# Patient Record
Sex: FEMALE | Race: BLACK OR AFRICAN AMERICAN | NOT HISPANIC OR LATINO | URBAN - METROPOLITAN AREA
[De-identification: names, ages, dates, MRNs, and addresses within clinical notes are randomized per-mention and may not be internally consistent; named-entity substitution may affect disease eponyms.]

---

## 2021-09-07 ENCOUNTER — EMERGENCY (EMERGENCY)
Facility: HOSPITAL | Age: 51
LOS: 1 days | Discharge: HOME | End: 2021-09-07
Attending: EMERGENCY MEDICINE
Payer: COMMERCIAL

## 2021-09-07 VITALS
HEART RATE: 81 BPM | OXYGEN SATURATION: 98 % | DIASTOLIC BLOOD PRESSURE: 80 MMHG | RESPIRATION RATE: 20 BRPM | WEIGHT: 199.96 LBS | TEMPERATURE: 98 F | SYSTOLIC BLOOD PRESSURE: 151 MMHG | HEIGHT: 66 IN

## 2021-09-07 PROBLEM — Z00.00 ENCOUNTER FOR PREVENTIVE HEALTH EXAMINATION: Status: ACTIVE | Noted: 2021-09-07

## 2021-09-07 LAB
ABO RH CONFIRMATION: SIGNIFICANT CHANGE UP
ALBUMIN SERPL ELPH-MCNC: 3.6 G/DL — SIGNIFICANT CHANGE UP (ref 3.3–5.2)
ALP SERPL-CCNC: 49 U/L — SIGNIFICANT CHANGE UP (ref 40–120)
ALT FLD-CCNC: 15 U/L — SIGNIFICANT CHANGE UP
ANION GAP SERPL CALC-SCNC: 13 MMOL/L — SIGNIFICANT CHANGE UP (ref 5–17)
ANISOCYTOSIS BLD QL: SLIGHT — SIGNIFICANT CHANGE UP
APTT BLD: 27.8 SEC — SIGNIFICANT CHANGE UP (ref 27.5–35.5)
AST SERPL-CCNC: 45 U/L — HIGH
BASOPHILS # BLD AUTO: 0.06 K/UL — SIGNIFICANT CHANGE UP (ref 0–0.2)
BASOPHILS NFR BLD AUTO: 0.8 % — SIGNIFICANT CHANGE UP (ref 0–2)
BILIRUB SERPL-MCNC: <0.2 MG/DL — LOW (ref 0.4–2)
BLD GP AB SCN SERPL QL: SIGNIFICANT CHANGE UP
BUN SERPL-MCNC: 5.8 MG/DL — LOW (ref 8–20)
BURR CELLS BLD QL SMEAR: PRESENT — SIGNIFICANT CHANGE UP
CALCIUM SERPL-MCNC: 8.9 MG/DL — SIGNIFICANT CHANGE UP (ref 8.6–10.2)
CHLORIDE SERPL-SCNC: 104 MMOL/L — SIGNIFICANT CHANGE UP (ref 98–107)
CO2 SERPL-SCNC: 19 MMOL/L — LOW (ref 22–29)
CREAT SERPL-MCNC: 0.92 MG/DL — SIGNIFICANT CHANGE UP (ref 0.5–1.3)
DACRYOCYTES BLD QL SMEAR: SLIGHT — SIGNIFICANT CHANGE UP
ELLIPTOCYTES BLD QL SMEAR: SLIGHT — SIGNIFICANT CHANGE UP
EOSINOPHIL # BLD AUTO: 0.21 K/UL — SIGNIFICANT CHANGE UP (ref 0–0.5)
EOSINOPHIL NFR BLD AUTO: 2.6 % — SIGNIFICANT CHANGE UP (ref 0–6)
GIANT PLATELETS BLD QL SMEAR: PRESENT — SIGNIFICANT CHANGE UP
GLUCOSE SERPL-MCNC: 114 MG/DL — HIGH (ref 70–99)
HCG SERPL-ACNC: <4 MIU/ML — SIGNIFICANT CHANGE UP
HCT VFR BLD CALC: 20.3 % — CRITICAL LOW (ref 34.5–45)
HGB BLD-MCNC: 5.8 G/DL — CRITICAL LOW (ref 11.5–15.5)
HYPOCHROMIA BLD QL: SLIGHT — SIGNIFICANT CHANGE UP
INR BLD: 0.96 RATIO — SIGNIFICANT CHANGE UP (ref 0.88–1.16)
LYMPHOCYTES # BLD AUTO: 2.75 K/UL — SIGNIFICANT CHANGE UP (ref 1–3.3)
LYMPHOCYTES # BLD AUTO: 34.8 % — SIGNIFICANT CHANGE UP (ref 13–44)
MACROCYTES BLD QL: SLIGHT — SIGNIFICANT CHANGE UP
MAGNESIUM SERPL-MCNC: 2.2 MG/DL — SIGNIFICANT CHANGE UP (ref 1.6–2.6)
MANUAL SMEAR VERIFICATION: SIGNIFICANT CHANGE UP
MCHC RBC-ENTMCNC: 22.2 PG — LOW (ref 27–34)
MCHC RBC-ENTMCNC: 28.6 GM/DL — LOW (ref 32–36)
MCV RBC AUTO: 77.8 FL — LOW (ref 80–100)
MICROCYTES BLD QL: SLIGHT — SIGNIFICANT CHANGE UP
MONOCYTES # BLD AUTO: 0.48 K/UL — SIGNIFICANT CHANGE UP (ref 0–0.9)
MONOCYTES NFR BLD AUTO: 6.1 % — SIGNIFICANT CHANGE UP (ref 2–14)
MYELOCYTES NFR BLD: 0.9 % — HIGH (ref 0–0)
NEUTROPHILS # BLD AUTO: 4.25 K/UL — SIGNIFICANT CHANGE UP (ref 1.8–7.4)
NEUTROPHILS NFR BLD AUTO: 53.9 % — SIGNIFICANT CHANGE UP (ref 43–77)
NRBC # BLD: 2 /100 — HIGH (ref 0–0)
OVALOCYTES BLD QL SMEAR: SLIGHT — SIGNIFICANT CHANGE UP
PHOSPHATE SERPL-MCNC: 3.4 MG/DL — SIGNIFICANT CHANGE UP (ref 2.4–4.7)
PLAT MORPH BLD: NORMAL — SIGNIFICANT CHANGE UP
PLATELET # BLD AUTO: 400 K/UL — SIGNIFICANT CHANGE UP (ref 150–400)
POIKILOCYTOSIS BLD QL AUTO: SLIGHT — SIGNIFICANT CHANGE UP
POLYCHROMASIA BLD QL SMEAR: SLIGHT — SIGNIFICANT CHANGE UP
POTASSIUM SERPL-MCNC: 4.4 MMOL/L — SIGNIFICANT CHANGE UP (ref 3.5–5.3)
POTASSIUM SERPL-SCNC: 4.4 MMOL/L — SIGNIFICANT CHANGE UP (ref 3.5–5.3)
PROT SERPL-MCNC: 7.2 G/DL — SIGNIFICANT CHANGE UP (ref 6.6–8.7)
PROTHROM AB SERPL-ACNC: 11.2 SEC — SIGNIFICANT CHANGE UP (ref 10.6–13.6)
RBC # BLD: 2.61 M/UL — LOW (ref 3.8–5.2)
RBC # FLD: 21.2 % — HIGH (ref 10.3–14.5)
RBC BLD AUTO: ABNORMAL
SCHISTOCYTES BLD QL AUTO: SLIGHT — SIGNIFICANT CHANGE UP
SODIUM SERPL-SCNC: 136 MMOL/L — SIGNIFICANT CHANGE UP (ref 135–145)
TARGETS BLD QL SMEAR: SLIGHT — SIGNIFICANT CHANGE UP
VARIANT LYMPHS # BLD: 0.9 % — SIGNIFICANT CHANGE UP (ref 0–6)
WBC # BLD: 7.89 K/UL — SIGNIFICANT CHANGE UP (ref 3.8–10.5)
WBC # FLD AUTO: 7.89 K/UL — SIGNIFICANT CHANGE UP (ref 3.8–10.5)

## 2021-09-07 PROCEDURE — 76830 TRANSVAGINAL US NON-OB: CPT

## 2021-09-07 PROCEDURE — 85610 PROTHROMBIN TIME: CPT

## 2021-09-07 PROCEDURE — 36415 COLL VENOUS BLD VENIPUNCTURE: CPT

## 2021-09-07 PROCEDURE — P9016: CPT

## 2021-09-07 PROCEDURE — 86923 COMPATIBILITY TEST ELECTRIC: CPT

## 2021-09-07 PROCEDURE — 99220: CPT

## 2021-09-07 PROCEDURE — 86901 BLOOD TYPING SEROLOGIC RH(D): CPT

## 2021-09-07 PROCEDURE — 36430 TRANSFUSION BLD/BLD COMPNT: CPT

## 2021-09-07 PROCEDURE — G0378: CPT

## 2021-09-07 PROCEDURE — 86850 RBC ANTIBODY SCREEN: CPT

## 2021-09-07 PROCEDURE — 76830 TRANSVAGINAL US NON-OB: CPT | Mod: 26

## 2021-09-07 PROCEDURE — 99284 EMERGENCY DEPT VISIT MOD MDM: CPT | Mod: 25

## 2021-09-07 PROCEDURE — 76856 US EXAM PELVIC COMPLETE: CPT

## 2021-09-07 PROCEDURE — 76856 US EXAM PELVIC COMPLETE: CPT | Mod: 26,59

## 2021-09-07 PROCEDURE — 83735 ASSAY OF MAGNESIUM: CPT

## 2021-09-07 PROCEDURE — 84100 ASSAY OF PHOSPHORUS: CPT

## 2021-09-07 PROCEDURE — 86900 BLOOD TYPING SEROLOGIC ABO: CPT

## 2021-09-07 PROCEDURE — 36410 VNPNXR 3YR/> PHY/QHP DX/THER: CPT

## 2021-09-07 PROCEDURE — 85730 THROMBOPLASTIN TIME PARTIAL: CPT

## 2021-09-07 PROCEDURE — 80053 COMPREHEN METABOLIC PANEL: CPT

## 2021-09-07 PROCEDURE — 84702 CHORIONIC GONADOTROPIN TEST: CPT

## 2021-09-07 PROCEDURE — 85025 COMPLETE CBC W/AUTO DIFF WBC: CPT

## 2021-09-07 RX ORDER — DOCUSATE SODIUM 100 MG
1 CAPSULE ORAL
Qty: 30 | Refills: 0
Start: 2021-09-07 | End: 2021-10-06

## 2021-09-07 RX ORDER — MEDROXYPROGESTERONE ACETATE 150 MG/ML
2 INJECTION, SUSPENSION, EXTENDED RELEASE INTRAMUSCULAR
Qty: 60 | Refills: 0
Start: 2021-09-07 | End: 2021-10-06

## 2021-09-07 RX ORDER — FERROUS SULFATE 325(65) MG
1 TABLET ORAL
Qty: 30 | Refills: 0
Start: 2021-09-07 | End: 2021-10-06

## 2021-09-07 NOTE — CONSULT NOTE ADULT - SUBJECTIVE AND OBJECTIVE BOX
HPI:     51y   Last Menstrual Period -   presents with heavy vaginal bleeding w/ clots and feeling lightheaded and experiencing palpitations. Pt states that after her menses ended on schedule, she had an episode of heavy bleeding with multiple fist-size clots. She states that the bleeding then eased up but returned a couple days later accompanied by lightheadedness, feeling weak and like she would pass out. EMS was called and she was evaluated. She felt better and was not brought in to the ED. However, the bleeding returned heavily last night, and she passed multiple large clots. She reports using 7 pads overnight and thus presented to Urgent Care, from which she was sent to the ED this morning for evaluation. Currently, she is no longer bleeding. Of note, she was incidentally found to have large fibroids on an MRI for back pain this past April. At that time, she was completely asymptomatic. She followed up with a gyn who recommended surgery eval, and she has an appointment scheduled with an outside provider for tomorrow, although she would prefer to follow up with a [facility] physician.     PMHX; denies  PSHX; ankle surgery  POBHX; 1 FT , 1 sAb, 1 TOP  PGYNHX: h/o abnl pap s/p colpo, repeat nl. h/o chlamydia s/p tx; fibroid uterus; right ovarian pain  SOCIAL: denies alcohol, illicit drug, and smoking use  Allergies: No Known Allergies  MEDS: iron    Vital Signs Last 24 Hrs  T(C): 36.7 (07 Sep 2021 09:17), Max: 36.7 (07 Sep 2021 09:17)  T(F): 98.1 (07 Sep 2021 09:17), Max: 98.1 (07 Sep 2021 09:17)  HR: 72 (07 Sep 2021 10:28) (72 - 81)  BP: 130/60 (07 Sep 2021 10:28) (130/60 - 151/80)  RR: 18 (07 Sep 2021 10:28) (18 - 20)  SpO2: 98% (07 Sep 2021 10:28) (98% - 98%)     PHYSICAL EXAM:  Gen: well-appearing, NAD  Resp: breathing comfortably on RA while sitting. Mildly SOB on exertion.   ABDOMEN: Soft, Nontender, Nondistended; Fibroid uterus palpated - fundus at umbilicus  PELVIC:        EXTERNAL GENITALIA: Normal. No rashes or lesions noted.        SVE: multiple fibroids palpated; uterine fundus at umbilicus, c/w 19wk size uterus. No adnexal tenderness or masses appreciated. Cervix felt smooth without any masses appreciated.    LABS:                        5.8    7.89  )-----------( 400      ( 07 Sep 2021 10:36 )             20.3         136  |  104  |  5.8<L>  ----------------------------<  114<H>  4.4   |  19.0<L>  |  0.92    Ca    8.9      07 Sep 2021 10:36  Phos  3.4       Mg     2.2         TPro  7.2  /  Alb  3.6  /  TBili  <0.2<L>  /  DBili  x   /  AST  45<H>  /  ALT  15  /  AlkPhos  49      RADIOLOGY STUDIES:  < from: US Transvaginal (21 @ 13:04) >  FINDINGS:    Uterus: 13.9 cm x 9.3 cm x 11.4 cm. Anterior transmural leiomyoma, measuring 8.6 x 6.4 x 8.5 cm. Lateral uterine leiomyoma, measuring 4.1 x 3.0 x 3.5 cm. Left lateral intramural leiomyoma, measuring 4.0 x 2.3 x 2.4 cm. Posterior intramural leiomyomas, measuring 2.8 and 1.4 cm.  Endometrium: Not clearly visualized.    Right ovary: 3.0 cm x 1.6 cm x 2.4 cm. Within normallimits. Normal arterial and venous waveforms.  Left ovary: 4.0 cm x 1.4 cm x 2.2 cm. Within normal limits. Normal arterial and venous waveforms.    Fluid: None.    IMPRESSION:  Multiple uterine leiomyomas.    < end of copied text >

## 2021-09-07 NOTE — ED PROVIDER NOTE - ATTENDING CONTRIBUTION TO CARE
I personally saw the patient with the resident, and completed the key components of the history and physical exam. I then discussed the management plan with the resident.    50 y/o F with fibroids presents for dysfunctional uterine bleeding with large clots, no pain starting on 9/1 with SOB, lightheadedness and near syncope. She was hypotensive at urgent care, given 1L NS which improved BP. Does not have GYN in [place] - saw her GYN in [place] recently and has follow up appointment next week.    AP - well appearing, pale, RRR, lungs CTA B/L, abd soft, NT/ND, no LE edema or calf TTP, 2+ distal pulses.    Will evaluate for level of anemia, sono, GYN consult as needed, transfuse as needed.

## 2021-09-07 NOTE — ED PROVIDER NOTE - CARE PLAN
1 Principal Discharge DX:	DUB (dysfunctional uterine bleeding)  Secondary Diagnosis:	Anemia due to acute blood loss

## 2021-09-07 NOTE — ED ADULT NURSE NOTE - NSIMPLEMENTINTERV_GEN_ALL_ED
Implemented All Universal Safety Interventions:  [place] to call system. Call bell, personal items and telephone within reach. Instruct patient to call for assistance. Room bathroom lighting operational. Non-slip footwear when patient is off stretcher. Physically safe environment: no spills, clutter or unnecessary equipment. Stretcher in lowest position, wheels locked, appropriate side rails in place.

## 2021-09-07 NOTE — ED ADULT NURSE REASSESSMENT NOTE - NS ED NURSE REASSESS COMMENT FT1
Pt awake and alert, VSS afebrile, normal sinus on bedside cardiac monitor. Pt currently receiving 1st unit of PRBC no adverse reactions noted. Will continue to monitor.

## 2021-09-07 NOTE — ED ADULT NURSE NOTE - OBJECTIVE STATEMENT
51 year old female, PMHx of uterine fibroids, present to the ED with c/o heavy vaginal bleeding since Friday with large blood clots. Pt states that she was recently diagnosed with fibroids. Pt states that she is not having any abdominal pain or cramping. Pt states that she is using 6-7 pads/24hrs. Pt also reporting some dizziness and lightheadedness. Patient A/Ox4, respirations are even and non labored, NSR on the monitor, abdomen is soft and non tender, NAD noted.

## 2021-09-07 NOTE — ED CDU PROVIDER INITIAL DAY NOTE - PHYSICAL EXAMINATION
Gen: NAD, AOx3  Head: NCAT  Lung: no respiratory distress  CV: Normal perfusion  Abd: soft, NTND  MSK: No edema, no visible deformities, full range of motion in all 4 extremities  Neuro: No focal neurologic deficits  Skin: No rash   Psych: normal affect

## 2021-09-07 NOTE — ED PROVIDER NOTE - CLINICAL SUMMARY MEDICAL DECISION MAKING FREE TEXT BOX
51yF with fibroids presenting with AUB. Patient with continuous heavy bleeding with passage of clots. Denies pregnancy. Now symptomatic due to bleeding. Labs, T&S, U/S, possible transfusion.

## 2021-09-07 NOTE — ED ADULT NURSE REASSESSMENT NOTE - NS ED NURSE REASSESS COMMENT FT1
PT A&Ox4. PT received at 1915 from previous RN, documentation as noted.  PT completed 1 unit PRBCs at change of shift, VSS. Pt has no complaints of pain or adverse reaction.  PT able to make needs know.  PT able to ambulate.  ON portable cardiac monitor.  IVs patent and intact. Will continue to monitor.

## 2021-09-07 NOTE — ED PROVIDER NOTE - OBJECTIVE STATEMENT
51yF recently diagnosed with fibroids with no other pmh presenting with recurrent, heavy vaginal bleeding. Patient reports menses is normally regular, lasting 5 days, using 2-4 pads per day. LMP was 8/23-09/01 and per patient was normal. Bleeding stopped but then had new bleeding on 09/01 which was heavy with passage of clots the size of her palm. Describes being "drenched in blood". Continued to have bleeding with clots through Friday 09/01 and began to have light-headedness and dizziness. Friend described her as pale. Called EMS who took BP which per patient may have been low. Rechecked at scene and was ok. Did not go to hospital. Continued to have bleeding with clots through weekend and went through 7 pads from Mon night to Tuesday morning. Went to urgent care where she was found to be hypotensive. Received IVF at urgent care and BIBA to [facility]. With respect to gyn history, denies history of AUB, clotting issues. Had MRI for back pain in April 2021 which noted an enlarged uterus. F/u U/S showed 3 large fibroids. Pt follows with Gyn at [facility] in [place] and plan to see gyn surgeon in next week or so. Last sexually active 6 months ago. No history of STDs or STIs. Denies abd, chest, or pelvic pain. Endorsing some shortness of breath and lightheadedness.

## 2021-09-07 NOTE — CONSULT NOTE ADULT - ATTENDING COMMENTS
Agree with above. Encouraged to consider Provera QD and iron supplementation until definitive treatment.

## 2021-09-07 NOTE — ED CDU PROVIDER INITIAL DAY NOTE - OBJECTIVE STATEMENT
50yo female with PMH fibroids presenting with vaginal bleeding. States that LMP 8/23-8/27, however from 8/31 until today patient started vaginally bleeding. On Friday, patient started to have clots with vaginal bleeding, felt lightheaded, had palpitations, EMS came however patient did not wish to go to hospital at that time. Patient presents today because she feels lightheaded. Went through 7 pads overnight. States that she has stopped bleeding today.

## 2021-09-07 NOTE — ED PROVIDER NOTE - NS ED ROS FT
Gen: No fever, normal appetite  Resp: +shortness of breath  Cardiovascular: +lightheadness; No chest pain or palpitation  Gastroenteric: No nausea/vomiting, diarrhea, constipation  :  No change in urine output; no dysuria  GYN: vaginal bleeding; no pelvic pain  MS: No joint or muscle pain  Skin: No rashes  Neuro: No headache; no abnormal movements  Remainder negative, except as per the HPI

## 2021-09-07 NOTE — ED PROVIDER NOTE - PHYSICAL EXAMINATION
Gen: no acute distress  Head: normocephalic, atraumatic  EENT: EOMI, PERRLA, neck supple; pallor under eyes and tongue  Lung: no increased work of breathing, CTABL, no wheezing, rales, rhonchi  CV: normal s1/s2, rrr, 2+ radial pulses b/l  Abd: soft, non-tender, non-distended, no rebound tenderness or guarding; no CVA tenderness  MSK: No edema, no visible deformities, full range of motion in all 4 extremities  Gyn: blood on pad; no active bleeding on external exam  Neuro: No focal neurologic deficits  Skin: No rash   Psych: normal affect

## 2021-09-07 NOTE — ED ADULT NURSE NOTE - CHIEF COMPLAINT QUOTE
Assumed care of pt. Call light in reach. Bed in lowest position. Care of plan discussed with pt with pt agreeing to care of plan. Communication board updated. All questions answered. Assessment completed.    Vaginal bleeding since Friday, denies pregnancy, C/O lightheadedness.

## 2021-09-07 NOTE — ED CDU PROVIDER INITIAL DAY NOTE - MEDICAL DECISION MAKING DETAILS
50yo F with symptomatic anemia 2/2 vaginal bleeding from uterine fibroids, seen by ob/gyn, bleeding has stopped, will transfuse 2 units prbcs, dc home later with ob/gyn f/u. April Monzon DO

## 2021-09-07 NOTE — CONSULT NOTE ADULT - ASSESSMENT
51y evaluated for AUB in the setting of known large fibroids.    A/P  - transfusing 2u pRBCs  - not currently bleeding  - will send home with provera 20mg qd x30d  - recommend daily iron w/ vitC and stool softener  - Imaging appreciated  - cleared from a gyn perspective, to f/u w/ outpatient gyn. provided w/ a phone number for [facility]    discussed with Dr. Reid

## 2021-09-08 VITALS
HEART RATE: 83 BPM | SYSTOLIC BLOOD PRESSURE: 136 MMHG | TEMPERATURE: 99 F | DIASTOLIC BLOOD PRESSURE: 86 MMHG | RESPIRATION RATE: 18 BRPM | OXYGEN SATURATION: 99 %

## 2021-09-08 PROCEDURE — 99217: CPT

## 2021-09-08 RX ORDER — MEDROXYPROGESTERONE ACETATE 150 MG/ML
2 INJECTION, SUSPENSION, EXTENDED RELEASE INTRAMUSCULAR
Qty: 60 | Refills: 0
Start: 2021-09-08 | End: 2021-10-07

## 2021-09-08 NOTE — ED CDU PROVIDER DISPOSITION NOTE - ATTENDING CONTRIBUTION TO CARE
50 yo  with vaginal bleeding in observation for transfusion. no current bleeding. gyn cleared patient for discharge with her GYN started on provera daily vitamin c and iron supplementation

## 2021-09-08 NOTE — ED CDU PROVIDER DISPOSITION NOTE - NSFOLLOWUPINSTRUCTIONS_ED_ALL_ED_FT
please follow with GYN   please take prescribed medication as directed   please monitor for new or worsening symptoms    PROVERA 20mg  daily for the next 30 days   daily iron and vitamin C

## 2021-09-08 NOTE — ED CDU PROVIDER DISPOSITION NOTE - CLINICAL COURSE
52 yo female with vaginal bleeding placed in observation for transfusion. no current bleeding. gyn cleared patient for discharge with her GYN started on provera daily vitamin c and iron supplementation

## 2021-09-08 NOTE — CHART NOTE - NSCHARTNOTEFT_GEN_A_CORE
Outpatient Follow Up Scheduled with Dr. Mccullough on Thursday 9/9/21 at 12:15pm  [address] [address]  Phone: [phone]

## 2021-09-08 NOTE — ED CDU PROVIDER DISPOSITION NOTE - PATIENT PORTAL LINK FT
You can access the FollowMyHealth Patient Portal offered by [facility] by registering at the following website: [email or web address]. By joining [employer]s FollowMyHealth portal, you will also be able to view your health information using other applications (apps) compatible with our system.

## 2021-09-09 ENCOUNTER — APPOINTMENT (OUTPATIENT)
Dept: OBGYN | Facility: CLINIC | Age: 51
End: 2021-09-09

## 2021-09-30 PROBLEM — Z78.9 OTHER SPECIFIED HEALTH STATUS: Chronic | Status: ACTIVE | Noted: 2021-09-07

## 2021-10-15 ENCOUNTER — NON-APPOINTMENT (OUTPATIENT)
Age: 51
End: 2021-10-15

## 2021-10-15 ENCOUNTER — APPOINTMENT (OUTPATIENT)
Dept: OBGYN | Facility: CLINIC | Age: 51
End: 2021-10-15
Payer: COMMERCIAL

## 2021-10-15 VITALS
HEIGHT: 66 IN | WEIGHT: 206 LBS | BODY MASS INDEX: 33.11 KG/M2 | SYSTOLIC BLOOD PRESSURE: 118 MMHG | DIASTOLIC BLOOD PRESSURE: 70 MMHG

## 2021-10-15 DIAGNOSIS — Z83.3 FAMILY HISTORY OF DIABETES MELLITUS: ICD-10-CM

## 2021-10-15 DIAGNOSIS — Z86.018 PERSONAL HISTORY OF OTHER BENIGN NEOPLASM: ICD-10-CM

## 2021-10-15 DIAGNOSIS — N93.9 ABNORMAL UTERINE AND VAGINAL BLEEDING, UNSPECIFIED: ICD-10-CM

## 2021-10-15 DIAGNOSIS — Z78.9 OTHER SPECIFIED HEALTH STATUS: ICD-10-CM

## 2021-10-15 DIAGNOSIS — D25.9 LEIOMYOMA OF UTERUS, UNSPECIFIED: ICD-10-CM

## 2021-10-15 DIAGNOSIS — Z82.49 FAMILY HISTORY OF ISCHEMIC HEART DISEASE AND OTHER DISEASES OF THE CIRCULATORY SYSTEM: ICD-10-CM

## 2021-10-15 PROCEDURE — 99203 OFFICE O/P NEW LOW 30 MIN: CPT

## 2021-10-15 RX ORDER — MEDROXYPROGESTERONE ACETATE 10 MG/1
10 TABLET ORAL
Refills: 0 | Status: ACTIVE | COMMUNITY

## 2021-10-15 NOTE — PHYSICAL EXAM
[Appropriately responsive] : appropriately responsive [Alert] : alert [No Acute Distress] : no acute distress [Soft] : soft [Non-tender] : non-tender [Non-distended] : non-distended [No HSM] : No HSM [No Lesions] : no lesions [No Mass] : no mass [Oriented x3] : oriented x3 [Labia Majora] : normal [Labia Minora] : normal [Normal] : normal [Enlarged ___ wks] : enlarged [unfilled] ~Uweeks [Uterine Adnexae] : non-palpable

## 2021-10-15 NOTE — REASON FOR VISIT
[Initial] : an initial consultation for [FreeTextEntry2] : a fibroid uterus and heavy menstrual bleeding. The patient was seen in the  emergency room with the same complaint. She was found to be anemic requiring transfusion.

## 2021-10-15 NOTE — DISCUSSION/SUMMARY
[FreeTextEntry1] : The plan is to do a hysteroscopy, endometrial biopsy. Pending the results, treatment options will be discussed with the patient.

## 2021-10-15 NOTE — HISTORY OF PRESENT ILLNESS
[N] : Patient is not sexually active [Menarche Age: ____] : age at menarche was [unfilled] [Regular Cycle Intervals] : periods have been regular [Frequency: Q ___ days] : menstrual periods occur approximately every [unfilled] days [PGHxTotal] : 4 [PGHxFullTerm] : 0 [PGxPremature] : 1 [PGHxAbortions] : 3 [Barrow Neurological Instituteiving] : 1 [PGHxABInduced] : 0 [PGxABSpont] : 3 [PGHxEctopic] : 0 [PGHxMultBirths] : 0

## 2021-11-09 ENCOUNTER — EMERGENCY (EMERGENCY)
Facility: HOSPITAL | Age: 51
LOS: 1 days | Discharge: HOME | End: 2021-11-09
Attending: EMERGENCY MEDICINE
Payer: COMMERCIAL

## 2021-11-09 VITALS
HEART RATE: 102 BPM | HEIGHT: 66 IN | TEMPERATURE: 99 F | OXYGEN SATURATION: 100 % | DIASTOLIC BLOOD PRESSURE: 67 MMHG | SYSTOLIC BLOOD PRESSURE: 117 MMHG | RESPIRATION RATE: 22 BRPM | WEIGHT: 220.02 LBS

## 2021-11-09 PROCEDURE — 99283 EMERGENCY DEPT VISIT LOW MDM: CPT | Mod: 25

## 2021-11-09 PROCEDURE — 73564 X-RAY EXAM KNEE 4 OR MORE: CPT | Mod: 26,LT

## 2021-11-09 PROCEDURE — 99284 EMERGENCY DEPT VISIT MOD MDM: CPT

## 2021-11-09 PROCEDURE — 96372 THER/PROPH/DIAG INJ SC/IM: CPT

## 2021-11-09 PROCEDURE — 73564 X-RAY EXAM KNEE 4 OR MORE: CPT

## 2021-11-09 RX ORDER — KETOROLAC TROMETHAMINE 30 MG/ML
30 SYRINGE (ML) INJECTION ONCE
Refills: 0 | Status: DISCONTINUED | OUTPATIENT
Start: 2021-11-09 | End: 2021-11-09

## 2021-11-09 RX ADMIN — Medication 60 MILLIGRAM(S): at 16:55

## 2021-11-09 RX ADMIN — Medication 30 MILLIGRAM(S): at 15:29

## 2021-11-09 NOTE — ED PROVIDER NOTE - ATTENDING CONTRIBUTION TO CARE
Pt. awake and alert. Pt. with swelling to lateral aspect of left knee. I, Dr. Jin, performed a face to face bedside interview with this patient regarding history of present illness, review of symptoms and relevant past medical, social and family history.  I completed an independent physical examination.  I have also reviewed the ACP's note(s) and discussed the plan with the ACP.

## 2021-11-09 NOTE — ED PROVIDER NOTE - NSFOLLOWUPINSTRUCTIONS_ED_ALL_ED_FT
1. TAKE ALL MEDICATIONS AS DIRECTED.  FOR PAIN YOU CAN TAKE IBUPROFEN (MOTRIN, ADVIL) OR ACETAMINOPHEN (TYLENOL) AS NEEDED, AS DIRECTED ON PACKAGING.  2. FOLLOW UP WITH ___Dr. García _______ AS DIRECTED.  YOU WERE GIVEN COPIES OF ALL LABS AND IMAGING RESULTS FROM YOUR ER VISIT--PLEASE TAKE THEM WITH YOU TO YOUR APPOINTMENT.  3. IF NEEDED, CALL [phone] TO FIND A PRIMARY CARE PHYSICIAN.  OR CALL [phone] TO MAKE AN APPOINTMENT WITH THE MEDICAL CLINIC.  4. RETURN TO THE ER FOR ANY WORSENING SYMPTOMS.

## 2021-11-09 NOTE — ED ADULT TRIAGE NOTE - CHIEF COMPLAINT QUOTE
BIBA from home c/o  non traumatic L knee swelling and pain  since this morning. Pt states she is unable to put pressure on

## 2021-11-09 NOTE — ED PROVIDER NOTE - OBJECTIVE STATEMENT
50 yo F Pt, no PmHx, presents to ED complaining of L knee pain x 1 day. Pt states she has 10/10 L knee pain and swelling.  Pt admits to knee locking last night in bent position. Pt denies fever, chills, recent trauma, numbness, tingling, calf pain, weakness, and any other acute symptoms at this time.

## 2021-11-09 NOTE — ED PROVIDER NOTE - MUSCULOSKELETAL, MLM
Spine appears normal, range of motion is not limited, + TTP Of L knee medial joint line tenderness, + suprapatellar swelling

## 2021-11-09 NOTE — ED PROVIDER NOTE - CARE PROVIDER_API CALL
Migel García)  Orthopaedic Surgery  [address] [place] [address]  Phone: [phone]  Fax: [phone]  Follow Up Time:

## 2021-11-09 NOTE — ED PROVIDER NOTE - PATIENT PORTAL LINK FT
You can access the FollowMyHealth Patient Portal offered by [facility] by registering at the following website: [email or web address]. By joining [employer]’s FollowMyHealth portal, you will also be able to view your health information using other applications (apps) compatible with our system.

## 2021-11-09 NOTE — ED PROVIDER NOTE - PROGRESS NOTE DETAILS
pt placed in ace wrap and referred to [facility] Acute Ed read of Xray shows no acute fractures/ dislocation. PT aware if secondary reading of imaging changes they will be notified.

## 2021-11-15 ENCOUNTER — APPOINTMENT (OUTPATIENT)
Dept: OBGYN | Facility: CLINIC | Age: 51
End: 2021-11-15

## 2021-11-16 ENCOUNTER — TRANSCRIPTION ENCOUNTER (OUTPATIENT)
Age: 51
End: 2021-11-16

## 2021-11-16 ENCOUNTER — NON-APPOINTMENT (OUTPATIENT)
Age: 51
End: 2021-11-16

## 2021-11-17 ENCOUNTER — INPATIENT (INPATIENT)
Facility: HOSPITAL | Age: 51
LOS: 71 days | Discharge: NURSING, REHAB OR LONG-TERM CARE | DRG: 853 | End: 2022-01-28
Attending: SURGERY | Admitting: INTERNAL MEDICINE
Payer: COMMERCIAL

## 2021-11-17 ENCOUNTER — APPOINTMENT (OUTPATIENT)
Dept: ORTHOPEDIC SURGERY | Facility: CLINIC | Age: 51
End: 2021-11-17

## 2021-11-17 ENCOUNTER — TRANSCRIPTION ENCOUNTER (OUTPATIENT)
Age: 51
End: 2021-11-17

## 2021-11-17 VITALS
TEMPERATURE: 98 F | DIASTOLIC BLOOD PRESSURE: 72 MMHG | RESPIRATION RATE: 17 BRPM | WEIGHT: 195.99 LBS | SYSTOLIC BLOOD PRESSURE: 115 MMHG | HEART RATE: 110 BPM | OXYGEN SATURATION: 99 % | HEIGHT: 66 IN

## 2021-11-17 DIAGNOSIS — L03.116 CELLULITIS OF LEFT LOWER LIMB: ICD-10-CM

## 2021-11-17 DIAGNOSIS — Z98.890 OTHER SPECIFIED POSTPROCEDURAL STATES: Chronic | ICD-10-CM

## 2021-11-17 LAB
ACANTHOCYTES BLD QL SMEAR: SIGNIFICANT CHANGE UP
ALBUMIN SERPL ELPH-MCNC: 1.8 G/DL — LOW (ref 3.3–5.2)
ALP SERPL-CCNC: 124 U/L — HIGH (ref 40–120)
ALT FLD-CCNC: 29 U/L — SIGNIFICANT CHANGE UP
ANION GAP SERPL CALC-SCNC: 25 MMOL/L — HIGH (ref 5–17)
ANISOCYTOSIS BLD QL: SIGNIFICANT CHANGE UP
APPEARANCE UR: ABNORMAL
APTT BLD: 31.4 SEC — SIGNIFICANT CHANGE UP (ref 27.5–35.5)
AST SERPL-CCNC: 50 U/L — HIGH
BACTERIA # UR AUTO: ABNORMAL
BASE EXCESS BLDV CALC-SCNC: -15.4 MMOL/L — LOW (ref -2–3)
BASOPHILS # BLD AUTO: 0 K/UL — SIGNIFICANT CHANGE UP (ref 0–0.2)
BASOPHILS NFR BLD AUTO: 0 % — SIGNIFICANT CHANGE UP (ref 0–2)
BILIRUB SERPL-MCNC: 0.4 MG/DL — SIGNIFICANT CHANGE UP (ref 0.4–2)
BILIRUB UR-MCNC: ABNORMAL
BLD GP AB SCN SERPL QL: SIGNIFICANT CHANGE UP
BUN SERPL-MCNC: 142.5 MG/DL — HIGH (ref 8–20)
BURR CELLS BLD QL SMEAR: PRESENT — SIGNIFICANT CHANGE UP
CA-I SERPL-SCNC: 1.08 MMOL/L — LOW (ref 1.15–1.33)
CALCIUM SERPL-MCNC: 8.5 MG/DL — LOW (ref 8.6–10.2)
CHLORIDE BLDV-SCNC: 104 MMOL/L — SIGNIFICANT CHANGE UP (ref 98–107)
CHLORIDE SERPL-SCNC: 99 MMOL/L — SIGNIFICANT CHANGE UP (ref 98–107)
CHLORIDE UR-SCNC: <27 MMOL/L — SIGNIFICANT CHANGE UP
CO2 SERPL-SCNC: 10 MMOL/L — CRITICAL LOW (ref 22–29)
COLOR SPEC: YELLOW — SIGNIFICANT CHANGE UP
CREAT SERPL-MCNC: 3.8 MG/DL — HIGH (ref 0.5–1.3)
DACRYOCYTES BLD QL SMEAR: SLIGHT — SIGNIFICANT CHANGE UP
DIFF PNL FLD: ABNORMAL
EOSINOPHIL # BLD AUTO: 0 K/UL — SIGNIFICANT CHANGE UP (ref 0–0.5)
EOSINOPHIL NFR BLD AUTO: 0 % — SIGNIFICANT CHANGE UP (ref 0–6)
EPI CELLS # UR: SIGNIFICANT CHANGE UP
FERRITIN SERPL-MCNC: 388 NG/ML — HIGH (ref 15–150)
GAS PNL BLDA: SIGNIFICANT CHANGE UP
GAS PNL BLDV: 132 MMOL/L — LOW (ref 136–145)
GAS PNL BLDV: SIGNIFICANT CHANGE UP
GIANT PLATELETS BLD QL SMEAR: PRESENT — SIGNIFICANT CHANGE UP
GLUCOSE BLDV-MCNC: 190 MG/DL — HIGH (ref 70–99)
GLUCOSE SERPL-MCNC: 211 MG/DL — HIGH (ref 70–99)
GLUCOSE UR QL: 50 MG/DL
HCG SERPL-ACNC: <4 MIU/ML — SIGNIFICANT CHANGE UP
HCO3 BLDV-SCNC: 12 MMOL/L — LOW (ref 22–29)
HCT VFR BLD CALC: 23.5 % — LOW (ref 34.5–45)
HCT VFR BLDA CALC: 22 % — LOW (ref 34–46)
HGB BLD CALC-MCNC: 7.3 G/DL — LOW (ref 11.7–16.1)
HGB BLD-MCNC: 7.7 G/DL — LOW (ref 11.5–15.5)
HIV 1 & 2 AB SERPL IA.RAPID: SIGNIFICANT CHANGE UP
HYPOCHROMIA BLD QL: SLIGHT — SIGNIFICANT CHANGE UP
INR BLD: 1.44 RATIO — HIGH (ref 0.88–1.16)
IRON SATN MFR SERPL: <9 UG/DL — LOW (ref 37–145)
IRON SATN MFR SERPL: SIGNIFICANT CHANGE UP % (ref 14–50)
KETONES UR-MCNC: ABNORMAL
LACTATE BLDV-MCNC: 2.6 MMOL/L — HIGH (ref 0.5–2)
LACTATE BLDV-MCNC: 2.8 MMOL/L — HIGH (ref 0.5–2)
LEUKOCYTE ESTERASE UR-ACNC: ABNORMAL
LYMPHOCYTES # BLD AUTO: 2.43 K/UL — SIGNIFICANT CHANGE UP (ref 1–3.3)
LYMPHOCYTES # BLD AUTO: 5.2 % — LOW (ref 13–44)
MACROCYTES BLD QL: SIGNIFICANT CHANGE UP
MAGNESIUM SERPL-MCNC: 3.5 MG/DL — HIGH (ref 1.6–2.6)
MANUAL SMEAR VERIFICATION: SIGNIFICANT CHANGE UP
MCHC RBC-ENTMCNC: 19.7 PG — LOW (ref 27–34)
MCHC RBC-ENTMCNC: 32.8 GM/DL — SIGNIFICANT CHANGE UP (ref 32–36)
MCV RBC AUTO: 60.3 FL — LOW (ref 80–100)
METAMYELOCYTES # FLD: 1.7 % — HIGH (ref 0–0)
MONOCYTES # BLD AUTO: 0.79 K/UL — SIGNIFICANT CHANGE UP (ref 0–0.9)
MONOCYTES NFR BLD AUTO: 1.7 % — LOW (ref 2–14)
MYELOCYTES NFR BLD: 0.9 % — HIGH (ref 0–0)
NEUTROPHILS # BLD AUTO: 41.41 K/UL — HIGH (ref 1.8–7.4)
NEUTROPHILS NFR BLD AUTO: 80 % — HIGH (ref 43–77)
NEUTS BAND # BLD: 8.7 % — HIGH (ref 0–8)
NITRITE UR-MCNC: NEGATIVE — SIGNIFICANT CHANGE UP
NT-PROBNP SERPL-SCNC: 798 PG/ML — HIGH (ref 0–300)
OSMOLALITY UR: 374 MOSM/KG — SIGNIFICANT CHANGE UP (ref 300–1000)
OVALOCYTES BLD QL SMEAR: SLIGHT — SIGNIFICANT CHANGE UP
PCO2 BLDV: 26 MMHG — LOW (ref 39–42)
PH BLDV: 7.26 — LOW (ref 7.32–7.43)
PH UR: 5 — SIGNIFICANT CHANGE UP (ref 5–8)
PLAT MORPH BLD: NORMAL — SIGNIFICANT CHANGE UP
PLATELET # BLD AUTO: 648 K/UL — HIGH (ref 150–400)
PO2 BLDV: 113 MMHG — HIGH (ref 25–45)
POIKILOCYTOSIS BLD QL AUTO: SIGNIFICANT CHANGE UP
POLYCHROMASIA BLD QL SMEAR: SLIGHT — SIGNIFICANT CHANGE UP
POTASSIUM BLDV-SCNC: 5 MMOL/L — SIGNIFICANT CHANGE UP (ref 3.5–5.1)
POTASSIUM SERPL-MCNC: 5.2 MMOL/L — SIGNIFICANT CHANGE UP (ref 3.5–5.3)
POTASSIUM SERPL-SCNC: 5.2 MMOL/L — SIGNIFICANT CHANGE UP (ref 3.5–5.3)
POTASSIUM UR-SCNC: 59 MMOL/L — SIGNIFICANT CHANGE UP
PROMYELOCYTES # FLD: 0.9 % — HIGH (ref 0–0)
PROT SERPL-MCNC: 6.9 G/DL — SIGNIFICANT CHANGE UP (ref 6.6–8.7)
PROT UR-MCNC: 30 MG/DL
PROTHROM AB SERPL-ACNC: 16.4 SEC — HIGH (ref 10.6–13.6)
RBC # BLD: 3.9 M/UL — SIGNIFICANT CHANGE UP (ref 3.8–5.2)
RBC # FLD: 23.9 % — HIGH (ref 10.3–14.5)
RBC BLD AUTO: ABNORMAL
RBC CASTS # UR COMP ASSIST: ABNORMAL /HPF (ref 0–4)
SAO2 % BLDV: 98.5 % — SIGNIFICANT CHANGE UP
SARS-COV-2 RNA SPEC QL NAA+PROBE: SIGNIFICANT CHANGE UP
SCHISTOCYTES BLD QL AUTO: SLIGHT — SIGNIFICANT CHANGE UP
SODIUM SERPL-SCNC: 134 MMOL/L — LOW (ref 135–145)
SODIUM UR-SCNC: <30 MMOL/L — SIGNIFICANT CHANGE UP
SP GR SPEC: 1.02 — SIGNIFICANT CHANGE UP (ref 1.01–1.02)
TARGETS BLD QL SMEAR: SLIGHT — SIGNIFICANT CHANGE UP
TIBC SERPL-MCNC: 160 UG/DL — LOW (ref 220–430)
TRANSFERRIN SERPL-MCNC: 112 MG/DL — LOW (ref 192–382)
TROPONIN T SERPL-MCNC: <0.01 NG/ML — SIGNIFICANT CHANGE UP (ref 0–0.06)
UROBILINOGEN FLD QL: 1 MG/DL
VARIANT LYMPHS # BLD: 0.9 % — SIGNIFICANT CHANGE UP (ref 0–6)
WBC # BLD: 46.68 K/UL — CRITICAL HIGH (ref 3.8–10.5)
WBC # FLD AUTO: 46.68 K/UL — CRITICAL HIGH (ref 3.8–10.5)
WBC UR QL: ABNORMAL

## 2021-11-17 PROCEDURE — 71250 CT THORAX DX C-: CPT | Mod: 26,MA

## 2021-11-17 PROCEDURE — 74176 CT ABD & PELVIS W/O CONTRAST: CPT | Mod: 26,MA

## 2021-11-17 PROCEDURE — 99231 SBSQ HOSP IP/OBS SF/LOW 25: CPT | Mod: GC,25

## 2021-11-17 PROCEDURE — 71045 X-RAY EXAM CHEST 1 VIEW: CPT | Mod: 26,76

## 2021-11-17 PROCEDURE — 27603 DRAIN LOWER LEG LESION: CPT | Mod: GC

## 2021-11-17 PROCEDURE — 99243 OFF/OP CNSLTJ NEW/EST LOW 30: CPT

## 2021-11-17 PROCEDURE — 73562 X-RAY EXAM OF KNEE 3: CPT | Mod: 26,LT

## 2021-11-17 PROCEDURE — 99291 CRITICAL CARE FIRST HOUR: CPT

## 2021-11-17 PROCEDURE — 73700 CT LOWER EXTREMITY W/O DYE: CPT | Mod: 26,LT,MA

## 2021-11-17 PROCEDURE — 99253 IP/OBS CNSLTJ NEW/EST LOW 45: CPT | Mod: 57

## 2021-11-17 PROCEDURE — 93971 EXTREMITY STUDY: CPT | Mod: 26,LT

## 2021-11-17 PROCEDURE — 73610 X-RAY EXAM OF ANKLE: CPT | Mod: 26,LT

## 2021-11-17 PROCEDURE — 93010 ELECTROCARDIOGRAM REPORT: CPT

## 2021-11-17 PROCEDURE — 70450 CT HEAD/BRAIN W/O DYE: CPT | Mod: 26,MA

## 2021-11-17 PROCEDURE — 20680 REMOVAL OF IMPLANT DEEP: CPT | Mod: 59,LT

## 2021-11-17 PROCEDURE — 99221 1ST HOSP IP/OBS SF/LOW 40: CPT | Mod: GC,25

## 2021-11-17 PROCEDURE — 76770 US EXAM ABDO BACK WALL COMP: CPT | Mod: 26

## 2021-11-17 PROCEDURE — 27310 EXPLORATION OF KNEE JOINT: CPT | Mod: LT

## 2021-11-17 PROCEDURE — 99213 OFFICE O/P EST LOW 20 MIN: CPT | Mod: 25

## 2021-11-17 RX ORDER — CHLORHEXIDINE GLUCONATE 213 G/1000ML
15 SOLUTION TOPICAL EVERY 12 HOURS
Refills: 0 | Status: DISCONTINUED | OUTPATIENT
Start: 2021-11-17 | End: 2021-11-18

## 2021-11-17 RX ORDER — SODIUM CHLORIDE 9 MG/ML
1000 INJECTION INTRAMUSCULAR; INTRAVENOUS; SUBCUTANEOUS ONCE
Refills: 0 | Status: COMPLETED | OUTPATIENT
Start: 2021-11-17 | End: 2021-11-17

## 2021-11-17 RX ORDER — SODIUM CHLORIDE 9 MG/ML
1700 INJECTION, SOLUTION INTRAVENOUS ONCE
Refills: 0 | Status: COMPLETED | OUTPATIENT
Start: 2021-11-17 | End: 2021-11-17

## 2021-11-17 RX ORDER — HYDROMORPHONE HYDROCHLORIDE 2 MG/ML
1 INJECTION INTRAMUSCULAR; INTRAVENOUS; SUBCUTANEOUS ONCE
Refills: 0 | Status: DISCONTINUED | OUTPATIENT
Start: 2021-11-17 | End: 2021-11-17

## 2021-11-17 RX ORDER — KETOROLAC TROMETHAMINE 30 MG/ML
15 SYRINGE (ML) INJECTION ONCE
Refills: 0 | Status: DISCONTINUED | OUTPATIENT
Start: 2021-11-17 | End: 2021-11-17

## 2021-11-17 RX ORDER — SODIUM CHLORIDE 9 MG/ML
1000 INJECTION, SOLUTION INTRAVENOUS
Refills: 0 | Status: DISCONTINUED | OUTPATIENT
Start: 2021-11-17 | End: 2021-11-22

## 2021-11-17 RX ORDER — FENTANYL CITRATE 50 UG/ML
0.5 INJECTION INTRAVENOUS
Qty: 2500 | Refills: 0 | Status: DISCONTINUED | OUTPATIENT
Start: 2021-11-17 | End: 2021-11-18

## 2021-11-17 RX ORDER — NOREPINEPHRINE BITARTRATE/D5W 8 MG/250ML
0.05 PLASTIC BAG, INJECTION (ML) INTRAVENOUS
Qty: 8 | Refills: 0 | Status: DISCONTINUED | OUTPATIENT
Start: 2021-11-17 | End: 2021-11-19

## 2021-11-17 RX ORDER — MORPHINE SULFATE 50 MG/1
4 CAPSULE, EXTENDED RELEASE ORAL ONCE
Refills: 0 | Status: DISCONTINUED | OUTPATIENT
Start: 2021-11-17 | End: 2021-11-17

## 2021-11-17 RX ORDER — DEXMEDETOMIDINE HYDROCHLORIDE IN 0.9% SODIUM CHLORIDE 4 UG/ML
0.2 INJECTION INTRAVENOUS
Qty: 200 | Refills: 0 | Status: DISCONTINUED | OUTPATIENT
Start: 2021-11-17 | End: 2021-11-18

## 2021-11-17 RX ORDER — VANCOMYCIN HCL 1 G
1000 VIAL (EA) INTRAVENOUS ONCE
Refills: 0 | Status: COMPLETED | OUTPATIENT
Start: 2021-11-17 | End: 2021-11-17

## 2021-11-17 RX ORDER — VASOPRESSIN 20 [USP'U]/ML
0.04 INJECTION INTRAVENOUS
Qty: 50 | Refills: 0 | Status: DISCONTINUED | OUTPATIENT
Start: 2021-11-17 | End: 2021-11-18

## 2021-11-17 RX ORDER — PIPERACILLIN AND TAZOBACTAM 4; .5 G/20ML; G/20ML
3.38 INJECTION, POWDER, LYOPHILIZED, FOR SOLUTION INTRAVENOUS ONCE
Refills: 0 | Status: COMPLETED | OUTPATIENT
Start: 2021-11-17 | End: 2021-11-17

## 2021-11-17 RX ORDER — CHLORHEXIDINE GLUCONATE 213 G/1000ML
1 SOLUTION TOPICAL
Refills: 0 | Status: DISCONTINUED | OUTPATIENT
Start: 2021-11-17 | End: 2021-11-22

## 2021-11-17 RX ORDER — PIPERACILLIN AND TAZOBACTAM 4; .5 G/20ML; G/20ML
3.38 INJECTION, POWDER, LYOPHILIZED, FOR SOLUTION INTRAVENOUS EVERY 12 HOURS
Refills: 0 | Status: DISCONTINUED | OUTPATIENT
Start: 2021-11-17 | End: 2021-11-19

## 2021-11-17 RX ORDER — SODIUM CHLORIDE 9 MG/ML
1000 INJECTION, SOLUTION INTRAVENOUS ONCE
Refills: 0 | Status: COMPLETED | OUTPATIENT
Start: 2021-11-17 | End: 2021-11-17

## 2021-11-17 RX ORDER — SODIUM BICARBONATE 1 MEQ/ML
0.21 SYRINGE (ML) INTRAVENOUS
Qty: 150 | Refills: 0 | Status: DISCONTINUED | OUTPATIENT
Start: 2021-11-17 | End: 2021-11-17

## 2021-11-17 RX ADMIN — MORPHINE SULFATE 4 MILLIGRAM(S): 50 CAPSULE, EXTENDED RELEASE ORAL at 14:46

## 2021-11-17 RX ADMIN — Medication 15 MILLIGRAM(S): at 13:12

## 2021-11-17 RX ADMIN — HYDROMORPHONE HYDROCHLORIDE 1 MILLIGRAM(S): 2 INJECTION INTRAMUSCULAR; INTRAVENOUS; SUBCUTANEOUS at 20:25

## 2021-11-17 RX ADMIN — SODIUM CHLORIDE 1700 MILLILITER(S): 9 INJECTION, SOLUTION INTRAVENOUS at 18:09

## 2021-11-17 RX ADMIN — HYDROMORPHONE HYDROCHLORIDE 1 MILLIGRAM(S): 2 INJECTION INTRAMUSCULAR; INTRAVENOUS; SUBCUTANEOUS at 20:56

## 2021-11-17 RX ADMIN — MORPHINE SULFATE 4 MILLIGRAM(S): 50 CAPSULE, EXTENDED RELEASE ORAL at 16:00

## 2021-11-17 RX ADMIN — SODIUM CHLORIDE 1700 MILLILITER(S): 9 INJECTION, SOLUTION INTRAVENOUS at 14:47

## 2021-11-17 RX ADMIN — Medication 15 MILLIGRAM(S): at 14:40

## 2021-11-17 RX ADMIN — Medication 125 MEQ/KG/HR: at 17:09

## 2021-11-17 RX ADMIN — MORPHINE SULFATE 4 MILLIGRAM(S): 50 CAPSULE, EXTENDED RELEASE ORAL at 13:12

## 2021-11-17 RX ADMIN — SODIUM CHLORIDE 1000 MILLILITER(S): 9 INJECTION INTRAMUSCULAR; INTRAVENOUS; SUBCUTANEOUS at 14:13

## 2021-11-17 RX ADMIN — Medication 250 MILLIGRAM(S): at 16:00

## 2021-11-17 RX ADMIN — PIPERACILLIN AND TAZOBACTAM 200 GRAM(S): 4; .5 INJECTION, POWDER, LYOPHILIZED, FOR SOLUTION INTRAVENOUS at 14:48

## 2021-11-17 RX ADMIN — PIPERACILLIN AND TAZOBACTAM 3.38 GRAM(S): 4; .5 INJECTION, POWDER, LYOPHILIZED, FOR SOLUTION INTRAVENOUS at 16:00

## 2021-11-17 RX ADMIN — MORPHINE SULFATE 4 MILLIGRAM(S): 50 CAPSULE, EXTENDED RELEASE ORAL at 14:40

## 2021-11-17 RX ADMIN — SODIUM CHLORIDE 1000 MILLILITER(S): 9 INJECTION INTRAMUSCULAR; INTRAVENOUS; SUBCUTANEOUS at 16:00

## 2021-11-17 RX ADMIN — Medication 1000 MILLIGRAM(S): at 17:09

## 2021-11-17 NOTE — ED ADULT NURSE NOTE - CHIEF COMPLAINT QUOTE
left leg swelling, seen at [facility] on 11/9 for same, pain and swelling has gotten significantly worse and pt is unable to walk.  50 mcg fentanyl given by EMS.  #22 L Ac present on arrival.

## 2021-11-17 NOTE — ED PROVIDER NOTE - PHYSICAL EXAMINATION
Gen: NAD, AOx3  Head: NCAT  HEENT: EOMI, oral mucosa moist, normal conjunctiva, neck supple  Lung: CTAB, mild tachypnea  CV: tachy regular, no murmur, Normal perfusion  Abd: soft, NTND  MSK: +3 Lt LE pitting edema circumfrential from foot to thigh, no visible deformities  Neuro: No focal neurologic deficits  Skin: No rash   Psych: normal affect Gen: NAD, AOx3  Head: NCAT  HEENT: EOMI, oral mucosa moist, normal conjunctiva, neck supple  Lung: CTAB, mild tachypnea  CV: tachy regular, no murmur, Normal perfusion  Abd: soft, NTND  MSK: +3 Lt LE pitting edema circumfrential from foot to thigh, no visible deformities  Neuro: No focal neurologic deficits  Skin: increased warmth to left leg with some erythema to calf    Psych: normal affect

## 2021-11-17 NOTE — ED ADULT TRIAGE NOTE - WEIGHT IN KG
pain control/gait training physical therapy at rehab. prevent symptoms. stay hydrated.   change positions very slowly  can try compression stockings while sitting in chair or walking.   sleep with head of bed elevated. 88.9

## 2021-11-17 NOTE — CHART NOTE - NSCHARTNOTEFT_GEN_A_CORE
pt was admitted to hospitalist service but pt  was taken to OR urgently for necrotizing soft tissue infection prior to being evaluated. Post operatively pt with worsening acidosis and anemia, transferred to SICU for further management.

## 2021-11-17 NOTE — ED PROVIDER NOTE - PROGRESS NOTE DETAILS
patient with significant leukocytosis and bandemia, will send lactate and cultures, abx ordered, will give additional fluids. also with acute renal failure. unable to get CTA. will need V/Q but likely start hep pending duplex LE. now does recall 1 day of subjective fever/chills 1 week ago prior to start of leg pain/swelling. now notes some mild lower abd pain but no diarreha. will ct abd/pelvis. ct lower extremity- no obvious cellulitis no crepitus eval for deep space infection -Jose DO patient slightly somnolent arouses to verbal stimuli but slow to answer questions, moving all extremities except for lt leg due to pain and swelling. priority CT called for urgent evaluation -Jose HENDRICKS ortho and gen surg consulted -Jose HENDRICKS patient slightly somnolent arouses to verbal stimuli but slow to answer questions, moving all extremities except for lt leg due to pain and swelling. priority CT called for urgent evaluation. left leg more erythematous, signifcant diffuse ttp, no pain with passive ROM of toes/ankle.  -Slowey DO signed off by surgery no acute intervention, pending ortho eval. will admit medicine -Jose DO patient with increased swelling/redness/pain. surgery to re-eval, currently admitted under medicine service -Jose HENDRICKS

## 2021-11-17 NOTE — ED CLERICAL - DIVISION
[facility] Vaccine Information Statement(s) was given today. This has been reviewed, questions answered, and verbal consent given by Parent for injection(s) and administration of Influenza (Inactivated).      Patient tolerated without incident. See immunization grid for documentation.

## 2021-11-17 NOTE — ED PROVIDER NOTE - CARE PLAN
Principal Discharge DX:	Cellulitis of left lower extremity  Secondary Diagnosis:	Severe sepsis  Secondary Diagnosis:	Acute kidney failure  Secondary Diagnosis:	Bandemia   1

## 2021-11-17 NOTE — ED ADULT NURSE REASSESSMENT NOTE - NS ED NURSE REASSESS COMMENT FT1
Surgery team and ortho team at bedside.  Team made aware that pain, swelling and redness and increased significantly since arrival 8 hours ago.

## 2021-11-17 NOTE — H&P ADULT - ASSESSMENT
51 year old female with no significant past medical history presenting with pain and swelling of left lower extremity for the past few days without improvement.   concern for necrotizing soft tissue infection/fasciitis  given rapid progression of swelling and blistering of skin near the ankle    -urgent OR for left lower extremity exploration, incision/drainage, debridement  -ortho colleages to evaluate hardware in left ankle as source of infection  -npo/iv fluids  -iv antibiotics  -possible SICU post op

## 2021-11-17 NOTE — ED ADULT NURSE NOTE - CAS EDN DISCHARGE INTERVENTIONS
left leg elevated/Arm band on/IV intact/Indwelling catheter secured and draining/Admission wristband placed

## 2021-11-17 NOTE — CHART NOTE - NSCHARTNOTEFT_GEN_A_CORE
Pt evaluated in ED by me 19:30 11/17/21  severe LLE pain with increasing swelling, redness per patient  nurse confirms worsening erythema and edema over last 90 minutes  Bullae noted left ankle with limited mobility of ankle due to pain  exquisitely tender to palpation, duplex neg for DVT  no crepitus but concerning for ascending soft tissue infection  WBC 46, BE -15, Hgb 7, lactate 10, CRT 3.5  Vanc, Zosyn-will add clindamycin  tachycardia 100-110, -120  diffuse edema LLE per CT , no gas in tissue  Ortho also as bedside  proceed to OR emergently for excision, debridement, drainage and indicated procedures  explained all risks and benefits to patient not excluding loss of limb or death  patient stated she understood and wished to proceed with surgery

## 2021-11-17 NOTE — CONSULT NOTE ADULT - SUBJECTIVE AND OBJECTIVE BOX
ACUTE CARE SURGERY CONSULT     HPI: 51y Female present to ED with left leg swelling, erythema, pain. Patient endorses she had left knee pain for 1 week presented 3 days ago to ED  where she states was given an steroid shot, and ibuprofen. however pain and edema worsened. Patient states she wasnt feeling herself today reason why she came. Endorses chills initially with pain 3 days ago but none since, denies history of trauma, insect bites, recent interventions, or injections to the area, contact with ticks, history of diabetes.      ROS: 10-system review is otherwise negative except HPI above.      PAST MEDICAL & SURGICAL HISTORY:  No pertinent past medical history    Left ankle Orif      FAMILY HISTORY:  FH: HTN (hypertension) (Mother)      Family history not pertinent as reviewed with the patient.    SOCIAL HISTORY:  Denies any toxic habits    ALLERGIES: NKA No Known Allergies      HOME MEDICATIONS: ***  Home Medications:    None  --------------------------------------------------------------------------------------------    PHYSICAL EXAM:   General: NAD,  uncomfortale  Neuro: A+Ox3  HEENT: EOMI, PERRLA, MMM  Cardio: RRR  Resp: Non labored breathing on RA  GI/Abd: Soft, NT/ND, no rebound/guarding, no masses palpated  Vascular: All 4 extremities warm and well perfused.   Pelvis: stable  Musculoskeletal: All 4 extremities moving spontaneously, nLeft lower extremoity edema erythema, there is no crepitus or tracking, pulses are palpable, marked knee edema  --------------------------------------------------------------------------------------------    LABS                 7.7    46.68  )----------(  648       ( 2021 13:21 )               23.5      134    |  99     |  142.5  ----------------------------<  211        ( 2021 13:21 )  5.2     |  10.0   |  3.80     Ca    8.5        ( 2021 13:21 )  Mg     3.5       ( 2021 13:21 )    TPro  6.9    /  Alb  1.8    /  TBili  0.4    /  DBili  x      /  AST  50     /  ALT  29     /  AlkPhos  124    ( 2021 13:21 )    LIVER FUNCTIONS - ( 2021 13:21 )  Alb: 1.8 g/dL / Pro: 6.9 g/dL / ALK PHOS: 124 U/L / ALT: 29 U/L / AST: 50 U/L / GGT: x           PT/INR -  16.4 sec / 1.44 ratio   ( 2021 13:21 )       PTT -  31.4 sec   ( 2021 13:21 )  CAPILLARY BLOOD GLUCOSE    CARDIAC MARKERS ( 2021 13:21 )  x     / <0.01 ng/mL / 439 U/L / x     / 10.8 ng/mL      Urinalysis Basic - ( 2021 16:46 )    Color: Yellow / Appearance: Cloudy / S.020 / pH: x  Gluc: x / Ketone: Trace  / Bili: Small / Urobili: 1 mg/dL   Blood: x / Protein: 30 mg/dL / Nitrite: Negative   Leuk Esterase: Trace / RBC: 6-10 /HPF / WBC 11-25   Sq Epi: x / Non Sq Epi: Few / Bacteria: Moderate        14:44 - VBG - pH: 7.260 | pCO2: 26    | pO2: 113   | Lactate: 2.80     --------------------------------------------------------------------------------------------  IMAGING  CT with diffuse soft tissue uptake

## 2021-11-17 NOTE — ED ADULT NURSE NOTE - NSICDXPASTSURGICALHX_GEN_ALL_CORE_FT
PAST SURGICAL HISTORY:  No significant past surgical history      PAST SURGICAL HISTORY:  History of ankle surgery

## 2021-11-17 NOTE — ED ADULT NURSE NOTE - OBJECTIVE STATEMENT
assumed pt care at 1249. Pt was seen in ED on Nov 9 for left knee swelling and pain. Discharged and made follow up appt with ortho which is scheduled for today. Entire left leg including foot became more painful and swollen over the last 3 days.  Also c/o some SOB especially upon exertion.  Oxygen saturation 100% on room air.  tachycardic 109.

## 2021-11-17 NOTE — H&P ADULT - HISTORY OF PRESENT ILLNESS
HPI: 51y Female present to ED with left leg swelling, erythema, pain. Patient endorses she had left knee pain for 1 week presented 3 days ago to ED  where she states was given an steroid shot, and ibuprofen. however pain and edema worsened. Patient states she wasnt feeling herself today reason why she came. Endorses chills initially with pain 3 days ago but none since, denies history of trauma, insect bites, recent interventions, or injections to the area, contact with ticks, history of diabetes.

## 2021-11-17 NOTE — CONSULT NOTE ADULT - ATTENDING COMMENTS
pt seen and examined. rapidly increasing swelling, erythema, with formation of bullae. Pt /w L fibula plate and effusion on US. Unable to range knee due to pain and diffuse soft tissue swelling. Unable to aspirate knee due to cellulitis and probable nec fasc. Trauma taking pt to OR for radical debridement emergently, will perform possible L ankle MASON and Knee washout. WBC 46k, CRP >422, very high probability of nec fasc. post op check
51y Female present to ED with left leg swelling, erythema, pain. Patient endorses she had left knee pain for 1 week presented 3 days ago to ED  where she states was given an steroid shot, and ibuprofen. however pain and edema worsened. Patient states she wasn't feeling herself today reason why she came. Endorses chills initially with pain 3 days ago but none since, denies history of trauma, insect bites, recent interventions, or injections to the area, contact with ticks, history of diabetes.  Leukocytosis and L shift. CT leg reviewed  Renal function impaired due to pre renal causes and dehydration/volume deficit.    On examination, left leg swelling from just above the knee down, consistent with severe cellulitis/edema. Palpable and doppler strong pulses. popliteal/DP PT.  Dorsiflexion impaired due to dorsum foot swelling/ but sensation preserved.  1. Patient has severe cellulitis of the soft tissues but NO necrotizing fasciitis or myonecrosis  2. Blood flow is preserved  3. Possible DVT underlying, but doubt it. US pending  In summary, this patient has severe cellulitis of the superficial tissues and knee effusion. This may either respond to conservative measures, or worsen   Local worsening in sense of swelling, vascular deficit or beeper infection. In addition, septic hyperdynamic picture may worsen, all these necessitating drainage and fasciotomy with possible knee drainage.  Dr Hogan will follow patient through the night

## 2021-11-17 NOTE — PROGRESS NOTE ADULT - SUBJECTIVE AND OBJECTIVE BOX
Pt Name: LUIS FERNANDO DAVIS    MRN: [id number]      Patient is a 51y Female presenting to the emergency department with a chief complaint of left lower extremity swelling.  Patient states she came to [facility] ER on 11/9 for left knee pain and swelling.  Patient denied any recent accident, injury or trauma, negative radiographs.  Patient received an injection of toradol,  was placed in an ACE bandage and given outpatient ortho number for follow up.  Today patient return to ER with increased pain to left lower extremity, and limited range of motion, and erythema to left leg. States the swelling has increased rapidly over the last several days.   Pt still denies  recent trauma, but states she has had worsening leg pain and swelling.  States she "didn't feel right"  Trauma service att bedside during exam   REVIEW OF SYSTEMS    General: Alert, responsive, in NAD    Skin/Breast: No rashes, no pruritis   	  Ophthalmologic: No visual changes. No redness.   	  ENMT:	No discharge. No swelling.    Respiratory and Thorax: No difficulty breathing. No cough.  	   Cardiovascular:	No chest pain. No palpitations.    Gastrointestinal:	 No abdominal pain. No diarrhea.     Genitourinary: Del Toro in place    Musculoskeletal: SEE HPI.    Neurological: No sensory or motor changes.       PAST MEDICAL & SURGICAL HISTORY:  PAST MEDICAL & SURGICAL HISTORY:  No pertinent past medical history    No significant past surgical history        Allergies: No Known Allergies      Medications: sodium bicarbonate  Infusion 0.211 mEq/kG/Hr IV Continuous <Continuous>      FAMILY HISTORY:  FH: HTN (hypertension) (Mother)    : non-contributory    Social History:              7.7    46.68 )-----------( 648      ( 17 Nov 2021 13:21 )             23.5       11-17    134<L>  |  99  |  142.5<H>  ----------------------------<  211<H>  5.2   |  10.0<LL>  |  3.80<H>    Ca    8.5<L>      17 Nov 2021 13:21  Mg     3.5     11-17    TPro  6.9  /  Alb  1.8<L>  /  TBili  0.4  /  DBili  x   /  AST  50<H>  /  ALT  29  /  AlkPhos  124<H>  11-17      Vital Signs Last 24 Hrs  T(C): 36.9 (17 Nov 2021 17:15), Max: 37.2 (17 Nov 2021 14:38)  T(F): 98.4 (17 Nov 2021 17:15), Max: 98.9 (17 Nov 2021 14:38)  HR: 100 (17 Nov 2021 17:15) (64 - 110)  BP: 121/58 (17 Nov 2021 17:15) (115/72 - 121/58)  BP(mean): --  RR: 18 (17 Nov 2021 17:15) (17 - 18)  SpO2: 99% (17 Nov 2021 17:15) (99% - 100%)    Daily Height in cm: 167.64 (17 Nov 2021 11:42)    Daily       PHYSICAL EXAM:      Appearance: Alert, responsive, in no acute distress.    Neurological: Sensation is grossly intact to light touch Left lower extremity. Limited ankle range of motion, patient is unable to flex, and ankle  knee secondary to swelling.    Skin: no rash on visible skin. Skin is clean, dry and intact. No bleeding. No abrasions. No ulcerations.    Vascular: 2+ distal pulses. Cap refill < 2 sec. No signs of venous insufficiency or stasis. No extremity ulcerations. No cyanosis.    Musculoskeletal:         Left Lower Extremity: Sensation is grossly intact to light touch Left lower extremity. Limited ankle range of motion, patient is unable to flex,  knee secondary to swelling.  EHL/FHL intact.   LLE swellling encompasses  left foot.  Palpable distal pulse.   swelling noted to left knee, cellulitis along entire left lower extremity.   pitting edema noted at calf.     Imaging Studies:  < from: CT Lower Extremity No Cont, Left (11.17.21 @ 15:30) >   EXAM:  CT LWR EXT LT                          PROCEDURE DATE:  11/17/2021          INTERPRETATION:  CT LOWER EXTREMITY LEFT    HISTORY: Sepsis with left leg swelling. Pain.    TECHNIQUE: Contiguous axial imaging was performed through the left lower extremity from the hip to the foot. Coronal and sagittal reformatting was utilized.    COMPARISON:  Left knee x-rays dated November 9, 2021 and pelvic ultrasound dated September 7, 2021.    FINDINGS    OSSEOUS STRUCTURES    Fractures:  None.    Postoperative Changes: Lateral malleolus surgical plate and lag screw are present. No lucency is appreciated around the hardware.    VISUALIZED PELVIC JOINTS    Symphysis Pubis:  Preserved.    Visualized Sacroiliac Joint:  Mild arthrosis is noted.    HIP JOINTS    Joint Space(s):  Maintained.    KNEE JOINTS    Joint Space(s):  Partially imaged right knee demonstrate mild medial compartment joint space narrowing. The left knee demonstrates moderate to severe medial compartment joint space narrowing with small marginal osteophytes. Small subchondral cysts are noted within the left patella.    Effusion:  Moderate to large left knee joint effusion is present.    LEFT ANKLE/FOOT JOINTS    Joint Space(s):  Maintained.    SOFT TISSUES    Neurovascular:  Unremarkable.    Pelvis/Abdomen:  Large presumed uterine leiomyoma is partially imaged measuring 10.9 x 11.8 cm axially. Borderline sized pelvic, iliac chain, and inguinal lymph nodes are noted that are larger on the left.    Musculature:  Mild intermuscular fascial edema is present around the musculature of the thigh and lower leg with posterior predominance.    Subcutaneous Tissues:  Moderate to severe subcutaneous edema is present throughout the left leg. Cutaneous thickening is also present. No abscess collection or gas is appreciated.    IMPRESSION:  1. Moderate-to-severe subcutaneous edema without gas or appreciated abscess on this noncontrast exam.  2. Intermuscular fascial edema is nonspecific and may be related to fluid imbalance or nonspecific fasciitis. There is no associated gas or discrete fluid collection.  3. Left knee osteoarthritis with moderate to large nonspecific suprapatellar effusion. Consider arthrocentesis as warranted.  4. Large presumed uterine leiomyoma is partially imaged measuring 10.9 x 11.8 cm axially.  5. Borderline sized pelvic and inguinal lymph nodes.    --- End of Report ---      KIERAN LAST MD; Attending Radiologist  This document has been electronically signed. Nov 17 2021  4:03PM    < end of copied text >  < from: CT Abdomen and Pelvis No Cont (11.17.21 @ 15:30) >   EXAM:  CT ABDOMEN AND PELVIS                         EXAM:  CT CHEST                          PROCEDURE DATE:  11/17/2021          INTERPRETATION:  CLINICAL INFORMATION: Sepsis    COMPARISON: None.    CONTRAST/COMPLICATIONS:  IV Contrast: NONE  Oral Contrast: NONE  Complications: None reported at time of study completion    PROCEDURE:  CT of the Chest, Abdomen and Pelvis was performed.  Sagittal and coronal reformats were performed.    FINDINGS:  CHEST:  LUNGS AND LARGE AIRWAYS: Patent centralairways. No pulmonary nodules.  PLEURA: No pleural effusion.  VESSELS: Within normal limits.  HEART: Heart size is normal. No pericardial effusion.  MEDIASTINUM AND RICHARD: No lymphadenopathy.  CHEST WALL AND LOWER NECK: Fluid in the right axilla and adjacent to the pectoralis and scapularis musculature. This may be iatrogenic. Has there been recent placement of a central line? If not, this may be related to inflammation or trauma.    ABDOMEN AND PELVIS:    Motion artifact limits evaluation of the mid abdomen.  LIVER: Within normal limits.  BILE DUCTS: Normal caliber.  GALLBLADDER: Distended gallbladder. Grossly unremarkable.  SPLEEN: Within normal limits.  PANCREAS: Within normal limits.  ADRENALS: Within normal limits.  KIDNEYS/URETERS: No calcifications. Limited evaluation for hydronephrosis due to motion artifact. None is seen with certainty. The left ureter and ovarian vein is limited in evaluation due to motion artifact and without contrast.    BLADDER: Within normal limits.  REPRODUCTIVE ORGANS: Uterus is markedly enlarged with heterogeneous central region. This may represent a large leiomyoma or gross thickening of the endometrial cavity. Please correlate clinically.    BOWEL: No bowel obstruction. Appendix within normal limits.  PERITONEUM: No ascites.  VESSELS: Within normal limits.  RETROPERITONEUM/LYMPH NODES: 1.4 x 2.9 cm left obturator internus questionable left obturator internus lymph node measuring 1.5 x 1.7 cm on series 3 image 262. Torturous vessel may also be within the differential diagnosis./external iliac lymph node on series 3 image 270. Small lymph nodes in the left inguinal region measuring up to 1.2 x 2.2 cm.  ABDOMINAL WALL: Subcutaneous edema the left side. Limited evaluation of vascular structures without intravenous contrast.  BONES: Within normal limits.    IMPRESSION:  No lung consolidation or source of infection visualized.    Mildly enlarged lymph nodes in the left obturator internus/external iliac region. This is limited in evaluation without intravenous contrast. This may be infectious or inflammatory although if there is a primary neoplasm, metastatic disease could not be excluded.    Enlarged uterus with central heterogeneity. This may represent a large leiomyoma versus a thickened endometrium from endometrial carcinoma. Unless there is a known history of large fibroids, pelvic MRI would be recommended. Clinical correlation is suggested.    Fluid in the right axillary region and adjacent to the pectoralis and scapular musculature. If patient had recent central line, this may be iatrogenic. If not, inflammation or infection cannot be excluded. Subcutaneous edema in the left thigh of uncertain etiology. I cannot assess for DVT without intravenous contrast.        --- End of Report ---    < from: Xray Knee w/Patella 4 View, Left (11.09.21 @ 16:02) >   EXAM:  XR KNEE W PATELLA 4+ VIEWS LT                          PROCEDURE DATE:  11/09/2021          INTERPRETATION:  Clinical history: 51 year old female, pain.    Three views of the left knee without comparison demonstrate minimal degenerative change with no fracture, dislocation, suprapatellar effusion/radiographic soft tissue abnormality.    IMPRESSION:  Minimal OA with no acute radiographic findings, if there is continued clinical concern follow-up MRI can be ordered    --- End of Report ---  JESSICA BYRNE DO; Attending Radiologist  This document has been electronically signed. Nov 10 2021 10:29AM    JORGE L SERVIN MD; Attending Radiologist  This document has been electronically signed. Nov 17 2021  4:43PM    A/P:  Pt is a  51y Female with diffuse  Left lower extremity swelling.    PLAN:   *Patient was taken to ultrasound after ortho exam.   follow up ultrasound  Awaiting plan from Dr Tobias

## 2021-11-17 NOTE — CONSULT NOTE ADULT - SUBJECTIVE AND OBJECTIVE BOX
Chief Complaint: leg swelling.    · Chief Complaint: The patient is a 51y Female complaining of    · HPI Objective Statement:  Per ED :         52yo AA F with h/o fibroids previously on hormone,  stopped 1 month ago, 1 week of knee/thigh pain and swelling now progressed rapidly over last 3 days to encompass entire left leg down to ankle.            pain 10/10.          some subjective numbness no weakness but limited movement due to pain.            no CP/SOB. breathing heavy due to pain.          no fever/chills. no rash. no trauma/ injury    PAST MEDICAL HISTORY:  No pertinent past medical history.     PAST SURGICAL HISTORY:  No significant past surgical history.     FAMILY HISTORY:  Mother  Still living? Unknown.    FH: HTN (hypertension), Age at diagnosis: Age Unknown.      · Tobacco Usage	Never smoker         Allergies:  	No Known Allergies:       * Patient Currently Takes Medications as of 09-Nov-2021 16:46 documented in Structured Notes  · 	predniSONE 20 mg oral tablet: 2 tab(s) orally once a day   · 	Provera 10 mg oral tablet: 2 tab(s) orally once a day   · 	Feosol 200 mg (65 mg elemental iron) oral tablet: 1 tab(s) orally once a day   · 	docusate sodium 100 mg oral tablet: 1 tab(s) orally once a day       · Review of Systems:          ROS: no CP/SOB. no cough. no fever. no n/v/d/c. no abd pain. no rash. no bleeding. no urinary complaints. no weakness. no vision changes. no HA. no neck/back pain. +extremity swelling/deformity. No change in mental status.      · Physical Examination:       Gen: NAD, AOx3  	Head: NCAT  	HEENT: EOMI, oral mucosa moist, normal conjunctiva, neck supple  	Lung: CTAB, mild tachypnea  	CV: tachy regular, no murmur, Normal perfusion  	Abd: soft, NT , ND  	MSK: +3 Lt LE pitting edema circumferential  from foot to thigh, no visible deformities  	Neuro: No focal neurologic deficits  	Skin: No rash     Psych : normal affect    CURRENT ORDERS/:   · 	Activity - Bedrest, 17-Nov-2021, Active, Standard  · 	Cardiac Monitor Bedside,   Time/Priority:  STAT, 17-Nov-2021, Active, Standard  · 	IV Insert,   Time/Priority:  STAT, 17-Nov-2021, Active, Standard  · 	Pulse Oximetry,   Frequency: <Continuous>  	  Additional Instructions:  Notify Provider if oxygen saturation is LESS THAN 92%, 17-Nov-2021, Active, Standard  · 	Vital Signs,   Frequency:  per routine, 17-Nov-2021, Active, Standard      Progress: patient with significant leukocytosis, Shift to L ,     lactate and cultures sent , abx ordered, will give additional fluids. also with acute renal failure. unable to get CTA. will need V/Q but likely start hep pending duplex LE. now does recall 1 day of subjective fever/chills 1 week ago prior to start of leg pain/swelling. now notes some mild lower abd pain but no diarrhaea will ct abd/pelvis. ct lower extremity- no obvious cellulitis no crepitus eval for deep space infection -Jose DO.     Date: 17-Nov-2021 14:52.     Progress: Worsening. patient slightly somnolent arouses to verbal stimuli but slow to answer questions, moving all extremities except for lt leg due to pain and swelling. priority CT called for urgent evaluation -Jose DO.    · EKG - Additional Information:	110bpm Sinus tach   mild ST depressions and t wave inversions inferiorly.     QRS 72  · Clinical Summary  (MDM): Summarize the clinical   Encounter	LT LE DVT, with tachycardia/tachypnea ,     R/O PE.       ATN - Vs. Post Renal RONNIE    Rhabdomyolysis,

## 2021-11-17 NOTE — ED ADULT NURSE REASSESSMENT NOTE - NS ED NURSE REASSESS COMMENT FT1
Pt has some increased pain in left knee.  No increased pain in thigh, lower leg or foot.  Awaiting pain medication orders from Dr. Garcia.

## 2021-11-17 NOTE — ED ADULT NURSE REASSESSMENT NOTE - NS ED NURSE REASSESS COMMENT FT1
Pt alert and oriented X 4. signed consent to operative procedure. Verbalized understanding of plan of care.  Vital signs stable. Valle catheter 350ml clear, yellow urine in valle bag upon transport to OR.  Transported by 2 surgical NPs.

## 2021-11-17 NOTE — H&P ADULT - ATTENDING COMMENTS
Pt seen and examined by me  agree with findings  exquisitely tender to palpation, unable to move foot due to pain  worsening erythema extending from foot to 6-8 cm above knee  bullae at ankle  labs as noted above concerning for ascending soft tissue infection  pt to be taken to OR emergently for incision/debridement/drainage and all indicated procedures

## 2021-11-17 NOTE — ED PROVIDER NOTE - PRESENTATION SUGGESTIVE OF:
r bilateral simple mastectomies bilateral sentinel node biopsies  possible axillary lymph node dissection bilateral iliana flap reconstruction bilateral simple mastectomies bilateral sentinel node biopsies  possible axillary lymph node dissection bilateral iliana flap reconstruction Bacterial Etiology

## 2021-11-17 NOTE — ED ADULT TRIAGE NOTE - CHIEF COMPLAINT QUOTE
left leg swelling, seen at [facility] on 11/9 for same, pain and swelling has gotten significantly worse and pt is unable to walk.  50 mcg fentanyl given by EMS.  #22 L Ac present on arrival.

## 2021-11-17 NOTE — CONSULT NOTE ADULT - SUBJECTIVE AND OBJECTIVE BOX
Progress Note Adult-Orthopedics PA [Charted Location: [facility] ED] [Authored: 17-Nov-2021 17:43]- for Visit: [phone], In Progress, Entered, Signed w/additional Signatures Pending, General    Progress Note:   · Provider Specialty	Orthopedics    Reason for Admission:    Reason for Admission:  · Reason for Admission	Left lower extremity swelling      · Subjective and Objective:   Pt Name: LUIS FERNANDO DAVIS    MRN: [id number]      Patient is a 51y Female presenting to the emergency department with a chief complaint of left lower extremity swelling.  Patient states she came to [facility] ER on 11/9 for left knee pain and swelling.  Patient denied any recent accident, injury or trauma, negative radiographs.  Patient received an injection of toradol,  was placed in an ACE bandage and given outpatient ortho number for follow up.  Today patient return to ER with increased pain to left lower extremity, and limited range of motion, and erythema to left leg. States the swelling has increased rapidly over the last several days.   Pt still denies  recent trauma, but states she has had worsening leg pain and swelling.  States she "didn't feel right"  Trauma service att bedside during exam   REVIEW OF SYSTEMS    General: Alert, responsive, in NAD    Skin/Breast: No rashes, no pruritis   	  Ophthalmologic: No visual changes. No redness.   	  ENMT:	No discharge. No swelling.    Respiratory and Thorax: No difficulty breathing. No cough.  	   Cardiovascular:	No chest pain. No palpitations.    Gastrointestinal:	 No abdominal pain. No diarrhea.     Genitourinary: Del Toro in place    Musculoskeletal: SEE HPI.    Neurological: No sensory or motor changes.       PAST MEDICAL & SURGICAL HISTORY:  PAST MEDICAL & SURGICAL HISTORY:  No pertinent past medical history    No significant past surgical history        Allergies: No Known Allergies      Medications: sodium bicarbonate  Infusion 0.211 mEq/kG/Hr IV Continuous <Continuous>      FAMILY HISTORY:  FH: HTN (hypertension) (Mother)    : non-contributory    Social History:              7.7    46.68 )-----------( 648      ( 17 Nov 2021 13:21 )             23.5       11-17    134<L>  |  99  |  142.5<H>  ----------------------------<  211<H>  5.2   |  10.0<LL>  |  3.80<H>    Ca    8.5<L>      17 Nov 2021 13:21  Mg     3.5     11-17    TPro  6.9  /  Alb  1.8<L>  /  TBili  0.4  /  DBili  x   /  AST  50<H>  /  ALT  29  /  AlkPhos  124<H>  11-17      Vital Signs Last 24 Hrs  T(C): 36.9 (17 Nov 2021 17:15), Max: 37.2 (17 Nov 2021 14:38)  T(F): 98.4 (17 Nov 2021 17:15), Max: 98.9 (17 Nov 2021 14:38)  HR: 100 (17 Nov 2021 17:15) (64 - 110)  BP: 121/58 (17 Nov 2021 17:15) (115/72 - 121/58)  BP(mean): --  RR: 18 (17 Nov 2021 17:15) (17 - 18)  SpO2: 99% (17 Nov 2021 17:15) (99% - 100%)    Daily Height in cm: 167.64 (17 Nov 2021 11:42)    Daily       PHYSICAL EXAM:      Appearance: Alert, responsive, in no acute distress.    Neurological: Sensation is grossly intact to light touch Left lower extremity. Limited ankle range of motion, patient is unable to flex, and ankle  knee secondary to swelling.    Skin: no rash on visible skin. Skin is clean, dry and intact. No bleeding. No abrasions. No ulcerations.    Vascular: 2+ distal pulses. Cap refill < 2 sec. No signs of venous insufficiency or stasis. No extremity ulcerations. No cyanosis.    Musculoskeletal:         Left Lower Extremity: Sensation is grossly intact to light touch Left lower extremity. Limited ankle range of motion, patient is unable to flex,  knee secondary to swelling.  EHL/FHL intact.   LLE swellling encompasses  left foot.  Palpable distal pulse.   swelling noted to left knee, cellulitis along entire left lower extremity.   pitting edema noted at calf.     Imaging Studies:  < from: CT Lower Extremity No Cont, Left (11.17.21 @ 15:30) >   EXAM:  CT LWR EXT LT                          PROCEDURE DATE:  11/17/2021          INTERPRETATION:  CT LOWER EXTREMITY LEFT    HISTORY: Sepsis with left leg swelling. Pain.    TECHNIQUE: Contiguous axial imaging was performed through the left lower extremity from the hip to the foot. Coronal and sagittal reformatting was utilized.    COMPARISON:  Left knee x-rays dated November 9, 2021 and pelvic ultrasound dated September 7, 2021.    FINDINGS    OSSEOUS STRUCTURES    Fractures:  None.    Postoperative Changes: Lateral malleolus surgical plate and lag screw are present. No lucency is appreciated around the hardware.    VISUALIZED PELVIC JOINTS    Symphysis Pubis:  Preserved.    Visualized Sacroiliac Joint:  Mild arthrosis is noted.    HIP JOINTS    Joint Space(s):  Maintained.    KNEE JOINTS    Joint Space(s):  Partially imaged right knee demonstrate mild medial compartment joint space narrowing. The left knee demonstrates moderate to severe medial compartment joint space narrowing with small marginal osteophytes. Small subchondral cysts are noted within the left patella.    Effusion:  Moderate to large left knee joint effusion is present.    LEFT ANKLE/FOOT JOINTS    Joint Space(s):  Maintained.    SOFT TISSUES    Neurovascular:  Unremarkable.    Pelvis/Abdomen:  Large presumed uterine leiomyoma is partially imaged measuring 10.9 x 11.8 cm axially. Borderline sized pelvic, iliac chain, and inguinal lymph nodes are noted that are larger on the left.    Musculature:  Mild intermuscular fascial edema is present around the musculature of the thigh and lower leg with posterior predominance.    Subcutaneous Tissues:  Moderate to severe subcutaneous edema is present throughout the left leg. Cutaneous thickening is also present. No abscess collection or gas is appreciated.    IMPRESSION:  1. Moderate-to-severe subcutaneous edema without gas or appreciated abscess on this noncontrast exam.  2. Intermuscular fascial edema is nonspecific and may be related to fluid imbalance or nonspecific fasciitis. There is no associated gas or discrete fluid collection.  3. Left knee osteoarthritis with moderate to large nonspecific suprapatellar effusion. Consider arthrocentesis as warranted.  4. Large presumed uterine leiomyoma is partially imaged measuring 10.9 x 11.8 cm axially.  5. Borderline sized pelvic and inguinal lymph nodes.    --- End of Report ---      KIERAN LAST MD; Attending Radiologist  This document has been electronically signed. Nov 17 2021  4:03PM    < end of copied text >  < from: CT Abdomen and Pelvis No Cont (11.17.21 @ 15:30) >   EXAM:  CT ABDOMEN AND PELVIS                         EXAM:  CT CHEST                          PROCEDURE DATE:  11/17/2021          INTERPRETATION:  CLINICAL INFORMATION: Sepsis    COMPARISON: None.    CONTRAST/COMPLICATIONS:  IV Contrast: NONE  Oral Contrast: NONE  Complications: None reported at time of study completion    PROCEDURE:  CT of the Chest, Abdomen and Pelvis was performed.  Sagittal and coronal reformats were performed.    FINDINGS:  CHEST:  LUNGS AND LARGE AIRWAYS: Patent centralairways. No pulmonary nodules.  PLEURA: No pleural effusion.  VESSELS: Within normal limits.  HEART: Heart size is normal. No pericardial effusion.  MEDIASTINUM AND RICHARD: No lymphadenopathy.  CHEST WALL AND LOWER NECK: Fluid in the right axilla and adjacent to the pectoralis and scapularis musculature. This may be iatrogenic. Has there been recent placement of a central line? If not, this may be related to inflammation or trauma.    ABDOMEN AND PELVIS:    Motion artifact limits evaluation of the mid abdomen.  LIVER: Within normal limits.  BILE DUCTS: Normal caliber.  GALLBLADDER: Distended gallbladder. Grossly unremarkable.  SPLEEN: Within normal limits.  PANCREAS: Within normal limits.  ADRENALS: Within normal limits.  KIDNEYS/URETERS: No calcifications. Limited evaluation for hydronephrosis due to motion artifact. None is seen with certainty. The left ureter and ovarian vein is limited in evaluation due to motion artifact and without contrast.    BLADDER: Within normal limits.  REPRODUCTIVE ORGANS: Uterus is markedly enlarged with heterogeneous central region. This may represent a large leiomyoma or gross thickening of the endometrial cavity. Please correlate clinically.    BOWEL: No bowel obstruction. Appendix within normal limits.  PERITONEUM: No ascites.  VESSELS: Within normal limits.  RETROPERITONEUM/LYMPH NODES: 1.4 x 2.9 cm left obturator internus questionable left obturator internus lymph node measuring 1.5 x 1.7 cm on series 3 image 262. Torturous vessel may also be within the differential diagnosis./external iliac lymph node on series 3 image 270. Small lymph nodes in the left inguinal region measuring up to 1.2 x 2.2 cm.  ABDOMINAL WALL: Subcutaneous edema the left side. Limited evaluation of vascular structures without intravenous contrast.  BONES: Within normal limits.    IMPRESSION:  No lung consolidation or source of infection visualized.    Mildly enlarged lymph nodes in the left obturator internus/external iliac region. This is limited in evaluation without intravenous contrast. This may be infectious or inflammatory although if there is a primary neoplasm, metastatic disease could not be excluded.    Enlarged uterus with central heterogeneity. This may represent a large leiomyoma versus a thickened endometrium from endometrial carcinoma. Unless there is a known history of large fibroids, pelvic MRI would be recommended. Clinical correlation is suggested.    Fluid in the right axillary region and adjacent to the pectoralis and scapular musculature. If patient had recent central line, this may be iatrogenic. If not, inflammation or infection cannot be excluded. Subcutaneous edema in the left thigh of uncertain etiology. I cannot assess for DVT without intravenous contrast.        --- End of Report ---    < from: Xray Knee w/Patella 4 View, Left (11.09.21 @ 16:02) >   EXAM:  XR KNEE W PATELLA 4+ VIEWS LT                          PROCEDURE DATE:  11/09/2021          INTERPRETATION:  Clinical history: 51 year old female, pain.    Three views of the left knee without comparison demonstrate minimal degenerative change with no fracture, dislocation, suprapatellar effusion/radiographic soft tissue abnormality.    IMPRESSION:  Minimal OA with no acute radiographic findings, if there is continued clinical concern follow-up MRI can be ordered    --- End of Report ---  JESSICA BYRNE DO; Attending Radiologist  This document has been electronically signed. Nov 10 2021 10:29AM    JORGE L SERVIN MD; Attending Radiologist  This document has been electronically signed. Nov 17 2021  4:43PM    A/P:  Pt is a  51y Female with diffuse  Left lower extremity swelling.    PLAN:   *Patient was taken to ultrasound after ortho exam.   follow up ultrasound, x rays  Awaiting plan from Dr Tobias           Electronic Signatures:  Alycia Koenig (PA)  (Signed 17-Nov-2021 18:18)  	Authored: Progress Note, Reason for Admission, Subjective and Objective  Gallo Tobias)  (Signature Pending)  	Co-Signer: Progress Note, Reason for Admission, Subjective and Objective      Last Updated: 17-Nov-2021 18:18 by Alycia Koenig)

## 2021-11-18 LAB
ACANTHOCYTES BLD QL SMEAR: SIGNIFICANT CHANGE UP
ALBUMIN SERPL ELPH-MCNC: 1.2 G/DL — LOW (ref 3.3–5.2)
ALP SERPL-CCNC: 89 U/L — SIGNIFICANT CHANGE UP (ref 40–120)
ALT FLD-CCNC: 19 U/L — SIGNIFICANT CHANGE UP
ANION GAP SERPL CALC-SCNC: 17 MMOL/L — SIGNIFICANT CHANGE UP (ref 5–17)
ANION GAP SERPL CALC-SCNC: 18 MMOL/L — HIGH (ref 5–17)
ANION GAP SERPL CALC-SCNC: 20 MMOL/L — HIGH (ref 5–17)
ANISOCYTOSIS BLD QL: SIGNIFICANT CHANGE UP
ANISOCYTOSIS BLD QL: SIGNIFICANT CHANGE UP
APTT BLD: 32.1 SEC — SIGNIFICANT CHANGE UP (ref 27.5–35.5)
APTT BLD: 32.9 SEC — SIGNIFICANT CHANGE UP (ref 27.5–35.5)
AST SERPL-CCNC: 44 U/L — HIGH
BASE EXCESS BLDV CALC-SCNC: -7.6 MMOL/L — LOW (ref -2–3)
BASOPHILS # BLD AUTO: 0 K/UL — SIGNIFICANT CHANGE UP (ref 0–0.2)
BASOPHILS # BLD AUTO: 0 K/UL — SIGNIFICANT CHANGE UP (ref 0–0.2)
BASOPHILS NFR BLD AUTO: 0 % — SIGNIFICANT CHANGE UP (ref 0–2)
BASOPHILS NFR BLD AUTO: 0 % — SIGNIFICANT CHANGE UP (ref 0–2)
BILIRUB SERPL-MCNC: 0.6 MG/DL — SIGNIFICANT CHANGE UP (ref 0.4–2)
BUN SERPL-MCNC: 107.2 MG/DL — HIGH (ref 8–20)
BUN SERPL-MCNC: 116.8 MG/DL — HIGH (ref 8–20)
BUN SERPL-MCNC: 117.9 MG/DL — HIGH (ref 8–20)
BURR CELLS BLD QL SMEAR: PRESENT — SIGNIFICANT CHANGE UP
CA-I SERPL-SCNC: 1.11 MMOL/L — LOW (ref 1.15–1.33)
CALCIUM SERPL-MCNC: 7.8 MG/DL — LOW (ref 8.6–10.2)
CALCIUM SERPL-MCNC: 8.3 MG/DL — LOW (ref 8.6–10.2)
CALCIUM SERPL-MCNC: 8.7 MG/DL — SIGNIFICANT CHANGE UP (ref 8.6–10.2)
CHLORIDE BLDV-SCNC: 104 MMOL/L — SIGNIFICANT CHANGE UP (ref 98–107)
CHLORIDE SERPL-SCNC: 102 MMOL/L — SIGNIFICANT CHANGE UP (ref 98–107)
CHLORIDE SERPL-SCNC: 102 MMOL/L — SIGNIFICANT CHANGE UP (ref 98–107)
CHLORIDE SERPL-SCNC: 103 MMOL/L — SIGNIFICANT CHANGE UP (ref 98–107)
CK MB CFR SERPL CALC: 11.4 NG/ML — HIGH (ref 0–6.7)
CK MB CFR SERPL CALC: 12.1 NG/ML — HIGH (ref 0–6.7)
CK SERPL-CCNC: 406 U/L — HIGH (ref 25–170)
CK SERPL-CCNC: 498 U/L — HIGH (ref 25–170)
CO2 SERPL-SCNC: 15 MMOL/L — LOW (ref 22–29)
CO2 SERPL-SCNC: 17 MMOL/L — LOW (ref 22–29)
CO2 SERPL-SCNC: 19 MMOL/L — LOW (ref 22–29)
COVID-19 NUCLEOCAPSID GAM AB INTERP: NEGATIVE — SIGNIFICANT CHANGE UP
COVID-19 NUCLEOCAPSID TOTAL GAM ANTIBODY RESULT: 0.08 INDEX — SIGNIFICANT CHANGE UP
COVID-19 SPIKE DOMAIN AB INTERP: POSITIVE
COVID-19 SPIKE DOMAIN ANTIBODY RESULT: 72.1 U/ML — HIGH
CREAT SERPL-MCNC: 1.98 MG/DL — HIGH (ref 0.5–1.3)
CREAT SERPL-MCNC: 2.52 MG/DL — HIGH (ref 0.5–1.3)
CREAT SERPL-MCNC: 2.54 MG/DL — HIGH (ref 0.5–1.3)
CULTURE RESULTS: SIGNIFICANT CHANGE UP
DACRYOCYTES BLD QL SMEAR: SLIGHT — SIGNIFICANT CHANGE UP
EOSINOPHIL # BLD AUTO: 0 K/UL — SIGNIFICANT CHANGE UP (ref 0–0.5)
EOSINOPHIL # BLD AUTO: 0.21 K/UL — SIGNIFICANT CHANGE UP (ref 0–0.5)
EOSINOPHIL NFR BLD AUTO: 0 % — SIGNIFICANT CHANGE UP (ref 0–6)
EOSINOPHIL NFR BLD AUTO: 0.9 % — SIGNIFICANT CHANGE UP (ref 0–6)
FIBRINOGEN PPP-MCNC: 869 MG/DL — CRITICAL HIGH (ref 290–520)
GAS PNL BLDA: SIGNIFICANT CHANGE UP
GAS PNL BLDV: 136 MMOL/L — SIGNIFICANT CHANGE UP (ref 136–145)
GAS PNL BLDV: SIGNIFICANT CHANGE UP
GAS PNL BLDV: SIGNIFICANT CHANGE UP
GIANT PLATELETS BLD QL SMEAR: PRESENT — SIGNIFICANT CHANGE UP
GIANT PLATELETS BLD QL SMEAR: PRESENT — SIGNIFICANT CHANGE UP
GLUCOSE BLDC GLUCOMTR-MCNC: 140 MG/DL — HIGH (ref 70–99)
GLUCOSE BLDC GLUCOMTR-MCNC: 172 MG/DL — HIGH (ref 70–99)
GLUCOSE BLDC GLUCOMTR-MCNC: 177 MG/DL — HIGH (ref 70–99)
GLUCOSE BLDC GLUCOMTR-MCNC: 239 MG/DL — HIGH (ref 70–99)
GLUCOSE BLDV-MCNC: 136 MG/DL — HIGH (ref 70–99)
GLUCOSE SERPL-MCNC: 142 MG/DL — HIGH (ref 70–99)
GLUCOSE SERPL-MCNC: 155 MG/DL — HIGH (ref 70–99)
GLUCOSE SERPL-MCNC: 177 MG/DL — HIGH (ref 70–99)
GRAM STN FLD: SIGNIFICANT CHANGE UP
HCO3 BLDV-SCNC: 18 MMOL/L — LOW (ref 22–29)
HCT VFR BLD CALC: 19.2 % — CRITICAL LOW (ref 34.5–45)
HCT VFR BLD CALC: 23.2 % — LOW (ref 34.5–45)
HCT VFR BLD CALC: 27.8 % — LOW (ref 34.5–45)
HCT VFR BLD CALC: 30.7 % — LOW (ref 34.5–45)
HCT VFR BLDA CALC: 30 % — LOW (ref 34–46)
HGB BLD CALC-MCNC: 10 G/DL — LOW (ref 11.7–16.1)
HGB BLD-MCNC: 10.1 G/DL — LOW (ref 11.5–15.5)
HGB BLD-MCNC: 6.5 G/DL — CRITICAL LOW (ref 11.5–15.5)
HGB BLD-MCNC: 8 G/DL — LOW (ref 11.5–15.5)
HGB BLD-MCNC: 9.4 G/DL — LOW (ref 11.5–15.5)
HYPOCHROMIA BLD QL: SLIGHT — SIGNIFICANT CHANGE UP
INR BLD: 1.47 RATIO — HIGH (ref 0.88–1.16)
INR BLD: 1.58 RATIO — HIGH (ref 0.88–1.16)
LACTATE BLDV-MCNC: 3.3 MMOL/L — HIGH (ref 0.5–2)
LYMPHOCYTES # BLD AUTO: 1.09 K/UL — SIGNIFICANT CHANGE UP (ref 1–3.3)
LYMPHOCYTES # BLD AUTO: 2.02 K/UL — SIGNIFICANT CHANGE UP (ref 1–3.3)
LYMPHOCYTES # BLD AUTO: 4.4 % — LOW (ref 13–44)
LYMPHOCYTES # BLD AUTO: 8.7 % — LOW (ref 13–44)
MACROCYTES BLD QL: SIGNIFICANT CHANGE UP
MACROCYTES BLD QL: SIGNIFICANT CHANGE UP
MAGNESIUM SERPL-MCNC: 2.8 MG/DL — HIGH (ref 1.6–2.6)
MAGNESIUM SERPL-MCNC: 2.8 MG/DL — HIGH (ref 1.6–2.6)
MANUAL SMEAR VERIFICATION: SIGNIFICANT CHANGE UP
MANUAL SMEAR VERIFICATION: SIGNIFICANT CHANGE UP
MCHC RBC-ENTMCNC: 23.2 PG — LOW (ref 27–34)
MCHC RBC-ENTMCNC: 23.2 PG — LOW (ref 27–34)
MCHC RBC-ENTMCNC: 23.4 PG — LOW (ref 27–34)
MCHC RBC-ENTMCNC: 32.9 GM/DL — SIGNIFICANT CHANGE UP (ref 32–36)
MCHC RBC-ENTMCNC: 33.8 GM/DL — SIGNIFICANT CHANGE UP (ref 32–36)
MCHC RBC-ENTMCNC: 33.9 GM/DL — SIGNIFICANT CHANGE UP (ref 32–36)
MCV RBC AUTO: 68.5 FL — LOW (ref 80–100)
MCV RBC AUTO: 69.1 FL — LOW (ref 80–100)
MCV RBC AUTO: 70.4 FL — LOW (ref 80–100)
METAMYELOCYTES # FLD: 1.7 % — HIGH (ref 0–0)
METAMYELOCYTES # FLD: 2.6 % — HIGH (ref 0–0)
METHOD TYPE: SIGNIFICANT CHANGE UP
MONOCYTES # BLD AUTO: 0 K/UL — SIGNIFICANT CHANGE UP (ref 0–0.9)
MONOCYTES # BLD AUTO: 0.65 K/UL — SIGNIFICANT CHANGE UP (ref 0–0.9)
MONOCYTES NFR BLD AUTO: 0 % — LOW (ref 2–14)
MONOCYTES NFR BLD AUTO: 2.6 % — SIGNIFICANT CHANGE UP (ref 2–14)
MYELOCYTES NFR BLD: 1.7 % — HIGH (ref 0–0)
MYOGLOBIN SERPL-MCNC: 1472 NG/ML — HIGH (ref 25–58)
NEUTROPHILS # BLD AUTO: 20.34 K/UL — HIGH (ref 1.8–7.4)
NEUTROPHILS # BLD AUTO: 21.58 K/UL — HIGH (ref 1.8–7.4)
NEUTROPHILS NFR BLD AUTO: 73.9 % — SIGNIFICANT CHANGE UP (ref 43–77)
NEUTROPHILS NFR BLD AUTO: 86.1 % — HIGH (ref 43–77)
NEUTS BAND # BLD: 1.7 % — SIGNIFICANT CHANGE UP (ref 0–8)
NEUTS BAND # BLD: 13.1 % — HIGH (ref 0–8)
NRBC # BLD: 1 /100 — HIGH (ref 0–0)
ORGANISM # SPEC MICROSCOPIC CNT: SIGNIFICANT CHANGE UP
OVALOCYTES BLD QL SMEAR: SLIGHT — SIGNIFICANT CHANGE UP
PCO2 BLDV: 36 MMHG — LOW (ref 39–42)
PH BLDV: 7.32 — SIGNIFICANT CHANGE UP (ref 7.32–7.43)
PHOSPHATE SERPL-MCNC: 10.6 MG/DL — HIGH (ref 2.4–4.7)
PHOSPHATE SERPL-MCNC: 11.7 MG/DL — HIGH (ref 2.4–4.7)
PLAT MORPH BLD: NORMAL — SIGNIFICANT CHANGE UP
PLAT MORPH BLD: NORMAL — SIGNIFICANT CHANGE UP
PLATELET # BLD AUTO: 311 K/UL — SIGNIFICANT CHANGE UP (ref 150–400)
PLATELET # BLD AUTO: 335 K/UL — SIGNIFICANT CHANGE UP (ref 150–400)
PLATELET # BLD AUTO: 401 K/UL — HIGH (ref 150–400)
PLATELET CLUMP BLD QL SMEAR: SLIGHT
PO2 BLDV: 47 MMHG — HIGH (ref 25–45)
POIKILOCYTOSIS BLD QL AUTO: SIGNIFICANT CHANGE UP
POIKILOCYTOSIS BLD QL AUTO: SIGNIFICANT CHANGE UP
POLYCHROMASIA BLD QL SMEAR: SIGNIFICANT CHANGE UP
POLYCHROMASIA BLD QL SMEAR: SIGNIFICANT CHANGE UP
POTASSIUM BLDV-SCNC: 4.7 MMOL/L — SIGNIFICANT CHANGE UP (ref 3.5–5.1)
POTASSIUM SERPL-MCNC: 4.4 MMOL/L — SIGNIFICANT CHANGE UP (ref 3.5–5.3)
POTASSIUM SERPL-MCNC: 4.7 MMOL/L — SIGNIFICANT CHANGE UP (ref 3.5–5.3)
POTASSIUM SERPL-MCNC: 4.7 MMOL/L — SIGNIFICANT CHANGE UP (ref 3.5–5.3)
POTASSIUM SERPL-SCNC: 4.4 MMOL/L — SIGNIFICANT CHANGE UP (ref 3.5–5.3)
POTASSIUM SERPL-SCNC: 4.7 MMOL/L — SIGNIFICANT CHANGE UP (ref 3.5–5.3)
POTASSIUM SERPL-SCNC: 4.7 MMOL/L — SIGNIFICANT CHANGE UP (ref 3.5–5.3)
PROMYELOCYTES # FLD: 1.7 % — HIGH (ref 0–0)
PROT ?TM UR-MCNC: 163 MG/DL — HIGH (ref 0–12)
PROT SERPL-MCNC: 5.2 G/DL — LOW (ref 6.6–8.7)
PROTHROM AB SERPL-ACNC: 16.7 SEC — HIGH (ref 10.6–13.6)
PROTHROM AB SERPL-ACNC: 17.9 SEC — HIGH (ref 10.6–13.6)
RBC # BLD: 2.78 M/UL — LOW (ref 3.8–5.2)
RBC # BLD: 4.06 M/UL — SIGNIFICANT CHANGE UP (ref 3.8–5.2)
RBC # BLD: 4.36 M/UL — SIGNIFICANT CHANGE UP (ref 3.8–5.2)
RBC # FLD: 27.8 % — HIGH (ref 10.3–14.5)
RBC # FLD: 28.9 % — HIGH (ref 10.3–14.5)
RBC # FLD: 30.8 % — HIGH (ref 10.3–14.5)
RBC BLD AUTO: ABNORMAL
RBC BLD AUTO: ABNORMAL
S PYO DNA BLD POS QL NAA+NON-PROBE: SIGNIFICANT CHANGE UP
SAO2 % BLDV: 77.9 % — SIGNIFICANT CHANGE UP
SARS-COV-2 IGG+IGM SERPL QL IA: 0.08 INDEX — SIGNIFICANT CHANGE UP
SARS-COV-2 IGG+IGM SERPL QL IA: 72.1 U/ML — HIGH
SARS-COV-2 IGG+IGM SERPL QL IA: NEGATIVE — SIGNIFICANT CHANGE UP
SARS-COV-2 IGG+IGM SERPL QL IA: POSITIVE
SCHISTOCYTES BLD QL AUTO: SLIGHT — SIGNIFICANT CHANGE UP
SMUDGE CELLS # BLD: PRESENT — SIGNIFICANT CHANGE UP
SODIUM SERPL-SCNC: 137 MMOL/L — SIGNIFICANT CHANGE UP (ref 135–145)
SODIUM SERPL-SCNC: 137 MMOL/L — SIGNIFICANT CHANGE UP (ref 135–145)
SODIUM SERPL-SCNC: 138 MMOL/L — SIGNIFICANT CHANGE UP (ref 135–145)
SPECIMEN SOURCE: SIGNIFICANT CHANGE UP
SPHEROCYTES BLD QL SMEAR: SLIGHT — SIGNIFICANT CHANGE UP
TARGETS BLD QL SMEAR: SLIGHT — SIGNIFICANT CHANGE UP
VANCOMYCIN TROUGH SERPL-MCNC: 9.4 UG/ML — LOW (ref 10–20)
VARIANT LYMPHS # BLD: 0.9 % — SIGNIFICANT CHANGE UP (ref 0–6)
WBC # BLD: 23.17 K/UL — HIGH (ref 3.8–10.5)
WBC # BLD: 24.81 K/UL — HIGH (ref 3.8–10.5)
WBC # BLD: 29.87 K/UL — HIGH (ref 3.8–10.5)
WBC # FLD AUTO: 23.17 K/UL — HIGH (ref 3.8–10.5)
WBC # FLD AUTO: 24.81 K/UL — HIGH (ref 3.8–10.5)
WBC # FLD AUTO: 29.87 K/UL — HIGH (ref 3.8–10.5)

## 2021-11-18 PROCEDURE — 99291 CRITICAL CARE FIRST HOUR: CPT

## 2021-11-18 PROCEDURE — 99232 SBSQ HOSP IP/OBS MODERATE 35: CPT

## 2021-11-18 PROCEDURE — 71045 X-RAY EXAM CHEST 1 VIEW: CPT | Mod: 26

## 2021-11-18 PROCEDURE — 93010 ELECTROCARDIOGRAM REPORT: CPT

## 2021-11-18 RX ORDER — INSULIN LISPRO 100/ML
VIAL (ML) SUBCUTANEOUS
Refills: 0 | Status: DISCONTINUED | OUTPATIENT
Start: 2021-11-18 | End: 2021-11-19

## 2021-11-18 RX ORDER — OXYCODONE HYDROCHLORIDE 5 MG/1
10 TABLET ORAL EVERY 4 HOURS
Refills: 0 | Status: DISCONTINUED | OUTPATIENT
Start: 2021-11-18 | End: 2021-11-24

## 2021-11-18 RX ORDER — CALCIUM GLUCONATE 100 MG/ML
2 VIAL (ML) INTRAVENOUS ONCE
Refills: 0 | Status: COMPLETED | OUTPATIENT
Start: 2021-11-18 | End: 2021-11-18

## 2021-11-18 RX ORDER — DEXTROSE 50 % IN WATER 50 %
15 SYRINGE (ML) INTRAVENOUS ONCE
Refills: 0 | Status: DISCONTINUED | OUTPATIENT
Start: 2021-11-18 | End: 2021-11-19

## 2021-11-18 RX ORDER — HYDROCORTISONE 20 MG
100 TABLET ORAL EVERY 8 HOURS
Refills: 0 | Status: DISCONTINUED | OUTPATIENT
Start: 2021-11-18 | End: 2021-11-18

## 2021-11-18 RX ORDER — GLUCAGON INJECTION, SOLUTION 0.5 MG/.1ML
1 INJECTION, SOLUTION SUBCUTANEOUS ONCE
Refills: 0 | Status: DISCONTINUED | OUTPATIENT
Start: 2021-11-18 | End: 2021-11-24

## 2021-11-18 RX ORDER — GABAPENTIN 400 MG/1
300 CAPSULE ORAL ONCE
Refills: 0 | Status: COMPLETED | OUTPATIENT
Start: 2021-11-18 | End: 2021-11-18

## 2021-11-18 RX ORDER — HYDROCORTISONE 20 MG
50 TABLET ORAL EVERY 6 HOURS
Refills: 0 | Status: DISCONTINUED | OUTPATIENT
Start: 2021-11-19 | End: 2021-11-20

## 2021-11-18 RX ORDER — VANCOMYCIN HCL 1 G
1000 VIAL (EA) INTRAVENOUS
Refills: 0 | Status: DISCONTINUED | OUTPATIENT
Start: 2021-11-18 | End: 2021-11-19

## 2021-11-18 RX ORDER — GABAPENTIN 400 MG/1
200 CAPSULE ORAL THREE TIMES A DAY
Refills: 0 | Status: DISCONTINUED | OUTPATIENT
Start: 2021-11-18 | End: 2021-11-19

## 2021-11-18 RX ORDER — DEXTROSE 50 % IN WATER 50 %
25 SYRINGE (ML) INTRAVENOUS ONCE
Refills: 0 | Status: DISCONTINUED | OUTPATIENT
Start: 2021-11-18 | End: 2021-11-19

## 2021-11-18 RX ORDER — FENTANYL CITRATE 50 UG/ML
50 INJECTION INTRAVENOUS ONCE
Refills: 0 | Status: DISCONTINUED | OUTPATIENT
Start: 2021-11-18 | End: 2021-11-18

## 2021-11-18 RX ORDER — SODIUM CHLORIDE 9 MG/ML
500 INJECTION, SOLUTION INTRAVENOUS ONCE
Refills: 0 | Status: COMPLETED | OUTPATIENT
Start: 2021-11-18 | End: 2021-11-18

## 2021-11-18 RX ORDER — HEPARIN SODIUM 5000 [USP'U]/ML
5000 INJECTION INTRAVENOUS; SUBCUTANEOUS EVERY 8 HOURS
Refills: 0 | Status: DISCONTINUED | OUTPATIENT
Start: 2021-11-18 | End: 2021-11-20

## 2021-11-18 RX ORDER — PHYTONADIONE (VIT K1) 5 MG
5 TABLET ORAL ONCE
Refills: 0 | Status: COMPLETED | OUTPATIENT
Start: 2021-11-18 | End: 2021-11-18

## 2021-11-18 RX ORDER — DEXTROSE 50 % IN WATER 50 %
12.5 SYRINGE (ML) INTRAVENOUS ONCE
Refills: 0 | Status: DISCONTINUED | OUTPATIENT
Start: 2021-11-18 | End: 2021-11-19

## 2021-11-18 RX ORDER — OXYCODONE HYDROCHLORIDE 5 MG/1
5 TABLET ORAL EVERY 4 HOURS
Refills: 0 | Status: DISCONTINUED | OUTPATIENT
Start: 2021-11-18 | End: 2021-11-24

## 2021-11-18 RX ORDER — SODIUM CHLORIDE 9 MG/ML
500 INJECTION, SOLUTION INTRAVENOUS
Refills: 0 | Status: DISCONTINUED | OUTPATIENT
Start: 2021-11-18 | End: 2021-11-18

## 2021-11-18 RX ORDER — ALBUMIN HUMAN 25 %
100 VIAL (ML) INTRAVENOUS EVERY 6 HOURS
Refills: 0 | Status: COMPLETED | OUTPATIENT
Start: 2021-11-18 | End: 2021-11-19

## 2021-11-18 RX ADMIN — HEPARIN SODIUM 5000 UNIT(S): 5000 INJECTION INTRAVENOUS; SUBCUTANEOUS at 13:36

## 2021-11-18 RX ADMIN — OXYCODONE HYDROCHLORIDE 10 MILLIGRAM(S): 5 TABLET ORAL at 16:36

## 2021-11-18 RX ADMIN — Medication 50 MILLILITER(S): at 09:17

## 2021-11-18 RX ADMIN — CHLORHEXIDINE GLUCONATE 15 MILLILITER(S): 213 SOLUTION TOPICAL at 05:41

## 2021-11-18 RX ADMIN — Medication 101 MILLIGRAM(S): at 14:41

## 2021-11-18 RX ADMIN — SODIUM CHLORIDE 1000 MILLILITER(S): 9 INJECTION, SOLUTION INTRAVENOUS at 08:54

## 2021-11-18 RX ADMIN — Medication 200 GRAM(S): at 08:54

## 2021-11-18 RX ADMIN — OXYCODONE HYDROCHLORIDE 10 MILLIGRAM(S): 5 TABLET ORAL at 12:25

## 2021-11-18 RX ADMIN — OXYCODONE HYDROCHLORIDE 10 MILLIGRAM(S): 5 TABLET ORAL at 17:35

## 2021-11-18 RX ADMIN — HEPARIN SODIUM 5000 UNIT(S): 5000 INJECTION INTRAVENOUS; SUBCUTANEOUS at 23:08

## 2021-11-18 RX ADMIN — GABAPENTIN 200 MILLIGRAM(S): 400 CAPSULE ORAL at 23:07

## 2021-11-18 RX ADMIN — OXYCODONE HYDROCHLORIDE 10 MILLIGRAM(S): 5 TABLET ORAL at 23:27

## 2021-11-18 RX ADMIN — DEXMEDETOMIDINE HYDROCHLORIDE IN 0.9% SODIUM CHLORIDE 4.45 MICROGRAM(S)/KG/HR: 4 INJECTION INTRAVENOUS at 00:14

## 2021-11-18 RX ADMIN — SODIUM CHLORIDE 125 MILLILITER(S): 9 INJECTION, SOLUTION INTRAVENOUS at 00:21

## 2021-11-18 RX ADMIN — Medication 100 MILLIGRAM(S): at 09:17

## 2021-11-18 RX ADMIN — Medication 50 MILLILITER(S): at 16:37

## 2021-11-18 RX ADMIN — Medication 50 MILLIGRAM(S): at 23:09

## 2021-11-18 RX ADMIN — Medication 100 MILLIGRAM(S): at 16:35

## 2021-11-18 RX ADMIN — CHLORHEXIDINE GLUCONATE 1 APPLICATION(S): 213 SOLUTION TOPICAL at 05:42

## 2021-11-18 RX ADMIN — Medication 2: at 23:27

## 2021-11-18 RX ADMIN — FENTANYL CITRATE 4.45 MICROGRAM(S)/KG/HR: 50 INJECTION INTRAVENOUS at 00:14

## 2021-11-18 RX ADMIN — GABAPENTIN 300 MILLIGRAM(S): 400 CAPSULE ORAL at 14:41

## 2021-11-18 RX ADMIN — Medication 100 MILLIGRAM(S): at 00:22

## 2021-11-18 RX ADMIN — Medication 8.33 MICROGRAM(S)/KG/MIN: at 00:14

## 2021-11-18 RX ADMIN — Medication 100 MILLIGRAM(S): at 09:40

## 2021-11-18 RX ADMIN — VASOPRESSIN 2.4 UNIT(S)/MIN: 20 INJECTION INTRAVENOUS at 00:14

## 2021-11-18 RX ADMIN — DEXMEDETOMIDINE HYDROCHLORIDE IN 0.9% SODIUM CHLORIDE 4.45 MICROGRAM(S)/KG/HR: 4 INJECTION INTRAVENOUS at 02:07

## 2021-11-18 RX ADMIN — Medication 100 MILLIGRAM(S): at 23:09

## 2021-11-18 RX ADMIN — FENTANYL CITRATE 50 MICROGRAM(S): 50 INJECTION INTRAVENOUS at 09:20

## 2021-11-18 RX ADMIN — SODIUM CHLORIDE 3000 MILLILITER(S): 9 INJECTION, SOLUTION INTRAVENOUS at 00:20

## 2021-11-18 RX ADMIN — SODIUM CHLORIDE 1000 MILLILITER(S): 9 INJECTION, SOLUTION INTRAVENOUS at 03:00

## 2021-11-18 RX ADMIN — Medication 250 MILLIGRAM(S): at 09:17

## 2021-11-18 RX ADMIN — FENTANYL CITRATE 50 MICROGRAM(S): 50 INJECTION INTRAVENOUS at 09:00

## 2021-11-18 RX ADMIN — Medication 4: at 16:35

## 2021-11-18 RX ADMIN — PIPERACILLIN AND TAZOBACTAM 25 GRAM(S): 4; .5 INJECTION, POWDER, LYOPHILIZED, FOR SOLUTION INTRAVENOUS at 02:07

## 2021-11-18 RX ADMIN — Medication 50 MILLILITER(S): at 23:09

## 2021-11-18 RX ADMIN — OXYCODONE HYDROCHLORIDE 10 MILLIGRAM(S): 5 TABLET ORAL at 11:32

## 2021-11-18 RX ADMIN — PIPERACILLIN AND TAZOBACTAM 25 GRAM(S): 4; .5 INJECTION, POWDER, LYOPHILIZED, FOR SOLUTION INTRAVENOUS at 13:08

## 2021-11-18 NOTE — PROGRESS NOTE ADULT - ATTENDING COMMENTS
pt seen and examined. s/p L knee washout and L ankle MASON. PT for ROM of L knee. IV abx, ID recs. DVT ppx as per primary team.

## 2021-11-18 NOTE — DISCHARGE NOTE PROVIDER - NSFOLLOWUPCLINICS_GEN_ALL_ED_FT
[facility] Acute Care Surgery  Acute Care Surgery  [address] [place] [id number]  Phone: [phone]  Fax:

## 2021-11-18 NOTE — DISCHARGE NOTE PROVIDER - NSDCCPCAREPLAN_GEN_ALL_CORE_FT
PRINCIPAL DISCHARGE DIAGNOSIS  Diagnosis: Cellulitis of left lower extremity  Assessment and Plan of Treatment: Follow up: Please call and make an appointment with the Acute Care Surgery Clinic 10-14 days after discharge. Also, please call and make an appointment with your primary care physician as per your usual schedule.   Activity: WBAT LLE in cam walker.  Diet: May continue regular diet.  Medications: Please take all medications listed on your discharge paperwork as prescribed. Antibiotics have been prescribed, you MUST FINISH THE ENTIRE PRESCRIPTION even if feeling better.  Wound Care: Wound sites should be covered with [employer] and wrap with Kerlix.  Keep dry and do not submerge in water.  Patient is advised to RETURN TO THE EMERGENCY DEPARTMENT for any of the following - worsening pain, fever/chills, nausea/vomiting, altered mental status, chest pain, shortness of breath, or any other new / worsening symptom.      SECONDARY DISCHARGE DIAGNOSES  Diagnosis: Severe sepsis  Assessment and Plan of Treatment:     Diagnosis: Acute kidney failure  Assessment and Plan of Treatment:     Diagnosis: Bandemia  Assessment and Plan of Treatment:

## 2021-11-18 NOTE — PROGRESS NOTE ADULT - SUBJECTIVE AND OBJECTIVE BOX
24h Events:  Pt taken emergently to the OR with ACS surgery and orthopedics to explore LLE. Fascia intact laterally and medially, some dishwater colored fluid irrigated. Orthopedics evacuated purulent drainage from knee, removed hardware from L ankle and evacuated pus from L ankle. L knee incision closed with sutures, remaining 4 incisions packed with betadine soaked gauze. Pt recieved 4 u PRBC and 4.5 L crystalloid during the case. Post operatively, pt arrived from OR on levophed and vaso. POCUS exam showed hyperdynamic heart with completely collapsed IVC. Pt started on rogers track. Additional L of IVF give, SVV improved from 20s to 9. prop d/c, fentanyl and precedex added for analgosedation. Transitioned to CPAP, tolerating well.     ICU Vital Signs Last 24 Hrs  T(C): 36.6 (2021 01:00), Max: 37.2 (2021 14:38)  T(F): 97.9 (2021 01:00), Max: 98.9 (2021 14:38)  HR: 86 (2021 01:45) (64 - 110)  BP: 93/50 (2021 01:20) (83/47 - 124/62)  BP(mean): 63 (2021 01:20) (59 - 107)  ABP: 108/50 (2021 01:45) (99/48 - 147/70)  ABP(mean): 65 (2021 01:45) (62 - 92)  RR: 22 (2021 01:45) (14 - 26)  SpO2: 100% (2021 01:45) (98% - 100%)    I&O's Detail    2021 07:01  -  2021 02:25  --------------------------------------------------------  IN:    Dexmedetomidine: 39.7 mL    FentaNYL: 18.7 mL    IV PiggyBack: 50 mL    multiple electrolytes Injection Type 1 Bolus: 1000 mL    multiple electrolytes Injection Type 1.: 125 mL    Norepinephrine: 42.8 mL    Vasopressin: 7.2 mL  Total IN: 1283.4 mL    OUT:    Indwelling Catheter - Urethral (mL): 625 mL  Total OUT: 625 mL    Total NET: 658.4 mL    ABG - ( 2021 23:50 )  pH, Arterial: 7.280 pH, Blood: x     /  pCO2: 39    /  pO2: 159   / HCO3: 18    / Base Excess: -8.4  /  SaO2: 99.6      MEDICATIONS  (STANDING):  chlorhexidine 0.12% Liquid 15 milliLiter(s) Oral Mucosa every 12 hours  chlorhexidine 2% Cloths 1 Application(s) Topical <User Schedule>  clindamycin IVPB 900 milliGRAM(s) IV Intermittent every 8 hours  dexMEDEtomidine Infusion 0.2 MICROgram(s)/kG/Hr (4.45 mL/Hr) IV Continuous <Continuous>  fentaNYL   Infusion 0.5 MICROgram(s)/kG/Hr (4.45 mL/Hr) IV Continuous <Continuous>  multiple electrolytes Injection Type 1 1000 milliLiter(s) (125 mL/Hr) IV Continuous <Continuous>  norepinephrine Infusion 0.05 MICROgram(s)/kG/Min (8.33 mL/Hr) IV Continuous <Continuous>  piperacillin/tazobactam IVPB.. 3.375 Gram(s) IV Intermittent every 12 hours  vasopressin Infusion 0.04 Unit(s)/Min (2.4 mL/Hr) IV Continuous <Continuous>    MEDICATIONS  (PRN):    Physical Exam:    Gen: intubated, sedated    HEENT: PERRL, EOMI    Neurological: full exam deferred due to sedation, no focal deficits noted    Neck: Trachea midline, no evidence of JVD, FROM without pain, neck symmetric    Pulmonary: CTAB with decreased breath sounds at the bases, mechanically ventilated    Cardiovascular: regular rate and rhythm    Gastrointestinal: Soft, non-tender, non-distended    : Valle in place draining clear yellow urine    Extremities: LLE w/ 4 incisions, packed with betadine soaked gauze, wrapped with kerlex    Skin: incisions as described above    Musculoskeletal: FROM without pain, no deformity or areas of tenderness    LABS:  CBC Full  -  ( 2021 23:52 )  WBC Count : 23.17 K/uL  RBC Count : 4.36 M/uL  Hemoglobin : 10.1 g/dL  Hematocrit : 30.7 %  Platelet Count - Automated : 401 K/uL  Mean Cell Volume : 70.4 fl  Mean Cell Hemoglobin : 23.2 pg  Mean Cell Hemoglobin Concentration : 32.9 gm/dL  Auto Neutrophil # : 20.34 K/uL  Auto Lymphocyte # : 2.02 K/uL  Auto Monocyte # : 0.00 K/uL  Auto Eosinophil # : 0.21 K/uL  Auto Basophil # : 0.00 K/uL  Auto Neutrophil % : 86.1 %  Auto Lymphocyte % : 8.7 %  Auto Monocyte % : 0.0 %  Auto Eosinophil % : 0.9 %  Auto Basophil % : 0.0 %        137  |  103  |  116.8<H>  ----------------------------<  155<H>  4.4   |  17.0<L>  |  2.52<H>    Ca    8.7      2021 23:52  Phos  11.7       Mg     2.8         TPro  5.2<L>  /  Alb  1.2<L>  /  TBili  0.6  /  DBili  x   /  AST  44<H>  /  ALT  19  /  AlkPhos  89      PT/INR - ( 2021 23:52 )   PT: 16.7 sec;   INR: 1.47 ratio      PTT - ( 2021 23:52 )  PTT:32.1 sec  Urinalysis Basic - ( 2021 16:46 )    Color: Yellow / Appearance: Cloudy / S.020 / pH: x  Gluc: x / Ketone: Trace  / Bili: Small / Urobili: 1 mg/dL   Blood: x / Protein: 30 mg/dL / Nitrite: Negative   Leuk Esterase: Trace / RBC: 6-10 /HPF / WBC 11-25   Sq Epi: x / Non Sq Epi: Few / Bacteria: Moderate    RECENT CULTURES:    LIVER FUNCTIONS - ( 2021 23:52 )  Alb: 1.2 g/dL / Pro: 5.2 g/dL / ALK PHOS: 89 U/L / ALT: 19 U/L / AST: 44 U/L / GGT: x           CARDIAC MARKERS ( 2021 23:52 )  x     / x     / 498 U/L / x     / 12.1 ng/mL  CARDIAC MARKERS ( 2021 13:21 )  x     / <0.01 ng/mL / 439 U/L / x     / 10.8 ng/mL    ASSESSMENT/PLAN:  51 y.o female, PMH significant for uterine fibroids presented to ED with LLE pain and swelling. Found to have Acute renal failure and CT scan concerning for ascending necrotizing soft tissue infection. Pt now s/p LLE exploration and removal of hardware, remains intubated and sedated requiring vasopressors for septic +/- hemorrhagic shock.    Neuro:   - maintain RASS 0 to -1  - fentanly and Precedex for analgosedation  - SAT in AM    CV:   - Flotrack monitoring, SVV improved from 20s to 9 with volume  - levophed and vasopressin to maintain MAP > 65  - 500 ml bolus infusing, total 6L IVF including what was given Intra-Op  - f/u AM lactate  - frequent POCUS exam to evaluate fluid balance    Pulm:   - Transitioned from AC to PSV  - F/U AM ABG  - RSBI in AM  - possible extubation this morning    GI/Nutrition:   - NPO  - start tube feeds in AM if remains intubated    /Renal:   - valle catheter for strict I/O  - metabolic acidosis improved with bicarb ggt (pt recieved in ED as per nephro recs)  - Cr downtrending, > 3 --> 2.5, baseline Cr 0.9  - F/U AM BMP, judicious electrolyte repletion  - UOP improving, approx 75 ml/hr, continue to trend  - renally dose medications  - nephrology recommendations appreciated    ID:   - blood cx sent , results pending  - continue empiric vancomycin, zosyn, and clindamycin for necrotizing soft tissue infection  - WBC improving 49 --> 23    Endo:   - FS q 6    Skin / MSK  - Repositioning for DTI prevention while in bed  - keep LLE elevated    Heme/DVT Prophylaxis:   - SCDs  - start DVT ppx in AM if Hgb remains stable    Lines/Tubes:   - RIJ TLC, L radial A line, ETT, OGT, Valle    Dispo:   - SICU

## 2021-11-18 NOTE — PROGRESS NOTE ADULT - SUBJECTIVE AND OBJECTIVE BOX
[facility] DIVISION OF KIDNEY DISEASES AND HYPERTENSION -- FOLLOW UP NOTE  --------------------------------------------------------------------------------  Chief Complaint:  bennett     24 hour events/subjective:  s/p OR for LLE exploration and removal of hardware      PAST HISTORY  --------------------------------------------------------------------------------  No significant changes to PMH, PSH, FHx, SHx, unless otherwise noted    ALLERGIES & MEDICATIONS  --------------------------------------------------------------------------------  Allergies    No Known Allergies    Intolerances      Standing Inpatient Medications  albumin human 25% IVPB 100 milliLiter(s) IV Intermittent every 6 hours  chlorhexidine 2% Cloths 1 Application(s) Topical <User Schedule>  clindamycin IVPB 900 milliGRAM(s) IV Intermittent every 8 hours  dextrose 40% Gel 15 Gram(s) Oral once  dextrose 50% Injectable 25 Gram(s) IV Push once  dextrose 50% Injectable 12.5 Gram(s) IV Push once  dextrose 50% Injectable 25 Gram(s) IV Push once  glucagon  Injectable 1 milliGRAM(s) IntraMuscular once  heparin   Injectable 5000 Unit(s) SubCutaneous every 8 hours  hydrocortisone sodium succinate Injectable 100 milliGRAM(s) IV Push every 8 hours  insulin lispro (ADMELOG) corrective regimen sliding scale   SubCutaneous Before meals and at bedtime  multiple electrolytes Injection Type 1 1000 milliLiter(s) IV Continuous <Continuous>  norepinephrine Infusion 0.05 MICROgram(s)/kG/Min IV Continuous <Continuous>  phytonadione  IVPB 5 milliGRAM(s) IV Intermittent once  piperacillin/tazobactam IVPB.. 3.375 Gram(s) IV Intermittent every 12 hours  vancomycin  IVPB 1000 milliGRAM(s) IV Intermittent <User Schedule>  vasopressin Infusion 0.04 Unit(s)/Min IV Continuous <Continuous>    PRN Inpatient Medications  oxyCODONE    IR 5 milliGRAM(s) Oral every 4 hours PRN  oxyCODONE    IR 10 milliGRAM(s) Oral every 4 hours PRN      REVIEW OF SYSTEMS  --------------------------------------------------------------------------------  Gen: No weight changes, fatigue, fevers/chills, weakness  Skin: No rashes  Head/Eyes/Ears/Mouth: No headache; Normal hearing; Normal vision w/o blurriness; No sinus pain/discomfort, sore throat  Respiratory: No dyspnea, cough, wheezing, hemoptysis  CV: No chest pain, PND, orthopnea  GI: No abdominal pain, diarrhea, constipation, nausea, vomiting, melena, hematochezia  : No increased frequency, dysuria, hematuria, nocturia  MSK: No joint pain/swelling; no back pain; no edema  Neuro: No dizziness/lightheadedness, weakness, seizures, numbness, tingling  Heme: No easy bruising or bleeding  Endo: No heat/cold intolerance  Psych: No significant nervousness, anxiety, stress, depression    All other systems were reviewed and are negative, except as noted.    VITALS/PHYSICAL EXAM  --------------------------------------------------------------------------------  T(C): 37.9 (11-18-21 @ 11:45), Max: 38.5 (11-18-21 @ 07:41)  HR: 93 (11-18-21 @ 13:00) (64 - 106)  BP: 124/51 (11-18-21 @ 12:00) (83/47 - 136/49)  RR: 15 (11-18-21 @ 13:00) (13 - 26)  SpO2: 100% (11-18-21 @ 13:00) (98% - 100%)  Wt(kg): --  Height (cm): 167.6 (11-17-21 @ 22:45)  Weight (kg): 93.4 (11-17-21 @ 22:45)  BMI (kg/m2): 33.3 (11-17-21 @ 22:45)  BSA (m2): 2.02 (11-17-21 @ 22:45)      11-17-21 @ 07:01  -  11-18-21 @ 07:00  --------------------------------------------------------  IN: 3330.7 mL / OUT: 860 mL / NET: 2470.7 mL    11-18-21 @ 07:01  -  11-18-21 @ 13:39  --------------------------------------------------------  IN: 1808.9 mL / OUT: 440 mL / NET: 1368.9 mL      Physical Exam:  	Gen: NAD  	HEENT: supple neck  	Pulm: CTA B/L  	CV: RRR, S1S2; no rub  	Back: no sacral edema  	Abd: +BS, soft, nontender/nondistended  	: No suprapubic tenderness  	UE: Warm, no edema  	LE: Warm,  edema, left leg edema- bandaged+  	Neuro: No focal deficit  	Psych: Normal affect and mood  	Skin: Warm      LABS/STUDIES  --------------------------------------------------------------------------------              8.0    x     >-----------<  x        [11-18-21 @ 12:01]              23.2     137  |  102  |  117.9  ----------------------------<  142      [11-18-21 @ 04:34]  4.7   |  15.0  |  2.54        Ca     8.3     [11-18-21 @ 04:34]      Mg     2.8     [11-18-21 @ 04:34]      Phos  10.6     [11-18-21 @ 04:34]    TPro  5.2  /  Alb  1.2  /  TBili  0.6  /  DBili  x   /  AST  44  /  ALT  19  /  AlkPhos  89  [11-17-21 @ 23:52]    PT/INR: PT 17.9 , INR 1.58       [11-18-21 @ 12:00]  PTT: 32.9       [11-18-21 @ 12:00]    Troponin <0.01      [11-17-21 @ 13:21]        [11-18-21 @ 04:34]    Creatinine Trend:  SCr 2.54 [11-18 @ 04:34]  SCr 2.52 [11-17 @ 23:52]  SCr 3.80 [11-17 @ 13:21]    Urinalysis - [11-17-21 @ 16:46]      Color Yellow / Appearance Cloudy / SG 1.020 / pH 5.0      Gluc 50 / Ketone Trace  / Bili Small / Urobili 1       Blood Moderate / Protein 30 / Leuk Est Trace / Nitrite Negative      RBC 6-10 / WBC 11-25 / Hyaline  / Gran  / Sq Epi  / Non Sq Epi Few / Bacteria Moderate    Urine Protein 163      [11-18-21 @ 00:59]  Urine Sodium <30      [11-17-21 @ 16:46]  Urine Potassium 59      [11-17-21 @ 16:46]  Urine Chloride <27      [11-17-21 @ 16:46]  Urine Osmolality 374      [11-17-21 @ 16:46]    Iron <9, TIBC 160, %sat Unable to Calculate      [11-17-21 @ 13:21]  Ferritin 388      [11-17-21 @ 13:21]    HIV Nonreact      [11-17-21 @ 13:22]

## 2021-11-18 NOTE — PROGRESS NOTE ADULT - SUBJECTIVE AND OBJECTIVE BOX
Pt Name: LUIS FERNANDO DAVIS  MRN: [id number]      Patient is a 51y female s/p emergent surgery secondary to severe soft tissue infection. Patient had removal of hardware of left ankle and I&D of left knee done by Dr. Tobias, I & D of LLE done by [facility] POD #0. Patient seen and examined at bedside. Exam limited as patient is intubated and unresponsive. Patient was transfused 4U of blood in OR.       PAST MEDICAL & SURGICAL HISTORY:  No pertinent past medical history    History of ankle surgery        Allergies: No Known Allergies                          7.7    46.68 )-----------( 648      ( 17 Nov 2021 13:21 )             23.5     11-17    134<L>  |  99  |  142.5<H>  ----------------------------<  211<H>  5.2   |  10.0<LL>  |  3.80<H>    Ca    8.5<L>      17 Nov 2021 13:21  Mg     3.5     11-17    TPro  6.9  /  Alb  1.8<L>  /  TBili  0.4  /  DBili  x   /  AST  50<H>  /  ALT  29  /  AlkPhos  124<H>  11-17      PHYSICAL EXAM:    Vital Signs Last 24 Hrs  T(C): 37 (17 Nov 2021 20:19), Max: 37.2 (17 Nov 2021 14:38)  T(F): 98.6 (17 Nov 2021 20:19), Max: 98.9 (17 Nov 2021 14:38)  HR: 99 (17 Nov 2021 23:45) (64 - 110)  BP: 118/99 (17 Nov 2021 23:00) (111/57 - 124/62)  BP(mean): 107 (17 Nov 2021 23:00) (76 - 107)  RR: 14 (17 Nov 2021 23:45) (14 - 21)  SpO2: 100% (17 Nov 2021 23:45) (98% - 100%)  Daily Height in cm: 167.6 (17 Nov 2021 22:45)    Daily     Appearance: Alert, responsive, in no acute distress.  Musculoskeletal:       Left Lower Extremity: + Dressings saturated to lower leg. Dressings removed. Betadine kerlix left in medial and lateral leg wounds. Incision site over knee closed with nylons - noted with bloody discharge. No erythema noted around knee incision. + Abd dressings placed over medial and lateral open wounds as well as over knee. New dressings applied. + DP pulse via doppler. BCR. Compartments soft.         A/P:  Pt is a  51y Female with removal of hardware of left ankle and I&D of left knee done by Dr. Tobias, I & D of LLE done by [facility] POD #0.    PLAN:   - CAM boot ordered for LLE   - PT   - WBAT to LLE with CAM BOOT, ROM of knee allowed   - Rec ID consult   - DVTp per trauma   - Continue IV antibx   - F/u knee cell count and culture

## 2021-11-18 NOTE — PROGRESS NOTE ADULT - SUBJECTIVE AND OBJECTIVE BOX
Pt Name: LUIS FERNANDO DAVIS    MRN: [id number]      Patient is a being followed for       PAST MEDICAL & SURGICAL HISTORY:  PAST MEDICAL & SURGICAL HISTORY:  No pertinent past medical history    History of ankle surgery        Allergies: No Known Allergies      Medications: albumin human 25% IVPB 100 milliLiter(s) IV Intermittent every 6 hours  chlorhexidine 2% Cloths 1 Application(s) Topical <User Schedule>  clindamycin IVPB 900 milliGRAM(s) IV Intermittent every 8 hours  dextrose 40% Gel 15 Gram(s) Oral once  dextrose 50% Injectable 25 Gram(s) IV Push once  dextrose 50% Injectable 12.5 Gram(s) IV Push once  dextrose 50% Injectable 25 Gram(s) IV Push once  glucagon  Injectable 1 milliGRAM(s) IntraMuscular once  heparin   Injectable 5000 Unit(s) SubCutaneous every 8 hours  hydrocortisone sodium succinate Injectable 100 milliGRAM(s) IV Push every 8 hours  insulin lispro (ADMELOG) corrective regimen sliding scale   SubCutaneous Before meals and at bedtime  multiple electrolytes Injection Type 1 1000 milliLiter(s) IV Continuous <Continuous>  norepinephrine Infusion 0.05 MICROgram(s)/kG/Min IV Continuous <Continuous>  oxyCODONE    IR 5 milliGRAM(s) Oral every 4 hours PRN  oxyCODONE    IR 10 milliGRAM(s) Oral every 4 hours PRN  piperacillin/tazobactam IVPB.. 3.375 Gram(s) IV Intermittent every 12 hours  vancomycin  IVPB 1000 milliGRAM(s) IV Intermittent <User Schedule>  vasopressin Infusion 0.04 Unit(s)/Min IV Continuous <Continuous>        Ambulation: Walking independently [ ] With Cane [ ] With Walker [ ]  Bedbound [ ]                           8.0    x     )-----------( x        ( 18 Nov 2021 12:01 )             23.2     11-18    137  |  102  |  117.9<H>  ----------------------------<  142<H>  4.7   |  15.0<L>  |  2.54<H>    Ca    8.3<L>      18 Nov 2021 04:34  Phos  10.6     11-18  Mg     2.8     11-18    TPro  5.2<L>  /  Alb  1.2<L>  /  TBili  0.6  /  DBili  x   /  AST  44<H>  /  ALT  19  /  AlkPhos  89  11-17      PHYSICAL EXAM:    Vital Signs Last 24 Hrs  T(C): 37.9 (18 Nov 2021 11:45), Max: 38.5 (18 Nov 2021 07:41)  T(F): 100.2 (18 Nov 2021 11:45), Max: 101.3 (18 Nov 2021 07:41)  HR: 85 (18 Nov 2021 15:15) (64 - 105)  BP: 124/51 (18 Nov 2021 12:00) (83/47 - 136/49)  BP(mean): 69 (18 Nov 2021 12:00) (59 - 107)  RR: 20 (18 Nov 2021 15:15) (13 - 26)  SpO2: 100% (18 Nov 2021 15:15) (93% - 100%)  Daily Height in cm: 167.6 (17 Nov 2021 22:45)    Daily     Appearance: Alert, responsive, in no acute distress.    Neurological: Sensation is grossly intact to light touch. 5/5 motor function of all extremities. No focal deficits or weaknesses found.    Skin: no rash on visible skin. Skin is clean, dry and intact. No bleeding. No abrasions. No ulcerations.    Vascular: 2+ distal pulses. Cap refill < 2 sec. No signs of venous   insufficiency   or stasis. No extremity ulcerations. No cyanosis.    Musculoskeletal:         Left Upper Extremity:       Right Upper Extremity:       Left Lower Extremity:       Right Lower Extremity:      A/P:  Pt is a  51y Female with     PLAN:   *  Pt Name: LUIS FERNANDO DAVIS    MRN: [id number]      Patient is a 51 F being followed for LLE soft tissue infection. Patient is POD #1 s/p emergent extensive LLE irrigation and debridement and MASON w/ ortho and ACS. Patient was seen in SICU today. Patient is intubated which limited encounter. Pain is reportedly well controlled. No acute events reported by bedside RN.       PAST MEDICAL & SURGICAL HISTORY:  No pertinent past medical history    History of ankle surgery        Allergies: No Known Allergies      Medications: albumin human 25% IVPB 100 milliLiter(s) IV Intermittent every 6 hours  chlorhexidine 2% Cloths 1 Application(s) Topical <User Schedule>  clindamycin IVPB 900 milliGRAM(s) IV Intermittent every 8 hours  dextrose 40% Gel 15 Gram(s) Oral once  dextrose 50% Injectable 25 Gram(s) IV Push once  dextrose 50% Injectable 12.5 Gram(s) IV Push once  dextrose 50% Injectable 25 Gram(s) IV Push once  glucagon  Injectable 1 milliGRAM(s) IntraMuscular once  heparin   Injectable 5000 Unit(s) SubCutaneous every 8 hours  hydrocortisone sodium succinate Injectable 100 milliGRAM(s) IV Push every 8 hours  insulin lispro (ADMELOG) corrective regimen sliding scale   SubCutaneous Before meals and at bedtime  multiple electrolytes Injection Type 1 1000 milliLiter(s) IV Continuous <Continuous>  norepinephrine Infusion 0.05 MICROgram(s)/kG/Min IV Continuous <Continuous>  oxyCODONE    IR 5 milliGRAM(s) Oral every 4 hours PRN  oxyCODONE    IR 10 milliGRAM(s) Oral every 4 hours PRN  piperacillin/tazobactam IVPB.. 3.375 Gram(s) IV Intermittent every 12 hours  vancomycin  IVPB 1000 milliGRAM(s) IV Intermittent <User Schedule>  vasopressin Infusion 0.04 Unit(s)/Min IV Continuous <Continuous>                          8.0    x     )-----------( x        ( 18 Nov 2021 12:01 )             23.2     11-18    137  |  102  |  117.9<H>  ----------------------------<  142<H>  4.7   |  15.0<L>  |  2.54<H>    Ca    8.3<L>      18 Nov 2021 04:34  Phos  10.6     11-18  Mg     2.8     11-18    TPro  5.2<L>  /  Alb  1.2<L>  /  TBili  0.6  /  DBili  x   /  AST  44<H>  /  ALT  19  /  AlkPhos  89  11-17      PHYSICAL EXAM:    Vital Signs Last 24 Hrs  T(C): 37.9 (18 Nov 2021 11:45), Max: 38.5 (18 Nov 2021 07:41)  T(F): 100.2 (18 Nov 2021 11:45), Max: 101.3 (18 Nov 2021 07:41)  HR: 85 (18 Nov 2021 15:15) (64 - 105)  BP: 124/51 (18 Nov 2021 12:00) (83/47 - 136/49)  BP(mean): 69 (18 Nov 2021 12:00) (59 - 107)  RR: 20 (18 Nov 2021 15:15) (13 - 26)  SpO2: 100% (18 Nov 2021 15:15) (93% - 100%)  Daily Height in cm: 167.6 (17 Nov 2021 22:45)    Daily     Appearance: Alert, responsive, in no acute distress.    Skin: no rash on visible skin. Skin is clean, dry and intact. No bleeding. No abrasions. No ulcerations.    Vascular, LLE: 1+ DP pulse.     Musculoskeletal:         Left Lower Extremity: Dressings intact. ROM not assessed at this time. SILT distally at foot. +EHL/FHL. +DP pulse.       A/P:  Pt is a 51y Female POD #1 s/p LLE I+D and MASON for necrotizing soft tissue infection.      PLAN:   1. Pain control.  2. WBAT LLE in CAM boot.  3. F/U OR Cx and joint aspiration results.  4. ABx as per ID.  5. DVT PPx.  6. PT.   7. Post-op labs.  8. Ortho to follow.

## 2021-11-18 NOTE — DISCHARGE NOTE PROVIDER - HOSPITAL COURSE
51F with no significant PMHx who originally presented to the ED with left leg swelling, erythema, pain for 1 week prior to admission.  Originally seen in the ED 1 week prior to admission, returned due to worsening pain and rapidly increasing swelling over the 3 days prior to admission.  While in ED swelling continued to worsen and pt was emergently taken to the OR due to concern for Necrotizing Fascitis.  She underwent Incision and drainage, abscess, deep, lower leg by surgery and knee washout and hardware removal by Ortho at the same time.  She was admitted to the SICU post op intubated on pressors.  Initial blood cultures returned positive for Strep Pyogenes.  She remained there for a few days due to ongoing critical care needs, extubated and pressors stopped POD#1, had continuing transfusion needs, GI called due to dark stools with blood thought to be due to NSAID use prior to admission.  underwent EGD on 11/20 where multiple ulcers found that were non bleeding and a deulofoy lesion was identified that was injected with epi and clipped.  HGB stabilized afterwards.  Infectious disease also consulted for antibiotic management on 11/20 and opinion on possible source.  She was downgraded to the floor on 11/22 once deemed stable.  Care on the floor consisted of daily dressing changes.  On 11/23 she was noted to have R knee swelling, arthrocentesis performed by Ortho withdrawing murky fluid but no organisms found.  She was taken back to the OR by Ortho on 11/24 and underwent mini open I&D of R knee.  Returned to floor in stable condition.  Seen by Neurology on 11/26 for pain/weakness in her shoulders.  UE MRI and dopplers obtained which were negative.  Course on floor continued with ongoing fevers, ID following managing abx.  11/29 CT showed collection in L leg.  Joint aspiration by Ortho on 12/1 done.  Fevers persisted, taken to the OR on 12/2 by surgery and Ortho both and pt. underwent Irrigation and debridement of the left leg: medial and lateral fasciotomies irrigation and debridement. Tissue culture performed.  Post op returned to floor.  Cardiology consulted for BENJAMIN to r/o cardiac source of infection.  Rheumatology consulted for ? rheumatic cause of infection.  Fevers returned, pt taken to the OR again on 12/7 for Debridement of muscle, Distal left lower extremity. Irrigation and debridement of the left leg: medial and lateral fasciotomies irrigation and debridement. Wound Vac placed.  Tissue culture performed.  Pt. returned to the floor once again, course continued and pt. taken back to the OR 12/11 for wound vac change.  Returned to the floor and course continued with vac changes, pain control.  Again return of fevers, taken back to the OR 12/17 and underwent Wound VAC dressing change for area 50 sq cm or less, Medial and lateral LLE, Drainage of abscess of left lower extremity, Selective debridement and Pulsed irrigation of wound with both surgery and Ortho.  Returned to floor where fevers persisted.  Pt. was taken back to the OR again on 12/20 to have Wound VAC dressing change for area 50 sq cm or less Medial and lateral LLE, Selective debridement.  Return to the floor. 12/23 returned to OR for Wound VAC dressing change, Pulsed irrigation of wound and Wound wash out.  Returned to floor, CT done on 12/27 with new collection.  Returned to the OR on 12/28 for I&D, washout, hemo vac x 2 placed, wound vac placed.  Returned to floor again, again spiked fevers.  Returned to the OR on 12/31 for washout, irrigation and wound vac change.  Returned to floor for continued IV abx, routine wound vac changes.  CT scan repeated on 1/10 showing increased abscess size in anterior thigh.  IR consulted and drain inserted by them on 1/11.  Drain removed 1/13.  Floor care continued with antibiotics as per ID, routine wound vac changes by team.  On 1/18 she was taken to the OR for STSG of her wound sites.  Returned to floor on bedrest for 5 days.  Wound vac taken down on 1/23 revealing that the skin grafts took well.  Currently awaiting PAWEL placement.............        Length of time preparing discharge > 30 minutes         51F with no significant PMHx who originally presented to the ED with left leg swelling, erythema, pain for 1 week prior to admission.  Originally seen in the ED 1 week prior to admission, returned due to worsening pain and rapidly increasing swelling over the 3 days prior to admission.  While in ED swelling continued to worsen and pt was emergently taken to the OR due to concern for Necrotizing Fascitis.  She underwent Incision and drainage, abscess, deep, lower leg by surgery and knee washout and hardware removal by Ortho at the same time.  She was admitted to the SICU post op intubated on pressors.  Initial blood cultures returned positive for Strep Pyogenes.  She remained there for a few days due to ongoing critical care needs, extubated and pressors stopped POD#1, had continuing transfusion needs, GI called due to dark stools with blood thought to be due to NSAID use prior to admission.  underwent EGD on 11/20 where multiple ulcers found that were non bleeding and a deulofoy lesion was identified that was injected with epi and clipped.  HGB stabilized afterwards.  Infectious disease also consulted for antibiotic management on 11/20 and opinion on possible source.  She was downgraded to the floor on 11/22 once deemed stable.  Care on the floor consisted of daily dressing changes.  On 11/23 she was noted to have R knee swelling, arthrocentesis performed by Ortho withdrawing murky fluid but no organisms found.  She was taken back to the OR by Ortho on 11/24 and underwent mini open I&D of R knee.  Returned to floor in stable condition.  Seen by Neurology on 11/26 for pain/weakness in her shoulders.  UE MRI and dopplers obtained which were negative.  Course on floor continued with ongoing fevers, ID following managing abx.  11/29 CT showed collection in L leg.  Joint aspiration by Ortho on 12/1 done.  Fevers persisted, taken to the OR on 12/2 by surgery and Ortho both and pt. underwent Irrigation and debridement of the left leg: medial and lateral fasciotomies irrigation and debridement. Tissue culture performed.  Post op returned to floor.  Cardiology consulted for BENAJMIN to r/o cardiac source of infection.  Rheumatology consulted for ? rheumatic cause of infection.  Fevers returned, pt taken to the OR again on 12/7 for Debridement of muscle, Distal left lower extremity. Irrigation and debridement of the left leg: medial and lateral fasciotomies irrigation and debridement. Wound Vac placed.  Tissue culture performed.  Pt. returned to the floor once again, course continued and pt. taken back to the OR 12/11 for wound vac change.  Returned to the floor and course continued with vac changes, pain control.  Again return of fevers, taken back to the OR 12/17 and underwent Wound VAC dressing change for area 50 sq cm or less, Medial and lateral LLE, Drainage of abscess of left lower extremity, Selective debridement and Pulsed irrigation of wound with both surgery and Ortho.  Returned to floor where fevers persisted.  Pt. was taken back to the OR again on 12/20 to have Wound VAC dressing change for area 50 sq cm or less Medial and lateral LLE, Selective debridement.  Return to the floor. 12/23 returned to OR for Wound VAC dressing change, Pulsed irrigation of wound and Wound wash out.  Returned to floor, CT done on 12/27 with new collection.  Returned to the OR on 12/28 for I&D, washout, hemo vac x 2 placed, wound vac placed.  Returned to floor again, again spiked fevers.  Returned to the OR on 12/31 for washout, irrigation and wound vac change.  Returned to floor for continued IV abx, routine wound vac changes.  CT scan repeated on 1/10 showing increased abscess size in anterior thigh.  IR consulted and drain inserted by them on 1/11.  Drain removed 1/13.  Floor care continued with antibiotics as per ID, routine wound vac changes by team.  On 1/18 she was taken to the OR for STSG of her wound sites.  Returned to floor on bedrest for 5 days.  Wound vac taken down on 1/23 revealing that the skin grafts took well.  Currently, accepted in [facility] for continuity of care.     51F with no significant PMHx who originally presented to the ED with left leg swelling, erythema, pain for 1 week prior to admission.  Originally seen in the ED 1 week prior to admission, returned due to worsening pain and rapidly increasing swelling over the 3 days prior to admission.  While in ED swelling continued to worsen and pt was emergently taken to the OR due to concern for Necrotizing Fascitis.  She underwent Incision and drainage, abscess, deep, lower leg by surgery and knee washout and hardware removal by Ortho at the same time.  She was admitted to the SICU post op intubated on pressors.  Initial blood cultures returned positive for Strep Pyogenes.  She remained there for a few days due to ongoing critical care needs, extubated and pressors stopped POD#1, had continuing transfusion needs, GI called due to dark stools with blood thought to be due to NSAID use prior to admission.  underwent EGD on 11/20 where multiple ulcers found that were non bleeding and a deulofoy lesion was identified that was injected with epi and clipped.  HGB stabilized afterwards.  Infectious disease also consulted for antibiotic management on 11/20 and opinion on possible source.  She was downgraded to the floor on 11/22 once deemed stable.  Care on the floor consisted of daily dressing changes.  On 11/23 she was noted to have R knee swelling, arthrocentesis performed by Ortho withdrawing murky fluid but no organisms found.  She was taken back to the OR by Ortho on 11/24 and underwent mini open I&D of R knee.  Returned to floor in stable condition.  Seen by Neurology on 11/26 for pain/weakness in her shoulders.  UE MRI and dopplers obtained which were negative.  Course on floor continued with ongoing fevers, ID following managing abx.  11/29 CT showed collection in L leg.  Joint aspiration by Ortho on 12/1 done.  Fevers persisted, taken to the OR on 12/2 by surgery and Ortho both and pt. underwent Irrigation and debridement of the left leg: medial and lateral fasciotomies irrigation and debridement. Tissue culture performed.  Post op returned to floor.  Cardiology consulted for BENJAMNI to r/o cardiac source of infection.  Rheumatology consulted for ? rheumatic cause of infection.  Fevers returned, pt taken to the OR again on 12/7 for Debridement of muscle, Distal left lower extremity. Irrigation and debridement of the left leg: medial and lateral fasciotomies irrigation and debridement. Wound Vac placed.  Tissue culture performed.  Pt. returned to the floor once again, course continued and pt. taken back to the OR 12/11 for wound vac change.  Returned to the floor and course continued with vac changes, pain control.  Again return of fevers, taken back to the OR 12/17 and underwent Wound VAC dressing change for area 50 sq cm or less, Medial and lateral LLE, Drainage of abscess of left lower extremity, Selective debridement and Pulsed irrigation of wound with both surgery and Ortho.  Returned to floor where fevers persisted.  Pt. was taken back to the OR again on 12/20 to have Wound VAC dressing change for area 50 sq cm or less Medial and lateral LLE, Selective debridement.  Return to the floor. 12/23 returned to OR for Wound VAC dressing change, Pulsed irrigation of wound and Wound wash out.  Returned to floor, CT done on 12/27 with new collection.  Returned to the OR on 12/28 for I&D, washout, hemo vac x 2 placed, wound vac placed.  Returned to floor again, again spiked fevers.  Returned to the OR on 12/31 for washout, irrigation and wound vac change.  Returned to floor for continued IV abx, routine wound vac changes.  CT scan repeated on 1/10 showing increased abscess size in anterior thigh.  IR consulted and drain inserted by them on 1/11.  Drain removed 1/13.  Floor care continued with antibiotics as per ID, routine wound vac changes by team.  On 1/18 she was taken to the OR for STSG of her wound sites.  Returned to floor on bedrest for 5 days.  Wound vac taken down on 1/23 revealing that the skin grafts took well.  Currently, accepted in [facility] for continuity of care.     Patient must follow up as outpatient with Gastroenterology due to peptic ulcer disease and current gastric ulcer FU care, and Gynecology due to uterine-leiomyomatosis diagnosed before admission.

## 2021-11-18 NOTE — DISCHARGE NOTE PROVIDER - CARE PROVIDERS DIRECT ADDRESSES
,DirectAddress_Unknown ,DirectAddress_Unknown,DirectAddress_Unknown ,DirectAddress_Unknown,DirectAddress_Unknown,[email or web address]

## 2021-11-18 NOTE — CHART NOTE - NSCHARTNOTEFT_GEN_A_CORE
pt repeat CBC with Hgb 6.5. dressing taken down bedside, oozing from medial incision, packed with surgicel. xeroform applied, incisions packed with gauze and wrapped with kerlex and ACE. Pt tolerated dressing change without complication. Pt asymptomatic, remains normotensive. 1 U PRBC ordered.

## 2021-11-18 NOTE — DISCHARGE NOTE PROVIDER - NSDCFUADDINST_GEN_ALL_CORE_FT
Orthopedic Recommendations:   Patient is WBAT to LLE with CAM Boot on. Range of motion to left knee is allowed. Patient will follow up in office with Dr. Tobias within 1 week. The patient will contact the office if the wound becomes red, has increasing pain, develops bleeding or discharge, an injury occurs, or has other concerns. The patient will continue prescribed DVTp. The patient will take prescribed pain medications for pain control and titrate according to prescription and patient needs. The patient is recommended to elevated the affected extremity to reduce swelling.  Orthopedic Recommendations:   Patient is WBAT to LLE with CAM Boot on. Patient is WBAT of Right lower extremity. Range of motion to left knee and right knee is allowed. Patient will follow up in office with Dr. Tobias within 1 week. The patient will contact the office if the wound becomes red, has increasing pain, develops bleeding or discharge, an injury occurs, or has other concerns. The patient will continue prescribed DVTp. The patient will take prescribed pain medications for pain control and titrate according to prescription and patient needs. The patient is recommended to elevated the affected extremity to reduce swelling.

## 2021-11-18 NOTE — PHYSICAL THERAPY INITIAL EVALUATION ADULT - ADDITIONAL COMMENTS
Pt. lives in a house with her mother, father, and 2 sisters. her mother and one sister who is visiting will be able to assist as needed. Pt. has 3 KANIKA without a rail and 12 inside with a rail. Pt. was independent PTA. No DME. Pt. lives in a house with her mother, father, and 2 sisters. her mother and one sister who is visiting will be able to assist as needed. Pt. has 3 KANIKA without a rail and 12 inside with a rail. Pt. was independent PTA. No DME. Sister is visually impaired and the first floor is set up for living.

## 2021-11-18 NOTE — DISCHARGE NOTE PROVIDER - PROVIDER TOKENS
PROVIDER:[TOKEN:[[id number]]] PROVIDER:[TOKEN:[[id number]]],PROVIDER:[TOKEN:[[id number]]] PROVIDER:[TOKEN:[[id number]]],PROVIDER:[TOKEN:[[id number]]],PROVIDER:[TOKEN:[[id number]]]

## 2021-11-18 NOTE — PROGRESS NOTE ADULT - SUBJECTIVE AND OBJECTIVE BOX
Bryce was awake and alert as I demonstrated fitting of an Aircast cam walker on her right LE. She was instructed on aircell inflation, and heel height equalization when W/B is allowed. Printed instructions and contact info were provided. Walker and socks were labeled and placed on chair for later application to LLE with dressing change.    [facility]th[facility]  [phone]

## 2021-11-18 NOTE — PROGRESS NOTE ADULT - ATTENDING COMMENTS
Patient seen and examined on rounds this am. Dressing taken down, all wounds appear healthy and viable, small amount drainage in lower medial wound, no additional debridement needs, small amount venous oozing from lateral wound inferior aspect, packed with surgicel, WTD dressing replaced, will plan on wound vac for upper medial wound, BID dressing changes for other two. Pt extubated, continue to wean pressors, given additional bolus of IVF< starting stress dose steroids and given 25% albumin to aid with oncotic pressure as pt's album is 1. To remain in ICU.

## 2021-11-18 NOTE — PROGRESS NOTE ADULT - ATTENDING COMMENTS
pt seen and examined this am s/p L knee washout, L ankle MASON and debridement, and fasciotomies/debridement by trauma surgery. Remains intubated but alert and follows commands. Able to dorsiflex foot, SILT in SP/DP distributions, dressing intact    F/u labs  f/u Cx  DVT ppx  ID recs  IV abx, will need prolonged course for likely septic arthritis in the L knee  care as per SICU pt seen and examined this am s/p L knee washout, L ankle MASON and debridement, and fasciotomies/debridement by trauma surgery. Remains intubated but alert and follows commands. Able to dorsiflex foot, SILT in SP/DP distributions, dressing intact. ankle FROM w/o pain, no micromotion tenderness      S/p L ankle MASON, bone and soft tissue debridement, L knee I&D  F/u labs  f/u Cx  DVT ppx  ID recs  IV abx, will need prolonged course for likely septic arthritis in the L knee  care as per SICU

## 2021-11-18 NOTE — PHARMACOTHERAPY INTERVENTION NOTE - COMMENTS
Spoke with patient at bedside regarding home medications Spoke with patient at bedside regarding home medications. Patient reports only taking ibuprofen and meloxicam as needed for pain.

## 2021-11-18 NOTE — DISCHARGE NOTE PROVIDER - DETAILS OF MALNUTRITION DIAGNOSIS/DIAGNOSES
This patient has been assessed with a concern for Malnutrition and was treated during this hospitalization for the following Nutrition diagnosis/diagnoses:     -  01/11/2022: Moderate protein-calorie malnutrition   -  12/10/2021: Moderate protein-calorie malnutrition

## 2021-11-18 NOTE — DISCHARGE NOTE PROVIDER - CARE PROVIDER_API CALL
Gallo Tobias)  Orthopedics  [address] [place] [address]  Phone: [phone]  Fax: [phone]  Follow Up Time:    Gallo Tobias)  Orthopedics  [address] [place] [address]  Phone: [phone]  Fax: [phone]  Follow Up Time:     Brooke Christie)  OBSGYN  Dept Director  [address] [place] [address] [address]  [place] [address]  Phone: [phone]  Fax: [phone]  Follow Up Time:    Gallo Tobias)  Orthopedics  [address] [place] [address]  Phone: [phone]  Fax: [phone]  Follow Up Time:     Brooke Christie)  OBSGYN  Dept Director  [address] [facility], [address]  [place] [address]  Phone: [phone]  Fax: [phone]  Follow Up Time:     Shawna Castellanos (DO)  Gastroenterology  [address] [facility], [address]  [place] [address]  Phone: [phone]  Fax: [phone]  Follow Up Time:

## 2021-11-18 NOTE — PROGRESS NOTE ADULT - ASSESSMENT
Acute kidney injury  Metabolic acidosis  Ascending necrotizing soft tissue infection- s/p LLE exploration and removal of hardware  Anemia- iron def    Improving Scr- continue volume resuscitation  Maintain valle  Monitor Scr, lytes, UOP

## 2021-11-18 NOTE — DISCHARGE NOTE PROVIDER - NSDCMRMEDTOKEN_GEN_ALL_CORE_FT
docusate sodium 100 mg oral tablet: 1 tab(s) orally once a day   Feosol 200 mg (65 mg elemental iron) oral tablet: 1 tab(s) orally once a day   predniSONE 20 mg oral tablet: 2 tab(s) orally once a day   Provera 10 mg oral tablet: 2 tab(s) orally once a day    ibuprofen:   meloxicam 15 mg oral tablet: 1 tab(s) orally once a day, As Needed   acetaminophen 325 mg oral tablet: 3 tab(s) orally every 6 hours  ascorbic acid 1000 mg oral tablet: 1 tab(s) orally 2 times a day  baclofen 10 mg oral tablet: 1 tab(s) orally every 6 hours  enoxaparin: 40 unit(s) subcutaneously once a day  gabapentin: 900 milligram(s) orally every 8 hours  lisinopril 5 mg oral tablet: 1 tab(s) orally once a day  melatonin 5 mg oral tablet: 1 tab(s) orally once a day (at bedtime), As needed, Sleep  pantoprazole 40 mg oral delayed release tablet: 1 tab(s) orally 2 times a day  senna oral tablet: 2 tab(s) orally once a day (at bedtime)  sulfamethoxazole-trimethoprim 800 mg-160 mg oral tablet: 160 milligram(s) orally 2 times a day  zinc sulfate 220 mg oral capsule: 1 cap(s) orally once a day

## 2021-11-19 ENCOUNTER — TRANSCRIPTION ENCOUNTER (OUTPATIENT)
Age: 51
End: 2021-11-19

## 2021-11-19 LAB
A1C WITH ESTIMATED AVERAGE GLUCOSE RESULT: 5.5 % — SIGNIFICANT CHANGE UP (ref 4–5.6)
ACANTHOCYTES BLD QL SMEAR: SLIGHT — SIGNIFICANT CHANGE UP
ALBUMIN SERPL ELPH-MCNC: 2.5 G/DL — LOW (ref 3.3–5.2)
ALP SERPL-CCNC: 75 U/L — SIGNIFICANT CHANGE UP (ref 40–120)
ALT FLD-CCNC: 14 U/L — SIGNIFICANT CHANGE UP
ANION GAP SERPL CALC-SCNC: 13 MMOL/L — SIGNIFICANT CHANGE UP (ref 5–17)
ANION GAP SERPL CALC-SCNC: 15 MMOL/L — SIGNIFICANT CHANGE UP (ref 5–17)
ANISOCYTOSIS BLD QL: SIGNIFICANT CHANGE UP
ANISOCYTOSIS BLD QL: SIGNIFICANT CHANGE UP
APTT BLD: 34.9 SEC — SIGNIFICANT CHANGE UP (ref 27.5–35.5)
AST SERPL-CCNC: 42 U/L — HIGH
BASOPHILS # BLD AUTO: 0 K/UL — SIGNIFICANT CHANGE UP (ref 0–0.2)
BASOPHILS # BLD AUTO: 0 K/UL — SIGNIFICANT CHANGE UP (ref 0–0.2)
BASOPHILS NFR BLD AUTO: 0 % — SIGNIFICANT CHANGE UP (ref 0–2)
BASOPHILS NFR BLD AUTO: 0 % — SIGNIFICANT CHANGE UP (ref 0–2)
BILIRUB SERPL-MCNC: 0.5 MG/DL — SIGNIFICANT CHANGE UP (ref 0.4–2)
BUN SERPL-MCNC: 87.2 MG/DL — HIGH (ref 8–20)
BUN SERPL-MCNC: 94.3 MG/DL — HIGH (ref 8–20)
BURR CELLS BLD QL SMEAR: PRESENT — SIGNIFICANT CHANGE UP
CALCIUM SERPL-MCNC: 8 MG/DL — LOW (ref 8.6–10.2)
CALCIUM SERPL-MCNC: 8.3 MG/DL — LOW (ref 8.6–10.2)
CHLORIDE SERPL-SCNC: 108 MMOL/L — HIGH (ref 98–107)
CHLORIDE SERPL-SCNC: 108 MMOL/L — HIGH (ref 98–107)
CK MB CFR SERPL CALC: 6.5 NG/ML — SIGNIFICANT CHANGE UP (ref 0–6.7)
CK SERPL-CCNC: 404 U/L — HIGH (ref 25–170)
CO2 SERPL-SCNC: 20 MMOL/L — LOW (ref 22–29)
CO2 SERPL-SCNC: 22 MMOL/L — SIGNIFICANT CHANGE UP (ref 22–29)
CREAT SERPL-MCNC: 1.42 MG/DL — HIGH (ref 0.5–1.3)
CREAT SERPL-MCNC: 1.63 MG/DL — HIGH (ref 0.5–1.3)
CULTURE RESULTS: NO GROWTH — SIGNIFICANT CHANGE UP
EOSINOPHIL # BLD AUTO: 0 K/UL — SIGNIFICANT CHANGE UP (ref 0–0.5)
EOSINOPHIL # BLD AUTO: 0 K/UL — SIGNIFICANT CHANGE UP (ref 0–0.5)
EOSINOPHIL NFR BLD AUTO: 0 % — SIGNIFICANT CHANGE UP (ref 0–6)
EOSINOPHIL NFR BLD AUTO: 0 % — SIGNIFICANT CHANGE UP (ref 0–6)
ESTIMATED AVERAGE GLUCOSE: 111 MG/DL — SIGNIFICANT CHANGE UP (ref 68–114)
GIANT PLATELETS BLD QL SMEAR: PRESENT — SIGNIFICANT CHANGE UP
GLUCOSE BLDC GLUCOMTR-MCNC: 174 MG/DL — HIGH (ref 70–99)
GLUCOSE BLDC GLUCOMTR-MCNC: 224 MG/DL — HIGH (ref 70–99)
GLUCOSE BLDC GLUCOMTR-MCNC: 224 MG/DL — HIGH (ref 70–99)
GLUCOSE BLDC GLUCOMTR-MCNC: 233 MG/DL — HIGH (ref 70–99)
GLUCOSE SERPL-MCNC: 155 MG/DL — HIGH (ref 70–99)
GLUCOSE SERPL-MCNC: 197 MG/DL — HIGH (ref 70–99)
HCT VFR BLD CALC: 18.7 % — CRITICAL LOW (ref 34.5–45)
HCT VFR BLD CALC: 19 % — CRITICAL LOW (ref 34.5–45)
HCT VFR BLD CALC: 19 % — CRITICAL LOW (ref 34.5–45)
HCT VFR BLD CALC: 21 % — CRITICAL LOW (ref 34.5–45)
HGB BLD-MCNC: 6.5 G/DL — CRITICAL LOW (ref 11.5–15.5)
HGB BLD-MCNC: 6.5 G/DL — CRITICAL LOW (ref 11.5–15.5)
HGB BLD-MCNC: 6.6 G/DL — CRITICAL LOW (ref 11.5–15.5)
HGB BLD-MCNC: 7.3 G/DL — LOW (ref 11.5–15.5)
HYPOCHROMIA BLD QL: SLIGHT — SIGNIFICANT CHANGE UP
HYPOCHROMIA BLD QL: SLIGHT — SIGNIFICANT CHANGE UP
INR BLD: 1.41 RATIO — HIGH (ref 0.88–1.16)
INR BLD: 1.42 RATIO — HIGH (ref 0.88–1.16)
KAPPA LC SER QL IFE: 28.27 MG/DL — HIGH (ref 0.33–1.94)
KAPPA/LAMBDA FREE LIGHT CHAIN RATIO, SERUM: 1.58 RATIO — SIGNIFICANT CHANGE UP (ref 0.26–1.65)
LAMBDA LC SER QL IFE: 17.9 MG/DL — HIGH (ref 0.57–2.63)
LYMPHOCYTES # BLD AUTO: 0.94 K/UL — LOW (ref 1–3.3)
LYMPHOCYTES # BLD AUTO: 1.04 K/UL — SIGNIFICANT CHANGE UP (ref 1–3.3)
LYMPHOCYTES # BLD AUTO: 3.5 % — LOW (ref 13–44)
LYMPHOCYTES # BLD AUTO: 3.5 % — LOW (ref 13–44)
MACROCYTES BLD QL: SIGNIFICANT CHANGE UP
MACROCYTES BLD QL: SLIGHT — SIGNIFICANT CHANGE UP
MAGNESIUM SERPL-MCNC: 3 MG/DL — HIGH (ref 1.6–2.6)
MAGNESIUM SERPL-MCNC: 3.1 MG/DL — HIGH (ref 1.6–2.6)
MANUAL SMEAR VERIFICATION: SIGNIFICANT CHANGE UP
MANUAL SMEAR VERIFICATION: SIGNIFICANT CHANGE UP
MCHC RBC-ENTMCNC: 24.4 PG — LOW (ref 27–34)
MCHC RBC-ENTMCNC: 25.1 PG — LOW (ref 27–34)
MCHC RBC-ENTMCNC: 25.2 PG — LOW (ref 27–34)
MCHC RBC-ENTMCNC: 34.7 GM/DL — SIGNIFICANT CHANGE UP (ref 32–36)
MCHC RBC-ENTMCNC: 34.8 GM/DL — SIGNIFICANT CHANGE UP (ref 32–36)
MCHC RBC-ENTMCNC: 34.8 GM/DL — SIGNIFICANT CHANGE UP (ref 32–36)
MCV RBC AUTO: 70.3 FL — LOW (ref 80–100)
MCV RBC AUTO: 72.2 FL — LOW (ref 80–100)
MCV RBC AUTO: 72.5 FL — LOW (ref 80–100)
MICROCYTES BLD QL: SIGNIFICANT CHANGE UP
MICROCYTES BLD QL: SLIGHT — SIGNIFICANT CHANGE UP
MONOCYTES # BLD AUTO: 0.24 K/UL — SIGNIFICANT CHANGE UP (ref 0–0.9)
MONOCYTES # BLD AUTO: 0.27 K/UL — SIGNIFICANT CHANGE UP (ref 0–0.9)
MONOCYTES NFR BLD AUTO: 0.9 % — LOW (ref 2–14)
MONOCYTES NFR BLD AUTO: 0.9 % — LOW (ref 2–14)
MYELOCYTES NFR BLD: 0.9 % — HIGH (ref 0–0)
NEUTROPHILS # BLD AUTO: 25.75 K/UL — HIGH (ref 1.8–7.4)
NEUTROPHILS # BLD AUTO: 28.1 K/UL — HIGH (ref 1.8–7.4)
NEUTROPHILS NFR BLD AUTO: 90.4 % — HIGH (ref 43–77)
NEUTROPHILS NFR BLD AUTO: 95.6 % — HIGH (ref 43–77)
NEUTS BAND # BLD: 4.3 % — SIGNIFICANT CHANGE UP (ref 0–8)
NRBC # BLD: 1 /100 — HIGH (ref 0–0)
OVALOCYTES BLD QL SMEAR: SLIGHT — SIGNIFICANT CHANGE UP
PHOSPHATE SERPL-MCNC: 6.5 MG/DL — HIGH (ref 2.4–4.7)
PHOSPHATE SERPL-MCNC: 8.2 MG/DL — HIGH (ref 2.4–4.7)
PLAT MORPH BLD: ABNORMAL
PLAT MORPH BLD: NORMAL — SIGNIFICANT CHANGE UP
PLATELET # BLD AUTO: 256 K/UL — SIGNIFICANT CHANGE UP (ref 150–400)
PLATELET # BLD AUTO: 256 K/UL — SIGNIFICANT CHANGE UP (ref 150–400)
PLATELET # BLD AUTO: 262 K/UL — SIGNIFICANT CHANGE UP (ref 150–400)
POIKILOCYTOSIS BLD QL AUTO: SIGNIFICANT CHANGE UP
POIKILOCYTOSIS BLD QL AUTO: SIGNIFICANT CHANGE UP
POLYCHROMASIA BLD QL SMEAR: SIGNIFICANT CHANGE UP
POLYCHROMASIA BLD QL SMEAR: SIGNIFICANT CHANGE UP
POTASSIUM SERPL-MCNC: 4.1 MMOL/L — SIGNIFICANT CHANGE UP (ref 3.5–5.3)
POTASSIUM SERPL-MCNC: 4.2 MMOL/L — SIGNIFICANT CHANGE UP (ref 3.5–5.3)
POTASSIUM SERPL-SCNC: 4.1 MMOL/L — SIGNIFICANT CHANGE UP (ref 3.5–5.3)
POTASSIUM SERPL-SCNC: 4.2 MMOL/L — SIGNIFICANT CHANGE UP (ref 3.5–5.3)
PROT SERPL-MCNC: 6.1 G/DL — LOW (ref 6.6–8.7)
PROTHROM AB SERPL-ACNC: 16.1 SEC — HIGH (ref 10.6–13.6)
PROTHROM AB SERPL-ACNC: 16.2 SEC — HIGH (ref 10.6–13.6)
RBC # BLD: 2.62 M/UL — LOW (ref 3.8–5.2)
RBC # BLD: 2.66 M/UL — LOW (ref 3.8–5.2)
RBC # BLD: 2.91 M/UL — LOW (ref 3.8–5.2)
RBC # FLD: 25.6 % — HIGH (ref 10.3–14.5)
RBC # FLD: 26.4 % — HIGH (ref 10.3–14.5)
RBC # FLD: 26.5 % — HIGH (ref 10.3–14.5)
RBC BLD AUTO: ABNORMAL
RBC BLD AUTO: ABNORMAL
ROULEAUX BLD QL SMEAR: PRESENT
SCHISTOCYTES BLD QL AUTO: SLIGHT — SIGNIFICANT CHANGE UP
SCHISTOCYTES BLD QL AUTO: SLIGHT — SIGNIFICANT CHANGE UP
SODIUM SERPL-SCNC: 143 MMOL/L — SIGNIFICANT CHANGE UP (ref 135–145)
SODIUM SERPL-SCNC: 143 MMOL/L — SIGNIFICANT CHANGE UP (ref 135–145)
SPECIMEN SOURCE: SIGNIFICANT CHANGE UP
TARGETS BLD QL SMEAR: SIGNIFICANT CHANGE UP
TARGETS BLD QL SMEAR: SLIGHT — SIGNIFICANT CHANGE UP
VANCOMYCIN TROUGH SERPL-MCNC: 8.7 UG/ML — LOW (ref 10–20)
WBC # BLD: 26.93 K/UL — HIGH (ref 3.8–10.5)
WBC # BLD: 29.67 K/UL — HIGH (ref 3.8–10.5)
WBC # BLD: 29.79 K/UL — HIGH (ref 3.8–10.5)
WBC # FLD AUTO: 26.93 K/UL — HIGH (ref 3.8–10.5)
WBC # FLD AUTO: 29.67 K/UL — HIGH (ref 3.8–10.5)
WBC # FLD AUTO: 29.79 K/UL — HIGH (ref 3.8–10.5)

## 2021-11-19 PROCEDURE — 99291 CRITICAL CARE FIRST HOUR: CPT

## 2021-11-19 RX ORDER — INSULIN LISPRO 100/ML
VIAL (ML) SUBCUTANEOUS
Refills: 0 | Status: DISCONTINUED | OUTPATIENT
Start: 2021-11-19 | End: 2021-11-21

## 2021-11-19 RX ORDER — FENTANYL CITRATE 50 UG/ML
100 INJECTION INTRAVENOUS ONCE
Refills: 0 | Status: DISCONTINUED | OUTPATIENT
Start: 2021-11-19 | End: 2021-11-19

## 2021-11-19 RX ORDER — VANCOMYCIN HCL 1 G
750 VIAL (EA) INTRAVENOUS EVERY 12 HOURS
Refills: 0 | Status: DISCONTINUED | OUTPATIENT
Start: 2021-11-19 | End: 2021-11-20

## 2021-11-19 RX ORDER — SENNA PLUS 8.6 MG/1
2 TABLET ORAL AT BEDTIME
Refills: 0 | Status: DISCONTINUED | OUTPATIENT
Start: 2021-11-19 | End: 2021-11-20

## 2021-11-19 RX ORDER — PIPERACILLIN AND TAZOBACTAM 4; .5 G/20ML; G/20ML
3.38 INJECTION, POWDER, LYOPHILIZED, FOR SOLUTION INTRAVENOUS EVERY 8 HOURS
Refills: 0 | Status: DISCONTINUED | OUTPATIENT
Start: 2021-11-19 | End: 2021-11-20

## 2021-11-19 RX ORDER — ACETAMINOPHEN 500 MG
950 TABLET ORAL EVERY 6 HOURS
Refills: 0 | Status: DISCONTINUED | OUTPATIENT
Start: 2021-11-19 | End: 2021-11-19

## 2021-11-19 RX ORDER — FERROUS SULFATE 325(65) MG
325 TABLET ORAL DAILY
Refills: 0 | Status: DISCONTINUED | OUTPATIENT
Start: 2021-11-19 | End: 2021-11-20

## 2021-11-19 RX ORDER — GABAPENTIN 400 MG/1
200 CAPSULE ORAL THREE TIMES A DAY
Refills: 0 | Status: DISCONTINUED | OUTPATIENT
Start: 2021-11-19 | End: 2021-11-24

## 2021-11-19 RX ORDER — ACETAMINOPHEN 500 MG
975 TABLET ORAL EVERY 6 HOURS
Refills: 0 | Status: DISCONTINUED | OUTPATIENT
Start: 2021-11-19 | End: 2021-11-24

## 2021-11-19 RX ORDER — FENTANYL CITRATE 50 UG/ML
75 INJECTION INTRAVENOUS EVERY 8 HOURS
Refills: 0 | Status: DISCONTINUED | OUTPATIENT
Start: 2021-11-19 | End: 2021-11-24

## 2021-11-19 RX ORDER — POLYETHYLENE GLYCOL 3350 17 G/17G
17 POWDER, FOR SOLUTION ORAL DAILY
Refills: 0 | Status: DISCONTINUED | OUTPATIENT
Start: 2021-11-19 | End: 2021-11-20

## 2021-11-19 RX ADMIN — Medication 975 MILLIGRAM(S): at 12:12

## 2021-11-19 RX ADMIN — OXYCODONE HYDROCHLORIDE 10 MILLIGRAM(S): 5 TABLET ORAL at 10:37

## 2021-11-19 RX ADMIN — OXYCODONE HYDROCHLORIDE 10 MILLIGRAM(S): 5 TABLET ORAL at 05:21

## 2021-11-19 RX ADMIN — OXYCODONE HYDROCHLORIDE 10 MILLIGRAM(S): 5 TABLET ORAL at 06:21

## 2021-11-19 RX ADMIN — Medication 50 MILLILITER(S): at 03:56

## 2021-11-19 RX ADMIN — CHLORHEXIDINE GLUCONATE 1 APPLICATION(S): 213 SOLUTION TOPICAL at 05:51

## 2021-11-19 RX ADMIN — Medication 100 MILLIGRAM(S): at 17:08

## 2021-11-19 RX ADMIN — Medication 4: at 08:11

## 2021-11-19 RX ADMIN — Medication 250 MILLIGRAM(S): at 05:50

## 2021-11-19 RX ADMIN — Medication 975 MILLIGRAM(S): at 17:10

## 2021-11-19 RX ADMIN — Medication 975 MILLIGRAM(S): at 17:25

## 2021-11-19 RX ADMIN — Medication 100 MILLIGRAM(S): at 23:43

## 2021-11-19 RX ADMIN — OXYCODONE HYDROCHLORIDE 10 MILLIGRAM(S): 5 TABLET ORAL at 23:31

## 2021-11-19 RX ADMIN — OXYCODONE HYDROCHLORIDE 10 MILLIGRAM(S): 5 TABLET ORAL at 11:37

## 2021-11-19 RX ADMIN — PIPERACILLIN AND TAZOBACTAM 25 GRAM(S): 4; .5 INJECTION, POWDER, LYOPHILIZED, FOR SOLUTION INTRAVENOUS at 14:42

## 2021-11-19 RX ADMIN — OXYCODONE HYDROCHLORIDE 10 MILLIGRAM(S): 5 TABLET ORAL at 16:53

## 2021-11-19 RX ADMIN — Medication 100 MILLIGRAM(S): at 09:25

## 2021-11-19 RX ADMIN — Medication 50 MILLIGRAM(S): at 23:39

## 2021-11-19 RX ADMIN — Medication 975 MILLIGRAM(S): at 23:31

## 2021-11-19 RX ADMIN — Medication 50 MILLIGRAM(S): at 05:49

## 2021-11-19 RX ADMIN — GABAPENTIN 200 MILLIGRAM(S): 400 CAPSULE ORAL at 05:50

## 2021-11-19 RX ADMIN — OXYCODONE HYDROCHLORIDE 10 MILLIGRAM(S): 5 TABLET ORAL at 00:20

## 2021-11-19 RX ADMIN — Medication 325 MILLIGRAM(S): at 12:13

## 2021-11-19 RX ADMIN — Medication 50 MILLIGRAM(S): at 17:08

## 2021-11-19 RX ADMIN — Medication 50 MILLIGRAM(S): at 12:12

## 2021-11-19 RX ADMIN — HEPARIN SODIUM 5000 UNIT(S): 5000 INJECTION INTRAVENOUS; SUBCUTANEOUS at 14:41

## 2021-11-19 RX ADMIN — FENTANYL CITRATE 100 MICROGRAM(S): 50 INJECTION INTRAVENOUS at 09:25

## 2021-11-19 RX ADMIN — Medication 6: at 17:09

## 2021-11-19 RX ADMIN — POLYETHYLENE GLYCOL 3350 17 GRAM(S): 17 POWDER, FOR SOLUTION ORAL at 12:13

## 2021-11-19 RX ADMIN — FENTANYL CITRATE 100 MICROGRAM(S): 50 INJECTION INTRAVENOUS at 09:40

## 2021-11-19 RX ADMIN — GABAPENTIN 200 MILLIGRAM(S): 400 CAPSULE ORAL at 23:31

## 2021-11-19 RX ADMIN — SODIUM CHLORIDE 75 MILLILITER(S): 9 INJECTION, SOLUTION INTRAVENOUS at 21:44

## 2021-11-19 RX ADMIN — PIPERACILLIN AND TAZOBACTAM 25 GRAM(S): 4; .5 INJECTION, POWDER, LYOPHILIZED, FOR SOLUTION INTRAVENOUS at 23:43

## 2021-11-19 RX ADMIN — Medication 250 MILLIGRAM(S): at 17:10

## 2021-11-19 RX ADMIN — GABAPENTIN 200 MILLIGRAM(S): 400 CAPSULE ORAL at 14:41

## 2021-11-19 RX ADMIN — PIPERACILLIN AND TAZOBACTAM 25 GRAM(S): 4; .5 INJECTION, POWDER, LYOPHILIZED, FOR SOLUTION INTRAVENOUS at 03:56

## 2021-11-19 RX ADMIN — Medication 975 MILLIGRAM(S): at 13:12

## 2021-11-19 RX ADMIN — OXYCODONE HYDROCHLORIDE 10 MILLIGRAM(S): 5 TABLET ORAL at 15:53

## 2021-11-19 RX ADMIN — Medication 4: at 23:40

## 2021-11-19 NOTE — PROGRESS NOTE ADULT - SUBJECTIVE AND OBJECTIVE BOX
24h Events:  pt extubated yesterday morning. Stress dose steroids started, weaned off vasopressors. Dressing changed yesterday AM, Lateral incision with some bleeding, area packed with surgicell. POCUS showed collapsing IVC. Pt recieved 1L IVF and albumin. vitamin K given for elevated INR. Overnight, dressing changed with Ortho PA. surgicel placed in L lateral incision where slow bleeding noted. Hgb yesterday evening 6.5. Pt recieved 1u PRBC, hgb did not respond. Repeat h/h post transfusion 6.5. Pt recieved another 1u PRBC. Remained HD stable overnight, no hypotension or tachycardia associated with acute blood loss anemia.     ICU Vital Signs Last 24 Hrs  T(C): 36.1 (2021 05:00), Max: 38.5 (2021 07:41)  T(F): 97 (2021 05:00), Max: 101.3 (2021 07:41)  HR: 87 (2021 05:00) (79 - 97)  BP: 124/51 (2021 12:00) (110/44 - 136/49)  BP(mean): 69 (2021 12:00) (62 - 104)  ABP: 138/53 (2021 05:00) (115/49 - 167/60)  ABP(mean): 75 (2021 05:00) (61 - 84)  RR: 14 (2021 05:00) (12 - 20)  SpO2: 100% (2021 05:00) (93% - 100%)    I&O's Detail    2021 07:01  -  2021 07:00  --------------------------------------------------------  IN:    Dexmedetomidine: 53.7 mL    FentaNYL: 46.7 mL    IV PiggyBack: 50 mL    IV PiggyBack: 100 mL    multiple electrolytes Injection Type 1 Bolus: 1000 mL    multiple electrolytes Injection Type 1.: 1750 mL    Norepinephrine: 308.7 mL    Vasopressin: 21.6 mL  Total IN: 3330.7 mL    OUT:    Indwelling Catheter - Urethral (mL): 860 mL  Total OUT: 860 mL    Total NET: 2470.7 mL    2021 07:01  -  2021 06:00  --------------------------------------------------------  IN:    FentaNYL: 9.3 mL    IV PiggyBack: 150 mL    IV PiggyBack: 100 mL    IV PiggyBack: 50 mL    IV PiggyBack: 50 mL    IV PiggyBack: 250 mL    IV PiggyBack: 200 mL    IV PiggyBack: 400 mL    multiple electrolytes Injection Type 1 Bolus: 500 mL    multiple electrolytes Injection Type 1.: 2875 mL    Norepinephrine: 338.9 mL    PRBCs (Packed Red Blood Cells): 563 mL    Vasopressin: 14.4 mL  Total IN: 5500.6 mL    OUT:    Indwelling Catheter - Urethral (mL): 3365 mL  Total OUT: 3365 mL    Total NET: 2135.6 mL    ABG - ( 2021 03:51 )  pH, Arterial: 7.390 pH, Blood: x     /  pCO2: 28    /  pO2: 148   / HCO3: 17    / Base Excess: -8.1  /  SaO2: 99.4      MEDICATIONS  (STANDING):  chlorhexidine 2% Cloths 1 Application(s) Topical <User Schedule>  clindamycin IVPB 900 milliGRAM(s) IV Intermittent every 8 hours  gabapentin Solution 200 milliGRAM(s) Oral three times a day  glucagon  Injectable 1 milliGRAM(s) IntraMuscular once  heparin   Injectable 5000 Unit(s) SubCutaneous every 8 hours  hydrocortisone sodium succinate Injectable 50 milliGRAM(s) IV Push every 6 hours  insulin lispro (ADMELOG) corrective regimen sliding scale   SubCutaneous Before meals and at bedtime  multiple electrolytes Injection Type 1 1000 milliLiter(s) (125 mL/Hr) IV Continuous <Continuous>  norepinephrine Infusion 0.05 MICROgram(s)/kG/Min (8.33 mL/Hr) IV Continuous <Continuous>  piperacillin/tazobactam IVPB.. 3.375 Gram(s) IV Intermittent every 12 hours  vancomycin  IVPB 1000 milliGRAM(s) IV Intermittent <User Schedule>    MEDICATIONS  (PRN):  oxyCODONE    IR 5 milliGRAM(s) Oral every 4 hours PRN Moderate Pain (4 - 6)  oxyCODONE    IR 10 milliGRAM(s) Oral every 4 hours PRN Severe Pain (7 - 10)    Physical Exam:    Gen: Resting comfortably in bed    HEENT: PERRL, EOMI    Neurological: Alert and oriented x3 without focal deficit    Neck: Trachea midline, no evidence of JVD, FROM without pain, neck symmetric    Pulmonary: CTAB with decreased breath sounds at the bases    Cardiovascular: regular rate and rhythm    Gastrointestinal: Soft, non-tender, non-distended    : Del Toro in place draining clear yellow urine    Extremities: LLE w/ 4 incision, L lateral incision, L medial incision and L upper thigh incision with xeroform and kerlex packing, Knee incision closed with sutures    Skin: incision site as described above. Skin around the L lateral incision boggy with fluid filled blister extending onto the ankle and foot.     Musculoskeletal: LLE limited ROM, elevated    LABS:  CBC Full  -  ( 2021 01:51 )  WBC Count : x  RBC Count : x  Hemoglobin : 6.5 g/dL  Hematocrit : 19.0 %  Platelet Count - Automated : x  Mean Cell Volume : x  Mean Cell Hemoglobin : x  Mean Cell Hemoglobin Concentration : x  Auto Neutrophil # : x  Auto Lymphocyte # : x  Auto Monocyte # : x  Auto Eosinophil # : x  Auto Basophil # : x  Auto Neutrophil % : x  Auto Lymphocyte % : x  Auto Monocyte % : x  Auto Eosinophil % : x  Auto Basophil % : x    11-18    138  |  102  |  107.2<H>  ----------------------------<  177<H>  4.7   |  19.0<L>  |  1.98<H>    Ca    7.8<L>      2021 21:01  Phos  10.6     11-18  Mg     2.8     11-18    TPro  5.2<L>  /  Alb  1.2<L>  /  TBili  0.6  /  DBili  x   /  AST  44<H>  /  ALT  19  /  AlkPhos  89  11-17    PT/INR - ( 2021 12:00 )   PT: 17.9 sec;   INR: 1.58 ratio      PTT - ( 2021 12:00 )  PTT:32.9 sec  Urinalysis Basic - ( 2021 16:46 )    Color: Yellow / Appearance: Cloudy / S.020 / pH: x  Gluc: x / Ketone: Trace  / Bili: Small / Urobili: 1 mg/dL   Blood: x / Protein: 30 mg/dL / Nitrite: Negative   Leuk Esterase: Trace / RBC: 6-10 /HPF / WBC    Sq Epi: x / Non Sq Epi: Few / Bacteria: Moderate    RECENT CULTURES:   .Body Fluid OR - Left Lower Extremity Fluid XXXX   No polymorphonuclear leukocytes seen  No organisms seen  by cytocentrifuge XXXX     Catheterized Catheterized XXXX XXXX   <10,000 CFU/mL Normal Urogenital Aimee     .Blood Blood Blood Culture PCR   Growth in aerobic and anaerobic bottles: Gram Positive Cocci in Pairs and  Chains  ***Blood Panel PCR results on this specimen are available  approximately 3 hours after the Gram stain result.***  Gram stain, PCR, and/or culture results may not always  correspond due to difference in methodologies.  ************************************************************  This PCR assay was performed using [employer].  The following targets are tested for: Enterococcus,  vancomycin resistant enterococci, Listeria monocytogenes,  coagulase negative staphylococci, S. aureus,  methicillin resistant S. aureus, Streptococcus agalactiae  (Group B), S. pneumoniae, S. pyogenes (Group A),  Acinetobacter baumannii, Enterobacter cloacae, E. coli,  Klebsiella oxytoca, K. pneumoniae, Proteus sp.,  Serratia marcescens, Haemophilus influenzae,  Neisseria meningitidis, Pseudomonas aeruginosa, Candida  albicans, C. glabrata, C krusei, C parapsilosis,  C. tropicalis and the KPC resistance gene.  Gram Stain and BCID performed by:  [facility]  [address] [place] [id number]  .  TYPE: (C=Critical, N=Notification, A=Abnormal) C  TESTS:  _ GS  DATE/TIME CALLED: _ 2021 09:40:08  CALLED TO: Chris Hernandez RN  READ BACK (2 Patient Identifiers)(Y/N): _ Y  READ BACK VALUES (Y/N): _ Y  CALLED BY: _ Sajaz XXXX    LIVER FUNCTIONS - ( 2021 23:52 )  Alb: 1.2 g/dL / Pro: 5.2 g/dL / ALK PHOS: 89 U/L / ALT: 19 U/L / AST: 44 U/L / GGT: x           CARDIAC MARKERS ( 2021 04:34 )  x     / x     / 406 U/L / x     / 11.4 ng/mL  CARDIAC MARKERS ( 2021 23:52 )  x     / x     / 498 U/L / x     / 12.1 ng/mL  CARDIAC MARKERS ( 2021 13:21 )  x     / <0.01 ng/mL / 439 U/L / x     / 10.8 ng/mL    ASSESSMENT/PLAN:  51y Female present to ED with left leg swelling, erythema, pain, leukocytosis and acute renal failure. CT scan concerning for necrotizing soft tissue infection. Pt s/p LLE exploration and debridement, admitted to SICU for hemorrhagic + septic shock, now weaned off vasopressors, still with acute blood loss anemia.    Neuro: Pain control, delirium precautions, sleep hygiene     CV:     Pulm:     GI/Nutrition:     /Renal: Del Toro for strict I&O    ID:     Endo:     Skin: Repositioning for DTI prevention while in bed    Heme/DVT Prophylaxis: SCDs    Lines/Tubes:     Dispo:      24h Events:  pt extubated yesterday morning. Stress dose steroids started, weaned off vasopressors. Dressing changed yesterday AM, Lateral incision with some bleeding, area packed with surgicell. POCUS showed collapsing IVC. Pt recieved 1L IVF and albumin. vitamin K given for elevated INR. Overnight, dressing changed with Ortho PA. surgicel placed in L lateral incision where slow bleeding noted. Hgb yesterday evening 6.5. Pt recieved 1u PRBC, hgb did not respond. Repeat h/h post transfusion 6.5. Pt recieved another 1u PRBC. Remained HD stable overnight, no hypotension or tachycardia associated with acute blood loss anemia.     ICU Vital Signs Last 24 Hrs  T(C): 36.1 (2021 05:00), Max: 38.5 (2021 07:41)  T(F): 97 (2021 05:00), Max: 101.3 (2021 07:41)  HR: 87 (2021 05:00) (79 - 97)  BP: 124/51 (2021 12:00) (110/44 - 136/49)  BP(mean): 69 (2021 12:00) (62 - 104)  ABP: 138/53 (2021 05:00) (115/49 - 167/60)  ABP(mean): 75 (2021 05:00) (61 - 84)  RR: 14 (2021 05:00) (12 - 20)  SpO2: 100% (2021 05:00) (93% - 100%)    I&O's Detail    2021 07:01  -  2021 07:00  --------------------------------------------------------  IN:    Dexmedetomidine: 53.7 mL    FentaNYL: 46.7 mL    IV PiggyBack: 50 mL    IV PiggyBack: 100 mL    multiple electrolytes Injection Type 1 Bolus: 1000 mL    multiple electrolytes Injection Type 1.: 1750 mL    Norepinephrine: 308.7 mL    Vasopressin: 21.6 mL  Total IN: 3330.7 mL    OUT:    Indwelling Catheter - Urethral (mL): 860 mL  Total OUT: 860 mL    Total NET: 2470.7 mL    2021 07:01  -  2021 06:00  --------------------------------------------------------  IN:    FentaNYL: 9.3 mL    IV PiggyBack: 150 mL    IV PiggyBack: 100 mL    IV PiggyBack: 50 mL    IV PiggyBack: 50 mL    IV PiggyBack: 250 mL    IV PiggyBack: 200 mL    IV PiggyBack: 400 mL    multiple electrolytes Injection Type 1 Bolus: 500 mL    multiple electrolytes Injection Type 1.: 2875 mL    Norepinephrine: 338.9 mL    PRBCs (Packed Red Blood Cells): 563 mL    Vasopressin: 14.4 mL  Total IN: 5500.6 mL    OUT:    Indwelling Catheter - Urethral (mL): 3365 mL  Total OUT: 3365 mL    Total NET: 2135.6 mL    ABG - ( 2021 03:51 )  pH, Arterial: 7.390 pH, Blood: x     /  pCO2: 28    /  pO2: 148   / HCO3: 17    / Base Excess: -8.1  /  SaO2: 99.4      MEDICATIONS  (STANDING):  chlorhexidine 2% Cloths 1 Application(s) Topical <User Schedule>  clindamycin IVPB 900 milliGRAM(s) IV Intermittent every 8 hours  gabapentin Solution 200 milliGRAM(s) Oral three times a day  glucagon  Injectable 1 milliGRAM(s) IntraMuscular once  heparin   Injectable 5000 Unit(s) SubCutaneous every 8 hours  hydrocortisone sodium succinate Injectable 50 milliGRAM(s) IV Push every 6 hours  insulin lispro (ADMELOG) corrective regimen sliding scale   SubCutaneous Before meals and at bedtime  multiple electrolytes Injection Type 1 1000 milliLiter(s) (125 mL/Hr) IV Continuous <Continuous>  norepinephrine Infusion 0.05 MICROgram(s)/kG/Min (8.33 mL/Hr) IV Continuous <Continuous>  piperacillin/tazobactam IVPB.. 3.375 Gram(s) IV Intermittent every 12 hours  vancomycin  IVPB 1000 milliGRAM(s) IV Intermittent <User Schedule>    MEDICATIONS  (PRN):  oxyCODONE    IR 5 milliGRAM(s) Oral every 4 hours PRN Moderate Pain (4 - 6)  oxyCODONE    IR 10 milliGRAM(s) Oral every 4 hours PRN Severe Pain (7 - 10)    Physical Exam:    Gen: Resting comfortably in bed    HEENT: PERRL, EOMI    Neurological: Alert and oriented x3 without focal deficit    Neck: Trachea midline, no evidence of JVD, FROM without pain, neck symmetric    Pulmonary: CTAB with decreased breath sounds at the bases    Cardiovascular: regular rate and rhythm    Gastrointestinal: Soft, non-tender, non-distended    : Valle in place draining clear yellow urine    Extremities: LLE w/ 4 incision, L lateral incision, L medial incision and L upper thigh incision with xeroform and kerlex packing, Knee incision closed with sutures    Skin: incision site as described above. Skin around the L lateral incision boggy with fluid filled blister extending onto the ankle and foot.     Musculoskeletal: LLE limited ROM, elevated    LABS:  CBC Full  -  ( 2021 01:51 )  WBC Count : x  RBC Count : x  Hemoglobin : 6.5 g/dL  Hematocrit : 19.0 %  Platelet Count - Automated : x  Mean Cell Volume : x  Mean Cell Hemoglobin : x  Mean Cell Hemoglobin Concentration : x  Auto Neutrophil # : x  Auto Lymphocyte # : x  Auto Monocyte # : x  Auto Eosinophil # : x  Auto Basophil # : x  Auto Neutrophil % : x  Auto Lymphocyte % : x  Auto Monocyte % : x  Auto Eosinophil % : x  Auto Basophil % : x    11-18    138  |  102  |  107.2<H>  ----------------------------<  177<H>  4.7   |  19.0<L>  |  1.98<H>    Ca    7.8<L>      2021 21:01  Phos  10.6     11-18  Mg     2.8     11-18    TPro  5.2<L>  /  Alb  1.2<L>  /  TBili  0.6  /  DBili  x   /  AST  44<H>  /  ALT  19  /  AlkPhos  89  11-17    PT/INR - ( 2021 12:00 )   PT: 17.9 sec;   INR: 1.58 ratio      PTT - ( 2021 12:00 )  PTT:32.9 sec  Urinalysis Basic - ( 2021 16:46 )    Color: Yellow / Appearance: Cloudy / S.020 / pH: x  Gluc: x / Ketone: Trace  / Bili: Small / Urobili: 1 mg/dL   Blood: x / Protein: 30 mg/dL / Nitrite: Negative   Leuk Esterase: Trace / RBC: 6-10 /HPF / WBC    Sq Epi: x / Non Sq Epi: Few / Bacteria: Moderate    RECENT CULTURES:   .Body Fluid OR - Left Lower Extremity Fluid XXXX   No polymorphonuclear leukocytes seen  No organisms seen  by cytocentrifuge XXXX     Catheterized Catheterized XXXX XXXX   <10,000 CFU/mL Normal Urogenital Aimee     .Blood Blood Blood Culture PCR   Growth in aerobic and anaerobic bottles: Gram Positive Cocci in Pairs and  Chains  ***Blood Panel PCR results on this specimen are available  approximately 3 hours after the Gram stain result.***  Gram stain, PCR, and/or culture results may not always  correspond due to difference in methodologies.  ************************************************************  This PCR assay was performed using [employer].  The following targets are tested for: Enterococcus,  vancomycin resistant enterococci, Listeria monocytogenes,  coagulase negative staphylococci, S. aureus,  methicillin resistant S. aureus, Streptococcus agalactiae  (Group B), S. pneumoniae, S. pyogenes (Group A),  Acinetobacter baumannii, Enterobacter cloacae, E. coli,  Klebsiella oxytoca, K. pneumoniae, Proteus sp.,  Serratia marcescens, Haemophilus influenzae,  Neisseria meningitidis, Pseudomonas aeruginosa, Candida  albicans, C. glabrata, C krusei, C parapsilosis,  C. tropicalis and the KPC resistance gene.  Gram Stain and BCID performed by:  [facility]  [address] [place] [id number]  .  TYPE: (C=Critical, N=Notification, A=Abnormal) C  TESTS:  _ GS  DATE/TIME CALLED: _ 2021 09:40:08  CALLED TO: Chris Hernandez RN  READ BACK (2 Patient Identifiers)(Y/N): _ Y  READ BACK VALUES (Y/N): _ Y  CALLED BY: _ Sajaz XXXX    LIVER FUNCTIONS - ( 2021 23:52 )  Alb: 1.2 g/dL / Pro: 5.2 g/dL / ALK PHOS: 89 U/L / ALT: 19 U/L / AST: 44 U/L / GGT: x           CARDIAC MARKERS ( 2021 04:34 )  x     / x     / 406 U/L / x     / 11.4 ng/mL  CARDIAC MARKERS ( 2021 23:52 )  x     / x     / 498 U/L / x     / 12.1 ng/mL  CARDIAC MARKERS ( 2021 13:21 )  x     / <0.01 ng/mL / 439 U/L / x     / 10.8 ng/mL    ASSESSMENT/PLAN:  51y Female present to ED with left leg swelling, erythema, pain, leukocytosis and acute renal failure. CT scan concerning for necrotizing soft tissue infection. Pt s/p LLE exploration and debridement, admitted to SICU for hemorrhagic + septic shock, now weaned off vasopressors, still with acute blood loss anemia.    Neuro:   - pain control with oxycodone for moderate to severe pain, tylenol for mild pain  - delirium precautions  - regulate sleep wake cycle  - pt with somewhat of weird affect / intermittent confusion - likely uremic encephalopathy, will continue to monitor    CV:   - off all vasopressors since yesterday afternoon  - Flotrack numbers indicate pt is well resusciated  - 2u PRBC overnight for acute blood loss anemia  - lactate clear    Pulm:   - breathing comfortably on room air  - encourage coughing and deep breathing  - IS 10x hour while awake    GI/Nutrition:   - consistent carb diet    /Renal:   - Valle for strict I&O  - uremic encephalopathy, continue to trend BUN  - no urgent indication for RRT, making good urine, Cr continues to improve  - avoid nephrotoxic medications    ID:   - preliminary OR cultures w/ CPC, follow up final result  - continue empiric vancomycin, clindamycin and zosyn for necrotizing soft tissue infection  - trend leukocytosis    Endo:   - FS q6  - tight glycemic control to maintain blood glucose < 180  - stress dose steroids for adrenal insufficiency (pt has been on prednisone for the week prior to surgery for knee inflammation)  - hydrocort 50 mg q8 for the next 24 hours (48 hours total), then slow taper    Skin:   - Repositioning for DTI prevention while in bed    Heme/DVT Prophylaxis:   - SCDs  - SQH held given patient with acute blood loss anemia overnight  - F/U hgb response after 2nd unit PRBC this AM  - continue to trend h/h q6    Lines/Tubes:   - R IJ TLC, L radial A line, valle    Dispo:   - SICU

## 2021-11-19 NOTE — DIETITIAN INITIAL EVALUATION ADULT. - OTHER INFO
Pt is a 51 year old Female present to ED with left leg swelling, erythema, pain. Patient endorses she had left knee pain for 1 week presented 3 days ago to ED  where she states was given an steroid shot, and ibuprofen. however pain and edema worsened. Patient states she wasn't feeling herself today reason why she came. Endorses chills initially with pain 3 days ago but none since, denies history of trauma, insect bites, recent interventions, or injections to the area, contact with ticks, history of diabetes. CT scan concerning for necrotizing soft tissue infection. Pt s/p LLE exploration and debridement, admitted to SICU for hemorrhagic + septic shock, now weaned off vasopressors, still with acute blood loss anemia. Pt is now extubated; Pt sleeping during visit, unable to conduct interview. Spoke with RN at bedside; PO diet started last night. Appetite fair at this time per nursing staff.

## 2021-11-19 NOTE — CDI QUERY NOTE - [id number]_GEN_ALL_CORE_HH
The Operative Report from 11/17/2021 documents "Left knee open irrigation and debridement, left ankle removal of deep implant, as well as debridement of bone and subcutaneous tissue".    Please clarify the type of debridement (excisional vs non-excisional) that was performed.    A)  Excisional Debridement of Bone  B)  Non-excisional Debridement of Bone  C)  Other, please specify  D)  Not clinically significant            Supporting Documentation:    PREOPERATIVE DIAGNOSIS:  Necrotizing fasciitis of the left lower extremity.    POSTOPERATIVE DIAGNOSIS:  Left septic knee and necrotizing fasciitis of left lower extremity.    OPERATION:  Left knee open irrigation and debridement, left ankle removal of deep implant, as well as debridement of bone and subcutaneous tissue.    ANESTHESIA:  General.    ASSISTANT:  BORIS SHARMA.    ESTIMATED BLOOD LOSS:  200 mL.    COMPLICATIONS:  None.    PREOPERATIVE ANTIBIOTICS:  The patient was given vancomycin as well as Zosyn in the emergency room, as well as clindamycin prior to the start of surgery.    CONDITION:  The patient is taken to the SICU in stable condition but on pressors and intubated    INDICATIONS:  Ms. Leal is a 51yearold female with no known past medical history who presented with a chief complaint of left leg pain 3 days ago to the Emergency Room.  She was given a Toradol injection as well as a steroid dose pack and discharged home.  She presented back to the Emergency Room today with increasing pain, swelling, and erythema of her left lower extremity.  On presentation her white count was over 46,000.  Her CRP was greater than 422,000 as well as a low sodium and high glucose.  Her hemoglobin was also low at 7.7.    CT scan of her left lower extremity did not show any air in the tissues or gross abscesses or infection, however it did show a nonspecific effusion in the left knee.  However, during the hours after the CT scan at 3:00 p.m. she then developed increasing pain, swelling, erythema as well as bullae on the left lower extremity.  Therefore with the history of her labs her LRINEC score put her at a >92% chance of having necrotizing fasciitis this coupled with the septicappearing nature of the patient she was taken to the Operating Room emergently by the Trauma Team and myself for necrotizing fasciitis and urgent irrigation and debridement of the left lower extremity including MASON of the left ankle and left knee irrigation and debridement. Of note, she had minimal pain with ankle range of motion and no micromotion tenderness, however she had severe knee pain with minimal movement.     Due to the fact that she had this effusion on CT scan and the fact that it was unable to be tapped in the Emergency Room we did proceed with irrigation and debridement of the left knee as well as arthrocentesis as well as removal of hardware, debridement of bone and soft tissue in the left lower extremity.    PROCEDURE:  After appropriate endotracheal intubation was given to the patient she was placed on the operating table, and prepped and draped in the standard sterile manner.  An approximately 6 cm incision was then made centered over the anterior aspect of the patella and a standard medial capsulotomy was then made next to the patella where purulent material was expressed from the knee joint.  This was then cultured as well as taken for cell count and other testing, and the knee was copiously irrigated with 9 liters of normal saline.  We next turned our attention to the ankle, where the previous skin incision approximately 1015 cm in length was utilized.    This was carried down through subcutaneous and soft tissues until the plate was identified distally.  The incision was then carried out proximally with careful protection of the peroneal nerve, which was not encountered during the procedure.  Dishwater fluid and some pus was identified in the fascial layers but no necrotic tissue was encountered in the fascia over the fibula plate. The screws were then removed from the distal aspect of the plate as well as the proximal aspect of plate.  Pus was noted and  fluid was also noted in the subcutaneous tissues and pus was noted anteriorly to the plate, but there was no evidence of gross infection of the plate or hardware.  The plate was then removed without complication.  The lag screw that was placed in the anterior aspect was then removed as well without complication.    Fluoroscopy was taken that confirmed no fractures of left lower extremity as well as removal of all the hardware.  The soft tissue and subcutaneous tissue were then debrided as well as the bone was debrided of any foreign material and necroticlooking tissue.  The wound was then irrigated with copious amounts of normal saline.  The Trauma Team then took over, washing out and fasciotomizing the lower extremity as well as placing Betadine wash, which will be dictated and detailed in their operative report.    The skin and soft tissues were then left open due to this infection as well as necrotizing fasciitis and difficulty with closing the soft tissues.  The knee capsulotomy was closed with 0 PDS.  The knee incision skin was closed with 2-0 nylon and a sterile dressing was placed over the knee.  She will continue on antibiotics with an Infectious Disease consult.  Of note, she required large amounts of pressors in the operating room as well as transfusion, and therefore she was taken to the SICU stable.  However, intubated on antibiotics.        Electronically Signed By: Gallo Tobias 11/18/2021 03:23:31 PM        DICT:	Gallo Tobias ([id number]) 11/17/2021 10:04 PM  TRANS:	[email or web address] 11/17/2021 10:07 PM  JOB:	[id number]

## 2021-11-19 NOTE — PROGRESS NOTE ADULT - ATTENDING COMMENTS
Patient seen and examined on am rounds. Pain controlled, tolerating diet, dressing change showed no additional bloody oozing, wound beds clean with no signs of infection. Needs wound vac to upper inner thigh wound. Cont abx. Off pressors. PT/OT. Will remain in ICU.

## 2021-11-19 NOTE — DIETITIAN INITIAL EVALUATION ADULT. - PERTINENT LABORATORY DATA
11-19 Na143 mmol/L Glu 155 mg/dL<H> K+ 4.2 mmol/L Cr  1.63 mg/dL<H> BUN 94.3 mg/dL<H> Phos 8.2 mg/dL<H> Alb 2.5 g/dL<L> PAB n/a

## 2021-11-19 NOTE — PROGRESS NOTE ADULT - SUBJECTIVE AND OBJECTIVE BOX
Pt Name: LUIS FERNANDO DAVIS  MRN: [id number]    Patient is a 51 F being followed for LLE soft tissue infection. Patient is POD #2 s/p emergent extensive LLE irrigation and debridement and MASON w/ ortho and ACS. Patient was seen in SICU today with SICU team. Pain is reportedly well controlled. No acute events reported by bedside RN.                           7.3    26.93 )-----------( 262      ( 19 Nov 2021 05:48 )             21.0     11-19    143  |  108<H>  |  94.3<H>  ----------------------------<  155<H>  4.2   |  20.0<L>  |  1.63<H>    Ca    8.0<L>      19 Nov 2021 05:48  Phos  8.2     11-19  Mg     3.0     11-19    TPro  6.1<L>  /  Alb  2.5<L>  /  TBili  0.5  /  DBili  x   /  AST  42<H>  /  ALT  14  /  AlkPhos  75  11-19      PHYSICAL EXAM:  Vital Signs Last 24 Hrs  T(C): 36.4 (19 Nov 2021 10:00), Max: 38 (18 Nov 2021 11:26)  T(F): 97.5 (19 Nov 2021 10:00), Max: 100.4 (18 Nov 2021 11:26)  HR: 92 (19 Nov 2021 08:00) (79 - 96)  BP: 124/51 (18 Nov 2021 12:00) (122/52 - 124/51)  BP(mean): 69 (18 Nov 2021 12:00) (69 - 72)  RR: 22 (19 Nov 2021 08:00) (12 - 22)  SpO2: 98% (19 Nov 2021 08:00) (93% - 100%)    Appearance: Alert, responsive, in no acute distress.  LLE:  Skin: All dressings of LLE removed. Knee incision c/d/i. No erythema noted at knee. Swelling decreased significantly. Minimal bleeding at proximal incision. Nylons intact. New dressing placed over knee dressing.   Other incisions being managed by ACS/SICU  Vascular, LLE: 2+ DP pulse. 1+ PT  Sensation: SILT, however diminished in SP distribution.  Motor: 4+/5 PF/FHL, 0/5 EHL/DF      A/P:  Pt is a 51y Female POD #2 s/p LLE I+D and MASON for necrotizing soft tissue infection.      PLAN:   1. Pain control.  2. WBAT LLE in CAM boot.  3. F/U OR Cx and joint aspiration results.  4. ABx as per ID.  5. DVT PPx.  6. PT.   7. Post-op labs. SICU following drop in H/H  8. Ortho to follow.

## 2021-11-20 DIAGNOSIS — K92.1 MELENA: ICD-10-CM

## 2021-11-20 LAB
ANION GAP SERPL CALC-SCNC: 14 MMOL/L — SIGNIFICANT CHANGE UP (ref 5–17)
ANION GAP SERPL CALC-SCNC: 14 MMOL/L — SIGNIFICANT CHANGE UP (ref 5–17)
ANISOCYTOSIS BLD QL: SLIGHT — SIGNIFICANT CHANGE UP
APTT BLD: 35.4 SEC — SIGNIFICANT CHANGE UP (ref 27.5–35.5)
APTT BLD: 35.5 SEC — SIGNIFICANT CHANGE UP (ref 27.5–35.5)
BASOPHILS # BLD AUTO: 0.21 K/UL — HIGH (ref 0–0.2)
BASOPHILS NFR BLD AUTO: 0.8 % — SIGNIFICANT CHANGE UP (ref 0–2)
BUN SERPL-MCNC: 108.8 MG/DL — HIGH (ref 8–20)
BUN SERPL-MCNC: 91.8 MG/DL — HIGH (ref 8–20)
BURR CELLS BLD QL SMEAR: PRESENT — SIGNIFICANT CHANGE UP
CALCIUM SERPL-MCNC: 7.9 MG/DL — LOW (ref 8.6–10.2)
CALCIUM SERPL-MCNC: 8.2 MG/DL — LOW (ref 8.6–10.2)
CHLORIDE SERPL-SCNC: 108 MMOL/L — HIGH (ref 98–107)
CHLORIDE SERPL-SCNC: 110 MMOL/L — HIGH (ref 98–107)
CO2 SERPL-SCNC: 21 MMOL/L — LOW (ref 22–29)
CO2 SERPL-SCNC: 22 MMOL/L — SIGNIFICANT CHANGE UP (ref 22–29)
CREAT SERPL-MCNC: 1.11 MG/DL — SIGNIFICANT CHANGE UP (ref 0.5–1.3)
CREAT SERPL-MCNC: 1.41 MG/DL — HIGH (ref 0.5–1.3)
EOSINOPHIL # BLD AUTO: 0 K/UL — SIGNIFICANT CHANGE UP (ref 0–0.5)
EOSINOPHIL NFR BLD AUTO: 0 % — SIGNIFICANT CHANGE UP (ref 0–6)
FIBRINOGEN PPP-MCNC: 635 MG/DL — HIGH (ref 290–520)
GIANT PLATELETS BLD QL SMEAR: PRESENT — SIGNIFICANT CHANGE UP
GLUCOSE BLDC GLUCOMTR-MCNC: 152 MG/DL — HIGH (ref 70–99)
GLUCOSE BLDC GLUCOMTR-MCNC: 167 MG/DL — HIGH (ref 70–99)
GLUCOSE BLDC GLUCOMTR-MCNC: 257 MG/DL — HIGH (ref 70–99)
GLUCOSE SERPL-MCNC: 149 MG/DL — HIGH (ref 70–99)
GLUCOSE SERPL-MCNC: 163 MG/DL — HIGH (ref 70–99)
HAPTOGLOB SERPL-MCNC: 205 MG/DL — HIGH (ref 34–200)
HCT VFR BLD CALC: 15.5 % — CRITICAL LOW (ref 34.5–45)
HCT VFR BLD CALC: 19.5 % — CRITICAL LOW (ref 34.5–45)
HCT VFR BLD CALC: 23.9 % — LOW (ref 34.5–45)
HGB BLD-MCNC: 5.3 G/DL — CRITICAL LOW (ref 11.5–15.5)
HGB BLD-MCNC: 6.7 G/DL — CRITICAL LOW (ref 11.5–15.5)
HGB BLD-MCNC: 8.2 G/DL — LOW (ref 11.5–15.5)
INR BLD: 1.39 RATIO — HIGH (ref 0.88–1.16)
INR BLD: 1.41 RATIO — HIGH (ref 0.88–1.16)
INR BLD: 1.43 RATIO — HIGH (ref 0.88–1.16)
LDH SERPL L TO P-CCNC: 769 U/L — HIGH (ref 98–192)
LYMPHOCYTES # BLD AUTO: 0.92 K/UL — LOW (ref 1–3.3)
LYMPHOCYTES # BLD AUTO: 3.5 % — LOW (ref 13–44)
MAGNESIUM SERPL-MCNC: 2.8 MG/DL — HIGH (ref 1.6–2.6)
MANUAL SMEAR VERIFICATION: SIGNIFICANT CHANGE UP
MCHC RBC-ENTMCNC: 25.4 PG — LOW (ref 27–34)
MCHC RBC-ENTMCNC: 26.3 PG — LOW (ref 27–34)
MCHC RBC-ENTMCNC: 26.4 PG — LOW (ref 27–34)
MCHC RBC-ENTMCNC: 34.2 GM/DL — SIGNIFICANT CHANGE UP (ref 32–36)
MCHC RBC-ENTMCNC: 34.3 GM/DL — SIGNIFICANT CHANGE UP (ref 32–36)
MCHC RBC-ENTMCNC: 34.4 GM/DL — SIGNIFICANT CHANGE UP (ref 32–36)
MCV RBC AUTO: 74.2 FL — LOW (ref 80–100)
MCV RBC AUTO: 76.5 FL — LOW (ref 80–100)
MCV RBC AUTO: 76.8 FL — LOW (ref 80–100)
MICROCYTES BLD QL: SLIGHT — SIGNIFICANT CHANGE UP
MONOCYTES # BLD AUTO: 0 K/UL — SIGNIFICANT CHANGE UP (ref 0–0.9)
MONOCYTES NFR BLD AUTO: 0 % — LOW (ref 2–14)
MYELOCYTES NFR BLD: 0.9 % — HIGH (ref 0–0)
NEUTROPHILS # BLD AUTO: 24.9 K/UL — HIGH (ref 1.8–7.4)
NEUTROPHILS NFR BLD AUTO: 94.8 % — HIGH (ref 43–77)
OB PNL STL: POSITIVE
PHOSPHATE SERPL-MCNC: 6.4 MG/DL — HIGH (ref 2.4–4.7)
PLAT MORPH BLD: NORMAL — SIGNIFICANT CHANGE UP
PLATELET # BLD AUTO: 209 K/UL — SIGNIFICANT CHANGE UP (ref 150–400)
PLATELET # BLD AUTO: 225 K/UL — SIGNIFICANT CHANGE UP (ref 150–400)
PLATELET # BLD AUTO: 226 K/UL — SIGNIFICANT CHANGE UP (ref 150–400)
POIKILOCYTOSIS BLD QL AUTO: SLIGHT — SIGNIFICANT CHANGE UP
POLYCHROMASIA BLD QL SMEAR: SIGNIFICANT CHANGE UP
POTASSIUM SERPL-MCNC: 3.9 MMOL/L — SIGNIFICANT CHANGE UP (ref 3.5–5.3)
POTASSIUM SERPL-MCNC: 4.3 MMOL/L — SIGNIFICANT CHANGE UP (ref 3.5–5.3)
POTASSIUM SERPL-SCNC: 3.9 MMOL/L — SIGNIFICANT CHANGE UP (ref 3.5–5.3)
POTASSIUM SERPL-SCNC: 4.3 MMOL/L — SIGNIFICANT CHANGE UP (ref 3.5–5.3)
PROT SERPL-MCNC: 5.8 G/DL — LOW (ref 6–8.3)
PROT SERPL-MCNC: 5.8 G/DL — LOW (ref 6–8.3)
PROTHROM AB SERPL-ACNC: 15.9 SEC — HIGH (ref 10.6–13.6)
PROTHROM AB SERPL-ACNC: 16.1 SEC — HIGH (ref 10.6–13.6)
PROTHROM AB SERPL-ACNC: 16.3 SEC — HIGH (ref 10.6–13.6)
RBC # BLD: 2.09 M/UL — LOW (ref 3.8–5.2)
RBC # BLD: 2.55 M/UL — LOW (ref 3.8–5.2)
RBC # BLD: 3.11 M/UL — LOW (ref 3.8–5.2)
RBC # FLD: 21 % — HIGH (ref 10.3–14.5)
RBC # FLD: 23.5 % — HIGH (ref 10.3–14.5)
RBC # FLD: 25.6 % — HIGH (ref 10.3–14.5)
RBC BLD AUTO: ABNORMAL
SCHISTOCYTES BLD QL AUTO: SLIGHT — SIGNIFICANT CHANGE UP
SODIUM SERPL-SCNC: 144 MMOL/L — SIGNIFICANT CHANGE UP (ref 135–145)
SODIUM SERPL-SCNC: 145 MMOL/L — SIGNIFICANT CHANGE UP (ref 135–145)
SPHEROCYTES BLD QL SMEAR: SLIGHT — SIGNIFICANT CHANGE UP
TARGETS BLD QL SMEAR: SLIGHT — SIGNIFICANT CHANGE UP
WBC # BLD: 25.35 K/UL — HIGH (ref 3.8–10.5)
WBC # BLD: 26.27 K/UL — HIGH (ref 3.8–10.5)
WBC # BLD: 26.59 K/UL — HIGH (ref 3.8–10.5)
WBC # FLD AUTO: 25.35 K/UL — HIGH (ref 3.8–10.5)
WBC # FLD AUTO: 26.27 K/UL — HIGH (ref 3.8–10.5)
WBC # FLD AUTO: 26.59 K/UL — HIGH (ref 3.8–10.5)

## 2021-11-20 PROCEDURE — 71045 X-RAY EXAM CHEST 1 VIEW: CPT | Mod: 26

## 2021-11-20 PROCEDURE — 31500 INSERT EMERGENCY AIRWAY: CPT

## 2021-11-20 PROCEDURE — 43227 ESOPHAGOSCOPY CONTROL BLEED: CPT

## 2021-11-20 PROCEDURE — 99255 IP/OBS CONSLTJ NEW/EST HI 80: CPT | Mod: 25

## 2021-11-20 PROCEDURE — 99252 IP/OBS CONSLTJ NEW/EST SF 35: CPT | Mod: 25

## 2021-11-20 RX ORDER — CALCIUM GLUCONATE 100 MG/ML
2 VIAL (ML) INTRAVENOUS ONCE
Refills: 0 | Status: COMPLETED | OUTPATIENT
Start: 2021-11-20 | End: 2021-11-20

## 2021-11-20 RX ORDER — PANTOPRAZOLE SODIUM 20 MG/1
40 TABLET, DELAYED RELEASE ORAL EVERY 12 HOURS
Refills: 0 | Status: DISCONTINUED | OUTPATIENT
Start: 2021-11-20 | End: 2021-11-20

## 2021-11-20 RX ORDER — CEFTRIAXONE 500 MG/1
INJECTION, POWDER, FOR SOLUTION INTRAMUSCULAR; INTRAVENOUS
Refills: 0 | Status: DISCONTINUED | OUTPATIENT
Start: 2021-11-20 | End: 2021-11-24

## 2021-11-20 RX ORDER — HYDROCORTISONE 20 MG
50 TABLET ORAL EVERY 8 HOURS
Refills: 0 | Status: DISCONTINUED | OUTPATIENT
Start: 2021-11-20 | End: 2021-11-21

## 2021-11-20 RX ORDER — CEFTRIAXONE 500 MG/1
2000 INJECTION, POWDER, FOR SOLUTION INTRAMUSCULAR; INTRAVENOUS ONCE
Refills: 0 | Status: COMPLETED | OUTPATIENT
Start: 2021-11-20 | End: 2021-11-20

## 2021-11-20 RX ORDER — ONDANSETRON 8 MG/1
4 TABLET, FILM COATED ORAL EVERY 4 HOURS
Refills: 0 | Status: DISCONTINUED | OUTPATIENT
Start: 2021-11-20 | End: 2021-11-24

## 2021-11-20 RX ORDER — DESMOPRESSIN ACETATE 0.1 MG/1
20 TABLET ORAL ONCE
Refills: 0 | Status: DISCONTINUED | OUTPATIENT
Start: 2021-11-20 | End: 2021-11-20

## 2021-11-20 RX ORDER — FENTANYL CITRATE 50 UG/ML
100 INJECTION INTRAVENOUS ONCE
Refills: 0 | Status: DISCONTINUED | OUTPATIENT
Start: 2021-11-20 | End: 2021-11-20

## 2021-11-20 RX ORDER — PANTOPRAZOLE SODIUM 20 MG/1
8 TABLET, DELAYED RELEASE ORAL
Qty: 80 | Refills: 0 | Status: DISCONTINUED | OUTPATIENT
Start: 2021-11-20 | End: 2021-11-21

## 2021-11-20 RX ORDER — CEFTRIAXONE 500 MG/1
2000 INJECTION, POWDER, FOR SOLUTION INTRAMUSCULAR; INTRAVENOUS EVERY 24 HOURS
Refills: 0 | Status: DISCONTINUED | OUTPATIENT
Start: 2021-11-21 | End: 2021-11-24

## 2021-11-20 RX ORDER — DESMOPRESSIN ACETATE 0.1 MG/1
20 TABLET ORAL ONCE
Refills: 0 | Status: COMPLETED | OUTPATIENT
Start: 2021-11-20 | End: 2021-11-20

## 2021-11-20 RX ADMIN — PANTOPRAZOLE SODIUM 10 MG/HR: 20 TABLET, DELAYED RELEASE ORAL at 15:27

## 2021-11-20 RX ADMIN — OXYCODONE HYDROCHLORIDE 10 MILLIGRAM(S): 5 TABLET ORAL at 16:16

## 2021-11-20 RX ADMIN — CHLORHEXIDINE GLUCONATE 1 APPLICATION(S): 213 SOLUTION TOPICAL at 05:15

## 2021-11-20 RX ADMIN — Medication 50 MILLIGRAM(S): at 13:56

## 2021-11-20 RX ADMIN — Medication 50 MILLIGRAM(S): at 21:27

## 2021-11-20 RX ADMIN — Medication 4: at 11:25

## 2021-11-20 RX ADMIN — CEFTRIAXONE 100 MILLIGRAM(S): 500 INJECTION, POWDER, FOR SOLUTION INTRAMUSCULAR; INTRAVENOUS at 12:58

## 2021-11-20 RX ADMIN — GABAPENTIN 200 MILLIGRAM(S): 400 CAPSULE ORAL at 05:08

## 2021-11-20 RX ADMIN — OXYCODONE HYDROCHLORIDE 10 MILLIGRAM(S): 5 TABLET ORAL at 21:28

## 2021-11-20 RX ADMIN — SODIUM CHLORIDE 75 MILLILITER(S): 9 INJECTION, SOLUTION INTRAVENOUS at 20:00

## 2021-11-20 RX ADMIN — PIPERACILLIN AND TAZOBACTAM 25 GRAM(S): 4; .5 INJECTION, POWDER, LYOPHILIZED, FOR SOLUTION INTRAVENOUS at 05:09

## 2021-11-20 RX ADMIN — FENTANYL CITRATE 100 MICROGRAM(S): 50 INJECTION INTRAVENOUS at 13:18

## 2021-11-20 RX ADMIN — Medication 975 MILLIGRAM(S): at 00:00

## 2021-11-20 RX ADMIN — Medication 100 MILLIGRAM(S): at 08:43

## 2021-11-20 RX ADMIN — Medication 8: at 21:45

## 2021-11-20 RX ADMIN — GABAPENTIN 200 MILLIGRAM(S): 400 CAPSULE ORAL at 21:28

## 2021-11-20 RX ADMIN — Medication 100 MILLIGRAM(S): at 15:13

## 2021-11-20 RX ADMIN — Medication 975 MILLIGRAM(S): at 05:09

## 2021-11-20 RX ADMIN — Medication 100 MILLIGRAM(S): at 23:57

## 2021-11-20 RX ADMIN — Medication 975 MILLIGRAM(S): at 05:39

## 2021-11-20 RX ADMIN — DESMOPRESSIN ACETATE 220 MICROGRAM(S): 0.1 TABLET ORAL at 10:58

## 2021-11-20 RX ADMIN — OXYCODONE HYDROCHLORIDE 10 MILLIGRAM(S): 5 TABLET ORAL at 05:39

## 2021-11-20 RX ADMIN — Medication 4: at 16:37

## 2021-11-20 RX ADMIN — Medication 975 MILLIGRAM(S): at 23:58

## 2021-11-20 RX ADMIN — Medication 200 GRAM(S): at 08:43

## 2021-11-20 RX ADMIN — OXYCODONE HYDROCHLORIDE 10 MILLIGRAM(S): 5 TABLET ORAL at 15:16

## 2021-11-20 RX ADMIN — Medication 250 MILLIGRAM(S): at 05:09

## 2021-11-20 RX ADMIN — Medication 50 MILLIGRAM(S): at 05:09

## 2021-11-20 RX ADMIN — OXYCODONE HYDROCHLORIDE 10 MILLIGRAM(S): 5 TABLET ORAL at 22:28

## 2021-11-20 RX ADMIN — OXYCODONE HYDROCHLORIDE 10 MILLIGRAM(S): 5 TABLET ORAL at 05:09

## 2021-11-20 RX ADMIN — OXYCODONE HYDROCHLORIDE 10 MILLIGRAM(S): 5 TABLET ORAL at 00:00

## 2021-11-20 RX ADMIN — PANTOPRAZOLE SODIUM 10 MG/HR: 20 TABLET, DELAYED RELEASE ORAL at 06:27

## 2021-11-20 NOTE — PROGRESS NOTE ADULT - SUBJECTIVE AND OBJECTIVE BOX
24 HOUR EVENTS:  Yesterday patient with downtrending hemoglobin, though hemodynamically stable throughout. RLE dressing changed with some oozing noted, but no clear active bleed. Given 1UpRBC with poor response, Hgb 6.5 to 6.6. Bedside FAST exam without finding of intraabdominal free fluid, or fluid in pelvis concerning for fibroid bleed. OB/Gyn called and explained that in absence of vaginal bleeding, uterine fibroid bleeding unlikely despite history. Decision per overnight attending to not transfuse given stability and concern for possible hemolytic process. LDH and Haptoglobin sent. This morning found with liquid dark stool positive for blood. When asked, patient did express some abdominal symptoms in the week prior to presenting, felt to be due to NSAID use. Around this time does remember one episode of dark stool.  No history of ulcers prior. Started on PPI infusion, SQH discontinued. Patient otherwise without complaints- no pain, nausea, vomiting, fevers, or chills          SUBJECTIVE/ROS:  [x] A ten-point review of systems was otherwise negative except as noted.  [ ] Due to altered mental status/intubation, subjective information were not able to be obtained from the patient. History was obtained, to the extent possible, from review of the chart and collateral sources of information.      NEURO  RASS: 0    GCS: 15    CAM ICU: Negative  Exam: A&Ox3, intermittent periods of confusion. No focal deficits.  Meds: acetaminophen     Tablet .. 975 milliGRAM(s) Oral every 6 hours  fentaNYL    Injectable 75 MICROGram(s) IV Push every 8 hours PRN Dressing changes  gabapentin 200 milliGRAM(s) Oral three times a day  ondansetron Injectable 4 milliGRAM(s) IV Push every 4 hours  oxyCODONE    IR 5 milliGRAM(s) Oral every 4 hours PRN Moderate Pain (4 - 6)  oxyCODONE    IR 10 milliGRAM(s) Oral every 4 hours PRN Severe Pain (7 - 10)    [x] Adequacy of sedation and pain control has been assessed and adjusted      RESPIRATORY  RR: 16 (11-20-21 @ 08:00) (10 - 24)  SpO2: 100% (11-20-21 @ 08:00) (94% - 100%)  Wt(kg): --  Exam: unlabored, no conversational dyspnea  ROOM AIR    [ ] Extubation Readiness Assessed  Meds:       CARDIOVASCULAR  HR: 85 (11-20-21 @ 08:00) (81 - 106)  BP: --  BP(mean): --  ABP: 159/56 (11-20-21 @ 08:00) (131/64 - 167/66)  ABP(mean): 81 (11-20-21 @ 08:00) (65 - 96)  Wt(kg): --  CVP(cm H2O): --      Exam: RRR S1S2  Cardiac Rhythm: Sinue  Perfusion     [x]Adequate   [ ]Inadequate  Mentation   [x]Normal       [ ]Reduced  Extremities  [x]Warm         [ ]Cool  Volume Status [ ]Hypervolemic [ ]Euvolemic [ ]Hypovolemic  Meds:       GI/NUTRITION  Exam: Soft, some fullness at lover abdomen representing enlarged fibroid within uterus with mild discomfort.   Diet: CC, made NPO after GIB suspected.   Meds: pantoprazole Infusion 8 mG/Hr IV Continuous <Continuous>      GENITOURINARY  I&O's Detail    11-19 @ 07:01  -  11-20 @ 07:00  --------------------------------------------------------  IN:    IV PiggyBack: 175 mL    IV PiggyBack: 50 mL    IV PiggyBack: 250 mL    multiple electrolytes Injection Type 1.: 1725 mL    Oral Fluid: 320 mL    Pantoprazole: 20 mL  Total IN: 2540 mL    OUT:    Indwelling Catheter - Urethral (mL): 2835 mL    VAC (Vacuum Assisted Closure) System (mL): 300 mL  Total OUT: 3135 mL    Total NET: -595 mL      11-20 @ 07:01  -  11-20 @ 08:44  --------------------------------------------------------  IN:    IV PiggyBack: 25 mL    multiple electrolytes Injection Type 1.: 75 mL    Pantoprazole: 10 mL    PRBCs (Packed Red Blood Cells): 570 mL  Total IN: 680 mL    OUT:  Total OUT: 0 mL    Total NET: 680 mL          11-20    144  |  108<H>  |  108.8<H>  ----------------------------<  149<H>  4.3   |  22.0  |  1.41<H>    Ca    7.9<L>      20 Nov 2021 05:21  Phos  6.4     11-20  Mg     2.8     11-20    TPro  6.1<L>  /  Alb  2.5<L>  /  TBili  0.5  /  DBili  x   /  AST  42<H>  /  ALT  14  /  AlkPhos  75  11-19    [x] Del Toro catheter, indication: Strict I&O  Meds: ferrous    sulfate 325 milliGRAM(s) Oral daily  multiple electrolytes Injection Type 1 1000 milliLiter(s) IV Continuous <Continuous>        HEMATOLOGIC  Meds:   [x] VTE Prophylaxis                        5.3    26.27 )-----------( 225      ( 20 Nov 2021 05:21 )             15.5     PT/INR - ( 20 Nov 2021 03:56 )   PT: 16.1 sec;   INR: 1.41 ratio         PTT - ( 20 Nov 2021 03:56 )  PTT:35.5 sec  Transfusion     [3] PRBC   [ ] Platelets   [ ] FFP   [ ] Cryoprecipitate      INFECTIOUS DISEASES  T(C): 36.6 (11-20-21 @ 07:26), Max: 37.2 (11-19-21 @ 17:00)  Wt(kg): --  WBC Count: 26.27 K/uL (11-20 @ 05:21)  WBC Count: 29.79 K/uL (11-19 @ 23:48)  WBC Count: 29.67 K/uL (11-19 @ 14:54)    Recent Cultures:  Specimen Source: .Body Fluid OR - Left Lower Extremity Fluid, 11-18 @ 16:20; Results   No growth; Gram Stain:   No polymorphonuclear leukocytes seen  No organisms seen  by cytocentrifuge; Organism: --  Specimen Source: Catheterized Catheterized, 11-18 @ 10:07; Results   No growth; Gram Stain: --; Organism: --  Specimen Source: Catheterized Catheterized, 11-18 @ 00:25; Results   <10,000 CFU/mL Normal Urogenital Aimee; Gram Stain: --; Organism: --  Specimen Source: .Blood Blood, 11-17 @ 14:45; Results   Growth in anaerobic bottle: Streptococcus pyogenes (Group A)  Susceptibility to follow.; Gram Stain:   Growth in aerobic and anaerobic bottles: Gram Positive Cocci in Pairs and  Chains  ***Blood Panel PCR results on this specimen are available  approximately 3 hours after the Gram stain result.***  Gram stain, PCR, and/or culture results may not always  correspond due to difference in methodologies.  ************************************************************  This PCR assay was performed using [employer].  The following targets are tested for: Enterococcus,  vancomycin resistant enterococci, Listeria monocytogenes,  coagulase negative staphylococci, S. aureus,  methicillin resistant S. aureus, Streptococcus agalactiae  (Group B), S. pneumoniae, S. pyogenes (Group A),  Acinetobacter baumannii, Enterobacter cloacae, E. coli,  Klebsiella oxytoca, K. pneumoniae, Proteus sp.,  Serratia marcescens, Haemophilus influenzae,  Neisseria meningitidis, Pseudomonas aeruginosa, Candida  albicans, C. glabrata, C krusei, C parapsilosis,  C. tropicalis and the KPC resistance gene.  Gram Stain and BCID performed by:  [facility]  [address] [place] [address]  .  TYPE: (C=Critical, N=Notification, A=Abnormal) C  TESTS:  _ GS  DATE/TIME CALLED: _ 11/18/2021 09:40:08  CALLED TO: Chris Hernandez RN  READ BACK (2 Patient Identifiers)(Y/N): _ Y  READ BACK VALUES (Y/N): _ Y  CALLED BY: Chris Quiñones; Organism: Blood Culture PCR    Meds: clindamycin IVPB 900 milliGRAM(s) IV Intermittent every 8 hours  piperacillin/tazobactam IVPB.. 3.375 Gram(s) IV Intermittent every 8 hours  vancomycin  IVPB 750 milliGRAM(s) IV Intermittent every 12 hours        ENDOCRINE  Capillary Blood Glucose    Meds: glucagon  Injectable 1 milliGRAM(s) IntraMuscular once  hydrocortisone sodium succinate Injectable 50 milliGRAM(s) IV Push every 8 hours  insulin lispro (ADMELOG) corrective regimen sliding scale   SubCutaneous Before meals and at bedtime        ACCESS DEVICES:  [s] Peripheral IV  [s] Central Venous Line	[ ] R	[ ] L	[ ] IJ	[ ] Fem	[ ] SC	Placed:   [s] Arterial Line		[ ] R	[ ] L	[ ] Fem	[ ] Rad	[ ] Ax	Placed:   [ ] PICC:					[ ] Mediport  [s] Urinary Catheter, Date Placed:   [s] Necessity of urinary, arterial, and venous catheters discussed    OTHER MEDICATIONS:  chlorhexidine 2% Cloths 1 Application(s) Topical <User Schedule>      CODE STATUS: Full    IMAGING:

## 2021-11-20 NOTE — PROGRESS NOTE ADULT - SUBJECTIVE AND OBJECTIVE BOX
Patient was seen and examined at approximately 8:00am    ORTHO-TRAUMA SERVICE      Pt Name: LUIS FERNANDO DAVIS    MRN: [id number]      Patient is a 51 y.o Female being followed s/p I&D of left native knee and removal of hardware of left ankle POD#3. Patient seen and examined in bed. Pain is controlled with current regimen. Currently being transfused for possible GI bleed as per nurse at bedside. No acute orthopedic events overnight.       PAST MEDICAL & SURGICAL HISTORY:  PAST MEDICAL & SURGICAL HISTORY:  No pertinent past medical history    History of ankle surgery      Allergies: No Known Allergies      Medications: acetaminophen     Tablet .. 975 milliGRAM(s) Oral every 6 hours  chlorhexidine 2% Cloths 1 Application(s) Topical <User Schedule>  clindamycin IVPB 900 milliGRAM(s) IV Intermittent every 8 hours  fentaNYL    Injectable 75 MICROGram(s) IV Push every 8 hours PRN  ferrous    sulfate 325 milliGRAM(s) Oral daily  gabapentin 200 milliGRAM(s) Oral three times a day  glucagon  Injectable 1 milliGRAM(s) IntraMuscular once  hydrocortisone sodium succinate Injectable 50 milliGRAM(s) IV Push every 8 hours  insulin lispro (ADMELOG) corrective regimen sliding scale   SubCutaneous Before meals and at bedtime  multiple electrolytes Injection Type 1 1000 milliLiter(s) IV Continuous <Continuous>  ondansetron Injectable 4 milliGRAM(s) IV Push every 4 hours PRN  oxyCODONE    IR 5 milliGRAM(s) Oral every 4 hours PRN  oxyCODONE    IR 10 milliGRAM(s) Oral every 4 hours PRN  pantoprazole Infusion 8 mG/Hr IV Continuous <Continuous>  piperacillin/tazobactam IVPB.. 3.375 Gram(s) IV Intermittent every 8 hours  vancomycin  IVPB 750 milliGRAM(s) IV Intermittent every 12 hours                        5.3    26.27 )-----------( 225      ( 2021 05:21 )             15.5     11-20    144  |  108<H>  |  108.8<H>  ----------------------------<  149<H>  4.3   |  22.0  |  1.41<H>    Ca    7.9<L>      2021 05:21  Phos  6.4     11-20  Mg     2.8     11-20    TPro  6.1<L>  /  Alb  2.5<L>  /  TBili  0.5  /  DBili  x   /  AST  42<H>  /  ALT  14  /  AlkPhos  75  -      PHYSICAL EXAM:    Vital Signs Last 24 Hrs  T(C): 36.6 (2021 07:26), Max: 37.2 (2021 17:00)  T(F): 97.9 (2021 07:26), Max: 99 (2021 17:00)  HR: 79 (2021 09:00) (79 - 106)  BP: --  BP(mean): --  RR: 12 (2021 09:00) (10 - 24)  SpO2: 100% (:) (94% - 100%)  Daily     Daily Weight in k.5 (2021 00:23)    Appearance: Alert, responsive, in no acute distress.    Neurological: Sensation is grossly intact to light touch b/l    Vascular: 2+ distal pulses. Cap refill < 2 sec.  No extremity ulcerations. No cyanosis.    Musculoskeletal:         Left Lower Extremity: Ace bandage present with Wound Vac in place. Knee dressing has a quarter size bloody stain. Dressing removed. No erythema noted. Incision Site is C/D/I. Nylon sutures intact. No active draining or bleeding. Sutures reinforced with steri strips and clean dressing placed. + 4/5 plantar flexion. 5/5 FHL intact. 1/5 EHL, dorsi flexion. calf is supple, tender to touch due to incisions. ACS/SICU managing other incisions.       Right Lower Extremity: + 5/5 dorsi plantar flexion, EHL, FHL intact. calf supple nontender.       A/P:  Pt is a 51y Female POD #3 s/p LLE I&D and MASON for soft tissue infection.      PLAN:   * Pain control.  * WBAT LLE in CAM boot.  * ABx as per ID.  * DVTp  * Physical therapy  * SICU transfusing H/H for GI bleed  * Ortho to follow, D/W Yohe     Patient was seen and examined at approximately 8:00am    ORTHO-TRAUMA SERVICE      Pt Name: LUIS FERNANDO DAVIS    MRN: [id number]      Patient is a 51 y.o Female being followed s/p I&D of left native knee and removal of hardware of left ankle POD#3. Patient seen and examined in bed. Pain is controlled with current regimen. Currently being transfused for possible GI bleed as per nurse at bedside. No acute orthopedic events overnight.       PAST MEDICAL & SURGICAL HISTORY:  PAST MEDICAL & SURGICAL HISTORY:  No pertinent past medical history    History of ankle surgery      Allergies: No Known Allergies      Medications: acetaminophen     Tablet .. 975 milliGRAM(s) Oral every 6 hours  chlorhexidine 2% Cloths 1 Application(s) Topical <User Schedule>  clindamycin IVPB 900 milliGRAM(s) IV Intermittent every 8 hours  fentaNYL    Injectable 75 MICROGram(s) IV Push every 8 hours PRN  ferrous    sulfate 325 milliGRAM(s) Oral daily  gabapentin 200 milliGRAM(s) Oral three times a day  glucagon  Injectable 1 milliGRAM(s) IntraMuscular once  hydrocortisone sodium succinate Injectable 50 milliGRAM(s) IV Push every 8 hours  insulin lispro (ADMELOG) corrective regimen sliding scale   SubCutaneous Before meals and at bedtime  multiple electrolytes Injection Type 1 1000 milliLiter(s) IV Continuous <Continuous>  ondansetron Injectable 4 milliGRAM(s) IV Push every 4 hours PRN  oxyCODONE    IR 5 milliGRAM(s) Oral every 4 hours PRN  oxyCODONE    IR 10 milliGRAM(s) Oral every 4 hours PRN  pantoprazole Infusion 8 mG/Hr IV Continuous <Continuous>  piperacillin/tazobactam IVPB.. 3.375 Gram(s) IV Intermittent every 8 hours  vancomycin  IVPB 750 milliGRAM(s) IV Intermittent every 12 hours                        5.3    26.27 )-----------( 225      ( 2021 05:21 )             15.5     11-20    144  |  108<H>  |  108.8<H>  ----------------------------<  149<H>  4.3   |  22.0  |  1.41<H>    Ca    7.9<L>      2021 05:21  Phos  6.4     11-20  Mg     2.8     11-20    TPro  6.1<L>  /  Alb  2.5<L>  /  TBili  0.5  /  DBili  x   /  AST  42<H>  /  ALT  14  /  AlkPhos  75  -      PHYSICAL EXAM:    Vital Signs Last 24 Hrs  T(C): 36.6 (2021 07:26), Max: 37.2 (2021 17:00)  T(F): 97.9 (2021 07:26), Max: 99 (2021 17:00)  HR: 79 (2021 09:00) (79 - 106)  BP: --  BP(mean): --  RR: 12 (2021 09:00) (10 - 24)  SpO2: 100% (:) (94% - 100%)  Daily     Daily Weight in k.5 (2021 00:23)    Appearance: Alert, responsive, in no acute distress.    Neurological: Sensation is grossly intact to light touch b/l    Vascular: 2+ distal pulses. Cap refill < 2 sec.  No extremity ulcerations. No cyanosis.    Musculoskeletal:         Left Lower Extremity: Ace bandage present with Wound Vac in place. Knee dressing has a quarter size bloody stain. Dressing removed. No erythema noted. Incision Site is C/D/I. Nylon sutures intact. No active draining or bleeding. Sutures reinforced with steri strips and clean dressing placed. + 4/5 plantar flexion. 5/5 FHL intact. 1/5 EHL, dorsi flexion. calf is supple, tender to touch due to incisions. ACS/SICU managing other incisions.       Right Lower Extremity: + 5/5 dorsi plantar flexion, EHL, FHL intact. calf supple nontender.       A/P:  Pt is a 51y Female POD #3 s/p LLE I&D and MASON for soft tissue infection.      PLAN:   * Pain control  * WBAT LLE in CAM boot  * DVTp   * Physical therapy  * D/W SICU team, recommend ID consult, D/W Dr. Triplett   * ABx as per ID  * SICU transfusing H/H for GI bleed  * Ortho to follow, D/W Jatinder

## 2021-11-20 NOTE — PROGRESS NOTE ADULT - ATTENDING COMMENTS
Patient post fasciotomy debridement leg.  Awake alert/ pain controlled  hemodynamically stable, but anemic with low trending Hgb for the last 48h  received 2 U PRBC over last 2 days and I am transfusing additional 2 U  Abdomen soft  NGT reveals upper GI bleeding  In summary, the hgb drop is not from the leg but a GI source, likely erosions or ulcer.  Patient does not have coagulopathy at this time  DDAVP  EGD today as per GI. I discussed it wit anesthesiologist and patient is placed on the schedule. Anesthesia sedation and management is a safer and preferred way, rather than bedside sedation.  In addition, patient was on steroids and NSAIDs before admission  Plan  decrease sternoids   EGD and appropriate measures pending bleeding source/ including possible embolization  Lupus / immune  workup

## 2021-11-20 NOTE — CONSULT NOTE ADULT - SUBJECTIVE AND OBJECTIVE BOX
INFECTIOUS DISEASES AND INTERNAL MEDICINE at [place]  =======================================================  Theo Morrow MD  Diplomates American Board of Internal Medicine and Infectious Diseases  Telephone [phone]  Fax            [phone]  =======================================================    LUIS FERNANDO [id number]      HPI:  HPI: 51y Female present to ED with left leg swelling, erythema, pain. Patient endorses she had left knee pain for 1 week presented 3 days ago to ED  where she states was given an steroid shot, and ibuprofen. however pain and edema worsened. Patient states she wasnt feeling herself today reason why she came. Endorses chills initially with pain 3 days ago but none since, denies history of trauma, insect bites, recent interventions, or injections to the area, contact with ticks, history of diabetes.  PT AS ABOVE WITH LEFT LEG SWELLING PT WITH INCREASED WBC PLACED ON ABX FOR PRESUMED INFECTION  PT WITH CT SCAN WITH FASCIAL EDAM PT BROUGHT TO THE OR EMERGENTLY AND  UNDERWENT FASCIOTOMY   PT BROUGHT TO THE OR  BOTH TRAUMA AND ORTHO INVOLVED  PURULENT FLUID FOUND  NECROTIZING SOFT TISSUE INFECTION    BLOOD CX WITH GROUP A STREP AND  OPERATIVE FLUID WITH STREP  ASKED TO EVALUATE TODAY  FROM ID STANDPOINT          PAST MEDICAL & SURGICAL HISTORY:  No pertinent past medical history    History of ankle surgery        ANTIBIOTICS  cefTRIAXone   IVPB      clindamycin IVPB 900 milliGRAM(s) IV Intermittent every 8 hours      Allergies    No Known Allergies    Intolerances        SOCIAL HISTORY:     FAMILY HX   FAMILY HISTORY:  FH: HTN (hypertension) (Mother)        Vital Signs Last 24 Hrs  T(C): 36.8 (20 Nov 2021 11:24), Max: 37.2 (19 Nov 2021 17:00)  T(F): 98.2 (20 Nov 2021 11:24), Max: 99 (19 Nov 2021 17:00)  HR: 82 (20 Nov 2021 11:00) (76 - 104)  BP: --  BP(mean): --  RR: 15 (20 Nov 2021 11:00) (10 - 24)  SpO2: 76% (20 Nov 2021 11:00) (76% - 100%)  Drug Dosing Weight  Height (cm): 167.6 (17 Nov 2021 22:45)  Weight (kg): 93.4 (17 Nov 2021 22:45)  BMI (kg/m2): 33.3 (17 Nov 2021 22:45)  BSA (m2): 2.02 (17 Nov 2021 22:45)      REVIEW OF SYSTEMS:    CONSTITUTIONAL:  As per HPI.    HEENT:  Eyes:  No diplopia or blurred vision. ENT:  No earache, sore throat or runny nose.    CARDIOVASCULAR:  No pressure, squeezing, strangling, tightness, heaviness or aching about the chest, neck, axilla or epigastrium.    RESPIRATORY:  No cough, shortness of breath, PND or orthopnea.    GASTROINTESTINAL:  No nausea, vomiting or diarrhea.    GENITOURINARY:  No dysuria, frequency or urgency.    MUSCULOSKELETAL:  As per HPI.    SKIN:  AS PER HPI    NEUROLOGIC: AS PER HPI                  PHYSICAL EXAMINATION:    GENERAL: The patient is a _____in no apparent distress. ___     VITAL SIGNS: T(C): 36.8 (11-20-21 @ 11:24), Max: 37.2 (11-19-21 @ 17:00)  HR: 82 (11-20-21 @ 11:00) (76 - 104)  BP: --  RR: 15 (11-20-21 @ 11:00) (10 - 24)  SpO2: 76% (11-20-21 @ 11:00) (76% - 100%)  Wt(kg): --    HEENT: Head is normocephalic and atraumatic.  ANICTERIC  NECK: Supple. No carotid bruits.  No lymphadenopathy or thyromegaly.    LUNGS:COARSE BREATH SOUNDS    HEART: Regular rate and rhythm without murmur.    ABDOMEN: Soft, nontender, and nondistended.  Positive bowel sounds.  No hepatosplenomegaly was noted. NO REBOUND NO GUARDING    EXTREMITIES: LEFT LEG WRAPPED     NEUROLOGIC: NON FOCAL      SKIN: No ulceration or induration present. NO RASH        BLOOD CULTURES  Culture Results:   Rare Streptococcus pyogenes (Group A)  Penicillin and ampicillin are drugs of choice for treatment of  beta-hemolytic streptococcal infections.  Susceptibility testing is not performed routinely because S. pyogenes  (GAS) is universally susceptibleto penicillin  and resistance in other strains is extremely rare. (11-18 @ 16:20)  Culture Results:   Numerous Streptococcus pyogenes (Group A) Penicillin and ampicillin are  drugs of choice for  treatment of beta-hemolytic streptococcal infections.  Susceptibility testing is not performed routinely because  S. pyogenes (GAS) is universally susceptible to penicillin  and resistance in other strains is extremely rare. (11-18 @ 16:20)  Culture Results:   Moderate Streptococcus pyogenes (Group A) Penicillin and ampicillin are  drugs of choice for  treatment of beta-hemolytic streptococcal infections.  Susceptibility testing is not performed routinely because  S. pyogenes (GAS) is universally susceptible to penicillin  and resistance in other strains is extremely rare. (11-18 @ 16:20)  Culture Results:   No growth (11-18 @ 10:07)  Culture Results:   <10,000 CFU/mL Normal Urogenital Aimee (11-18 @ 00:25)  Culture Results:   Growth in anaerobic bottle: Streptococcus pyogenes (Group A)  Susceptibility to follow. (11-17 @ 14:45)  Culture Results:   Growth in aerobic and anaerobic bottles: Streptococcus pyogenes (Group A)  See previous culture [id number] (11-17 @ 14:45)       URINE CX          LABS:                        6.7    25.35 )-----------( 209      ( 20 Nov 2021 10:32 )             19.5     11-20    144  |  108<H>  |  108.8<H>  ----------------------------<  149<H>  4.3   |  22.0  |  1.41<H>    Ca    7.9<L>      20 Nov 2021 05:21  Phos  6.4     11-20  Mg     2.8     11-20    TPro  6.1<L>  /  Alb  2.5<L>  /  TBili  0.5  /  DBili  x   /  AST  42<H>  /  ALT  14  /  AlkPhos  75  11-19    PT/INR - ( 20 Nov 2021 10:32 )   PT: 15.9 sec;   INR: 1.39 ratio         PTT - ( 20 Nov 2021 10:32 )  PTT:35.4 sec      RADIOLOGY & ADDITIONAL STUDIES:      ASSESSMENT/PLAN  51y Female present to ED with left leg swelling, erythema, pain. Patient endorses she had left knee pain for 1 week presented 3 days ago to ED  where she states was given an steroid shot, and ibuprofen. however pain and edema worsened. Patient states she wasnt feeling herself today reason why she came. Endorses chills initially with pain 3 days ago but none since, denies history of trauma, insect bites, recent interventions, or injections to the area, contact with ticks, history of diabetes.  PT AS ABOVE WITH LEFT LEG SWELLING PT WITH INCREASED WBC PLACED ON ABX FOR PRESUMED INFECTION  PT WITH CT SCAN WITH FASCIAL EDAM PT BROUGHT TO THE OR EMERGENTLY AND  UNDERWENT FASCIOTOMY   PT BROUGHT TO THE OR  BOTH TRAUMA AND ORTHO INVOLVED  PURULENT FLUID FOUND  NECROTIZING SOFT TISSUE INFECTION    BLOOD CX WITH GROUP A STREP AND  OPERATIVE FLUID WITH STREP  PT WAS ON ZOSYN /VANCO/ CLINDA  WILL CHANGE TO ROCEPHIN FOR GROUP A STREP  CONTINUE  CLINDAMYCIN FOR  POSSIBLE ANTITOXIN EFFECT IN GROUP A STREP INFECTION  WILL REPEAT BLOOD  CX X2  SETS  ULTIMATELY PT WILL REQUIRE LONG TERM IV ABX   PT WITH ANEMIA  WORK UP AS PER SURGERY AND GI  WILL FOLLOW UP WITH FURTHER RECOMMENDATIONS                 EMMETT MCKINNEY MD

## 2021-11-20 NOTE — PROGRESS NOTE ADULT - ASSESSMENT
51y Female present to ED with left leg swelling, erythema, pain, leukocytosis and acute renal failure. CT scan concerning for necrotizing soft tissue infection. Pt s/p LLE exploration and debridement, admitted to SICU for hemorrhagic + septic shock, now weaned off vasopressors, still with acute blood loss anemia. Unresponsive to transfusions, found this AM with dark stool + blood, concern for GIB. Hemodynamically stable throughout.     Neuro:   - pain control with oxycodone for moderate to severe pain, tylenol for mild pain  - delirium precautions  - regulate sleep wake cycle  - pt with somewhat of peculiar affect / intermittent confusion - possibly uremic encephalopathy, will continue to monitor    CV:   - No pressor needs, normal HR and normotensive.,   - IVF for ARF  - ordered for 2UpRBC for acute blood loss anemia    Pulm:   - breathing comfortably on room air  - encourage coughing and deep breathing  - IS 10x hour while awake    GI/Nutrition:   - Dark stools positive for blood  - Bowel regimen discontinued  - PPI infusion  - GI consult  - NPO for possible procedure    /Renal:   - Valle for strict I&O  - IVF for ARF  - possible uremic encephalopathy, continue to trend BUN  - no urgent indication for RRT, making good urine, Cr continues to improve  - avoid nephrotoxic medications    ID:   - preliminary OR cultures w/ [facility], follow up final result  - continue empiric vancomycin, clindamycin and zosyn for necrotizing soft tissue infection  - trend leukocytosis    Endo:   - FS q6  - tight glycemic control to maintain blood glucose < 180  - stress dose steroids for adrenal insufficiency (pt has been on prednisone for the week prior to surgery for knee inflammation)  - hydrocort 50 mg q8 for the next 24 hours (48 hours total), then slow taper - possible abrupt taper given GIB    Skin:   - Repositioning for DTI prevention while in bed  - LLE dressing changes daily  - Wound VAC to superior portion of medial thigh incisions placed 11/19. Next due  11/23    Heme/DVT Prophylaxis:   - SCDs  - SQH held given patient with acute blood loss anemia overnight  - 2UpRBC ordered  - continue to trend h/h q6    Lines/Tubes:   - R IJ TLC, L radial A line, valle    Dispo:   - SICU for critical care needs.     30 minutes of critical care time spent providing medical care for patient's acute illness/conditions that impairs at least one vital organ system and/or poses a high risk of imminent or life threatening deterioration in the patient's condition. It includes time spent evaluating and treating the patient's acute illness as well as time spent reviewing labs, radiology, discussing goals of care with patient and/or patient's family, and discussing the case with a multidisciplinary team in an effort to prevent further life threatening deterioration or end organ damage. This time is independent of any procedures performed.

## 2021-11-20 NOTE — PROCEDURE NOTE - NSPOSTPRCRAD_GEN_A_CORE
good position/post-procedure radiography performed
no pneumothorax/post-procedure radiography performed

## 2021-11-20 NOTE — CONSULT NOTE ADULT - SUBJECTIVE AND OBJECTIVE BOX
Patient is a 51y old  Female who presents with a chief complaint of necrotizing soft tissue infection (19 Nov 2021 09:06)      HPI:  HPI: 51y Female present to ED with left leg swelling, erythema, pain. Patient endorses she had left knee pain for 1 week . Found to have necrotizing fascitis.  Patient had debridement.  Had septic shock and required debridement. septic shock ,pressors were required, Patient noted to have decline in hemoglobin for few days. Surgery does not feel pt is bleeding into surgical site. has hx of fibroids and found to have anemia 3 months ago after heavy menses , but no vaginal bleeding now. On admission hemoglobin was 10, now 5.3. Patient admits to taking 3-4 NSAID pills a day for 6 days before admission for knee pain. May have had one dark stool at home. Now with melena. NGT showed coffee grounds. Not requiring pressors.  Hgb was 6.4 yesterday, then 6.5 after one unit. Now 5.3.  Had 2 more units and waiting for hemoglobin. No abdominal pain, nausea, heartburn       REVIEW OF SYSTEMS:  Constitutional: No fever, weight loss or fatigue  ENMT:  No difficulty hearing, tinnitus, vertigo; No sinus or throat pain  Respiratory: No cough, wheezing, chills or hemoptysis  Cardiovascular: No chest pain, palpitations, dizziness or leg swelling  Gastrointestinal: No abdominal or epigastric pain. No nausea, vomiting or hematemesis; No diarrhea or constipation. + melena   Skin: No itching, burning, rashes or lesions   Musculoskeletal: No joint pain or swelling; No muscle, back or extremity pain    PAST MEDICAL & SURGICAL HISTORY:  No pertinent past medical history    History of ankle surgery        FAMILY HISTORY:  FH: HTN (hypertension) (Mother)        SOCIAL HISTORY:  Smoking Status: [ ] Current, [ ] Former, [ ] Never  Pack Years:  [  ] EtOH-no  [  ] IVDA    MEDICATIONS:  MEDICATIONS  (STANDING):  acetaminophen     Tablet .. 975 milliGRAM(s) Oral every 6 hours  chlorhexidine 2% Cloths 1 Application(s) Topical <User Schedule>  clindamycin IVPB 900 milliGRAM(s) IV Intermittent every 8 hours  gabapentin 200 milliGRAM(s) Oral three times a day  glucagon  Injectable 1 milliGRAM(s) IntraMuscular once  hydrocortisone sodium succinate Injectable 50 milliGRAM(s) IV Push every 8 hours  insulin lispro (ADMELOG) corrective regimen sliding scale   SubCutaneous Before meals and at bedtime  multiple electrolytes Injection Type 1 1000 milliLiter(s) (100 mL/Hr) IV Continuous <Continuous>  pantoprazole Infusion 8 mG/Hr (10 mL/Hr) IV Continuous <Continuous>  piperacillin/tazobactam IVPB.. 3.375 Gram(s) IV Intermittent every 8 hours    MEDICATIONS  (PRN):  fentaNYL    Injectable 75 MICROGram(s) IV Push every 8 hours PRN Dressing changes  ondansetron Injectable 4 milliGRAM(s) IV Push every 4 hours PRN Nausea and/or Vomiting  oxyCODONE    IR 5 milliGRAM(s) Oral every 4 hours PRN Moderate Pain (4 - 6)  oxyCODONE    IR 10 milliGRAM(s) Oral every 4 hours PRN Severe Pain (7 - 10)      Allergies    No Known Allergies    Intolerances        Vital Signs Last 24 Hrs  T(C): 36.8 (20 Nov 2021 11:24), Max: 37.2 (19 Nov 2021 17:00)  T(F): 98.2 (20 Nov 2021 11:24), Max: 99 (19 Nov 2021 17:00)  HR: 76 (20 Nov 2021 10:00) (76 - 104)  BP: --  BP(mean): --  RR: 12 (20 Nov 2021 10:00) (10 - 24)  SpO2: 100% (20 Nov 2021 10:00) (94% - 100%)    11-19 @ 07:01 - 11-20 @ 07:00  --------------------------------------------------------  IN: 2540 mL / OUT: 3135 mL / NET: -595 mL    11-20 @ 07:01 - 11-20 @ 11:35  --------------------------------------------------------  IN: 1075 mL / OUT: 300 mL / NET: 775 mL          PHYSICAL EXAM:    General: acutely ill - offee gorund in NGT  HEENT: MMM, conjunctiva and sclera clear  H- RRR  L- CTA   Gastrointestinal: Soft, non-tender non-distended; Normal bowel sounds; No rebound or guarding  Extremities: Normal range of motion, No clubbing, cyanosis left leg in dressing  Neurological: Alert and oriented x3  Skin: Warm and dry. No obvious rash  rectal- melena       LABS:                        6.7    25.35 )-----------( 209      ( 20 Nov 2021 10:32 )             19.5     20 Nov 2021 05:21    144    |  108    |  108.8  ----------------------------<  149    4.3     |  22.0   |  1.41     Ca    7.9        20 Nov 2021 05:21  Phos  6.4       20 Nov 2021 05:21  Mg     2.8       20 Nov 2021 05:21    TPro  6.1    /  Alb  2.5    /  TBili  0.5    /  DBili  x      /  AST  42     /  ALT  14     /  AlkPhos  75     / Amylase x      /Lipase x      19 Nov 2021 05:48              RADIOLOGY & ADDITIONAL STUDIES:     < from: Xray Chest 1 View-PORTABLE IMMEDIATE (Xray Chest 1 View-PORTABLE IMMEDIATE .) (11.18.21 @ 10:12) >  MPRESSION: Removal left ankle hardware. Chest shows no infiltrate. Ankle edema.      < end of copied text >    < from: Xray Chest 1 View-PORTABLE IMMEDIATE (Xray Chest 1 View-PORTABLE IMMEDIATE .) (11.18.21 @ 10:12) >  IMPRESSION: Removal left ankle hardware. Chest shows no infiltrate. Ankle edema.    < end of copied text >

## 2021-11-21 LAB
-  CEFTRIAXONE: SIGNIFICANT CHANGE UP
-  PENICILLIN: SIGNIFICANT CHANGE UP
-  VANCOMYCIN: SIGNIFICANT CHANGE UP
ANION GAP SERPL CALC-SCNC: 11 MMOL/L — SIGNIFICANT CHANGE UP (ref 5–17)
ANION GAP SERPL CALC-SCNC: 12 MMOL/L — SIGNIFICANT CHANGE UP (ref 5–17)
ANION GAP SERPL CALC-SCNC: 13 MMOL/L — SIGNIFICANT CHANGE UP (ref 5–17)
ANISOCYTOSIS BLD QL: SLIGHT — SIGNIFICANT CHANGE UP
BASOPHILS # BLD AUTO: 0 K/UL — SIGNIFICANT CHANGE UP (ref 0–0.2)
BASOPHILS NFR BLD AUTO: 0 % — SIGNIFICANT CHANGE UP (ref 0–2)
BUN SERPL-MCNC: 50.1 MG/DL — HIGH (ref 8–20)
BUN SERPL-MCNC: 61.2 MG/DL — HIGH (ref 8–20)
BUN SERPL-MCNC: 77.1 MG/DL — HIGH (ref 8–20)
CALCIUM SERPL-MCNC: 7.7 MG/DL — LOW (ref 8.6–10.2)
CALCIUM SERPL-MCNC: 7.8 MG/DL — LOW (ref 8.6–10.2)
CALCIUM SERPL-MCNC: 8.1 MG/DL — LOW (ref 8.6–10.2)
CHLORIDE SERPL-SCNC: 113 MMOL/L — HIGH (ref 98–107)
CHLORIDE SERPL-SCNC: 113 MMOL/L — HIGH (ref 98–107)
CHLORIDE SERPL-SCNC: 115 MMOL/L — HIGH (ref 98–107)
CO2 SERPL-SCNC: 22 MMOL/L — SIGNIFICANT CHANGE UP (ref 22–29)
CREAT SERPL-MCNC: 0.73 MG/DL — SIGNIFICANT CHANGE UP (ref 0.5–1.3)
CREAT SERPL-MCNC: 0.88 MG/DL — SIGNIFICANT CHANGE UP (ref 0.5–1.3)
CREAT SERPL-MCNC: 1.02 MG/DL — SIGNIFICANT CHANGE UP (ref 0.5–1.3)
CULTURE RESULTS: SIGNIFICANT CHANGE UP
CULTURE RESULTS: SIGNIFICANT CHANGE UP
EOSINOPHIL # BLD AUTO: 0 K/UL — SIGNIFICANT CHANGE UP (ref 0–0.5)
EOSINOPHIL NFR BLD AUTO: 0 % — SIGNIFICANT CHANGE UP (ref 0–6)
GIANT PLATELETS BLD QL SMEAR: PRESENT — SIGNIFICANT CHANGE UP
GLUCOSE BLDC GLUCOMTR-MCNC: 147 MG/DL — HIGH (ref 70–99)
GLUCOSE BLDC GLUCOMTR-MCNC: 193 MG/DL — HIGH (ref 70–99)
GLUCOSE BLDC GLUCOMTR-MCNC: 298 MG/DL — HIGH (ref 70–99)
GLUCOSE SERPL-MCNC: 135 MG/DL — HIGH (ref 70–99)
GLUCOSE SERPL-MCNC: 149 MG/DL — HIGH (ref 70–99)
GLUCOSE SERPL-MCNC: 246 MG/DL — HIGH (ref 70–99)
HCT VFR BLD CALC: 23 % — LOW (ref 34.5–45)
HCT VFR BLD CALC: 23.3 % — LOW (ref 34.5–45)
HCT VFR BLD CALC: 23.5 % — LOW (ref 34.5–45)
HGB BLD-MCNC: 7.7 G/DL — LOW (ref 11.5–15.5)
HGB BLD-MCNC: 7.8 G/DL — LOW (ref 11.5–15.5)
HGB BLD-MCNC: 7.9 G/DL — LOW (ref 11.5–15.5)
HYPOCHROMIA BLD QL: SLIGHT — SIGNIFICANT CHANGE UP
LYMPHOCYTES # BLD AUTO: 0.52 K/UL — LOW (ref 1–3.3)
LYMPHOCYTES # BLD AUTO: 2.6 % — LOW (ref 13–44)
MAGNESIUM SERPL-MCNC: 1.9 MG/DL — SIGNIFICANT CHANGE UP (ref 1.6–2.6)
MAGNESIUM SERPL-MCNC: 2.2 MG/DL — SIGNIFICANT CHANGE UP (ref 1.6–2.6)
MANUAL SMEAR VERIFICATION: SIGNIFICANT CHANGE UP
MCHC RBC-ENTMCNC: 26.7 PG — LOW (ref 27–34)
MCHC RBC-ENTMCNC: 27 PG — SIGNIFICANT CHANGE UP (ref 27–34)
MCHC RBC-ENTMCNC: 27.1 PG — SIGNIFICANT CHANGE UP (ref 27–34)
MCHC RBC-ENTMCNC: 33 GM/DL — SIGNIFICANT CHANGE UP (ref 32–36)
MCHC RBC-ENTMCNC: 33.2 GM/DL — SIGNIFICANT CHANGE UP (ref 32–36)
MCHC RBC-ENTMCNC: 34.3 GM/DL — SIGNIFICANT CHANGE UP (ref 32–36)
MCV RBC AUTO: 79 FL — LOW (ref 80–100)
MCV RBC AUTO: 80.9 FL — SIGNIFICANT CHANGE UP (ref 80–100)
MCV RBC AUTO: 81.3 FL — SIGNIFICANT CHANGE UP (ref 80–100)
METHOD TYPE: SIGNIFICANT CHANGE UP
METHOD TYPE: SIGNIFICANT CHANGE UP
MONOCYTES # BLD AUTO: 0.7 K/UL — SIGNIFICANT CHANGE UP (ref 0–0.9)
MONOCYTES NFR BLD AUTO: 3.5 % — SIGNIFICANT CHANGE UP (ref 2–14)
NEUTROPHILS # BLD AUTO: 18.88 K/UL — HIGH (ref 1.8–7.4)
NEUTROPHILS NFR BLD AUTO: 93.9 % — HIGH (ref 43–77)
ORGANISM # SPEC MICROSCOPIC CNT: SIGNIFICANT CHANGE UP
OVALOCYTES BLD QL SMEAR: SLIGHT — SIGNIFICANT CHANGE UP
PHOSPHATE SERPL-MCNC: 3.6 MG/DL — SIGNIFICANT CHANGE UP (ref 2.4–4.7)
PHOSPHATE SERPL-MCNC: 4.9 MG/DL — HIGH (ref 2.4–4.7)
PLAT MORPH BLD: NORMAL — SIGNIFICANT CHANGE UP
PLATELET # BLD AUTO: 231 K/UL — SIGNIFICANT CHANGE UP (ref 150–400)
PLATELET # BLD AUTO: 248 K/UL — SIGNIFICANT CHANGE UP (ref 150–400)
PLATELET # BLD AUTO: 273 K/UL — SIGNIFICANT CHANGE UP (ref 150–400)
POIKILOCYTOSIS BLD QL AUTO: SLIGHT — SIGNIFICANT CHANGE UP
POTASSIUM SERPL-MCNC: 3.6 MMOL/L — SIGNIFICANT CHANGE UP (ref 3.5–5.3)
POTASSIUM SERPL-MCNC: 4 MMOL/L — SIGNIFICANT CHANGE UP (ref 3.5–5.3)
POTASSIUM SERPL-MCNC: 4.1 MMOL/L — SIGNIFICANT CHANGE UP (ref 3.5–5.3)
POTASSIUM SERPL-SCNC: 3.6 MMOL/L — SIGNIFICANT CHANGE UP (ref 3.5–5.3)
POTASSIUM SERPL-SCNC: 4 MMOL/L — SIGNIFICANT CHANGE UP (ref 3.5–5.3)
POTASSIUM SERPL-SCNC: 4.1 MMOL/L — SIGNIFICANT CHANGE UP (ref 3.5–5.3)
RBC # BLD: 2.88 M/UL — LOW (ref 3.8–5.2)
RBC # BLD: 2.89 M/UL — LOW (ref 3.8–5.2)
RBC # BLD: 2.91 M/UL — LOW (ref 3.8–5.2)
RBC # FLD: 21.2 % — HIGH (ref 10.3–14.5)
RBC # FLD: 21.2 % — HIGH (ref 10.3–14.5)
RBC # FLD: 21.3 % — HIGH (ref 10.3–14.5)
RBC BLD AUTO: ABNORMAL
RHEUMATOID FACT SERPL-ACNC: <10 IU/ML — SIGNIFICANT CHANGE UP (ref 0–13)
SCHISTOCYTES BLD QL AUTO: SLIGHT — SIGNIFICANT CHANGE UP
SMUDGE CELLS # BLD: PRESENT — SIGNIFICANT CHANGE UP
SODIUM SERPL-SCNC: 146 MMOL/L — HIGH (ref 135–145)
SODIUM SERPL-SCNC: 148 MMOL/L — HIGH (ref 135–145)
SODIUM SERPL-SCNC: 148 MMOL/L — HIGH (ref 135–145)
SPECIMEN SOURCE: SIGNIFICANT CHANGE UP
SPECIMEN SOURCE: SIGNIFICANT CHANGE UP
WBC # BLD: 16.87 K/UL — HIGH (ref 3.8–10.5)
WBC # BLD: 17.2 K/UL — HIGH (ref 3.8–10.5)
WBC # BLD: 20.11 K/UL — HIGH (ref 3.8–10.5)
WBC # FLD AUTO: 16.87 K/UL — HIGH (ref 3.8–10.5)
WBC # FLD AUTO: 17.2 K/UL — HIGH (ref 3.8–10.5)
WBC # FLD AUTO: 20.11 K/UL — HIGH (ref 3.8–10.5)

## 2021-11-21 PROCEDURE — 99233 SBSQ HOSP IP/OBS HIGH 50: CPT

## 2021-11-21 RX ORDER — LISINOPRIL 2.5 MG/1
5 TABLET ORAL DAILY
Refills: 0 | Status: DISCONTINUED | OUTPATIENT
Start: 2021-11-21 | End: 2021-11-24

## 2021-11-21 RX ORDER — POTASSIUM CHLORIDE 20 MEQ
20 PACKET (EA) ORAL ONCE
Refills: 0 | Status: COMPLETED | OUTPATIENT
Start: 2021-11-21 | End: 2021-11-21

## 2021-11-21 RX ORDER — PANTOPRAZOLE SODIUM 20 MG/1
40 TABLET, DELAYED RELEASE ORAL
Refills: 0 | Status: DISCONTINUED | OUTPATIENT
Start: 2021-11-21 | End: 2021-11-24

## 2021-11-21 RX ORDER — HYDROCORTISONE 20 MG
50 TABLET ORAL
Refills: 0 | Status: DISCONTINUED | OUTPATIENT
Start: 2021-11-21 | End: 2021-11-21

## 2021-11-21 RX ORDER — INSULIN LISPRO 100/ML
VIAL (ML) SUBCUTANEOUS
Refills: 0 | Status: DISCONTINUED | OUTPATIENT
Start: 2021-11-21 | End: 2021-11-24

## 2021-11-21 RX ORDER — HYDROCORTISONE 20 MG
50 TABLET ORAL
Refills: 0 | Status: COMPLETED | OUTPATIENT
Start: 2021-11-21 | End: 2021-11-22

## 2021-11-21 RX ORDER — HYDROCORTISONE 20 MG
50 TABLET ORAL DAILY
Refills: 0 | Status: DISCONTINUED | OUTPATIENT
Start: 2021-11-22 | End: 2021-11-22

## 2021-11-21 RX ADMIN — Medication 50 MILLIGRAM(S): at 17:01

## 2021-11-21 RX ADMIN — SODIUM CHLORIDE 75 MILLILITER(S): 9 INJECTION, SOLUTION INTRAVENOUS at 19:58

## 2021-11-21 RX ADMIN — OXYCODONE HYDROCHLORIDE 10 MILLIGRAM(S): 5 TABLET ORAL at 04:07

## 2021-11-21 RX ADMIN — Medication 100 MILLIGRAM(S): at 17:02

## 2021-11-21 RX ADMIN — Medication 20 MILLIEQUIVALENT(S): at 17:02

## 2021-11-21 RX ADMIN — OXYCODONE HYDROCHLORIDE 10 MILLIGRAM(S): 5 TABLET ORAL at 14:15

## 2021-11-21 RX ADMIN — OXYCODONE HYDROCHLORIDE 10 MILLIGRAM(S): 5 TABLET ORAL at 05:00

## 2021-11-21 RX ADMIN — GABAPENTIN 200 MILLIGRAM(S): 400 CAPSULE ORAL at 05:34

## 2021-11-21 RX ADMIN — Medication 975 MILLIGRAM(S): at 11:27

## 2021-11-21 RX ADMIN — Medication 100 MILLIGRAM(S): at 08:17

## 2021-11-21 RX ADMIN — GABAPENTIN 200 MILLIGRAM(S): 400 CAPSULE ORAL at 22:34

## 2021-11-21 RX ADMIN — Medication 4: at 11:26

## 2021-11-21 RX ADMIN — OXYCODONE HYDROCHLORIDE 10 MILLIGRAM(S): 5 TABLET ORAL at 15:15

## 2021-11-21 RX ADMIN — Medication 50 MILLIGRAM(S): at 05:33

## 2021-11-21 RX ADMIN — PANTOPRAZOLE SODIUM 40 MILLIGRAM(S): 20 TABLET, DELAYED RELEASE ORAL at 17:02

## 2021-11-21 RX ADMIN — GABAPENTIN 200 MILLIGRAM(S): 400 CAPSULE ORAL at 14:16

## 2021-11-21 RX ADMIN — OXYCODONE HYDROCHLORIDE 10 MILLIGRAM(S): 5 TABLET ORAL at 22:34

## 2021-11-21 RX ADMIN — Medication 975 MILLIGRAM(S): at 05:32

## 2021-11-21 RX ADMIN — CHLORHEXIDINE GLUCONATE 1 APPLICATION(S): 213 SOLUTION TOPICAL at 05:33

## 2021-11-21 RX ADMIN — Medication 100 MILLIGRAM(S): at 23:23

## 2021-11-21 RX ADMIN — Medication 975 MILLIGRAM(S): at 17:02

## 2021-11-21 RX ADMIN — CEFTRIAXONE 100 MILLIGRAM(S): 500 INJECTION, POWDER, FOR SOLUTION INTRAMUSCULAR; INTRAVENOUS at 11:27

## 2021-11-21 RX ADMIN — LISINOPRIL 5 MILLIGRAM(S): 2.5 TABLET ORAL at 11:55

## 2021-11-21 RX ADMIN — PANTOPRAZOLE SODIUM 10 MG/HR: 20 TABLET, DELAYED RELEASE ORAL at 01:30

## 2021-11-21 RX ADMIN — Medication 8: at 17:03

## 2021-11-21 RX ADMIN — OXYCODONE HYDROCHLORIDE 10 MILLIGRAM(S): 5 TABLET ORAL at 23:04

## 2021-11-21 RX ADMIN — Medication 975 MILLIGRAM(S): at 23:23

## 2021-11-21 NOTE — PROGRESS NOTE ADULT - SUBJECTIVE AND OBJECTIVE BOX
Pt Name: LUIS FERNANDO DAVIS    MRN: [id number]      Patient is a 51 y F s/p I&D of left native knee and removal of hardware of left ankle POD#4. Patient seen and examined in bed. Pain is controlled with current regimen. Denies fever/chills. Denies numbness or tingling. No acute orthopedic complaints at this time                          7.9    20.11 )-----------( 231      ( 21 Nov 2021 04:19 )             23.0     PHYSICAL EXAM:    ICU Vital Signs Last 24 Hrs  T(C): 36.9 (21 Nov 2021 04:21), Max: 37.1 (20 Nov 2021 20:00)  T(F): 98.4 (21 Nov 2021 04:21), Max: 98.8 (20 Nov 2021 20:00)  HR: 67 (21 Nov 2021 07:00) (67 - 97)  BP: --  BP(mean): --  ABP: 181/74 (21 Nov 2021 07:00) (143/48 - 195/70)  ABP(mean): 107 (21 Nov 2021 07:00) (-18 - 107)  RR: 18 (21 Nov 2021 07:00) (12 - 29)  SpO2: 100% (21 Nov 2021 07:00) (59% - 100%)      Appearance: Alert, responsive, in no acute distress.    Musculoskeletal:         Left Lower Extremity: ACE wrap to LLE. Knee dressing in place, c/d/i-no drainage or discharge noted.  4/5 plantar flexion. 4/5 FHL intact. 1/5 EHL, dorsi flexion. calf is supple, tender to touch due to incisions. ICU team at bedside- Ace wrap and dressing taken down, incisions noted to medial and lateral leg being managed by ACS/SICU team.    A/P:  Pt is a 51y Female POD #4 s/p LLE I&D and MASON for soft tissue infection.      PLAN:   * Pain control  * WBAT LLE in CAM boot  * DVTp   * Physical therapy  * ID following  * ABx as per ID  * Ortho to follow

## 2021-11-21 NOTE — PROGRESS NOTE ADULT - SUBJECTIVE AND OBJECTIVE BOX
Patient is a 51y old  Female who presents with a chief complaint of Ascending soft tissue infection (21 Nov 2021 03:40)      HPI:  HPI: 51y Female present to ED with left leg swelling, erythema, pain- found to have fasciatitis. The patient was found to have melena and severe drop in hemoglobin to 5.3 yesterday.  Hemoglobin went up to 8.2 and this morning it is 7.9.  This is fairly stable.  She did pass some melanic bowel movements overnight.  EGD yesterday showed 2 large ulcers in the antrum.  There were 3 smaller ulcers in the antrum as well.  No active bleeding.  She was also noted to have a Dielafoy  lesion in the body which was injected and clipped.  She has no abdominal pain nausea vomiting.      REVIEW OF SYSTEMS:  Constitutional: No fever, weight loss or fatigue  ENMT:  No difficulty hearing, tinnitus, vertigo; No sinus or throat pain  Respiratory: No cough, wheezing, chills or hemoptysis  Cardiovascular: No chest pain, palpitations, dizziness or leg swelling  Gastrointestinal: No abdominal or epigastric pain. No nausea, vomiting or hematemesis; No diarrhea or constipation. + melena   Skin: No itching, burning, rashes or lesions   Musculoskeletal: No joint pain or swelling; No muscle, back or extremity pain    PAST MEDICAL & SURGICAL HISTORY:  No pertinent past medical history    History of ankle surgery        FAMILY HISTORY:  FH: HTN (hypertension) (Mother)        SOCIAL HISTORY:  Smoking Status: [ ] Current, [ ] Former, [ ] Never  Pack Years:  [  ] EtOH-no  [  ] IVDA    MEDICATIONS:  MEDICATIONS  (STANDING):  acetaminophen     Tablet .. 975 milliGRAM(s) Oral every 6 hours  cefTRIAXone   IVPB      cefTRIAXone   IVPB 2000 milliGRAM(s) IV Intermittent every 24 hours  chlorhexidine 2% Cloths 1 Application(s) Topical <User Schedule>  clindamycin IVPB 900 milliGRAM(s) IV Intermittent every 8 hours  gabapentin 200 milliGRAM(s) Oral three times a day  glucagon  Injectable 1 milliGRAM(s) IntraMuscular once  hydrocortisone sodium succinate Injectable 50 milliGRAM(s) IV Push two times a day  insulin lispro (ADMELOG) corrective regimen sliding scale   SubCutaneous Before meals and at bedtime  lisinopril 5 milliGRAM(s) Oral daily  multiple electrolytes Injection Type 1 1000 milliLiter(s) (75 mL/Hr) IV Continuous <Continuous>  pantoprazole  Injectable 40 milliGRAM(s) IV Push two times a day    MEDICATIONS  (PRN):  fentaNYL    Injectable 75 MICROGram(s) IV Push every 8 hours PRN Dressing changes  ondansetron Injectable 4 milliGRAM(s) IV Push every 4 hours PRN Nausea and/or Vomiting  oxyCODONE    IR 5 milliGRAM(s) Oral every 4 hours PRN Moderate Pain (4 - 6)  oxyCODONE    IR 10 milliGRAM(s) Oral every 4 hours PRN Severe Pain (7 - 10)      Allergies    No Known Allergies    Intolerances        Vital Signs Last 24 Hrs  T(C): 36.9 (21 Nov 2021 04:21), Max: 37.1 (20 Nov 2021 20:00)  T(F): 98.4 (21 Nov 2021 04:21), Max: 98.8 (20 Nov 2021 20:00)  HR: 62 (21 Nov 2021 10:00) (62 - 97)  BP: --  BP(mean): --  RR: 13 (21 Nov 2021 10:00) (13 - 29)  SpO2: 99% (21 Nov 2021 10:00) (59% - 100%)    11-20 @ 07:01  -  11-21 @ 07:00  --------------------------------------------------------  IN: 3886 mL / OUT: 3905 mL / NET: -19 mL    11-21 @ 07:01 - 11-21 @ 10:55  --------------------------------------------------------  IN: 220 mL / OUT: 80 mL / NET: 140 mL          PHYSICAL EXAM:    General:  in no acute distress  HEENT: MMM, conjunctiva and sclera clear  H- RRR  L- CTA   Gastrointestinal: Soft, non-tender non-distended; Normal bowel sounds; No rebound or guarding  Extremities: Normal range of motion, left leg in surgical dressing   Neurological: Alert and oriented x3  Skin: Warm and dry. No obvious rash      LABS:                        7.9    20.11 )-----------( 231      ( 21 Nov 2021 04:19 )             23.0     21 Nov 2021 04:19    148    |  113    |  77.1   ----------------------------<  135    4.1     |  22.0   |  1.02     Ca    8.1        21 Nov 2021 04:19  Phos  4.9       21 Nov 2021 04:19  Mg     2.2       21 Nov 2021 04:19                RADIOLOGY & ADDITIONAL STUDIES:     < from: Xray Chest 1 View- PORTABLE-Urgent (Xray Chest 1 View- PORTABLE-Urgent .) (11.20.21 @ 10:08) >  IMPRESSION:  Right central line in SVC region. The nasogastric tube courses below the left hemidiaphragm, tip off edge of film.  HEART:difficult to access in this projection  LUNGS: free of consolidation or effusion.  BONES: degenerative changes      < end of copied text >

## 2021-11-21 NOTE — PROGRESS NOTE ADULT - ATTENDING COMMENTS
pt seen and examined. knee dressing c/d/i. transferred from sicu to the floor. recommend PT for knee ROM. IV abx per ID, will require 6 weeks for septic knee. care as per primary team. ortho to follow

## 2021-11-21 NOTE — PROGRESS NOTE ADULT - SUBJECTIVE AND OBJECTIVE BOX
24h Events: Patient continued to have downtrending HGB yesterday despite transfusions. Was found to have upper GI bleed via NGT lavage and also had melanotic stools yesterday morning. DDAVP was also given for coagulopathy along with 4 PRBC. EGD was performed which showed multiple ulcers that were non-bleeding and had clean bases. There was also a deulofoy lesion identified that was injected with epi and clip was placed. Patient’s hemoglobin has since stabilized. She had an additional large melanotic stool which is likely residual from bleeding prior to EGD.  Protonix drip started. SQH held and SCD’s currently in place. Patient states she had been taking Ibuprofen for left leg pain prior to coming to hospital, however gastrin level was sent and is pending.     ICU Vital Signs Last 24 Hrs  T(C): 37 (21 Nov 2021 00:00), Max: 37.1 (20 Nov 2021 20:00)  T(F): 98.6 (21 Nov 2021 00:00), Max: 98.8 (20 Nov 2021 20:00)  HR: 73 (21 Nov 2021 03:00) (71 - 97)  BP: --  BP(mean): --  ABP: 147/62 (21 Nov 2021 03:00) (133/56 - 195/70)  ABP(mean): 89 (21 Nov 2021 03:00) (70 - 101)  RR: 20 (21 Nov 2021 03:00) (12 - 29)  SpO2: 97% (21 Nov 2021 03:00) (59% - 100%)      I&O's Detail    19 Nov 2021 07:01  -  20 Nov 2021 07:00  --------------------------------------------------------  IN:    IV PiggyBack: 175 mL    IV PiggyBack: 50 mL    IV PiggyBack: 250 mL    multiple electrolytes Injection Type 1.: 1725 mL    Oral Fluid: 320 mL    Pantoprazole: 20 mL  Total IN: 2540 mL    OUT:    Indwelling Catheter - Urethral (mL): 2835 mL    VAC (Vacuum Assisted Closure) System (mL): 300 mL  Total OUT: 3135 mL    Total NET: -595 mL      20 Nov 2021 07:01  -  21 Nov 2021 03:40  --------------------------------------------------------  IN:    IV PiggyBack: 25 mL    IV PiggyBack: 100 mL    IV PiggyBack: 50 mL    IV PiggyBack: 100 mL    IV PiggyBack: 50 mL    IV PiggyBack: 50 mL    multiple electrolytes Injection Type 1.: 1800 mL    Pantoprazole: 200 mL    PRBCs (Packed Red Blood Cells): 1171 mL  Total IN: 3546 mL    OUT:    Indwelling Catheter - Urethral (mL): 2845 mL    VAC (Vacuum Assisted Closure) System (mL): 350 mL  Total OUT: 3195 mL    Total NET: 351 mL      MEDICATIONS  (STANDING):  acetaminophen     Tablet .. 975 milliGRAM(s) Oral every 6 hours  cefTRIAXone   IVPB      cefTRIAXone   IVPB 2000 milliGRAM(s) IV Intermittent every 24 hours  chlorhexidine 2% Cloths 1 Application(s) Topical <User Schedule>  clindamycin IVPB 900 milliGRAM(s) IV Intermittent every 8 hours  gabapentin 200 milliGRAM(s) Oral three times a day  glucagon  Injectable 1 milliGRAM(s) IntraMuscular once  hydrocortisone sodium succinate Injectable 50 milliGRAM(s) IV Push every 8 hours  insulin lispro (ADMELOG) corrective regimen sliding scale   SubCutaneous Before meals and at bedtime  multiple electrolytes Injection Type 1 1000 milliLiter(s) (75 mL/Hr) IV Continuous <Continuous>  pantoprazole Infusion 8 mG/Hr (10 mL/Hr) IV Continuous <Continuous>    MEDICATIONS  (PRN):  fentaNYL    Injectable 75 MICROGram(s) IV Push every 8 hours PRN Dressing changes  ondansetron Injectable 4 milliGRAM(s) IV Push every 4 hours PRN Nausea and/or Vomiting  oxyCODONE    IR 5 milliGRAM(s) Oral every 4 hours PRN Moderate Pain (4 - 6)  oxyCODONE    IR 10 milliGRAM(s) Oral every 4 hours PRN Severe Pain (7 - 10)        Physical Exam:    Gen: Resting comfortably in bed    HEENT: PERRL, EOMI    Neurological: Alert and oriented x3 without focal deficit    Neck: Trachea midline, no evidence of JVD, FROM without pain, neck symmetric    Pulmonary: CTAB with decreased breath sounds at the bases    Cardiovascular: regular rate and rhythm    Gastrointestinal: Soft, non-tender, non-distended    : Del Toro in place draining clear yellow urine    Extremities: LLE w/ 4 incision, L lateral incision, L medial incision and L upper thigh incision with xeroform and kerlex packing, Knee incision closed with sutures    Skin: incision site as described above. Skin around the L lateral incision boggy with fluid filled blister extending onto the ankle and foot.     Musculoskeletal: LLE limited ROM, elevated    LABS:  CBC Full  -  ( 20 Nov 2021 16:46 )  WBC Count : 26.59 K/uL  RBC Count : 3.11 M/uL  Hemoglobin : 8.2 g/dL  Hematocrit : 23.9 %  Platelet Count - Automated : 226 K/uL  Mean Cell Volume : 76.8 fl  Mean Cell Hemoglobin : 26.4 pg  Mean Cell Hemoglobin Concentration : 34.3 gm/dL  Auto Neutrophil # : x  Auto Lymphocyte # : x  Auto Monocyte # : x  Auto Eosinophil # : x  Auto Basophil # : x  Auto Neutrophil % : x  Auto Lymphocyte % : x  Auto Monocyte % : x  Auto Eosinophil % : x  Auto Basophil % : x    11-20    145  |  110<H>  |  91.8<H>  ----------------------------<  163<H>  3.9   |  21.0<L>  |  1.11    Ca    8.2<L>      20 Nov 2021 16:46  Phos  6.4     11-20  Mg     2.8     11-20    TPro  6.1<L>  /  Alb  2.5<L>  /  TBili  0.5  /  DBili  x   /  AST  42<H>  /  ALT  14  /  AlkPhos  75  11-19    PT/INR - ( 20 Nov 2021 16:46 )   PT: 16.3 sec;   INR: 1.43 ratio         PTT - ( 20 Nov 2021 10:32 )  PTT:35.4 sec    RECENT CULTURES:  11-18 .Body Fluid OR - Left Lower Extremity Fluid XXXX   No polymorphonuclear leukocytes seen  No organisms seen  by cytocentrifuge   Rare Streptococcus pyogenes (Group A)  Penicillin and ampicillin are drugs of choice for treatment of  beta-hemolytic streptococcal infections.  Susceptibility testing is not performed routinely because S. pyogenes  (GAS) is universally susceptibleto penicillin  and resistance in other strains is extremely rare.    11-18 Catheterized Catheterized XXXX XXXX   No growth    11-18 Catheterized Catheterized XXXX XXXX   <10,000 CFU/mL Normal Urogenital Aimee    11-17 .Blood Blood Blood Culture PCR   Growth in aerobic and anaerobic bottles: Gram Positive Cocci in Pairs and  Chains  ***Blood Panel PCR results on this specimen are available  approximately 3 hours after the Gram stain result.***  Gram stain, PCR, and/or culture results may not always  correspond due to difference in methodologies.  ************************************************************  This PCR assay was performed using [employer].  The following targets are tested for: Enterococcus,  vancomycin resistant enterococci, Listeria monocytogenes,  coagulase negative staphylococci, S. aureus,  methicillin resistant S. aureus, Streptococcus agalactiae  (Group B), S. pneumoniae, S. pyogenes (Group A),  Acinetobacter baumannii, Enterobacter cloacae, E. coli,  Klebsiella oxytoca, K. pneumoniae, Proteus sp.,  Serratia marcescens, Haemophilus influenzae,  Neisseria meningitidis, Pseudomonas aeruginosa, Candida  albicans, C. glabrata, C krusei, C parapsilosis,  C. tropicalis and the KPC resistance gene.  Gram Stain and BCID performed by:  [facility]  [address] [place] [address]  .  TYPE: (C=Critical, N=Notification, A=Abnormal) C  TESTS:  _ GS  DATE/TIME CALLED: _ 11/18/2021 09:40:08  CALLED TO: Chris Hernandez RN  READ BACK (2 Patient Identifiers)(Y/N): _ Y  READ BACK VALUES (Y/N): _ Y  CALLED BY: Chris Quiñones   Growth in aerobic and anaerobic bottles: Streptococcus pyogenes (Group A)  Susceptibility to follow.        LIVER FUNCTIONS - ( 19 Nov 2021 05:48 )  Alb: 2.5 g/dL / Pro: 6.1 g/dL / ALK PHOS: 75 U/L / ALT: 14 U/L / AST: 42 U/L / GGT: x           CARDIAC MARKERS ( 19 Nov 2021 05:48 )  x     / x     / 404 U/L / x     / 6.5 ng/mL

## 2021-11-21 NOTE — PROGRESS NOTE ADULT - ASSESSMENT
Acute kidney injury  Metabolic acidosis  Ascending necrotizing soft tissue infection- s/p LLE exploration and removal of hardware  Anemia- iron def    Improving Scr- continue volume resuscitation  Change fluids to 1/2 NS  Signing off  Please recall if needed

## 2021-11-21 NOTE — PROGRESS NOTE ADULT - ASSESSMENT
Assessment: 51y Female present to ED with left leg swelling, erythema, pain, leukocytosis and acute renal failure. CT scan concerning for necrotizing soft tissue infection. Pt s/p LLE exploration and debridement, admitted to SICU for hemorrhagic + septic shock, now weaned off vasopressors, still with acute blood loss anemia. Unresponsive to transfusions, found this AM with dark stool + blood, concern for GIB. Hemodynamically stable throughout. EDG performed yesterday showed stable ulcers and duelofoy lesions that received epi injection and clip.     Neuro:   - pain control with oxycodone for moderate to severe pain, tylenol for mild pain  - delirium precautions  - regulate sleep wake cycle  - pt continues to have abnormal affect / intermittent confusion - possibly uremic encephalopathy, will continue to monitor    CV:   - No pressor needs, normal HR and normotensive.,   - IVF for ARF  - Received 4 units PRBC yesterday...Hgb now stable.     Pulm:   - breathing comfortably on room air  - encourage coughing and deep breathing  - IS 10x hour while awake    GI/Nutrition:   - Continue to monitor for melena  - Bowel regimen discontinued  - PPI infusion...transition to BID dosing after 24 hours.  - EGD performed yesterday by GI w/ epi injection and clip of duelofoy lesion.     /Renal:   - Valle for strict I&O  - IVF for ARF  - possible uremic encephalopathy, continue to trend BUN  - no urgent indication for RRT, making good urine, Cr continues to improve  - avoid nephrotoxic medications    ID:   - Strep Pyogenes bacteremia  - Knee cx positive for strep pyogenes.   - ID consulted by Orthopedics yesterday...transitioned to Ceftriaxone and Clindamycin  - trend leukocytosis    Endo:   - FS q6  - tight glycemic control to maintain blood glucose < 180  - stress dose steroids for adrenal insufficiency (pt has been on prednisone for the week prior to surgery for knee inflammation)  - hydrocort 50 mg q8 for the next 24 hours (48 hours total), then slow taper.    Skin:   - Repositioning for DTI prevention while in bed  - LLE dressing changes daily  - Wound VAC to superior portion of medial thigh incisions placed 11/19. Next due 11/23    Heme/DVT Prophylaxis:   - SCDs  - SQH held given patient with acute blood loss anemia overnight  - continue to trend h/h q6...HGB appears stable now.    Lines/Tubes:   - R IJ TLC, L radial A line, valle    Dispo:   - SICU for critical care needs.

## 2021-11-21 NOTE — PROGRESS NOTE ADULT - SUBJECTIVE AND OBJECTIVE BOX
[facility] DIVISION OF KIDNEY DISEASES AND HYPERTENSION -- FOLLOW UP NOTE  --------------------------------------------------------------------------------  Chief Complaint:  RONNIE    24 hour events/subjective:  Seen/examined   SCr improving;      PAST HISTORY  --------------------------------------------------------------------------------  No significant changes to PMH, PSH, FHx, SHx, unless otherwise noted    ALLERGIES & MEDICATIONS  --------------------------------------------------------------------------------  Allergies    No Known Allergies    Intolerances      Standing Inpatient Medications  acetaminophen     Tablet .. 975 milliGRAM(s) Oral every 6 hours  cefTRIAXone   IVPB      cefTRIAXone   IVPB 2000 milliGRAM(s) IV Intermittent every 24 hours  chlorhexidine 2% Cloths 1 Application(s) Topical <User Schedule>  clindamycin IVPB 900 milliGRAM(s) IV Intermittent every 8 hours  gabapentin 200 milliGRAM(s) Oral three times a day  glucagon  Injectable 1 milliGRAM(s) IntraMuscular once  hydrocortisone sodium succinate Injectable 50 milliGRAM(s) IV Push two times a day  insulin lispro (ADMELOG) corrective regimen sliding scale   SubCutaneous Before meals and at bedtime  lisinopril 5 milliGRAM(s) Oral daily  multiple electrolytes Injection Type 1 1000 milliLiter(s) IV Continuous <Continuous>  pantoprazole  Injectable 40 milliGRAM(s) IV Push two times a day    PRN Inpatient Medications  fentaNYL    Injectable 75 MICROGram(s) IV Push every 8 hours PRN  ondansetron Injectable 4 milliGRAM(s) IV Push every 4 hours PRN  oxyCODONE    IR 5 milliGRAM(s) Oral every 4 hours PRN  oxyCODONE    IR 10 milliGRAM(s) Oral every 4 hours PRN      REVIEW OF SYSTEMS  --------------------------------------------------------------------------------  Gen: No weight changes, fatigue, fevers/chills, weakness  Skin: No rashes  Head/Eyes/Ears/Mouth: No headache; Normal hearing; Normal vision w/o blurriness; No sinus pain/discomfort, sore throat  Respiratory: No dyspnea, cough, wheezing, hemoptysis  CV: No chest pain, PND, orthopnea  GI: No abdominal pain, diarrhea, constipation, nausea, vomiting, melena, hematochezia  : No increased frequency, dysuria, hematuria, nocturia  MSK: No joint pain/swelling; no back pain; no edema  Neuro: No dizziness/lightheadedness, weakness, seizures, numbness, tingling  Heme: No easy bruising or bleeding  Endo: No heat/cold intolerance  Psych: No significant nervousness, anxiety, stress, depression    All other systems were reviewed and are negative, except as noted.    VITALS/PHYSICAL EXAM  --------------------------------------------------------------------------------  T(C): 36.7 (11-21-21 @ 12:00), Max: 37.1 (11-20-21 @ 20:00)  HR: 63 (11-21-21 @ 14:00) (62 - 96)  BP: --  RR: 16 (11-21-21 @ 14:00) (13 - 29)  SpO2: 98% (11-21-21 @ 14:00) (95% - 100%)  Wt(kg): --        11-20-21 @ 07:01  -  11-21-21 @ 07:00  --------------------------------------------------------  IN: 3886 mL / OUT: 3905 mL / NET: -19 mL    11-21-21 @ 07:01  -  11-21-21 @ 14:31  --------------------------------------------------------  IN: 505 mL / OUT: 490 mL / NET: 15 mL      Physical Exam:  	Gen: NAD   	HEENT: PERRL, supple neck, clear oropharynx  	Pulm: CTA B/L  	CV: RRR, S1S2; no rub  	Back: No spinal or CVA tenderness; no sacral edema  	Abd: +BS, soft, nontender/nondistended  	: No suprapubic tenderness  	UE: Warm, FROM, no clubbing, intact strength; no edema; no asterixis  	LE: Warm, FROM, no clubbing, intact strength; no edema  	Neuro: No focal deficits, intact gait  	Psych: Normal affect and mood  	Skin: Warm, without rashes       LABS/STUDIES  --------------------------------------------------------------------------------              7.9    20.11 >-----------<  231      [11-21-21 @ 04:19]              23.0     148  |  113  |  77.1  ----------------------------<  135      [11-21-21 @ 04:19]  4.1   |  22.0  |  1.02        Ca     8.1     [11-21-21 @ 04:19]      Mg     2.2     [11-21-21 @ 04:19]      Phos  4.9     [11-21-21 @ 04:19]      PT/INR: PT 16.3 , INR 1.43       [11-20-21 @ 16:46]  PTT: 35.4       [11-20-21 @ 10:32]          [11-20-21 @ 03:56]    Creatinine Trend:  SCr 1.02 [11-21 @ 04:19]  SCr 1.11 [11-20 @ 16:46]  SCr 1.41 [11-20 @ 05:21]  SCr 1.42 [11-19 @ 14:54]  SCr 1.63 [11-19 @ 05:48]    Urinalysis - [11-17-21 @ 16:46]      Color Yellow / Appearance Cloudy / SG 1.020 / pH 5.0      Gluc 50 / Ketone Trace  / Bili Small / Urobili 1       Blood Moderate / Protein 30 / Leuk Est Trace / Nitrite Negative      RBC 6-10 / WBC 11-25 / Hyaline  / Gran  / Sq Epi  / Non Sq Epi Few / Bacteria Moderate    Urine Protein 163      [11-18-21 @ 00:59]  Urine Sodium <30      [11-17-21 @ 16:46]  Urine Potassium 59      [11-17-21 @ 16:46]  Urine Chloride <27      [11-17-21 @ 16:46]  Urine Osmolality 374      [11-17-21 @ 16:46]    Iron <9, TIBC 160, %sat Unable to Calculate      [11-17-21 @ 13:21]  Ferritin 388      [11-17-21 @ 13:21]    HIV Nonreact      [11-17-21 @ 13:22]    Rheumatoid Factor <10      [11-21-21 @ 01:17]  Free Light Chains: kappa 28.27, lambda 17.90, ratio = 1.58      [11-18 @ 07:41]

## 2021-11-22 LAB
ANION GAP SERPL CALC-SCNC: 12 MMOL/L — SIGNIFICANT CHANGE UP (ref 5–17)
BASOPHILS # BLD AUTO: 0.02 K/UL — SIGNIFICANT CHANGE UP (ref 0–0.2)
BASOPHILS NFR BLD AUTO: 0.1 % — SIGNIFICANT CHANGE UP (ref 0–2)
BUN SERPL-MCNC: 43.9 MG/DL — HIGH (ref 8–20)
CALCIUM SERPL-MCNC: 7.9 MG/DL — LOW (ref 8.6–10.2)
CHLORIDE SERPL-SCNC: 116 MMOL/L — HIGH (ref 98–107)
CO2 SERPL-SCNC: 22 MMOL/L — SIGNIFICANT CHANGE UP (ref 22–29)
CREAT SERPL-MCNC: 0.7 MG/DL — SIGNIFICANT CHANGE UP (ref 0.5–1.3)
DRVVT 50/50: 54.6 SEC — SIGNIFICANT CHANGE UP
DRVVT SCREEN TO CONFIRM RATIO: SIGNIFICANT CHANGE UP
EOSINOPHIL # BLD AUTO: 0.1 K/UL — SIGNIFICANT CHANGE UP (ref 0–0.5)
EOSINOPHIL NFR BLD AUTO: 0.6 % — SIGNIFICANT CHANGE UP (ref 0–6)
GLUCOSE BLDC GLUCOMTR-MCNC: 161 MG/DL — HIGH (ref 70–99)
GLUCOSE BLDC GLUCOMTR-MCNC: 171 MG/DL — HIGH (ref 70–99)
GLUCOSE BLDC GLUCOMTR-MCNC: 230 MG/DL — HIGH (ref 70–99)
GLUCOSE BLDC GLUCOMTR-MCNC: 235 MG/DL — HIGH (ref 70–99)
GLUCOSE BLDC GLUCOMTR-MCNC: 96 MG/DL — SIGNIFICANT CHANGE UP (ref 70–99)
GLUCOSE SERPL-MCNC: 116 MG/DL — HIGH (ref 70–99)
HCT VFR BLD CALC: 23.1 % — LOW (ref 34.5–45)
HGB BLD-MCNC: 7.6 G/DL — LOW (ref 11.5–15.5)
IMM GRANULOCYTES NFR BLD AUTO: 1.8 % — HIGH (ref 0–1.5)
LA NT DPL PPP QL: 57.6 SEC — SIGNIFICANT CHANGE UP
LYMPHOCYTES # BLD AUTO: 1.62 K/UL — SIGNIFICANT CHANGE UP (ref 1–3.3)
LYMPHOCYTES # BLD AUTO: 10.5 % — LOW (ref 13–44)
MAGNESIUM SERPL-MCNC: 1.9 MG/DL — SIGNIFICANT CHANGE UP (ref 1.6–2.6)
MCHC RBC-ENTMCNC: 27 PG — SIGNIFICANT CHANGE UP (ref 27–34)
MCHC RBC-ENTMCNC: 32.9 GM/DL — SIGNIFICANT CHANGE UP (ref 32–36)
MCV RBC AUTO: 82.2 FL — SIGNIFICANT CHANGE UP (ref 80–100)
MONOCYTES # BLD AUTO: 0.78 K/UL — SIGNIFICANT CHANGE UP (ref 0–0.9)
MONOCYTES NFR BLD AUTO: 5 % — SIGNIFICANT CHANGE UP (ref 2–14)
MYOGLOBIN UR-MCNC: 4 NG/ML — SIGNIFICANT CHANGE UP (ref 0–13)
NEUTROPHILS # BLD AUTO: 12.69 K/UL — HIGH (ref 1.8–7.4)
NEUTROPHILS NFR BLD AUTO: 82 % — HIGH (ref 43–77)
NORMALIZED SCT PPP-RTO: 0.65 RATIO — SIGNIFICANT CHANGE UP (ref 0–1.16)
NORMALIZED SCT PPP-RTO: SIGNIFICANT CHANGE UP
PHOSPHATE SERPL-MCNC: 3.4 MG/DL — SIGNIFICANT CHANGE UP (ref 2.4–4.7)
PLATELET # BLD AUTO: 260 K/UL — SIGNIFICANT CHANGE UP (ref 150–400)
POTASSIUM SERPL-MCNC: 4 MMOL/L — SIGNIFICANT CHANGE UP (ref 3.5–5.3)
POTASSIUM SERPL-SCNC: 4 MMOL/L — SIGNIFICANT CHANGE UP (ref 3.5–5.3)
RBC # BLD: 2.81 M/UL — LOW (ref 3.8–5.2)
RBC # FLD: 22 % — HIGH (ref 10.3–14.5)
SODIUM SERPL-SCNC: 150 MMOL/L — HIGH (ref 135–145)
WBC # BLD: 15.49 K/UL — HIGH (ref 3.8–10.5)
WBC # FLD AUTO: 15.49 K/UL — HIGH (ref 3.8–10.5)

## 2021-11-22 PROCEDURE — 99233 SBSQ HOSP IP/OBS HIGH 50: CPT

## 2021-11-22 PROCEDURE — 99232 SBSQ HOSP IP/OBS MODERATE 35: CPT

## 2021-11-22 RX ORDER — POLYETHYLENE GLYCOL 3350 17 G/17G
17 POWDER, FOR SOLUTION ORAL DAILY
Refills: 0 | Status: DISCONTINUED | OUTPATIENT
Start: 2021-11-22 | End: 2021-11-24

## 2021-11-22 RX ORDER — SODIUM CHLORIDE 9 MG/ML
1000 INJECTION, SOLUTION INTRAVENOUS
Refills: 0 | Status: DISCONTINUED | OUTPATIENT
Start: 2021-11-22 | End: 2021-11-22

## 2021-11-22 RX ORDER — SENNA PLUS 8.6 MG/1
2 TABLET ORAL AT BEDTIME
Refills: 0 | Status: DISCONTINUED | OUTPATIENT
Start: 2021-11-22 | End: 2021-11-24

## 2021-11-22 RX ADMIN — Medication 4: at 12:05

## 2021-11-22 RX ADMIN — OXYCODONE HYDROCHLORIDE 10 MILLIGRAM(S): 5 TABLET ORAL at 21:50

## 2021-11-22 RX ADMIN — Medication 975 MILLIGRAM(S): at 23:50

## 2021-11-22 RX ADMIN — GABAPENTIN 200 MILLIGRAM(S): 400 CAPSULE ORAL at 21:18

## 2021-11-22 RX ADMIN — Medication 975 MILLIGRAM(S): at 18:15

## 2021-11-22 RX ADMIN — OXYCODONE HYDROCHLORIDE 10 MILLIGRAM(S): 5 TABLET ORAL at 15:49

## 2021-11-22 RX ADMIN — GABAPENTIN 200 MILLIGRAM(S): 400 CAPSULE ORAL at 13:03

## 2021-11-22 RX ADMIN — LISINOPRIL 5 MILLIGRAM(S): 2.5 TABLET ORAL at 05:24

## 2021-11-22 RX ADMIN — SODIUM CHLORIDE 125 MILLILITER(S): 9 INJECTION, SOLUTION INTRAVENOUS at 07:42

## 2021-11-22 RX ADMIN — Medication 975 MILLIGRAM(S): at 23:07

## 2021-11-22 RX ADMIN — Medication 975 MILLIGRAM(S): at 12:08

## 2021-11-22 RX ADMIN — Medication 975 MILLIGRAM(S): at 05:24

## 2021-11-22 RX ADMIN — Medication 4: at 08:05

## 2021-11-22 RX ADMIN — OXYCODONE HYDROCHLORIDE 10 MILLIGRAM(S): 5 TABLET ORAL at 20:55

## 2021-11-22 RX ADMIN — PANTOPRAZOLE SODIUM 40 MILLIGRAM(S): 20 TABLET, DELAYED RELEASE ORAL at 05:25

## 2021-11-22 RX ADMIN — CHLORHEXIDINE GLUCONATE 1 APPLICATION(S): 213 SOLUTION TOPICAL at 05:26

## 2021-11-22 RX ADMIN — Medication 100 MILLIGRAM(S): at 23:07

## 2021-11-22 RX ADMIN — Medication 8: at 23:05

## 2021-11-22 RX ADMIN — CEFTRIAXONE 100 MILLIGRAM(S): 500 INJECTION, POWDER, FOR SOLUTION INTRAMUSCULAR; INTRAVENOUS at 12:06

## 2021-11-22 RX ADMIN — GABAPENTIN 200 MILLIGRAM(S): 400 CAPSULE ORAL at 05:24

## 2021-11-22 RX ADMIN — Medication 100 MILLIGRAM(S): at 08:03

## 2021-11-22 RX ADMIN — Medication 50 MILLIGRAM(S): at 05:25

## 2021-11-22 RX ADMIN — OXYCODONE HYDROCHLORIDE 5 MILLIGRAM(S): 5 TABLET ORAL at 09:00

## 2021-11-22 RX ADMIN — Medication 100 MILLIGRAM(S): at 16:28

## 2021-11-22 RX ADMIN — FENTANYL CITRATE 75 MICROGRAM(S): 50 INJECTION INTRAVENOUS at 16:25

## 2021-11-22 RX ADMIN — PANTOPRAZOLE SODIUM 40 MILLIGRAM(S): 20 TABLET, DELAYED RELEASE ORAL at 18:15

## 2021-11-22 RX ADMIN — OXYCODONE HYDROCHLORIDE 5 MILLIGRAM(S): 5 TABLET ORAL at 08:03

## 2021-11-22 NOTE — PROGRESS NOTE ADULT - PROBLEM SELECTOR PLAN 1
pateint with 2 large antral ucler ( no visable vessel), 2 small ulcers in antrum abd dielaofy in body.   hemoglobin stable  solid food  BID PPI  will need repeat egd in 8 weeks

## 2021-11-22 NOTE — PROGRESS NOTE ADULT - SUBJECTIVE AND OBJECTIVE BOX
INFECTIOUS DISEASES AND INTERNAL MEDICINE at [place]  =======================================================  Theo Morrow MD  Diplomates American Board of Internal Medicine and Infectious Diseases  Telephone [phone]  Fax            [phone]  =======================================================    LUIS FERNANDO DAVIS [id number]    Follow up: group A STREP NECROTIZING SOFT TISSUE INFECTION     Allergies:  No Known Allergies      Medications:  acetaminophen     Tablet .. 975 milliGRAM(s) Oral every 6 hours  cefTRIAXone   IVPB      cefTRIAXone   IVPB 2000 milliGRAM(s) IV Intermittent every 24 hours  chlorhexidine 2% Cloths 1 Application(s) Topical <User Schedule>  clindamycin IVPB 900 milliGRAM(s) IV Intermittent every 8 hours  fentaNYL    Injectable 75 MICROGram(s) IV Push every 8 hours PRN  gabapentin 200 milliGRAM(s) Oral three times a day  glucagon  Injectable 1 milliGRAM(s) IntraMuscular once  hydrocortisone sodium succinate Injectable 50 milliGRAM(s) IV Push daily  insulin lispro (ADMELOG) corrective regimen sliding scale   SubCutaneous Before meals and at bedtime  lisinopril 5 milliGRAM(s) Oral daily  multiple electrolytes Injection Type 1 1000 milliLiter(s) IV Continuous <Continuous>  ondansetron Injectable 4 milliGRAM(s) IV Push every 4 hours PRN  oxyCODONE    IR 5 milliGRAM(s) Oral every 4 hours PRN  oxyCODONE    IR 10 milliGRAM(s) Oral every 4 hours PRN  pantoprazole  Injectable 40 milliGRAM(s) IV Push two times a day    SOCIAL       FAMILY   FAMILY HISTORY:  FH: HTN (hypertension) (Mother)      REVIEW OF SYSTEMS:  CONSTITUTIONAL:  No Fever or chills  HEENT:   No diplopia or blurred vision.  No earache, sore throat or runny nose.  CARDIOVASCULAR:  No pressure, squeezing, strangling, tightness, heaviness or aching about the chest, neck, axilla or epigastrium.  RESPIRATORY:  No cough, shortness of breath, PND or orthopnea.  GASTROINTESTINAL:  No nausea, vomiting or diarrhea.  GENITOURINARY:  No dysuria, frequency or urgency. No Blood in urine  MUSCULOSKELETAL:   moves all joints  SKIN:  No change in skin, hair or nails.  NEUROLOGIC:  No paresthesias, fasciculations, seizures or weakness.  PSYCHIATRIC:  No disorder of thought or mood.  ENDOCRINE:  No heat or cold intolerance, polyuria or polydipsia.  HEMATOLOGICAL:  No easy bruising or bleeding.            Physical Exam:  ICU Vital Signs Last 24 Hrs  T(C): 36.8 (22 Nov 2021 03:48), Max: 37 (21 Nov 2021 20:00)  T(F): 98.2 (22 Nov 2021 03:48), Max: 98.6 (21 Nov 2021 20:00)  HR: 67 (22 Nov 2021 07:00) (60 - 73)  BP: --  BP(mean): --  ABP: 174/70 (22 Nov 2021 07:00) (153/61 - 186/69)  ABP(mean): 101 (22 Nov 2021 07:00) (82 - 103)  RR: 13 (22 Nov 2021 07:00) (12 - 21)  SpO2: 100% (22 Nov 2021 07:00) (95% - 100%)    GEN: NAD,   HEENT: normocephalic and atraumatic. EOMI. ASHISH.    NECK: Supple. No carotid bruits.  No lymphadenopathy or thyromegaly.  LUNGS: Clear to auscultation.  HEART: Regular rate and rhythm without murmur.  ABDOMEN: Soft, nontender, and nondistended.  Positive bowel sounds.    : No CVA tenderness  EXTREMITIES: LEFT LEG WRAPPED      NEUROLOGIC: Cranial nerves II through XII are grossly intact.  PSYCHIATRIC: Appropriate affect .  SKIN: No ulceration or induration present.        Labs:  Vitals:  ============  T(F): 98.2 (22 Nov 2021 03:48), Max: 98.6 (21 Nov 2021 20:00)  HR: 67 (22 Nov 2021 07:00)  BP: --  RR: 13 (22 Nov 2021 07:00)  SpO2: 100% (22 Nov 2021 07:00) (95% - 100%)  temp max in last 48H T(F): , Max: 98.8 (11-20-21 @ 20:00)    =======================================================  Current Antibiotics:  cefTRIAXone   IVPB      cefTRIAXone   IVPB 2000 milliGRAM(s) IV Intermittent every 24 hours  clindamycin IVPB 900 milliGRAM(s) IV Intermittent every 8 hours    Other medications:  acetaminophen     Tablet .. 975 milliGRAM(s) Oral every 6 hours  chlorhexidine 2% Cloths 1 Application(s) Topical <User Schedule>  gabapentin 200 milliGRAM(s) Oral three times a day  glucagon  Injectable 1 milliGRAM(s) IntraMuscular once  hydrocortisone sodium succinate Injectable 50 milliGRAM(s) IV Push daily  insulin lispro (ADMELOG) corrective regimen sliding scale   SubCutaneous Before meals and at bedtime  lisinopril 5 milliGRAM(s) Oral daily  multiple electrolytes Injection Type 1 1000 milliLiter(s) IV Continuous <Continuous>  pantoprazole  Injectable 40 milliGRAM(s) IV Push two times a day      =======================================================  Labs:                        7.6    15.49 )-----------( 260      ( 22 Nov 2021 04:43 )             23.1     11-22    150<H>  |  116<H>  |  43.9<H>  ----------------------------<  116<H>  4.0   |  22.0  |  0.70    Ca    7.9<L>      22 Nov 2021 04:43  Phos  3.4     11-22  Mg     1.9     11-22        Culture - Body Fluid with Gram Stain (collected 11-18-21 @ 16:20)  Source: .Body Fluid OR Posterior Calf Left Lower Extremity Fluid  Gram Stain (11-18-21 @ 21:39):    polymorphonuclear leukocytes seen    Gram Positive Cocci in Pairs and Chains seen    by cytocentrifuge    Culture - Body Fluid with Gram Stain (collected 11-18-21 @ 16:20)  Source: .Body Fluid OR Swab Left Knee Lower Extremity Fluid  Gram Stain (11-18-21 @ 19:36):    polymorphonuclear leukocytes seen    Gram Positive Cocci in Pairs and Chains seen    by cytocentrifuge    Culture - Body Fluid with Gram Stain (collected 11-18-21 @ 16:20)  Source: .Body Fluid OR - Left Lower Extremity Fluid  Gram Stain (11-18-21 @ 20:10):    No polymorphonuclear leukocytes seen    No organisms seen    by cytocentrifuge    Culture - Urine (collected 11-18-21 @ 10:07)  Source: Catheterized Catheterized  Final Report (11-19-21 @ 07:03):    No growth    Culture - Urine (collected 11-18-21 @ 00:25)  Source: Catheterized Catheterized  Final Report (11-18-21 @ 22:09):    <10,000 CFU/mL Normal Urogenital Aimee    Culture - Blood (collected 11-17-21 @ 14:45)  Source: .Blood Blood  Gram Stain (11-18-21 @ 09:44):    Growth in aerobic and anaerobic bottles: Gram Positive Cocci in Pairs and    Chains    Gram Stain performed by:    [facility]    [address] [place] [address]    .    TYPE: (C=Critical, N=Notification, A=Abnormal) C    TESTS:  _ GS    DATE/TIME CALLED: _ 11/18/2021 09:42:19    CALLED TO: Chris Hernandez RN    READ BACK (2 Patient Identifiers)(Y/N): _ Y    READ BACK VALUES (Y/N): _ Y    CALLED BY: Chris Quiñones  Final Report (11-21-21 @ 12:28):    Growth in aerobic and anaerobic bottles: Streptococcus pyogenes (Group A)    See previous culture [id number]    Culture - Blood (collected 11-17-21 @ 14:45)  Source: .Blood Blood  Gram Stain (11-18-21 @ 09:40):    Growth in aerobic and anaerobic bottles: Gram Positive Cocci in Pairs and    Chains    ***Blood Panel PCR results on this specimen are available    approximately 3 hours after the Gram stain result.***    Gram stain, PCR, and/or culture results may not always    correspond due to difference in methodologies.    ************************************************************    This PCR assay was performed using [employer].    The following targets are tested for: Enterococcus,    vancomycin resistant enterococci, Listeria monocytogenes,    coagulase negative staphylococci, S. aureus,    methicillin resistant S. aureus, Streptococcus agalactiae    (Group B), S. pneumoniae, S. pyogenes (Group A),    Acinetobacter baumannii, Enterobacter cloacae, E. coli,    Klebsiella oxytoca, K. pneumoniae, Proteus sp.,    Serratia marcescens, Haemophilus influenzae,    Neisseria meningitidis, Pseudomonas aeruginosa, Candida    albicans, C. glabrata, C krusei, C parapsilosis,    C. tropicalis and the KPC resistance gene.    Gram Stain and BCID performed by:    [facility]    [address] [place] [address]    .    TYPE: (C=Critical, N=Notification, A=Abnormal) C    TESTS:  _ GS    DATE/TIME CALLED: _ 11/18/2021 09:40:08    CALLED TO: _ Zoe Hernandez RN    READ BACK (2 Patient Identifiers)(Y/N): _ Y    READ BACK VALUES (Y/N): _ Y    CALLED BY: Chris Quiñones  Final Report (11-21-21 @ 12:28):    Growth in aerobic and anaerobic bottles: Streptococcus pyogenes (Group A)  Organism: Blood Culture PCR  Streptococcus pyogenes (Group A) (11-21-21 @ 12:28)  Organism: Streptococcus pyogenes (Group A) (11-21-21 @ 12:28)    Sensitivities:      -  Ceftriaxone: S 0.016      -  Penicillin: S 0.016 Predicts results for ampicillin, amoxicillin, amoxicillin/clavulanate, ampicillin/sulbactam, 1st, 2nd and 3rd generation cephalosporins and carbapenems.      Method Type: ETEST  Organism: Streptococcus pyogenes (Group A) (11-21-21 @ 12:28)    Sensitivities:      -  Vancomycin: S      Method Type: KB  Organism: Blood Culture PCR (11-21-21 @ 12:28)    Sensitivities:      -  Streptococcus pyogenes (Group A): Detec      Method Type: PCR      Creatinine, Serum: 0.70 mg/dL (11-22-21 @ 04:43)  Creatinine, Serum: 0.73 mg/dL (11-21-21 @ 21:57)  Creatinine, Serum: 0.88 mg/dL (11-21-21 @ 15:43)  Creatinine, Serum: 1.02 mg/dL (11-21-21 @ 04:19)  Creatinine, Serum: 1.11 mg/dL (11-20-21 @ 16:46)  Creatinine, Serum: 1.41 mg/dL (11-20-21 @ 05:21)  Creatinine, Serum: 1.42 mg/dL (11-19-21 @ 14:54)  Creatinine, Serum: 1.63 mg/dL (11-19-21 @ 05:48)  Creatinine, Serum: 1.98 mg/dL (11-18-21 @ 21:01)  Creatinine, Serum: 2.54 mg/dL (11-18-21 @ 04:34)  Creatinine, Serum: 2.52 mg/dL (11-17-21 @ 23:52)  Creatinine, Serum: 3.80 mg/dL (11-17-21 @ 13:21)        Ferritin, Serum: 388 ng/mL (11-17-21 @ 13:21)    C-Reactive Protein, Serum: >422 mg/L (11-17-21 @ 13:21)    WBC Count: 15.49 K/uL (11-22-21 @ 04:43)  WBC Count: 17.20 K/uL (11-21-21 @ 21:57)  WBC Count: 16.87 K/uL (11-21-21 @ 15:43)  WBC Count: 20.11 K/uL (11-21-21 @ 04:19)  WBC Count: 26.59 K/uL (11-20-21 @ 16:46)  WBC Count: 25.35 K/uL (11-20-21 @ 10:32)  WBC Count: 26.27 K/uL (11-20-21 @ 05:21)  WBC Count: 29.79 K/uL (11-19-21 @ 23:48)  WBC Count: 29.67 K/uL (11-19-21 @ 14:54)  WBC Count: 26.93 K/uL (11-19-21 @ 05:48)  WBC Count: 29.87 K/uL (11-18-21 @ 21:01)  WBC Count: 24.81 K/uL (11-18-21 @ 04:34)  WBC Count: 23.17 K/uL (11-17-21 @ 23:52)  WBC Count: 46.68 K/uL (11-17-21 @ 13:21)      COVID-19 PCR: NotDetec (11-17-21 @ 13:18)    Lactate Dehydrogenase, Serum: 769 U/L (11-20-21 @ 03:56)    Alkaline Phosphatase, Serum: 75 U/L (11-19-21 @ 05:48)  Alanine Aminotransferase (ALT/SGPT): 14 U/L (11-19-21 @ 05:48)  Aspartate Aminotransferase (AST/SGOT): 42 U/L (11-19-21 @ 05:48)  Bilirubin Total, Serum: 0.5 mg/dL (11-19-21 @ 05:48)

## 2021-11-22 NOTE — PROGRESS NOTE ADULT - ATTENDING COMMENTS
I have seen and examined the patient during SICU multidisciplinary rounds from 830a-11a.   Events noted.    Neuro: awake, no focal  CV: HD normal, perfusion is adequate  Pulm: SaO2 adequate.  GI: tolerating diet, no melena, no hematochezia  : urine flow adequate, free water deficit noted, electrolytes ok  ID: ceftriaxone, clindamycin  Heme: h/h Stable , SCD  Endo: glycemia at target    Plan:  NSTI and GI bleed.  No new bleed, appreciate GI interventions.  Appreciate ID input.  Ok to transfer to floor.

## 2021-11-22 NOTE — PROGRESS NOTE ADULT - SUBJECTIVE AND OBJECTIVE BOX
24h Events:  Hgb slowly drifted down to 7.9 during the day. HD stable, one small bloody BM during the day yesterday. Evening labs w/ hgb 7.7 overnight, pt w/ brown bowel movement, no melena noted.    ICU Vital Signs Last 24 Hrs  T(C): 36.8 (22 Nov 2021 03:48), Max: 37 (21 Nov 2021 20:00)  T(F): 98.2 (22 Nov 2021 03:48), Max: 98.6 (21 Nov 2021 20:00)  HR: 60 (22 Nov 2021 04:00) (60 - 74)  BP: --  BP(mean): --  ABP: 172/63 (22 Nov 2021 04:00) (153/61 - 187/73)  ABP(mean): 93 (22 Nov 2021 04:00) (82 - 107)  RR: 14 (22 Nov 2021 04:00) (12 - 21)  SpO2: 97% (22 Nov 2021 04:00) (95% - 100%)    I&O's Detail    20 Nov 2021 07:01  -  21 Nov 2021 07:00  --------------------------------------------------------  IN:    IV PiggyBack: 100 mL    IV PiggyBack: 25 mL    IV PiggyBack: 100 mL    IV PiggyBack: 50 mL    IV PiggyBack: 100 mL    IV PiggyBack: 50 mL    multiple electrolytes Injection Type 1.: 2100 mL    Pantoprazole: 240 mL    PRBCs (Packed Red Blood Cells): 1171 mL  Total IN: 3936 mL    OUT:    Indwelling Catheter - Urethral (mL): 3280 mL    VAC (Vacuum Assisted Closure) System (mL): 625 mL  Total OUT: 3905 mL    Total NET: 31 mL      21 Nov 2021 07:01  -  22 Nov 2021 04:55  --------------------------------------------------------  IN:    IV PiggyBack: 100 mL    IV PiggyBack: 50 mL    IV PiggyBack: 50 mL    multiple electrolytes Injection Type 1.: 1575 mL    Pantoprazole: 30 mL  Total IN: 1805 mL    OUT:    Indwelling Catheter - Urethral (mL): 2095 mL    VAC (Vacuum Assisted Closure) System (mL): 125 mL  Total OUT: 2220 mL    Total NET: -415 mL    MEDICATIONS  (STANDING):  acetaminophen     Tablet .. 975 milliGRAM(s) Oral every 6 hours  cefTRIAXone   IVPB      cefTRIAXone   IVPB 2000 milliGRAM(s) IV Intermittent every 24 hours  chlorhexidine 2% Cloths 1 Application(s) Topical <User Schedule>  clindamycin IVPB 900 milliGRAM(s) IV Intermittent every 8 hours  gabapentin 200 milliGRAM(s) Oral three times a day  glucagon  Injectable 1 milliGRAM(s) IntraMuscular once  hydrocortisone sodium succinate Injectable 50 milliGRAM(s) IV Push two times a day  hydrocortisone sodium succinate Injectable 50 milliGRAM(s) IV Push daily  insulin lispro (ADMELOG) corrective regimen sliding scale   SubCutaneous Before meals and at bedtime  lisinopril 5 milliGRAM(s) Oral daily  multiple electrolytes Injection Type 1 1000 milliLiter(s) (75 mL/Hr) IV Continuous <Continuous>  pantoprazole  Injectable 40 milliGRAM(s) IV Push two times a day    MEDICATIONS  (PRN):  fentaNYL    Injectable 75 MICROGram(s) IV Push every 8 hours PRN Dressing changes  ondansetron Injectable 4 milliGRAM(s) IV Push every 4 hours PRN Nausea and/or Vomiting  oxyCODONE    IR 5 milliGRAM(s) Oral every 4 hours PRN Moderate Pain (4 - 6)  oxyCODONE    IR 10 milliGRAM(s) Oral every 4 hours PRN Severe Pain (7 - 10)      Physical Exam:    Gen: Resting comfortably in bed    HEENT: PERRL, EOMI    Neurological: Alert and oriented x3 without focal deficit    Neck: Trachea midline, no evidence of JVD, FROM without pain, neck symmetric    Pulmonary: CTAB with decreased breath sounds at the bases    Cardiovascular: regular rate and rhythm    Gastrointestinal: Soft, non-tender, non-distended    : Valle in place draining clear yellow urine    Extremities: LLE w/ 4 incision, L lateral incision, L medial incision with xeroform and kerlex packing, Knee incision closed with sutures, L upper thigh incision with wound vac in place    Skin: incision site as described above, rest of skin is intact    Musculoskeletal: LLE limited ROM, elevated    LABS:  CBC Full  -  ( 21 Nov 2021 21:57 )  WBC Count : 17.20 K/uL  RBC Count : 2.89 M/uL  Hemoglobin : 7.8 g/dL  Hematocrit : 23.5 %  Platelet Count - Automated : 273 K/uL  Mean Cell Volume : 81.3 fl  Mean Cell Hemoglobin : 27.0 pg  Mean Cell Hemoglobin Concentration : 33.2 gm/dL  Auto Neutrophil # : x  Auto Lymphocyte # : x  Auto Monocyte # : x  Auto Eosinophil # : x  Auto Basophil # : x  Auto Neutrophil % : x  Auto Lymphocyte % : x  Auto Monocyte % : x  Auto Eosinophil % : x  Auto Basophil % : x    11-21    146<H>  |  113<H>  |  50.1<H>  ----------------------------<  149<H>  4.0   |  22.0  |  0.73    Ca    7.8<L>      21 Nov 2021 21:57  Phos  3.6     11-21  Mg     1.9     11-21      PT/INR - ( 20 Nov 2021 16:46 )   PT: 16.3 sec;   INR: 1.43 ratio         PTT - ( 20 Nov 2021 10:32 )  PTT:35.4 sec    RECENT CULTURES:  11-18 .Body Fluid OR - Left Lower Extremity Fluid XXXX   No polymorphonuclear leukocytes seen  No organisms seen  by cytocentrifuge   Rare Streptococcus pyogenes (Group A)  Penicillin and ampicillin are drugs of choice for treatment of  beta-hemolytic streptococcal infections.  Susceptibility testing is not performed routinely because S. pyogenes  (GAS) is universally susceptibleto penicillin  and resistance in other strains is extremely rare.    11-18 Catheterized Catheterized XXXX XXXX   No growth    11-18 Catheterized Catheterized XXXX XXXX   <10,000 CFU/mL Normal Urogenital Aimee    11-17 .Blood Blood Blood Culture PCR  Streptococcus pyogenes (Group A)   Growth in aerobic and anaerobic bottles: Gram Positive Cocci in Pairs and  Chains  ***Blood Panel PCR results on this specimen are available  approximately 3 hours after the Gram stain result.***  Gram stain, PCR, and/or culture results may not always  correspond due to difference in methodologies.  ************************************************************  This PCR assay was performed using [employer].  The following targets are tested for: Enterococcus,  vancomycin resistant enterococci, Listeria monocytogenes,  coagulase negative staphylococci, S. aureus,  methicillin resistant S. aureus, Streptococcus agalactiae  (Group B), S. pneumoniae, S. pyogenes (Group A),  Acinetobacter baumannii, Enterobacter cloacae, E. coli,  Klebsiella oxytoca, K. pneumoniae, Proteus sp.,  Serratia marcescens, Haemophilus influenzae,  Neisseria meningitidis, Pseudomonas aeruginosa, Candida  albicans, C. glabrata, C krusei, C parapsilosis,  C. tropicalis and the KPC resistance gene.  Gram Stain and BCID performed by:  [facility]  [address] [place] [id number]  .  TYPE: (C=Critical, N=Notification, A=Abnormal) C  TESTS:  _ GS  DATE/TIME CALLED: _ 11/18/2021 09:40:08  CALLED TO: Chris Hernandez RN  READ BACK (2 Patient Identifiers)(Y/N): _ Y  READ BACK VALUES (Y/N): _ Y  CALLED BY: Chris Quiñones   Growth in aerobic and anaerobic bottles: Streptococcus pyogenes (Group A)      ASSESSMENT/PLAN:  51y Female present to ED with left leg swelling, erythema, pain, leukocytosis and acute renal failure. CT scan concerning for necrotizing soft tissue infection. 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      Neuro:   - pain control with oxycodone for moderate to severe pain, tylenol for mild pain  - delirium precautions  - regulate sleep wake cycle    CV:   - No pressor needs, normal HR and normotensive   - continue to trend H/H, no active s/sx of bleeding currently    Pulm:   - breathing comfortably on room air  - encourage coughing and deep breathing  - IS 10x hour while awake    GI/Nutrition:   - Continue to monitor for melena  - protonix BID  - follow up Gastrin level   - advance diet     /Renal:   - Valle for strict I&O  - IVF for ARF  - Renal function continues to improve  - d/c valle today    ID:   - Strep Pyogenes bacteremia  - Knee cx positive for strep pyogenes.   - ID recs appreciated, ceftriaxone w/ clindamycin for added toxic effect  - trend leukocytosis  - per ID pt will need PICC line for long term access  - send BCX today 11/22 for surveillance    Endo:   - FS q6  - tight glycemic control to maintain blood glucose < 180  - taper steroids    Skin:   - Repositioning for DTI prevention while in bed  - LLE dressing changes daily  - Wound VAC to superior portion of medial thigh incisions placed 11/19. Next due 11/23    Heme/DVT Prophylaxis:   - SCDs  - hold chemical AC given GI bleed  - continue to trend h/h q6    Lines/Tubes:   - R IJ TLC, L radial A line, valle  - D/C TLC, A line and valle today    Dispo:   - SICU for critical care needs.   - possible downgrade this afternoon

## 2021-11-22 NOTE — CHART NOTE - NSCHARTNOTEFT_GEN_A_CORE
Dressing to LLE changed, patient premedicated with fentanyl, packing removed, stained with serous drainage, Medial aspect with small fibrinous exudate on the posterior aspect of the wound, Lateral very clean with the exception of surgicel in the caudal aspect. wounds redressed and patient tolerated procedure well. Will order santyl for medial aspect of the wound to be applied at next dressing change. Dressing to LLE changed at 1600, patient premedicated with fentanyl, packing removed, stained with serous drainage, Medial aspect with small fibrinous exudate on the posterior aspect of the wound, Lateral very clean with the exception of surgicel in the caudal aspect. wounds redressed and patient tolerated procedure well. Will order santyl for medial aspect of the wound to be applied at next dressing change.

## 2021-11-22 NOTE — CHART NOTE - NSCHARTNOTEFT_GEN_A_CORE
SICU TRANSFER NOTE  -----------------------------  ICU Admission Date: XXXXX  Transfer Date: 11-22-21 @ 15:29    Admission Diagnosis: XXXXXXX    Active Problems/injuries: XXXXXX    Procedures: XXXXX INCLUDE DATES    Consultants:  [ ] Cardiology  [ ] Endocrine  [ ] Infectious Disease  [ ] Medicine  [ ]Neurosurgery  [ ] Ortho       [ ] Weight Bearing Status:  [ ] Palliative       [ ] Advanced Directives:    [ ] Physical Medicine and Rehab       [ ] Disposition :   [ ] Plastics  [ ] Pulmonary    Medications  acetaminophen     Tablet .. 975 milliGRAM(s) Oral every 6 hours  cefTRIAXone   IVPB      cefTRIAXone   IVPB 2000 milliGRAM(s) IV Intermittent every 24 hours  chlorhexidine 2% Cloths 1 Application(s) Topical <User Schedule>  clindamycin IVPB 900 milliGRAM(s) IV Intermittent every 8 hours  fentaNYL    Injectable 75 MICROGram(s) IV Push every 8 hours PRN  gabapentin 200 milliGRAM(s) Oral three times a day  glucagon  Injectable 1 milliGRAM(s) IntraMuscular once  insulin lispro (ADMELOG) corrective regimen sliding scale   SubCutaneous Before meals and at bedtime  lisinopril 5 milliGRAM(s) Oral daily  ondansetron Injectable 4 milliGRAM(s) IV Push every 4 hours PRN  oxyCODONE    IR 5 milliGRAM(s) Oral every 4 hours PRN  oxyCODONE    IR 10 milliGRAM(s) Oral every 4 hours PRN  pantoprazole  Injectable 40 milliGRAM(s) IV Push two times a day  polyethylene glycol 3350 17 Gram(s) Oral daily  senna 2 Tablet(s) Oral at bedtime      [ ] I attest I have reviewed and reconciled all medications prior to transfer    IV Fluids  lactated ringers Bolus:   1700 milliLiter(s), IV Bolus, once, infuse over 60 Minute(s), Stop After 1 Doses  Provider's Contact #: [phone]     (Calc Info: 19.1226 milliLiter(s)/Kg/DOSE x 88.9 Kg = 1,700 milliLiter(s)/Dose     (Requested dose was 30 milliLiter(s) per Kg), Professional Judgment  sodium chloride 0.9% Bolus:   1000 milliLiter(s), IV Bolus, once, infuse over 60 Minute(s), Stop After 1 Doses  Provider's Contact #: [phone]    Indication: XXXXXX    Antibiotics:  cefTRIAXone   IVPB      cefTRIAXone   IVPB 2000 milliGRAM(s) IV Intermittent every 24 hours  clindamycin IVPB 900 milliGRAM(s) IV Intermittent every 8 hours    Indication: XXXXXXX End Date:XXXXXXX      I have discussed this case with xxxxxENTER [email or web address] upon transfer and all questions regarding ICU course were answered.  The following items are to be followed up: SICU TRANSFER NOTE  -----------------------------  ICU Admission Date: 11/17  Transfer Date: 11-22-21 @ 15:29    Admission Diagnosis: Septic shock due to Necrotizing soft tissue infection    Active Problems/injuries: Necrotizing soft tissue infection    Procedures: 11/17 wide excisional debridement, and MASON L ankle    Consultants:  [ ] Cardiology  [ ] Endocrine  [ x ] Infectious Disease  [ ] Medicine  [ ]Neurosurgery  [ x ] Ortho       [ x ] Weight Bearing Status: LLE WBAT with cam boot  [ ] Palliative       [ ] Advanced Directives:    [ ] Physical Medicine and Rehab       [ ] Disposition :   [ ] Plastics  [ ] Pulmonary    Medications  acetaminophen     Tablet .. 975 milliGRAM(s) Oral every 6 hours  cefTRIAXone   IVPB      cefTRIAXone   IVPB 2000 milliGRAM(s) IV Intermittent every 24 hours  chlorhexidine 2% Cloths 1 Application(s) Topical <User Schedule>  clindamycin IVPB 900 milliGRAM(s) IV Intermittent every 8 hours  fentaNYL    Injectable 75 MICROGram(s) IV Push every 8 hours PRN  gabapentin 200 milliGRAM(s) Oral three times a day  glucagon  Injectable 1 milliGRAM(s) IntraMuscular once  insulin lispro (ADMELOG) corrective regimen sliding scale   SubCutaneous Before meals and at bedtime  lisinopril 5 milliGRAM(s) Oral daily  ondansetron Injectable 4 milliGRAM(s) IV Push every 4 hours PRN  oxyCODONE    IR 5 milliGRAM(s) Oral every 4 hours PRN  oxyCODONE    IR 10 milliGRAM(s) Oral every 4 hours PRN  pantoprazole  Injectable 40 milliGRAM(s) IV Push two times a day  polyethylene glycol 3350 17 Gram(s) Oral daily  senna 2 Tablet(s) Oral at bedtime      [ x ] I attest I have reviewed and reconciled all medications prior to transfer    IV Fluids      Antibiotics:  cefTRIAXone   IVPB      cefTRIAXone   IVPB 2000 milliGRAM(s) IV Intermittent every 24 hours  clindamycin IVPB 900 milliGRAM(s) IV Intermittent every 8 hours    Indication: NSTI End Date: TBD, ID following      I have discussed this case with Dr. Flores upon transfer and all questions regarding ICU course were answered.  The following items are to be followed up:    -Will need PICC for prolonged IV ABX course per ID, most resent blood cx neg   -will need BID dressing changes to LLE  -VAC to thigh to changed 11/23  -no further melena noted from Dieulafoy lesion SICU TRANSFER NOTE  -----------------------------  ICU Admission Date: 11/17  Transfer Date: 11-22-21 @ 15:29    Admission Diagnosis: Septic shock due to Necrotizing soft tissue infection    Active Problems/injuries: Necrotizing soft tissue infection, upper GI bleed, Dieulafoy lesion    Procedures: 11/17 wide excisional debridement, and MASON L ankle, 11/20 EGD    Consultants:  [ ] Cardiology  [ ] Endocrine  [ x ] Infectious Disease  [ ] Medicine  [ ]Neurosurgery  [ x ] Ortho       [ x ] Weight Bearing Status: LLE WBAT with cam boot  [ ] Palliative       [ ] Advanced Directives:    [ ] Physical Medicine and Rehab       [ ] Disposition :   [ ] Plastics  [ ] Pulmonary    Medications  acetaminophen     Tablet .. 975 milliGRAM(s) Oral every 6 hours  cefTRIAXone   IVPB      cefTRIAXone   IVPB 2000 milliGRAM(s) IV Intermittent every 24 hours  chlorhexidine 2% Cloths 1 Application(s) Topical <User Schedule>  clindamycin IVPB 900 milliGRAM(s) IV Intermittent every 8 hours  fentaNYL    Injectable 75 MICROGram(s) IV Push every 8 hours PRN  gabapentin 200 milliGRAM(s) Oral three times a day  glucagon  Injectable 1 milliGRAM(s) IntraMuscular once  insulin lispro (ADMELOG) corrective regimen sliding scale   SubCutaneous Before meals and at bedtime  lisinopril 5 milliGRAM(s) Oral daily  ondansetron Injectable 4 milliGRAM(s) IV Push every 4 hours PRN  oxyCODONE    IR 5 milliGRAM(s) Oral every 4 hours PRN  oxyCODONE    IR 10 milliGRAM(s) Oral every 4 hours PRN  pantoprazole  Injectable 40 milliGRAM(s) IV Push two times a day  polyethylene glycol 3350 17 Gram(s) Oral daily  senna 2 Tablet(s) Oral at bedtime      [ x ] I attest I have reviewed and reconciled all medications prior to transfer    IV Fluids      Antibiotics:  cefTRIAXone   IVPB      cefTRIAXone   IVPB 2000 milliGRAM(s) IV Intermittent every 24 hours  clindamycin IVPB 900 milliGRAM(s) IV Intermittent every 8 hours    Indication: NSTI End Date: TBD, ID following      I have discussed this case with Dr. Flores upon transfer and all questions regarding ICU course were answered.  The following items are to be followed up:    -Will need PICC for prolonged IV ABX course per ID, most resent blood cx neg   -will need BID dressing changes to LLE  -VAC to thigh to changed 11/23  -no further melena noted from Dieulafoy lesion

## 2021-11-22 NOTE — CHART NOTE - NSCHARTNOTEFT_GEN_A_CORE
Patient is a 50 y/o F s/p ascending soft tissue infection to LLE, with septic shock now resolving downgraded from the SICU today with no acute events. Patient examined at bedside, awake and alert complaining of right lower leg pain. On exam extremity has generalized swelling, some mild tenderness to the calf on palpation but patient says it feels good when I move her leg and bend her knee, speaking with the patient it seems she is stiff and mildly  deconditioned and she agreed. A right lower extremity duplex is ordered to r/o DVT and patient is aware. Otherwise at this time no other complaints, we spoke about trying not to get to discouraged as the healing process has good days and bad days and she was thankful for our bedside conversation. Patient remains on abx for group A bacteremia, hemoglobin stable for now, tolerating a dash diet and pain controlled on current regimen. Patient needs continued encouragement and PT/OT for dispo

## 2021-11-22 NOTE — PROGRESS NOTE ADULT - SUBJECTIVE AND OBJECTIVE BOX
Patient is a 51y old  Female who presents with a chief complaint of necrotizing soft tissue infection (22 Nov 2021 04:55)      HPI:  HPI: 51y Female- The patient has no nausea vomiting abdominal pain.  She had one small brown bowel movement today.  No heartburn.  She is started solid diet and is tolerating.  Hemoglobin stable.        REVIEW OF SYSTEMS:  Constitutional: No fever, weight loss or fatigue  ENMT:  No difficulty hearing, tinnitus, vertigo; No sinus or throat pain  Respiratory: No cough, wheezing, chills or hemoptysis  Cardiovascular: No chest pain, palpitations, dizziness or leg swelling  Gastrointestinal: No abdominal or epigastric pain. No nausea, vomiting or hematemesis; No diarrhea or constipation. No melena or hematochezia.  Skin: No itching, burning, rashes or lesions   Musculoskeletal: No joint pain or swelling; No muscle, back or extremity pain    PAST MEDICAL & SURGICAL HISTORY:  No pertinent past medical history    History of ankle surgery        FAMILY HISTORY:  FH: HTN (hypertension) (Mother)        SOCIAL HISTORY:  Smoking Status: [ ] Current, [ ] Former, [ ] Never  Pack Years:  [  ] EtOH-no  [  ] IVDA    MEDICATIONS:  MEDICATIONS  (STANDING):  acetaminophen     Tablet .. 975 milliGRAM(s) Oral every 6 hours  cefTRIAXone   IVPB      cefTRIAXone   IVPB 2000 milliGRAM(s) IV Intermittent every 24 hours  chlorhexidine 2% Cloths 1 Application(s) Topical <User Schedule>  clindamycin IVPB 900 milliGRAM(s) IV Intermittent every 8 hours  gabapentin 200 milliGRAM(s) Oral three times a day  glucagon  Injectable 1 milliGRAM(s) IntraMuscular once  insulin lispro (ADMELOG) corrective regimen sliding scale   SubCutaneous Before meals and at bedtime  lisinopril 5 milliGRAM(s) Oral daily  pantoprazole  Injectable 40 milliGRAM(s) IV Push two times a day  polyethylene glycol 3350 17 Gram(s) Oral daily  senna 2 Tablet(s) Oral at bedtime    MEDICATIONS  (PRN):  fentaNYL    Injectable 75 MICROGram(s) IV Push every 8 hours PRN Dressing changes  ondansetron Injectable 4 milliGRAM(s) IV Push every 4 hours PRN Nausea and/or Vomiting  oxyCODONE    IR 10 milliGRAM(s) Oral every 4 hours PRN Severe Pain (7 - 10)  oxyCODONE    IR 5 milliGRAM(s) Oral every 4 hours PRN Moderate Pain (4 - 6)      Allergies    No Known Allergies    Intolerances        Vital Signs Last 24 Hrs  T(C): 37.1 (22 Nov 2021 16:10), Max: 37.1 (22 Nov 2021 16:10)  T(F): 98.8 (22 Nov 2021 16:10), Max: 98.8 (22 Nov 2021 16:10)  HR: 69 (22 Nov 2021 16:10) (60 - 76)  BP: 140/68 (22 Nov 2021 16:10) (137/82 - 141/71)  BP(mean): 98 (22 Nov 2021 14:14) (85 - 98)  RR: 18 (22 Nov 2021 16:10) (12 - 22)  SpO2: 99% (22 Nov 2021 16:10) (95% - 100%)    11-21 @ 07:01  -  11-22 @ 07:00  --------------------------------------------------------  IN: 2080 mL / OUT: 2805 mL / NET: -725 mL    11-22 @ 07:01 - 11-22 @ 16:51  --------------------------------------------------------  IN: 905 mL / OUT: 140 mL / NET: 765 mL          PHYSICAL EXAM:    General: Well developed; well nourished; in no acute distress  HEENT: MMM, conjunctiva and sclera clear  H- RRR  l_ cta   Gastrointestinal: Soft, non-tender non-distended; Normal bowel sounds; No rebound or guarding  Extremities: leg in surgical dressing   Neurological: Alert and oriented x3  Skin: Warm and dry. No obvious rash      LABS:                        7.6    15.49 )-----------( 260      ( 22 Nov 2021 04:43 )             23.1     22 Nov 2021 04:43    150    |  116    |  43.9   ----------------------------<  116    4.0     |  22.0   |  0.70     Ca    7.9        22 Nov 2021 04:43  Phos  3.4       22 Nov 2021 04:43  Mg     1.9       22 Nov 2021 04:43                RADIOLOGY & ADDITIONAL STUDIES:     < from: Xray Chest 1 View- PORTABLE-Urgent (Xray Chest 1 View- PORTABLE-Urgent .) (11.20.21 @ 10:08) >  ight central line in SVC region. The nasogastric tube courses below the left hemidiaphragm, tip off edge of film.  HEART:difficult to access in this projection  LUNGS: free of consolidation or effusion.  BONES: degenerative changes      < end of copied text >

## 2021-11-22 NOTE — PROGRESS NOTE ADULT - ASSESSMENT
51y Female present to ED with left leg swelling, erythema, pain. Patient endorses she had left knee pain for 1 week presented 3 days ago to ED  where she states was given an steroid shot, and ibuprofen. however pain and edema worsened. Patient states she wasnt feeling herself today reason why she came. Endorses chills initially with pain 3 days ago but none since, denies history of trauma, insect bites, recent interventions, or injections to the area, contact with ticks, history of diabetes.  PT AS ABOVE WITH LEFT LEG SWELLING PT WITH INCREASED WBC PLACED ON ABX FOR PRESUMED INFECTION  PT WITH CT SCAN WITH FASCIAL EDAM PT BROUGHT TO THE OR EMERGENTLY AND  UNDERWENT FASCIOTOMY   PT BROUGHT TO THE OR  BOTH TRAUMA AND ORTHO INVOLVED  PURULENT FLUID FOUND  NECROTIZING SOFT TISSUE INFECTION    BLOOD CX WITH GROUP A STREP AND  OPERATIVE FLUID WITH STREP  PT WAS ON ZOSYN /VANCO/ CLINDA  WILL CHANGE TO ROCEPHIN FOR GROUP A STREP  CONTINUE  CLINDAMYCIN FOR  POSSIBLE ANTITOXIN EFFECT IN GROUP A STREP INFECTION    BLOOD  CX  REPEATED   ULTIMATELY PT WILL REQUIRE LONG TERM IV ABX   PT WITH ANEMIA  WORK UP AS PER SURGERY AND GI  WILL FOLLOW UP

## 2021-11-22 NOTE — PROGRESS NOTE ADULT - SUBJECTIVE AND OBJECTIVE BOX
Pt Name: LUIS FERNANDO DAVIS    MRN: [id number]    Patient is a 51 y F s/p I&D of left native knee and removal of hardware of left ankle POD#4. Patient seen and examined in bed. Pain is controlled with current regimen. Denies fever/chills. Denies numbness or tingling. No acute orthopedic complaints at this time.    PAST MEDICAL & SURGICAL HISTORY:  PAST MEDICAL & SURGICAL HISTORY:  No pertinent past medical history    History of ankle surgery    Allergies: No Known Allergies    Medications: acetaminophen     Tablet .. 975 milliGRAM(s) Oral every 6 hours  cefTRIAXone   IVPB      cefTRIAXone   IVPB 2000 milliGRAM(s) IV Intermittent every 24 hours  chlorhexidine 2% Cloths 1 Application(s) Topical <User Schedule>  clindamycin IVPB 900 milliGRAM(s) IV Intermittent every 8 hours  fentaNYL    Injectable 75 MICROGram(s) IV Push every 8 hours PRN  gabapentin 200 milliGRAM(s) Oral three times a day  glucagon  Injectable 1 milliGRAM(s) IntraMuscular once  hydrocortisone sodium succinate Injectable 50 milliGRAM(s) IV Push daily  insulin lispro (ADMELOG) corrective regimen sliding scale   SubCutaneous Before meals and at bedtime  lisinopril 5 milliGRAM(s) Oral daily  multiple electrolytes Injection Type 1 1000 milliLiter(s) IV Continuous <Continuous>  ondansetron Injectable 4 milliGRAM(s) IV Push every 4 hours PRN  oxyCODONE    IR 5 milliGRAM(s) Oral every 4 hours PRN  oxyCODONE    IR 10 milliGRAM(s) Oral every 4 hours PRN  pantoprazole  Injectable 40 milliGRAM(s) IV Push two times a day                          7.6    15.49 )-----------( 260      ( 2021 04:43 )             23.1     11-22    150<H>  |  116<H>  |  43.9<H>  ----------------------------<  116<H>  4.0   |  22.0  |  0.70    Ca    7.9<L>      2021 04:43  Phos  3.4     -  Mg     1.9     -        PHYSICAL EXAM:    Vital Signs Last 24 Hrs  T(C): 36.8 (2021 03:48), Max: 37 (2021 20:00)  T(F): 98.2 (2021 03:48), Max: 98.6 (2021 20:00)  HR: 67 (2021 07:00) (60 - 73)  BP: --  BP(mean): --  RR: 13 (2021 07:00) (12 - 21)  SpO2: 100% (2021 07:00) (95% - 100%)  Daily     Daily Weight in k.2 (2021 01:38)    General: Alert, responsive, in no acute distress.  LLE: Knee dressing in place, c/d/i-no drainage or discharge noted, no erythema to surrounding skin.  Knee non tender to palpation. ACE wrap to LLE c/d/i. 4/5 plantar flexion. 4/5 FHL intact. 1/5 EHL, 1/5 dorsi flexion. calf is supple, tender to touch due to incisions. Compartments soft and compressible.     A/P:  Pt is a 51y Female POD #5 s/p LLE I&D and MASON for soft tissue infection.      PLAN:   * Pain control  * WBAT LLE in CAM boot  * DVTp   * Physical therapy  * ID following  * ABx as per ID  * Ortho to follow

## 2021-11-23 LAB
ANION GAP SERPL CALC-SCNC: 9 MMOL/L — SIGNIFICANT CHANGE UP (ref 5–17)
B PERT IGG+IGM PNL SER: ABNORMAL
BASOPHILS # BLD AUTO: 0.05 K/UL — SIGNIFICANT CHANGE UP (ref 0–0.2)
BASOPHILS NFR BLD AUTO: 0.3 % — SIGNIFICANT CHANGE UP (ref 0–2)
BUN SERPL-MCNC: 24.2 MG/DL — HIGH (ref 8–20)
CALCIUM SERPL-MCNC: 7.6 MG/DL — LOW (ref 8.6–10.2)
CHLORIDE SERPL-SCNC: 111 MMOL/L — HIGH (ref 98–107)
CO2 SERPL-SCNC: 24 MMOL/L — SIGNIFICANT CHANGE UP (ref 22–29)
COLOR FLD: ABNORMAL
CREAT SERPL-MCNC: 0.63 MG/DL — SIGNIFICANT CHANGE UP (ref 0.5–1.3)
CRP SERPL-MCNC: 53 MG/L — HIGH
CULTURE RESULTS: SIGNIFICANT CHANGE UP
EOSINOPHIL # BLD AUTO: 0.1 K/UL — SIGNIFICANT CHANGE UP (ref 0–0.5)
EOSINOPHIL # FLD: 3 % — SIGNIFICANT CHANGE UP
EOSINOPHIL NFR BLD AUTO: 0.5 % — SIGNIFICANT CHANGE UP (ref 0–6)
FLUID INTAKE SUBSTANCE CLASS: SIGNIFICANT CHANGE UP
FLUID SEGMENTED GRANULOCYTES: 80 % — SIGNIFICANT CHANGE UP
GASTRIN SERPL-MCNC: 35 PG/ML — SIGNIFICANT CHANGE UP (ref 0–115)
GLUCOSE BLDC GLUCOMTR-MCNC: 111 MG/DL — HIGH (ref 70–99)
GLUCOSE BLDC GLUCOMTR-MCNC: 152 MG/DL — HIGH (ref 70–99)
GLUCOSE BLDC GLUCOMTR-MCNC: 87 MG/DL — SIGNIFICANT CHANGE UP (ref 70–99)
GLUCOSE BLDC GLUCOMTR-MCNC: 89 MG/DL — SIGNIFICANT CHANGE UP (ref 70–99)
GLUCOSE SERPL-MCNC: 85 MG/DL — SIGNIFICANT CHANGE UP (ref 70–99)
GRAM STN FLD: SIGNIFICANT CHANGE UP
HCT VFR BLD CALC: 28.8 % — LOW (ref 34.5–45)
HGB BLD-MCNC: 9.2 G/DL — LOW (ref 11.5–15.5)
IMM GRANULOCYTES NFR BLD AUTO: 1.3 % — SIGNIFICANT CHANGE UP (ref 0–1.5)
LYMPHOCYTES # BLD AUTO: 10.7 % — LOW (ref 13–44)
LYMPHOCYTES # BLD AUTO: 2.08 K/UL — SIGNIFICANT CHANGE UP (ref 1–3.3)
LYMPHOCYTES # FLD: 6 % — SIGNIFICANT CHANGE UP
MAGNESIUM SERPL-MCNC: 1.7 MG/DL — SIGNIFICANT CHANGE UP (ref 1.6–2.6)
MCHC RBC-ENTMCNC: 27.5 PG — SIGNIFICANT CHANGE UP (ref 27–34)
MCHC RBC-ENTMCNC: 31.9 GM/DL — LOW (ref 32–36)
MCV RBC AUTO: 86.2 FL — SIGNIFICANT CHANGE UP (ref 80–100)
MONOCYTES # BLD AUTO: 0.67 K/UL — SIGNIFICANT CHANGE UP (ref 0–0.9)
MONOCYTES NFR BLD AUTO: 3.4 % — SIGNIFICANT CHANGE UP (ref 2–14)
MONOS+MACROS # FLD: 11 % — SIGNIFICANT CHANGE UP
NEUTROPHILS # BLD AUTO: 16.37 K/UL — HIGH (ref 1.8–7.4)
NEUTROPHILS NFR BLD AUTO: 83.8 % — HIGH (ref 43–77)
PHOSPHATE SERPL-MCNC: 2.7 MG/DL — SIGNIFICANT CHANGE UP (ref 2.4–4.7)
PLATELET # BLD AUTO: 419 K/UL — HIGH (ref 150–400)
POTASSIUM SERPL-MCNC: 3.9 MMOL/L — SIGNIFICANT CHANGE UP (ref 3.5–5.3)
POTASSIUM SERPL-SCNC: 3.9 MMOL/L — SIGNIFICANT CHANGE UP (ref 3.5–5.3)
RBC # BLD: 3.34 M/UL — LOW (ref 3.8–5.2)
RBC # FLD: 22.7 % — HIGH (ref 10.3–14.5)
RCV VOL RI: 8000 /UL — HIGH (ref 0–0)
SARS-COV-2 RNA SPEC QL NAA+PROBE: SIGNIFICANT CHANGE UP
SODIUM SERPL-SCNC: 144 MMOL/L — SIGNIFICANT CHANGE UP (ref 135–145)
SPECIMEN SOURCE: SIGNIFICANT CHANGE UP
SYNOVIAL CRYSTALS CLARITY: ABNORMAL
SYNOVIAL CRYSTALS COLOR: SIGNIFICANT CHANGE UP
SYNOVIAL CRYSTALS ID: SIGNIFICANT CHANGE UP
SYNOVIAL CRYSTALS TUBE: SIGNIFICANT CHANGE UP
TOTAL NUCLEATED CELL COUNT, BODY FLUID: SIGNIFICANT CHANGE UP /UL
TUBE TYPE: SIGNIFICANT CHANGE UP
WBC # BLD: 19.53 K/UL — HIGH (ref 3.8–10.5)
WBC # FLD AUTO: 19.53 K/UL — HIGH (ref 3.8–10.5)

## 2021-11-23 PROCEDURE — 99233 SBSQ HOSP IP/OBS HIGH 50: CPT

## 2021-11-23 PROCEDURE — 93971 EXTREMITY STUDY: CPT | Mod: 26,RT

## 2021-11-23 PROCEDURE — 73562 X-RAY EXAM OF KNEE 3: CPT | Mod: 26,RT

## 2021-11-23 PROCEDURE — 73700 CT LOWER EXTREMITY W/O DYE: CPT | Mod: 26,RT

## 2021-11-23 PROCEDURE — 99232 SBSQ HOSP IP/OBS MODERATE 35: CPT | Mod: 24,57

## 2021-11-23 RX ORDER — HYDROMORPHONE HYDROCHLORIDE 2 MG/ML
0.5 INJECTION INTRAMUSCULAR; INTRAVENOUS; SUBCUTANEOUS ONCE
Refills: 0 | Status: DISCONTINUED | OUTPATIENT
Start: 2021-11-23 | End: 2021-11-23

## 2021-11-23 RX ORDER — SODIUM CHLORIDE 9 MG/ML
1000 INJECTION, SOLUTION INTRAVENOUS
Refills: 0 | Status: DISCONTINUED | OUTPATIENT
Start: 2021-11-24 | End: 2021-11-24

## 2021-11-23 RX ORDER — COLLAGENASE CLOSTRIDIUM HIST. 250 UNIT/G
1 OINTMENT (GRAM) TOPICAL
Refills: 0 | Status: DISCONTINUED | OUTPATIENT
Start: 2021-11-23 | End: 2021-11-24

## 2021-11-23 RX ORDER — HYDROMORPHONE HYDROCHLORIDE 2 MG/ML
0.5 INJECTION INTRAMUSCULAR; INTRAVENOUS; SUBCUTANEOUS EVERY 6 HOURS
Refills: 0 | Status: DISCONTINUED | OUTPATIENT
Start: 2021-11-23 | End: 2021-11-24

## 2021-11-23 RX ADMIN — Medication 975 MILLIGRAM(S): at 22:35

## 2021-11-23 RX ADMIN — Medication 975 MILLIGRAM(S): at 06:41

## 2021-11-23 RX ADMIN — Medication 975 MILLIGRAM(S): at 17:28

## 2021-11-23 RX ADMIN — HYDROMORPHONE HYDROCHLORIDE 0.5 MILLIGRAM(S): 2 INJECTION INTRAMUSCULAR; INTRAVENOUS; SUBCUTANEOUS at 18:33

## 2021-11-23 RX ADMIN — Medication 100 MILLIGRAM(S): at 09:21

## 2021-11-23 RX ADMIN — Medication 1 APPLICATION(S): at 17:25

## 2021-11-23 RX ADMIN — Medication 100 MILLIGRAM(S): at 22:34

## 2021-11-23 RX ADMIN — HYDROMORPHONE HYDROCHLORIDE 0.5 MILLIGRAM(S): 2 INJECTION INTRAMUSCULAR; INTRAVENOUS; SUBCUTANEOUS at 19:00

## 2021-11-23 RX ADMIN — SENNA PLUS 2 TABLET(S): 8.6 TABLET ORAL at 22:35

## 2021-11-23 RX ADMIN — Medication 975 MILLIGRAM(S): at 06:03

## 2021-11-23 RX ADMIN — Medication 975 MILLIGRAM(S): at 14:18

## 2021-11-23 RX ADMIN — GABAPENTIN 200 MILLIGRAM(S): 400 CAPSULE ORAL at 13:21

## 2021-11-23 RX ADMIN — OXYCODONE HYDROCHLORIDE 10 MILLIGRAM(S): 5 TABLET ORAL at 09:15

## 2021-11-23 RX ADMIN — OXYCODONE HYDROCHLORIDE 10 MILLIGRAM(S): 5 TABLET ORAL at 17:29

## 2021-11-23 RX ADMIN — OXYCODONE HYDROCHLORIDE 10 MILLIGRAM(S): 5 TABLET ORAL at 23:03

## 2021-11-23 RX ADMIN — OXYCODONE HYDROCHLORIDE 10 MILLIGRAM(S): 5 TABLET ORAL at 09:45

## 2021-11-23 RX ADMIN — PANTOPRAZOLE SODIUM 40 MILLIGRAM(S): 20 TABLET, DELAYED RELEASE ORAL at 06:05

## 2021-11-23 RX ADMIN — PANTOPRAZOLE SODIUM 40 MILLIGRAM(S): 20 TABLET, DELAYED RELEASE ORAL at 17:24

## 2021-11-23 RX ADMIN — GABAPENTIN 200 MILLIGRAM(S): 400 CAPSULE ORAL at 22:35

## 2021-11-23 RX ADMIN — Medication 975 MILLIGRAM(S): at 18:40

## 2021-11-23 RX ADMIN — OXYCODONE HYDROCHLORIDE 10 MILLIGRAM(S): 5 TABLET ORAL at 02:27

## 2021-11-23 RX ADMIN — Medication 100 MILLIGRAM(S): at 17:35

## 2021-11-23 RX ADMIN — HYDROMORPHONE HYDROCHLORIDE 0.5 MILLIGRAM(S): 2 INJECTION INTRAMUSCULAR; INTRAVENOUS; SUBCUTANEOUS at 09:52

## 2021-11-23 RX ADMIN — HYDROMORPHONE HYDROCHLORIDE 0.5 MILLIGRAM(S): 2 INJECTION INTRAMUSCULAR; INTRAVENOUS; SUBCUTANEOUS at 14:02

## 2021-11-23 RX ADMIN — OXYCODONE HYDROCHLORIDE 10 MILLIGRAM(S): 5 TABLET ORAL at 16:29

## 2021-11-23 RX ADMIN — OXYCODONE HYDROCHLORIDE 10 MILLIGRAM(S): 5 TABLET ORAL at 22:35

## 2021-11-23 RX ADMIN — Medication 975 MILLIGRAM(S): at 13:18

## 2021-11-23 RX ADMIN — Medication 975 MILLIGRAM(S): at 23:02

## 2021-11-23 RX ADMIN — GABAPENTIN 200 MILLIGRAM(S): 400 CAPSULE ORAL at 06:03

## 2021-11-23 RX ADMIN — LISINOPRIL 5 MILLIGRAM(S): 2.5 TABLET ORAL at 06:03

## 2021-11-23 RX ADMIN — HYDROMORPHONE HYDROCHLORIDE 0.5 MILLIGRAM(S): 2 INJECTION INTRAMUSCULAR; INTRAVENOUS; SUBCUTANEOUS at 11:24

## 2021-11-23 RX ADMIN — CEFTRIAXONE 100 MILLIGRAM(S): 500 INJECTION, POWDER, FOR SOLUTION INTRAMUSCULAR; INTRAVENOUS at 12:47

## 2021-11-23 RX ADMIN — OXYCODONE HYDROCHLORIDE 10 MILLIGRAM(S): 5 TABLET ORAL at 01:28

## 2021-11-23 RX ADMIN — HYDROMORPHONE HYDROCHLORIDE 0.5 MILLIGRAM(S): 2 INJECTION INTRAMUSCULAR; INTRAVENOUS; SUBCUTANEOUS at 13:02

## 2021-11-23 RX ADMIN — Medication 4: at 22:46

## 2021-11-23 NOTE — PROGRESS NOTE ADULT - SUBJECTIVE AND OBJECTIVE BOX
Orthopedic PA Note   Cell count for right knee 26,000  No crystals   CT positive for SubQ edema,   Will order MRI with contrast of knee     Spoke with ACS will evaluate to right leg edema

## 2021-11-23 NOTE — PROGRESS NOTE ADULT - SUBJECTIVE AND OBJECTIVE BOX
Patient is a 51y old  Female who presents with a chief complaint of Left lower extremity necrotizing fascitis (23 Nov 2021 07:55)      INTERVAL HPI/OVERNIGHT EVENTS: Patient seen and evaluated at bedside, reporting no complaints, no overnight events, s/p EGD revealing normal esophagus, dieulafoy int the body that was injected with epinephrine at the base and 2 hemostat clips placed. 5 Ulcers int he antrum/prepyloric area, and a large deep ulcer on the right pylorus, and another on the left. Hemoglobin uptrend (S/p 8 units of PRBC's since admission). Denies nausea, vomiting, abdominal pain, chest pain, shortness of breath, hematemesis, hematochezia, melena.                                                            MEDICATIONS  (STANDING):  acetaminophen     Tablet .. 975 milliGRAM(s) Oral every 6 hours  cefTRIAXone   IVPB 2000 milliGRAM(s) IV Intermittent every 24 hours  cefTRIAXone   IVPB      clindamycin IVPB 900 milliGRAM(s) IV Intermittent every 8 hours  collagenase Ointment 1 Application(s) Topical two times a day  gabapentin 200 milliGRAM(s) Oral three times a   glucagon  Injectable 1 milliGRAM(s) IntraMuscular once  insulin lispro (ADMELOG) corrective regimen sliding scale   SubCutaneous Before meals and at bedtime  lisinopril 5 milliGRAM(s) Oral daily  pantoprazole  Injectable 40 milliGRAM(s) IV Push two times a day  polyethylene glycol 3350 17 Gram(s) Oral daily  senna 2 Tablet(s) Oral at bedtime    MEDICATIONS  (PRN):  fentaNYL    Injectable 75 MICROGram(s) IV Push every 8 hours PRN Dressing changes  ondansetron Injectable 4 milliGRAM(s) IV Push every 4 hours PRN Nausea and/or Vomiting  oxyCODONE    IR 5 milliGRAM(s) Oral every 4 hours PRN Moderate Pain (4 - 6)  oxyCODONE    IR 10 milliGRAM(s) Oral every 4 hours PRN Severe Pain (7 - 10)      Allergies    No Known Allergies    Intolerances    Review of Systems:  · ENMT: negative  · Respiratory and Thorax: negative  · Cardiovascular: negative  · Gastrointestinal: see above.  · Genitourinary:	negative  · Musculoskeletal: negative  · Neurological: negative  · Psychiatric: negative  · Hematology/Lymphatics: negative  · Endocrine: negative        Vital Signs Last 24 Hrs  T(C): 37.3 (23 Nov 2021 08:00), Max: 37.3 (23 Nov 2021 01:25)  T(F): 99.2 (23 Nov 2021 08:00), Max: 99.2 (23 Nov 2021 01:25)  HR: 84 (23 Nov 2021 09:45) (64 - 84)  BP: 136/70 (23 Nov 2021 09:45) (116/82 - 144/78)  BP(mean): 98 (22 Nov 2021 14:14) (91 - 98)  RR: 18 (23 Nov 2021 08:00) (17 - 18)  SpO2: 98% (23 Nov 2021 08:00) (93% - 100%)    PHYSICAL EXAM:  · Constitutional: Elderly looking woman, in no acute distress.   · Eyes: EOMI; PERRL; no drainage or redness  · ENMT: No oral lesions; no gross abnormalities  · Neck:	No bruits; no thyromegaly or nodules  · Back:	No deformity or limitation of movement  · Respiratory: Breath Sounds equal & clear to percussion & auscultation, no accessory muscle use  · Cardiovascular: Regular rate & rhythm, normal S1, S2; no murmurs, gallops or rubs; no S3, S4  · Gastrointestinal: Soft, non-tender, no hepatosplenomegaly, normal bowel sounds      LABS:                        9.2    19.53 )-----------( 419      ( 23 Nov 2021 07:42 )             28.8     11-23    144  |  111<H>  |  24.2<H>  ----------------------------<  85  3.9   |  24.0  |  0.63    Ca    7.6<L>      23 Nov 2021 07:42  Phos  2.7     11-23  Mg     1.7     11-23          LIVER FUNCTIONS - ( 19 Nov 2021 05:48 )  Alb: 2.5 g/dL / Pro: 6.1 g/dL / ALK PHOS: 75 U/L / ALT: 14 U/L / AST: 42 U/L / GGT: x             RADIOLOGY & ADDITIONAL TESTS:

## 2021-11-23 NOTE — PROGRESS NOTE ADULT - ASSESSMENT
51y Female present to ED with left leg swelling, erythema, pain, leukocytosis and acute renal failure. CT scan concerning for necrotizing soft tissue infection. 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  Downgraded to floor from SICU 11/22/21.  Patient had complained or RLE swelling.  Right lower extremity duplex and negative for DVT; however + swelling in popliteal fossa to R calf.      Plan  Daily dressing changes to LLE  IV ceftriaxone ABX for group A bacteremia  FU AM labs; hemoglobin stable for now  Dash diet  Pain controlled on current regimen  PT/OT for dispo     51y Female present to ED with left leg swelling, erythema, pain, leukocytosis and acute renal failure. CT scan concerning for necrotizing soft tissue infection. 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  Downgraded to floor from SICU 11/22/21.  Patient had complained or RLE swelling.  Right lower extremity duplex and negative for DVT; however + swelling in popliteal fossa to R calf.      Plan  Daily dressing changes to LLE  IV ceftriaxone ABX for group A bacteremia  FU AM labs; hemoglobin stable for now  Dash diet  Pain controlled on current regimen  Will need PICC line 11/23  PT/OT for dispo

## 2021-11-23 NOTE — PROGRESS NOTE ADULT - ATTENDING COMMENTS
pt seen and examined. crp down to 56 from >422. However, over last 24 hours pt /w increased pain and swelling in right knee. Now states that right knee hurts as much as left knee on presentation. Has severe pain /w movement and can only flex to 30 degrees. Tap results 26K wbc and no crystals, however, patient on abx. I had a thorough discussion /w the patient about the risks and benefits of surgical intervention. While the tap results are not c/w infection, they could be skewed by abx. I have recommended an MRI of her R knee for further evaluation. Will plan for knee I&D in am if pain continues to increase despite arthrocentesis and abx use. Pt in agreement that knee may not be infected however with the developing clinical picture she may have a new infection in her right knee. All questions asked and answered. F/u tap cx and gram stain results. Will plan for OR in am pending MRI results. pt seen and examined. crp down to 56 from >422. However, over last 24 hours pt /w increased pain and swelling in right knee. Now states that right knee hurts as much as left knee on presentation. Has severe pain /w movement and can only flex to 30 degrees. Tap results 26K wbc and no crystals, however, patient on abx. I had a thorough discussion /w the patient about the risks and benefits of surgical intervention. While the tap results are not c/w infection, they could be skewed by abx. I have recommended an MRI of her R knee for further evaluation. Will plan for knee I&D in am if pain continues to increase despite arthrocentesis and abx use. Pt in agreement that knee may not be infected however with the developing clinical picture she may have a new infection in her right knee. All questions asked and answered. F/u tap cx and gram stain results. Will plan for OR in am pending MRI results.    11/24    Pt seen and examined. +micromotion tenderness of R knee, + edema, pain increasing from exam yesterday. neg gram stain but pt on abx. MRI reviewed, no discreet abscess identified, large joint effusion. D/w pt there is significant subq edema on MRI. May need possible surgical intervention in the future for developing infection in RLE, however, due to clinical diagnosis of evolving R knee septic arthrtis, will proceed /w urgent I&D of R knee at his time. Pt in agreement and wishes to proceed /w surgery. Risks/benefits discussed

## 2021-11-23 NOTE — PROCEDURE NOTE - NSICDXPROCEDURE_GEN_ALL_CORE_FT
PROCEDURES:  Wound VAC dressing change for area 50 sq cm or less 19-Nov-2021 14:16:05  Zion Hillman  
PROCEDURES:  Arthrocentesis of right knee 23-Nov-2021 09:30:48  Mj Jeff

## 2021-11-23 NOTE — PROGRESS NOTE ADULT - SUBJECTIVE AND OBJECTIVE BOX
Trauma Surgery Progress Note:    Overnight patient complained of swelling to RLE, but RLE US was negative for DVT.  Pain well controlled to the LLE wound sites.    VSS, afebrile.  Tolerating diet.  Denies n/v/f/c/sob/cp.      Diet, Regular:   Consistent Carbohydrate Evening Snack (CSTCHOSN)  Supplement Feeding Modality:  Oral  Glucerna Shake Cans or Servings Per Day:  2       Frequency:  Two Times a day (11-22-21 @ 09:31)      Scheduled Medications:   acetaminophen     Tablet .. 975 milliGRAM(s) Oral every 6 hours  cefTRIAXone   IVPB      cefTRIAXone   IVPB 2000 milliGRAM(s) IV Intermittent every 24 hours  clindamycin IVPB 900 milliGRAM(s) IV Intermittent every 8 hours  gabapentin 200 milliGRAM(s) Oral three times a day  glucagon  Injectable 1 milliGRAM(s) IntraMuscular once  insulin lispro (ADMELOG) corrective regimen sliding scale   SubCutaneous Before meals and at bedtime  lisinopril 5 milliGRAM(s) Oral daily  pantoprazole  Injectable 40 milliGRAM(s) IV Push two times a day  polyethylene glycol 3350 17 Gram(s) Oral daily  senna 2 Tablet(s) Oral at bedtime    PRN Medications:  fentaNYL    Injectable 75 MICROGram(s) IV Push every 8 hours PRN Dressing changes  ondansetron Injectable 4 milliGRAM(s) IV Push every 4 hours PRN Nausea and/or Vomiting  oxyCODONE    IR 10 milliGRAM(s) Oral every 4 hours PRN Severe Pain (7 - 10)  oxyCODONE    IR 5 milliGRAM(s) Oral every 4 hours PRN Moderate Pain (4 - 6)      Objective:   T(F): 99.2 (11-23 @ 01:25), Max: 99.2 (11-23 @ 01:25)  HR: 76 (11-23 @ 01:25) (60 - 76)  BP: 144/78 (11-23 @ 01:25) (128/70 - 144/78)  BP(mean): 98 (11-22 @ 14:14) (85 - 98)  ABP: 165/65 (11-22 @ 10:00) (161/67 - 185/70)  ABP(mean): 94 (11-22 @ 10:00) (87 - 104)  RR: 18 (11-23 @ 01:25) (12 - 22)  SpO2: 93% (11-23 @ 01:25) (93% - 100%)      Physical Exam:   GEN: patient resting comfortably in bed, in no acute distress  RESP: respirations are unlabored, no accessory muscle use, no conversational dyspnea  CVS: RRR  GI: Abdomen soft, non-tender, non-distended, no rebound tenderness / guarding  MSK: RLE with swelling in calf; active ROM in RLE; dressings to LLE c/d/i    I&O's    11-21 @ 07:01  -  11-22 @ 07:00  --------------------------------------------------------  IN:    IV PiggyBack: 50 mL    IV PiggyBack: 50 mL    IV PiggyBack: 100 mL    multiple electrolytes Injection Type 1.: 1725 mL    multiple electrolytes Injection Type 1.: 125 mL    Pantoprazole: 30 mL  Total IN: 2080 mL    OUT:    Indwelling Catheter - Urethral (mL): 2405 mL    VAC (Vacuum Assisted Closure) System (mL): 400 mL  Total OUT: 2805 mL    Total NET: -725 mL      11-22 @ 07:01  -  11-23 @ 01:48  --------------------------------------------------------  IN:    IV PiggyBack: 50 mL    multiple electrolytes Injection Type 1.: 375 mL    Oral Fluid: 480 mL  Total IN: 905 mL    OUT:    Indwelling Catheter - Urethral (mL): 140 mL    VAC (Vacuum Assisted Closure) System (mL): 250 mL  Total OUT: 390 mL    Total NET: 515 mL          LABS:                        7.6    15.49 )-----------( 260      ( 22 Nov 2021 04:43 )             23.1     11-22    150<H>  |  116<H>  |  43.9<H>  ----------------------------<  116<H>  4.0   |  22.0  |  0.70    Ca    7.9<L>      22 Nov 2021 04:43  Phos  3.4     11-22  Mg     1.9     11-22            MICROBIOLOGY:     Culture - Blood (collected 11-20 @ 12:57)  Source: .Blood Blood  Preliminary Report (11-22 @ 13:00):    No growth at 48 hours    Culture - Body Fluid with Gram Stain (collected 11-18 @ 16:20)  Source: .Body Fluid OR Posterior Calf Left Lower Extremity Fluid  Gram Stain (11-18 @ 21:39):    polymorphonuclear leukocytes seen    Gram Positive Cocci in Pairs and Chains seen    by cytocentrifuge  Preliminary Report (11-19 @ 14:01):    Moderate Streptococcus pyogenes (Group A) Penicillin and ampicillin are    drugs of choice for    treatment of beta-hemolytic streptococcal infections.    Susceptibility testing is not performed routinely because    S. pyogenes (GAS) is universally susceptible to penicillin    and resistance in other strains is extremely rare.    Culture - Body Fluid with Gram Stain (collected 11-18 @ 16:20)  Source: .Body Fluid OR Swab Left Knee Lower Extremity Fluid  Gram Stain (11-18 @ 19:36):    polymorphonuclear leukocytes seen    Gram Positive Cocci in Pairs and Chains seen    by cytocentrifuge  Preliminary Report (11-19 @ 14:02):    Numerous Streptococcus pyogenes (Group A) Penicillin and ampicillin are    drugs of choice for    treatment of beta-hemolytic streptococcal infections.    Susceptibility testing is not performed routinely because    S. pyogenes (GAS) is universally susceptible to penicillin    and resistance in other strains is extremely rare.    Culture - Body Fluid with Gram Stain (collected 11-18 @ 16:20)  Source: .Body Fluid OR - Left Lower Extremity Fluid  Gram Stain (11-18 @ 20:10):    No polymorphonuclear leukocytes seen    No organisms seen    by cytocentrifuge  Preliminary Report (11-20 @ 11:41):    Rare Streptococcus pyogenes (Group A)    Penicillin and ampicillin are drugs of choice for treatment of    beta-hemolytic streptococcal infections.    Susceptibility testing is not performed routinely because S. pyogenes    (GAS) is universally susceptibleto penicillin    and resistance in other strains is extremely rare.    Culture - Urine (collected 11-18 @ 10:07)  Source: Catheterized Catheterized  Final Report (11-19 @ 07:03):    No growth    Culture - Urine (collected 11-18 @ 00:25)  Source: Catheterized Catheterized  Final Report (11-18 @ 22:09):    <10,000 CFU/mL Normal Urogenital Aimee    Culture - Blood (collected 11-17 @ 14:45)  Source: .Blood Blood  Gram Stain (11-18 @ 09:44):    Growth in aerobic and anaerobic bottles: Gram Positive Cocci in Pairs and    Chains    Gram Stain performed by:    [facility]    [address] [place] [id number]    .    TYPE: (C=Critical, N=Notification, A=Abnormal) C    TESTS:  _ GS    DATE/TIME CALLED: _ 11/18/2021 09:42:19    CALLED TO: Chris Hernandez RN    READ BACK (2 Patient Identifiers)(Y/N): _ Y    READ BACK VALUES (Y/N): _ Y    CALLED BY: Chris Quiñones  Final Report (11-21 @ 12:28):    Growth in aerobic and anaerobic bottles: Streptococcus pyogenes (Group A)    See previous culture [id number]    Culture - Blood (collected 11-17 @ 14:45)  Source: .Blood Blood  Gram Stain (11-18 @ 09:40):    Growth in aerobic and anaerobic bottles: Gram Positive Cocci in Pairs and    Chains    ***Blood Panel PCR results on this specimen are available    approximately 3 hours after the Gram stain result.***    Gram stain, PCR, and/or culture results may not always    correspond due to difference in methodologies.    ************************************************************    This PCR assay was performed using [employer].    The following targets are tested for: Enterococcus,    vancomycin resistant enterococci, Listeria monocytogenes,    coagulase negative staphylococci, S. aureus,    methicillin resistant S. aureus, Streptococcus agalactiae    (Group B), S. pneumoniae, S. pyogenes (Group A),    Acinetobacter baumannii, Enterobacter cloacae, E. coli,    Klebsiella oxytoca, K. pneumoniae, Proteus sp.,    Serratia marcescens, Haemophilus influenzae,    Neisseria meningitidis, Pseudomonas aeruginosa, Candida    albicans, C. glabrata, C krusei, C parapsilosis,    C. tropicalis and the KPC resistance gene.    Gram Stain and BCID performed by:    [facility]    [address] [place] [id number]    .    TYPE: (C=Critical, N=Notification, A=Abnormal) C    TESTS:  _ GS    DATE/TIME CALLED: _ 11/18/2021 09:40:08    CALLED TO: Chris Hernandez RN    READ BACK (2 Patient Identifiers)(Y/N): _ Y    READ BACK VALUES (Y/N): _ Y    CALLED BY: Chris Quiñones  Final Report (11-21 @ 12:28):    Growth in aerobic and anaerobic bottles: Streptococcus pyogenes (Group A)  Organism: Blood Culture PCR  Streptococcus pyogenes (Group A) (11-21 @ 12:28)  Organism: Streptococcus pyogenes (Group A) (11-21 @ 12:28)      -  Ceftriaxone: S 0.016      -  Penicillin: S 0.016 Predicts results for ampicillin, amoxicillin, amoxicillin/clavulanate, ampicillin/sulbactam, 1st, 2nd and 3rd generation cephalosporins and carbapenems.      Method Type: ETEST  Organism: Streptococcus pyogenes (Group A) (11-21 @ 12:28)      -  Vancomycin: S      Method Type: KB  Organism: Blood Culture PCR (11-21 @ 12:28)      -  Streptococcus pyogenes (Group A): Detec      Method Type: PCR        PATHOLOGY:

## 2021-11-23 NOTE — PROGRESS NOTE ADULT - SUBJECTIVE AND OBJECTIVE BOX
Pt Name: LUIS FERNANDO DAVIS    MRN: [id number]      Patient is a being followed for Left knee infection/ Necrotising fascitis  removal of hardware left ankle.     PAST MEDICAL & SURGICAL HISTORY:  PAST MEDICAL & SURGICAL HISTORY:  No pertinent past medical history    History of ankle surgery   EXAM:  US DPLX LWR EXT VEINS LTD RT                          PROCEDURE DATE:  11/23/2021          INTERPRETATION:  CLINICAL INFORMATION: Right lower extremity pain.    COMPARISON: None available.    TECHNIQUE: Duplex sonography of the RIGHT LOWER extremity veins with color and spectral Doppler, with and without compression.    FINDINGS:    There is normal compressibility of the right common femoral, femoral and popliteal veins.  The contralateral common femoral vein is patent.  Doppler examination shows normal spontaneous and phasic flow.    No calf vein thrombosis is detected.    IMPRESSION:  No evidence of right lower extremity deep venous thrombosis.          --- End of Report ---            Allergies: No Known Allergies      Medications: acetaminophen     Tablet .. 975 milliGRAM(s) Oral every 6 hours  cefTRIAXone   IVPB 2000 milliGRAM(s) IV Intermittent every 24 hours  cefTRIAXone   IVPB      clindamycin IVPB 900 milliGRAM(s) IV Intermittent every 8 hours  fentaNYL    Injectable 75 MICROGram(s) IV Push every 8 hours PRN  gabapentin 200 milliGRAM(s) Oral three times a day  glucagon  Injectable 1 milliGRAM(s) IntraMuscular once  insulin lispro (ADMELOG) corrective regimen sliding scale   SubCutaneous Before meals and at bedtime  lisinopril 5 milliGRAM(s) Oral daily  ondansetron Injectable 4 milliGRAM(s) IV Push every 4 hours PRN  oxyCODONE    IR 10 milliGRAM(s) Oral every 4 hours PRN  oxyCODONE    IR 5 milliGRAM(s) Oral every 4 hours PRN  pantoprazole  Injectable 40 milliGRAM(s) IV Push two times a day  polyethylene glycol 3350 17 Gram(s) Oral daily  senna 2 Tablet(s) Oral at bedtime        Ambulation: Walking independently [ ] With Cane [ ] With Walker [ ]  Bedbound [ ]                           7.6    15.49 )-----------( 260      ( 22 Nov 2021 04:43 )             23.1     11-22    150<H>  |  116<H>  |  43.9<H>  ----------------------------<  116<H>  4.0   |  22.0  |  0.70    Ca    7.9<L>      22 Nov 2021 04:43  Phos  3.4     11-22  Mg     1.9     11-22        PHYSICAL EXAM:    Vital Signs Last 24 Hrs  T(C): 37.3 (23 Nov 2021 04:42), Max: 37.3 (23 Nov 2021 01:25)  T(F): 99.2 (23 Nov 2021 04:42), Max: 99.2 (23 Nov 2021 01:25)  HR: 80 (23 Nov 2021 04:42) (63 - 80)  BP: 116/82 (23 Nov 2021 04:42) (116/82 - 144/78)  BP(mean): 98 (22 Nov 2021 14:14) (85 - 98)  RR: 18 (23 Nov 2021 04:42) (13 - 22)  SpO2: 93% (23 Nov 2021 01:25) (93% - 100%)  Daily     Daily     Appearance: Alert, responsive, in no acute distress.    Neurological: Sensation is grossly intact to light touch. EHL/FHL intact RLE, unable to fully dorsiflex great toe LLE in splint.  Compartments soft non tender.       Skin: no rash on visible skin. Skin is clean, dry and intact. No bleeding. No abrasions. No ulcerations.    Vascular: 2+ distal pulses. Cap refill < 2 sec. No signs of venous   insufficiency   or stasis. No extremity ulcerations. No cyanosis.    Musculoskeletal:        Left Lower Extremity: In posterior splint.  Decreased dorsiflexion LLE, LLE in splint.  Compartments soft non tender. Left knee dressings remain clean dry and intact.        Right Lower Extremity: Diffuse swelling noted to Right lower extremity.  Patient unable  to straight leg raise without assistance. Knee effusion noted.  ROM to right knee very limited by pain and swelling.  Unable to Passively range knee without discomfort. compartments soft non tender.       A/P:  Pt is a  51y Female with Left lower extremity necrotizing fascitis     PLAN:   * Will discuss with Dr Tobias regarding new physical findings regarding RIGHT lower extremity, right knee swelling  Pain control

## 2021-11-23 NOTE — PROGRESS NOTE ADULT - ATTENDING COMMENTS
52 yo F present to ED with left leg swelling, erythema, pain, and leukocytosis is s/p LLE exploration and debridement for LLE necrotizing soft tissue infection. 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  She is AAOx3, and complained of RLE swelling. Ortho tapped the knee and found murky fluid which had no organisms  PUL CTA  CV RRR  GI benign  MS LLE with open wounds and wound VAC. RLE duplex and negative for DVT; however + swelling in popliteal fossa to R calf.  Plan  1. Ortho recommend CT scan RLE and no surgical intervention  2. Obtain  ECHO  3. D/C Fentanyl and place on Oxy and Dilaudid  4. Diet as tolerated   5. Hold chem ppx  6. Pain controlled on current regimen  7. Place PICC line   8. PT/OT for dispo      Code 24895

## 2021-11-23 NOTE — PROGRESS NOTE ADULT - ATTENDING COMMENTS
Patient with GI bleeding status post endoscopic hemostasis.  Continue PPI therapy.  Patient is n.p.o. for surgical intervention.  CBC is stable.  No further GI recommendation except for continuing PPI.

## 2021-11-23 NOTE — PROGRESS NOTE ADULT - PROBLEM SELECTOR PLAN 1
Now s/p EGD revealing normal esophagus, dieulafoy int the body that was injected with epinephrine at the base and 2 hemostat clips placed. 5 Ulcers int he antrum/prepyloric area, and a large deep ulcer on the right pylorus, and another on the left. Hemoglobin uptrend (S/p 8 units of PRBC's since admission).  Continue to monitor daily CBC  Protonix drip  Advance diet as per primary team.

## 2021-11-24 LAB
ANA PAT FLD IF-IMP: ABNORMAL
ANA TITR SER: ABNORMAL
ANION GAP SERPL CALC-SCNC: 14 MMOL/L — SIGNIFICANT CHANGE UP (ref 5–17)
B PERT IGG+IGM PNL SER: ABNORMAL
BASOPHILS # BLD AUTO: 0.19 K/UL — SIGNIFICANT CHANGE UP (ref 0–0.2)
BASOPHILS NFR BLD AUTO: 0.7 % — SIGNIFICANT CHANGE UP (ref 0–2)
BLD GP AB SCN SERPL QL: SIGNIFICANT CHANGE UP
BUN SERPL-MCNC: 13.8 MG/DL — SIGNIFICANT CHANGE UP (ref 8–20)
CALCIUM SERPL-MCNC: 7.7 MG/DL — LOW (ref 8.6–10.2)
CHLORIDE SERPL-SCNC: 106 MMOL/L — SIGNIFICANT CHANGE UP (ref 98–107)
CO2 SERPL-SCNC: 19 MMOL/L — LOW (ref 22–29)
COLOR FLD: ABNORMAL
CREAT SERPL-MCNC: 0.62 MG/DL — SIGNIFICANT CHANGE UP (ref 0.5–1.3)
EOSINOPHIL # BLD AUTO: 0.08 K/UL — SIGNIFICANT CHANGE UP (ref 0–0.5)
EOSINOPHIL NFR BLD AUTO: 0.3 % — SIGNIFICANT CHANGE UP (ref 0–6)
FLUID INTAKE SUBSTANCE CLASS: SIGNIFICANT CHANGE UP
FLUID SEGMENTED GRANULOCYTES: 95 % — SIGNIFICANT CHANGE UP
GLUCOSE BLDC GLUCOMTR-MCNC: 141 MG/DL — HIGH (ref 70–99)
GLUCOSE BLDC GLUCOMTR-MCNC: 157 MG/DL — HIGH (ref 70–99)
GLUCOSE BLDC GLUCOMTR-MCNC: 185 MG/DL — HIGH (ref 70–99)
GLUCOSE BLDC GLUCOMTR-MCNC: 202 MG/DL — HIGH (ref 70–99)
GLUCOSE BLDC GLUCOMTR-MCNC: 56 MG/DL — LOW (ref 70–99)
GLUCOSE BLDC GLUCOMTR-MCNC: 66 MG/DL — LOW (ref 70–99)
GLUCOSE BLDC GLUCOMTR-MCNC: 84 MG/DL — SIGNIFICANT CHANGE UP (ref 70–99)
GLUCOSE SERPL-MCNC: 68 MG/DL — LOW (ref 70–99)
HCT VFR BLD CALC: 36.7 % — SIGNIFICANT CHANGE UP (ref 34.5–45)
HGB BLD-MCNC: 10.9 G/DL — LOW (ref 11.5–15.5)
IMM GRANULOCYTES NFR BLD AUTO: 1.5 % — SIGNIFICANT CHANGE UP (ref 0–1.5)
INTERPRETATION 24H UR IFE-IMP: SIGNIFICANT CHANGE UP
INTERPRETATION 24H UR IFE-IMP: SIGNIFICANT CHANGE UP
LYMPHOCYTES # BLD AUTO: 2.18 K/UL — SIGNIFICANT CHANGE UP (ref 1–3.3)
LYMPHOCYTES # BLD AUTO: 7.6 % — LOW (ref 13–44)
LYMPHOCYTES # FLD: 2 % — SIGNIFICANT CHANGE UP
MAGNESIUM SERPL-MCNC: 1.7 MG/DL — SIGNIFICANT CHANGE UP (ref 1.6–2.6)
MCHC RBC-ENTMCNC: 27.3 PG — SIGNIFICANT CHANGE UP (ref 27–34)
MCHC RBC-ENTMCNC: 29.7 GM/DL — LOW (ref 32–36)
MCV RBC AUTO: 92 FL — SIGNIFICANT CHANGE UP (ref 80–100)
MONOCYTES # BLD AUTO: 0.56 K/UL — SIGNIFICANT CHANGE UP (ref 0–0.9)
MONOCYTES NFR BLD AUTO: 1.9 % — LOW (ref 2–14)
MONOS+MACROS # FLD: 3 % — SIGNIFICANT CHANGE UP
NEUTROPHILS # BLD AUTO: 25.43 K/UL — HIGH (ref 1.8–7.4)
NEUTROPHILS NFR BLD AUTO: 88 % — HIGH (ref 43–77)
NIGHT BLUE STAIN TISS: SIGNIFICANT CHANGE UP
PLATELET # BLD AUTO: 348 K/UL — SIGNIFICANT CHANGE UP (ref 150–400)
POTASSIUM SERPL-MCNC: 4.9 MMOL/L — SIGNIFICANT CHANGE UP (ref 3.5–5.3)
POTASSIUM SERPL-SCNC: 4.9 MMOL/L — SIGNIFICANT CHANGE UP (ref 3.5–5.3)
RBC # BLD: 3.99 M/UL — SIGNIFICANT CHANGE UP (ref 3.8–5.2)
RBC # FLD: 23.1 % — HIGH (ref 10.3–14.5)
RCV VOL RI: HIGH /UL (ref 0–0)
SODIUM SERPL-SCNC: 139 MMOL/L — SIGNIFICANT CHANGE UP (ref 135–145)
SPECIMEN SOURCE: SIGNIFICANT CHANGE UP
TOTAL NUCLEATED CELL COUNT, BODY FLUID: 4829 /UL — SIGNIFICANT CHANGE UP
TUBE TYPE: SIGNIFICANT CHANGE UP
WBC # BLD: 28.86 K/UL — HIGH (ref 3.8–10.5)
WBC # FLD AUTO: 28.86 K/UL — HIGH (ref 3.8–10.5)

## 2021-11-24 PROCEDURE — 73722 MRI JOINT OF LWR EXTR W/DYE: CPT | Mod: 26,RT

## 2021-11-24 PROCEDURE — 93306 TTE W/DOPPLER COMPLETE: CPT | Mod: 26

## 2021-11-24 PROCEDURE — 99233 SBSQ HOSP IP/OBS HIGH 50: CPT

## 2021-11-24 PROCEDURE — 27310 EXPLORATION OF KNEE JOINT: CPT | Mod: 79,RT

## 2021-11-24 PROCEDURE — ZZZZZ: CPT

## 2021-11-24 PROCEDURE — 99232 SBSQ HOSP IP/OBS MODERATE 35: CPT

## 2021-11-24 RX ORDER — PENICILLIN G POTASSIUM 5000000 [IU]/1
4 POWDER, FOR SOLUTION INTRAMUSCULAR; INTRAPLEURAL; INTRATHECAL; INTRAVENOUS EVERY 4 HOURS
Refills: 0 | Status: DISCONTINUED | OUTPATIENT
Start: 2021-11-24 | End: 2021-11-28

## 2021-11-24 RX ORDER — INSULIN LISPRO 100/ML
VIAL (ML) SUBCUTANEOUS
Refills: 0 | Status: DISCONTINUED | OUTPATIENT
Start: 2021-11-24 | End: 2021-11-27

## 2021-11-24 RX ORDER — POLYETHYLENE GLYCOL 3350 17 G/17G
17 POWDER, FOR SOLUTION ORAL DAILY
Refills: 0 | Status: DISCONTINUED | OUTPATIENT
Start: 2021-11-24 | End: 2021-12-02

## 2021-11-24 RX ORDER — ONDANSETRON 8 MG/1
4 TABLET, FILM COATED ORAL ONCE
Refills: 0 | Status: DISCONTINUED | OUTPATIENT
Start: 2021-11-24 | End: 2021-11-24

## 2021-11-24 RX ORDER — PENICILLIN G POTASSIUM 5000000 [IU]/1
4 POWDER, FOR SOLUTION INTRAMUSCULAR; INTRAPLEURAL; INTRATHECAL; INTRAVENOUS ONCE
Refills: 0 | Status: COMPLETED | OUTPATIENT
Start: 2021-11-24 | End: 2021-11-24

## 2021-11-24 RX ORDER — KETOROLAC TROMETHAMINE 30 MG/ML
15 SYRINGE (ML) INJECTION ONCE
Refills: 0 | Status: DISCONTINUED | OUTPATIENT
Start: 2021-11-24 | End: 2021-11-24

## 2021-11-24 RX ORDER — HYDROMORPHONE HYDROCHLORIDE 2 MG/ML
0.5 INJECTION INTRAMUSCULAR; INTRAVENOUS; SUBCUTANEOUS EVERY 6 HOURS
Refills: 0 | Status: DISCONTINUED | OUTPATIENT
Start: 2021-11-24 | End: 2021-11-29

## 2021-11-24 RX ORDER — ONDANSETRON 8 MG/1
4 TABLET, FILM COATED ORAL EVERY 4 HOURS
Refills: 0 | Status: DISCONTINUED | OUTPATIENT
Start: 2021-11-24 | End: 2021-11-30

## 2021-11-24 RX ORDER — METHOCARBAMOL 500 MG/1
750 TABLET, FILM COATED ORAL THREE TIMES A DAY
Refills: 0 | Status: DISCONTINUED | OUTPATIENT
Start: 2021-11-24 | End: 2021-12-02

## 2021-11-24 RX ORDER — SENNA PLUS 8.6 MG/1
2 TABLET ORAL AT BEDTIME
Refills: 0 | Status: DISCONTINUED | OUTPATIENT
Start: 2021-11-24 | End: 2021-12-02

## 2021-11-24 RX ORDER — OXYCODONE HYDROCHLORIDE 5 MG/1
5 TABLET ORAL EVERY 4 HOURS
Refills: 0 | Status: DISCONTINUED | OUTPATIENT
Start: 2021-11-24 | End: 2021-11-25

## 2021-11-24 RX ORDER — FENTANYL CITRATE 50 UG/ML
75 INJECTION INTRAVENOUS EVERY 8 HOURS
Refills: 0 | Status: DISCONTINUED | OUTPATIENT
Start: 2021-11-24 | End: 2021-11-24

## 2021-11-24 RX ORDER — SODIUM CHLORIDE 9 MG/ML
1000 INJECTION, SOLUTION INTRAVENOUS
Refills: 0 | Status: DISCONTINUED | OUTPATIENT
Start: 2021-11-24 | End: 2021-11-24

## 2021-11-24 RX ORDER — LISINOPRIL 2.5 MG/1
5 TABLET ORAL DAILY
Refills: 0 | Status: DISCONTINUED | OUTPATIENT
Start: 2021-11-24 | End: 2021-12-02

## 2021-11-24 RX ORDER — PANTOPRAZOLE SODIUM 20 MG/1
40 TABLET, DELAYED RELEASE ORAL
Refills: 0 | Status: DISCONTINUED | OUTPATIENT
Start: 2021-11-24 | End: 2021-11-29

## 2021-11-24 RX ORDER — PENICILLIN G POTASSIUM 5000000 [IU]/1
POWDER, FOR SOLUTION INTRAMUSCULAR; INTRAPLEURAL; INTRATHECAL; INTRAVENOUS
Refills: 0 | Status: DISCONTINUED | OUTPATIENT
Start: 2021-11-24 | End: 2021-11-28

## 2021-11-24 RX ORDER — SODIUM CHLORIDE 9 MG/ML
1000 INJECTION, SOLUTION INTRAVENOUS
Refills: 0 | Status: DISCONTINUED | OUTPATIENT
Start: 2021-11-24 | End: 2021-11-27

## 2021-11-24 RX ORDER — MAGNESIUM SULFATE 500 MG/ML
2 VIAL (ML) INJECTION ONCE
Refills: 0 | Status: DISCONTINUED | OUTPATIENT
Start: 2021-11-24 | End: 2021-11-24

## 2021-11-24 RX ORDER — FENTANYL CITRATE 50 UG/ML
50 INJECTION INTRAVENOUS
Refills: 0 | Status: DISCONTINUED | OUTPATIENT
Start: 2021-11-24 | End: 2021-11-24

## 2021-11-24 RX ORDER — GLUCAGON INJECTION, SOLUTION 0.5 MG/.1ML
1 INJECTION, SOLUTION SUBCUTANEOUS ONCE
Refills: 0 | Status: DISCONTINUED | OUTPATIENT
Start: 2021-11-24 | End: 2021-12-02

## 2021-11-24 RX ORDER — HYDROMORPHONE HYDROCHLORIDE 2 MG/ML
0.5 INJECTION INTRAMUSCULAR; INTRAVENOUS; SUBCUTANEOUS ONCE
Refills: 0 | Status: DISCONTINUED | OUTPATIENT
Start: 2021-11-24 | End: 2021-11-24

## 2021-11-24 RX ORDER — GABAPENTIN 400 MG/1
200 CAPSULE ORAL THREE TIMES A DAY
Refills: 0 | Status: DISCONTINUED | OUTPATIENT
Start: 2021-11-24 | End: 2021-12-02

## 2021-11-24 RX ORDER — OXYCODONE HYDROCHLORIDE 5 MG/1
10 TABLET ORAL EVERY 4 HOURS
Refills: 0 | Status: DISCONTINUED | OUTPATIENT
Start: 2021-11-24 | End: 2021-12-01

## 2021-11-24 RX ORDER — ACETAMINOPHEN 500 MG
975 TABLET ORAL EVERY 6 HOURS
Refills: 0 | Status: DISCONTINUED | OUTPATIENT
Start: 2021-11-24 | End: 2021-11-29

## 2021-11-24 RX ORDER — HEPARIN SODIUM 5000 [USP'U]/ML
5000 INJECTION INTRAVENOUS; SUBCUTANEOUS EVERY 8 HOURS
Refills: 0 | Status: DISCONTINUED | OUTPATIENT
Start: 2021-11-24 | End: 2021-12-02

## 2021-11-24 RX ORDER — COLLAGENASE CLOSTRIDIUM HIST. 250 UNIT/G
1 OINTMENT (GRAM) TOPICAL
Refills: 0 | Status: DISCONTINUED | OUTPATIENT
Start: 2021-11-24 | End: 2021-12-02

## 2021-11-24 RX ADMIN — LISINOPRIL 5 MILLIGRAM(S): 2.5 TABLET ORAL at 13:09

## 2021-11-24 RX ADMIN — METHOCARBAMOL 750 MILLIGRAM(S): 500 TABLET, FILM COATED ORAL at 17:22

## 2021-11-24 RX ADMIN — HYDROMORPHONE HYDROCHLORIDE 0.5 MILLIGRAM(S): 2 INJECTION INTRAMUSCULAR; INTRAVENOUS; SUBCUTANEOUS at 01:13

## 2021-11-24 RX ADMIN — Medication 975 MILLIGRAM(S): at 21:55

## 2021-11-24 RX ADMIN — HEPARIN SODIUM 5000 UNIT(S): 5000 INJECTION INTRAVENOUS; SUBCUTANEOUS at 13:09

## 2021-11-24 RX ADMIN — HYDROMORPHONE HYDROCHLORIDE 0.5 MILLIGRAM(S): 2 INJECTION INTRAMUSCULAR; INTRAVENOUS; SUBCUTANEOUS at 15:43

## 2021-11-24 RX ADMIN — OXYCODONE HYDROCHLORIDE 10 MILLIGRAM(S): 5 TABLET ORAL at 06:14

## 2021-11-24 RX ADMIN — SODIUM CHLORIDE 100 MILLILITER(S): 9 INJECTION, SOLUTION INTRAVENOUS at 16:39

## 2021-11-24 RX ADMIN — HYDROMORPHONE HYDROCHLORIDE 0.5 MILLIGRAM(S): 2 INJECTION INTRAMUSCULAR; INTRAVENOUS; SUBCUTANEOUS at 13:07

## 2021-11-24 RX ADMIN — HYDROMORPHONE HYDROCHLORIDE 0.5 MILLIGRAM(S): 2 INJECTION INTRAMUSCULAR; INTRAVENOUS; SUBCUTANEOUS at 21:55

## 2021-11-24 RX ADMIN — HYDROMORPHONE HYDROCHLORIDE 0.5 MILLIGRAM(S): 2 INJECTION INTRAMUSCULAR; INTRAVENOUS; SUBCUTANEOUS at 14:02

## 2021-11-24 RX ADMIN — FENTANYL CITRATE 50 MICROGRAM(S): 50 INJECTION INTRAVENOUS at 10:40

## 2021-11-24 RX ADMIN — METHOCARBAMOL 750 MILLIGRAM(S): 500 TABLET, FILM COATED ORAL at 21:44

## 2021-11-24 RX ADMIN — GABAPENTIN 200 MILLIGRAM(S): 400 CAPSULE ORAL at 05:43

## 2021-11-24 RX ADMIN — GABAPENTIN 200 MILLIGRAM(S): 400 CAPSULE ORAL at 13:07

## 2021-11-24 RX ADMIN — PANTOPRAZOLE SODIUM 40 MILLIGRAM(S): 20 TABLET, DELAYED RELEASE ORAL at 17:22

## 2021-11-24 RX ADMIN — SODIUM CHLORIDE 150 MILLILITER(S): 9 INJECTION, SOLUTION INTRAVENOUS at 09:59

## 2021-11-24 RX ADMIN — PANTOPRAZOLE SODIUM 40 MILLIGRAM(S): 20 TABLET, DELAYED RELEASE ORAL at 05:42

## 2021-11-24 RX ADMIN — Medication 100 MILLIGRAM(S): at 22:22

## 2021-11-24 RX ADMIN — FENTANYL CITRATE 50 MICROGRAM(S): 50 INJECTION INTRAVENOUS at 10:09

## 2021-11-24 RX ADMIN — OXYCODONE HYDROCHLORIDE 10 MILLIGRAM(S): 5 TABLET ORAL at 05:44

## 2021-11-24 RX ADMIN — LISINOPRIL 5 MILLIGRAM(S): 2.5 TABLET ORAL at 05:43

## 2021-11-24 RX ADMIN — Medication 1 APPLICATION(S): at 17:23

## 2021-11-24 RX ADMIN — HYDROMORPHONE HYDROCHLORIDE 0.5 MILLIGRAM(S): 2 INJECTION INTRAMUSCULAR; INTRAVENOUS; SUBCUTANEOUS at 01:32

## 2021-11-24 RX ADMIN — FENTANYL CITRATE 50 MICROGRAM(S): 50 INJECTION INTRAVENOUS at 10:56

## 2021-11-24 RX ADMIN — PENICILLIN G POTASSIUM 100 MILLION UNIT(S): 5000000 POWDER, FOR SOLUTION INTRAMUSCULAR; INTRAPLEURAL; INTRATHECAL; INTRAVENOUS at 16:37

## 2021-11-24 RX ADMIN — HEPARIN SODIUM 5000 UNIT(S): 5000 INJECTION INTRAVENOUS; SUBCUTANEOUS at 21:44

## 2021-11-24 RX ADMIN — Medication 975 MILLIGRAM(S): at 16:49

## 2021-11-24 RX ADMIN — GABAPENTIN 200 MILLIGRAM(S): 400 CAPSULE ORAL at 21:43

## 2021-11-24 RX ADMIN — Medication 975 MILLIGRAM(S): at 17:19

## 2021-11-24 RX ADMIN — Medication 15 MILLIGRAM(S): at 18:20

## 2021-11-24 RX ADMIN — OXYCODONE HYDROCHLORIDE 5 MILLIGRAM(S): 5 TABLET ORAL at 20:49

## 2021-11-24 RX ADMIN — FENTANYL CITRATE 50 MICROGRAM(S): 50 INJECTION INTRAVENOUS at 09:57

## 2021-11-24 RX ADMIN — Medication 8: at 22:21

## 2021-11-24 RX ADMIN — Medication 15 MILLIGRAM(S): at 17:22

## 2021-11-24 RX ADMIN — Medication 975 MILLIGRAM(S): at 05:43

## 2021-11-24 RX ADMIN — HYDROMORPHONE HYDROCHLORIDE 0.5 MILLIGRAM(S): 2 INJECTION INTRAMUSCULAR; INTRAVENOUS; SUBCUTANEOUS at 22:55

## 2021-11-24 RX ADMIN — PENICILLIN G POTASSIUM 100 MILLION UNIT(S): 5000000 POWDER, FOR SOLUTION INTRAMUSCULAR; INTRAPLEURAL; INTRATHECAL; INTRAVENOUS at 21:43

## 2021-11-24 RX ADMIN — Medication 100 MILLIGRAM(S): at 16:36

## 2021-11-24 RX ADMIN — HYDROMORPHONE HYDROCHLORIDE 0.5 MILLIGRAM(S): 2 INJECTION INTRAMUSCULAR; INTRAVENOUS; SUBCUTANEOUS at 17:16

## 2021-11-24 RX ADMIN — Medication 975 MILLIGRAM(S): at 22:25

## 2021-11-24 RX ADMIN — FENTANYL CITRATE 50 MICROGRAM(S): 50 INJECTION INTRAVENOUS at 10:42

## 2021-11-24 RX ADMIN — SENNA PLUS 2 TABLET(S): 8.6 TABLET ORAL at 21:44

## 2021-11-24 RX ADMIN — OXYCODONE HYDROCHLORIDE 5 MILLIGRAM(S): 5 TABLET ORAL at 21:49

## 2021-11-24 NOTE — PROGRESS NOTE ADULT - ASSESSMENT
51y Female present to ED with left leg swelling, erythema, pain, leukocytosis and acute renal failure. CT scan concerning for necrotizing soft tissue infection. 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  Downgraded to floor from SICU 11/22/21.  Patient had complained or RLE swelling.  Right lower extremity duplex and negative for DVT; however + swelling in popliteal fossa to R calf.      Plan  Going to OR today with ortho for right knee wash out and exploration   Daily dressing changes to LLE  IV ceftriaxone ABX for group A bacteremia  FU AM labs; hemoglobin stable for now  Dash diet but npo with IV hydration for OR   Pain controlled on current regimen  Will need PICC line at some point   PT/OT for dispo when medically stable

## 2021-11-24 NOTE — PROGRESS NOTE ADULT - SUBJECTIVE AND OBJECTIVE BOX
INFECTIOUS DISEASES AND INTERNAL MEDICINE at [facility]  =======================================================  Theo Morrow MD  Diplomates American Board of Internal Medicine and Infectious Diseases  Telephone [phone]  Fax            [phone]  =======================================================    LUIS FERNANDO DAVIS [id number]    Follow up: group A STREP NECROTIZING SOFT TISSUE INFECTION    NOW W/P RIGHT KNEE     Allergies:  No Known Allergies      Medications:  acetaminophen     Tablet .. 975 milliGRAM(s) Oral every 6 hours  cefTRIAXone   IVPB      cefTRIAXone   IVPB 2000 milliGRAM(s) IV Intermittent every 24 hours  chlorhexidine 2% Cloths 1 Application(s) Topical <User Schedule>  clindamycin IVPB 900 milliGRAM(s) IV Intermittent every 8 hours  fentaNYL    Injectable 75 MICROGram(s) IV Push every 8 hours PRN  gabapentin 200 milliGRAM(s) Oral three times a day  glucagon  Injectable 1 milliGRAM(s) IntraMuscular once  hydrocortisone sodium succinate Injectable 50 milliGRAM(s) IV Push daily  insulin lispro (ADMELOG) corrective regimen sliding scale   SubCutaneous Before meals and at bedtime  lisinopril 5 milliGRAM(s) Oral daily  multiple electrolytes Injection Type 1 1000 milliLiter(s) IV Continuous <Continuous>  ondansetron Injectable 4 milliGRAM(s) IV Push every 4 hours PRN  oxyCODONE    IR 5 milliGRAM(s) Oral every 4 hours PRN  oxyCODONE    IR 10 milliGRAM(s) Oral every 4 hours PRN  pantoprazole  Injectable 40 milliGRAM(s) IV Push two times a day    SOCIAL       FAMILY   FAMILY HISTORY:  FH: HTN (hypertension) (Mother)      REVIEW OF SYSTEMS:  CONSTITUTIONAL:  No Fever or chills  HEENT:   No diplopia or blurred vision.  No earache, sore throat or runny nose.  CARDIOVASCULAR:  No pressure, squeezing, strangling, tightness, heaviness or aching about the chest, neck, axilla or epigastrium.  RESPIRATORY:  No cough, shortness of breath, PND or orthopnea.  GASTROINTESTINAL:  No nausea, vomiting or diarrhea.  GENITOURINARY:  No dysuria, frequency or urgency. No Blood in urine  MUSCULOSKELETAL:   moves all joints  SKIN:  No change in skin, hair or nails.  NEUROLOGIC:  No paresthesias, fasciculations, seizures or weakness.  PSYCHIATRIC:  No disorder of thought or mood.  ENDOCRINE:  No heat or cold intolerance, polyuria or polydipsia.  HEMATOLOGICAL:  No easy bruising or bleeding.            Physical Exam:  I Vital Signs Last 24 Hrs  T(C): 38.3 (24 Nov 2021 06:36), Max: 38.3 (24 Nov 2021 06:36)  T(F): 101 (24 Nov 2021 06:36), Max: 101 (24 Nov 2021 06:36)  HR: 94 (24 Nov 2021 06:36) (84 - 94)  BP: 125/57 (24 Nov 2021 06:36) (120/65 - 159/73)  BP(mean): --  RR: 18 (24 Nov 2021 06:36) (18 - 18)  SpO2: 98% (24 Nov 2021 06:36) (97% - 100%)    GEN: NAD, LETHARGIC  HEENT: normocephalic and atraumatic. EOMI. ASHISH.    NECK: Supple. No carotid bruits.  No lymphadenopathy or thyromegaly.  LUNGS: Clear to auscultation.  HEART: Regular rate and rhythm without murmur.  ABDOMEN: Soft, nontender, and nondistended.  Positive bowel sounds.    : No CVA tenderness  EXTREMITIES: LEFT LEG WRAPPED  RIGHT LEG WRAPPED     NEUROLOGIC: LETHARGIC  BUT ANSWERS QUESTIONS  PSYCHIATRIC: Appropriate affect .  SKIN: No ulceration or induration present.        Labs:  Vitals:  =   =======================================================  Current Antibiotics:  cefTRIAXone   IVPB      cefTRIAXone   IVPB 2000 milliGRAM(s) IV Intermittent every 24 hours  clindamycin IVPB 900 milliGRAM(s) IV Intermittent every 8 hours    Other medications:  acetaminophen     Tablet .. 975 milliGRAM(s) Oral every 6 hours  chlorhexidine 2% Cloths 1 Application(s) Topical <User Schedule>  gabapentin 200 milliGRAM(s) Oral three times a day  glucagon  Injectable 1 milliGRAM(s) IntraMuscular once  hydrocortisone sodium succinate Injectable 50 milliGRAM(s) IV Push daily  insulin lispro (ADMELOG) corrective regimen sliding scale   SubCutaneous Before meals and at bedtime  lisinopril 5 milliGRAM(s) Oral daily  multiple electrolytes Injection Type 1 1000 milliLiter(s) IV Continuous <Continuous>  pantoprazole  Injectable 40 milliGRAM(s) IV Push two times a day      =======================================================  Labs:                                  10.9   28.86 )-----------( 348      ( 24 Nov 2021 06:21 )             36.7   11-24    139  |  106  |  13.8  ----------------------------<  68<L>  4.9   |  19.0<L>  |  0.62    Ca    7.7<L>      24 Nov 2021 06:21  Phos  2.7     11-23  Mg     1.7     11-24            Culture - Body Fluid with Gram Stain (collected 11-18-21 @ 16:20)  Source: .Body Fluid OR Posterior Calf Left Lower Extremity Fluid  Gram Stain (11-18-21 @ 21:39):    polymorphonuclear leukocytes seen    Gram Positive Cocci in Pairs and Chains seen    by cytocentrifuge    Culture - Body Fluid with Gram Stain (collected 11-18-21 @ 16:20)  Source: .Body Fluid OR Swab Left Knee Lower Extremity Fluid  Gram Stain (11-18-21 @ 19:36):    polymorphonuclear leukocytes seen    Gram Positive Cocci in Pairs and Chains seen    by cytocentrifuge    Culture - Body Fluid with Gram Stain (collected 11-18-21 @ 16:20)  Source: .Body Fluid OR - Left Lower Extremity Fluid  Gram Stain (11-18-21 @ 20:10):    No polymorphonuclear leukocytes seen    No organisms seen    by cytocentrifuge    Culture - Urine (collected 11-18-21 @ 10:07)  Source: Catheterized Catheterized  Final Report (11-19-21 @ 07:03):    No growth    Culture - Urine (collected 11-18-21 @ 00:25)  Source: Catheterized Catheterized  Final Report (11-18-21 @ 22:09):    <10,000 CFU/mL Normal Urogenital Aimee    Culture - Blood (collected 11-17-21 @ 14:45)  Source: .Blood Blood  Gram Stain (11-18-21 @ 09:44):    Growth in aerobic and anaerobic bottles: Gram Positive Cocci in Pairs and    Chains    Gram Stain performed by:    [facility]    [address] [place] [id number]    .    TYPE: (C=Critical, N=Notification, A=Abnormal) C    TESTS:  _ GS    DATE/TIME CALLED: _ 11/18/2021 09:42:19    CALLED TO: Chris Hernandez RN    READ BACK (2 Patient Identifiers)(Y/N): _ Y    READ BACK VALUES (Y/N): _ Y    CALLED BY: Chris Quiñones  Final Report (11-21-21 @ 12:28):    Growth in aerobic and anaerobic bottles: Streptococcus pyogenes (Group A)    See previous culture [id number]    Culture - Blood (collected 11-17-21 @ 14:45)  Source: .Blood Blood  Gram Stain (11-18-21 @ 09:40):    Growth in aerobic and anaerobic bottles: Gram Positive Cocci in Pairs and    Chains    ***Blood Panel PCR results on this specimen are available    approximately 3 hours after the Gram stain result.***    Gram stain, PCR, and/or culture results may not always    correspond due to difference in methodologies.    ************************************************************    This PCR assay was performed using [employer].    The following targets are tested for: Enterococcus,    vancomycin resistant enterococci, Listeria monocytogenes,    coagulase negative staphylococci, S. aureus,    methicillin resistant S. aureus, Streptococcus agalactiae    (Group B), S. pneumoniae, S. pyogenes (Group A),    Acinetobacter baumannii, Enterobacter cloacae, E. coli,    Klebsiella oxytoca, K. pneumoniae, Proteus sp.,    Serratia marcescens, Haemophilus influenzae,    Neisseria meningitidis, Pseudomonas aeruginosa, Candida    albicans, C. glabrata, C krusei, C parapsilosis,    C. tropicalis and the KPC resistance gene.    Gram Stain and BCID performed by:    [facility]    [address] [place] [id number]    .    TYPE: (C=Critical, N=Notification, A=Abnormal) C    TESTS:  _ GS    DATE/TIME CALLED: _ 11/18/2021 09:40:08    CALLED TO: Chris Hernandez RN    READ BACK (2 Patient Identifiers)(Y/N): _ Y    READ BACK VALUES (Y/N): _ Y    CALLED BY: Chris Quiñones  Final Report (11-21-21 @ 12:28):    Growth in aerobic and anaerobic bottles: Streptococcus pyogenes (Group A)  Organism: Blood Culture PCR  Streptococcus pyogenes (Group A) (11-21-21 @ 12:28)  Organism: Streptococcus pyogenes (Group A) (11-21-21 @ 12:28)    Sensitivities:      -  Ceftriaxone: S 0.016      -  Penicillin: S 0.016 Predicts results for ampicillin, amoxicillin, amoxicillin/clavulanate, ampicillin/sulbactam, 1st, 2nd and 3rd generation cephalosporins and carbapenems.      Method Type: ETEST  Organism: Streptococcus pyogenes (Group A) (11-21-21 @ 12:28)    Sensitivities:      -  Vancomycin: S      Method Type: KB  Organism: Blood Culture PCR (11-21-21 @ 12:28)    Sensitivities:      -  Streptococcus pyogenes (Group A): Detec      Method Type: PCR      Creatinine, Serum: 0.70 mg/dL (11-22-21 @ 04:43)  Creatinine, Serum: 0.73 mg/dL (11-21-21 @ 21:57)  Creatinine, Serum: 0.88 mg/dL (11-21-21 @ 15:43)  Creatinine, Serum: 1.02 mg/dL (11-21-21 @ 04:19)  Creatinine, Serum: 1.11 mg/dL (11-20-21 @ 16:46)  Creatinine, Serum: 1.41 mg/dL (11-20-21 @ 05:21)  Creatinine, Serum: 1.42 mg/dL (11-19-21 @ 14:54)  Creatinine, Serum: 1.63 mg/dL (11-19-21 @ 05:48)  Creatinine, Serum: 1.98 mg/dL (11-18-21 @ 21:01)  Creatinine, Serum: 2.54 mg/dL (11-18-21 @ 04:34)  Creatinine, Serum: 2.52 mg/dL (11-17-21 @ 23:52)  Creatinine, Serum: 3.80 mg/dL (11-17-21 @ 13:21)        Ferritin, Serum: 388 ng/mL (11-17-21 @ 13:21)    C-Reactive Protein, Serum: >422 mg/L (11-17-21 @ 13:21)    WBC Count: 15.49 K/uL (11-22-21 @ 04:43)  WBC Count: 17.20 K/uL (11-21-21 @ 21:57)  WBC Count: 16.87 K/uL (11-21-21 @ 15:43)  WBC Count: 20.11 K/uL (11-21-21 @ 04:19)  WBC Count: 26.59 K/uL (11-20-21 @ 16:46)  WBC Count: 25.35 K/uL (11-20-21 @ 10:32)  WBC Count: 26.27 K/uL (11-20-21 @ 05:21)  WBC Count: 29.79 K/uL (11-19-21 @ 23:48)  WBC Count: 29.67 K/uL (11-19-21 @ 14:54)  WBC Count: 26.93 K/uL (11-19-21 @ 05:48)  WBC Count: 29.87 K/uL (11-18-21 @ 21:01)  WBC Count: 24.81 K/uL (11-18-21 @ 04:34)  WBC Count: 23.17 K/uL (11-17-21 @ 23:52)  WBC Count: 46.68 K/uL (11-17-21 @ 13:21)      COVID-19 PCR: NotDetec (11-17-21 @ 13:18)    Lactate Dehydrogenase, Serum: 769 U/L (11-20-21 @ 03:56)    Alkaline Phosphatase, Serum: 75 U/L (11-19-21 @ 05:48)  Alanine Aminotransferase (ALT/SGPT): 14 U/L (11-19-21 @ 05:48)  Aspartate Aminotransferase (AST/SGOT): 42 U/L (11-19-21 @ 05:48)  Bilirubin Total, Serum: 0.5 mg/dL (11-19-21 @ 05:48)

## 2021-11-24 NOTE — PROGRESS NOTE ADULT - SUBJECTIVE AND OBJECTIVE BOX
SUBJECTIVE/24 hour events: Patient complained of swelling to RLE yesterday evening and throughout the day, ortho consulted and MRI completed 2/2 to the severity of the lle infection and patient stating that the pain feels like how her lle started she will go today for washout of right knee and exploration. RLE US was negative for DVT.  Pain well controlled to the LLE wound sites.  VSS, afebrile.  Tolerating diet now NPO and on IV fluids for OR.  Denies n/v/f/c/sob/cp.           Vital Signs Last 24 Hrs  T(C): 37.3 (23 Nov 2021 22:00), Max: 37.3 (23 Nov 2021 04:42)  T(F): 99.2 (23 Nov 2021 22:00), Max: 99.2 (23 Nov 2021 04:42)  HR: 93 (23 Nov 2021 22:00) (80 - 93)  BP: 125/66 (23 Nov 2021 22:00) (116/82 - 136/70)  BP(mean): --  RR: 18 (23 Nov 2021 22:00) (18 - 18)  SpO2: 98% (23 Nov 2021 22:00) (98% - 100%)  Drug Dosing Weight  Height (cm): 167.6 (17 Nov 2021 22:45)  Weight (kg): 93.4 (17 Nov 2021 22:45)  BMI (kg/m2): 33.3 (17 Nov 2021 22:45)  BSA (m2): 2.02 (17 Nov 2021 22:45)  I&O's Detail    22 Nov 2021 07:01  -  23 Nov 2021 07:00  --------------------------------------------------------  IN:    IV PiggyBack: 50 mL    multiple electrolytes Injection Type 1.: 375 mL    Oral Fluid: 780 mL  Total IN: 1205 mL    OUT:    Indwelling Catheter - Urethral (mL): 140 mL    VAC (Vacuum Assisted Closure) System (mL): 450 mL    Voided (mL): 500 mL  Total OUT: 1090 mL    Total NET: 115 mL      23 Nov 2021 07:01 - 24 Nov 2021 01:25  --------------------------------------------------------  IN:  Total IN: 0 mL    OUT:    Voided (mL): 900 mL  Total OUT: 900 mL    Total NET: -900 mL        Allergies    No Known Allergies    Intolerances                              9.2    19.53 )-----------( 419      ( 23 Nov 2021 07:42 )             28.8   11-23    144  |  111<H>  |  24.2<H>  ----------------------------<  85  3.9   |  24.0  |  0.63    Ca    7.6<L>      23 Nov 2021 07:42  Phos  2.7     11-23  Mg     1.7     11-23        ROS:    PHYSICAL EXAM:  GEN: patient resting comfortably in bed, in no acute distress  RESP: respirations are unlabored, no accessory muscle use, no conversational dyspnea  CVS: RRR  GI: Abdomen soft, non-tender, non-distended, no rebound tenderness / guarding  MSK: RLE with swelling now from ankle through thigh; active ROM in RLE; dressings to LLE c/d/i          MEDICATIONS  (STANDING):  acetaminophen     Tablet .. 975 milliGRAM(s) Oral every 6 hours  cefTRIAXone   IVPB      cefTRIAXone   IVPB 2000 milliGRAM(s) IV Intermittent every 24 hours  clindamycin IVPB 900 milliGRAM(s) IV Intermittent every 8 hours  collagenase Ointment 1 Application(s) Topical two times a day  gabapentin 200 milliGRAM(s) Oral three times a day  glucagon  Injectable 1 milliGRAM(s) IntraMuscular once  insulin lispro (ADMELOG) corrective regimen sliding scale   SubCutaneous Before meals and at bedtime  lisinopril 5 milliGRAM(s) Oral daily  multiple electrolytes Injection Type 1 1000 milliLiter(s) (100 mL/Hr) IV Continuous <Continuous>  pantoprazole  Injectable 40 milliGRAM(s) IV Push two times a day  polyethylene glycol 3350 17 Gram(s) Oral daily  senna 2 Tablet(s) Oral at bedtime    MEDICATIONS  (PRN):  fentaNYL    Injectable 75 MICROGram(s) IV Push every 8 hours PRN Dressing changes  HYDROmorphone  Injectable 0.5 milliGRAM(s) IV Push every 6 hours PRN breakthrough pain  ondansetron Injectable 4 milliGRAM(s) IV Push every 4 hours PRN Nausea and/or Vomiting  oxyCODONE    IR 10 milliGRAM(s) Oral every 4 hours PRN Severe Pain (7 - 10)  oxyCODONE    IR 5 milliGRAM(s) Oral every 4 hours PRN Moderate Pain (4 - 6)      RADIOLOGY STUDIES:    CULTURES:

## 2021-11-24 NOTE — PROGRESS NOTE ADULT - PROBLEM SELECTOR PLAN 1
Now s/p EGD revealing normal esophagus, dieulafoy int the body that was injected with epinephrine at the base and 2 hemostat clips placed. 5 Ulcers int he antrum/prepyloric area, and a large deep ulcer on the right pylorus, and another on the left. Hemoglobin uptrend (S/p 8 units of PRBC's since admission).  Hemoglobin remain stable, today 10.9, no evidence of overt bleeding   Continue IV Protonix every 12 H  Trend H/H, transfuse to Hgb 8 or higher  Advance diet as tolerated

## 2021-11-24 NOTE — PROGRESS NOTE ADULT - ATTENDING COMMENTS
Patient with multiple ulcer in antrum and one dielafoy. hemoglobin stable   had debridement on leg today   continue  PPI BID   Will sign off.  Please F/U in office in 4 weeks. EGD in 6-8 weeks  avoid NSAIDS

## 2021-11-24 NOTE — CHART NOTE - NSCHARTNOTEFT_GEN_A_CORE
POST OP CHECK and WOUND VAC CHANGE    RLE: Patient is POD 0 s/p R knee washout secondary to a right knee effusion, septic arthritis and mini open I&D.  She reports her pain to be a 10/10 until she receives pain medication.  She complains of muscle spasms that shoot from R knee downwards to calf and ankle.  Dressing c/d/i.  She has a Preveena connected to suction with good urinary output.  Patient has not had anything to eat yet but ordered food for dinner.    LLE: Patient's wound vacuum changed to medial thigh with tolerance.    Diet, Regular (11-24-21 @ 12:46)      Scheduled Medications:   acetaminophen     Tablet .. 975 milliGRAM(s) Oral every 6 hours  clindamycin IVPB 900 milliGRAM(s) IV Intermittent every 8 hours  collagenase Ointment 1 Application(s) Topical two times a day  gabapentin 200 milliGRAM(s) Oral three times a day  glucagon  Injectable 1 milliGRAM(s) IntraMuscular once  heparin   Injectable 5000 Unit(s) SubCutaneous every 8 hours  insulin lispro (ADMELOG) corrective regimen sliding scale   SubCutaneous Before meals and at bedtime  lisinopril 5 milliGRAM(s) Oral daily  methocarbamol 750 milliGRAM(s) Oral three times a day  multiple electrolytes Injection Type 1 1000 milliLiter(s) (100 mL/Hr) IV Continuous <Continuous>  pantoprazole  Injectable 40 milliGRAM(s) IV Push two times a day  penicillin   G  potassium  IVPB      penicillin   G  potassium  IVPB 4 Million Unit(s) IV Intermittent every 4 hours  polyethylene glycol 3350 17 Gram(s) Oral daily  senna 2 Tablet(s) Oral at bedtime    PRN Medications:  fentaNYL    Injectable 75 MICROGram(s) IV Push every 8 hours PRN Dressing changes  HYDROmorphone  Injectable 0.5 milliGRAM(s) IV Push every 6 hours PRN breakthrough pain  ondansetron Injectable 4 milliGRAM(s) IV Push every 4 hours PRN Nausea and/or Vomiting  oxyCODONE    IR 5 milliGRAM(s) Oral every 4 hours PRN Moderate Pain (4 - 6)  oxyCODONE    IR 10 milliGRAM(s) Oral every 4 hours PRN Severe Pain (7 - 10)      Objective:   T(F): 98.6 (11-24 @ 11:15), Max: 101 (11-24 @ 06:36)  HR: 80 (11-24 @ 11:15) (80 - 94)  BP: 122/74 (11-24 @ 11:15) (114/53 - 159/73)  BP(mean): 78 (11-24 @ 10:30) (68 - 83)  ABP: --  ABP(mean): --  RR: 18 (11-24 @ 11:15) (16 - 20)  SpO2: 97% (11-24 @ 11:15) (97% - 100%)      Physical Exam:   GEN: patient resting in bed, in mild acute distress  RESP: respirations are unlabored, no accessory muscle use, no conversational dyspnea  CVS: RRR  GI: Abdomen soft, non-tender, non-distended  MSK: RLE with dressing c/d/i, LLE with ACE bandage to distal leg and wound vacuum to medial thigh        I&O's    11-23 @ 07:01  -  11-24 @ 07:00  --------------------------------------------------------  IN:  Total IN: 0 mL    OUT:    Voided (mL): 1700 mL  Total OUT: 1700 mL    Total NET: -1700 mL          LABS:                        10.9   28.86 )-----------( 348      ( 24 Nov 2021 06:21 )             36.7     11-24    139  |  106  |  13.8  ----------------------------<  68<L>  4.9   |  19.0<L>  |  0.62    Ca    7.7<L>      24 Nov 2021 06:21  Phos  2.7     11-23  Mg     1.7     11-24            MICROBIOLOGY:     Culture - Body Fluid with Gram Stain (collected 11-23 @ 12:50)  Source: .Body Fluid Knee Fluid  Gram Stain (11-23 @ 23:34):    polymorphonuclear leukocytes seen    No organisms seen    by cytocentrifuge  Preliminary Report (11-24 @ 13:52):    No growth    Culture - Blood (collected 11-22 @ 08:44)  Source: .Blood Blood  Preliminary Report (11-24 @ 09:00):    No growth at 48 hours    Culture - Blood (collected 11-20 @ 12:57)  Source: .Blood Blood  Preliminary Report (11-22 @ 13:00):    No growth at 48 hours    Culture - Body Fluid with Gram Stain (collected 11-18 @ 16:20)  Source: .Body Fluid OR Posterior Calf Left Lower Extremity Fluid  Gram Stain (11-18 @ 21:39):    polymorphonuclear leukocytes seen    Gram Positive Cocci in Pairs and Chains seen    by cytocentrifuge  Final Report (11-23 @ 09:40):    Moderate Streptococcus pyogenes (Group A) Penicillin and ampicillin are    drugs of choice for    treatment of beta-hemolytic streptococcal infections.    Susceptibility testing is not performed routinely because    S. pyogenes (GAS) is universally susceptible to penicillin    and resistance in other strains is extremely rare.    Culture - Body Fluid with Gram Stain (collected 11-18 @ 16:20)  Source: .Body Fluid OR Swab Left Knee Lower Extremity Fluid  Gram Stain (11-18 @ 19:36):    polymorphonuclear leukocytes seen    Gram Positive Cocci in Pairs and Chains seen    by cytocentrifuge  Final Report (11-23 @ 09:41):    Numerous Streptococcus pyogenes (Group A) Penicillin and ampicillin are    drugs of choice for    treatment of beta-hemolytic streptococcal infections.    Susceptibility testing is not performed routinely because    S. pyogenes (GAS) is universally susceptible to penicillin    and resistance in other strains is extremely rare.    Culture - Body Fluid with Gram Stain (collected 11-18 @ 16:20)  Source: .Body Fluid OR - Left Lower Extremity Fluid  Gram Stain (11-18 @ 20:10):    No polymorphonuclear leukocytes seen    No organisms seen    by cytocentrifuge  Final Report (11-23 @ 09:40):    Rare Streptococcus pyogenes (Group A)    Penicillin and ampicillin are drugs of choice for treatment of    beta-hemolytic streptococcal infections.    Susceptibility testing is not performed routinely because S. pyogenes    (GAS) is universally susceptibleto penicillin    and resistance in other strains is extremely rare.    Culture - Urine (collected 11-18 @ 10:07)  Source: Catheterized Catheterized  Final Report (11-19 @ 07:03):    No growth    Culture - Urine (collected 11-18 @ 00:25)  Source: Catheterized Catheterized  Final Report (11-18 @ 22:09):    <10,000 CFU/mL Normal Urogenital Aimee        PATHOLOGY:    ASSESSMENT and PLAN  51y Female present to ED with left leg swelling, erythema, pain, leukocytosis and acute renal failure. CT scan concerning for necrotizing soft tissue infection. 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  Downgraded to floor from SICU 11/22/21.  Patient had complained or RLE swelling.  Right lower extremity duplex and negative for DVT; however + swelling in popliteal fossa to R calf.      Plan  S/p OR today with ortho for right knee wash out and exploration   Daily dressing changes to LLE, and Wound vac changed 11/24  IV ceftriaxone ABX for group A bacteremia; Added on Pencicillin  FU AM labs  Dash diet   Added Robaxin for muscle spasms  FU pain tolerance  Vitals, monitor for fevers

## 2021-11-24 NOTE — PROGRESS NOTE ADULT - SUBJECTIVE AND OBJECTIVE BOX
Chief Complaint: This is a 51y old woman patient being seen in follow-up consultation for melena.     Interval HPI / 24H events:  Patient is seen and evaluated at bedside, reporting no complaints, no overnight events, s/p EGD revealing normal esophagus, dieulafoy int the body that was injected with epinephrine at the base and 2 hemostat clips placed. 5 Ulcers in  the antrum/prepyloric area, and a large deep ulcer on the right pylorus, and another on the left. Her hemoglobin now remain stable, today 10.9 gm uptrend from 9.2 yesterday.  Denies nausea, vomiting, abdominal pain, chest pain, shortness of breath, hematemesis, hematochezia, melena.                                                           Review of Systems:  · ENMT: negative  · Respiratory and Thorax: negative  · Cardiovascular: negative  · Gastrointestinal: see above.  · Genitourinary:	negative  · Musculoskeletal: negative  · Neurological: negative  · Psychiatric: negative  · Hematology/Lymphatics: negative  · Endocrine: negative      PAST MEDICAL/SURGICAL HISTORY:  No pertinent past medical history    History of ankle surgery      MEDICATIONS  (STANDING):  clindamycin IVPB 900 milliGRAM(s) IV Intermittent every 8 hours  penicillin   G  potassium  IVPB      penicillin   G  potassium  IVPB 4 Million Unit(s) IV Intermittent once  penicillin   G  potassium  IVPB 4 Million Unit(s) IV Intermittent every 4 hours    MEDICATIONS  (PRN):    No Known Allergies    T(C): 36.7 (11-24-21 @ 10:15), Max: 38.3 (11-24-21 @ 06:36)  HR: 81 (11-24-21 @ 10:30) (80 - 94)  BP: 130/61 (11-24-21 @ 10:30) (114/53 - 159/73)  RR: 17 (11-24-21 @ 10:30) (16 - 20)  SpO2: 100% (11-24-21 @ 10:30) (97% - 100%) on room air    I&O's Summary    23 Nov 2021 07:01  -  24 Nov 2021 07:00  --------------------------------------------------------  IN: 0 mL / OUT: 1700 mL / NET: -1700 mL      PHYSICAL EXAM:  Constitutional: No acute distress  Neuro: Awake alert, oriented to person, place and situation, non-focal, speech clear and intact  HEENT: PERRL, anicteric sclerae  Neck: supple, no JVD  CV: regular rate, regular rhythm, +S1S2,   Pulm/chest: lung sounds CTA and equal bilaterally, no accessory muscle use noted  Abd: soft, NT, ND, hypoactive bowel sounds   Ext: Left knee wound vac with acewrap. Right knee with acewrap.   Skin: warm, no jaundice   Psych: calm, appropriate affect      LABS:               10.9   28.86 )-----------( 348      ( 11-24 @ 06:21 )             36.7                9.2    19.53 )-----------( 419      ( 11-23 @ 07:42 )             28.8                7.6    15.49 )-----------( 260      ( 11-22 @ 04:43 )             23.1                7.8    17.20 )-----------( 273      ( 11-21 @ 21:57 )             23.5                7.7    16.87 )-----------( 248      ( 11-21 @ 15:43 )             23.3       11-24    139  |  106  |  13.8  ----------------------------<  68<L>  4.9   |  19.0<L>  |  0.62    Ca    7.7<L>      24 Nov 2021 06:21  Phos  2.7     11-23  Mg     1.7     11-24    Culture - Body Fluid with Gram Stain (collected 23 Nov 2021 12:50)  Source: .Body Fluid Knee Fluid  Gram Stain (23 Nov 2021 23:34):    polymorphonuclear leukocytes seen    No organisms seen    by cytocentrifuge    Culture - Blood (collected 22 Nov 2021 08:44)  Source: .Blood Blood  Preliminary Report (24 Nov 2021 09:00):    No growth at 48 hour        < from: EGD (11.20.21 @ 00:00) >  Findings:     Esophagus Normal esophagus.     Duodenum Additional duodenum findings - There was a deulofoy in the body. The    base was injected with 9 cc epinephrine and 2 hemostat clips placed. There were    5 ulcers in the antrum/peripyloric area. There was a large 2 cm deep ulcer on    the right of pylorus. On the left of the pylorus there was a 1 cm ulcer. Deep ,    but no visible vessel. There were three 5 mm ulcers just proximal to the 1 cm    ulcer ..       Impressions:      Normal esophagus.      - There was a deulofoy in the body. The base was injected with 9 cc epinephrine    and 2 hemostat clips placed. There were 5 ulcers in the antrum/peripyloric area.    There was a large 2 cm deep ulcer on the right of pylorus. On the left of the    pylorus there was a 1 cm ulcer. Deep, but no visible vessel. There were three 5    mm ulcers just proximal to the 1 cm ulcer. .       Plan: Continue current medical regimen.      Additional Notes: start clears in am    check serial hgb    protonix drip         < end of copied text >   Chief Complaint: This is a 51y old woman patient being seen in follow-up consultation for melena.     Interval HPI / 24H events:  Patient is seen and evaluated at bedside, reporting no complaints, no overnight events, s/p EGD revealing normal esophagus, dieulafoy int the body that was injected with epinephrine at the base and 2 hemostat clips placed. 5 Ulcers in  the stomach .  All were in the  antrum/prepyloric area,.  2 were large  1-2 cm deep ulcers, the other 3 were 5 mm ulcers. There was a dielafoy in the body.  Her hemoglobin now remain stable, today 10.9 gm uptrend from 9.2 yesterday.  Denies nausea, vomiting, abdominal pain, chest pain, shortness of breath, hematemesis, hematochezia, melena.                                                           Review of Systems:  · ENMT: negative  · Respiratory and Thorax: negative  · Cardiovascular: negative  · Gastrointestinal: see above.  · Genitourinary:	negative  · Musculoskeletal: negative  · Neurological: negative  · Psychiatric: negative  · Hematology/Lymphatics: negative  · Endocrine: negative      PAST MEDICAL/SURGICAL HISTORY:  No pertinent past medical history    History of ankle surgery      MEDICATIONS  (STANDING):  clindamycin IVPB 900 milliGRAM(s) IV Intermittent every 8 hours  penicillin   G  potassium  IVPB      penicillin   G  potassium  IVPB 4 Million Unit(s) IV Intermittent once  penicillin   G  potassium  IVPB 4 Million Unit(s) IV Intermittent every 4 hours    MEDICATIONS  (PRN):    No Known Allergies    T(C): 36.7 (11-24-21 @ 10:15), Max: 38.3 (11-24-21 @ 06:36)  HR: 81 (11-24-21 @ 10:30) (80 - 94)  BP: 130/61 (11-24-21 @ 10:30) (114/53 - 159/73)  RR: 17 (11-24-21 @ 10:30) (16 - 20)  SpO2: 100% (11-24-21 @ 10:30) (97% - 100%) on room air    I&O's Summary    23 Nov 2021 07:01  -  24 Nov 2021 07:00  --------------------------------------------------------  IN: 0 mL / OUT: 1700 mL / NET: -1700 mL      PHYSICAL EXAM:  Constitutional: No acute distress  Neuro: Awake alert, oriented to person, place and situation, non-focal, speech clear and intact  HEENT: PERRL, anicteric sclerae  Neck: supple, no JVD  CV: regular rate, regular rhythm, +S1S2,   Pulm/chest: lung sounds CTA and equal bilaterally, no accessory muscle use noted  Abd: soft, NT, ND, hypoactive bowel sounds   Ext: Left knee wound vac with acewrap. Right knee with acewrap.   Skin: warm, no jaundice   Psych: calm, appropriate affect      LABS:               10.9   28.86 )-----------( 348      ( 11-24 @ 06:21 )             36.7                9.2    19.53 )-----------( 419      ( 11-23 @ 07:42 )             28.8                7.6    15.49 )-----------( 260      ( 11-22 @ 04:43 )             23.1                7.8    17.20 )-----------( 273      ( 11-21 @ 21:57 )             23.5                7.7    16.87 )-----------( 248      ( 11-21 @ 15:43 )             23.3       11-24    139  |  106  |  13.8  ----------------------------<  68<L>  4.9   |  19.0<L>  |  0.62    Ca    7.7<L>      24 Nov 2021 06:21  Phos  2.7     11-23  Mg     1.7     11-24    Culture - Body Fluid with Gram Stain (collected 23 Nov 2021 12:50)  Source: .Body Fluid Knee Fluid  Gram Stain (23 Nov 2021 23:34):    polymorphonuclear leukocytes seen    No organisms seen    by cytocentrifuge    Culture - Blood (collected 22 Nov 2021 08:44)  Source: .Blood Blood  Preliminary Report (24 Nov 2021 09:00):    No growth at 48 hour        < from: EGD (11.20.21 @ 00:00) >  Findings:     Esophagus Normal esophagus.     Duodenum Additional duodenum findings - There was a deulofoy in the body. The    base was injected with 9 cc epinephrine and 2 hemostat clips placed. There were    5 ulcers in the antrum/peripyloric area. There was a large 2 cm deep ulcer on    the right of pylorus. On the left of the pylorus there was a 1 cm ulcer. Deep ,    but no visible vessel. There were three 5 mm ulcers just proximal to the 1 cm    ulcer ..       Impressions:      Normal esophagus.      - There was a deulofoy in the body. The base was injected with 9 cc epinephrine    and 2 hemostat clips placed. There were 5 ulcers in the antrum/peripyloric area.    There was a large 2 cm deep ulcer on the right of pylorus. On the left of the    pylorus there was a 1 cm ulcer. Deep, but no visible vessel. There were three 5    mm ulcers just proximal to the 1 cm ulcer. .       Plan: Continue current medical regimen.      Additional Notes: start clears in am    check serial hgb    protonix drip         < end of copied text >

## 2021-11-24 NOTE — PROGRESS NOTE ADULT - SUBJECTIVE AND OBJECTIVE BOX
Ortho Post Op Check    Name: LUIS FERNANDO DAVIS  MR #: [id number]    Procedure: right native knee I&D  Surgeon: Jatinder    Pt seen and examined with Dr. Tobias. Pt comfortable without complaints, pain to right knee controlled, some improvement compared to pre-op  Denies CP, SOB, N/V, numbness/tingling     General Exam:  Vital Signs Last 24 Hrs  T(C): 37 (11-24-21 @ 11:15), Max: 37 (11-24-21 @ 11:15)  T(F): 98.6 (11-24-21 @ 11:15), Max: 98.6 (11-24-21 @ 11:15)  HR: 80 (11-24-21 @ 11:15) (80 - 80)  BP: 122/74 (11-24-21 @ 11:15) (122/74 - 122/74)  BP(mean): --  RR: 18 (11-24-21 @ 11:15) (18 - 18)  SpO2: 97% (11-24-21 @ 11:15) (97% - 97%)    General: Pt Alert and oriented, NAD, controlled pain.  Dressings C/D/I. No bleeding.  Pulses: 2+ dorsalis pedis pulse. Cap refill < 2 sec.  Sensation: Grossly intact to light touch without deficit.  Motor: + EHL/FHL/TA/GS      A/P: 51yFemale POD#0 s/p right native knee I&D    - Pain Control  - DVT ppx as per primary team  - Post op abx: as per ID - Clinda, PCN  - PT/OT  - Weight bearing status: WBAT RLE, WBAT LLE in cam boot when able

## 2021-11-24 NOTE — PROGRESS NOTE ADULT - ASSESSMENT
51y Female present to ED with left leg swelling, erythema, pain. Patient endorses she had left knee pain for 1 week presented 3 days ago to ED  where she states was given an steroid shot, and ibuprofen. however pain and edema worsened. Patient states she wasnt feeling herself today reason why she came. Endorses chills initially with pain 3 days ago but none since, denies history of trauma, insect bites, recent interventions, or injections to the area, contact with ticks, history of diabetes.  PT AS ABOVE WITH LEFT LEG SWELLING PT WITH INCREASED WBC PLACED ON ABX FOR PRESUMED INFECTION  PT WITH CT SCAN WITH FASCIAL EDAM PT BROUGHT TO THE OR EMERGENTLY AND  UNDERWENT FASCIOTOMY   PT BROUGHT TO THE OR  BOTH TRAUMA AND ORTHO INVOLVED  PURULENT FLUID FOUND  NECROTIZING SOFT TISSUE INFECTION    BLOOD CX WITH GROUP A STREP AND  OPERATIVE FLUID WITH STREP  PT WAS ON ZOSYN /VANCO/ CLINDA   CHANGED TO  ROCEPHIN FOR GROUP A STREP  CONTINUE  CLINDAMYCIN FOR  POSSIBLE ANTITOXIN EFFECT IN GROUP A STREP INFECTION    BLOOD  CX  REPEATED   PT WITH RIGHT KNEE PAIN WENT TO OR TODAY AND WASHED OUT AND CULTURES TAKEN  WILL CHANGE  ROCEPHIN TO PCN 4MILLION Q 4 FOR STREP AS WELL AS CONTINUE CLINDAMYCIN FOR ADDITIONAL   ANTI TOXIN EFFECT   WILL FOLLOWUP   PT SEEN IN PACU TODAY  LETHARGIC

## 2021-11-24 NOTE — PROGRESS NOTE ADULT - ATTENDING COMMENTS
52 yo F present to ED with left leg swelling, erythema, pain, and leukocytosis is s/p LLE exploration and debridement for LLE necrotizing soft tissue infection. 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 Ortho tapped the Right knee and found murky fluid which had no organisms  AAOx3, and complained of RLE swelling.   PUL CTA  CV RRR  GI benign  MS LLE with open wounds and wound VAC. R knee tender  Plan  1. Ortho for surgical intervention to R Knee  2. Obtain  ECHO  3. Continue surveillance blood cultures      Code [id number] .

## 2021-11-25 LAB
ANION GAP SERPL CALC-SCNC: 12 MMOL/L — SIGNIFICANT CHANGE UP (ref 5–17)
BASOPHILS # BLD AUTO: 0.07 K/UL — SIGNIFICANT CHANGE UP (ref 0–0.2)
BASOPHILS NFR BLD AUTO: 0.3 % — SIGNIFICANT CHANGE UP (ref 0–2)
BUN SERPL-MCNC: 11.6 MG/DL — SIGNIFICANT CHANGE UP (ref 8–20)
CALCIUM SERPL-MCNC: 7.4 MG/DL — LOW (ref 8.6–10.2)
CHLORIDE SERPL-SCNC: 101 MMOL/L — SIGNIFICANT CHANGE UP (ref 98–107)
CO2 SERPL-SCNC: 21 MMOL/L — LOW (ref 22–29)
CREAT SERPL-MCNC: 0.71 MG/DL — SIGNIFICANT CHANGE UP (ref 0.5–1.3)
CULTURE RESULTS: SIGNIFICANT CHANGE UP
EOSINOPHIL # BLD AUTO: 0.2 K/UL — SIGNIFICANT CHANGE UP (ref 0–0.5)
EOSINOPHIL NFR BLD AUTO: 0.8 % — SIGNIFICANT CHANGE UP (ref 0–6)
GLUCOSE BLDC GLUCOMTR-MCNC: 130 MG/DL — HIGH (ref 70–99)
GLUCOSE BLDC GLUCOMTR-MCNC: 74 MG/DL — SIGNIFICANT CHANGE UP (ref 70–99)
GLUCOSE BLDC GLUCOMTR-MCNC: 85 MG/DL — SIGNIFICANT CHANGE UP (ref 70–99)
GLUCOSE BLDC GLUCOMTR-MCNC: 85 MG/DL — SIGNIFICANT CHANGE UP (ref 70–99)
GLUCOSE SERPL-MCNC: 125 MG/DL — HIGH (ref 70–99)
GRAM STN FLD: SIGNIFICANT CHANGE UP
HCT VFR BLD CALC: 28.4 % — LOW (ref 34.5–45)
HGB BLD-MCNC: 8.5 G/DL — LOW (ref 11.5–15.5)
IMM GRANULOCYTES NFR BLD AUTO: 0.8 % — SIGNIFICANT CHANGE UP (ref 0–1.5)
LYMPHOCYTES # BLD AUTO: 1.6 K/UL — SIGNIFICANT CHANGE UP (ref 1–3.3)
LYMPHOCYTES # BLD AUTO: 6.8 % — LOW (ref 13–44)
MAGNESIUM SERPL-MCNC: 1.6 MG/DL — SIGNIFICANT CHANGE UP (ref 1.6–2.6)
MCHC RBC-ENTMCNC: 27.2 PG — SIGNIFICANT CHANGE UP (ref 27–34)
MCHC RBC-ENTMCNC: 29.9 GM/DL — LOW (ref 32–36)
MCV RBC AUTO: 91 FL — SIGNIFICANT CHANGE UP (ref 80–100)
MONOCYTES # BLD AUTO: 0.61 K/UL — SIGNIFICANT CHANGE UP (ref 0–0.9)
MONOCYTES NFR BLD AUTO: 2.6 % — SIGNIFICANT CHANGE UP (ref 2–14)
NEUTROPHILS # BLD AUTO: 20.95 K/UL — HIGH (ref 1.8–7.4)
NEUTROPHILS NFR BLD AUTO: 88.7 % — HIGH (ref 43–77)
PLATELET # BLD AUTO: 514 K/UL — HIGH (ref 150–400)
POTASSIUM SERPL-MCNC: 4.1 MMOL/L — SIGNIFICANT CHANGE UP (ref 3.5–5.3)
POTASSIUM SERPL-SCNC: 4.1 MMOL/L — SIGNIFICANT CHANGE UP (ref 3.5–5.3)
RBC # BLD: 3.12 M/UL — LOW (ref 3.8–5.2)
RBC # FLD: 22.7 % — HIGH (ref 10.3–14.5)
SODIUM SERPL-SCNC: 134 MMOL/L — LOW (ref 135–145)
SPECIMEN SOURCE: SIGNIFICANT CHANGE UP
SPECIMEN SOURCE: SIGNIFICANT CHANGE UP
WBC # BLD: 23.61 K/UL — HIGH (ref 3.8–10.5)
WBC # FLD AUTO: 23.61 K/UL — HIGH (ref 3.8–10.5)

## 2021-11-25 PROCEDURE — 99233 SBSQ HOSP IP/OBS HIGH 50: CPT | Mod: 25

## 2021-11-25 RX ORDER — HYDROMORPHONE HYDROCHLORIDE 2 MG/ML
0.5 INJECTION INTRAMUSCULAR; INTRAVENOUS; SUBCUTANEOUS ONCE
Refills: 0 | Status: DISCONTINUED | OUTPATIENT
Start: 2021-11-25 | End: 2021-11-25

## 2021-11-25 RX ORDER — MAGNESIUM SULFATE 500 MG/ML
2 VIAL (ML) INJECTION ONCE
Refills: 0 | Status: COMPLETED | OUTPATIENT
Start: 2021-11-25 | End: 2021-11-25

## 2021-11-25 RX ORDER — BACLOFEN 100 %
10 POWDER (GRAM) MISCELLANEOUS THREE TIMES A DAY
Refills: 0 | Status: DISCONTINUED | OUTPATIENT
Start: 2021-11-25 | End: 2021-12-02

## 2021-11-25 RX ADMIN — METHOCARBAMOL 750 MILLIGRAM(S): 500 TABLET, FILM COATED ORAL at 12:50

## 2021-11-25 RX ADMIN — METHOCARBAMOL 750 MILLIGRAM(S): 500 TABLET, FILM COATED ORAL at 21:10

## 2021-11-25 RX ADMIN — Medication 100 MILLIGRAM(S): at 12:50

## 2021-11-25 RX ADMIN — OXYCODONE HYDROCHLORIDE 10 MILLIGRAM(S): 5 TABLET ORAL at 10:57

## 2021-11-25 RX ADMIN — OXYCODONE HYDROCHLORIDE 10 MILLIGRAM(S): 5 TABLET ORAL at 17:31

## 2021-11-25 RX ADMIN — SODIUM CHLORIDE 100 MILLILITER(S): 9 INJECTION, SOLUTION INTRAVENOUS at 00:16

## 2021-11-25 RX ADMIN — PENICILLIN G POTASSIUM 100 MILLION UNIT(S): 5000000 POWDER, FOR SOLUTION INTRAMUSCULAR; INTRAPLEURAL; INTRATHECAL; INTRAVENOUS at 21:11

## 2021-11-25 RX ADMIN — PENICILLIN G POTASSIUM 100 MILLION UNIT(S): 5000000 POWDER, FOR SOLUTION INTRAMUSCULAR; INTRAPLEURAL; INTRATHECAL; INTRAVENOUS at 04:57

## 2021-11-25 RX ADMIN — PANTOPRAZOLE SODIUM 40 MILLIGRAM(S): 20 TABLET, DELAYED RELEASE ORAL at 17:13

## 2021-11-25 RX ADMIN — HYDROMORPHONE HYDROCHLORIDE 0.5 MILLIGRAM(S): 2 INJECTION INTRAMUSCULAR; INTRAVENOUS; SUBCUTANEOUS at 20:22

## 2021-11-25 RX ADMIN — HEPARIN SODIUM 5000 UNIT(S): 5000 INJECTION INTRAVENOUS; SUBCUTANEOUS at 05:13

## 2021-11-25 RX ADMIN — Medication 975 MILLIGRAM(S): at 05:12

## 2021-11-25 RX ADMIN — GABAPENTIN 200 MILLIGRAM(S): 400 CAPSULE ORAL at 21:11

## 2021-11-25 RX ADMIN — OXYCODONE HYDROCHLORIDE 5 MILLIGRAM(S): 5 TABLET ORAL at 05:43

## 2021-11-25 RX ADMIN — Medication 100 MILLIGRAM(S): at 05:14

## 2021-11-25 RX ADMIN — PANTOPRAZOLE SODIUM 40 MILLIGRAM(S): 20 TABLET, DELAYED RELEASE ORAL at 05:14

## 2021-11-25 RX ADMIN — PENICILLIN G POTASSIUM 100 MILLION UNIT(S): 5000000 POWDER, FOR SOLUTION INTRAMUSCULAR; INTRAPLEURAL; INTRATHECAL; INTRAVENOUS at 12:58

## 2021-11-25 RX ADMIN — Medication 1 APPLICATION(S): at 16:36

## 2021-11-25 RX ADMIN — Medication 10 MILLIGRAM(S): at 21:10

## 2021-11-25 RX ADMIN — HYDROMORPHONE HYDROCHLORIDE 0.5 MILLIGRAM(S): 2 INJECTION INTRAMUSCULAR; INTRAVENOUS; SUBCUTANEOUS at 17:32

## 2021-11-25 RX ADMIN — HEPARIN SODIUM 5000 UNIT(S): 5000 INJECTION INTRAVENOUS; SUBCUTANEOUS at 10:59

## 2021-11-25 RX ADMIN — PENICILLIN G POTASSIUM 100 MILLION UNIT(S): 5000000 POWDER, FOR SOLUTION INTRAMUSCULAR; INTRAPLEURAL; INTRATHECAL; INTRAVENOUS at 17:14

## 2021-11-25 RX ADMIN — Medication 975 MILLIGRAM(S): at 23:17

## 2021-11-25 RX ADMIN — HEPARIN SODIUM 5000 UNIT(S): 5000 INJECTION INTRAVENOUS; SUBCUTANEOUS at 21:10

## 2021-11-25 RX ADMIN — SODIUM CHLORIDE 100 MILLILITER(S): 9 INJECTION, SOLUTION INTRAVENOUS at 22:27

## 2021-11-25 RX ADMIN — Medication 100 MILLIGRAM(S): at 22:18

## 2021-11-25 RX ADMIN — HYDROMORPHONE HYDROCHLORIDE 0.5 MILLIGRAM(S): 2 INJECTION INTRAMUSCULAR; INTRAVENOUS; SUBCUTANEOUS at 20:37

## 2021-11-25 RX ADMIN — HYDROMORPHONE HYDROCHLORIDE 0.5 MILLIGRAM(S): 2 INJECTION INTRAMUSCULAR; INTRAVENOUS; SUBCUTANEOUS at 08:20

## 2021-11-25 RX ADMIN — SENNA PLUS 2 TABLET(S): 8.6 TABLET ORAL at 21:10

## 2021-11-25 RX ADMIN — OXYCODONE HYDROCHLORIDE 5 MILLIGRAM(S): 5 TABLET ORAL at 05:13

## 2021-11-25 RX ADMIN — Medication 975 MILLIGRAM(S): at 06:12

## 2021-11-25 RX ADMIN — OXYCODONE HYDROCHLORIDE 10 MILLIGRAM(S): 5 TABLET ORAL at 16:31

## 2021-11-25 RX ADMIN — Medication 50 GRAM(S): at 20:22

## 2021-11-25 RX ADMIN — GABAPENTIN 200 MILLIGRAM(S): 400 CAPSULE ORAL at 05:13

## 2021-11-25 RX ADMIN — METHOCARBAMOL 750 MILLIGRAM(S): 500 TABLET, FILM COATED ORAL at 05:13

## 2021-11-25 RX ADMIN — Medication 975 MILLIGRAM(S): at 10:59

## 2021-11-25 RX ADMIN — HYDROMORPHONE HYDROCHLORIDE 0.5 MILLIGRAM(S): 2 INJECTION INTRAMUSCULAR; INTRAVENOUS; SUBCUTANEOUS at 13:50

## 2021-11-25 RX ADMIN — HYDROMORPHONE HYDROCHLORIDE 0.5 MILLIGRAM(S): 2 INJECTION INTRAMUSCULAR; INTRAVENOUS; SUBCUTANEOUS at 08:09

## 2021-11-25 RX ADMIN — GABAPENTIN 200 MILLIGRAM(S): 400 CAPSULE ORAL at 10:58

## 2021-11-25 RX ADMIN — Medication 10 MILLIGRAM(S): at 11:00

## 2021-11-25 RX ADMIN — Medication 1 APPLICATION(S): at 11:25

## 2021-11-25 RX ADMIN — POLYETHYLENE GLYCOL 3350 17 GRAM(S): 17 POWDER, FOR SOLUTION ORAL at 10:58

## 2021-11-25 RX ADMIN — OXYCODONE HYDROCHLORIDE 10 MILLIGRAM(S): 5 TABLET ORAL at 11:57

## 2021-11-25 RX ADMIN — PENICILLIN G POTASSIUM 100 MILLION UNIT(S): 5000000 POWDER, FOR SOLUTION INTRAMUSCULAR; INTRAPLEURAL; INTRATHECAL; INTRAVENOUS at 08:52

## 2021-11-25 NOTE — PROGRESS NOTE ADULT - ASSESSMENT
51y Female present to ED with left leg swelling, erythema, pain, leukocytosis and acute renal failure. CT scan concerning for necrotizing soft tissue infection. 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  Patient had complained or RLE swelling. Right lower extremity duplex and negative for DVT; however + swelling in popliteal fossa to R calf.  R knee I&D performed.     Plan:   - Pain control   - Daily dressing changes, wound care        Will reassess wound in AM, currently wrapped   - F/U Cx   - Continue IV abx: Clinda, penicillin   - AM labs, monitor lytes, replete PRN   - PICC line  - PT/OT

## 2021-11-25 NOTE — PROGRESS NOTE ADULT - SUBJECTIVE AND OBJECTIVE BOX
INFECTIOUS DISEASES AND INTERNAL MEDICINE at [place]  =======================================================  Theo Morrow MD  Diplomates American Board of Internal Medicine and Infectious Diseases  Telephone [phone]  Fax            [phone]  =======================================================    LUIS FERNANDO DAVIS [id number]    Follow up: group A STREP NECROTIZING SOFT TISSUE INFECTION    NOW s/P RIGHT KNEE SURGERY    Allergies:  No Known Allergies      Medications:  acetaminophen     Tablet .. 975 milliGRAM(s) Oral every 6 hours   PCN   chlorhexidine 2% Cloths 1 Application(s) Topical <User Schedule>  clindamycin IVPB 900 milliGRAM(s) IV Intermittent every 8 hours  fentaNYL    Injectable 75 MICROGram(s) IV Push every 8 hours PRN  gabapentin 200 milliGRAM(s) Oral three times a day  glucagon  Injectable 1 milliGRAM(s) IntraMuscular once  hydrocortisone sodium succinate Injectable 50 milliGRAM(s) IV Push daily  insulin lispro (ADMELOG) corrective regimen sliding scale   SubCutaneous Before meals and at bedtime  lisinopril 5 milliGRAM(s) Oral daily  multiple electrolytes Injection Type 1 1000 milliLiter(s) IV Continuous <Continuous>  ondansetron Injectable 4 milliGRAM(s) IV Push every 4 hours PRN  oxyCODONE    IR 5 milliGRAM(s) Oral every 4 hours PRN  oxyCODONE    IR 10 milliGRAM(s) Oral every 4 hours PRN  pantoprazole  Injectable 40 milliGRAM(s) IV Push two times a day    SOCIAL       FAMILY   FAMILY HISTORY:  FH: HTN (hypertension) (Mother)      REVIEW OF SYSTEMS:  CONSTITUTIONAL:  No Fever or chills  HEENT:   No diplopia or blurred vision.  No earache, sore throat or runny nose.  CARDIOVASCULAR:  No pressure, squeezing, strangling, tightness, heaviness or aching about the chest, neck, axilla or epigastrium.  RESPIRATORY:  No cough, shortness of breath, PND or orthopnea.  GASTROINTESTINAL:  No nausea, vomiting or diarrhea.  GENITOURINARY:  No dysuria, frequency or urgency. No Blood in urine  MUSCULOSKELETAL:   AS PER HPI   SKIN:  No change in skin, hair or nails.  NEUROLOGIC:  No paresthesias, fasciculations, seizures or weakness.  PSYCHIATRIC:  No disorder of thought or mood.  ENDOCRINE:  No heat or cold intolerance, polyuria or polydipsia.  HEMATOLOGICAL:  No easy bruising or bleeding.            Physical Exam:  I  Vital Signs Last 24 Hrs  T(C): 37.1 (25 Nov 2021 06:00), Max: 39.3 (24 Nov 2021 17:00)  T(F): 98.8 (25 Nov 2021 06:00), Max: 102.8 (24 Nov 2021 17:00)  HR: 78 (25 Nov 2021 06:00) (78 - 85)  BP: 112/64 (25 Nov 2021 06:00) (101/63 - 122/74)  BP(mean): --  RR: 18 (25 Nov 2021 06:00) (18 - 18)  SpO2: 95% (25 Nov 2021 06:00) (95% - 97%)    GEN: NAD, LETHARGIC  HEENT: normocephalic and atraumatic. EOMI. ASHISH.    NECK: Supple. No carotid bruits.  No lymphadenopathy or thyromegaly.  LUNGS: Clear to auscultation.  HEART: Regular rate and rhythm without murmur.  ABDOMEN: Soft, nontender, and nondistended.  Positive bowel sounds.    : No CVA tenderness  EXTREMITIES: LEFT LEG WRAPPED  RIGHT LEG WRAPPED     NEUROLOGIC: LETHARGIC  BUT ANSWERS QUESTIONS  PSYCHIATRIC: Appropriate affect .  SKIN: No ulceration or induration present.        Labs:  Vitals:  =   =======================================================  Current Antibiotics:  PCN  clindamycin IVPB 900 milliGRAM(s) IV Intermittent every 8 hours    Other medications:  acetaminophen     Tablet .. 975 milliGRAM(s) Oral every 6 hours  chlorhexidine 2% Cloths 1 Application(s) Topical <User Schedule>  gabapentin 200 milliGRAM(s) Oral three times a day  glucagon  Injectable 1 milliGRAM(s) IntraMuscular once  hydrocortisone sodium succinate Injectable 50 milliGRAM(s) IV Push daily  insulin lispro (ADMELOG) corrective regimen sliding scale   SubCutaneous Before meals and at bedtime  lisinopril 5 milliGRAM(s) Oral daily  multiple electrolytes Injection Type 1 1000 milliLiter(s) IV Continuous <Continuous>  pantoprazole  Injectable 40 milliGRAM(s) IV Push two times a day      =======================================================  Labs:                                      8.5    23.61 )-----------( x        ( 25 Nov 2021 10:21 )             28.4   11-25    134<L>  |  101  |  11.6  ----------------------------<  125<H>  4.1   |  21.0<L>  |  0.71    Ca    7.4<L>      25 Nov 2021 10:21  Mg     1.6     11-25              Culture - Body Fluid with Gram Stain (collected 11-18-21 @ 16:20)  Source: .Body Fluid OR Posterior Calf Left Lower Extremity Fluid  Gram Stain (11-18-21 @ 21:39):    polymorphonuclear leukocytes seen    Gram Positive Cocci in Pairs and Chains seen    by cytocentrifuge    Culture - Body Fluid with Gram Stain (collected 11-18-21 @ 16:20)  Source: .Body Fluid OR Swab Left Knee Lower Extremity Fluid  Gram Stain (11-18-21 @ 19:36):    polymorphonuclear leukocytes seen    Gram Positive Cocci in Pairs and Chains seen    by cytocentrifuge    Culture - Body Fluid with Gram Stain (collected 11-18-21 @ 16:20)  Source: .Body Fluid OR - Left Lower Extremity Fluid  Gram Stain (11-18-21 @ 20:10):    No polymorphonuclear leukocytes seen    No organisms seen    by cytocentrifuge    Culture - Urine (collected 11-18-21 @ 10:07)  Source: Catheterized Catheterized  Final Report (11-19-21 @ 07:03):    No growth    Culture - Urine (collected 11-18-21 @ 00:25)  Source: Catheterized Catheterized  Final Report (11-18-21 @ 22:09):    <10,000 CFU/mL Normal Urogenital Aimee    Culture - Blood (collected 11-17-21 @ 14:45)  Source: .Blood Blood  Gram Stain (11-18-21 @ 09:44):    Growth in aerobic and anaerobic bottles: Gram Positive Cocci in Pairs and    Chains    Gram Stain performed by:    [facility]    [address] [place] [id number]    .    TYPE: (C=Critical, N=Notification, A=Abnormal) C    TESTS:  _ GS    DATE/TIME CALLED: _ 11/18/2021 09:42:19    CALLED TO: Chris Hernandez RN    READ BACK (2 Patient Identifiers)(Y/N): _ Y    READ BACK VALUES (Y/N): _ Y    CALLED BY: Chris Quiñones  Final Report (11-21-21 @ 12:28):    Growth in aerobic and anaerobic bottles: Streptococcus pyogenes (Group A)    See previous culture [id number]    Culture - Blood (collected 11-17-21 @ 14:45)  Source: .Blood Blood  Gram Stain (11-18-21 @ 09:40):    Growth in aerobic and anaerobic bottles: Gram Positive Cocci in Pairs and    Chains    ***Blood Panel PCR results on this specimen are available    approximately 3 hours after the Gram stain result.***    Gram stain, PCR, and/or culture results may not always    correspond due to difference in methodologies.    ************************************************************    This PCR assay was performed using Ciplex.    The following targets are tested for: Enterococcus,    vancomycin resistant enterococci, Listeria monocytogenes,    coagulase negative staphylococci, S. aureus,    methicillin resistant S. aureus, Streptococcus agalactiae    (Group B), S. pneumoniae, S. pyogenes (Group A),    Acinetobacter baumannii, Enterobacter cloacae, E. coli,    Klebsiella oxytoca, K. pneumoniae, Proteus sp.,    Serratia marcescens, Haemophilus influenzae,    Neisseria meningitidis, Pseudomonas aeruginosa, Candida    albicans, C. glabrata, C krusei, C parapsilosis,    C. tropicalis and the KPC resistance gene.    Gram Stain and BCID performed by:    [facility]    [address] [place] [id number]    .    TYPE: (C=Critical, N=Notification, A=Abnormal) C    TESTS:  _ GS    DATE/TIME CALLED: _ 11/18/2021 09:40:08    CALLED TO: Chris Hernandez RN    READ BACK (2 Patient Identifiers)(Y/N): _ Y    READ BACK VALUES (Y/N): _ Y    CALLED BY: Chris Quiñones  Final Report (11-21-21 @ 12:28):    Growth in aerobic and anaerobic bottles: Streptococcus pyogenes (Group A)  Organism: Blood Culture PCR  Streptococcus pyogenes (Group A) (11-21-21 @ 12:28)  Organism: Streptococcus pyogenes (Group A) (11-21-21 @ 12:28)    Sensitivities:      -  Ceftriaxone: S 0.016      -  Penicillin: S 0.016 Predicts results for ampicillin, amoxicillin, amoxicillin/clavulanate, ampicillin/sulbactam, 1st, 2nd and 3rd generation cephalosporins and carbapenems.      Method Type: ETEST  Organism: Streptococcus pyogenes (Group A) (11-21-21 @ 12:28)    Sensitivities:      -  Vancomycin: S      Method Type: KB  Organism: Blood Culture PCR (11-21-21 @ 12:28)    Sensitivities:      -  Streptococcus pyogenes (Group A): Detec      Method Type: PCR      Creatinine, Serum: 0.70 mg/dL (11-22-21 @ 04:43)  Creatinine, Serum: 0.73 mg/dL (11-21-21 @ 21:57)  Creatinine, Serum: 0.88 mg/dL (11-21-21 @ 15:43)  Creatinine, Serum: 1.02 mg/dL (11-21-21 @ 04:19)  Creatinine, Serum: 1.11 mg/dL (11-20-21 @ 16:46)  Creatinine, Serum: 1.41 mg/dL (11-20-21 @ 05:21)  Creatinine, Serum: 1.42 mg/dL (11-19-21 @ 14:54)  Creatinine, Serum: 1.63 mg/dL (11-19-21 @ 05:48)  Creatinine, Serum: 1.98 mg/dL (11-18-21 @ 21:01)  Creatinine, Serum: 2.54 mg/dL (11-18-21 @ 04:34)  Creatinine, Serum: 2.52 mg/dL (11-17-21 @ 23:52)  Creatinine, Serum: 3.80 mg/dL (11-17-21 @ 13:21)        Ferritin, Serum: 388 ng/mL (11-17-21 @ 13:21)    C-Reactive Protein, Serum: >422 mg/L (11-17-21 @ 13:21)    WBC Count: 15.49 K/uL (11-22-21 @ 04:43)  WBC Count: 17.20 K/uL (11-21-21 @ 21:57)  WBC Count: 16.87 K/uL (11-21-21 @ 15:43)  WBC Count: 20.11 K/uL (11-21-21 @ 04:19)  WBC Count: 26.59 K/uL (11-20-21 @ 16:46)  WBC Count: 25.35 K/uL (11-20-21 @ 10:32)  WBC Count: 26.27 K/uL (11-20-21 @ 05:21)  WBC Count: 29.79 K/uL (11-19-21 @ 23:48)  WBC Count: 29.67 K/uL (11-19-21 @ 14:54)  WBC Count: 26.93 K/uL (11-19-21 @ 05:48)  WBC Count: 29.87 K/uL (11-18-21 @ 21:01)  WBC Count: 24.81 K/uL (11-18-21 @ 04:34)  WBC Count: 23.17 K/uL (11-17-21 @ 23:52)  WBC Count: 46.68 K/uL (11-17-21 @ 13:21)      COVID-19 PCR: NotDetec (11-17-21 @ 13:18)    Lactate Dehydrogenase, Serum: 769 U/L (11-20-21 @ 03:56)    Alkaline Phosphatase, Serum: 75 U/L (11-19-21 @ 05:48)  Alanine Aminotransferase (ALT/SGPT): 14 U/L (11-19-21 @ 05:48)  Aspartate Aminotransferase (AST/SGOT): 42 U/L (11-19-21 @ 05:48)  Bilirubin Total, Serum: 0.5 mg/dL (11-19-21 @ 05:48)

## 2021-11-25 NOTE — PROGRESS NOTE ADULT - ASSESSMENT
51y Female present to ED with left leg swelling, erythema, pain. Patient endorses she had left knee pain for 1 week presented 3 days ago to ED  where she states was given an steroid shot, and ibuprofen. however pain and edema worsened. Patient states she wasnt feeling herself today reason why she came. Endorses chills initially with pain 3 days ago but none since, denies history of trauma, insect bites, recent interventions, or injections to the area, contact with ticks, history of diabetes.  PT AS ABOVE WITH LEFT LEG SWELLING PT WITH INCREASED WBC PLACED ON ABX FOR PRESUMED INFECTION  PT WITH CT SCAN WITH FASCIAL EDAM PT BROUGHT TO THE OR EMERGENTLY AND  UNDERWENT FASCIOTOMY   PT BROUGHT TO THE OR  BOTH TRAUMA AND ORTHO INVOLVED  PURULENT FLUID FOUND  NECROTIZING SOFT TISSUE INFECTION    BLOOD CX WITH GROUP A STREP AND  OPERATIVE FLUID WITH STREP   PT ON PCN AND CLINDA    P  CONTINUE  CLINDAMYCIN FOR  POSSIBLE ANTITOXIN EFFECT IN GROUP A STREP INFECTION    BLOOD  CX  REPEATED   PT WITH RIGHT KNEE PAIN WENT TO OR 11/24  AND WASHED OUT AND CULTURES TAKEN  W PT WTIH RPROTED OF BILATERAL SHOULDER PAIN UNCLEAR ETIOLOGY  WILL D/W  TRAUMA  ? MRI OF CERVICAL SPINE  WILL FOLLOW UP

## 2021-11-25 NOTE — PROGRESS NOTE ADULT - ATTENDING COMMENTS
52 yo F present to ED with left leg swelling, erythema, pain, and leukocytosis is s/p LLE exploration and debridement for LLE necrotizing soft tissue infection. 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 Ortho tapped the Right knee and found murky fluid which had no organisms  AAOx3, and complained of RLE swelling.   PUL CTA  CV RRR  GI benign  MS LLE with open wounds and wound VAC. R knee tender  WBC 28.8--> 23  ECHO no vegetation  Plan  1. S/P  R Knee drainage and antibx on  2. Add muscle relaxer  3. Awaiting clearance from ortho for ambulation      Code [id number] .

## 2021-11-25 NOTE — PROGRESS NOTE ADULT - SUBJECTIVE AND OBJECTIVE BOX
Patient seen and examined. POD #0 of right knee incision and drainage. Pain controlled. Doing well. denies acute sensory/motor changes at this time.    Vital Signs Last 24 Hrs  T(C): 37.4 (24 Nov 2021 21:53), Max: 39.3 (24 Nov 2021 17:00)  T(F): 99.3 (24 Nov 2021 21:53), Max: 102.8 (24 Nov 2021 17:00)  HR: 85 (24 Nov 2021 21:53) (80 - 94)  BP: 101/63 (24 Nov 2021 21:53) (101/63 - 159/73)  BP(mean): 78 (24 Nov 2021 10:30) (68 - 83)  RR: 18 (24 Nov 2021 21:53) (16 - 20)  SpO2: 97% (24 Nov 2021 21:53) (97% - 100%)    Alert and oriented NAD  RLE:  Ace is c/d/i  Calf compressible nontender  SILT distally  +DF/PF/EHL/FHL  vasc, warm and perfused    abx as per ID  pain control  dvtp  Ortho managing b/l incisions  WBAT b/l LE

## 2021-11-25 NOTE — PHYSICAL THERAPY INITIAL EVALUATION ADULT - GENERAL OBSERVATIONS, REHAB EVAL
Pt received in semifowler position with LLE wound vac, LLE boot, +IV, +monitor, 10/10 pain before during and after treatment.

## 2021-11-25 NOTE — PHYSICAL THERAPY INITIAL EVALUATION ADULT - ADDITIONAL COMMENTS
Pt. lives in a house with her mother, father, and 2 sisters. her mother and one sister who is visiting will be able to assist as needed. Pt. has 3 KANIKA without a rail and 12 inside with a rail. Pt. was independent PTA. No DME. Sister is visually impaired and the first floor is set up for living.

## 2021-11-25 NOTE — PROGRESS NOTE ADULT - SUBJECTIVE AND OBJECTIVE BOX
Ortho Post Op Check    Name: LUIS FERNANDO DAVIS    MR #: [id number]    Procedure: Right knee I&D POD#1 (11/24/21),  Left knee I&D w [facility]ON left ankle POD#8 (11/17/21)  Surgeon: Dr Tobias    Patient expressing concern about inability/weakness in bilateral UE. Patient states she feels weak in her arms and is unable to lift her arms up past shoulder height. She has NO shoulder or arm pain.   States no back or neck pain, she simply feels weak and "restricted" as if a weight or heavy object were on top of her  Patient also states she continues to have pain in her right knee.   Denies CP, SOB, N/V, numbness/tingling     PAST MEDICAL & SURGICAL HISTORY:  No pertinent past medical history    History of ankle surgery        General Exam:  Vital Signs Last 24 Hrs  T(C): 37.2 (11-25-21 @ 11:00), Max: 37.2 (11-25-21 @ 11:00)  T(F): 99 (11-25-21 @ 11:00), Max: 99 (11-25-21 @ 11:00)  HR: 88 (11-25-21 @ 11:00) (78 - 88)  BP: 132/71 (11-25-21 @ 11:00) (112/64 - 132/71)  BP(mean): --  RR: 18 (11-25-21 @ 11:00) (18 - 18)  SpO2: 99% (11-25-21 @ 11:00) (95% - 99%)    General: Pt Alert and oriented, NAD, controlled pain.  Right knee ace wrap dressing was saturated posteriorly, therefore removed. Serous drainage noted on arun in bed. Anterior knee dressing remains C/D/I.   Left medial leg/ superficial medial knee area has a wound vac that appears to not be functioning and is leaking clear serous drainage. After drying around the wound vac sponge I placed new tegaderm to achieve a seal. The vac machine itself had been unplugged and had no battery life left. Once plugged in it seemed to be functioning well. Over 400ml in canister. Replacement canister left at bedside. ACS team made aware  Left knee anterior dressings remain C/D/I. No bleeding. Left foot unable to dorsi flex  Bilateral calves remain soft, supple and nontender  Pulses: 2+ dorsalis pedis pulse. Cap refill < 2 sec.  Sensation: Grossly intact to light touch without deficit.                            8.5    23.61 )-----------( 514      ( 25 Nov 2021 10:21 )             28.4   11-25    134<L>  |  101  |  11.6  ----------------------------<  125<H>  4.1   |  21.0<L>  |  0.71    Ca    7.4<L>      25 Nov 2021 10:21  Mg     1.6     11-25    I&O's Detail    24 Nov 2021 07:01  -  25 Nov 2021 07:00  --------------------------------------------------------  IN:    IV PiggyBack: 100 mL    IV PiggyBack: 100 mL    multiple electrolytes Injection Type 1.: 400 mL    Oral Fluid: 500 mL  Total IN: 1100 mL    OUT:    Voided (mL): 400 mL  Total OUT: 400 mL    Total NET: 700 mL      MEDICATIONS  (STANDING):  acetaminophen     Tablet .. 975 milliGRAM(s) Oral every 6 hours  baclofen 10 milliGRAM(s) Oral three times a day  clindamycin IVPB 900 milliGRAM(s) IV Intermittent every 8 hours  collagenase Ointment 1 Application(s) Topical two times a day  gabapentin 200 milliGRAM(s) Oral three times a day  glucagon  Injectable 1 milliGRAM(s) IntraMuscular once  heparin   Injectable 5000 Unit(s) SubCutaneous every 8 hours  insulin lispro (ADMELOG) corrective regimen sliding scale   SubCutaneous Before meals and at bedtime  lisinopril 5 milliGRAM(s) Oral daily  methocarbamol 750 milliGRAM(s) Oral three times a day  multiple electrolytes Injection Type 1 1000 milliLiter(s) (100 mL/Hr) IV Continuous <Continuous>  pantoprazole  Injectable 40 milliGRAM(s) IV Push two times a day  penicillin   G  potassium  IVPB      penicillin   G  potassium  IVPB 4 Million Unit(s) IV Intermittent every 4 hours  polyethylene glycol 3350 17 Gram(s) Oral daily  senna 2 Tablet(s) Oral at bedtime    MEDICATIONS  (PRN):  fentaNYL    Injectable 75 MICROGram(s) IV Push every 8 hours PRN Dressing changes  HYDROmorphone  Injectable 0.5 milliGRAM(s) IV Push every 6 hours PRN breakthrough pain  ondansetron Injectable 4 milliGRAM(s) IV Push every 4 hours PRN Nausea and/or Vomiting  oxyCODONE    IR 5 milliGRAM(s) Oral every 4 hours PRN Moderate Pain (4 - 6)  oxyCODONE    IR 10 milliGRAM(s) Oral every 4 hours PRN Severe Pain (7 - 10)      A/P: 51yFemale Right knee I&D POD#1 (11/24/21),  Left knee I&D w [facility] left ankle POD#8 (11/17/21)  - Pain Control- recommend Pian management or tighter pain control   - DVT ppx: Heparin  - Post op abx: as per ID team   - PT eval pending  - Weight bearing status: WBAT B/L LE Ortho Post Op Check    Name: LUIS FERNANDO DAVIS    MR #: [id number]    Procedure: Right knee I&D POD#1 (11/24/21),  Left knee I&D w [facility] left ankle POD#8 (11/17/21)  Surgeon: Dr Tobias    Patient expressing concern about inability/weakness in bilateral UE. Patient states she feels weak in her arms and is unable to lift her arms up past shoulder height. She has NO shoulder or arm pain.   States no back or neck pain, she simply feels weak and "restricted" as if a weight or heavy object were on top of her  Patient also states she continues to have pain in her right knee.   Denies CP, SOB, N/V, numbness/tingling     PAST MEDICAL & SURGICAL HISTORY:  No pertinent past medical history    History of ankle surgery        General Exam:  Vital Signs Last 24 Hrs  T(C): 37.2 (11-25-21 @ 11:00), Max: 37.2 (11-25-21 @ 11:00)  T(F): 99 (11-25-21 @ 11:00), Max: 99 (11-25-21 @ 11:00)  HR: 88 (11-25-21 @ 11:00) (78 - 88)  BP: 132/71 (11-25-21 @ 11:00) (112/64 - 132/71)  BP(mean): --  RR: 18 (11-25-21 @ 11:00) (18 - 18)  SpO2: 99% (11-25-21 @ 11:00) (95% - 99%)    General: Pt Alert and oriented, NAD, controlled pain.  Right knee ace wrap dressing was saturated posteriorly, therefore removed. Serous drainage noted on arun in bed. Anterior knee dressing remains C/D/I.   Left medial leg/ superficial medial knee area has a wound vac that appears to not be functioning and is leaking clear serous drainage. After drying around the wound vac sponge I placed new tegaderm to achieve a seal. The vac machine itself had been unplugged and had no battery life left. Once plugged in it seemed to be functioning well. Over 400ml in canister. Replacement canister left at bedside. ACS team made aware  Left knee anterior dressings remain C/D/I. No bleeding. Left foot unable to dorsi flex  Bilateral calves remain soft, supple and nontender  Pulses: 2+ dorsalis pedis pulse. Cap refill < 2 sec.  Sensation: Grossly intact to light touch without deficit.                            8.5    23.61 )-----------( 514      ( 25 Nov 2021 10:21 )             28.4   11-25    134<L>  |  101  |  11.6  ----------------------------<  125<H>  4.1   |  21.0<L>  |  0.71    Ca    7.4<L>      25 Nov 2021 10:21  Mg     1.6     11-25    I&O's Detail    24 Nov 2021 07:01  -  25 Nov 2021 07:00  --------------------------------------------------------  IN:    IV PiggyBack: 100 mL    IV PiggyBack: 100 mL    multiple electrolytes Injection Type 1.: 400 mL    Oral Fluid: 500 mL  Total IN: 1100 mL    OUT:    Voided (mL): 400 mL  Total OUT: 400 mL    Total NET: 700 mL      MEDICATIONS  (STANDING):  acetaminophen     Tablet .. 975 milliGRAM(s) Oral every 6 hours  baclofen 10 milliGRAM(s) Oral three times a day  clindamycin IVPB 900 milliGRAM(s) IV Intermittent every 8 hours  collagenase Ointment 1 Application(s) Topical two times a day  gabapentin 200 milliGRAM(s) Oral three times a day  glucagon  Injectable 1 milliGRAM(s) IntraMuscular once  heparin   Injectable 5000 Unit(s) SubCutaneous every 8 hours  insulin lispro (ADMELOG) corrective regimen sliding scale   SubCutaneous Before meals and at bedtime  lisinopril 5 milliGRAM(s) Oral daily  methocarbamol 750 milliGRAM(s) Oral three times a day  multiple electrolytes Injection Type 1 1000 milliLiter(s) (100 mL/Hr) IV Continuous <Continuous>  pantoprazole  Injectable 40 milliGRAM(s) IV Push two times a day  penicillin   G  potassium  IVPB      penicillin   G  potassium  IVPB 4 Million Unit(s) IV Intermittent every 4 hours  polyethylene glycol 3350 17 Gram(s) Oral daily  senna 2 Tablet(s) Oral at bedtime    MEDICATIONS  (PRN):  fentaNYL    Injectable 75 MICROGram(s) IV Push every 8 hours PRN Dressing changes  HYDROmorphone  Injectable 0.5 milliGRAM(s) IV Push every 6 hours PRN breakthrough pain  ondansetron Injectable 4 milliGRAM(s) IV Push every 4 hours PRN Nausea and/or Vomiting  oxyCODONE    IR 5 milliGRAM(s) Oral every 4 hours PRN Moderate Pain (4 - 6)  oxyCODONE    IR 10 milliGRAM(s) Oral every 4 hours PRN Severe Pain (7 - 10)      A/P: 51yFemale Right knee I&D POD#1 (11/24/21),  Left knee I&D w [facility] left ankle POD#8 (11/17/21)  - Pain Control- recommend Pian management or tighter pain control   - DVT ppx: Heparin  - Post op abx: as per ID team   - PT eval pending  - Weight bearing status: WBAT B/L LE  - Ortho recs neuro consult, Rheumatology consult for vague UE weakness.

## 2021-11-26 LAB
ANION GAP SERPL CALC-SCNC: 11 MMOL/L — SIGNIFICANT CHANGE UP (ref 5–17)
BASOPHILS # BLD AUTO: 0.05 K/UL — SIGNIFICANT CHANGE UP (ref 0–0.2)
BASOPHILS NFR BLD AUTO: 0.3 % — SIGNIFICANT CHANGE UP (ref 0–2)
BUN SERPL-MCNC: 10 MG/DL — SIGNIFICANT CHANGE UP (ref 8–20)
CALCIUM SERPL-MCNC: 7 MG/DL — LOW (ref 8.6–10.2)
CHLORIDE SERPL-SCNC: 101 MMOL/L — SIGNIFICANT CHANGE UP (ref 98–107)
CK SERPL-CCNC: 40 U/L — SIGNIFICANT CHANGE UP (ref 25–170)
CK SERPL-CCNC: 43 U/L — SIGNIFICANT CHANGE UP (ref 25–170)
CO2 SERPL-SCNC: 23 MMOL/L — SIGNIFICANT CHANGE UP (ref 22–29)
CREAT SERPL-MCNC: 0.61 MG/DL — SIGNIFICANT CHANGE UP (ref 0.5–1.3)
CRP SERPL-MCNC: 197 MG/L — HIGH
CRP SERPL-MCNC: 211 MG/L — HIGH
EOSINOPHIL # BLD AUTO: 0.26 K/UL — SIGNIFICANT CHANGE UP (ref 0–0.5)
EOSINOPHIL NFR BLD AUTO: 1.4 % — SIGNIFICANT CHANGE UP (ref 0–6)
ERYTHROCYTE [SEDIMENTATION RATE] IN BLOOD: 63 MM/HR — HIGH (ref 0–20)
ERYTHROCYTE [SEDIMENTATION RATE] IN BLOOD: 65 MM/HR — HIGH (ref 0–20)
GLUCOSE BLDC GLUCOMTR-MCNC: 120 MG/DL — HIGH (ref 70–99)
GLUCOSE BLDC GLUCOMTR-MCNC: 132 MG/DL — HIGH (ref 70–99)
GLUCOSE BLDC GLUCOMTR-MCNC: 135 MG/DL — HIGH (ref 70–99)
GLUCOSE BLDC GLUCOMTR-MCNC: 136 MG/DL — HIGH (ref 70–99)
GLUCOSE BLDC GLUCOMTR-MCNC: 87 MG/DL — SIGNIFICANT CHANGE UP (ref 70–99)
GLUCOSE SERPL-MCNC: 95 MG/DL — SIGNIFICANT CHANGE UP (ref 70–99)
HCT VFR BLD CALC: 24 % — LOW (ref 34.5–45)
HGB BLD-MCNC: 7.5 G/DL — LOW (ref 11.5–15.5)
IMM GRANULOCYTES NFR BLD AUTO: 0.6 % — SIGNIFICANT CHANGE UP (ref 0–1.5)
LYMPHOCYTES # BLD AUTO: 1.83 K/UL — SIGNIFICANT CHANGE UP (ref 1–3.3)
LYMPHOCYTES # BLD AUTO: 9.8 % — LOW (ref 13–44)
MAGNESIUM SERPL-MCNC: 1.8 MG/DL — SIGNIFICANT CHANGE UP (ref 1.6–2.6)
MCHC RBC-ENTMCNC: 27.4 PG — SIGNIFICANT CHANGE UP (ref 27–34)
MCHC RBC-ENTMCNC: 31.3 GM/DL — LOW (ref 32–36)
MCV RBC AUTO: 87.6 FL — SIGNIFICANT CHANGE UP (ref 80–100)
MONOCYTES # BLD AUTO: 0.81 K/UL — SIGNIFICANT CHANGE UP (ref 0–0.9)
MONOCYTES NFR BLD AUTO: 4.3 % — SIGNIFICANT CHANGE UP (ref 2–14)
NEUTROPHILS # BLD AUTO: 15.67 K/UL — HIGH (ref 1.8–7.4)
NEUTROPHILS NFR BLD AUTO: 83.6 % — HIGH (ref 43–77)
PHOSPHATE SERPL-MCNC: 2.6 MG/DL — SIGNIFICANT CHANGE UP (ref 2.4–4.7)
PLATELET # BLD AUTO: 540 K/UL — HIGH (ref 150–400)
POTASSIUM SERPL-MCNC: 4.3 MMOL/L — SIGNIFICANT CHANGE UP (ref 3.5–5.3)
POTASSIUM SERPL-SCNC: 4.3 MMOL/L — SIGNIFICANT CHANGE UP (ref 3.5–5.3)
RBC # BLD: 2.74 M/UL — LOW (ref 3.8–5.2)
RBC # FLD: 22.5 % — HIGH (ref 10.3–14.5)
SODIUM SERPL-SCNC: 135 MMOL/L — SIGNIFICANT CHANGE UP (ref 135–145)
WBC # BLD: 18.73 K/UL — HIGH (ref 3.8–10.5)
WBC # FLD AUTO: 18.73 K/UL — HIGH (ref 3.8–10.5)

## 2021-11-26 PROCEDURE — 99232 SBSQ HOSP IP/OBS MODERATE 35: CPT

## 2021-11-26 PROCEDURE — 99252 IP/OBS CONSLTJ NEW/EST SF 35: CPT

## 2021-11-26 RX ORDER — HYDROMORPHONE HYDROCHLORIDE 2 MG/ML
1 INJECTION INTRAMUSCULAR; INTRAVENOUS; SUBCUTANEOUS
Refills: 0 | Status: DISCONTINUED | OUTPATIENT
Start: 2021-11-26 | End: 2021-11-29

## 2021-11-26 RX ADMIN — METHOCARBAMOL 750 MILLIGRAM(S): 500 TABLET, FILM COATED ORAL at 05:20

## 2021-11-26 RX ADMIN — PENICILLIN G POTASSIUM 100 MILLION UNIT(S): 5000000 POWDER, FOR SOLUTION INTRAMUSCULAR; INTRAPLEURAL; INTRATHECAL; INTRAVENOUS at 01:58

## 2021-11-26 RX ADMIN — HYDROMORPHONE HYDROCHLORIDE 1 MILLIGRAM(S): 2 INJECTION INTRAMUSCULAR; INTRAVENOUS; SUBCUTANEOUS at 17:43

## 2021-11-26 RX ADMIN — Medication 975 MILLIGRAM(S): at 18:20

## 2021-11-26 RX ADMIN — OXYCODONE HYDROCHLORIDE 10 MILLIGRAM(S): 5 TABLET ORAL at 20:30

## 2021-11-26 RX ADMIN — SODIUM CHLORIDE 100 MILLILITER(S): 9 INJECTION, SOLUTION INTRAVENOUS at 08:35

## 2021-11-26 RX ADMIN — METHOCARBAMOL 750 MILLIGRAM(S): 500 TABLET, FILM COATED ORAL at 21:35

## 2021-11-26 RX ADMIN — GABAPENTIN 200 MILLIGRAM(S): 400 CAPSULE ORAL at 21:36

## 2021-11-26 RX ADMIN — Medication 975 MILLIGRAM(S): at 17:28

## 2021-11-26 RX ADMIN — PENICILLIN G POTASSIUM 100 MILLION UNIT(S): 5000000 POWDER, FOR SOLUTION INTRAMUSCULAR; INTRAPLEURAL; INTRATHECAL; INTRAVENOUS at 21:32

## 2021-11-26 RX ADMIN — PANTOPRAZOLE SODIUM 40 MILLIGRAM(S): 20 TABLET, DELAYED RELEASE ORAL at 17:46

## 2021-11-26 RX ADMIN — PENICILLIN G POTASSIUM 100 MILLION UNIT(S): 5000000 POWDER, FOR SOLUTION INTRAMUSCULAR; INTRAPLEURAL; INTRATHECAL; INTRAVENOUS at 10:49

## 2021-11-26 RX ADMIN — HEPARIN SODIUM 5000 UNIT(S): 5000 INJECTION INTRAVENOUS; SUBCUTANEOUS at 21:35

## 2021-11-26 RX ADMIN — HYDROMORPHONE HYDROCHLORIDE 1 MILLIGRAM(S): 2 INJECTION INTRAMUSCULAR; INTRAVENOUS; SUBCUTANEOUS at 23:58

## 2021-11-26 RX ADMIN — SENNA PLUS 2 TABLET(S): 8.6 TABLET ORAL at 21:35

## 2021-11-26 RX ADMIN — OXYCODONE HYDROCHLORIDE 10 MILLIGRAM(S): 5 TABLET ORAL at 10:10

## 2021-11-26 RX ADMIN — HYDROMORPHONE HYDROCHLORIDE 0.5 MILLIGRAM(S): 2 INJECTION INTRAMUSCULAR; INTRAVENOUS; SUBCUTANEOUS at 05:17

## 2021-11-26 RX ADMIN — LISINOPRIL 5 MILLIGRAM(S): 2.5 TABLET ORAL at 05:18

## 2021-11-26 RX ADMIN — Medication 975 MILLIGRAM(S): at 00:00

## 2021-11-26 RX ADMIN — GABAPENTIN 200 MILLIGRAM(S): 400 CAPSULE ORAL at 14:01

## 2021-11-26 RX ADMIN — HEPARIN SODIUM 5000 UNIT(S): 5000 INJECTION INTRAVENOUS; SUBCUTANEOUS at 05:18

## 2021-11-26 RX ADMIN — HEPARIN SODIUM 5000 UNIT(S): 5000 INJECTION INTRAVENOUS; SUBCUTANEOUS at 14:01

## 2021-11-26 RX ADMIN — OXYCODONE HYDROCHLORIDE 10 MILLIGRAM(S): 5 TABLET ORAL at 02:55

## 2021-11-26 RX ADMIN — GABAPENTIN 200 MILLIGRAM(S): 400 CAPSULE ORAL at 05:18

## 2021-11-26 RX ADMIN — PENICILLIN G POTASSIUM 100 MILLION UNIT(S): 5000000 POWDER, FOR SOLUTION INTRAMUSCULAR; INTRAPLEURAL; INTRATHECAL; INTRAVENOUS at 05:20

## 2021-11-26 RX ADMIN — Medication 10 MILLIGRAM(S): at 05:20

## 2021-11-26 RX ADMIN — Medication 10 MILLIGRAM(S): at 21:35

## 2021-11-26 RX ADMIN — Medication 975 MILLIGRAM(S): at 12:00

## 2021-11-26 RX ADMIN — PENICILLIN G POTASSIUM 100 MILLION UNIT(S): 5000000 POWDER, FOR SOLUTION INTRAMUSCULAR; INTRAPLEURAL; INTRATHECAL; INTRAVENOUS at 17:28

## 2021-11-26 RX ADMIN — HYDROMORPHONE HYDROCHLORIDE 0.5 MILLIGRAM(S): 2 INJECTION INTRAMUSCULAR; INTRAVENOUS; SUBCUTANEOUS at 11:11

## 2021-11-26 RX ADMIN — HYDROMORPHONE HYDROCHLORIDE 0.5 MILLIGRAM(S): 2 INJECTION INTRAMUSCULAR; INTRAVENOUS; SUBCUTANEOUS at 11:26

## 2021-11-26 RX ADMIN — OXYCODONE HYDROCHLORIDE 10 MILLIGRAM(S): 5 TABLET ORAL at 14:01

## 2021-11-26 RX ADMIN — Medication 100 MILLIGRAM(S): at 07:02

## 2021-11-26 RX ADMIN — Medication 975 MILLIGRAM(S): at 11:11

## 2021-11-26 RX ADMIN — PANTOPRAZOLE SODIUM 40 MILLIGRAM(S): 20 TABLET, DELAYED RELEASE ORAL at 05:17

## 2021-11-26 RX ADMIN — SODIUM CHLORIDE 100 MILLILITER(S): 9 INJECTION, SOLUTION INTRAVENOUS at 21:33

## 2021-11-26 RX ADMIN — OXYCODONE HYDROCHLORIDE 10 MILLIGRAM(S): 5 TABLET ORAL at 09:18

## 2021-11-26 RX ADMIN — Medication 975 MILLIGRAM(S): at 23:58

## 2021-11-26 RX ADMIN — METHOCARBAMOL 750 MILLIGRAM(S): 500 TABLET, FILM COATED ORAL at 14:01

## 2021-11-26 RX ADMIN — Medication 975 MILLIGRAM(S): at 23:20

## 2021-11-26 RX ADMIN — OXYCODONE HYDROCHLORIDE 10 MILLIGRAM(S): 5 TABLET ORAL at 19:25

## 2021-11-26 RX ADMIN — Medication 100 MILLIGRAM(S): at 21:33

## 2021-11-26 RX ADMIN — Medication 100 MILLIGRAM(S): at 14:07

## 2021-11-26 RX ADMIN — OXYCODONE HYDROCHLORIDE 10 MILLIGRAM(S): 5 TABLET ORAL at 14:55

## 2021-11-26 RX ADMIN — Medication 975 MILLIGRAM(S): at 05:18

## 2021-11-26 RX ADMIN — Medication 10 MILLIGRAM(S): at 14:02

## 2021-11-26 RX ADMIN — HYDROMORPHONE HYDROCHLORIDE 1 MILLIGRAM(S): 2 INJECTION INTRAMUSCULAR; INTRAVENOUS; SUBCUTANEOUS at 23:20

## 2021-11-26 RX ADMIN — OXYCODONE HYDROCHLORIDE 10 MILLIGRAM(S): 5 TABLET ORAL at 03:40

## 2021-11-26 RX ADMIN — Medication 975 MILLIGRAM(S): at 06:00

## 2021-11-26 RX ADMIN — HYDROMORPHONE HYDROCHLORIDE 1 MILLIGRAM(S): 2 INJECTION INTRAMUSCULAR; INTRAVENOUS; SUBCUTANEOUS at 17:28

## 2021-11-26 RX ADMIN — HYDROMORPHONE HYDROCHLORIDE 0.5 MILLIGRAM(S): 2 INJECTION INTRAMUSCULAR; INTRAVENOUS; SUBCUTANEOUS at 05:32

## 2021-11-26 RX ADMIN — PENICILLIN G POTASSIUM 100 MILLION UNIT(S): 5000000 POWDER, FOR SOLUTION INTRAMUSCULAR; INTRAPLEURAL; INTRATHECAL; INTRAVENOUS at 15:49

## 2021-11-26 NOTE — PROGRESS NOTE ADULT - SUBJECTIVE AND OBJECTIVE BOX
Acute Care Surgery Progress Note:    No acute overnight events. Patient afebrile, VSS. Pain well controlled after a prn breakthrough med given.   Tolerating diet. Denies n/v/f/c/cp/sob.     Of note, patient stated that bilateral UE have been feeling more weak than usual.  She states this started while she was in the SICU.     Diet, Regular (11-24-21 @ 12:46)      Scheduled Medications:   acetaminophen     Tablet .. 975 milliGRAM(s) Oral every 6 hours  baclofen 10 milliGRAM(s) Oral three times a day  clindamycin IVPB 900 milliGRAM(s) IV Intermittent every 8 hours  collagenase Ointment 1 Application(s) Topical two times a day  gabapentin 200 milliGRAM(s) Oral three times a day  glucagon  Injectable 1 milliGRAM(s) IntraMuscular once  heparin   Injectable 5000 Unit(s) SubCutaneous every 8 hours  insulin lispro (ADMELOG) corrective regimen sliding scale   SubCutaneous Before meals and at bedtime  lisinopril 5 milliGRAM(s) Oral daily  methocarbamol 750 milliGRAM(s) Oral three times a day  multiple electrolytes Injection Type 1 1000 milliLiter(s) (100 mL/Hr) IV Continuous <Continuous>  pantoprazole  Injectable 40 milliGRAM(s) IV Push two times a day  penicillin   G  potassium  IVPB 4 Million Unit(s) IV Intermittent every 4 hours  penicillin   G  potassium  IVPB      polyethylene glycol 3350 17 Gram(s) Oral daily  senna 2 Tablet(s) Oral at bedtime    PRN Medications:  fentaNYL    Injectable 75 MICROGram(s) IV Push every 8 hours PRN Dressing changes  HYDROmorphone  Injectable 0.5 milliGRAM(s) IV Push every 6 hours PRN breakthrough pain  ondansetron Injectable 4 milliGRAM(s) IV Push every 4 hours PRN Nausea and/or Vomiting  oxyCODONE    IR 5 milliGRAM(s) Oral every 4 hours PRN Moderate Pain (4 - 6)  oxyCODONE    IR 10 milliGRAM(s) Oral every 4 hours PRN Severe Pain (7 - 10)      Objective:   T(F): 99.6 (11-26 @ 00:00), Max: 99.7 (11-25 @ 22:56)  HR: 88 (11-26 @ 00:00) (78 - 94)  BP: 118/68 (11-26 @ 00:00) (112/64 - 143/81)  BP(mean): --  ABP: --  ABP(mean): --  RR: 18 (11-26 @ 00:00) (18 - 20)  SpO2: 96% (11-26 @ 00:00) (95% - 100%)      Physical Exam:   GEN: patient resting comfortably in bed, in no acute distress  RESP: respirations are unlabored, no accessory muscle use, no conversational dyspnea  CVS: RRR  GI: Abdomen soft, non-tender, non-distended, no rebound tenderness / guarding  MSK: RLE with knee dressing c/d/i, no erythema; LLE with bandages c/d/i; medial thigh wound vac well adhesed and connected to Axela with moderate yellow fluid output; Bilateral UE with decreased strength 4/5, limited ROM to 45 degrees (patient states unable to move much further without having pain in shoulders)    I&O's    11-24 @ 07:01  -  11-25 @ 07:00  --------------------------------------------------------  IN:    IV PiggyBack: 100 mL    IV PiggyBack: 100 mL    multiple electrolytes Injection Type 1.: 400 mL    Oral Fluid: 500 mL  Total IN: 1100 mL    OUT:    Voided (mL): 400 mL  Total OUT: 400 mL    Total NET: 700 mL      11-25 @ 07:01  -  11-26 @ 03:14  --------------------------------------------------------  IN:  Total IN: 0 mL    OUT:    Indwelling Catheter - Urethral (mL): 700 mL    VAC (Vacuum Assisted Closure) System (mL): 500 mL  Total OUT: 1200 mL    Total NET: -1200 mL          LABS:                        8.5    23.61 )-----------( 514      ( 25 Nov 2021 10:21 )             28.4     11-25    134<L>  |  101  |  11.6  ----------------------------<  125<H>  4.1   |  21.0<L>  |  0.71    Ca    7.4<L>      25 Nov 2021 10:21  Mg     1.6     11-25            MICROBIOLOGY:     Culture - Acid Fast - Body Fluid w/Smear (collected 11-24 @ 13:35)  Source: .Body Fluid Right Knee Fluid    Culture - Body Fluid with Gram Stain (collected 11-24 @ 13:35)  Source: .Body Fluid Right Knee Fluid  Gram Stain (11-25 @ 01:27):    polymorphonuclear leukocytes seen per low power field    No organisms seen per oil power field  Preliminary Report (11-25 @ 12:23):    No growth    Culture - Surgical Swab (collected 11-24 @ 12:30)  Source: .Surgical Swab Swab Right Knee #2  Preliminary Report (11-25 @ 12:19):    No growth to date.    Culture - Surgical Swab (collected 11-24 @ 12:30)  Source: .Surgical Swab Swab Right Knee #1  Preliminary Report (11-25 @ 12:19):    No growth to date.    Culture - Surgical Swab (collected 11-24 @ 12:28)  Source: .Surgical Swab Swab Right Knee #3  Preliminary Report (11-25 @ 12:06):    No growth to date.    Culture - Body Fluid with Gram Stain (collected 11-23 @ 12:50)  Source: .Body Fluid Knee Fluid  Gram Stain (11-23 @ 23:34):    polymorphonuclear leukocytes seen    No organisms seen    by cytocentrifuge  Preliminary Report (11-24 @ 13:52):    No growth    Culture - Blood (collected 11-22 @ 08:44)  Source: .Blood Blood  Preliminary Report (11-24 @ 09:00):    No growth at 48 hours    Culture - Blood (collected 11-20 @ 12:57)  Source: .Blood Blood  Final Report (11-25 @ 13:00):    No growth at 5 days.        PATHOLOGY:

## 2021-11-26 NOTE — PROGRESS NOTE ADULT - ASSESSMENT
51y Female present to ED with left leg swelling, erythema, pain. Patient endorses she had left knee pain for 1 week presented 3 days ago to ED  where she states was given an steroid shot, and ibuprofen. however pain and edema worsened. Patient states she wasnt feeling herself today reason why she came. Endorses chills initially with pain 3 days ago but none since, denies history of trauma, insect bites, recent interventions, or injections to the area, contact with ticks, history of diabetes.  PT AS ABOVE WITH LEFT LEG SWELLING PT WITH INCREASED WBC PLACED ON ABX FOR PRESUMED INFECTION  PT WITH CT SCAN WITH FASCIAL EDAM PT BROUGHT TO THE OR EMERGENTLY AND  UNDERWENT FASCIOTOMY   PT BROUGHT TO THE OR  BOTH TRAUMA AND ORTHO INVOLVED  PURULENT FLUID FOUND  NECROTIZING SOFT TISSUE INFECTION    BLOOD CX WITH GROUP A STREP AND  OPERATIVE FLUID WITH STREP   PT ON PCN AND CLINDA    P  CONTINUE  CLINDAMYCIN FOR  POSSIBLE ANTITOXIN EFFECT IN GROUP A STREP INFECTION    BLOOD  CX  REPEATED   PT WITH RIGHT KNEE PAIN WENT TO OR 11/24  AND WASHED OUT AND CULTURES TAKEN  W PT WTIH RPROTED OF BILATERAL SHOULDER ISSUES UNCLEAR ETIOLOGY NO WARMTH OR TENDERNESS NO CREPITUS NO ERYTHEMA     D/W  TRAUMA     WILL FOLLOW UP   CONTIUE PCN/CLINDA

## 2021-11-26 NOTE — PROGRESS NOTE ADULT - SUBJECTIVE AND OBJECTIVE BOX
Patient seen and eval at bedside. Patient still having difficulty raising her arms B/L. Patient juanpablo fever, chills. Patient c/o some LE muscle spasms.    Vital Signs Last 24 Hrs  T(C): 37.1 (26 Nov 2021 05:00), Max: 37.6 (25 Nov 2021 22:56)  T(F): 98.7 (26 Nov 2021 05:00), Max: 99.7 (25 Nov 2021 22:56)  HR: 81 (26 Nov 2021 05:00) (81 - 94)  BP: 118/68 (26 Nov 2021 05:00) (116/66 - 143/81)  RR: 18 (26 Nov 2021 05:00) (18 - 20)  SpO2: 99% (26 Nov 2021 05:00) (96% - 100%)    PE: NAD, alert awake  B/L LE: Knee dressings C/D/I, incision sites healing well, steristrips intact on left knee, no bleeding or drainage. New gauze dressings placed.  EHL/TA/GS/FHL intact ON RIGHT, Left GS/FHL intact, diminished EHL/TA, SILT s/s/DP/SP/tib distrib B/L, DP pulse + B/L  bic/tric/WE/WF/ 5/5 B/L, Delt 1/5 B/L, SILT C5-T1 B/L                          8.5    23.61 )-----------( 514      ( 25 Nov 2021 10:21 )             28.4     Culture - Body Fluid with Gram Stain (11.24.21 @ 13:35)    Gram Stain:   polymorphonuclear leukocytes seen per low power field  No organisms seen per oil power field    Specimen Source: .Body Fluid Right Knee Fluid    Culture Results:   No growth    Culture - Body Fluid with Gram Stain (11.18.21 @ 16:20)    Gram Stain:   polymorphonuclear leukocytes seen  Gram Positive Cocci in Pairs and Chains seen  by cytocentrifuge    Specimen Source: .Body Fluid OR Swab Left Knee Lower Extremity Fluid    Culture Results:   Numerous Streptococcus pyogenes (Group A) Penicillin and ampicillin are  drugs of choice for  treatment of beta-hemolytic streptococcal infections.  Susceptibility testing is not performed routinely because  S. pyogenes (GAS) is universally susceptible to penicillin  and resistance in other strains is extremely rare.    A/P: s/p B/L Kale I&D, + strep group A  ·	Pain control  ·	Abx as per ID  ·	Recommend neurology consultation for UE weakness  ·	Recommend AFO boot left LE  ·	Left LE Wound VAC/fasciotomy managed by ACS  ·	DVT propx as per primary team  ·	Cont care as per primary team             Patient seen and eval at bedside. Patient still having difficulty raising her arms B/L. Patient juanpablo fever, chills. Patient c/o some LE muscle spasms.    Vital Signs Last 24 Hrs  T(C): 37.1 (26 Nov 2021 05:00), Max: 37.6 (25 Nov 2021 22:56)  T(F): 98.7 (26 Nov 2021 05:00), Max: 99.7 (25 Nov 2021 22:56)  HR: 81 (26 Nov 2021 05:00) (81 - 94)  BP: 118/68 (26 Nov 2021 05:00) (116/66 - 143/81)  RR: 18 (26 Nov 2021 05:00) (18 - 20)  SpO2: 99% (26 Nov 2021 05:00) (96% - 100%)    PE: NAD, alert awake  B/L LE: Knee dressings C/D/I, incision sites healing well, steristrips intact on left knee, no bleeding or drainage. New gauze dressings placed.  EHL/TA/GS/FHL intact ON RIGHT, Left GS/FHL intact, diminished EHL/TA, SILT s/s/DP/SP/tib distrib B/L, DP pulse + B/L  bic/tric/WE/WF/ 5/5 B/L, Delt 1/5 B/L, SILT C5-T1 B/L  B/L shoulders full PROM without discomfort, grossly NT to palp, skin intact, no open wounds, no s/o infx, no erythema deformity or ecchymosis                          8.5    23.61 )-----------( 514      ( 25 Nov 2021 10:21 )             28.4     Culture - Body Fluid with Gram Stain (11.24.21 @ 13:35)    Gram Stain:   polymorphonuclear leukocytes seen per low power field  No organisms seen per oil power field    Specimen Source: .Body Fluid Right Knee Fluid    Culture Results:   No growth    Culture - Body Fluid with Gram Stain (11.18.21 @ 16:20)    Gram Stain:   polymorphonuclear leukocytes seen  Gram Positive Cocci in Pairs and Chains seen  by cytocentrifuge    Specimen Source: .Body Fluid OR Swab Left Knee Lower Extremity Fluid    Culture Results:   Numerous Streptococcus pyogenes (Group A) Penicillin and ampicillin are  drugs of choice for  treatment of beta-hemolytic streptococcal infections.  Susceptibility testing is not performed routinely because  S. pyogenes (GAS) is universally susceptible to penicillin  and resistance in other strains is extremely rare.    A/P: s/p B/L Kale I&D, + strep group A (left)  ·	Pain control  ·	Abx as per ID  ·	Recommend neurology consultation for UE weakness  ·	Recommend AFO boot left LE  ·	Left LE Wound VAC/fasciotomy managed by ACS  ·	DVT propx as per primary team  ·	Cont care as per primary team

## 2021-11-26 NOTE — CONSULT NOTE ADULT - SUBJECTIVE AND OBJECTIVE BOX
Neurology Consult Note  [facility] [place] [address] [place] [address]  [phone]    HPI:  52 yo woman with no significant past medical history who was admitted for fasciotomy for necrotizing soft tissue infection. She states she had left lower extremity swelling at home and when she arrived here had emergent surgery for suspected infection. 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 She states at home, she also had pain in her arms and shoulders, but she could move them well. She states two days ago when she was in ICU, she noticed soreness in her shoulders. She states she has weakness in her proximal arms which is new. She denies numbness or tingling in her extremities. She denies headache, or change in vision. She states she feels her upper arms are swollen. She has no further complaints.     PMHx:  none    PSHx:  hx of ankle surgery    Meds:  MEDICATIONS  (STANDING):  acetaminophen     Tablet .. 975 milliGRAM(s) Oral every 6 hours  baclofen 10 milliGRAM(s) Oral three times a day  clindamycin IVPB 900 milliGRAM(s) IV Intermittent every 8 hours  collagenase Ointment 1 Application(s) Topical two times a day  gabapentin 200 milliGRAM(s) Oral three times a day  glucagon  Injectable 1 milliGRAM(s) IntraMuscular once  heparin   Injectable 5000 Unit(s) SubCutaneous every 8 hours  HYDROmorphone  Injectable 1 milliGRAM(s) IV Push four times a day  insulin lispro (ADMELOG) corrective regimen sliding scale   SubCutaneous Before meals and at bedtime  lisinopril 5 milliGRAM(s) Oral daily  methocarbamol 750 milliGRAM(s) Oral three times a day  multiple electrolytes Injection Type 1 1000 milliLiter(s) (100 mL/Hr) IV Continuous <Continuous>  pantoprazole  Injectable 40 milliGRAM(s) IV Push two times a day  penicillin   G  potassium  IVPB      penicillin   G  potassium  IVPB 4 Million Unit(s) IV Intermittent every 4 hours  polyethylene glycol 3350 17 Gram(s) Oral daily  senna 2 Tablet(s) Oral at bedtime    MEDICATIONS  (PRN):  fentaNYL    Injectable 75 MICROGram(s) IV Push every 8 hours PRN Dressing changes  HYDROmorphone  Injectable 0.5 milliGRAM(s) IV Push every 6 hours PRN breakthrough pain  ondansetron Injectable 4 milliGRAM(s) IV Push every 4 hours PRN Nausea and/or Vomiting  oxyCODONE    IR 5 milliGRAM(s) Oral every 4 hours PRN Moderate Pain (4 - 6)  oxyCODONE    IR 10 milliGRAM(s) Oral every 4 hours PRN Severe Pain (7 - 10)    Allergies: NKA    FamHx:  Mother -HTN    SocHx:  denies toxic habits    ROS: A 12 system review of system was negative aside from pertinent negatives and positives outlined in HPI      Physical Exam  Vital Signs Last 24 Hrs  T(C): 38.8 (26 Nov 2021 17:26), Max: 38.8 (26 Nov 2021 17:26)  T(F): 101.9 (26 Nov 2021 17:26), Max: 101.9 (26 Nov 2021 17:26)  HR: 92 (26 Nov 2021 17:26) (64 - 92)  BP: 131/70 (26 Nov 2021 17:26) (113/61 - 131/70)  BP(mean): --  RR: 18 (26 Nov 2021 17:26) (18 - 18)  SpO2: 100% (26 Nov 2021 17:26) (91% - 100%)  General: no acute distress  HEENT:NC/AT  Lungs: no respiratory distress  Skin: no rash or ecchymoses in upper extremities  Bilateral upper extremity proximal edema      Mental Status: AAO x 3, no dysarthria, no aphasia  CN: PERRL, EOMI, VFF, V1-V3 sensation intact, no facial asymmetry  Motor:  R deltoid 2/5  L deltoid 2/5  Otherwise 5/5 in upper extremities    Bilateral lower extremities, moves spontaneously at least 1/5, however, full strength examination limited due to pain from recent surgery  Sensory: intact to light touch  and pinprick throughout  Reflexes 1+ bilateral biceps and brachioradialis, unable to check in LE due to severe pain  Coordination: unable to perform due to weakness  Gait: deferred      LABS:  11-26 @ 09:01 Creatine 40 U/L [25 - 170]  cret                        7.5    18.73 )-----------( 540      ( 26 Nov 2021 09:01 )             24.0     11-26    135  |  101  |  10.0  ----------------------------<  95  4.3   |  23.0  |  0.61    Ca    7.0<L>      26 Nov 2021 09:01  Phos  2.6     11-26  Mg     1.8     11-26

## 2021-11-26 NOTE — PROGRESS NOTE ADULT - ASSESSMENT
51y Female present to ED with left leg swelling, erythema, pain, leukocytosis and acute renal failure. CT scan concerning for necrotizing soft tissue infection. 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  Patient had complained or RLE swelling. Right lower extremity duplex and negative for DVT; however + swelling in popliteal fossa to R calf.  R knee I&D performed.   LLE with streptococcus pyogenes      Plan:   - Pain control   - Assessment of B/L UE strength and motor function  - Daily dressing changes, wound care   - F/U Cx from RLE  - Continue IV abx: Clinda, penicillin   - AM labs, monitor lytes, replete PRN   - PT/OT   - Monitor wound vac

## 2021-11-26 NOTE — PROGRESS NOTE ADULT - SUBJECTIVE AND OBJECTIVE BOX
INFECTIOUS DISEASES AND INTERNAL MEDICINE at [place]  =======================================================  Theo Morrow MD  Diplomates American Board of Internal Medicine and Infectious Diseases  Telephone [phone]  Fax            [phone]  =======================================================    [place] [id number]    Follow up: group A STREP NECROTIZING SOFT TISSUE INFECTION    NOW s/P RIGHT KNEE SURGERY  CX NEGATIVE    Allergies:  No Known Allergies      Medications:  acetaminophen     Tablet .. 975 milliGRAM(s) Oral every 6 hours   PCN   chlorhexidine 2% Cloths 1 Application(s) Topical <User Schedule>  clindamycin IVPB 900 milliGRAM(s) IV Intermittent every 8 hours  fentaNYL    Injectable 75 MICROGram(s) IV Push every 8 hours PRN  gabapentin 200 milliGRAM(s) Oral three times a day  glucagon  Injectable 1 milliGRAM(s) IntraMuscular once  hydrocortisone sodium succinate Injectable 50 milliGRAM(s) IV Push daily  insulin lispro (ADMELOG) corrective regimen sliding scale   SubCutaneous Before meals and at bedtime  lisinopril 5 milliGRAM(s) Oral daily  multiple electrolytes Injection Type 1 1000 milliLiter(s) IV Continuous <Continuous>  ondansetron Injectable 4 milliGRAM(s) IV Push every 4 hours PRN  oxyCODONE    IR 5 milliGRAM(s) Oral every 4 hours PRN  oxyCODONE    IR 10 milliGRAM(s) Oral every 4 hours PRN  pantoprazole  Injectable 40 milliGRAM(s) IV Push two times a day    SOCIAL       FAMILY   FAMILY HISTORY:  FH: HTN (hypertension) (Mother)      REVIEW OF SYSTEMS:  CONSTITUTIONAL:  No Fever or chills  HEENT:   No diplopia or blurred vision.  No earache, sore throat or runny nose.  CARDIOVASCULAR:  No pressure, squeezing, strangling, tightness, heaviness or aching about the chest, neck, axilla or epigastrium.  RESPIRATORY:  No cough, shortness of breath, PND or orthopnea.  GASTROINTESTINAL:  No nausea, vomiting or diarrhea.  GENITOURINARY:  No dysuria, frequency or urgency. No Blood in urine  MUSCULOSKELETAL:   AS PER HPI   SKIN:  No change in skin, hair or nails.  NEUROLOGIC:  No paresthesias, fasciculations, seizures or weakness.  PSYCHIATRIC:  No disorder of thought or mood.  ENDOCRINE:  No heat or cold intolerance, polyuria or polydipsia.  HEMATOLOGICAL:  No easy bruising or bleeding.            Physical Exam:  I Vital Signs Last 24 Hrs  T(C): 38.2 (26 Nov 2021 12:50), Max: 38.2 (26 Nov 2021 12:50)  T(F): 100.7 (26 Nov 2021 12:50), Max: 100.7 (26 Nov 2021 12:50)  HR: 64 (26 Nov 2021 12:50) (64 - 94)  BP: 115/74 (26 Nov 2021 12:50) (113/61 - 143/81)  BP(mean): --  RR: 18 (26 Nov 2021 12:50) (18 - 20)  SpO2: 91% (26 Nov 2021 12:50) (91% - 100%)    GEN: NAD,    HEENT: normocephalic and atraumatic. EOMI. ASHISH.    NECK: Supple. No carotid bruits.  No lymphadenopathy or thyromegaly.  LUNGS: Clear to auscultation.  HEART: Regular rate and rhythm without murmur.  ABDOMEN: Soft, nontender, and nondistended.  Positive bowel sounds.    : No CVA tenderness  EXTREMITIES: LEFT LEG WRAPPED  RIGHT LEG WRAPPED  UPPER EXT STRENGTH GOOD BUT CAN T RAISE HANDS ALL THE WAY     NEUROLOGIC:  C AWAKE ALERT Appropriate affect .  SKIN: No ulceration or induration present.        Labs:  Vitals:  =   =======================================================  Current Antibiotics:  PCN  clindamycin IVPB 900 milliGRAM(s) IV Intermittent every 8 hours    Other medications:  acetaminophen     Tablet .. 975 milliGRAM(s) Oral every 6 hours  chlorhexidine 2% Cloths 1 Application(s) Topical <User Schedule>  gabapentin 200 milliGRAM(s) Oral three times a day  glucagon  Injectable 1 milliGRAM(s) IntraMuscular once  hydrocortisone sodium succinate Injectable 50 milliGRAM(s) IV Push daily  insulin lispro (ADMELOG) corrective regimen sliding scale   SubCutaneous Before meals and at bedtime  lisinopril 5 milliGRAM(s) Oral daily  multiple electrolytes Injection Type 1 1000 milliLiter(s) IV Continuous <Continuous>  pantoprazole  Injectable 40 milliGRAM(s) IV Push two times a day      =======================================================  Labs:                                                7.5    18.73 )-----------( 540      ( 26 Nov 2021 09:01 )             24.0   11-26    135  |  101  |  10.0  ----------------------------<  95  4.3   |  23.0  |  0.61    Ca    7.0<L>      26 Nov 2021 09:01  Phos  2.6     11-26  Mg     1.8     11-26                  Culture - Body Fluid with Gram Stain (collected 11-18-21 @ 16:20)  Source: .Body Fluid OR Posterior Calf Left Lower Extremity Fluid  Gram Stain (11-18-21 @ 21:39):    polymorphonuclear leukocytes seen    Gram Positive Cocci in Pairs and Chains seen    by cytocentrifuge    Culture - Body Fluid with Gram Stain (collected 11-18-21 @ 16:20)  Source: .Body Fluid OR Swab Left Knee Lower Extremity Fluid  Gram Stain (11-18-21 @ 19:36):    polymorphonuclear leukocytes seen    Gram Positive Cocci in Pairs and Chains seen    by cytocentrifuge    Culture - Body Fluid with Gram Stain (collected 11-18-21 @ 16:20)  Source: .Body Fluid OR - Left Lower Extremity Fluid  Gram Stain (11-18-21 @ 20:10):    No polymorphonuclear leukocytes seen    No organisms seen    by cytocentrifuge    Culture - Urine (collected 11-18-21 @ 10:07)  Source: Catheterized Catheterized  Final Report (11-19-21 @ 07:03):    No growth    Culture - Urine (collected 11-18-21 @ 00:25)  Source: Catheterized Catheterized  Final Report (11-18-21 @ 22:09):    <10,000 CFU/mL Normal Urogenital Aimee    Culture - Blood (collected 11-17-21 @ 14:45)  Source: .Blood Blood  Gram Stain (11-18-21 @ 09:44):    Growth in aerobic and anaerobic bottles: Gram Positive Cocci in Pairs and    Chains    Gram Stain performed by:    [facility]    [address] [place] [id number]    .    TYPE: (C=Critical, N=Notification, A=Abnormal) C    TESTS:  _ GS    DATE/TIME CALLED: _ 11/18/2021 09:42:19    CALLED TO: Chris Hernandez RN    READ BACK (2 Patient Identifiers)(Y/N): _ Y    READ BACK VALUES (Y/N): _ Y    CALLED BY: Chris Quiñones  Final Report (11-21-21 @ 12:28):    Growth in aerobic and anaerobic bottles: Streptococcus pyogenes (Group A)    See previous culture [id number]    Culture - Blood (collected 11-17-21 @ 14:45)  Source: .Blood Blood  Gram Stain (11-18-21 @ 09:40):    Growth in aerobic and anaerobic bottles: Gram Positive Cocci in Pairs and    Chains    ***Blood Panel PCR results on this specimen are available    approximately 3 hours after the Gram stain result.***    Gram stain, PCR, and/or culture results may not always    correspond due to difference in methodologies.    ************************************************************    This PCR assay was performed using [employer].    The following targets are tested for: Enterococcus,    vancomycin resistant enterococci, Listeria monocytogenes,    coagulase negative staphylococci, S. aureus,    methicillin resistant S. aureus, Streptococcus agalactiae    (Group B), S. pneumoniae, S. pyogenes (Group A),    Acinetobacter baumannii, Enterobacter cloacae, E. coli,    Klebsiella oxytoca, K. pneumoniae, Proteus sp.,    Serratia marcescens, Haemophilus influenzae,    Neisseria meningitidis, Pseudomonas aeruginosa, Candida    albicans, C. glabrata, C krusei, C parapsilosis,    C. tropicalis and the KPC resistance gene.    Gram Stain and BCID performed by:    [facility]    [address] [place] [id number]    .    TYPE: (C=Critical, N=Notification, A=Abnormal) C    TESTS:  _ GS    DATE/TIME CALLED: _ 11/18/2021 09:40:08    CALLED TO: Chris Hernandez RN    READ BACK (2 Patient Identifiers)(Y/N): _ Y    READ BACK VALUES (Y/N): _ Y    CALLED BY: Chris Quiñones  Final Report (11-21-21 @ 12:28):    Growth in aerobic and anaerobic bottles: Streptococcus pyogenes (Group A)  Organism: Blood Culture PCR  Streptococcus pyogenes (Group A) (11-21-21 @ 12:28)  Organism: Streptococcus pyogenes (Group A) (11-21-21 @ 12:28)    Sensitivities:      -  Ceftriaxone: S 0.016      -  Penicillin: S 0.016 Predicts results for ampicillin, amoxicillin, amoxicillin/clavulanate, ampicillin/sulbactam, 1st, 2nd and 3rd generation cephalosporins and carbapenems.      Method Type: ETEST  Organism: Streptococcus pyogenes (Group A) (11-21-21 @ 12:28)    Sensitivities:      -  Vancomycin: S      Method Type: KB  Organism: Blood Culture PCR (11-21-21 @ 12:28)    Sensitivities:      -  Streptococcus pyogenes (Group A): Detec      Method Type: PCR      Creatinine, Serum: 0.70 mg/dL (11-22-21 @ 04:43)  Creatinine, Serum: 0.73 mg/dL (11-21-21 @ 21:57)  Creatinine, Serum: 0.88 mg/dL (11-21-21 @ 15:43)  Creatinine, Serum: 1.02 mg/dL (11-21-21 @ 04:19)  Creatinine, Serum: 1.11 mg/dL (11-20-21 @ 16:46)  Creatinine, Serum: 1.41 mg/dL (11-20-21 @ 05:21)  Creatinine, Serum: 1.42 mg/dL (11-19-21 @ 14:54)  Creatinine, Serum: 1.63 mg/dL (11-19-21 @ 05:48)  Creatinine, Serum: 1.98 mg/dL (11-18-21 @ 21:01)  Creatinine, Serum: 2.54 mg/dL (11-18-21 @ 04:34)  Creatinine, Serum: 2.52 mg/dL (11-17-21 @ 23:52)  Creatinine, Serum: 3.80 mg/dL (11-17-21 @ 13:21)        Ferritin, Serum: 388 ng/mL (11-17-21 @ 13:21)    C-Reactive Protein, Serum: >422 mg/L (11-17-21 @ 13:21)    WBC Count: 15.49 K/uL (11-22-21 @ 04:43)  WBC Count: 17.20 K/uL (11-21-21 @ 21:57)  WBC Count: 16.87 K/uL (11-21-21 @ 15:43)  WBC Count: 20.11 K/uL (11-21-21 @ 04:19)  WBC Count: 26.59 K/uL (11-20-21 @ 16:46)  WBC Count: 25.35 K/uL (11-20-21 @ 10:32)  WBC Count: 26.27 K/uL (11-20-21 @ 05:21)  WBC Count: 29.79 K/uL (11-19-21 @ 23:48)  WBC Count: 29.67 K/uL (11-19-21 @ 14:54)  WBC Count: 26.93 K/uL (11-19-21 @ 05:48)  WBC Count: 29.87 K/uL (11-18-21 @ 21:01)  WBC Count: 24.81 K/uL (11-18-21 @ 04:34)  WBC Count: 23.17 K/uL (11-17-21 @ 23:52)  WBC Count: 46.68 K/uL (11-17-21 @ 13:21)      COVID-19 PCR: NotDetec (11-17-21 @ 13:18)    Lactate Dehydrogenase, Serum: 769 U/L (11-20-21 @ 03:56)    Alkaline Phosphatase, Serum: 75 U/L (11-19-21 @ 05:48)  Alanine Aminotransferase (ALT/SGPT): 14 U/L (11-19-21 @ 05:48)  Aspartate Aminotransferase (AST/SGOT): 42 U/L (11-19-21 @ 05:48)  Bilirubin Total, Serum: 0.5 mg/dL (11-19-21 @ 05:48)

## 2021-11-26 NOTE — PROGRESS NOTE ADULT - ATTENDING COMMENTS
Patient reports proximal UE weakness: unable to elevate/perform full rang of motion with b/l UE. Endorses b/l shoulder pain as cause. Intact strength/ROM distal to b/l elbows. Shoulders appear warm to touch; albeit minimal discrepancy from diffuse warmth to touch.     --f/u ID recs.   --f/u Ortho eval of shoulders.   --Neurology eval of proximal b/l UE weakness.

## 2021-11-27 LAB
ALBUMIN SERPL ELPH-MCNC: 1.5 G/DL — LOW (ref 3.3–5.2)
ALP SERPL-CCNC: 88 U/L — SIGNIFICANT CHANGE UP (ref 40–120)
ALT FLD-CCNC: 11 U/L — SIGNIFICANT CHANGE UP
ANION GAP SERPL CALC-SCNC: 10 MMOL/L — SIGNIFICANT CHANGE UP (ref 5–17)
ANION GAP SERPL CALC-SCNC: 14 MMOL/L — SIGNIFICANT CHANGE UP (ref 5–17)
APPEARANCE UR: CLEAR — SIGNIFICANT CHANGE UP
AST SERPL-CCNC: 25 U/L — SIGNIFICANT CHANGE UP
BASOPHILS # BLD AUTO: 0.08 K/UL — SIGNIFICANT CHANGE UP (ref 0–0.2)
BASOPHILS NFR BLD AUTO: 0.5 % — SIGNIFICANT CHANGE UP (ref 0–2)
BILIRUB SERPL-MCNC: 0.3 MG/DL — LOW (ref 0.4–2)
BILIRUB UR-MCNC: NEGATIVE — SIGNIFICANT CHANGE UP
BUN SERPL-MCNC: 5.1 MG/DL — LOW (ref 8–20)
BUN SERPL-MCNC: 5.9 MG/DL — LOW (ref 8–20)
CALCIUM SERPL-MCNC: 6.4 MG/DL — CRITICAL LOW (ref 8.6–10.2)
CALCIUM SERPL-MCNC: 7.4 MG/DL — LOW (ref 8.6–10.2)
CHLORIDE SERPL-SCNC: 92 MMOL/L — LOW (ref 98–107)
CHLORIDE SERPL-SCNC: 97 MMOL/L — LOW (ref 98–107)
CHLORIDE UR-SCNC: 117 MMOL/L — SIGNIFICANT CHANGE UP
CO2 SERPL-SCNC: 19 MMOL/L — LOW (ref 22–29)
CO2 SERPL-SCNC: 20 MMOL/L — LOW (ref 22–29)
COLOR SPEC: YELLOW — SIGNIFICANT CHANGE UP
CREAT SERPL-MCNC: 0.57 MG/DL — SIGNIFICANT CHANGE UP (ref 0.5–1.3)
CREAT SERPL-MCNC: 0.65 MG/DL — SIGNIFICANT CHANGE UP (ref 0.5–1.3)
CULTURE RESULTS: SIGNIFICANT CHANGE UP
DIFF PNL FLD: ABNORMAL
EOSINOPHIL # BLD AUTO: 0.27 K/UL — SIGNIFICANT CHANGE UP (ref 0–0.5)
EOSINOPHIL NFR BLD AUTO: 1.7 % — SIGNIFICANT CHANGE UP (ref 0–6)
GLUCOSE BLDC GLUCOMTR-MCNC: 97 MG/DL — SIGNIFICANT CHANGE UP (ref 70–99)
GLUCOSE SERPL-MCNC: 82 MG/DL — SIGNIFICANT CHANGE UP (ref 70–99)
GLUCOSE SERPL-MCNC: 83 MG/DL — SIGNIFICANT CHANGE UP (ref 70–99)
GLUCOSE UR QL: NEGATIVE MG/DL — SIGNIFICANT CHANGE UP
HCT VFR BLD CALC: 24.8 % — LOW (ref 34.5–45)
HGB BLD-MCNC: 7.8 G/DL — LOW (ref 11.5–15.5)
IMM GRANULOCYTES NFR BLD AUTO: 0.8 % — SIGNIFICANT CHANGE UP (ref 0–1.5)
KETONES UR-MCNC: NEGATIVE — SIGNIFICANT CHANGE UP
LEUKOCYTE ESTERASE UR-ACNC: ABNORMAL
LYMPHOCYTES # BLD AUTO: 1.9 K/UL — SIGNIFICANT CHANGE UP (ref 1–3.3)
LYMPHOCYTES # BLD AUTO: 11.9 % — LOW (ref 13–44)
MAGNESIUM SERPL-MCNC: 1.8 MG/DL — SIGNIFICANT CHANGE UP (ref 1.6–2.6)
MCHC RBC-ENTMCNC: 27.5 PG — SIGNIFICANT CHANGE UP (ref 27–34)
MCHC RBC-ENTMCNC: 31.5 GM/DL — LOW (ref 32–36)
MCV RBC AUTO: 87.3 FL — SIGNIFICANT CHANGE UP (ref 80–100)
MONOCYTES # BLD AUTO: 1.12 K/UL — HIGH (ref 0–0.9)
MONOCYTES NFR BLD AUTO: 7 % — SIGNIFICANT CHANGE UP (ref 2–14)
NEUTROPHILS # BLD AUTO: 12.5 K/UL — HIGH (ref 1.8–7.4)
NEUTROPHILS NFR BLD AUTO: 78.1 % — HIGH (ref 43–77)
NITRITE UR-MCNC: NEGATIVE — SIGNIFICANT CHANGE UP
OSMOLALITY UR: 393 MOSM/KG — SIGNIFICANT CHANGE UP (ref 300–1000)
PH UR: 6.5 — SIGNIFICANT CHANGE UP (ref 5–8)
PHOSPHATE SERPL-MCNC: 3 MG/DL — SIGNIFICANT CHANGE UP (ref 2.4–4.7)
PLATELET # BLD AUTO: 453 K/UL — HIGH (ref 150–400)
POTASSIUM SERPL-MCNC: 4.8 MMOL/L — SIGNIFICANT CHANGE UP (ref 3.5–5.3)
POTASSIUM SERPL-MCNC: 5 MMOL/L — SIGNIFICANT CHANGE UP (ref 3.5–5.3)
POTASSIUM SERPL-SCNC: 4.8 MMOL/L — SIGNIFICANT CHANGE UP (ref 3.5–5.3)
POTASSIUM SERPL-SCNC: 5 MMOL/L — SIGNIFICANT CHANGE UP (ref 3.5–5.3)
PROT SERPL-MCNC: 6.4 G/DL — LOW (ref 6.6–8.7)
PROT UR-MCNC: 15 MG/DL
RBC # BLD: 2.84 M/UL — LOW (ref 3.8–5.2)
RBC # FLD: 22.7 % — HIGH (ref 10.3–14.5)
SODIUM SERPL-SCNC: 125 MMOL/L — LOW (ref 135–145)
SODIUM SERPL-SCNC: 127 MMOL/L — LOW (ref 135–145)
SODIUM UR-SCNC: 128 MMOL/L — SIGNIFICANT CHANGE UP
SP GR SPEC: 1.01 — SIGNIFICANT CHANGE UP (ref 1.01–1.02)
SPECIMEN SOURCE: SIGNIFICANT CHANGE UP
UROBILINOGEN FLD QL: NEGATIVE MG/DL — SIGNIFICANT CHANGE UP
WBC # BLD: 15.99 K/UL — HIGH (ref 3.8–10.5)
WBC # FLD AUTO: 15.99 K/UL — HIGH (ref 3.8–10.5)

## 2021-11-27 PROCEDURE — 71045 X-RAY EXAM CHEST 1 VIEW: CPT | Mod: 26

## 2021-11-27 PROCEDURE — 99232 SBSQ HOSP IP/OBS MODERATE 35: CPT

## 2021-11-27 PROCEDURE — 93970 EXTREMITY STUDY: CPT | Mod: 26

## 2021-11-27 PROCEDURE — 72141 MRI NECK SPINE W/O DYE: CPT | Mod: 26

## 2021-11-27 PROCEDURE — 70551 MRI BRAIN STEM W/O DYE: CPT | Mod: 26

## 2021-11-27 RX ORDER — SODIUM CHLORIDE 9 MG/ML
1000 INJECTION INTRAMUSCULAR; INTRAVENOUS; SUBCUTANEOUS
Refills: 0 | Status: DISCONTINUED | OUTPATIENT
Start: 2021-11-27 | End: 2021-12-02

## 2021-11-27 RX ADMIN — GABAPENTIN 200 MILLIGRAM(S): 400 CAPSULE ORAL at 06:05

## 2021-11-27 RX ADMIN — HYDROMORPHONE HYDROCHLORIDE 0.5 MILLIGRAM(S): 2 INJECTION INTRAMUSCULAR; INTRAVENOUS; SUBCUTANEOUS at 16:56

## 2021-11-27 RX ADMIN — OXYCODONE HYDROCHLORIDE 10 MILLIGRAM(S): 5 TABLET ORAL at 13:30

## 2021-11-27 RX ADMIN — Medication 10 MILLIGRAM(S): at 21:21

## 2021-11-27 RX ADMIN — Medication 975 MILLIGRAM(S): at 18:58

## 2021-11-27 RX ADMIN — OXYCODONE HYDROCHLORIDE 10 MILLIGRAM(S): 5 TABLET ORAL at 21:21

## 2021-11-27 RX ADMIN — PENICILLIN G POTASSIUM 100 MILLION UNIT(S): 5000000 POWDER, FOR SOLUTION INTRAMUSCULAR; INTRAPLEURAL; INTRATHECAL; INTRAVENOUS at 13:30

## 2021-11-27 RX ADMIN — PENICILLIN G POTASSIUM 100 MILLION UNIT(S): 5000000 POWDER, FOR SOLUTION INTRAMUSCULAR; INTRAPLEURAL; INTRATHECAL; INTRAVENOUS at 08:50

## 2021-11-27 RX ADMIN — SENNA PLUS 2 TABLET(S): 8.6 TABLET ORAL at 21:22

## 2021-11-27 RX ADMIN — LISINOPRIL 5 MILLIGRAM(S): 2.5 TABLET ORAL at 06:03

## 2021-11-27 RX ADMIN — HEPARIN SODIUM 5000 UNIT(S): 5000 INJECTION INTRAVENOUS; SUBCUTANEOUS at 13:30

## 2021-11-27 RX ADMIN — PENICILLIN G POTASSIUM 100 MILLION UNIT(S): 5000000 POWDER, FOR SOLUTION INTRAMUSCULAR; INTRAPLEURAL; INTRATHECAL; INTRAVENOUS at 21:23

## 2021-11-27 RX ADMIN — OXYCODONE HYDROCHLORIDE 10 MILLIGRAM(S): 5 TABLET ORAL at 22:00

## 2021-11-27 RX ADMIN — HYDROMORPHONE HYDROCHLORIDE 1 MILLIGRAM(S): 2 INJECTION INTRAMUSCULAR; INTRAVENOUS; SUBCUTANEOUS at 18:07

## 2021-11-27 RX ADMIN — Medication 100 MILLIGRAM(S): at 06:00

## 2021-11-27 RX ADMIN — HYDROMORPHONE HYDROCHLORIDE 1 MILLIGRAM(S): 2 INJECTION INTRAMUSCULAR; INTRAVENOUS; SUBCUTANEOUS at 18:23

## 2021-11-27 RX ADMIN — PENICILLIN G POTASSIUM 100 MILLION UNIT(S): 5000000 POWDER, FOR SOLUTION INTRAMUSCULAR; INTRAPLEURAL; INTRATHECAL; INTRAVENOUS at 06:00

## 2021-11-27 RX ADMIN — METHOCARBAMOL 750 MILLIGRAM(S): 500 TABLET, FILM COATED ORAL at 06:00

## 2021-11-27 RX ADMIN — HYDROMORPHONE HYDROCHLORIDE 1 MILLIGRAM(S): 2 INJECTION INTRAMUSCULAR; INTRAVENOUS; SUBCUTANEOUS at 11:13

## 2021-11-27 RX ADMIN — OXYCODONE HYDROCHLORIDE 10 MILLIGRAM(S): 5 TABLET ORAL at 14:20

## 2021-11-27 RX ADMIN — HEPARIN SODIUM 5000 UNIT(S): 5000 INJECTION INTRAVENOUS; SUBCUTANEOUS at 21:22

## 2021-11-27 RX ADMIN — Medication 975 MILLIGRAM(S): at 12:10

## 2021-11-27 RX ADMIN — Medication 10 MILLIGRAM(S): at 13:30

## 2021-11-27 RX ADMIN — SODIUM CHLORIDE 100 MILLILITER(S): 9 INJECTION, SOLUTION INTRAVENOUS at 05:59

## 2021-11-27 RX ADMIN — PENICILLIN G POTASSIUM 100 MILLION UNIT(S): 5000000 POWDER, FOR SOLUTION INTRAMUSCULAR; INTRAPLEURAL; INTRATHECAL; INTRAVENOUS at 01:36

## 2021-11-27 RX ADMIN — Medication 975 MILLIGRAM(S): at 06:55

## 2021-11-27 RX ADMIN — SODIUM CHLORIDE 100 MILLILITER(S): 9 INJECTION INTRAMUSCULAR; INTRAVENOUS; SUBCUTANEOUS at 16:34

## 2021-11-27 RX ADMIN — PENICILLIN G POTASSIUM 100 MILLION UNIT(S): 5000000 POWDER, FOR SOLUTION INTRAMUSCULAR; INTRAPLEURAL; INTRATHECAL; INTRAVENOUS at 18:08

## 2021-11-27 RX ADMIN — HYDROMORPHONE HYDROCHLORIDE 1 MILLIGRAM(S): 2 INJECTION INTRAMUSCULAR; INTRAVENOUS; SUBCUTANEOUS at 11:28

## 2021-11-27 RX ADMIN — HEPARIN SODIUM 5000 UNIT(S): 5000 INJECTION INTRAVENOUS; SUBCUTANEOUS at 06:03

## 2021-11-27 RX ADMIN — GABAPENTIN 200 MILLIGRAM(S): 400 CAPSULE ORAL at 21:22

## 2021-11-27 RX ADMIN — Medication 975 MILLIGRAM(S): at 23:50

## 2021-11-27 RX ADMIN — OXYCODONE HYDROCHLORIDE 10 MILLIGRAM(S): 5 TABLET ORAL at 04:43

## 2021-11-27 RX ADMIN — Medication 10 MILLIGRAM(S): at 06:03

## 2021-11-27 RX ADMIN — METHOCARBAMOL 750 MILLIGRAM(S): 500 TABLET, FILM COATED ORAL at 21:21

## 2021-11-27 RX ADMIN — PANTOPRAZOLE SODIUM 40 MILLIGRAM(S): 20 TABLET, DELAYED RELEASE ORAL at 06:01

## 2021-11-27 RX ADMIN — HYDROMORPHONE HYDROCHLORIDE 1 MILLIGRAM(S): 2 INJECTION INTRAMUSCULAR; INTRAVENOUS; SUBCUTANEOUS at 06:08

## 2021-11-27 RX ADMIN — Medication 100 MILLIGRAM(S): at 23:04

## 2021-11-27 RX ADMIN — Medication 100 MILLIGRAM(S): at 16:35

## 2021-11-27 RX ADMIN — OXYCODONE HYDROCHLORIDE 10 MILLIGRAM(S): 5 TABLET ORAL at 05:30

## 2021-11-27 RX ADMIN — Medication 975 MILLIGRAM(S): at 18:08

## 2021-11-27 RX ADMIN — OXYCODONE HYDROCHLORIDE 10 MILLIGRAM(S): 5 TABLET ORAL at 08:50

## 2021-11-27 RX ADMIN — HYDROMORPHONE HYDROCHLORIDE 1 MILLIGRAM(S): 2 INJECTION INTRAMUSCULAR; INTRAVENOUS; SUBCUTANEOUS at 23:04

## 2021-11-27 RX ADMIN — METHOCARBAMOL 750 MILLIGRAM(S): 500 TABLET, FILM COATED ORAL at 13:30

## 2021-11-27 RX ADMIN — GABAPENTIN 200 MILLIGRAM(S): 400 CAPSULE ORAL at 13:37

## 2021-11-27 RX ADMIN — PANTOPRAZOLE SODIUM 40 MILLIGRAM(S): 20 TABLET, DELAYED RELEASE ORAL at 18:07

## 2021-11-27 RX ADMIN — Medication 975 MILLIGRAM(S): at 11:13

## 2021-11-27 RX ADMIN — HYDROMORPHONE HYDROCHLORIDE 1 MILLIGRAM(S): 2 INJECTION INTRAMUSCULAR; INTRAVENOUS; SUBCUTANEOUS at 23:19

## 2021-11-27 RX ADMIN — OXYCODONE HYDROCHLORIDE 10 MILLIGRAM(S): 5 TABLET ORAL at 09:45

## 2021-11-27 RX ADMIN — HYDROMORPHONE HYDROCHLORIDE 1 MILLIGRAM(S): 2 INJECTION INTRAMUSCULAR; INTRAVENOUS; SUBCUTANEOUS at 06:20

## 2021-11-27 RX ADMIN — Medication 975 MILLIGRAM(S): at 06:05

## 2021-11-27 RX ADMIN — Medication 975 MILLIGRAM(S): at 23:09

## 2021-11-27 RX ADMIN — HYDROMORPHONE HYDROCHLORIDE 0.5 MILLIGRAM(S): 2 INJECTION INTRAMUSCULAR; INTRAVENOUS; SUBCUTANEOUS at 16:41

## 2021-11-27 NOTE — PROGRESS NOTE ADULT - SUBJECTIVE AND OBJECTIVE BOX
Neurology Consult Note  [facility] [place] [address] [place] [address]  [phone]    Interval History:  Patient states her arms continue to feel weak and swelling has increased since yesterday    HPI 11/26/21:  50 yo woman with no significant past medical history who was admitted for fasciotomy for necrotizing soft tissue infection. She states she had left lower extremity swelling at home and when she arrived here had emergent surgery for suspected infection. 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 She states at home, she also had pain in her arms and shoulders, but she could move them well. She states two days ago when she was in ICU, she noticed soreness in her shoulders. She states she has weakness in her proximal arms which is new. She denies numbness or tingling in her extremities. She denies headache, or change in vision. She states she feels her upper arms are swollen. She has no further complaints.     Meds:  MEDICATIONS  (STANDING):  acetaminophen     Tablet .. 975 milliGRAM(s) Oral every 6 hours  baclofen 10 milliGRAM(s) Oral three times a day  clindamycin IVPB 900 milliGRAM(s) IV Intermittent every 8 hours  collagenase Ointment 1 Application(s) Topical two times a day  gabapentin 200 milliGRAM(s) Oral three times a day  glucagon  Injectable 1 milliGRAM(s) IntraMuscular once  heparin   Injectable 5000 Unit(s) SubCutaneous every 8 hours  HYDROmorphone  Injectable 1 milliGRAM(s) IV Push four times a day  lisinopril 5 milliGRAM(s) Oral daily  methocarbamol 750 milliGRAM(s) Oral three times a day  pantoprazole  Injectable 40 milliGRAM(s) IV Push two times a day  penicillin   G  potassium  IVPB      penicillin   G  potassium  IVPB 4 Million Unit(s) IV Intermittent every 4 hours  polyethylene glycol 3350 17 Gram(s) Oral daily  senna 2 Tablet(s) Oral at bedtime    MEDICATIONS  (PRN):  fentaNYL    Injectable 75 MICROGram(s) IV Push every 8 hours PRN Dressing changes  HYDROmorphone  Injectable 0.5 milliGRAM(s) IV Push every 6 hours PRN breakthrough pain  ondansetron Injectable 4 milliGRAM(s) IV Push every 4 hours PRN Nausea and/or Vomiting  oxyCODONE    IR 5 milliGRAM(s) Oral every 4 hours PRN Moderate Pain (4 - 6)  oxyCODONE    IR 10 milliGRAM(s) Oral every 4 hours PRN Severe Pain (7 - 10)    Physical Exam  Vital Signs Last 24 Hrs  T(C): 37.5 (27 Nov 2021 08:40), Max: 38.8 (26 Nov 2021 17:26)  T(F): 99.5 (27 Nov 2021 08:40), Max: 101.9 (26 Nov 2021 17:26)  HR: 83 (27 Nov 2021 08:40) (83 - 92)  BP: 105/70 (27 Nov 2021 08:40) (105/70 - 131/70)  BP(mean): --  RR: 18 (27 Nov 2021 08:40) (17 - 18)  SpO2: 95% (27 Nov 2021 08:40) (95% - 100%)  General: no acute distress  HEENT:NC/AT  Lungs: no respiratory distress  Skin: no rash or ecchymoses in upper extremities  Significant bilateral upper extremity edema      Mental Status: AAO x 3, no dysarthria, no aphasia  CN: PERRL, EOMI, VFF, V1-V3 sensation intact, no facial asymmetry  Motor:  R deltoid 2/5  L  unable to examine as patient having blood drawn  Otherwise 5/5 distal RUE    Bilateral lower extremities, moves spontaneously at least 1/5, however, full strength examination limited due to pain from recent surgery  Sensory: intact to light touch  and pinprick throughout  Reflexes 1+ bilateral biceps and brachioradialis, unable to check in LE due to severe pain  Coordination: unable to perform due to weakness  Gait: deferred      LABS:  11-26 @ 21:52 Creatine 43 U/L [25 - 170]  11-26 @ 09:01 Creatine 40 U/L [25 - 170]  cret                        7.8    15.99 )-----------( 453      ( 27 Nov 2021 05:33 )             24.8     11-27    125<L>  |  92<L>  |  5.1<L>  ----------------------------<  82  4.8   |  19.0<L>  |  0.65    Ca    6.4<LL>      27 Nov 2021 11:51  Phos  3.0     11-27  Mg     1.8     11-27    TPro  6.4<L>  /  Alb  1.5<L>  /  TBili  0.3<L>  /  DBili  x   /  AST  25  /  ALT  11  /  AlkPhos  88  11-27

## 2021-11-27 NOTE — PROVIDER CONTACT NOTE (OTHER) - ACTION/TREATMENT ORDERED:
Continue to monitor
Encourage PO fluids, ace packs, stat blood cultures, chest xray, UA
Sodium bicarb gtt infusing at 125ml/hr  Hospitalist can manage additional fluid.

## 2021-11-27 NOTE — CHART NOTE - NSCHARTNOTEFT_GEN_A_CORE
Source: Patient [ ]  Family [ ]   other [ ]    51y Female present to ED with left leg swelling, erythema, pain, leukocytosis and acute renal failure. CT scan concerning for necrotizing soft tissue infection. 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 Downgraded to floor from SICU 11/22/21.  Patient had complained or RLE swelling. Right lower extremity duplex and negative for DVT; however + swelling in popliteal fossa to R calf.  R knee I&D performed.   LLE with streptococcus pyogenes    Current Diet: Diet, Regular (11-24-21 @ 12:46)      Patient reports [ ] nausea  [ ] vomiting [ ] diarrhea [ ] constipation  [ ]chewing problems [ ] swallowing issues  [ ] other:     PO intake:  < 50% [ ]   50-75%  [x ]   %  [ ]  other :    Source for PO intake [ ] Patient [ x] family [ ] chart [ ] staff [ ] other        Current Weight:   11/24 93.3 kg  11/22 111.2 kg  11/21 110.2 kg  11/20 103.5 kg  11/17 93.5 kg    % Weight Change   accuracy is questionable  Edema : 1+ generalized. 2+lags and ankles, 3+ arms and feet- may be related to weight fluctuations    Pertinent Medications: MEDICATIONS  (STANDING):  acetaminophen     Tablet .. 975 milliGRAM(s) Oral every 6 hours  baclofen 10 milliGRAM(s) Oral three times a day  clindamycin IVPB 900 milliGRAM(s) IV Intermittent every 8 hours  collagenase Ointment 1 Application(s) Topical two times a day  gabapentin 200 milliGRAM(s) Oral three times a day  glucagon  Injectable 1 milliGRAM(s) IntraMuscular once  heparin   Injectable 5000 Unit(s) SubCutaneous every 8 hours  HYDROmorphone  Injectable 1 milliGRAM(s) IV Push four times a day  lisinopril 5 milliGRAM(s) Oral daily  methocarbamol 750 milliGRAM(s) Oral three times a day  pantoprazole  Injectable 40 milliGRAM(s) IV Push two times a day  penicillin   G  potassium  IVPB      penicillin   G  potassium  IVPB 4 Million Unit(s) IV Intermittent every 4 hours  polyethylene glycol 3350 17 Gram(s) Oral daily  senna 2 Tablet(s) Oral at bedtime    MEDICATIONS  (PRN):  fentaNYL    Injectable 75 MICROGram(s) IV Push every 8 hours PRN Dressing changes  HYDROmorphone  Injectable 0.5 milliGRAM(s) IV Push every 6 hours PRN breakthrough pain  ondansetron Injectable 4 milliGRAM(s) IV Push every 4 hours PRN Nausea and/or Vomiting  oxyCODONE    IR 5 milliGRAM(s) Oral every 4 hours PRN Moderate Pain (4 - 6)  oxyCODONE    IR 10 milliGRAM(s) Oral every 4 hours PRN Severe Pain (7 - 10)    Pertinent Labs: CBC Full  -  ( 27 Nov 2021 05:33 )  WBC Count : 15.99 K/uL  RBC Count : 2.84 M/uL  Hemoglobin : 7.8 g/dL  Hematocrit : 24.8 %  Platelet Count - Automated : 453 K/uL  Mean Cell Volume : 87.3 fl  Mean Cell Hemoglobin : 27.5 pg  Mean Cell Hemoglobin Concentration : 31.5 gm/dL  Auto Neutrophil # : 12.50 K/uL  Auto Lymphocyte # : 1.90 K/uL  Auto Monocyte # : 1.12 K/uL  Auto Eosinophil # : 0.27 K/uL  Auto Basophil # : 0.08 K/uL  Auto Neutrophil % : 78.1 %  Auto Lymphocyte % : 11.9 %  Auto Monocyte % : 7.0 %  Auto Eosinophil % : 1.7 %  Auto Basophil % : 0.5 %    11-27 Na127 mmol/L<L> Glu 83 mg/dL K+ 5.0 mmol/L Cr  0.57 mg/dL BUN 5.9 mg/dL<L> Phos 3.0 mg/dL Alb 1.5 g/dL<L> PAB n/a             Skin: sx incision     Nutrition focused physical exam conducted - found signs of malnutrition [x ]absent [ ]present    Subcutaneous fat loss: [ ] Orbital fat pads region, [ ]Buccal fat region, [ ]Triceps region,  [ ]Ribs region    Muscle wasting: [ ]Temples region, [ ]Clavicle region, [ ]Shoulder region, [ ]Scapula region, [ ]Interosseous region,  [ ]thigh region, [ ]Calf region    Estimated Needs:   [ x] no change since previous assessment  [ ] recalculated:     Current Nutrition Diagnosis: Pt presents at risk secondary to increased protein calorie needs, related to increased  physiologic demand stress factor as evidenced by s/p LLE exploration and debridement of L leg necrotizing soft tissue infection. PO intake is good.       Recommendations:   1) RX MVI, Vit C  2) Cont diet  3) consider ad Swapnil BID  4) monitor weights for accuracy and trends    Monitoring and Evaluation:   [x ] PO intake [ x] Tolerance to diet prescription [X] Weights  [X] Follow up per protocol [X] Labs:

## 2021-11-27 NOTE — PROGRESS NOTE ADULT - ASSESSMENT
50 yo woman admitted for necrotizing infection s/p fasciotomy of LLE now with proximal arm weakness and swelling    Proximal arm weakness/Swelling  unclear etiology, possibly due to pain from swelling  MRI brain and C spine w/o contrast to r/o neurologic cause, however, this seems less likely  Consider upper extremity dopplers or medicine eval for swelling  Upper extremity swelling worse when compared to yesterday - notified primary team, further management as per primary team, consider repeat scan to evaluate for infection seen in leg      Will continue to follow

## 2021-11-27 NOTE — CHART NOTE - NSCHARTNOTEFT_GEN_A_CORE
S: Patient with fever to 103.1F oral and 104.0F rectal. Last given tylenol at 1800. Seen and examined at bedside. No acute complaints, denies SOB/CP, N/V, new or worsening pain.     O:  Vital Signs Last 24 Hrs  T(C): 38.3 (27 Nov 2021 17:25), Max: 38.3 (27 Nov 2021 17:25)  T(F): 101 (27 Nov 2021 17:25), Max: 101 (27 Nov 2021 17:25)  HR: 85 (27 Nov 2021 18:10) (83 - 96)  BP: 138/72 (27 Nov 2021 18:10) (105/70 - 144/73)  BP(mean): --  RR: 18 (27 Nov 2021 17:25) (18 - 18)  SpO2: 99% (27 Nov 2021 17:25) (95% - 99%)    Patient resting comfortably, appears nontoxic, saturating well on room air. PIVs appear free from erythema or induration, nontender.       LABS:  11-26 @ 21:52 Creatine 43 U/L [25 - 170]  11-26 @ 09:01 Creatine 40 U/L [25 - 170]                          7.8    15.99 )-----------( 453      ( 27 Nov 2021 05:33 )             24.8     11-27    125<L>  |  92<L>  |  5.1<L>  ----------------------------<  82  4.8   |  19.0<L>  |  0.65    Ca    6.4<LL>      27 Nov 2021 11:51  Phos  3.0     11-27  Mg     1.8     11-27    TPro  6.4<L>  /  Alb  1.5<L>  /  TBili  0.3<L>  /  DBili  x   /  AST  25  /  ALT  11  /  AlkPhos  88  11-27          Culture - Blood (11.22.21 @ 08:44)   Specimen Source: .Blood Blood   Culture Results:   No growth at 5 days.     A/P: All cultures have been negative thus far. Last bcx 11/22. LLE (11/17), RLE (11/23), and BUE (11/27) duplex negative for DVT.     - will repeat BCx, CXR, and UA  - Encourage IS and deep breathing

## 2021-11-27 NOTE — PROGRESS NOTE ADULT - SUBJECTIVE AND OBJECTIVE BOX
Pt Name: LUIS FERNANDO DAVIS  MRN: [id number]    Procedure: Right knee I&D POD#3 (11/24/21),  Left knee I&D w [facility]ON left ankle POD#10 (11/17/21)  Surgeon: Dr Tobias    Patient is a 51y Female seen and examined at bedside. Patient is doing well, reports improvement. Only complaint of intermittent muscle spasms as before. Pain is controlled with the prescribed medication. Denies CP, SOB, N/V, numbness/tingling. No other orthopedic complaints.        PAST MEDICAL & SURGICAL HISTORY:  No pertinent past medical history    History of ankle surgery        Allergies: No Known Allergies          Vital Signs Last 24 Hrs  T(C): 37.5 (27 Nov 2021 08:40), Max: 38.8 (26 Nov 2021 17:26)  T(F): 99.5 (27 Nov 2021 08:40), Max: 101.9 (26 Nov 2021 17:26)  HR: 83 (27 Nov 2021 08:40) (64 - 92)  BP: 105/70 (27 Nov 2021 08:40) (105/70 - 131/70)  BP(mean): --  RR: 18 (27 Nov 2021 08:40) (17 - 18)  SpO2: 95% (27 Nov 2021 08:40) (91% - 100%)  Daily     Daily                           7.8    15.99 )-----------( 453      ( 27 Nov 2021 05:33 )             24.8     11-27    127<L>  |  97<L>  |  5.9<L>  ----------------------------<  83  5.0   |  20.0<L>  |  0.57    Ca    7.4<L>      27 Nov 2021 05:35  Phos  3.0     11-27  Mg     1.8     11-27    TPro  6.4<L>  /  Alb  1.5<L>  /  TBili  0.3<L>  /  DBili  x   /  AST  25  /  ALT  11  /  AlkPhos  88  11-27      PHYSICAL EXAM:    Appearance: Alert, responsive, in no acute distress.    Musculoskeletal:       Left Lower Extremity: Left knee dressing is clean, dry, and intact. Wound vac/ACE dressing intact, managed by ACS. No abrasions, ecchymosis, or erythema. No bleeding. Sensation is grossly intact to light touch. + plantarflexion/FHL. +foot drop. DP pulses 2+. Cap refill < 2 seconds. No cyanosis. No signs of venous insufficiency or stasis.        Right Lower Extremity: Right knee dressing is clean, dry, and intact. No abrasions, ecchymosis, or erythema. No bleeding. Sensation is grossly intact to light touch. + dorsi/plantarflexion/EHL/FHL. DP pulses 2+. Cap refill < 2 seconds. No cyanosis. No signs of venous insufficiency or stasis.         A/P:  Pt is a  51y Female s/p Right knee I&D POD#3 (11/24/21),  Left knee I&D w [facility] left ankle POD#10 (11/17/21)    PLAN:   * Pain control  * DVTp  * recc AFO for left foot drop  * Weight Bearing Status: WBAT RLE, WBAT LLE in CAM boot  * Continue care as per primary team

## 2021-11-27 NOTE — PROGRESS NOTE ADULT - ATTENDING COMMENTS
Persistent b/l shoulder weakness/pain (slight improvement in ability to "shrug").   Neurology recs appreciated: MRI today.   F/u ortho recs.   COntinue abx (mild downtrend of WBC today).     --Repeat BMP (AM lab concerning for errors).

## 2021-11-27 NOTE — CHART NOTE - NSCHARTNOTEFT_GEN_A_CORE
Left medial thigh wound vac changed at bedside, patient tolerated well. Wound appeared well with healthy viable tissue. No evidence of exudate and/or purulence. Wound measures 9cm x 3cm x 2.5 cm. Wound vac has good seal (125 mmhg), serous drainage.

## 2021-11-27 NOTE — PROGRESS NOTE ADULT - ASSESSMENT
51y Female present to ED with left leg swelling, erythema, pain, leukocytosis and acute renal failure. CT scan concerning for necrotizing soft tissue infection. 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  Patient had complained or RLE swelling. Right lower extremity duplex and negative for DVT; however + swelling in popliteal fossa to R calf.  R knee I&D performed.   LLE with streptococcus pyogenes      Plan:   - Pain control   - monitor fevers  - f/u neurology recs of MRI and CRP  - UE doppler pending  - Daily dressing changes, wound care   - Continue IV abx: Clinda, penicillin   - AM labs, monitor lytes, replete PRN   - PT/OT   - Monitor wound vac

## 2021-11-27 NOTE — PROGRESS NOTE ADULT - SUBJECTIVE AND OBJECTIVE BOX
Acute Care Surgery/Trauma Surgery Progress Note:    No acute overnight events. Patient febrile and treated with Tylenol VSS. Pain well controlled with routine and PRN for breakthrough pain control. Denies n/v/f/c/cp/sob. Patient continues to reports b/l UE weakness and pain.      Diet, Regular (11-24-21 @ 12:46)      Scheduled Medications:   acetaminophen     Tablet .. 975 milliGRAM(s) Oral every 6 hours  baclofen 10 milliGRAM(s) Oral three times a day  clindamycin IVPB 900 milliGRAM(s) IV Intermittent every 8 hours  collagenase Ointment 1 Application(s) Topical two times a day  gabapentin 200 milliGRAM(s) Oral three times a day  glucagon  Injectable 1 milliGRAM(s) IntraMuscular once  heparin   Injectable 5000 Unit(s) SubCutaneous every 8 hours  HYDROmorphone  Injectable 1 milliGRAM(s) IV Push four times a day  insulin lispro (ADMELOG) corrective regimen sliding scale   SubCutaneous Before meals and at bedtime  lisinopril 5 milliGRAM(s) Oral daily  methocarbamol 750 milliGRAM(s) Oral three times a day  multiple electrolytes Injection Type 1 1000 milliLiter(s) (100 mL/Hr) IV Continuous <Continuous>  pantoprazole  Injectable 40 milliGRAM(s) IV Push two times a day  penicillin   G  potassium  IVPB      penicillin   G  potassium  IVPB 4 Million Unit(s) IV Intermittent every 4 hours  polyethylene glycol 3350 17 Gram(s) Oral daily  senna 2 Tablet(s) Oral at bedtime    PRN Medications:  fentaNYL    Injectable 75 MICROGram(s) IV Push every 8 hours PRN Dressing changes  HYDROmorphone  Injectable 0.5 milliGRAM(s) IV Push every 6 hours PRN breakthrough pain  ondansetron Injectable 4 milliGRAM(s) IV Push every 4 hours PRN Nausea and/or Vomiting  oxyCODONE    IR 5 milliGRAM(s) Oral every 4 hours PRN Moderate Pain (4 - 6)  oxyCODONE    IR 10 milliGRAM(s) Oral every 4 hours PRN Severe Pain (7 - 10)      Objective:   T(F): 100 (11-26 @ 21:56), Max: 101.9 (11-26 @ 17:26)  HR: 89 (11-26 @ 21:56) (64 - 92)  BP: 116/65 (11-26 @ 21:56) (113/61 - 131/70)  BP(mean): --  ABP: --  ABP(mean): --  RR: 18 (11-26 @ 21:56) (17 - 18)  SpO2: 97% (11-26 @ 21:56) (91% - 100%)      Physical Exam:   GEN: patient resting comfortably in bed, in no acute distress  RESP: respirations are unlabored, no accessory muscle use, no conversational dyspnea  CVS: RRR  GI: Abdomen soft, non-tender, non-distended, no rebound tenderness / guarding  MSK: RLE with knee dressing c/d/i without surrounding erythema. LLE with dressing c/d/i. b/l UE 4/5 strength, decreased ROM. ttp of shoulders bilaterally.     I&O's    11-25 @ 07:01  -  11-26 @ 07:00  --------------------------------------------------------  IN:  Total IN: 0 mL    OUT:    Indwelling Catheter - Urethral (mL): 700 mL    VAC (Vacuum Assisted Closure) System (mL): 500 mL    Voided (mL): 600 mL  Total OUT: 1800 mL    Total NET: -1800 mL      11-26 @ 07:01  -  11-27 @ 01:46  --------------------------------------------------------  IN:    Oral Fluid: 300 mL  Total IN: 300 mL    OUT:    VAC (Vacuum Assisted Closure) System (mL): 500 mL    Voided (mL): 800 mL  Total OUT: 1300 mL    Total NET: -1000 mL          LABS:                        7.5    18.73 )-----------( 540      ( 26 Nov 2021 09:01 )             24.0     11-26    135  |  101  |  10.0  ----------------------------<  95  4.3   |  23.0  |  0.61    Ca    7.0<L>      26 Nov 2021 09:01  Phos  2.6     11-26  Mg     1.8     11-26            MICROBIOLOGY:     Culture - Fungal, Body Fluid (collected 11-24 @ 13:35)  Source: .Body Fluid Right Knee Fluid  Preliminary Report (11-26 @ 09:33):    Testing in progress    Culture - Acid Fast - Body Fluid w/Smear (collected 11-24 @ 13:35)  Source: .Body Fluid Right Knee Fluid    Culture - Body Fluid with Gram Stain (collected 11-24 @ 13:35)  Source: .Body Fluid Right Knee Fluid  Gram Stain (11-25 @ 01:27):    polymorphonuclear leukocytes seen per low power field    No organisms seen per oil power field  Preliminary Report (11-25 @ 12:23):    No growth    Culture - Surgical Swab (collected 11-24 @ 12:30)  Source: .Surgical Swab Swab Right Knee #2  Preliminary Report (11-25 @ 12:19):    No growth to date.    Culture - Surgical Swab (collected 11-24 @ 12:30)  Source: .Surgical Swab Swab Right Knee #1  Preliminary Report (11-25 @ 12:19):    No growth to date.    Culture - Surgical Swab (collected 11-24 @ 12:28)  Source: .Surgical Swab Swab Right Knee #3  Preliminary Report (11-25 @ 12:06):    No growth to date.    Culture - Body Fluid with Gram Stain (collected 11-23 @ 12:50)  Source: .Body Fluid Knee Fluid  Gram Stain (11-23 @ 23:34):    polymorphonuclear leukocytes seen    No organisms seen    by cytocentrifuge  Preliminary Report (11-24 @ 13:52):    No growth    Culture - Blood (collected 11-22 @ 08:44)  Source: .Blood Blood  Preliminary Report (11-24 @ 09:00):    No growth at 48 hours    Culture - Blood (collected 11-20 @ 12:57)  Source: .Blood Blood  Final Report (11-25 @ 13:00):    No growth at 5 days.        PATHOLOGY:

## 2021-11-28 LAB
ANION GAP SERPL CALC-SCNC: 12 MMOL/L — SIGNIFICANT CHANGE UP (ref 5–17)
BASOPHILS # BLD AUTO: 0.06 K/UL — SIGNIFICANT CHANGE UP (ref 0–0.2)
BASOPHILS NFR BLD AUTO: 0.4 % — SIGNIFICANT CHANGE UP (ref 0–2)
BUN SERPL-MCNC: 5.6 MG/DL — LOW (ref 8–20)
CALCIUM SERPL-MCNC: 7.5 MG/DL — LOW (ref 8.6–10.2)
CHLORIDE SERPL-SCNC: 95 MMOL/L — LOW (ref 98–107)
CO2 SERPL-SCNC: 22 MMOL/L — SIGNIFICANT CHANGE UP (ref 22–29)
CREAT SERPL-MCNC: 0.64 MG/DL — SIGNIFICANT CHANGE UP (ref 0.5–1.3)
EOSINOPHIL # BLD AUTO: 0.48 K/UL — SIGNIFICANT CHANGE UP (ref 0–0.5)
EOSINOPHIL NFR BLD AUTO: 2.8 % — SIGNIFICANT CHANGE UP (ref 0–6)
GLUCOSE SERPL-MCNC: 71 MG/DL — SIGNIFICANT CHANGE UP (ref 70–99)
HCT VFR BLD CALC: 25.8 % — LOW (ref 34.5–45)
HGB BLD-MCNC: 7.7 G/DL — LOW (ref 11.5–15.5)
IMM GRANULOCYTES NFR BLD AUTO: 0.8 % — SIGNIFICANT CHANGE UP (ref 0–1.5)
LYMPHOCYTES # BLD AUTO: 1.78 K/UL — SIGNIFICANT CHANGE UP (ref 1–3.3)
LYMPHOCYTES # BLD AUTO: 10.5 % — LOW (ref 13–44)
MAGNESIUM SERPL-MCNC: 1.6 MG/DL — SIGNIFICANT CHANGE UP (ref 1.6–2.6)
MCHC RBC-ENTMCNC: 26.8 PG — LOW (ref 27–34)
MCHC RBC-ENTMCNC: 29.8 GM/DL — LOW (ref 32–36)
MCV RBC AUTO: 89.9 FL — SIGNIFICANT CHANGE UP (ref 80–100)
MONOCYTES # BLD AUTO: 1.37 K/UL — HIGH (ref 0–0.9)
MONOCYTES NFR BLD AUTO: 8 % — SIGNIFICANT CHANGE UP (ref 2–14)
NEUTROPHILS # BLD AUTO: 13.19 K/UL — HIGH (ref 1.8–7.4)
NEUTROPHILS NFR BLD AUTO: 77.5 % — HIGH (ref 43–77)
PHOSPHATE SERPL-MCNC: 3.3 MG/DL — SIGNIFICANT CHANGE UP (ref 2.4–4.7)
PLATELET # BLD AUTO: 687 K/UL — HIGH (ref 150–400)
POTASSIUM SERPL-MCNC: 4.3 MMOL/L — SIGNIFICANT CHANGE UP (ref 3.5–5.3)
POTASSIUM SERPL-SCNC: 4.3 MMOL/L — SIGNIFICANT CHANGE UP (ref 3.5–5.3)
RBC # BLD: 2.87 M/UL — LOW (ref 3.8–5.2)
RBC # FLD: 22.4 % — HIGH (ref 10.3–14.5)
SODIUM SERPL-SCNC: 129 MMOL/L — LOW (ref 135–145)
WBC # BLD: 17.02 K/UL — HIGH (ref 3.8–10.5)
WBC # FLD AUTO: 17.02 K/UL — HIGH (ref 3.8–10.5)

## 2021-11-28 PROCEDURE — 99233 SBSQ HOSP IP/OBS HIGH 50: CPT

## 2021-11-28 PROCEDURE — 99232 SBSQ HOSP IP/OBS MODERATE 35: CPT

## 2021-11-28 RX ORDER — PIPERACILLIN AND TAZOBACTAM 4; .5 G/20ML; G/20ML
3.38 INJECTION, POWDER, LYOPHILIZED, FOR SOLUTION INTRAVENOUS ONCE
Refills: 0 | Status: COMPLETED | OUTPATIENT
Start: 2021-11-28 | End: 2021-11-28

## 2021-11-28 RX ORDER — PIPERACILLIN AND TAZOBACTAM 4; .5 G/20ML; G/20ML
3.38 INJECTION, POWDER, LYOPHILIZED, FOR SOLUTION INTRAVENOUS EVERY 8 HOURS
Refills: 0 | Status: DISCONTINUED | OUTPATIENT
Start: 2021-11-28 | End: 2021-11-30

## 2021-11-28 RX ORDER — MAGNESIUM SULFATE 500 MG/ML
1 VIAL (ML) INJECTION ONCE
Refills: 0 | Status: COMPLETED | OUTPATIENT
Start: 2021-11-28 | End: 2021-11-28

## 2021-11-28 RX ORDER — IBUPROFEN 200 MG
600 TABLET ORAL ONCE
Refills: 0 | Status: DISCONTINUED | OUTPATIENT
Start: 2021-11-28 | End: 2021-11-28

## 2021-11-28 RX ADMIN — SODIUM CHLORIDE 100 MILLILITER(S): 9 INJECTION INTRAMUSCULAR; INTRAVENOUS; SUBCUTANEOUS at 05:19

## 2021-11-28 RX ADMIN — METHOCARBAMOL 750 MILLIGRAM(S): 500 TABLET, FILM COATED ORAL at 05:29

## 2021-11-28 RX ADMIN — SODIUM CHLORIDE 100 MILLILITER(S): 9 INJECTION INTRAMUSCULAR; INTRAVENOUS; SUBCUTANEOUS at 12:11

## 2021-11-28 RX ADMIN — Medication 10 MILLIGRAM(S): at 13:41

## 2021-11-28 RX ADMIN — METHOCARBAMOL 750 MILLIGRAM(S): 500 TABLET, FILM COATED ORAL at 13:41

## 2021-11-28 RX ADMIN — HYDROMORPHONE HYDROCHLORIDE 1 MILLIGRAM(S): 2 INJECTION INTRAMUSCULAR; INTRAVENOUS; SUBCUTANEOUS at 05:20

## 2021-11-28 RX ADMIN — PENICILLIN G POTASSIUM 100 MILLION UNIT(S): 5000000 POWDER, FOR SOLUTION INTRAMUSCULAR; INTRAPLEURAL; INTRATHECAL; INTRAVENOUS at 13:40

## 2021-11-28 RX ADMIN — Medication 10 MILLIGRAM(S): at 22:38

## 2021-11-28 RX ADMIN — OXYCODONE HYDROCHLORIDE 10 MILLIGRAM(S): 5 TABLET ORAL at 21:17

## 2021-11-28 RX ADMIN — HEPARIN SODIUM 5000 UNIT(S): 5000 INJECTION INTRAVENOUS; SUBCUTANEOUS at 22:39

## 2021-11-28 RX ADMIN — HEPARIN SODIUM 5000 UNIT(S): 5000 INJECTION INTRAVENOUS; SUBCUTANEOUS at 13:40

## 2021-11-28 RX ADMIN — OXYCODONE HYDROCHLORIDE 10 MILLIGRAM(S): 5 TABLET ORAL at 08:50

## 2021-11-28 RX ADMIN — LISINOPRIL 5 MILLIGRAM(S): 2.5 TABLET ORAL at 05:19

## 2021-11-28 RX ADMIN — HYDROMORPHONE HYDROCHLORIDE 1 MILLIGRAM(S): 2 INJECTION INTRAMUSCULAR; INTRAVENOUS; SUBCUTANEOUS at 12:11

## 2021-11-28 RX ADMIN — Medication 100 MILLIGRAM(S): at 16:02

## 2021-11-28 RX ADMIN — Medication 975 MILLIGRAM(S): at 06:24

## 2021-11-28 RX ADMIN — HYDROMORPHONE HYDROCHLORIDE 1 MILLIGRAM(S): 2 INJECTION INTRAMUSCULAR; INTRAVENOUS; SUBCUTANEOUS at 22:05

## 2021-11-28 RX ADMIN — HEPARIN SODIUM 5000 UNIT(S): 5000 INJECTION INTRAVENOUS; SUBCUTANEOUS at 05:19

## 2021-11-28 RX ADMIN — GABAPENTIN 200 MILLIGRAM(S): 400 CAPSULE ORAL at 22:38

## 2021-11-28 RX ADMIN — Medication 100 MILLIGRAM(S): at 22:38

## 2021-11-28 RX ADMIN — HYDROMORPHONE HYDROCHLORIDE 0.5 MILLIGRAM(S): 2 INJECTION INTRAMUSCULAR; INTRAVENOUS; SUBCUTANEOUS at 21:50

## 2021-11-28 RX ADMIN — HYDROMORPHONE HYDROCHLORIDE 1 MILLIGRAM(S): 2 INJECTION INTRAMUSCULAR; INTRAVENOUS; SUBCUTANEOUS at 23:25

## 2021-11-28 RX ADMIN — PENICILLIN G POTASSIUM 100 MILLION UNIT(S): 5000000 POWDER, FOR SOLUTION INTRAMUSCULAR; INTRAPLEURAL; INTRATHECAL; INTRAVENOUS at 01:31

## 2021-11-28 RX ADMIN — Medication 100 MILLIGRAM(S): at 06:16

## 2021-11-28 RX ADMIN — PANTOPRAZOLE SODIUM 40 MILLIGRAM(S): 20 TABLET, DELAYED RELEASE ORAL at 05:19

## 2021-11-28 RX ADMIN — HYDROMORPHONE HYDROCHLORIDE 1 MILLIGRAM(S): 2 INJECTION INTRAMUSCULAR; INTRAVENOUS; SUBCUTANEOUS at 17:56

## 2021-11-28 RX ADMIN — OXYCODONE HYDROCHLORIDE 10 MILLIGRAM(S): 5 TABLET ORAL at 04:18

## 2021-11-28 RX ADMIN — Medication 975 MILLIGRAM(S): at 23:42

## 2021-11-28 RX ADMIN — PENICILLIN G POTASSIUM 100 MILLION UNIT(S): 5000000 POWDER, FOR SOLUTION INTRAMUSCULAR; INTRAPLEURAL; INTRATHECAL; INTRAVENOUS at 05:29

## 2021-11-28 RX ADMIN — Medication 975 MILLIGRAM(S): at 18:42

## 2021-11-28 RX ADMIN — Medication 975 MILLIGRAM(S): at 13:00

## 2021-11-28 RX ADMIN — METHOCARBAMOL 750 MILLIGRAM(S): 500 TABLET, FILM COATED ORAL at 22:38

## 2021-11-28 RX ADMIN — HYDROMORPHONE HYDROCHLORIDE 1 MILLIGRAM(S): 2 INJECTION INTRAMUSCULAR; INTRAVENOUS; SUBCUTANEOUS at 05:35

## 2021-11-28 RX ADMIN — Medication 975 MILLIGRAM(S): at 05:30

## 2021-11-28 RX ADMIN — OXYCODONE HYDROCHLORIDE 10 MILLIGRAM(S): 5 TABLET ORAL at 20:17

## 2021-11-28 RX ADMIN — GABAPENTIN 200 MILLIGRAM(S): 400 CAPSULE ORAL at 13:41

## 2021-11-28 RX ADMIN — Medication 10 MILLIGRAM(S): at 21:52

## 2021-11-28 RX ADMIN — Medication 975 MILLIGRAM(S): at 17:56

## 2021-11-28 RX ADMIN — PIPERACILLIN AND TAZOBACTAM 200 GRAM(S): 4; .5 INJECTION, POWDER, LYOPHILIZED, FOR SOLUTION INTRAVENOUS at 16:40

## 2021-11-28 RX ADMIN — GABAPENTIN 200 MILLIGRAM(S): 400 CAPSULE ORAL at 05:20

## 2021-11-28 RX ADMIN — HYDROMORPHONE HYDROCHLORIDE 1 MILLIGRAM(S): 2 INJECTION INTRAMUSCULAR; INTRAVENOUS; SUBCUTANEOUS at 12:26

## 2021-11-28 RX ADMIN — OXYCODONE HYDROCHLORIDE 10 MILLIGRAM(S): 5 TABLET ORAL at 03:42

## 2021-11-28 RX ADMIN — OXYCODONE HYDROCHLORIDE 10 MILLIGRAM(S): 5 TABLET ORAL at 13:41

## 2021-11-28 RX ADMIN — HYDROMORPHONE HYDROCHLORIDE 0.5 MILLIGRAM(S): 2 INJECTION INTRAMUSCULAR; INTRAVENOUS; SUBCUTANEOUS at 22:20

## 2021-11-28 RX ADMIN — PENICILLIN G POTASSIUM 100 MILLION UNIT(S): 5000000 POWDER, FOR SOLUTION INTRAMUSCULAR; INTRAPLEURAL; INTRATHECAL; INTRAVENOUS at 08:50

## 2021-11-28 RX ADMIN — Medication 975 MILLIGRAM(S): at 12:11

## 2021-11-28 RX ADMIN — HYDROMORPHONE HYDROCHLORIDE 0.5 MILLIGRAM(S): 2 INJECTION INTRAMUSCULAR; INTRAVENOUS; SUBCUTANEOUS at 16:02

## 2021-11-28 RX ADMIN — HYDROMORPHONE HYDROCHLORIDE 1 MILLIGRAM(S): 2 INJECTION INTRAMUSCULAR; INTRAVENOUS; SUBCUTANEOUS at 18:12

## 2021-11-28 RX ADMIN — OXYCODONE HYDROCHLORIDE 10 MILLIGRAM(S): 5 TABLET ORAL at 14:30

## 2021-11-28 RX ADMIN — PIPERACILLIN AND TAZOBACTAM 25 GRAM(S): 4; .5 INJECTION, POWDER, LYOPHILIZED, FOR SOLUTION INTRAVENOUS at 22:39

## 2021-11-28 RX ADMIN — Medication 100 GRAM(S): at 13:40

## 2021-11-28 RX ADMIN — SENNA PLUS 2 TABLET(S): 8.6 TABLET ORAL at 22:38

## 2021-11-28 RX ADMIN — PANTOPRAZOLE SODIUM 40 MILLIGRAM(S): 20 TABLET, DELAYED RELEASE ORAL at 17:56

## 2021-11-28 RX ADMIN — Medication 10 MILLIGRAM(S): at 05:19

## 2021-11-28 RX ADMIN — HYDROMORPHONE HYDROCHLORIDE 0.5 MILLIGRAM(S): 2 INJECTION INTRAMUSCULAR; INTRAVENOUS; SUBCUTANEOUS at 16:17

## 2021-11-28 RX ADMIN — OXYCODONE HYDROCHLORIDE 10 MILLIGRAM(S): 5 TABLET ORAL at 09:40

## 2021-11-28 NOTE — PROGRESS NOTE ADULT - ATTENDING COMMENTS
Febrile overnight; workup (cultures etc) performed per night team.   Non-toxic-appearing this morning, afebrile this AM, WBC stable on AM labs; alert/oriented/appropriate.     MRI obtained yesterday, pending final read but no obvious abscess on review.     Clinical exam stable with decreased proximal UE strength. Anasarca.     --f/u ID/ortho/neuro recs.   --f/u results of infection workup from overnight.   --mobilize/OOBTC.

## 2021-11-28 NOTE — PROGRESS NOTE ADULT - SUBJECTIVE AND OBJECTIVE BOX
TRAUMA SURGERY PROGRESS NOTE    Subjective: Patient examined at bedside this AM. Reports pain is well controlled and she is feeling well. Tolerating PO without N/V. Patient was febrile overnight with TMax of 104.0F on rectal thermometer.     Objective:  Vital Signs  T(C): 37.8 (11-27 @ 23:15), Max: 40 (11-27 @ 20:30)  HR: 94 (11-27 @ 22:25) (83 - 100)  BP: 110/54 (11-27 @ 22:25) (105/70 - 144/73)  RR: 18 (11-27 @ 22:25) (18 - 18)  SpO2: 96% (11-27 @ 22:25) (94% - 99%)  11-26-21 @ 07:01  -  11-27-21 @ 07:00  --------------------------------------------------------  IN:  Total IN: 0 mL    OUT:    VAC (Vacuum Assisted Closure) System (mL): 500 mL    Voided (mL): 2050 mL  Total OUT: 2550 mL    Total NET: -2550 mL      11-27-21 @ 07:01  -  11-28-21 @ 01:37  --------------------------------------------------------  IN:  Total IN: 0 mL    OUT:    VAC (Vacuum Assisted Closure) System (mL): 500 mL    Voided (mL): 1400 mL  Total OUT: 1900 mL    Total NET: -1900 mL          Physical Exam:  General: alert and oriented, NAD  Resp: airway patent, respirations unlabored  CVS: regular rate and rhythm  Abdomen: soft, nontender, nondistended  Extremities: RLE with knee dressing c/d/i without surrounding erythema. LLE with dressing c/d/i. b/l UE 4/5 strength, decreased ROM. ttp of shoulders bilaterally. Profound edema in BUE  Skin: warm, dry, appropriate color        Labs:                        7.8    15.99 )-----------( 453      ( 27 Nov 2021 05:33 )             24.8   11-27    125<L>  |  92<L>  |  5.1<L>  ----------------------------<  82  4.8   |  19.0<L>  |  0.65    Ca    6.4<LL>      27 Nov 2021 11:51  Phos  3.0     11-27  Mg     1.8     11-27    TPro  6.4<L>  /  Alb  1.5<L>  /  TBili  0.3<L>  /  DBili  x   /  AST  25  /  ALT  11  /  AlkPhos  88  11-27    CAPILLARY BLOOD GLUCOSE      POCT Blood Glucose.: 97 mg/dL (27 Nov 2021 08:48)      Medications:   MEDICATIONS  (STANDING):  acetaminophen     Tablet .. 975 milliGRAM(s) Oral every 6 hours  baclofen 10 milliGRAM(s) Oral three times a day  clindamycin IVPB 900 milliGRAM(s) IV Intermittent every 8 hours  collagenase Ointment 1 Application(s) Topical two times a day  gabapentin 200 milliGRAM(s) Oral three times a day  glucagon  Injectable 1 milliGRAM(s) IntraMuscular once  heparin   Injectable 5000 Unit(s) SubCutaneous every 8 hours  HYDROmorphone  Injectable 1 milliGRAM(s) IV Push four times a day  lisinopril 5 milliGRAM(s) Oral daily  methocarbamol 750 milliGRAM(s) Oral three times a day  pantoprazole  Injectable 40 milliGRAM(s) IV Push two times a day  penicillin   G  potassium  IVPB      penicillin   G  potassium  IVPB 4 Million Unit(s) IV Intermittent every 4 hours  polyethylene glycol 3350 17 Gram(s) Oral daily  senna 2 Tablet(s) Oral at bedtime  sodium chloride 0.9%. 1000 milliLiter(s) (100 mL/Hr) IV Continuous <Continuous>    MEDICATIONS  (PRN):  fentaNYL    Injectable 75 MICROGram(s) IV Push every 8 hours PRN Dressing changes  HYDROmorphone  Injectable 0.5 milliGRAM(s) IV Push every 6 hours PRN breakthrough pain  ondansetron Injectable 4 milliGRAM(s) IV Push every 4 hours PRN Nausea and/or Vomiting  oxyCODONE    IR 5 milliGRAM(s) Oral every 4 hours PRN Moderate Pain (4 - 6)  oxyCODONE    IR 10 milliGRAM(s) Oral every 4 hours PRN Severe Pain (7 - 10)        IMAGING:   < from: US Duplex Venous Upper Ext Complete, Bilateral (11.27.21 @ 12:55) >  FINDINGS:    The right internal jugular, subclavian, axillary and brachial veins are patent and compressible where applicable.  The basilic vein (superficial veins) is patent and without thrombus. Occlusion of the right cephalic (superficial) vein.    The left internal jugular, subclavian, axillary and brachial veins are patent and compressible where applicable.  The basilic vein (superficial veins) is patent and without thrombus. Occlusion of the left cephalic (superficial) vein.    Doppler examination shows normal spontaneous and phasic flow.    IMPRESSION:  No evidence of deep venous thrombosis in either upper extremity. Occlusion of the bilateralcephalic veins (superficial veins).    < end of copied text >  < from: MR Head No Cont (11.27.21 @ 15:42) >  FINDINGS:    There is no evidence of acute infarct or hemorrhage. There is no midline shift or herniation pattern. No mass effect is found in the brain.    The ventricles, sulci and basal cisterns appearunremarkable.    The vertebral and internal carotid arteries demonstrate expected flow voids indicating their patency.    The paranasal sinuses are clear.    IMPRESSION:   No evidence of acute infarct or hemorrhage.    < end of copied text >  < from: MR Cervical Spine No Cont (11.27.21 @ 16:23) >  FINDINGS:  Vertebrae: The study is markedly limited due to motion artifact. Mild reversal  cervical lordosis. Normal alignment cervical vertebral bodies. No obvious acute  fracture involving the cervical spine.  Spinal cord: Normal signal. No cord compression.  Discs/Spinal canal/Neural foramina: There are multi level areas of posterior  projecting disc osteophyte complexes particularly C3-C4, C4-C5, C5-C6 and to  some degree C6-C7. There is fairly symmetrical bilateral foraminal stenosis due  to uncovertebral osteophytes at the C4-C5 level, C5-C6 and to some degree  C6-C7.    Vasculature: Expected flow voids in the vertebral arteries.  Soft tissues: Unremarkable    IMPRESSION:  1. The study is quite limited due to motion artifact.  2. Mild reversal cervical lordosis. Normal alignment cervical vertebral bodies.  3. No obvious acute fracture involving the cervical spine.  4. Please see details above regarding some degree of central and foraminal  stenosis at multiple levels due to posterior projecting disc osteophyte  complexes.  5. There is no evidence of any gross cord compression. Due to motion artifact  no conclusions can be made regarding the central spinal canal or the  intramedullary portion of the cervical spinal cord .    < end of copied text >

## 2021-11-28 NOTE — PROGRESS NOTE ADULT - SUBJECTIVE AND OBJECTIVE BOX
Neurology Progress Note  [facility] [place] [address] [place] [address]  [phone]    Interval History:  Patient reports continued soreness and arm swelling    HPI 11/26/21:  50 yo woman with no significant past medical history who was admitted for fasciotomy for necrotizing soft tissue infection. She states she had left lower extremity swelling at home and when she arrived here had emergent surgery for suspected infection. 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 She states at home, she also had pain in her arms and shoulders, but she could move them well. She states two days ago when she was in ICU, she noticed soreness in her shoulders. She states she has weakness in her proximal arms which is new. She denies numbness or tingling in her extremities. She denies headache, or change in vision. She states she feels her upper arms are swollen. She has no further complaints.     Meds:  MEDICATIONS  (STANDING):  acetaminophen     Tablet .. 975 milliGRAM(s) Oral every 6 hours  baclofen 10 milliGRAM(s) Oral three times a day  clindamycin IVPB 900 milliGRAM(s) IV Intermittent every 8 hours  collagenase Ointment 1 Application(s) Topical two times a day  gabapentin 200 milliGRAM(s) Oral three times a day  glucagon  Injectable 1 milliGRAM(s) IntraMuscular once  heparin   Injectable 5000 Unit(s) SubCutaneous every 8 hours  HYDROmorphone  Injectable 1 milliGRAM(s) IV Push four times a day  lisinopril 5 milliGRAM(s) Oral daily  methocarbamol 750 milliGRAM(s) Oral three times a day  pantoprazole  Injectable 40 milliGRAM(s) IV Push two times a day  penicillin   G  potassium  IVPB      penicillin   G  potassium  IVPB 4 Million Unit(s) IV Intermittent every 4 hours  polyethylene glycol 3350 17 Gram(s) Oral daily  senna 2 Tablet(s) Oral at bedtime  sodium chloride 0.9%. 1000 milliLiter(s) (100 mL/Hr) IV Continuous <Continuous>    MEDICATIONS  (PRN):  fentaNYL    Injectable 75 MICROGram(s) IV Push every 8 hours PRN Dressing changes  HYDROmorphone  Injectable 0.5 milliGRAM(s) IV Push every 6 hours PRN breakthrough pain  ondansetron Injectable 4 milliGRAM(s) IV Push every 4 hours PRN Nausea and/or Vomiting  oxyCODONE    IR 10 milliGRAM(s) Oral every 4 hours PRN Severe Pain (7 - 10)  oxyCODONE    IR 5 milliGRAM(s) Oral every 4 hours PRN Moderate Pain (4 - 6)      Physical Exam  Vital Signs Last 24 Hrs  T(C): 39 (28 Nov 2021 12:10), Max: 40 (27 Nov 2021 20:30)  T(F): 102.2 (28 Nov 2021 12:10), Max: 104 (27 Nov 2021 20:30)  HR: 88 (28 Nov 2021 12:10) (85 - 100)  BP: 127/70 (28 Nov 2021 12:10) (110/54 - 144/73)  BP(mean): --  RR: 18 (28 Nov 2021 12:10) (18 - 18)  SpO2: 95% (28 Nov 2021 12:10) (94% - 99%)  General: no acute distress  HEENT:NC/AT  Lungs: no respiratory distress  Skin: no rash or ecchymoses in upper extremities  Significant bilateral upper extremity edema      Mental Status: AAO x 3, no dysarthria, no aphasia  CN: PERRL, EOMI, VFF, V1-V3 sensation intact, no facial asymmetry  Motor:  R deltoid 2/5  L  deltoid 2/5  Otherwise 5/5 distal upper extremities  Good radial pulses bilaterally    Bilateral lower extremities, moves spontaneously at least 1/5, however, full strength examination limited due to pain from recent surgery  Sensory: intact to light touch  and pinprick throughout  Reflexes 1+ bilateral biceps and brachioradialis, unable to check in LE due to severe pain  Coordination: unable to perform due to weakness  Gait: deferred      LABS:  11-26 @ 21:52 Creatine 43 U/L [25 - 170]  cret                        7.7    17.02 )-----------( 687      ( 28 Nov 2021 09:14 )             25.8     11-28    129<L>  |  95<L>  |  5.6<L>  ----------------------------<  71  4.3   |  22.0  |  0.64    Ca    7.5<L>      28 Nov 2021 09:14  Phos  3.3     11-28  Mg     1.6     11-28    TPro  6.4<L>  /  Alb  1.5<L>  /  TBili  0.3<L>  /  DBili  x   /  AST  25  /  ALT  11  /  AlkPhos  88  11-27      MRI brain w/o contrast - no acute findings  MRI C spine w/o contrast - no gross evidence of cord compression, however, severely motion limited limiting interpretation    UE dopplers - no DVT in upper extremities

## 2021-11-28 NOTE — PROGRESS NOTE ADULT - SUBJECTIVE AND OBJECTIVE BOX
INFECTIOUS DISEASES AND INTERNAL MEDICINE at [place]  =======================================================  Theo Morrow MD  Diplomates American Board of Internal Medicine and Infectious Diseases  Telephone [phone]  Fax            [phone]  =======================================================    LUIS FERNANDO DAVIS [id number]    Follow up: group A STREP NECROTIZING SOFT TISSUE INFECTION   now febrile  c/o b/l knee pain and shoulder pain    Allergies:  No Known Allergies      Medications:  acetaminophen     Tablet .. 975 milliGRAM(s) Oral every 6 hours   PCN   chlorhexidine 2% Cloths 1 Application(s) Topical <User Schedule>  clindamycin IVPB 900 milliGRAM(s) IV Intermittent every 8 hours  fentaNYL    Injectable 75 MICROGram(s) IV Push every 8 hours PRN  gabapentin 200 milliGRAM(s) Oral three times a day  glucagon  Injectable 1 milliGRAM(s) IntraMuscular once  hydrocortisone sodium succinate Injectable 50 milliGRAM(s) IV Push daily  insulin lispro (ADMELOG) corrective regimen sliding scale   SubCutaneous Before meals and at bedtime  lisinopril 5 milliGRAM(s) Oral daily  multiple electrolytes Injection Type 1 1000 milliLiter(s) IV Continuous <Continuous>  ondansetron Injectable 4 milliGRAM(s) IV Push every 4 hours PRN  oxyCODONE    IR 5 milliGRAM(s) Oral every 4 hours PRN  oxyCODONE    IR 10 milliGRAM(s) Oral every 4 hours PRN  pantoprazole  Injectable 40 milliGRAM(s) IV Push two times a day    SOCIAL       FAMILY   FAMILY HISTORY:  FH: HTN (hypertension) (Mother)      REVIEW OF SYSTEMS:  CONSTITUTIONAL:  No Fever or chills  HEENT:   No diplopia or blurred vision.  No earache, sore throat or runny nose.  CARDIOVASCULAR:  No pressure, squeezing, strangling, tightness, heaviness or aching about the chest, neck, axilla or epigastrium.  RESPIRATORY:  No cough, shortness of breath, PND or orthopnea.  GASTROINTESTINAL:  No nausea, vomiting or diarrhea.  GENITOURINARY:  No dysuria, frequency or urgency. No Blood in urine  MUSCULOSKELETAL:   AS PER HPI   SKIN:  No change in skin, hair or nails.  NEUROLOGIC:  No paresthesias, fasciculations, seizures or weakness.  PSYCHIATRIC:  No disorder of thought or mood.  ENDOCRINE:  No heat or cold intolerance, polyuria or polydipsia.  HEMATOLOGICAL:  No easy bruising or bleeding.            Physical Exam:  Vital Signs Last 24 Hrs  T(C): 38 (2021 13:30), Max: 40 (2021 20:30)  T(F): 100.4 (2021 13:30), Max: 104 (2021 20:30)  HR: 88 (2021 12:10) (85 - 100)  BP: 127/70 (2021 12:10) (110/54 - 144/73)  BP(mean): --  RR: 18 (2021 12:10) (18 - 18)  SpO2: 95% (2021 12:10) (94% - 99%)    GEN: NAD,    HEENT: normocephalic and atraumatic. EOMI. ASHISH.    NECK: Supple. No carotid bruits.  No lymphadenopathy or thyromegaly.  LUNGS: Clear to auscultation.  HEART: Regular rate and rhythm without murmur.  ABDOMEN: Soft, nontender, and nondistended.  Positive bowel sounds.    : No CVA tenderness  EXTREMITIES: LEFT LEG WRAPPED left thigh vac in place  UPPER EXT STRENGTH GOOD BUT CAN T RAISE HANDS ALL THE WAY, localized tenderness to both shoulders     NEUROLOGIC:  C AWAKE ALERT Appropriate affect .  SKIN: No ulceration or induration present.        Labs:                        7.7    17.02 )-----------( 687      ( 2021 09:14 )             25.8           129<L>  |  95<L>  |  5.6<L>  ----------------------------<  71  4.3   |  22.0  |  0.64    Ca    7.5<L>      2021 09:14  Phos  3.3       Mg     1.6         TPro  6.4<L>  /  Alb  1.5<L>  /  TBili  0.3<L>  /  DBili  x   /  AST  25  /  ALT  11  /  AlkPhos  88                Urinalysis Basic - ( 2021 21:26 )    Color: Yellow / Appearance: Clear / S.010 / pH: x  Gluc: x / Ketone: Negative  / Bili: Negative / Urobili: Negative mg/dL   Blood: x / Protein: 15 mg/dL / Nitrite: Negative   Leuk Esterase: Trace / RBC: 6-10 /HPF / WBC 0-2   Sq Epi: x / Non Sq Epi: Few / Bacteria: Occasional            CARDIAC MARKERS ( 2021 21:52 )  x     / x     / 43 U/L / x     / x            CAPILLARY BLOOD GLUCOSE      POCT Blood Glucose.: 97 mg/dL (2021 08:48)            =======================================================  Current Antibiotics:  PCN  clindamycin IVPB 900 milliGRAM(s) IV Intermittent every 8 hours    Other medications:  acetaminophen     Tablet .. 975 milliGRAM(s) Oral every 6 hours  chlorhexidine 2% Cloths 1 Application(s) Topical <User Schedule>  gabapentin 200 milliGRAM(s) Oral three times a day  glucagon  Injectable 1 milliGRAM(s) IntraMuscular once  hydrocortisone sodium succinate Injectable 50 milliGRAM(s) IV Push daily  insulin lispro (ADMELOG) corrective regimen sliding scale   SubCutaneous Before meals and at bedtime  lisinopril 5 milliGRAM(s) Oral daily  multiple electrolytes Injection Type 1 1000 milliLiter(s) IV Continuous <Continuous>  pantoprazole  Injectable 40 milliGRAM(s) IV Push two times a day      =======================================================          Culture - Body Fluid with Gram Stain (collected 21 @ 16:20)  Source: .Body Fluid OR Posterior Calf Left Lower Extremity Fluid  Gram Stain (21 @ 21:39):    polymorphonuclear leukocytes seen    Gram Positive Cocci in Pairs and Chains seen    by cytocentrifuge    Culture - Body Fluid with Gram Stain (collected 21 @ 16:20)  Source: .Body Fluid OR Swab Left Knee Lower Extremity Fluid  Gram Stain (21 @ 19:36):    polymorphonuclear leukocytes seen    Gram Positive Cocci in Pairs and Chains seen    by cytocentrifuge    Culture - Body Fluid with Gram Stain (collected 21 @ 16:20)  Source: .Body Fluid OR - Left Lower Extremity Fluid  Gram Stain (21 @ 20:10):    No polymorphonuclear leukocytes seen    No organisms seen    by cytocentrifuge    Culture - Urine (collected 21 @ 10:07)  Source: Catheterized Catheterized  Final Report (21 @ 07:03):    No growth    Culture - Urine (collected 21 @ 00:25)  Source: Catheterized Catheterized  Final Report (21 @ 22:09):    <10,000 CFU/mL Normal Urogenital Aimee    Culture - Blood (collected 21 @ 14:45)  Source: .Blood Blood  Gram Stain (21 @ 09:44):    Growth in aerobic and anaerobic bottles: Gram Positive Cocci in Pairs and    Chains    Gram Stain performed by:    [facility]    [address] [place] [id number]    .    TYPE: (C=Critical, N=Notification, A=Abnormal) C    TESTS:  _     DATE/TIME CALLED: _ 2021 09:42:19    CALLED TO: Chris Hernandez RN    READ BACK (2 Patient Identifiers)(Y/N): _ Y    READ BACK VALUES (Y/N): _ Y    CALLED BY: Chris Quiñones  Final Report (21 @ 12:28):    Growth in aerobic and anaerobic bottles: Streptococcus pyogenes (Group A)    See previous culture [id number]    Culture - Blood (collected 21 @ 14:45)  Source: .Blood Blood  Gram Stain (21 @ 09:40):    Growth in aerobic and anaerobic bottles: Gram Positive Cocci in Pairs and    Chains    ***Blood Panel PCR results on this specimen are available    approximately 3 hours after the Gram stain result.***    Gram stain, PCR, and/or culture results may not always    correspond due to difference in methodologies.    ************************************************************    This PCR assay was performed using [employer].    The following targets are tested for: Enterococcus,    vancomycin resistant enterococci, Listeria monocytogenes,    coagulase negative staphylococci, S. aureus,    methicillin resistant S. aureus, Streptococcus agalactiae    (Group B), S. pneumoniae, S. pyogenes (Group A),    Acinetobacter baumannii, Enterobacter cloacae, E. coli,    Klebsiella oxytoca, K. pneumoniae, Proteus sp.,    Serratia marcescens, Haemophilus influenzae,    Neisseria meningitidis, Pseudomonas aeruginosa, Candida    albicans, C. glabrata, C krusei, C parapsilosis,    C. tropicalis and the KPC resistance gene.    Gram Stain and BCID performed by:    [facility]    [address] [place] [address]    .    TYPE: (C=Critical, N=Notification, A=Abnormal) C    TESTS:  _ GS    DATE/TIME CALLED: _ 2021 09:40:08    CALLED TO: Chris Hernandez RN    READ BACK (2 Patient Identifiers)(Y/N): _ Y    READ BACK VALUES (Y/N): _ Y    CALLED BY: Chris Quiñones  Final Report (21 @ 12:28):    Growth in aerobic and anaerobic bottles: Streptococcus pyogenes (Group A)  Organism: Blood Culture PCR  Streptococcus pyogenes (Group A) (21 @ 12:28)  Organism: Streptococcus pyogenes (Group A) (21 @ 12:28)    Sensitivities:      -  Ceftriaxone: S 0.016      -  Penicillin: S 0.016 Predicts results for ampicillin, amoxicillin, amoxicillin/clavulanate, ampicillin/sulbactam, 1st, 2nd and 3rd generation cephalosporins and carbapenems.      Method Type: ETEST  Organism: Streptococcus pyogenes (Group A) (21 @ 12:28)    Sensitivities:      -  Vancomycin: S      Method Type: KB  Organism: Blood Culture PCR (21 @ 12:28)    Sensitivities:      -  Streptococcus pyogenes (Group A): Detec      Method Type: PCR    < from: MR Cervical Spine No Cont (21 @ 16:23) >  IMPRESSION:  1. The study is quite limited due to motion artifact.  2. Mild reversal cervical lordosis. Normal alignment cervical vertebral bodies.  3. No obvious acute fracture involving the cervical spine.  4. Please see details above regarding some degree of central and foraminal  stenosis at multiple levels due to posterior projecting disc osteophyte  complexes.  5. There is no evidence of any gross cord compression. Due to motion artifact  no conclusions can be made regarding the central spinal canal or the  intramedullary portion of the cervical spinal cord .    Final report:    Agree with preliminary report.    --- End of Report ---    < end of copied text >

## 2021-11-28 NOTE — PROGRESS NOTE ADULT - ASSESSMENT
Assessment: 51y Female present to ED with left leg swelling, erythema, pain, leukocytosis and acute renal failure. CT scan concerning for necrotizing soft tissue infection. 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  Patient had complained or RLE swelling. Right lower extremity duplex and negative for DVT; however + swelling in popliteal fossa to R calf.  R knee I&D performed.   LLE with streptococcus pyogenes      Plan:   - Pain control   - monitor fevers  - f/u fever work up  - f/u neurology recs: medicine eval of swelling  - UE doppler: negative for DVT   - Daily dressing changes, wound care   - Continue IV abx: Clinda, penicillin   - AM labs, monitor lytes, replete PRN   - PT/OT   - Wound vac change on 11/30

## 2021-11-28 NOTE — PROGRESS NOTE ADULT - ASSESSMENT
51y Female present to ED with left leg swelling, erythema, pain. Patient endorses she had left knee pain for 1 week presented 3 days ago to ED  where she states was given an steroid shot, and ibuprofen. however pain and edema worsened. Patient states she wasnt feeling herself today reason why she came. Endorses chills initially with pain 3 days ago but none since, denies history of trauma, insect bites, recent interventions, or injections to the area, contact with ticks, history of diabetes.  PT AS ABOVE WITH LEFT LEG SWELLING PT WITH INCREASED WBC PLACED ON ABX FOR PRESUMED INFECTION  PT WITH CT SCAN WITH FASCIAL EDAM PT BROUGHT TO THE OR EMERGENTLY AND  UNDERWENT FASCIOTOMY   PT BROUGHT TO THE OR  BOTH TRAUMA AND ORTHO INVOLVED  PURULENT FLUID FOUND  NECROTIZING SOFT TISSUE INFECTION    BLOOD CX WITH GROUP A STREP AND  OPERATIVE FLUID WITH STREP   PT ON PCN AND CLINDA for POSSIBLE ANTITOXIN EFFECT IN GROUP A STREP INFECTION    PT WITH RIGHT KNEE PAIN WENT TO OR 11/24  AND WASHED OUT AND CULTURES TAKEN so far negative    now febrile with uptrending WBC  CXr and UA (-)  c/o b/l shoulder pain, and knee pain, MR as above  f/u repeat BCX  would re-image knees  dc penicillin and change to zosyn  c/w clinda       WILL FOLLOW UP

## 2021-11-28 NOTE — PROGRESS NOTE ADULT - SUBJECTIVE AND OBJECTIVE BOX
Pt Name: LUIS FERNANDO DAVIS  MRN: [id number]    Procedure: Right knee I&D POD#4 (11/24/21),  Left knee I&D w [facility]ON left ankle POD#10 (11/17/21)  Surgeon: Dr Tobias    Patient is a 51y Female seen and examined at bedside. Patient is doing well, reports improvement. Patient complaining of muscle spasm to RLE. Pain is controlled with the prescribed medication. Denies numbness/tingling. No other orthopedic complaints.    ICU Vital Signs Last 24 Hrs  T(C): 38.1 (28 Nov 2021 04:05), Max: 40 (27 Nov 2021 20:30)  T(F): 100.6 (28 Nov 2021 04:05), Max: 104 (27 Nov 2021 20:30)  HR: 100 (28 Nov 2021 04:05) (85 - 100)  BP: 123/68 (28 Nov 2021 04:05) (110/54 - 144/73)  BP(mean): --  ABP: --  ABP(mean): --  RR: 18 (28 Nov 2021 04:05) (18 - 18)  SpO2: 94% (28 Nov 2021 04:05) (94% - 99%)                          7.7    17.02 )-----------( x        ( 28 Nov 2021 09:14 )             25.8       PHYSICAL EXAM:    Appearance: Alert, responsive, in no acute distress.    Musculoskeletal:       Left Lower Extremity: Left knee dressing is clean, dry, and intact. Wound vac/ACE dressing intact- managed by ACS. No abrasions, ecchymosis, or erythema. No bleeding. SILT. + plantarflexion/FHL. +foot drop. DP pulses 2+. Cap refill < 2 seconds. No cyanosis. No signs of venous insufficiency or stasis.        Right Lower Extremity: Right knee dressing is clean, dry, and intact. No abrasions, ecchymosis, or erythema. No bleeding. SILT. + dorsi/plantarflexion/EHL/FHL. DP pulses 2+. Cap refill < 2 seconds. No cyanosis. No signs of venous insufficiency or stasis.     A/P:  Pt is a  51y Female s/p Right knee I&D POD#4 (11/24/21),  Left knee I&D w [facility]ON left ankle POD#10 (11/17/21)    PLAN:   * Pain control  * DVTp  * recc AFO for left foot drop  * Weight Bearing Status: WBAT RLE, WBAT LLE in CAM boot  * Continue care as per primary team

## 2021-11-28 NOTE — PROGRESS NOTE ADULT - ASSESSMENT
52 yo woman admitted for necrotizing infection s/p fasciotomy of LLE now with proximal arm weakness and swelling    Proximal arm weakness  weakness likely due to swelling  If swelling improves and weakness persists, consider repeat MRI C Spine w/o contrast    Upper extremity swelling  Management as per primary team        Will continue to follow

## 2021-11-29 LAB
% ALBUMIN: 24.6 % — SIGNIFICANT CHANGE UP
% ALPHA 1: 15.3 % — SIGNIFICANT CHANGE UP
% ALPHA 2: 14.1 % — SIGNIFICANT CHANGE UP
% BETA: 12.6 % — SIGNIFICANT CHANGE UP
% GAMMA: 33.4 % — SIGNIFICANT CHANGE UP
ALBUMIN SERPL ELPH-MCNC: 1.4 G/DL — LOW (ref 3.6–5.5)
ALBUMIN/GLOB SERPL ELPH: 0.3 RATIO — SIGNIFICANT CHANGE UP
ALPHA1 GLOB SERPL ELPH-MCNC: 0.9 G/DL — HIGH (ref 0.1–0.4)
ALPHA2 GLOB SERPL ELPH-MCNC: 0.8 G/DL — SIGNIFICANT CHANGE UP (ref 0.5–1)
ANION GAP SERPL CALC-SCNC: 11 MMOL/L — SIGNIFICANT CHANGE UP (ref 5–17)
ANISOCYTOSIS BLD QL: SLIGHT — SIGNIFICANT CHANGE UP
B-GLOBULIN SERPL ELPH-MCNC: 0.7 G/DL — SIGNIFICANT CHANGE UP (ref 0.5–1)
BASOPHILS # BLD AUTO: 0 K/UL — SIGNIFICANT CHANGE UP (ref 0–0.2)
BASOPHILS NFR BLD AUTO: 0 % — SIGNIFICANT CHANGE UP (ref 0–2)
BLD GP AB SCN SERPL QL: SIGNIFICANT CHANGE UP
BUN SERPL-MCNC: 5.7 MG/DL — LOW (ref 8–20)
CALCIUM SERPL-MCNC: 7.3 MG/DL — LOW (ref 8.6–10.2)
CHLORIDE SERPL-SCNC: 94 MMOL/L — LOW (ref 98–107)
CO2 SERPL-SCNC: 23 MMOL/L — SIGNIFICANT CHANGE UP (ref 22–29)
CREAT SERPL-MCNC: 0.68 MG/DL — SIGNIFICANT CHANGE UP (ref 0.5–1.3)
CULTURE RESULTS: SIGNIFICANT CHANGE UP
DACRYOCYTES BLD QL SMEAR: SLIGHT — SIGNIFICANT CHANGE UP
ELLIPTOCYTES BLD QL SMEAR: SLIGHT — SIGNIFICANT CHANGE UP
EOSINOPHIL # BLD AUTO: 0.58 K/UL — HIGH (ref 0–0.5)
EOSINOPHIL NFR BLD AUTO: 3.5 % — SIGNIFICANT CHANGE UP (ref 0–6)
GAMMA GLOBULIN: 1.9 G/DL — HIGH (ref 0.6–1.6)
GIANT PLATELETS BLD QL SMEAR: PRESENT — SIGNIFICANT CHANGE UP
GLUCOSE SERPL-MCNC: 78 MG/DL — SIGNIFICANT CHANGE UP (ref 70–99)
HCT VFR BLD CALC: 20.6 % — CRITICAL LOW (ref 34.5–45)
HGB BLD-MCNC: 6.5 G/DL — CRITICAL LOW (ref 11.5–15.5)
HYPOCHROMIA BLD QL: SIGNIFICANT CHANGE UP
INTERPRETATION SERPL IFE-IMP: SIGNIFICANT CHANGE UP
LYMPHOCYTES # BLD AUTO: 0.74 K/UL — LOW (ref 1–3.3)
LYMPHOCYTES # BLD AUTO: 4.4 % — LOW (ref 13–44)
MAGNESIUM SERPL-MCNC: 1.5 MG/DL — LOW (ref 1.6–2.6)
MANUAL SMEAR VERIFICATION: SIGNIFICANT CHANGE UP
MCHC RBC-ENTMCNC: 26.7 PG — LOW (ref 27–34)
MCHC RBC-ENTMCNC: 31.6 GM/DL — LOW (ref 32–36)
MCV RBC AUTO: 84.8 FL — SIGNIFICANT CHANGE UP (ref 80–100)
MICROCYTES BLD QL: SLIGHT — SIGNIFICANT CHANGE UP
MONOCYTES # BLD AUTO: 0.43 K/UL — SIGNIFICANT CHANGE UP (ref 0–0.9)
MONOCYTES NFR BLD AUTO: 2.6 % — SIGNIFICANT CHANGE UP (ref 2–14)
NEUTROPHILS # BLD AUTO: 14.81 K/UL — HIGH (ref 1.8–7.4)
NEUTROPHILS NFR BLD AUTO: 88.6 % — HIGH (ref 43–77)
OVALOCYTES BLD QL SMEAR: SLIGHT — SIGNIFICANT CHANGE UP
PHOSPHATE SERPL-MCNC: 3.6 MG/DL — SIGNIFICANT CHANGE UP (ref 2.4–4.7)
PLAT MORPH BLD: NORMAL — SIGNIFICANT CHANGE UP
PLATELET # BLD AUTO: 627 K/UL — HIGH (ref 150–400)
POIKILOCYTOSIS BLD QL AUTO: SLIGHT — SIGNIFICANT CHANGE UP
POLYCHROMASIA BLD QL SMEAR: SIGNIFICANT CHANGE UP
POTASSIUM SERPL-MCNC: 4.2 MMOL/L — SIGNIFICANT CHANGE UP (ref 3.5–5.3)
POTASSIUM SERPL-SCNC: 4.2 MMOL/L — SIGNIFICANT CHANGE UP (ref 3.5–5.3)
PROT PATTERN SERPL ELPH-IMP: SIGNIFICANT CHANGE UP
RBC # BLD: 2.43 M/UL — LOW (ref 3.8–5.2)
RBC # FLD: 21.8 % — HIGH (ref 10.3–14.5)
RBC BLD AUTO: ABNORMAL
SODIUM SERPL-SCNC: 128 MMOL/L — LOW (ref 135–145)
SPECIMEN SOURCE: SIGNIFICANT CHANGE UP
VARIANT LYMPHS # BLD: 0.9 % — SIGNIFICANT CHANGE UP (ref 0–6)
WBC # BLD: 16.71 K/UL — HIGH (ref 3.8–10.5)
WBC # FLD AUTO: 16.71 K/UL — HIGH (ref 3.8–10.5)

## 2021-11-29 PROCEDURE — 99232 SBSQ HOSP IP/OBS MODERATE 35: CPT | Mod: 24

## 2021-11-29 PROCEDURE — 73030 X-RAY EXAM OF SHOULDER: CPT | Mod: 26,50

## 2021-11-29 PROCEDURE — 99232 SBSQ HOSP IP/OBS MODERATE 35: CPT

## 2021-11-29 RX ORDER — MAGNESIUM SULFATE 500 MG/ML
2 VIAL (ML) INJECTION ONCE
Refills: 0 | Status: COMPLETED | OUTPATIENT
Start: 2021-11-29 | End: 2021-11-29

## 2021-11-29 RX ORDER — ACETAMINOPHEN 500 MG
1000 TABLET ORAL ONCE
Refills: 0 | Status: COMPLETED | OUTPATIENT
Start: 2021-11-29 | End: 2021-11-29

## 2021-11-29 RX ORDER — PANTOPRAZOLE SODIUM 20 MG/1
40 TABLET, DELAYED RELEASE ORAL
Refills: 0 | Status: DISCONTINUED | OUTPATIENT
Start: 2021-11-29 | End: 2021-12-02

## 2021-11-29 RX ORDER — HYDROMORPHONE HYDROCHLORIDE 2 MG/ML
1 INJECTION INTRAMUSCULAR; INTRAVENOUS; SUBCUTANEOUS EVERY 6 HOURS
Refills: 0 | Status: DISCONTINUED | OUTPATIENT
Start: 2021-11-29 | End: 2021-12-02

## 2021-11-29 RX ADMIN — METHOCARBAMOL 750 MILLIGRAM(S): 500 TABLET, FILM COATED ORAL at 10:50

## 2021-11-29 RX ADMIN — Medication 975 MILLIGRAM(S): at 15:52

## 2021-11-29 RX ADMIN — HYDROMORPHONE HYDROCHLORIDE 1 MILLIGRAM(S): 2 INJECTION INTRAMUSCULAR; INTRAVENOUS; SUBCUTANEOUS at 11:32

## 2021-11-29 RX ADMIN — PIPERACILLIN AND TAZOBACTAM 25 GRAM(S): 4; .5 INJECTION, POWDER, LYOPHILIZED, FOR SOLUTION INTRAVENOUS at 12:20

## 2021-11-29 RX ADMIN — Medication 400 MILLIGRAM(S): at 21:55

## 2021-11-29 RX ADMIN — OXYCODONE HYDROCHLORIDE 10 MILLIGRAM(S): 5 TABLET ORAL at 01:15

## 2021-11-29 RX ADMIN — OXYCODONE HYDROCHLORIDE 10 MILLIGRAM(S): 5 TABLET ORAL at 20:25

## 2021-11-29 RX ADMIN — HEPARIN SODIUM 5000 UNIT(S): 5000 INJECTION INTRAVENOUS; SUBCUTANEOUS at 21:46

## 2021-11-29 RX ADMIN — Medication 975 MILLIGRAM(S): at 17:00

## 2021-11-29 RX ADMIN — Medication 975 MILLIGRAM(S): at 10:50

## 2021-11-29 RX ADMIN — HYDROMORPHONE HYDROCHLORIDE 1 MILLIGRAM(S): 2 INJECTION INTRAMUSCULAR; INTRAVENOUS; SUBCUTANEOUS at 05:53

## 2021-11-29 RX ADMIN — SODIUM CHLORIDE 100 MILLILITER(S): 9 INJECTION INTRAMUSCULAR; INTRAVENOUS; SUBCUTANEOUS at 06:20

## 2021-11-29 RX ADMIN — OXYCODONE HYDROCHLORIDE 10 MILLIGRAM(S): 5 TABLET ORAL at 19:55

## 2021-11-29 RX ADMIN — PANTOPRAZOLE SODIUM 40 MILLIGRAM(S): 20 TABLET, DELAYED RELEASE ORAL at 15:52

## 2021-11-29 RX ADMIN — Medication 10 MILLIGRAM(S): at 06:19

## 2021-11-29 RX ADMIN — GABAPENTIN 200 MILLIGRAM(S): 400 CAPSULE ORAL at 21:45

## 2021-11-29 RX ADMIN — Medication 975 MILLIGRAM(S): at 11:32

## 2021-11-29 RX ADMIN — GABAPENTIN 200 MILLIGRAM(S): 400 CAPSULE ORAL at 12:20

## 2021-11-29 RX ADMIN — Medication 1 APPLICATION(S): at 09:06

## 2021-11-29 RX ADMIN — HYDROMORPHONE HYDROCHLORIDE 0.5 MILLIGRAM(S): 2 INJECTION INTRAMUSCULAR; INTRAVENOUS; SUBCUTANEOUS at 04:33

## 2021-11-29 RX ADMIN — GABAPENTIN 200 MILLIGRAM(S): 400 CAPSULE ORAL at 06:20

## 2021-11-29 RX ADMIN — Medication 50 GRAM(S): at 14:06

## 2021-11-29 RX ADMIN — HYDROMORPHONE HYDROCHLORIDE 1 MILLIGRAM(S): 2 INJECTION INTRAMUSCULAR; INTRAVENOUS; SUBCUTANEOUS at 17:00

## 2021-11-29 RX ADMIN — HYDROMORPHONE HYDROCHLORIDE 0.5 MILLIGRAM(S): 2 INJECTION INTRAMUSCULAR; INTRAVENOUS; SUBCUTANEOUS at 05:03

## 2021-11-29 RX ADMIN — Medication 100 MILLIGRAM(S): at 06:19

## 2021-11-29 RX ADMIN — Medication 975 MILLIGRAM(S): at 00:42

## 2021-11-29 RX ADMIN — PIPERACILLIN AND TAZOBACTAM 25 GRAM(S): 4; .5 INJECTION, POWDER, LYOPHILIZED, FOR SOLUTION INTRAVENOUS at 21:46

## 2021-11-29 RX ADMIN — METHOCARBAMOL 750 MILLIGRAM(S): 500 TABLET, FILM COATED ORAL at 21:44

## 2021-11-29 RX ADMIN — Medication 1000 MILLIGRAM(S): at 22:25

## 2021-11-29 RX ADMIN — Medication 100 MILLIGRAM(S): at 16:39

## 2021-11-29 RX ADMIN — POLYETHYLENE GLYCOL 3350 17 GRAM(S): 17 POWDER, FOR SOLUTION ORAL at 10:50

## 2021-11-29 RX ADMIN — Medication 975 MILLIGRAM(S): at 06:19

## 2021-11-29 RX ADMIN — HYDROMORPHONE HYDROCHLORIDE 1 MILLIGRAM(S): 2 INJECTION INTRAMUSCULAR; INTRAVENOUS; SUBCUTANEOUS at 15:52

## 2021-11-29 RX ADMIN — METHOCARBAMOL 750 MILLIGRAM(S): 500 TABLET, FILM COATED ORAL at 06:20

## 2021-11-29 RX ADMIN — PIPERACILLIN AND TAZOBACTAM 25 GRAM(S): 4; .5 INJECTION, POWDER, LYOPHILIZED, FOR SOLUTION INTRAVENOUS at 06:21

## 2021-11-29 RX ADMIN — OXYCODONE HYDROCHLORIDE 10 MILLIGRAM(S): 5 TABLET ORAL at 02:15

## 2021-11-29 RX ADMIN — LISINOPRIL 5 MILLIGRAM(S): 2.5 TABLET ORAL at 06:20

## 2021-11-29 RX ADMIN — HEPARIN SODIUM 5000 UNIT(S): 5000 INJECTION INTRAVENOUS; SUBCUTANEOUS at 06:20

## 2021-11-29 RX ADMIN — Medication 975 MILLIGRAM(S): at 07:19

## 2021-11-29 RX ADMIN — PANTOPRAZOLE SODIUM 40 MILLIGRAM(S): 20 TABLET, DELAYED RELEASE ORAL at 06:20

## 2021-11-29 RX ADMIN — Medication 1 APPLICATION(S): at 15:49

## 2021-11-29 RX ADMIN — Medication 10 MILLIGRAM(S): at 21:45

## 2021-11-29 RX ADMIN — HEPARIN SODIUM 5000 UNIT(S): 5000 INJECTION INTRAVENOUS; SUBCUTANEOUS at 12:20

## 2021-11-29 RX ADMIN — HYDROMORPHONE HYDROCHLORIDE 1 MILLIGRAM(S): 2 INJECTION INTRAMUSCULAR; INTRAVENOUS; SUBCUTANEOUS at 05:23

## 2021-11-29 RX ADMIN — Medication 10 MILLIGRAM(S): at 10:50

## 2021-11-29 RX ADMIN — HYDROMORPHONE HYDROCHLORIDE 1 MILLIGRAM(S): 2 INJECTION INTRAMUSCULAR; INTRAVENOUS; SUBCUTANEOUS at 10:49

## 2021-11-29 NOTE — PROGRESS NOTE ADULT - ATTENDING COMMENTS
Pt seen and examined. C/o bilateral shoulder weakness and pain, seen by neuro. Pain mild, able to passively range shoulder to 100 degrees /w minimal pain bilaterally. no micromotion tenderness. TTP over biceps tendon proximally. Deltoid 0/5. knee dressing c/d/i. VAC to LLE in place and managed by acs. pt /w sensation over L foot however unable to dorsiflex foot or toes. RLE NVID.    B/L Septic knee, Nec fasc  continue PT for ROM of b/l knees  vac care as per trauma team  PT for shoulder ROM  f/u neuro recs for weakness  Rheum consult  DVT ppx as per primary team  no acute ortho intervention necessary for shoulders at this time  care as per primary team  ortho to follow

## 2021-11-29 NOTE — PROGRESS NOTE ADULT - ASSESSMENT
52 yo woman admitted for necrotizing infection s/p fasciotomy of LLE now with proximal arm weakness and swelling    Proximal arm weakness  Weakness possibly due to swelling.  Patient has severe pain in shoulders limiting movement.  Would consider ortho eval for shoulders as well.   If swelling improves and weakness persists, consider repeat MRI C Spine w/o contrast.  Mobilize upper extremities with physical therapy.    Upper extremity swelling  Management as per primary team.

## 2021-11-29 NOTE — PROGRESS NOTE ADULT - SUBJECTIVE AND OBJECTIVE BOX
INFECTIOUS DISEASES AND INTERNAL MEDICINE at [place]  =======================================================  Theo Morrow MD  Diplomates American Board of Internal Medicine and Infectious Diseases  Telephone [phone]  Fax            [phone]  =======================================================    LUIS FERNANDO DAVIS [id number]  Follow up: group A STREP NECROTIZING SOFT TISSUE INFECTION     still with intermittent  fevers  c/o b/l knee pain and shoulder pain  Pain in the LEGS; right foot spasm.     Allergies:  No Known Allergies       FAMILY   FAMILY HISTORY:  FH: HTN (hypertension) (Mother)      REVIEW OF SYSTEMS:  CONSTITUTIONAL:  No Fever or chills  HEENT:   No diplopia or blurred vision.  No earache, sore throat or runny nose.  CARDIOVASCULAR:  No pressure, squeezing, strangling, tightness, heaviness or aching about the chest, neck, axilla or epigastrium.  RESPIRATORY:  No cough, shortness of breath, PND or orthopnea.  GASTROINTESTINAL:  No nausea, vomiting or diarrhea.  GENITOURINARY:  No dysuria, frequency or urgency. No Blood in urine  MUSCULOSKELETAL:   AS PER HPI   SKIN:  No change in skin, hair or nails.  NEUROLOGIC:  No paresthesias, fasciculations, seizures or weakness.  PSYCHIATRIC:  No disorder of thought or mood.  ENDOCRINE:  No heat or cold intolerance, polyuria or polydipsia.  HEMATOLOGICAL:  No easy bruising or bleeding.            Physical Exam:     GEN: NAD,    HEENT: normocephalic and atraumatic. EOMI. ASHISH.    NECK: Supple. No carotid bruits.  No lymphadenopathy or thyromegaly.  LUNGS: Clear to auscultation.  HEART: Regular rate and rhythm without murmur.  ABDOMEN: Soft, nontender, and nondistended.  Positive bowel sounds.    : No CVA tenderness  EXTREMITIES: LEFT LEG WRAPPED; left thigh vac in place  right knee with dressing in place   UPPER EXT STRENGTH GOOD BUT CAN T RAISE HANDS ALL THE WAY, localized tenderness to both shoulders   NEUROLOGIC:  C AWAKE ALERT Appropriate affect .  SKIN: No ulceration or induration present.         Vitals:  ============  T(F): 101 (29 Nov 2021 11:15), Max: 104 (28 Nov 2021 20:30)  HR: 84 (29 Nov 2021 11:15)  BP: 134/71 (29 Nov 2021 11:15)  RR: 18 (29 Nov 2021 11:15)  SpO2: 92% (29 Nov 2021 11:15) (92% - 97%)  temp max in last 48H T(F): , Max: 104 (11-27-21 @ 20:30)    =======================================================  Current Antibiotics:  clindamycin IVPB 900 milliGRAM(s) IV Intermittent every 8 hours  piperacillin/tazobactam IVPB.. 3.375 Gram(s) IV Intermittent every 8 hours    Other medications:  acetaminophen     Tablet .. 975 milliGRAM(s) Oral every 6 hours  baclofen 10 milliGRAM(s) Oral three times a day  collagenase Ointment 1 Application(s) Topical two times a day  gabapentin 200 milliGRAM(s) Oral three times a day  glucagon  Injectable 1 milliGRAM(s) IntraMuscular once  heparin   Injectable 5000 Unit(s) SubCutaneous every 8 hours  HYDROmorphone  Injectable 1 milliGRAM(s) IV Push four times a day  lisinopril 5 milliGRAM(s) Oral daily  magnesium sulfate  IVPB 2 Gram(s) IV Intermittent once  methocarbamol 750 milliGRAM(s) Oral three times a day  pantoprazole    Tablet 40 milliGRAM(s) Oral two times a day  polyethylene glycol 3350 17 Gram(s) Oral daily  senna 2 Tablet(s) Oral at bedtime  sodium chloride 0.9%. 1000 milliLiter(s) IV Continuous <Continuous>      =======================================================  Labs:                        6.5    16.71 )-----------( 627      ( 29 Nov 2021 09:54 )             20.6     11-29    128<L>  |  94<L>  |  5.7<L>  ----------------------------<  78  4.2   |  23.0  |  0.68    Ca    7.3<L>      29 Nov 2021 09:55  Phos  3.6     11-29  Mg     1.5     11-29        Culture - Fungal, Body Fluid (collected 11-24-21 @ 13:35)  Source: .Body Fluid Right Knee Fluid    Culture - Acid Fast - Body Fluid w/Smear (collected 11-24-21 @ 13:35)  Source: .Body Fluid Right Knee Fluid    Culture - Body Fluid with Gram Stain (collected 11-24-21 @ 13:35)  Source: .Body Fluid Right Knee Fluid  Gram Stain (11-25-21 @ 01:27):    polymorphonuclear leukocytes seen per low power field    No organisms seen per oil power field    Culture - Surgical Swab (collected 11-24-21 @ 12:30)  Source: .Surgical Swab Swab Right Knee #2  Final Report (11-29-21 @ 07:55):    No growth at 5 days    Culture - Surgical Swab (collected 11-24-21 @ 12:30)  Source: .Surgical Swab Swab Right Knee #1  Final Report (11-29-21 @ 07:56):    No growth at 5 days    Culture - Surgical Swab (collected 11-24-21 @ 12:28)  Source: .Surgical Swab Swab Right Knee #3  Final Report (11-29-21 @ 08:01):    No growth at 5 days    Culture - Body Fluid with Gram Stain (collected 11-23-21 @ 12:50)  Source: .Body Fluid Knee Fluid  Gram Stain (11-23-21 @ 23:34):    polymorphonuclear leukocytes seen    No organisms seen    by cytocentrifuge    Culture - Blood (collected 11-22-21 @ 08:44)  Source: .Blood Blood  Final Report (11-27-21 @ 09:00):    No growth at 5 days.    Culture - Blood (collected 11-20-21 @ 12:57)  Source: .Blood Blood  Final Report (11-25-21 @ 13:00):    No growth at 5 days.    Culture - Body Fluid with Gram Stain (collected 11-18-21 @ 16:20)  Source: .Body Fluid OR Posterior Calf Left Lower Extremity Fluid  Gram Stain (11-18-21 @ 21:39):    polymorphonuclear leukocytes seen    Gram Positive Cocci in Pairs and Chains seen    by cytocentrifuge  Final Report (11-23-21 @ 09:40):    Moderate Streptococcus pyogenes (Group A) Penicillin and ampicillin are    drugs of choice for    treatment of beta-hemolytic streptococcal infections.    Susceptibility testing is not performed routinely because    S. pyogenes (GAS) is universally susceptible to penicillin    and resistance in other strains is extremely rare.    Culture - Body Fluid with Gram Stain (collected 11-18-21 @ 16:20)  Source: .Body Fluid OR Swab Left Knee Lower Extremity Fluid  Gram Stain (11-18-21 @ 19:36):    polymorphonuclear leukocytes seen    Gram Positive Cocci in Pairs and Chains seen    by cytocentrifuge  Final Report (11-23-21 @ 09:41):    Numerous Streptococcus pyogenes (Group A) Penicillin and ampicillin are    drugs of choice for    treatment of beta-hemolytic streptococcal infections.    Susceptibility testing is not performed routinely because    S. pyogenes (GAS) is universally susceptible to penicillin    and resistance in other strains is extremely rare.    Culture - Body Fluid with Gram Stain (collected 11-18-21 @ 16:20)  Source: .Body Fluid OR - Left Lower Extremity Fluid  Gram Stain (11-18-21 @ 20:10):    No polymorphonuclear leukocytes seen    No organisms seen    by cytocentrifuge  Final Report (11-23-21 @ 09:40):    Rare Streptococcus pyogenes (Group A)    Penicillin and ampicillin are drugs of choice for treatment of    beta-hemolytic streptococcal infections.    Susceptibility testing is not performed routinely because S. pyogenes    (GAS) is universally susceptibleto penicillin    and resistance in other strains is extremely rare.    Culture - Urine (collected 11-18-21 @ 10:07)  Source: Catheterized Catheterized  Final Report (11-19-21 @ 07:03):    No growth    Culture - Urine (collected 11-18-21 @ 00:25)  Source: Catheterized Catheterized  Final Report (11-18-21 @ 22:09):    <10,000 CFU/mL Normal Urogenital Aimee    Culture - Blood (collected 11-17-21 @ 14:45)  Source: .Blood Blood  Gram Stain (11-18-21 @ 09:44):    Growth in aerobic and anaerobic bottles: Gram Positive Cocci in Pairs and    Chains    Gram Stain performed by:    [facility]    [address] [place] [address]    .    TYPE: (C=Critical, N=Notification, A=Abnormal) C    TESTS:  _ GS    DATE/TIME CALLED: _ 11/18/2021 09:42:19    CALLED TO: Chris Hernandez RN    READ BACK (2 Patient Identifiers)(Y/N): _ Y    READ BACK VALUES (Y/N): _ Y    CALLED BY: Chris Quiñones  Final Report (11-21-21 @ 12:28):    Growth in aerobic and anaerobic bottles: Streptococcus pyogenes (Group A)    See previous culture [id number]    Culture - Blood (collected 11-17-21 @ 14:45)  Source: .Blood Blood  Gram Stain (11-18-21 @ 09:40):    Growth in aerobic and anaerobic bottles: Gram Positive Cocci in Pairs and    Chains    ***Blood Panel PCR results on this specimen are available    approximately 3 hours after the Gram stain result.***    Gram stain, PCR, and/or culture results may not always    correspond due to difference in methodologies.    ************************************************************    This PCR assay was performed using [facility].    The following targets are tested for: Enterococcus,    vancomycin resistant enterococci, Listeria monocytogenes,    coagulase negative staphylococci, S. aureus,    methicillin resistant S. aureus, Streptococcus agalactiae    (Group B), S. pneumoniae, S. pyogenes (Group A),    Acinetobacter baumannii, Enterobacter cloacae, E. coli,    Klebsiella oxytoca, K. pneumoniae, Proteus sp.,    Serratia marcescens, Haemophilus influenzae,    Neisseria meningitidis, Pseudomonas aeruginosa, Candida    albicans, C. glabrata, C krusei, C parapsilosis,    C. tropicalis and the KPC resistance gene.    Gram Stain and BCID performed by:    [facility]    [address] [place] [address]    .    TYPE: (C=Critical, N=Notification, A=Abnormal) C    TESTS:  _ GS    DATE/TIME CALLED: _ 11/18/2021 09:40:08    CALLED TO: Chris Hernandez RN    READ BACK (2 Patient Identifiers)(Y/N): _ Y    READ BACK VALUES (Y/N): _ Y    CALLED BY: Chris Quiñones  Final Report (11-21-21 @ 12:28):    Growth in aerobic and anaerobic bottles: Streptococcus pyogenes (Group A)  Organism: Blood Culture PCR  Streptococcus pyogenes (Group A) (11-21-21 @ 12:28)  Organism: Streptococcus pyogenes (Group A) (11-21-21 @ 12:28)    Sensitivities:      -  Ceftriaxone: S 0.016      -  Penicillin: S 0.016 Predicts results for ampicillin, amoxicillin, amoxicillin/clavulanate, ampicillin/sulbactam, 1st, 2nd and 3rd generation cephalosporins and carbapenems.      Method Type: ETEST  Organism: Streptococcus pyogenes (Group A) (11-21-21 @ 12:28)    Sensitivities:      -  Vancomycin: S      Method Type: KB  Organism: Blood Culture PCR (11-21-21 @ 12:28)    Sensitivities:      -  Streptococcus pyogenes (Group A): Detec      Method Type: PCR      Creatinine, Serum: 0.68 mg/dL (11-29-21 @ 09:55)  Creatinine, Serum: 0.64 mg/dL (11-28-21 @ 09:14)  Creatinine, Serum: 0.65 mg/dL (11-27-21 @ 11:51)  Creatinine, Serum: 0.57 mg/dL (11-27-21 @ 05:35)  Creatinine, Serum: 0.61 mg/dL (11-26-21 @ 09:01)  Creatinine, Serum: 0.71 mg/dL (11-25-21 @ 10:21)        Ferritin, Serum: 388 ng/mL (11-17-21 @ 13:21)    C-Reactive Protein, Serum: 197 mg/L (11-26-21 @ 21:52)  C-Reactive Protein, Serum: 211 mg/L (11-26-21 @ 09:01)  C-Reactive Protein, Serum: 53 mg/L (11-23-21 @ 07:42)  C-Reactive Protein, Serum: >422 mg/L (11-17-21 @ 13:21)    Sedimentation Rate, Erythrocyte: 65 mm/hr (11-26-21 @ 21:52)  Sedimentation Rate, Erythrocyte: 63 mm/hr (11-26-21 @ 09:01)  Sedimentation Rate, Erythrocyte: 55 mm/hr (11-17-21 @ 13:21)    WBC Count: 16.71 K/uL (11-29-21 @ 09:54)  WBC Count: 17.02 K/uL (11-28-21 @ 09:14)  WBC Count: 15.99 K/uL (11-27-21 @ 05:33)  WBC Count: 18.73 K/uL (11-26-21 @ 09:01)  WBC Count: 23.61 K/uL (11-25-21 @ 10:21)      COVID-19 PCR: NotDetec (11-23-21 @ 18:50)  COVID-19 PCR: NotDetec (11-17-21 @ 13:18)    Lactate Dehydrogenase, Serum: 769 U/L (11-20-21 @ 03:56)    Alkaline Phosphatase, Serum: 88 U/L (11-27-21 @ 05:35)  Alanine Aminotransferase (ALT/SGPT): 11 U/L (11-27-21 @ 05:35)  Aspartate Aminotransferase (AST/SGOT): 25 U/L (11-27-21 @ 05:35)  Bilirubin Total, Serum: 0.3 mg/dL (11-27-21 @ 05:35)        < from: MR Cervical Spine No Cont (11.27.21 @ 16:23) >  IMPRESSION:  1. The study is quite limited due to motion artifact.  2. Mild reversal cervical lordosis. Normal alignment cervical vertebral bodies.  3. No obvious acute fracture involving the cervical spine.  4. Please see details above regarding some degree of central and foraminal  stenosis at multiple levels due to posterior projecting disc osteophyte  complexes.  5. There is no evidence of any gross cord compression. Due to motion artifact  no conclusions can be made regarding the central spinal canal or the  intramedullary portion of the cervical spinal cord .    Final report:    Agree with preliminary report.    --- End of Report ---    < end of copied text >

## 2021-11-29 NOTE — PROGRESS NOTE ADULT - SUBJECTIVE AND OBJECTIVE BOX
Patient seen and examined with Dr. Tobias.  Patient reporting weakness in b/l shoulders x 1 week. Patient reports that she has intermittent pain at shoulders. Currently no pain.  Denies acute sensory/motor changes to distal b/l upper extremity.     Examination:  NAD, alert and oriented    * Right upper extremity  - Generalized pain at shoulder. minimal swelling of upper extremity  - Limited AROM of shoulder 2/2 weakness.   - No pain with PROM of shoulder  - R/u/M nerve motor function intact. FROM of elbow/wrist/digits  - SILT distally    * Left upper extremity  - Generalized pain at shoulder. minimal swelling of upper extremity  - Limited AROM of shoulder 2/2 weakness.   - No pain with PROM of shoulder  - FROM of elbow/wrist/digits  - SILT distally      PLAN:   * Pain control  * DVTp  * recc AFO for left foot drop  * Weight Bearing Status: WBAT RLE, WBAT LLE in CAM boot  * Recommend pain management consult  * Continue care as per primary team    FOR UPPER EXTREMITIES:  * Recommend rheumatology consult  * b/l shoulder xrays stat  * PT/OT: ROM of shoulders  * Case d/w with Dr. Tobias

## 2021-11-29 NOTE — PROGRESS NOTE ADULT - SUBJECTIVE AND OBJECTIVE BOX
Acute Care Surgery/Trauma Surgery Progress Note:  No acute overnight events. Patient afebrile, VSS. Pain well controlled. Tolerating diet. Denies n/v/f/c/cp/sob.     Diet, Regular (21 @ 12:46)      Scheduled Medications:   acetaminophen     Tablet .. 975 milliGRAM(s) Oral every 6 hours  baclofen 10 milliGRAM(s) Oral three times a day  clindamycin IVPB 900 milliGRAM(s) IV Intermittent every 8 hours  collagenase Ointment 1 Application(s) Topical two times a day  gabapentin 200 milliGRAM(s) Oral three times a day  glucagon  Injectable 1 milliGRAM(s) IntraMuscular once  heparin   Injectable 5000 Unit(s) SubCutaneous every 8 hours  HYDROmorphone  Injectable 1 milliGRAM(s) IV Push four times a day  lisinopril 5 milliGRAM(s) Oral daily  methocarbamol 750 milliGRAM(s) Oral three times a day  pantoprazole  Injectable 40 milliGRAM(s) IV Push two times a day  piperacillin/tazobactam IVPB.. 3.375 Gram(s) IV Intermittent every 8 hours  polyethylene glycol 3350 17 Gram(s) Oral daily  senna 2 Tablet(s) Oral at bedtime  sodium chloride 0.9%. 1000 milliLiter(s) (100 mL/Hr) IV Continuous <Continuous>    PRN Medications:  fentaNYL    Injectable 75 MICROGram(s) IV Push every 8 hours PRN Dressing changes  HYDROmorphone  Injectable 0.5 milliGRAM(s) IV Push every 6 hours PRN breakthrough pain  ondansetron Injectable 4 milliGRAM(s) IV Push every 4 hours PRN Nausea and/or Vomiting  oxyCODONE    IR 5 milliGRAM(s) Oral every 4 hours PRN Moderate Pain (4 - 6)  oxyCODONE    IR 10 milliGRAM(s) Oral every 4 hours PRN Severe Pain (7 - 10)      Objective:   T(F): 101.3 ( @ 01:00), Max: 104 ( @ 20:30)  HR: 78 ( @ 01:00) (65 - 88)  BP: 104/61 ( @ 01:00) (104/61 - 127/70)  BP(mean): --  ABP: --  ABP(mean): --  RR: 16 ( @ 01:00) (16 - 18)  SpO2: 95% ( @ 01:00) (95% - 97%)      Physical Exam:   GEN: patient resting comfortably in bed, in no acute distress  RESP: respirations are unlabored, no accessory muscle use, no conversational dyspnea  CVS: RRR  GI: Abdomen soft, non-tender, non-distended, no rebound tenderness / guarding    I&O's     @ 07:01  -   @ 07:00  --------------------------------------------------------  IN:    Oral Fluid: 640 mL  Total IN: 640 mL    OUT:    VAC (Vacuum Assisted Closure) System (mL): 500 mL    Voided (mL): 2600 mL  Total OUT: 3100 mL    Total NET: -2460 mL       @ 07:01  -   @ 04:20  --------------------------------------------------------  IN:  Total IN: 0 mL    OUT:    VAC (Vacuum Assisted Closure) System (mL): 500 mL    Voided (mL): 1600 mL  Total OUT: 2100 mL    Total NET: -2100 mL          LABS:                        7.7    17.02 )-----------( 687      ( 2021 09:14 )             25.8         129<L>  |  95<L>  |  5.6<L>  ----------------------------<  71  4.3   |  22.0  |  0.64    Ca    7.5<L>      2021 09:14  Phos  3.3       Mg     1.6         TPro  6.4<L>  /  Alb  1.5<L>  /  TBili  0.3<L>  /  DBili  x   /  AST  25  /  ALT  11  /  AlkPhos  88        Urinalysis Basic - ( 2021 21:26 )    Color: Yellow / Appearance: Clear / S.010 / pH: x  Gluc: x / Ketone: Negative  / Bili: Negative / Urobili: Negative mg/dL   Blood: x / Protein: 15 mg/dL / Nitrite: Negative   Leuk Esterase: Trace / RBC: 6-10 /HPF / WBC 0-2   Sq Epi: x / Non Sq Epi: Few / Bacteria: Occasional        MICROBIOLOGY:     Culture - Fungal, Body Fluid (collected  @ 13:35)  Source: .Body Fluid Right Knee Fluid  Preliminary Report ( @ 09:33):    Testing in progress    Culture - Acid Fast - Body Fluid w/Smear (collected  @ 13:35)  Source: .Body Fluid Right Knee Fluid  Preliminary Report ( @ 15:04):    Culture is being performed.    Culture - Body Fluid with Gram Stain (collected  @ 13:35)  Source: .Body Fluid Right Knee Fluid  Gram Stain ( @ 01:27):    polymorphonuclear leukocytes seen per low power field    No organisms seen per oil power field  Preliminary Report ( @ 12:23):    No growth    Culture - Surgical Swab (collected  @ 12:30)  Source: .Surgical Swab Swab Right Knee #2  Preliminary Report ( @ 12:19):    No growth to date.    Culture - Surgical Swab (collected  @ 12:30)  Source: .Surgical Swab Swab Right Knee #1  Preliminary Report ( @ 12:19):    No growth to date.    Culture - Surgical Swab (collected  @ 12:28)  Source: .Surgical Swab Swab Right Knee #3  Preliminary Report ( @ 12:06):    No growth to date.    Culture - Body Fluid with Gram Stain (collected  @ 12:50)  Source: .Body Fluid Knee Fluid  Gram Stain ( @ 23:34):    polymorphonuclear leukocytes seen    No organisms seen    by cytocentrifuge  Preliminary Report ( @ 10:43):    No growth at 5 days    Culture - Blood (collected  @ 08:44)  Source: .Blood Blood  Final Report ( @ 09:00):    No growth at 5 days.        PATHOLOGY:

## 2021-11-29 NOTE — PROGRESS NOTE ADULT - SUBJECTIVE AND OBJECTIVE BOX
[facility]                                        Neurology at [place]                                 [address]                                  [address]                                        [place], [place], [address]                                             [phone]        CC: proximal weakness of upper extremities.    HPI:   50 yo woman with no significant past medical history who was admitted for fasciotomy for necrotizing soft tissue infection. She states she had left lower extremity swelling at home and when she arrived here had emergent surgery for suspected infection. 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 She states at home, she also had pain in her arms and shoulders, but she could move them well. She states two days ago when she was in ICU, she noticed soreness in her shoulders. She states she has weakness in her proximal arms which is new. She denies numbness or tingling in her extremities. She denies headache, or change in vision. She states she feels her upper arms are swollen. She has no further complaints. (TK).    Interim history:  On 2 [place].   Still with pain in both shoulders and proximal weakness in both upper extremities.    ROS:   Denies headache or dizziness.  Denies chest pain.  Denies shortness of breath.    MEDICATIONS  (STANDING):  acetaminophen     Tablet .. 975 milliGRAM(s) Oral every 6 hours  baclofen 10 milliGRAM(s) Oral three times a day  clindamycin IVPB 900 milliGRAM(s) IV Intermittent every 8 hours  collagenase Ointment 1 Application(s) Topical two times a day  gabapentin 200 milliGRAM(s) Oral three times a day  glucagon  Injectable 1 milliGRAM(s) IntraMuscular once  heparin   Injectable 5000 Unit(s) SubCutaneous every 8 hours  HYDROmorphone  Injectable 1 milliGRAM(s) IV Push four times a day  lisinopril 5 milliGRAM(s) Oral daily  magnesium sulfate  IVPB 2 Gram(s) IV Intermittent once  methocarbamol 750 milliGRAM(s) Oral three times a day  pantoprazole    Tablet 40 milliGRAM(s) Oral two times a day  piperacillin/tazobactam IVPB.. 3.375 Gram(s) IV Intermittent every 8 hours  polyethylene glycol 3350 17 Gram(s) Oral daily  senna 2 Tablet(s) Oral at bedtime  sodium chloride 0.9%. 1000 milliLiter(s) (100 mL/Hr) IV Continuous <Continuous>      Vital Signs Last 24 Hrs  T(C): 38.3 (29 Nov 2021 11:15), Max: 40 (28 Nov 2021 20:30)  T(F): 101 (29 Nov 2021 11:15), Max: 104 (28 Nov 2021 20:30)  HR: 84 (29 Nov 2021 11:15) (65 - 103)  BP: 134/71 (29 Nov 2021 11:15) (104/61 - 152/80)  BP(mean): 104 (29 Nov 2021 04:41) (104 - 104)  RR: 18 (29 Nov 2021 11:15) (16 - 18)  SpO2: 92% (29 Nov 2021 11:15) (92% - 97%)    Detailed Neurologic Exam:    Mental status: The patient is awake and alert. There is no aphasia. There is no dysarthria.     Cranial nerves: Pupils equal and react symmetrically to light. There is no visual field deficit to threat. Extraocular motion is full with no nystagmus. Facial sensation is intact. Facial musculature is symmetric. Palate elevates symmetrically. Tongue is midline.    Motor: There is normal bulk and tone.  There is no tremor.  Deltoids 2/5 bilaterally but limited by pain bilaterally in the shoulders. Strength in upper extremities otherwise 5/5.  Bilateral lower extremities, moves spontaneously at least 1/5, however, full strength examination limited due to pain from recent surgery.    Sensation: Grossly intact to light touch and pin other than around surgical site.    Reflexes: 1+ in UE (LEs not assessed secondary to surgery) and plantar responses are flexor.    Cerebellar: Difficult to assess secondary to weakness.    Labs:     11-29    128<L>  |  94<L>  |  5.7<L>  ----------------------------<  78  4.2   |  23.0  |  0.68    Ca    7.3<L>      29 Nov 2021 09:55  Phos  3.6     11-29  Mg     1.5     11-29                              6.5    16.71 )-----------( 627      ( 29 Nov 2021 09:54 )             20.6       Rad:   MRI brain w/o contrast - no acute findings  MRI C spine w/o contrast - no gross evidence of cord compression, however, severely motion limited limiting interpretation

## 2021-11-29 NOTE — PROGRESS NOTE ADULT - ASSESSMENT
51y Female present to ED with left leg swelling, erythema, pain. Patient endorses she had left knee pain for 1 week presented 3 days ago to ED  where she states was given an steroid shot, and ibuprofen. however pain and edema worsened. Patient states she wasnt feeling herself today reason why she came. Endorses chills initially with pain 3 days ago but none since, denies history of trauma, insect bites, recent interventions, or injections to the area, contact with ticks, history of diabetes.  PT AS ABOVE WITH LEFT LEG SWELLING PT WITH INCREASED WBC PLACED ON ABX FOR PRESUMED INFECTION  PT WITH CT SCAN WITH FASCIAL EDAM PT BROUGHT TO THE OR EMERGENTLY AND  UNDERWENT FASCIOTOMY   PT BROUGHT TO THE OR  BOTH TRAUMA AND ORTHO INVOLVED  PURULENT FLUID FOUND  NECROTIZING SOFT TISSUE INFECTION    BLOOD CX WITH GROUP A STREP AND  OPERATIVE FLUID WITH STREP   PT ON PCN AND CLINDA for POSSIBLE ANTITOXIN EFFECT IN GROUP A STREP INFECTION    PT WITH RIGHT KNEE PAIN WENT TO OR 11/24  AND WASHED OUT AND CULTURES TAKEN so far negative    now febrile with uptrending WBC    CXR and UA (-)  c/o b/l shoulder pain, and knee pain, MR as above  f/u repeat BCX  Continue Zosyn  c/w clinda    Pending BENJAMIN      WILL FOLLOW UP

## 2021-11-29 NOTE — PROGRESS NOTE ADULT - ASSESSMENT
Assessment: 51y Female present to ED with left leg swelling, erythema, pain, leukocytosis and acute renal failure. CT scan concerning for necrotizing soft tissue infection. 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  Patient had complained or RLE swelling. Right lower extremity duplex and negative for DVT; however + swelling in popliteal fossa to R calf.  R knee I&D performed.   LLE with streptococcus pyogenes      Plan:   - Continue IV abx: Clinda, Zosyn (recs changedby ID on 11/28)  - Pain control   - Monitor fevers  - f/u fever work up. new blood cultures sent 11/28.   - neurology recs: medicine eval of swelling (UE doppler: negative for DVT)  - potential for rescanning of B.L LE   - Daily dressing changes, wound care   - AM labs, monitor lytes, replete PRN   - PT/OT   - Wound vac change on 11/30

## 2021-11-29 NOTE — PROGRESS NOTE ADULT - SUBJECTIVE AND OBJECTIVE BOX
Pt Name: LUIS FERNANDO DAVIS  MRN: [id number]    Procedure: Right knee I&D POD#5 (11/24/21),  Left knee I&D w [facility] left ankle POD#11 (11/17/21)  Surgeon: Dr Tobias    Patient is a 51y Female seen and examined at bedside. Patient is doing well, reports improvement. Patient complaining of muscle spasm to RLE and LLE. Medication is not controlling spasms. Denies numbness/tingling. No other orthopedic complaints.    Vital Signs Last 24 Hrs  T(C): 38.6 (29 Nov 2021 04:41), Max: 40 (28 Nov 2021 20:30)  T(F): 101.4 (29 Nov 2021 04:41), Max: 104 (28 Nov 2021 20:30)  HR: 103 (29 Nov 2021 04:41) (65 - 103)  BP: 152/80 (29 Nov 2021 04:41) (104/61 - 152/80)  BP(mean): 104 (29 Nov 2021 04:41) (104 - 104)  RR: 18 (29 Nov 2021 04:41) (16 - 18)  SpO2: 95% (29 Nov 2021 01:00) (95% - 97%)                                    7.7    17.02 )-----------( 687      ( 28 Nov 2021 09:14 )             25.8         PHYSICAL EXAM:    Appearance: Alert, responsive, in no acute distress.    Musculoskeletal:       Left Lower Extremity: Left knee dressing is clean, dry, and intact. Wound vac/ACE dressing intact- managed by ACS. No abrasions, ecchymosis, or erythema. No bleeding. SILT. + plantarflexion/FHL. 0/5EHL. +footdrop. DP pulses 2+. Cap refill < 2 seconds. No cyanosis. No signs of venous insufficiency or stasis.        Right Lower Extremity: Right knee dressing is clean, dry, and intact. No abrasions, ecchymosis, or erythema. No bleeding. SILT. + dorsi/plantarflexion/EHL/FHL. DP pulses 2+. Cap refill < 2 seconds. No cyanosis. No signs of venous insufficiency or stasis.     A/P:  Pt is a  51y Female s/p Right knee I&D POD#5 (11/24/21),  Left knee I&D w [facility] left ankle POD#11 (11/17/21)    PLAN:   * Pain control  * DVTp  * recc AFO for left foot drop  * Weight Bearing Status: WBAT RLE, WBAT LLE in CAM boot  * Recommend pain management consult  * Continue care as per primary team

## 2021-11-29 NOTE — PROGRESS NOTE ADULT - ATTENDING COMMENTS
Patient awake alert  Initial cellulitis L leg for which she underwent fasciotomy and arthrotomy knee  Now R leg cellulitis and knee drainage  BENJAMIN pending. Truly elusive as to the original source of infection/inflamation

## 2021-11-30 LAB
ANION GAP SERPL CALC-SCNC: 14 MMOL/L — SIGNIFICANT CHANGE UP (ref 5–17)
BASOPHILS # BLD AUTO: 0.08 K/UL — SIGNIFICANT CHANGE UP (ref 0–0.2)
BASOPHILS NFR BLD AUTO: 0.6 % — SIGNIFICANT CHANGE UP (ref 0–2)
BUN SERPL-MCNC: 6.9 MG/DL — LOW (ref 8–20)
CALCIUM SERPL-MCNC: 7.5 MG/DL — LOW (ref 8.6–10.2)
CHLORIDE SERPL-SCNC: 98 MMOL/L — SIGNIFICANT CHANGE UP (ref 98–107)
CO2 SERPL-SCNC: 15 MMOL/L — LOW (ref 22–29)
CREAT SERPL-MCNC: 0.75 MG/DL — SIGNIFICANT CHANGE UP (ref 0.5–1.3)
EOSINOPHIL # BLD AUTO: 0.77 K/UL — HIGH (ref 0–0.5)
EOSINOPHIL NFR BLD AUTO: 5.6 % — SIGNIFICANT CHANGE UP (ref 0–6)
GLUCOSE SERPL-MCNC: 77 MG/DL — SIGNIFICANT CHANGE UP (ref 70–99)
HCT VFR BLD CALC: 31.6 % — LOW (ref 34.5–45)
HGB BLD-MCNC: 9.1 G/DL — LOW (ref 11.5–15.5)
IMM GRANULOCYTES NFR BLD AUTO: 0.6 % — SIGNIFICANT CHANGE UP (ref 0–1.5)
LYMPHOCYTES # BLD AUTO: 1.4 K/UL — SIGNIFICANT CHANGE UP (ref 1–3.3)
LYMPHOCYTES # BLD AUTO: 10.1 % — LOW (ref 13–44)
MAGNESIUM SERPL-MCNC: 1.7 MG/DL — SIGNIFICANT CHANGE UP (ref 1.6–2.6)
MCHC RBC-ENTMCNC: 27.5 PG — SIGNIFICANT CHANGE UP (ref 27–34)
MCHC RBC-ENTMCNC: 28.8 GM/DL — LOW (ref 32–36)
MCV RBC AUTO: 95.5 FL — SIGNIFICANT CHANGE UP (ref 80–100)
MONOCYTES # BLD AUTO: 1.16 K/UL — HIGH (ref 0–0.9)
MONOCYTES NFR BLD AUTO: 8.4 % — SIGNIFICANT CHANGE UP (ref 2–14)
NEUTROPHILS # BLD AUTO: 10.38 K/UL — HIGH (ref 1.8–7.4)
NEUTROPHILS NFR BLD AUTO: 74.7 % — SIGNIFICANT CHANGE UP (ref 43–77)
PLATELET # BLD AUTO: 534 K/UL — HIGH (ref 150–400)
POTASSIUM SERPL-MCNC: 5.9 MMOL/L — HIGH (ref 3.5–5.3)
POTASSIUM SERPL-SCNC: 5.9 MMOL/L — HIGH (ref 3.5–5.3)
RBC # BLD: 3.31 M/UL — LOW (ref 3.8–5.2)
RBC # FLD: 21 % — HIGH (ref 10.3–14.5)
SARS-COV-2 RNA SPEC QL NAA+PROBE: SIGNIFICANT CHANGE UP
SODIUM SERPL-SCNC: 127 MMOL/L — LOW (ref 135–145)
WBC # BLD: 14.55 K/UL — HIGH (ref 3.8–10.5)
WBC # FLD AUTO: 14.55 K/UL — HIGH (ref 3.8–10.5)

## 2021-11-30 PROCEDURE — 73701 CT LOWER EXTREMITY W/DYE: CPT | Mod: 26,50

## 2021-11-30 PROCEDURE — 99232 SBSQ HOSP IP/OBS MODERATE 35: CPT

## 2021-11-30 PROCEDURE — 99254 IP/OBS CNSLTJ NEW/EST MOD 60: CPT

## 2021-11-30 RX ORDER — HYDROMORPHONE HYDROCHLORIDE 2 MG/ML
0.5 INJECTION INTRAMUSCULAR; INTRAVENOUS; SUBCUTANEOUS ONCE
Refills: 0 | Status: DISCONTINUED | OUTPATIENT
Start: 2021-11-30 | End: 2021-11-30

## 2021-11-30 RX ORDER — LINEZOLID 600 MG/300ML
600 INJECTION, SOLUTION INTRAVENOUS ONCE
Refills: 0 | Status: DISCONTINUED | OUTPATIENT
Start: 2021-11-30 | End: 2021-11-30

## 2021-11-30 RX ORDER — LINEZOLID 600 MG/300ML
INJECTION, SOLUTION INTRAVENOUS
Refills: 0 | Status: DISCONTINUED | OUTPATIENT
Start: 2021-11-30 | End: 2021-11-30

## 2021-11-30 RX ORDER — MAGNESIUM SULFATE 500 MG/ML
2 VIAL (ML) INJECTION ONCE
Refills: 0 | Status: COMPLETED | OUTPATIENT
Start: 2021-11-30 | End: 2021-11-30

## 2021-11-30 RX ORDER — SODIUM CHLORIDE 9 MG/ML
1 INJECTION INTRAMUSCULAR; INTRAVENOUS; SUBCUTANEOUS EVERY 12 HOURS
Refills: 0 | Status: DISCONTINUED | OUTPATIENT
Start: 2021-11-30 | End: 2021-12-02

## 2021-11-30 RX ORDER — PIPERACILLIN AND TAZOBACTAM 4; .5 G/20ML; G/20ML
4.5 INJECTION, POWDER, LYOPHILIZED, FOR SOLUTION INTRAVENOUS EVERY 8 HOURS
Refills: 0 | Status: DISCONTINUED | OUTPATIENT
Start: 2021-11-30 | End: 2021-12-02

## 2021-11-30 RX ORDER — ACETAMINOPHEN 500 MG
1000 TABLET ORAL ONCE
Refills: 0 | Status: COMPLETED | OUTPATIENT
Start: 2021-11-30 | End: 2021-11-30

## 2021-11-30 RX ORDER — LINEZOLID 600 MG/300ML
600 INJECTION, SOLUTION INTRAVENOUS EVERY 12 HOURS
Refills: 0 | Status: DISCONTINUED | OUTPATIENT
Start: 2021-11-30 | End: 2021-11-30

## 2021-11-30 RX ADMIN — METHOCARBAMOL 750 MILLIGRAM(S): 500 TABLET, FILM COATED ORAL at 13:37

## 2021-11-30 RX ADMIN — SODIUM CHLORIDE 1 GRAM(S): 9 INJECTION INTRAMUSCULAR; INTRAVENOUS; SUBCUTANEOUS at 18:01

## 2021-11-30 RX ADMIN — POLYETHYLENE GLYCOL 3350 17 GRAM(S): 17 POWDER, FOR SOLUTION ORAL at 13:37

## 2021-11-30 RX ADMIN — Medication 400 MILLIGRAM(S): at 20:38

## 2021-11-30 RX ADMIN — Medication 50 GRAM(S): at 13:44

## 2021-11-30 RX ADMIN — HEPARIN SODIUM 5000 UNIT(S): 5000 INJECTION INTRAVENOUS; SUBCUTANEOUS at 07:11

## 2021-11-30 RX ADMIN — HYDROMORPHONE HYDROCHLORIDE 1 MILLIGRAM(S): 2 INJECTION INTRAMUSCULAR; INTRAVENOUS; SUBCUTANEOUS at 15:40

## 2021-11-30 RX ADMIN — PANTOPRAZOLE SODIUM 40 MILLIGRAM(S): 20 TABLET, DELAYED RELEASE ORAL at 18:00

## 2021-11-30 RX ADMIN — OXYCODONE HYDROCHLORIDE 10 MILLIGRAM(S): 5 TABLET ORAL at 01:16

## 2021-11-30 RX ADMIN — PIPERACILLIN AND TAZOBACTAM 25 GRAM(S): 4; .5 INJECTION, POWDER, LYOPHILIZED, FOR SOLUTION INTRAVENOUS at 21:18

## 2021-11-30 RX ADMIN — Medication 1000 MILLIGRAM(S): at 20:52

## 2021-11-30 RX ADMIN — HYDROMORPHONE HYDROCHLORIDE 0.5 MILLIGRAM(S): 2 INJECTION INTRAMUSCULAR; INTRAVENOUS; SUBCUTANEOUS at 17:59

## 2021-11-30 RX ADMIN — Medication 100 MILLIGRAM(S): at 07:12

## 2021-11-30 RX ADMIN — HEPARIN SODIUM 5000 UNIT(S): 5000 INJECTION INTRAVENOUS; SUBCUTANEOUS at 21:14

## 2021-11-30 RX ADMIN — PIPERACILLIN AND TAZOBACTAM 25 GRAM(S): 4; .5 INJECTION, POWDER, LYOPHILIZED, FOR SOLUTION INTRAVENOUS at 07:14

## 2021-11-30 RX ADMIN — PANTOPRAZOLE SODIUM 40 MILLIGRAM(S): 20 TABLET, DELAYED RELEASE ORAL at 07:13

## 2021-11-30 RX ADMIN — GABAPENTIN 200 MILLIGRAM(S): 400 CAPSULE ORAL at 07:11

## 2021-11-30 RX ADMIN — GABAPENTIN 200 MILLIGRAM(S): 400 CAPSULE ORAL at 13:30

## 2021-11-30 RX ADMIN — LISINOPRIL 5 MILLIGRAM(S): 2.5 TABLET ORAL at 07:11

## 2021-11-30 RX ADMIN — METHOCARBAMOL 750 MILLIGRAM(S): 500 TABLET, FILM COATED ORAL at 07:12

## 2021-11-30 RX ADMIN — ONDANSETRON 4 MILLIGRAM(S): 8 TABLET, FILM COATED ORAL at 01:50

## 2021-11-30 RX ADMIN — Medication 400 MILLIGRAM(S): at 08:50

## 2021-11-30 RX ADMIN — HYDROMORPHONE HYDROCHLORIDE 1 MILLIGRAM(S): 2 INJECTION INTRAMUSCULAR; INTRAVENOUS; SUBCUTANEOUS at 09:03

## 2021-11-30 RX ADMIN — GABAPENTIN 200 MILLIGRAM(S): 400 CAPSULE ORAL at 21:22

## 2021-11-30 RX ADMIN — HYDROMORPHONE HYDROCHLORIDE 1 MILLIGRAM(S): 2 INJECTION INTRAMUSCULAR; INTRAVENOUS; SUBCUTANEOUS at 09:15

## 2021-11-30 RX ADMIN — HYDROMORPHONE HYDROCHLORIDE 1 MILLIGRAM(S): 2 INJECTION INTRAMUSCULAR; INTRAVENOUS; SUBCUTANEOUS at 15:10

## 2021-11-30 RX ADMIN — OXYCODONE HYDROCHLORIDE 10 MILLIGRAM(S): 5 TABLET ORAL at 13:45

## 2021-11-30 RX ADMIN — Medication 10 MILLIGRAM(S): at 21:15

## 2021-11-30 RX ADMIN — Medication 100 MILLIGRAM(S): at 22:13

## 2021-11-30 RX ADMIN — Medication 1000 MILLIGRAM(S): at 09:00

## 2021-11-30 RX ADMIN — Medication 100 MILLIGRAM(S): at 01:15

## 2021-11-30 RX ADMIN — SENNA PLUS 2 TABLET(S): 8.6 TABLET ORAL at 21:15

## 2021-11-30 RX ADMIN — Medication 10 MILLIGRAM(S): at 07:10

## 2021-11-30 RX ADMIN — OXYCODONE HYDROCHLORIDE 10 MILLIGRAM(S): 5 TABLET ORAL at 01:46

## 2021-11-30 RX ADMIN — METHOCARBAMOL 750 MILLIGRAM(S): 500 TABLET, FILM COATED ORAL at 21:17

## 2021-11-30 RX ADMIN — HYDROMORPHONE HYDROCHLORIDE 0.5 MILLIGRAM(S): 2 INJECTION INTRAMUSCULAR; INTRAVENOUS; SUBCUTANEOUS at 17:19

## 2021-11-30 RX ADMIN — HEPARIN SODIUM 5000 UNIT(S): 5000 INJECTION INTRAVENOUS; SUBCUTANEOUS at 13:30

## 2021-11-30 RX ADMIN — Medication 100 MILLIGRAM(S): at 13:36

## 2021-11-30 RX ADMIN — PIPERACILLIN AND TAZOBACTAM 25 GRAM(S): 4; .5 INJECTION, POWDER, LYOPHILIZED, FOR SOLUTION INTRAVENOUS at 13:44

## 2021-11-30 RX ADMIN — Medication 10 MILLIGRAM(S): at 13:37

## 2021-11-30 RX ADMIN — OXYCODONE HYDROCHLORIDE 10 MILLIGRAM(S): 5 TABLET ORAL at 14:45

## 2021-11-30 NOTE — PROGRESS NOTE ADULT - SUBJECTIVE AND OBJECTIVE BOX
Acute Care Surgery/Trauma Surgery Progress Note:    No acute overnight events. Patient afebrile, VSS. Pain well controlled. Tolerating diet. Denies n/v/f/c/cp/sob.     Diet, Regular (11-24-21 @ 12:46)      Scheduled Medications:   baclofen 10 milliGRAM(s) Oral three times a day  clindamycin IVPB 900 milliGRAM(s) IV Intermittent every 8 hours  collagenase Ointment 1 Application(s) Topical two times a day  gabapentin 200 milliGRAM(s) Oral three times a day  glucagon  Injectable 1 milliGRAM(s) IntraMuscular once  heparin   Injectable 5000 Unit(s) SubCutaneous every 8 hours  lisinopril 5 milliGRAM(s) Oral daily  methocarbamol 750 milliGRAM(s) Oral three times a day  pantoprazole    Tablet 40 milliGRAM(s) Oral two times a day  piperacillin/tazobactam IVPB.. 3.375 Gram(s) IV Intermittent every 8 hours  polyethylene glycol 3350 17 Gram(s) Oral daily  senna 2 Tablet(s) Oral at bedtime  sodium chloride 0.9%. 1000 milliLiter(s) (100 mL/Hr) IV Continuous <Continuous>    PRN Medications:  fentaNYL    Injectable 75 MICROGram(s) IV Push every 8 hours PRN Dressing changes  HYDROmorphone  Injectable 1 milliGRAM(s) IV Push every 6 hours PRN break thru  ondansetron Injectable 4 milliGRAM(s) IV Push every 4 hours PRN Nausea and/or Vomiting  oxyCODONE    IR 5 milliGRAM(s) Oral every 4 hours PRN Moderate Pain (4 - 6)  oxyCODONE    IR 10 milliGRAM(s) Oral every 4 hours PRN Severe Pain (7 - 10)      Objective:   T(F): 102.9 (11-29 @ 22:01), Max: 102.9 (11-29 @ 22:01)  HR: 63 (11-29 @ 22:01) (63 - 103)  BP: 112/62 (11-29 @ 16:50) (112/62 - 152/80)  BP(mean): 104 (11-29 @ 04:41) (104 - 104)  ABP: --  ABP(mean): --  RR: 17 (11-29 @ 22:01) (17 - 18)  SpO2: 95% (11-29 @ 22:01) (92% - 96%)      Physical Exam:   GEN: patient resting comfortably in bed, in no acute distress  RESP: respirations are unlabored, no accessory muscle use, no conversational dyspnea  CVS: RRR  GI: Abdomen soft, non-tender, non-distended, no rebound tenderness / guarding    I&O's    11-28 @ 07:01  -  11-29 @ 07:00  --------------------------------------------------------  IN:    IV PiggyBack: 100 mL    IV PiggyBack: 200 mL    Oral Fluid: 1500 mL    sodium chloride 0.9%: 600 mL  Total IN: 2400 mL    OUT:    VAC (Vacuum Assisted Closure) System (mL): 950 mL    Voided (mL): 2500 mL  Total OUT: 3450 mL    Total NET: -1050 mL      11-29 @ 07:01  -  11-30 @ 02:00  --------------------------------------------------------  IN:  Total IN: 0 mL    OUT:    Voided (mL): 1100 mL  Total OUT: 1100 mL    Total NET: -1100 mL          LABS:                        6.5    16.71 )-----------( 627      ( 29 Nov 2021 09:54 )             20.6     11-29    128<L>  |  94<L>  |  5.7<L>  ----------------------------<  78  4.2   |  23.0  |  0.68    Ca    7.3<L>      29 Nov 2021 09:55  Phos  3.6     11-29  Mg     1.5     11-29            MICROBIOLOGY:     Culture - Fungal, Body Fluid (collected 11-24 @ 13:35)  Source: .Body Fluid Right Knee Fluid  Preliminary Report (11-26 @ 09:33):    Testing in progress    Culture - Acid Fast - Body Fluid w/Smear (collected 11-24 @ 13:35)  Source: .Body Fluid Right Knee Fluid  Preliminary Report (11-27 @ 15:04):    Culture is being performed.    Culture - Body Fluid with Gram Stain (collected 11-24 @ 13:35)  Source: .Body Fluid Right Knee Fluid  Gram Stain (11-25 @ 01:27):    polymorphonuclear leukocytes seen per low power field    No organisms seen per oil power field  Preliminary Report (11-29 @ 11:40):    No growth at 5 days    Culture - Surgical Swab (collected 11-24 @ 12:30)  Source: .Surgical Swab Swab Right Knee #2  Final Report (11-29 @ 07:55):    No growth at 5 days    Culture - Surgical Swab (collected 11-24 @ 12:30)  Source: .Surgical Swab Swab Right Knee #1  Final Report (11-29 @ 07:56):    No growth at 5 days    Culture - Surgical Swab (collected 11-24 @ 12:28)  Source: .Surgical Swab Swab Right Knee #3  Final Report (11-29 @ 08:01):    No growth at 5 days    Culture - Body Fluid with Gram Stain (collected 11-23 @ 12:50)  Source: .Body Fluid Knee Fluid  Gram Stain (11-23 @ 23:34):    polymorphonuclear leukocytes seen    No organisms seen    by cytocentrifuge  Preliminary Report (11-28 @ 10:43):    No growth at 5 days        PATHOLOGY:

## 2021-11-30 NOTE — CONSULT NOTE ADULT - SUBJECTIVE AND OBJECTIVE BOX
[place] [facility]                                                               Office:  [address] [address]                                                              Telephone: [phone]. Fax:[phone]                                                                        CARDIOLOGY CONSULTATION NOTE                                                                                             Consult requested by:    Reason for Consultation:   History obtained by: Patient and medical record   obtained: No    Chief complaint:    Patient is a 51y old  Female who presents with a chief complaint of UGIB (26 Nov 2021 03:14)        HPI:  HPI: 51y Female present to ED with left leg swelling, erythema, pain. Patient endorses she had left knee pain for 1 week presented 3 days ago to ED  where she states was given an steroid shot, and ibuprofen. however pain and edema worsened. Patient states she wasnt feeling herself today reason why she came. Endorses chills initially with pain 3 days ago but none since, denies history of trauma, insect bites, recent interventions, or injections to the area, contact with ticks, history of diabetes.   (17 Nov 2021 20:50)        REVIEW OF SYMPTOMS:     CONSTITUTIONAL: No fever, weight loss, or fatigue  ENMT:  No difficulty hearing, tinnitus, vertigo; No sinus or throat pain  NECK: No pain or stiffness  CARDIOVASCULAR: No chest pain, dyspnea, syncope, palpitations, dizziness, Orthopnea, Paroxsymal nocturnal dyspnea  RESPIRATORY: No Dyspnea on exertion, Shortness of breath, cough, wheezing  : No dysuria, no hematuria   GI: No dark color stool, no melena, no diarrhea, no constipation, no abdominal pain   NEURO: No headache, no dizziness, no slurred speech   MUSCULOSKELETAL: No joint pain or swelling; No muscle, back, or extremity pain  PSYCH: No agitation, no anxiety.    ALL OTHER REVIEW OF SYSTEMS ARE NEGATIVE.      PREVIOUS DIAGNOSTIC TESTING  ECHO FINDINGS:      STRESS FINDINGS:      CATHETERIZATION FINDINGS:         ALLERGIES: Allergies    No Known Allergies    Intolerances          PAST MEDICAL HISTORY  No pertinent past medical history        PAST SURGICAL HISTORY  No significant past surgical history    History of ankle surgery        FAMILY HISTORY:  FH: HTN (hypertension) (Mother)        SOCIAL HISTORY:  Denies smoking/alcohol/drugs  CIGARETTES:     ALCOHOL:  DRUGS:      CURRENT MEDICATIONS:  lisinopril 5 milliGRAM(s) Oral daily     baclofen  gabapentin  methocarbamol  pantoprazole    Tablet  polyethylene glycol 3350  senna  clindamycin IVPB  collagenase Ointment  glucagon  Injectable  heparin   Injectable  magnesium sulfate  IVPB  piperacillin/tazobactam IVPB..  sodium chloride  sodium chloride 0.9%.        HOME MEDICATIONS:      Vital Signs Last 24 Hrs  T(C): 39 (30 Nov 2021 08:55), Max: 39.4 (29 Nov 2021 22:01)  T(F): 102.2 (30 Nov 2021 08:55), Max: 102.9 (29 Nov 2021 22:01)  HR: 60 (30 Nov 2021 08:55) (60 - 84)  BP: 112/76 (30 Nov 2021 08:55) (112/62 - 134/76)  BP(mean): --  RR: 18 (30 Nov 2021 08:55) (17 - 18)  SpO2: 92% (30 Nov 2021 08:55) (92% - 96%)      PHYSICAL EXAM:  Constitutional: Comfortable . No acute distress.   HEENT: Atraumatic and normocephalic , neck is supple . no JVD. No carotid bruit. PEERL   CNS: A&Ox3. No focal deficits. EOMI. Cranial nerves II-IX are intact.   Lymph Nodes: Cervical : Not palpable.  Respiratory: CTAB  Cardiovascular: S1S2 RRR. No murmur/rubs or gallop.  Gastrointestinal: Soft non-tender and non distended . +Bowel sounds. negative Mcdonald's sign.  Extremities: No edema.   Psychiatric: Calm . no agitation.  Skin: No skin rash/ulcers visualized to face, hands or feet.    Intake and output:   11-29 @ 07:01  -  11-30 @ 07:00  --------------------------------------------------------  IN: 0 mL / OUT: 1100 mL / NET: -1100 mL        LABS:                        9.1    14.55 )-----------( 534      ( 30 Nov 2021 07:18 )             31.6     11-30    127<L>  |  98  |  6.9<L>  ----------------------------<  77  5.9<H>   |  15.0<L>  |  0.75    Ca    7.5<L>      30 Nov 2021 07:18  Phos  3.6     11-29  Mg     1.7     11-30        ;p-BNP=        INTERPRETATION OF TELEMETRY: Reviewed by me.   ECG: Reviewed by me.     RADIOLOGY & ADDITIONAL STUDIES:    X-ray:  reviewed by me.   CT scan:   MRI:                                                                          [place] [facility]                                                               Office:  [address] [address]                                                              Telephone: [phone]. Fax:[phone]                                                                        CARDIOLOGY CONSULTATION NOTE                                                                                             Consult requested by:  Dr. Barcenas   Reason for Consultation: Fever and Leukocytosis   History obtained by: Patient and medical record   obtained: No    Chief complaint:    Patient is a 51y old  Female who presents with a chief complaint of UGIB (26 Nov 2021 03:14)        HPI:  HPI: 51y Female present to ED with left leg swelling, erythema, pain. Patient endorses she had left knee pain for 1 week presented 3 days ago to ED  where she states was given an steroid shot, and ibuprofen. however pain and edema worsened. Patient states she wasnt feeling herself today reason why she came. Endorses chills initially with pain 3 days ago but none since, denies history of trauma, insect bites, recent interventions, or injections to the area, contact with ticks, history of diabetes.   (17 Nov 2021 20:50)  Patient of note has increased WBC with persistent fevers;  purulent fluid found with necrotizing soft tissue  blood cultures cultures with Group A Strep and operative fluid with Strep; patient with R knee pain went to OR 11/24 and washed out and cultures taken which has been negative so far.     REVIEW OF SYMPTOMS:     CONSTITUTIONAL: +_ fever, weight loss, or fatigue  ENMT:  No difficulty hearing, tinnitus, vertigo; No sinus or throat pain  NECK: No pain or stiffness  CARDIOVASCULAR: No chest pain, dyspnea, syncope, palpitations, dizziness, Orthopnea, Paroxsymal nocturnal dyspnea  RESPIRATORY: No Dyspnea on exertion, Shortness of breath, cough, wheezing  : No dysuria, no hematuria   GI: No dark color stool, no melena, no diarrhea, no constipation, no abdominal pain   NEURO: No headache, no dizziness, no slurred speech   MUSCULOSKELETAL: No joint pain or +swelling; No muscle, back, or extremity pain  PSYCH: No agitation, no anxiety.    ALL OTHER REVIEW OF SYSTEMS ARE NEGATIVE.      PREVIOUS DIAGNOSTIC TESTING  ECHO FINDINGS:  < from: TTE Echo Complete w/ Contrast w/ Doppler (11.24.21 @ 13:26) >    Summary:   1. Left ventricular ejection fraction, by visual estimation, is 60 to 65%.   2. Normal global left ventricular systolic function.   3. Normal left ventricular internal cavity size.   4. Normal right ventricular size and function.   5. The left atrium is normal in size.   6. Mild thickening of the anterior and posterior mitral valve leaflets.   7. Mild Mitral valve prolapse.   8. Mild to moderate mitral valve regurgitation.   9. Normal trileaflet aortic valve with normal opening.  10. There is no evidence of pericardial effusion.    MD Jeovanny Electronically signed on 11/24/2021 at 3:43:18 PM      < end of copied text >      STRESS FINDINGS:      CATHETERIZATION FINDINGS:         ALLERGIES: Allergies    No Known Allergies    Intolerances          PAST MEDICAL HISTORY  No pertinent past medical history        PAST SURGICAL HISTORY  No significant past surgical history    History of ankle surgery        FAMILY HISTORY:  FH: HTN (hypertension) (Mother)        SOCIAL HISTORY:  Denies smoking/alcohol/drugs  CIGARETTES:     ALCOHOL:  DRUGS:      CURRENT MEDICATIONS:  lisinopril 5 milliGRAM(s) Oral daily     baclofen  gabapentin  methocarbamol  pantoprazole    Tablet  polyethylene glycol 3350  senna  clindamycin IVPB  collagenase Ointment  glucagon  Injectable  heparin   Injectable  magnesium sulfate  IVPB  piperacillin/tazobactam IVPB..  sodium chloride  sodium chloride 0.9%.        HOME MEDICATIONS:      Vital Signs Last 24 Hrs  T(C): 39 (30 Nov 2021 08:55), Max: 39.4 (29 Nov 2021 22:01)  T(F): 102.2 (30 Nov 2021 08:55), Max: 102.9 (29 Nov 2021 22:01)  HR: 60 (30 Nov 2021 08:55) (60 - 84)  BP: 112/76 (30 Nov 2021 08:55) (112/62 - 134/76)  BP(mean): --  RR: 18 (30 Nov 2021 08:55) (17 - 18)  SpO2: 92% (30 Nov 2021 08:55) (92% - 96%)      PHYSICAL EXAM:  Constitutional: Comfortable . No acute distress.   HEENT: Atraumatic and normocephalic , neck is supple . no JVD. No carotid bruit. PEERL   CNS: A&Ox3. No focal deficits. EOMI. Cranial nerves II-IX are intact.   Lymph Nodes: Cervical : Not palpable.  Respiratory: CTAB  Cardiovascular: S1S2 RRR. No murmur/rubs or gallop.  Gastrointestinal: Soft non-tender and non distended . +Bowel sounds. negative Mcdonald's sign.  Extremities: No edema.   Psychiatric: Calm . no agitation.  Skin: No skin rash/ulcers visualized to face, hands or feet.    Intake and output:   11-29 @ 07:01  -  11-30 @ 07:00  --------------------------------------------------------  IN: 0 mL / OUT: 1100 mL / NET: -1100 mL        LABS:                        9.1    14.55 )-----------( 534      ( 30 Nov 2021 07:18 )             31.6     11-30    127<L>  |  98  |  6.9<L>  ----------------------------<  77  5.9<H>   |  15.0<L>  |  0.75    Ca    7.5<L>      30 Nov 2021 07:18  Phos  3.6     11-29  Mg     1.7     11-30        ;p-BNP=        INTERPRETATION OF TELEMETRY: Reviewed by me.   ECG: Reviewed by me.     RADIOLOGY & ADDITIONAL STUDIES:    X-ray:  reviewed by me.   CT scan:   MRI:

## 2021-11-30 NOTE — PROGRESS NOTE ADULT - SUBJECTIVE AND OBJECTIVE BOX
INFECTIOUS DISEASES AND INTERNAL MEDICINE at [place]  =======================================================  Theo Morrow MD  Diplomates American Board of Internal Medicine and Infectious Diseases  Telephone [phone]  Fax            [phone]  =======================================================    LUIS FERNANDO DAVIS [id number]  Follow up: group A STREP NECROTIZING SOFT TISSUE INFECTION     repeat CT scan of lower ext still with new collections.       Allergies:  No Known Allergies       FAMILY   FAMILY HISTORY:  FH: HTN (hypertension) (Mother)      REVIEW OF SYSTEMS:  CONSTITUTIONAL:  No Fever or chills  HEENT:   No diplopia or blurred vision.  No earache, sore throat or runny nose.  CARDIOVASCULAR:  No pressure, squeezing, strangling, tightness, heaviness or aching about the chest, neck, axilla or epigastrium.  RESPIRATORY:  No cough, shortness of breath, PND or orthopnea.  GASTROINTESTINAL:  No nausea, vomiting or diarrhea.  GENITOURINARY:  No dysuria, frequency or urgency. No Blood in urine  MUSCULOSKELETAL:   AS PER HPI   SKIN:  No change in skin, hair or nails.  NEUROLOGIC:  No paresthesias, fasciculations, seizures or weakness.  PSYCHIATRIC:  No disorder of thought or mood.  ENDOCRINE:  No heat or cold intolerance, polyuria or polydipsia.  HEMATOLOGICAL:  No easy bruising or bleeding.            Physical Exam:     GEN: NAD,    HEENT: normocephalic and atraumatic. EOMI. ASHISH.    NECK: Supple. No carotid bruits.  No lymphadenopathy or thyromegaly.  LUNGS: Clear to auscultation.  HEART: Regular rate and rhythm without murmur.  ABDOMEN: Soft, nontender, and nondistended.  Positive bowel sounds.    : No CVA tenderness  EXTREMITIES: LEFT LEG WRAPPED; left thigh vac in place  right knee with dressing in place   UPPER EXT STRENGTH GOOD BUT CAN T RAISE HANDS ALL THE WAY, localized tenderness to both shoulders   NEUROLOGIC:  C AWAKE ALERT Appropriate affect .  SKIN: No ulceration or induration present.           Vitals:  ============  T(F): 99.9 (30 Nov 2021 12:56), Max: 102.9 (29 Nov 2021 22:01)  HR: 69 (30 Nov 2021 12:56)  BP: 120/76 (30 Nov 2021 12:56)  RR: 17 (30 Nov 2021 12:56)  SpO2: 96% (30 Nov 2021 12:56) (92% - 96%)  temp max in last 48H T(F): , Max: 104 (11-28-21 @ 20:30)    =======================================================  Current Antibiotics:  clindamycin IVPB 900 milliGRAM(s) IV Intermittent every 8 hours  piperacillin/tazobactam IVPB.. 4.5 Gram(s) IV Intermittent every 8 hours    Other medications:  baclofen 10 milliGRAM(s) Oral three times a day  collagenase Ointment 1 Application(s) Topical two times a day  gabapentin 200 milliGRAM(s) Oral three times a day  glucagon  Injectable 1 milliGRAM(s) IntraMuscular once  heparin   Injectable 5000 Unit(s) SubCutaneous every 8 hours  lisinopril 5 milliGRAM(s) Oral daily  methocarbamol 750 milliGRAM(s) Oral three times a day  pantoprazole    Tablet 40 milliGRAM(s) Oral two times a day  polyethylene glycol 3350 17 Gram(s) Oral daily  senna 2 Tablet(s) Oral at bedtime  sodium chloride 1 Gram(s) Oral every 12 hours  sodium chloride 0.9%. 1000 milliLiter(s) IV Continuous <Continuous>      =======================================================  Labs:                        9.1    14.55 )-----------( 534      ( 30 Nov 2021 07:18 )             31.6     11-30    127<L>  |  98  |  6.9<L>  ----------------------------<  77  5.9<H>   |  15.0<L>  |  0.75    Ca    7.5<L>      30 Nov 2021 07:18  Phos  3.6     11-29  Mg     1.7     11-30        Culture - Blood (collected 11-28-21 @ 09:14)  Source: .Blood Blood-Peripheral    Culture - Blood (collected 11-28-21 @ 09:14)  Source: .Blood Blood-Peripheral    Culture - Fungal, Body Fluid (collected 11-24-21 @ 13:35)  Source: .Body Fluid Right Knee Fluid    Culture - Acid Fast - Body Fluid w/Smear (collected 11-24-21 @ 13:35)  Source: .Body Fluid Right Knee Fluid    Culture - Body Fluid with Gram Stain (collected 11-24-21 @ 13:35)  Source: .Body Fluid Right Knee Fluid  Gram Stain (11-25-21 @ 01:27):    polymorphonuclear leukocytes seen per low power field    No organisms seen per oil power field    Culture - Surgical Swab (collected 11-24-21 @ 12:30)  Source: .Surgical Swab Swab Right Knee #2  Final Report (11-29-21 @ 07:55):    No growth at 5 days    Culture - Surgical Swab (collected 11-24-21 @ 12:30)  Source: .Surgical Swab Swab Right Knee #1  Final Report (11-29-21 @ 07:56):    No growth at 5 days    Culture - Surgical Swab (collected 11-24-21 @ 12:28)  Source: .Surgical Swab Swab Right Knee #3  Final Report (11-29-21 @ 08:01):    No growth at 5 days    Culture - Body Fluid with Gram Stain (collected 11-23-21 @ 12:50)  Source: .Body Fluid Knee Fluid  Gram Stain (11-23-21 @ 23:34):    polymorphonuclear leukocytes seen    No organisms seen    by cytocentrifuge    Culture - Blood (collected 11-22-21 @ 08:44)  Source: .Blood Blood  Final Report (11-27-21 @ 09:00):    No growth at 5 days.    Culture - Blood (collected 11-20-21 @ 12:57)  Source: .Blood Blood  Final Report (11-25-21 @ 13:00):    No growth at 5 days.    Culture - Body Fluid with Gram Stain (collected 11-18-21 @ 16:20)  Source: .Body Fluid OR Posterior Calf Left Lower Extremity Fluid  Gram Stain (11-18-21 @ 21:39):    polymorphonuclear leukocytes seen    Gram Positive Cocci in Pairs and Chains seen    by cytocentrifuge  Final Report (11-23-21 @ 09:40):    Moderate Streptococcus pyogenes (Group A) Penicillin and ampicillin are    drugs of choice for    treatment of beta-hemolytic streptococcal infections.    Susceptibility testing is not performed routinely because    S. pyogenes (GAS) is universally susceptible to penicillin    and resistance in other strains is extremely rare.    Culture - Body Fluid with Gram Stain (collected 11-18-21 @ 16:20)  Source: .Body Fluid OR Swab Left Knee Lower Extremity Fluid  Gram Stain (11-18-21 @ 19:36):    polymorphonuclear leukocytes seen    Gram Positive Cocci in Pairs and Chains seen    by cytocentrifuge  Final Report (11-23-21 @ 09:41):    Numerous Streptococcus pyogenes (Group A) Penicillin and ampicillin are    drugs of choice for    treatment of beta-hemolytic streptococcal infections.    Susceptibility testing is not performed routinely because    S. pyogenes (GAS) is universally susceptible to penicillin    and resistance in other strains is extremely rare.    Culture - Body Fluid with Gram Stain (collected 11-18-21 @ 16:20)  Source: .Body Fluid OR - Left Lower Extremity Fluid  Gram Stain (11-18-21 @ 20:10):    No polymorphonuclear leukocytes seen    No organisms seen    by cytocentrifuge  Final Report (11-23-21 @ 09:40):    Rare Streptococcus pyogenes (Group A)    Penicillin and ampicillin are drugs of choice for treatment of    beta-hemolytic streptococcal infections.    Susceptibility testing is not performed routinely because S. pyogenes    (GAS) is universally susceptibleto penicillin    and resistance in other strains is extremely rare.    Culture - Urine (collected 11-18-21 @ 10:07)  Source: Catheterized Catheterized  Final Report (11-19-21 @ 07:03):    No growth    Culture - Urine (collected 11-18-21 @ 00:25)  Source: Catheterized Catheterized  Final Report (11-18-21 @ 22:09):    <10,000 CFU/mL Normal Urogenital Aimee    Culture - Blood (collected 11-17-21 @ 14:45)  Source: .Blood Blood  Gram Stain (11-18-21 @ 09:44):    Growth in aerobic and anaerobic bottles: Gram Positive Cocci in Pairs and    Chains    Gram Stain performed by:    [facility]    [address] [place] [address]    .    TYPE: (C=Critical, N=Notification, A=Abnormal) C    TESTS:  _ GS    DATE/TIME CALLED: _ 11/18/2021 09:42:19    CALLED TO: Chris Hernandez RN    READ BACK (2 Patient Identifiers)(Y/N): _ Y    READ BACK VALUES (Y/N): _ Y    CALLED BY: Chris Quiñones  Final Report (11-21-21 @ 12:28):    Growth in aerobic and anaerobic bottles: Streptococcus pyogenes (Group A)    See previous culture [id number]    Culture - Blood (collected 11-17-21 @ 14:45)  Source: .Blood Blood  Gram Stain (11-18-21 @ 09:40):    Growth in aerobic and anaerobic bottles: Gram Positive Cocci in Pairs and    Chains    ***Blood Panel PCR results on this specimen are available    approximately 3 hours after the Gram stain result.***    Gram stain, PCR, and/or culture results may not always    correspond due to difference in methodologies.    ************************************************************    This PCR assay was performed using [employer].    The following targets are tested for: Enterococcus,    vancomycin resistant enterococci, Listeria monocytogenes,    coagulase negative staphylococci, S. aureus,    methicillin resistant S. aureus, Streptococcus agalactiae    (Group B), S. pneumoniae, S. pyogenes (Group A),    Acinetobacter baumannii, Enterobacter cloacae, E. coli,    Klebsiella oxytoca, K. pneumoniae, Proteus sp.,    Serratia marcescens, Haemophilus influenzae,    Neisseria meningitidis, Pseudomonas aeruginosa, Candida    albicans, C. glabrata, C krusei, C parapsilosis,    C. tropicalis and the KPC resistance gene.    Gram Stain and BCID performed by:    [facility]    [address] [place] [address]    .    TYPE: (C=Critical, N=Notification, A=Abnormal) C    TESTS:  _ GS    DATE/TIME CALLED: _ 11/18/2021 09:40:08    CALLED TO: Chris Hernandez RN    READ BACK (2 Patient Identifiers)(Y/N): _ Y    READ BACK VALUES (Y/N): _ Y    CALLED BY: Chris Quiñones  Final Report (11-21-21 @ 12:28):    Growth in aerobic and anaerobic bottles: Streptococcus pyogenes (Group A)  Organism: Blood Culture PCR  Streptococcus pyogenes (Group A) (11-21-21 @ 12:28)  Organism: Streptococcus pyogenes (Group A) (11-21-21 @ 12:28)    Sensitivities:      -  Ceftriaxone: S 0.016      -  Penicillin: S 0.016 Predicts results for ampicillin, amoxicillin, amoxicillin/clavulanate, ampicillin/sulbactam, 1st, 2nd and 3rd generation cephalosporins and carbapenems.      Method Type: ETEST  Organism: Streptococcus pyogenes (Group A) (11-21-21 @ 12:28)    Sensitivities:      -  Vancomycin: S      Method Type: KB  Organism: Blood Culture PCR (11-21-21 @ 12:28)    Sensitivities:      -  Streptococcus pyogenes (Group A): Detec      Method Type: PCR      Creatinine, Serum: 0.75 mg/dL (11-30-21 @ 07:18)  Creatinine, Serum: 0.68 mg/dL (11-29-21 @ 09:55)  Creatinine, Serum: 0.64 mg/dL (11-28-21 @ 09:14)  Creatinine, Serum: 0.65 mg/dL (11-27-21 @ 11:51)  Creatinine, Serum: 0.57 mg/dL (11-27-21 @ 05:35)  Creatinine, Serum: 0.61 mg/dL (11-26-21 @ 09:01)        Ferritin, Serum: 388 ng/mL (11-17-21 @ 13:21)    C-Reactive Protein, Serum: 197 mg/L (11-26-21 @ 21:52)  C-Reactive Protein, Serum: 211 mg/L (11-26-21 @ 09:01)  C-Reactive Protein, Serum: 53 mg/L (11-23-21 @ 07:42)  C-Reactive Protein, Serum: >422 mg/L (11-17-21 @ 13:21)    Sedimentation Rate, Erythrocyte: 65 mm/hr (11-26-21 @ 21:52)  Sedimentation Rate, Erythrocyte: 63 mm/hr (11-26-21 @ 09:01)  Sedimentation Rate, Erythrocyte: 55 mm/hr (11-17-21 @ 13:21)    WBC Count: 14.55 K/uL (11-30-21 @ 07:18)  WBC Count: 16.71 K/uL (11-29-21 @ 09:54)  WBC Count: 17.02 K/uL (11-28-21 @ 09:14)  WBC Count: 15.99 K/uL (11-27-21 @ 05:33)  WBC Count: 18.73 K/uL (11-26-21 @ 09:01)      COVID-19 PCR: NotDetec (11-30-21 @ 01:59)  COVID-19 PCR: NotDetec (11-23-21 @ 18:50)  COVID-19 PCR: NotDetec (11-17-21 @ 13:18)    Lactate Dehydrogenase, Serum: 769 U/L (11-20-21 @ 03:56)    Alkaline Phosphatase, Serum: 88 U/L (11-27-21 @ 05:35)  Alanine Aminotransferase (ALT/SGPT): 11 U/L (11-27-21 @ 05:35)  Aspartate Aminotransferase (AST/SGOT): 25 U/L (11-27-21 @ 05:35)  Bilirubin Total, Serum: 0.3 mg/dL (11-27-21 @ 05:35)        < from: CT Lower Extremity w/ IV Cont, Bilateral (11.30.21 @ 09:55) >     EXAM:  CT LWR EXT IC BI                          PROCEDURE DATE:  11/30/2021          INTERPRETATION:  CT LOWER EXTREMITY WITH IV CONTRAST BILATERAL dated 11/30/2021 9:55 AM    INDICATION: Fever. Status post incision and drainage bilaterally. Persistent fevers to 104.    COMPARISON: CT of the right knee dated 11/23/2021. CT of the left lower extremity dated 11/17/2021    TECHNIQUE: CT imaging of the bilateral lower extremities was performed with contrast. The data was reformatted in the axial,coronal, and sagittal planes.  Contrast: Omnipaque 300. Administered: 96 cc. Discarded: 4 cc.    FINDINGS:    OSSEOUS STRUCTURES: Mild degenerative changes at the lower lumbar spine including sclerosis and narrowing at the facet joints. Moderate sclerosis at the right lower sacroiliac joint. Moderate narrowing the bilateral hip joint spaces which is worse posteriorly. There is marginal productive changes. Moderate narrowing at the pubic symphysis. Small cysts appreciated at the left patella suggesting overlying cartilage loss. Postsurgical changes noted at the left distal fibula with a small screw tracts through the distal fibula. A portion of the distal fibula is exposed by an overlying skin wound. No fracture or osteonecrosis is seen. Thereis no periosteal reaction or large erosion appreciated.  SYNOVIUM/ JOINT FLUID: There is a large left knee joint effusion containing several small new locules of gas. A moderate right knee joint effusion is identified. No hip joint effusion is seen.  TENDONS: The tendons are intact.  MUSCLES: There is hypodensity within the left soleus and gastrocnemius musculature. Confluent edema is seen between the soleus and gastrocnemius musculature. Moderate edema is noted in the left anterior compartment calf musculature. There is suggestion of rim enhancement, new compared to the prior exam. There is a hazy appearance of the right facets and lateralis and tensor fascia yohan muscle. Moderate hazy hypodensity throughout the left sartorius and left vastus lateralis muscle. Edema overlies the left rectus femoris muscle.  NEUROVASCULAR STRUCTURES: Mild vascular calcifications.  INTRAPELVIC SOFT TISSUES: Multiple uterine masses are noted without enhancement likely degenerating uterine fibroids, unchanged.  SUBCUTANEOUS SOFT TISSUES: There is diffuse soft tissue swelling and skin thickening. There are skin wounds along the medial lateral aspect of the left calf. No subcutaneous fluid collection is noted.    3-D reformatted imaging confirms these findings.    IMPRESSION:    1.  Soft tissue swelling bilaterally. Medial lateral left calf skin wounds possibly from prior surgery or infection. Nonspecific but can be seen with cellulitis.  2.  Exposure of the left distal fibula by an overlying skin wound raises concern for osteomyelitis. New compared to the prior study.  3.  Large left knee joint effusion with several new foci of gas. Differential considerations include recent aspiration and infection. Correlate clinically.  4.  Hypodensity throughout the left calf musculature in the bilateral hip musculature as detailed above as can be seen with myositis.  5.  Suggestion of rim enhancement involving the left anterior calf musculature concerning for early abscess formation  6.  Edema between the left medial gastrocnemius and soleus musculature, likely infectious  7.  Heterogeneous masses in the uterus, likely degenerating fibroids.    Findings were discussed with Dr. Cortez of Trauma on 11/30/2021 10:42 AM  with read back.    --- End of Report ---            LOREN ALEXANDER MD; Attending Radiologist  This document has been electronically signed. Nov 30 2021 10:42AM    < end of copied text >

## 2021-11-30 NOTE — PROGRESS NOTE ADULT - SUBJECTIVE AND OBJECTIVE BOX
CT scan results below:     EXAM:  CT LWR EXT IC BI                          PROCEDURE DATE:  11/30/2021          INTERPRETATION:  CT LOWER EXTREMITY WITH IV CONTRAST BILATERAL dated 11/30/2021 9:55 AM    INDICATION: Fever. Status post incision and drainage bilaterally. Persistent fevers to 104.    COMPARISON: CT of the right knee dated 11/23/2021. CT of the left lower extremity dated 11/17/2021    TECHNIQUE: CT imaging of the bilateral lower extremities was performed with contrast. The data was reformatted in the axial,coronal, and sagittal planes.  Contrast: Omnipaque 300. Administered: 96 cc. Discarded: 4 cc.    FINDINGS:    OSSEOUS STRUCTURES: Mild degenerative changes at the lower lumbar spine including sclerosis and narrowing at the facet joints. Moderate sclerosis at the right lower sacroiliac joint. Moderate narrowing the bilateral hip joint spaces which is worse posteriorly. There is marginal productive changes. Moderate narrowing at the pubic symphysis. Small cysts appreciated at the left patella suggesting overlying cartilage loss. Postsurgical changes noted at the left distal fibula with a small screw tracts through the distal fibula. A portion of the distal fibula is exposed by an overlying skin wound. No fracture or osteonecrosis is seen. Thereis no periosteal reaction or large erosion appreciated.  SYNOVIUM/ JOINT FLUID: There is a large left knee joint effusion containing several small new locules of gas. A moderate right knee joint effusion is identified. No hip joint effusion is seen.  TENDONS: The tendons are intact.  MUSCLES: There is hypodensity within the left soleus and gastrocnemius musculature. Confluent edema is seen between the soleus and gastrocnemius musculature. Moderate edema is noted in the left anterior compartment calf musculature. There is suggestion of rim enhancement, new compared to the prior exam. There is a hazy appearance of the right facets and lateralis and tensor fascia yohan muscle. Moderate hazy hypodensity throughout the left sartorius and left vastus lateralis muscle. Edema overlies the left rectus femoris muscle.  NEUROVASCULAR STRUCTURES: Mild vascular calcifications.  INTRAPELVIC SOFT TISSUES: Multiple uterine masses are noted without enhancement likely degenerating uterine fibroids, unchanged.  SUBCUTANEOUS SOFT TISSUES: There is diffuse soft tissue swelling and skin thickening. There are skin wounds along the medial lateral aspect of the left calf. No subcutaneous fluid collection is noted.    3-D reformatted imaging confirms these findings.    IMPRESSION:    1.  Soft tissue swelling bilaterally. Medial lateral left calf skin wounds possibly from prior surgery or infection. Nonspecific but can be seen with cellulitis.  2.  Exposure of the left distal fibula by an overlying skin wound raises concern for osteomyelitis. New compared to the prior study.  3.  Large left knee joint effusion with several new foci of gas. Differential considerations include recent aspiration and infection. Correlate clinically.  4.  Hypodensity throughout the left calf musculature in the bilateral hip musculature as detailed above as can be seen with myositis.  5.  Suggestion of rim enhancement involving the left anterior calf musculature concerning for early abscess formation  6.  Edema between theleft medial gastrocnemius and soleus musculature, likely infectious  7.  Heterogeneous masses in the uterus, likely degenerating fibroids.    Findings were discussed with Dr. Cortez of Trauma on 11/30/2021 10:42 AM  with read back.    --- End of Report ---    LOREN ALEXANDER MD; Attending Radiologist  This document has been electronically signed. Nov 30 2021 10:42AM      Patient seen and examined. Reports still having bilateral knee pain.   Physical examination same as previous exam this morning.  CT shows large knee effusion in left knee.     ARTHROCENTSIS  PROCEDURE NOTE: Arthrocentesis    Performed by: Roderick García PA-C    Indication: Effusion / rule out septic joint    Consent: the risks and benefits of arthrocentesis discussed with patient, including the risk of bleeding, infection, and technical failure. The risks of not performing the procedure, failure to diagnose septic joint with resultant systemic infection, discussed with the patient. The alternatives of performing the procedure also discussed. Written consent was obtained following the discussion.    Universal Protocol: A time out was performed and the correct patient and site were verified.    The left knee joint was prepped and draped in proper sterile fashion. A 18 gauge needle was used to aspirate fluid from the joint using appropriate anatomic landmarks. Bloody fluid was obtained from the joint. The site was bandaged and no complications were noted. The patient tolerated the procedure well.    Post-Procedure Diagnosis: Effusion  Complications: None    Plan is to keep patient NPO after midnight  Dr. Tobias will aspirate knee in morning.  Patient to be NPO until aspiration results return tomorrow.

## 2021-11-30 NOTE — PROGRESS NOTE ADULT - ASSESSMENT
51y Female present to ED with left leg swelling, erythema, pain. Patient endorses she had left knee pain for 1 week presented 3 days ago to ED  where she states was given an steroid shot, and ibuprofen. however pain and edema worsened. Patient states she wasnt feeling herself today reason why she came. Endorses chills initially with pain 3 days ago but none since, denies history of trauma, insect bites, recent interventions, or injections to the area, contact with ticks, history of diabetes.  PT AS ABOVE WITH LEFT LEG SWELLING PT WITH INCREASED WBC PLACED ON ABX FOR PRESUMED INFECTION  PT WITH CT SCAN WITH FASCIAL EDAM PT BROUGHT TO THE OR EMERGENTLY AND  UNDERWENT FASCIOTOMY   PT BROUGHT TO THE OR  BOTH TRAUMA AND ORTHO INVOLVED  PURULENT FLUID FOUND  NECROTIZING SOFT TISSUE INFECTION    BLOOD CX WITH GROUP A STREP AND  OPERATIVE FLUID WITH STREP   PT ON PCN AND CLINDA for POSSIBLE ANTITOXIN EFFECT IN GROUP A STREP INFECTION    PT WITH RIGHT KNEE PAIN WENT TO OR 11/24  AND WASHED OUT AND CULTURES TAKEN so far negative    now febrile with uptrending WBC    CXR and UA (-)  c/o b/l shoulder pain, and knee pain, MR as above  f/u repeat BCX  - new CT scan of lower ext on 11/29/21 showed early abscess in left anterior calf. also edema between left media gastroc and soleus      - increase ZOSYN to 4.5 grams Q8H  - continue CLINDAMYCIN      follow up on BENJAMIN results      WILL FOLLOW UP

## 2021-11-30 NOTE — PROGRESS NOTE ADULT - SUBJECTIVE AND OBJECTIVE BOX
Pt Name: LUIS FERNANDO DAVIS  MRN: [id number]    Procedure: Right knee I&D POD#6 (11/24/21),  Left knee I&D w [facility] left ankle POD#12 (11/17/21)  Surgeon: Dr Tobias    Patient is a 51y Female seen and examined at bedside. Patient is doing well, reports improvement. Patient complaining of muscle spasm to RLE and LLE. Also complaining of UE weakness. Denies numbness/tingling. No other orthopedic complaints.    Vital Signs Last 24 Hrs  T(C): 39 (30 Nov 2021 08:55), Max: 39.4 (29 Nov 2021 22:01)  T(F): 102.2 (30 Nov 2021 08:55), Max: 102.9 (29 Nov 2021 22:01)  HR: 60 (30 Nov 2021 08:55) (60 - 84)  BP: 112/76 (30 Nov 2021 08:55) (112/62 - 134/76)  BP(mean): --  RR: 18 (30 Nov 2021 08:55) (17 - 18)  SpO2: 92% (30 Nov 2021 08:55) (92% - 96%)                          9.1    14.55 )-----------( 534      ( 30 Nov 2021 07:18 )             31.6     PHYSICAL EXAM:    Appearance: Alert, responsive, in no acute distress.    Musculoskeletal:       Left Lower Extremity: Left knee dressing is clean, dry, and intact. Wound vac/ACE dressing intact- managed by ACS. No abrasions, ecchymosis, or erythema. No bleeding. SILT. + plantarflexion/FHL. 0/5EHL. +footdrop. DP pulses 2+. Cap refill < 2 seconds. No cyanosis. No signs of venous insufficiency or stasis.        Right Lower Extremity: Right knee dressing is clean, dry, and intact. No abrasions, ecchymosis, or erythema. No bleeding. SILT. + dorsi/plantarflexion/EHL/FHL. DP pulses 2+. Cap refill < 2 seconds. No cyanosis. No signs of venous insufficiency or stasis.     A/P:  Pt is a  51y Female s/p Right knee I&D POD#5 (11/24/21),  Left knee I&D w [facility] left ankle POD#11 (11/17/21)    PLAN:   * Pain control  * DVTp  * recc AFO for left foot drop  * Weight Bearing Status: WBAT RLE, WBAT LLE in CAM boot  * Recommend pain management consult  * Continue care as per primary team    FOR UPPER EXTREMITY WEAKNESS:  * Trauma to evaluate right arm swelling  * Recommend rheumatology consult  * b/l shoulder xrays stat  * PT/OT: ROM of shoulders  * Case d/w with Dr. Tobias

## 2021-11-30 NOTE — PROGRESS NOTE ADULT - ASSESSMENT
51y Female present to ED with left leg swelling, erythema, pain, leukocytosis and acute renal failure. CT scan concerning for necrotizing soft tissue infection. 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 Downgraded to floor from SICU 11/22/21.  Patient had complained or RLE swelling. Right lower extremity duplex and negative for DVT; however + swelling in popliteal fossa to R calf.  R knee I&D performed.   LLE with streptococcus pyogenes.    - Continue IV abx: Clinda, Zosyn (recs changed by ID on 11/28)  - Pain control   - Monitor fevers  - f/u new blood cultures sent on 11/28  - pending repeat CT   - Daily dressing changes, wound care   - AM labs, monitor lytes, replete PRN   - PT/OT   - Wound vac change on 11/30  - WBAT LLE in CAM boot

## 2021-11-30 NOTE — PROGRESS NOTE ADULT - ASSESSMENT
52 yo woman admitted for necrotizing infection s/p fasciotomy of LLE now with proximal arm weakness and swelling    Proximal arm weakness  Weakness possibly due to swelling.  Patient has severe pain in shoulders limiting movement.  Orthopedic follow up appreciated.   If swelling improves and weakness persists, consider repeat MRI C Spine w/o contrast.  Mobilize upper extremities with physical therapy.    Upper extremity swelling  Management as per primary team.

## 2021-11-30 NOTE — PROGRESS NOTE ADULT - ATTENDING COMMENTS
pt seen and examined this am. L knee no erythema, incision c/d/i; ROM 0-30 limited by pain, no micromotion tenderness. Knee aspirated by me, 2-3cc of bloody fluid aspirated. No evidence of gross infection or pus. CT showing large effusion and air, likely hemarthrosis and postsurgical. Recommend IR aspiration for possible septated collection. At this time, no plan for operative intervention pending tap results and IR aspiration. ACS f/u for early abscess in lower leg recommended. Care as per primary team. Ortho to continue to follow closely. continue PT and ROM for b/l knees

## 2021-11-30 NOTE — PROGRESS NOTE ADULT - ATTENDING COMMENTS
Swelling L leg significantly down  Excellent distal pulses.  CT legs reveals some air in the L knee and according to ortho this is outside air based on arthrotomy L knee  Wound vac to be changed,. Patient does not have additional sources of infection that we detected and no collections  BENJAMIN pending  Cardiology consult appreciated

## 2021-12-01 ENCOUNTER — TRANSCRIPTION ENCOUNTER (OUTPATIENT)
Age: 51
End: 2021-12-01

## 2021-12-01 LAB
ANION GAP SERPL CALC-SCNC: 13 MMOL/L — SIGNIFICANT CHANGE UP (ref 5–17)
ANISOCYTOSIS BLD QL: SLIGHT — SIGNIFICANT CHANGE UP
BASOPHILS # BLD AUTO: 0.13 K/UL — SIGNIFICANT CHANGE UP (ref 0–0.2)
BASOPHILS NFR BLD AUTO: 0.9 % — SIGNIFICANT CHANGE UP (ref 0–2)
BILIRUB DIRECT SERPL-MCNC: <0.1 MG/DL — SIGNIFICANT CHANGE UP (ref 0–0.3)
BILIRUB INDIRECT FLD-MCNC: SIGNIFICANT CHANGE UP MG/DL (ref 0.2–1)
BILIRUB SERPL-MCNC: 0.2 MG/DL — LOW (ref 0.4–2)
BUN SERPL-MCNC: 5.3 MG/DL — LOW (ref 8–20)
CALCIUM SERPL-MCNC: 7.6 MG/DL — LOW (ref 8.6–10.2)
CHLORIDE SERPL-SCNC: 101 MMOL/L — SIGNIFICANT CHANGE UP (ref 98–107)
CO2 SERPL-SCNC: 20 MMOL/L — LOW (ref 22–29)
CREAT SERPL-MCNC: 0.66 MG/DL — SIGNIFICANT CHANGE UP (ref 0.5–1.3)
EOSINOPHIL # BLD AUTO: 0.63 K/UL — HIGH (ref 0–0.5)
EOSINOPHIL NFR BLD AUTO: 4.4 % — SIGNIFICANT CHANGE UP (ref 0–6)
GIANT PLATELETS BLD QL SMEAR: PRESENT — SIGNIFICANT CHANGE UP
GLUCOSE SERPL-MCNC: 94 MG/DL — SIGNIFICANT CHANGE UP (ref 70–99)
GRAM STN FLD: SIGNIFICANT CHANGE UP
HAPTOGLOB SERPL-MCNC: 354 MG/DL — HIGH (ref 34–200)
HCT VFR BLD CALC: 21.7 % — LOW (ref 34.5–45)
HGB BLD-MCNC: 7 G/DL — CRITICAL LOW (ref 11.5–15.5)
HYPOCHROMIA BLD QL: SLIGHT — SIGNIFICANT CHANGE UP
LYMPHOCYTES # BLD AUTO: 1.25 K/UL — SIGNIFICANT CHANGE UP (ref 1–3.3)
LYMPHOCYTES # BLD AUTO: 8.7 % — LOW (ref 13–44)
MAGNESIUM SERPL-MCNC: 1.5 MG/DL — LOW (ref 1.8–2.6)
MAGNESIUM SERPL-MCNC: 2.1 MG/DL — SIGNIFICANT CHANGE UP (ref 1.8–2.6)
MANUAL SMEAR VERIFICATION: SIGNIFICANT CHANGE UP
MCHC RBC-ENTMCNC: 27.3 PG — SIGNIFICANT CHANGE UP (ref 27–34)
MCHC RBC-ENTMCNC: 32.3 GM/DL — SIGNIFICANT CHANGE UP (ref 32–36)
MCV RBC AUTO: 84.8 FL — SIGNIFICANT CHANGE UP (ref 80–100)
MONOCYTES # BLD AUTO: 0.62 K/UL — SIGNIFICANT CHANGE UP (ref 0–0.9)
MONOCYTES NFR BLD AUTO: 4.3 % — SIGNIFICANT CHANGE UP (ref 2–14)
NEUTROPHILS # BLD AUTO: 11.5 K/UL — HIGH (ref 1.8–7.4)
NEUTROPHILS NFR BLD AUTO: 80 % — HIGH (ref 43–77)
NRBC # BLD: 1 /100 — HIGH (ref 0–0)
OVALOCYTES BLD QL SMEAR: SLIGHT — SIGNIFICANT CHANGE UP
PLAT MORPH BLD: NORMAL — SIGNIFICANT CHANGE UP
PLATELET # BLD AUTO: 592 K/UL — HIGH (ref 150–400)
POLYCHROMASIA BLD QL SMEAR: SIGNIFICANT CHANGE UP
POTASSIUM SERPL-MCNC: 4.1 MMOL/L — SIGNIFICANT CHANGE UP (ref 3.5–5.3)
POTASSIUM SERPL-SCNC: 4.1 MMOL/L — SIGNIFICANT CHANGE UP (ref 3.5–5.3)
RBC # BLD: 2.56 M/UL — LOW (ref 3.8–5.2)
RBC # FLD: 21.2 % — HIGH (ref 10.3–14.5)
RBC BLD AUTO: ABNORMAL
SCHISTOCYTES BLD QL AUTO: SLIGHT — SIGNIFICANT CHANGE UP
SODIUM SERPL-SCNC: 134 MMOL/L — LOW (ref 135–145)
SPECIMEN SOURCE: SIGNIFICANT CHANGE UP
VARIANT LYMPHS # BLD: 1.7 % — SIGNIFICANT CHANGE UP (ref 0–6)
WBC # BLD: 14.38 K/UL — HIGH (ref 3.8–10.5)
WBC # FLD AUTO: 14.38 K/UL — HIGH (ref 3.8–10.5)

## 2021-12-01 PROCEDURE — 99232 SBSQ HOSP IP/OBS MODERATE 35: CPT

## 2021-12-01 RX ORDER — ACETAMINOPHEN 500 MG
1000 TABLET ORAL ONCE
Refills: 0 | Status: COMPLETED | OUTPATIENT
Start: 2021-12-01 | End: 2021-12-01

## 2021-12-01 RX ORDER — MAGNESIUM SULFATE 500 MG/ML
2 VIAL (ML) INJECTION
Refills: 0 | Status: COMPLETED | OUTPATIENT
Start: 2021-12-01 | End: 2021-12-01

## 2021-12-01 RX ORDER — LABETALOL HCL 100 MG
1 TABLET ORAL
Qty: 200 | Refills: 0 | Status: DISCONTINUED | OUTPATIENT
Start: 2021-12-01 | End: 2021-12-01

## 2021-12-01 RX ADMIN — Medication 50 GRAM(S): at 11:35

## 2021-12-01 RX ADMIN — Medication 10 MILLIGRAM(S): at 15:02

## 2021-12-01 RX ADMIN — GABAPENTIN 200 MILLIGRAM(S): 400 CAPSULE ORAL at 22:08

## 2021-12-01 RX ADMIN — OXYCODONE HYDROCHLORIDE 10 MILLIGRAM(S): 5 TABLET ORAL at 15:00

## 2021-12-01 RX ADMIN — OXYCODONE HYDROCHLORIDE 10 MILLIGRAM(S): 5 TABLET ORAL at 16:00

## 2021-12-01 RX ADMIN — Medication 50 GRAM(S): at 15:01

## 2021-12-01 RX ADMIN — PANTOPRAZOLE SODIUM 40 MILLIGRAM(S): 20 TABLET, DELAYED RELEASE ORAL at 17:55

## 2021-12-01 RX ADMIN — Medication 100 MILLIGRAM(S): at 15:03

## 2021-12-01 RX ADMIN — Medication 10 MILLIGRAM(S): at 05:52

## 2021-12-01 RX ADMIN — Medication 400 MILLIGRAM(S): at 20:59

## 2021-12-01 RX ADMIN — HYDROMORPHONE HYDROCHLORIDE 1 MILLIGRAM(S): 2 INJECTION INTRAMUSCULAR; INTRAVENOUS; SUBCUTANEOUS at 08:15

## 2021-12-01 RX ADMIN — GABAPENTIN 200 MILLIGRAM(S): 400 CAPSULE ORAL at 15:01

## 2021-12-01 RX ADMIN — Medication 400 MILLIGRAM(S): at 05:52

## 2021-12-01 RX ADMIN — Medication 1000 MILLIGRAM(S): at 21:30

## 2021-12-01 RX ADMIN — HYDROMORPHONE HYDROCHLORIDE 1 MILLIGRAM(S): 2 INJECTION INTRAMUSCULAR; INTRAVENOUS; SUBCUTANEOUS at 08:00

## 2021-12-01 RX ADMIN — METHOCARBAMOL 750 MILLIGRAM(S): 500 TABLET, FILM COATED ORAL at 05:52

## 2021-12-01 RX ADMIN — SODIUM CHLORIDE 1 GRAM(S): 9 INJECTION INTRAMUSCULAR; INTRAVENOUS; SUBCUTANEOUS at 17:55

## 2021-12-01 RX ADMIN — Medication 100 MILLIGRAM(S): at 21:59

## 2021-12-01 RX ADMIN — OXYCODONE HYDROCHLORIDE 10 MILLIGRAM(S): 5 TABLET ORAL at 04:33

## 2021-12-01 RX ADMIN — PANTOPRAZOLE SODIUM 40 MILLIGRAM(S): 20 TABLET, DELAYED RELEASE ORAL at 05:53

## 2021-12-01 RX ADMIN — METHOCARBAMOL 750 MILLIGRAM(S): 500 TABLET, FILM COATED ORAL at 22:06

## 2021-12-01 RX ADMIN — PIPERACILLIN AND TAZOBACTAM 25 GRAM(S): 4; .5 INJECTION, POWDER, LYOPHILIZED, FOR SOLUTION INTRAVENOUS at 06:50

## 2021-12-01 RX ADMIN — SODIUM CHLORIDE 1 GRAM(S): 9 INJECTION INTRAMUSCULAR; INTRAVENOUS; SUBCUTANEOUS at 05:52

## 2021-12-01 RX ADMIN — Medication 10 MILLIGRAM(S): at 22:05

## 2021-12-01 RX ADMIN — METHOCARBAMOL 750 MILLIGRAM(S): 500 TABLET, FILM COATED ORAL at 15:03

## 2021-12-01 RX ADMIN — HEPARIN SODIUM 5000 UNIT(S): 5000 INJECTION INTRAVENOUS; SUBCUTANEOUS at 22:04

## 2021-12-01 RX ADMIN — Medication 1000 MILLIGRAM(S): at 06:15

## 2021-12-01 RX ADMIN — LISINOPRIL 5 MILLIGRAM(S): 2.5 TABLET ORAL at 05:52

## 2021-12-01 RX ADMIN — OXYCODONE HYDROCHLORIDE 10 MILLIGRAM(S): 5 TABLET ORAL at 05:30

## 2021-12-01 RX ADMIN — PIPERACILLIN AND TAZOBACTAM 25 GRAM(S): 4; .5 INJECTION, POWDER, LYOPHILIZED, FOR SOLUTION INTRAVENOUS at 15:00

## 2021-12-01 RX ADMIN — PIPERACILLIN AND TAZOBACTAM 25 GRAM(S): 4; .5 INJECTION, POWDER, LYOPHILIZED, FOR SOLUTION INTRAVENOUS at 21:58

## 2021-12-01 RX ADMIN — Medication 100 MILLIGRAM(S): at 06:15

## 2021-12-01 RX ADMIN — GABAPENTIN 200 MILLIGRAM(S): 400 CAPSULE ORAL at 05:52

## 2021-12-01 NOTE — PROGRESS NOTE ADULT - SUBJECTIVE AND OBJECTIVE BOX
Pt Name: LUIS FERNANDO DAVIS  MRN: [id number]    Procedure: Right knee I&D POD# 7 (21),  Left knee I&D w [facility] left ankle POD#13 (21)  Surgeon: Dr Tobias    Patient is a 51y Female seen and examined at bedside with Dr. Tobias. Patient is doing well, reports improvement. Patient complaining of muscle spasm to RLE and LLE. Also complaining of UE weakness and swelling. Patient states that she has been elevating RUE. Patient informed about attempt of left knee aspiration by Dr. Tobias this morning. Denies numbness/tingling. No other orthopedic complaints.      PAST MEDICAL & SURGICAL HISTORY:  No pertinent past medical history    History of ankle surgery        Allergies: No Known Allergies                            7.0    14.38 )-----------( 592      ( 01 Dec 2021 07:12 )             21.7     12-    134<L>  |  101  |  5.3<L>  ----------------------------<  94  4.1   |  20.0<L>  |  0.66    Ca    7.6<L>      01 Dec 2021 07:12  Phos  3.6     11-29  Mg     1.5     12-01    TPro  x   /  Alb  x   /  TBili  0.2<L>  /  DBili  <0.1  /  AST  x   /  ALT  x   /  AlkPhos  x   12-      PHYSICAL EXAM:    Vital Signs Last 24 Hrs  T(C): 37.3 (01 Dec 2021 08:21), Max: 38.9 (2021 19:56)  T(F): 99.2 (01 Dec 2021 08:21), Max: 102.1 (2021 19:56)  HR: 84 (01 Dec 2021 08:21) (61 - 90)  BP: 90/52 (01 Dec 2021 08:21) (90/52 - 125/71)  BP(mean): --  RR: 17 (01 Dec 2021 08:21) (17 - 18)  SpO2: 90% (01 Dec 2021 08:21) (90% - 99%)  Daily     Daily Weight in k.5 (01 Dec 2021 02:45)    Appearance: Alert, responsive, in no acute distress.  Musculoskeletal:       Left Lower Extremity: Left knee dressing is clean, dry, and intact. Removed for knee aspiration. Incision site clean, dry, and steristrips to upper half of incision intact with no erythema noted to visible skin. New dressings placed. Wound vac/ACE dressing intact- managed by ACS.  No bleeding noted. SILT. + plantarflexion/FHL. 0/5EHL. +footdrop. DP pulses 2+. Cap refill < 2 seconds. No cyanosis. No signs of venous insufficiency or stasis.        Right Lower Extremity: Right knee dressing is clean, dry, and intact. No abrasions, ecchymosis, or erythema. No bleeding. SILT. + dorsi/plantarflexion/EHL/FHL. DP pulses 2+. Cap refill < 2 seconds. No cyanosis. No signs of venous insufficiency or stasis.       ARTHROCENTSIS  PROCEDURE NOTE: Arthrocentesis    Performed by: Dr. Tobias    Indication: Effusion / rule out septic joint    Consent: the risks and benefits of arthrocentesis discussed with patient, including the risk of bleeding, infection, and technical failure. The risks of not performing the procedure, failure to diagnose septic joint with resultant systemic infection, discussed with the patient. The alternatives of performing the procedure also discussed. Written consent was obtained following the discussion.    Universal Protocol: A time out was performed and the correct patient and site were verified.    The left knee joint was prepped and draped in proper sterile fashion. A 18 gauge needle was used to aspirate fluid from the joint using appropriate anatomic landmarks. 2-3cc of bloody fluid was obtained from the joint. The fluid was then sent to the lab for appropriate studies. The site was bandaged and no complications were noted. The patient tolerated the procedure well.    Post-Procedure Diagnosis: Effusion  Complications: None  Specimens Removed: fluid only    < from: CT Lower Extremity w/ IV Cont, Bilateral (21 @ 09:55) >     EXAM:  CT LWR EXT IC BI                          PROCEDURE DATE:  2021          INTERPRETATION:  CT LOWER EXTREMITY WITH IV CONTRAST BILATERAL dated 2021 9:55 AM    INDICATION: Fever. Status post incision and drainage bilaterally. Persistent fevers to 104.    COMPARISON: CT of the right knee dated 2021. CT of the left lower extremity dated 2021    TECHNIQUE: CT imaging of the bilateral lower extremities was performed with contrast. The data was reformatted in the axial,coronal, and sagittal planes.  Contrast: Omnipaque 300. Administered: 96 cc. Discarded: 4 cc.    FINDINGS:    OSSEOUS STRUCTURES: Mild degenerative changes at the lower lumbar spine including sclerosis and narrowing at the facet joints. Moderate sclerosis at the right lower sacroiliac joint. Moderate narrowing the bilateral hip joint spaces which is worse posteriorly. There is marginal productive changes. Moderate narrowing at the pubic symphysis. Small cysts appreciated at the left patella suggesting overlying cartilage loss. Postsurgical changes noted at the left distal fibula with a small screw tracts through the distal fibula. A portion of the distal fibula is exposed by an overlying skin wound. No fracture or osteonecrosis is seen. Thereis no periosteal reaction or large erosion appreciated.  SYNOVIUM/ JOINT FLUID: There is a large left knee joint effusion containing several small new locules of gas. A moderate right knee joint effusion is identified. No hip joint effusion is seen.  TENDONS: The tendons are intact.  MUSCLES: There is hypodensity within the left soleus and gastrocnemius musculature. Confluent edema is seen between the soleus and gastrocnemius musculature. Moderate edema is noted in the left anterior compartment calf musculature. There is suggestion of rim enhancement, new compared to the prior exam. There is a hazy appearance of the right facets and lateralis and tensor fascia yohan muscle. Moderate hazy hypodensity throughout the left sartorius and left vastus lateralis muscle. Edema overlies the left rectus femoris muscle.  NEUROVASCULAR STRUCTURES: Mild vascular calcifications.  INTRAPELVIC SOFT TISSUES: Multiple uterine masses are noted without enhancement likely degenerating uterine fibroids, unchanged.  SUBCUTANEOUS SOFT TISSUES: There is diffuse soft tissue swelling and skin thickening. There are skin wounds along the medial lateral aspect of the left calf. No subcutaneous fluid collection is noted.    3-D reformatted imaging confirms these findings.    IMPRESSION:    1.  Soft tissue swelling bilaterally. Medial lateral left calf skin wounds possibly from prior surgery or infection. Nonspecific but can be seen with cellulitis.  2.  Exposure of the left distal fibula by an overlying skin wound raises concern for osteomyelitis. New compared to the prior study.  3.  Large left knee joint effusion with several new foci of gas. Differential considerations include recent aspiration and infection. Correlate clinically.  4.  Hypodensity throughout the left calf musculature in the bilateral hip musculature as detailed above as can be seen with myositis.  5.  Suggestion of rim enhancement involving the left anterior calf musculature concerning for early abscess formation  6.  Edema between theleft medial gastrocnemius and soleus musculature, likely infectious  7.  Heterogeneous masses in the uterus, likely degenerating fibroids.    Findings were discussed with Dr. Cortez of Trauma on 2021 10:42 AM  with read back.    --- End of Report ---        LOREN ALEXANDER MD; Attending Radiologist  This document has been electronically signed. 2021 10:42AM    < end of copied text >        A/P:  Pt is a  51y Female s/p Right knee I&D POD# 7 (21),  Left knee I&D w [facility] left ankle POD#13 (21)    PLAN:   - B/l shoulder xrays negative for acute fractures or dislocations - PT/OT for ROM of shoulders  - F/u cell count and cultures for left knee aspiration   - Pain control  - DVTp  - recc AFO for left foot drop  - Weight Bearing Status: WBAT RLE, WBAT LLE in CAM boot  - Rec pain management consult  - Continue care as per primary team Pt Name: LUIS FERNANDO DAVIS  MRN: [id number]    Procedure: Right knee I&D POD# 7 (21),  Left knee I&D w [facility] left ankle POD#13 (21)  Surgeon: Dr Tobias    Patient is a 51y Female seen and examined at bedside with Dr. Tobias. Patient is doing well, reports improvement. Patient complaining of muscle spasm to RLE and LLE. Also complaining of UE weakness and swelling. Patient states that she has been elevating RUE. Patient informed about attempt of left knee aspiration by Dr. Tobias this morning. Denies numbness/tingling. No other orthopedic complaints.      PAST MEDICAL & SURGICAL HISTORY:  No pertinent past medical history    History of ankle surgery        Allergies: No Known Allergies                            7.0    14.38 )-----------( 592      ( 01 Dec 2021 07:12 )             21.7     12-    134<L>  |  101  |  5.3<L>  ----------------------------<  94  4.1   |  20.0<L>  |  0.66    Ca    7.6<L>      01 Dec 2021 07:12  Phos  3.6     11-29  Mg     1.5     12-01    TPro  x   /  Alb  x   /  TBili  0.2<L>  /  DBili  <0.1  /  AST  x   /  ALT  x   /  AlkPhos  x   12-      PHYSICAL EXAM:    Vital Signs Last 24 Hrs  T(C): 37.3 (01 Dec 2021 08:21), Max: 38.9 (2021 19:56)  T(F): 99.2 (01 Dec 2021 08:21), Max: 102.1 (2021 19:56)  HR: 84 (01 Dec 2021 08:21) (61 - 90)  BP: 90/52 (01 Dec 2021 08:21) (90/52 - 125/71)  BP(mean): --  RR: 17 (01 Dec 2021 08:21) (17 - 18)  SpO2: 90% (01 Dec 2021 08:21) (90% - 99%)  Daily     Daily Weight in k.5 (01 Dec 2021 02:45)    Appearance: Alert, responsive, in no acute distress.  Musculoskeletal:       Left Lower Extremity: Left knee dressing is clean, dry, and intact. Removed for knee aspiration. Incision site clean, dry, and steristrips to upper half of incision intact with no erythema noted to visible skin. New dressings placed. Wound vac/ACE dressing intact- managed by ACS.  No bleeding noted. SILT. + plantarflexion/FHL. 0/5EHL. +footdrop. DP pulses 2+. Cap refill < 2 seconds. No cyanosis. No signs of venous insufficiency or stasis.        Right Lower Extremity: Right knee dressing is clean, dry, and intact. No abrasions, ecchymosis, or erythema. No bleeding. SILT. + dorsi/plantarflexion/EHL/FHL. DP pulses 2+. Cap refill < 2 seconds. No cyanosis. No signs of venous insufficiency or stasis.       ARTHROCENTSIS  PROCEDURE NOTE: Arthrocentesis    Performed by: Dr. Tobias    Indication: Effusion / rule out septic joint    Consent: the risks and benefits of arthrocentesis discussed with patient, including the risk of bleeding, infection, and technical failure. The risks of not performing the procedure, failure to diagnose septic joint with resultant systemic infection, discussed with the patient. The alternatives of performing the procedure also discussed. Written consent was obtained following the discussion.    Universal Protocol: A time out was performed and the correct patient and site were verified.    The left knee joint was prepped and draped in proper sterile fashion. A 18 gauge needle was used to aspirate fluid from the joint using appropriate anatomic landmarks. 2-3cc of bloody fluid was obtained from the joint. The fluid was then sent to the lab for appropriate studies. The site was bandaged and no complications were noted. The patient tolerated the procedure well.    Post-Procedure Diagnosis: Effusion  Complications: None  Specimens Removed: fluid only    < from: CT Lower Extremity w/ IV Cont, Bilateral (21 @ 09:55) >     EXAM:  CT LWR EXT IC BI                          PROCEDURE DATE:  2021          INTERPRETATION:  CT LOWER EXTREMITY WITH IV CONTRAST BILATERAL dated 2021 9:55 AM    INDICATION: Fever. Status post incision and drainage bilaterally. Persistent fevers to 104.    COMPARISON: CT of the right knee dated 2021. CT of the left lower extremity dated 2021    TECHNIQUE: CT imaging of the bilateral lower extremities was performed with contrast. The data was reformatted in the axial,coronal, and sagittal planes.  Contrast: Omnipaque 300. Administered: 96 cc. Discarded: 4 cc.    FINDINGS:    OSSEOUS STRUCTURES: Mild degenerative changes at the lower lumbar spine including sclerosis and narrowing at the facet joints. Moderate sclerosis at the right lower sacroiliac joint. Moderate narrowing the bilateral hip joint spaces which is worse posteriorly. There is marginal productive changes. Moderate narrowing at the pubic symphysis. Small cysts appreciated at the left patella suggesting overlying cartilage loss. Postsurgical changes noted at the left distal fibula with a small screw tracts through the distal fibula. A portion of the distal fibula is exposed by an overlying skin wound. No fracture or osteonecrosis is seen. Thereis no periosteal reaction or large erosion appreciated.  SYNOVIUM/ JOINT FLUID: There is a large left knee joint effusion containing several small new locules of gas. A moderate right knee joint effusion is identified. No hip joint effusion is seen.  TENDONS: The tendons are intact.  MUSCLES: There is hypodensity within the left soleus and gastrocnemius musculature. Confluent edema is seen between the soleus and gastrocnemius musculature. Moderate edema is noted in the left anterior compartment calf musculature. There is suggestion of rim enhancement, new compared to the prior exam. There is a hazy appearance of the right facets and lateralis and tensor fascia yohan muscle. Moderate hazy hypodensity throughout the left sartorius and left vastus lateralis muscle. Edema overlies the left rectus femoris muscle.  NEUROVASCULAR STRUCTURES: Mild vascular calcifications.  INTRAPELVIC SOFT TISSUES: Multiple uterine masses are noted without enhancement likely degenerating uterine fibroids, unchanged.  SUBCUTANEOUS SOFT TISSUES: There is diffuse soft tissue swelling and skin thickening. There are skin wounds along the medial lateral aspect of the left calf. No subcutaneous fluid collection is noted.    3-D reformatted imaging confirms these findings.    IMPRESSION:    1.  Soft tissue swelling bilaterally. Medial lateral left calf skin wounds possibly from prior surgery or infection. Nonspecific but can be seen with cellulitis.  2.  Exposure of the left distal fibula by an overlying skin wound raises concern for osteomyelitis. New compared to the prior study.  3.  Large left knee joint effusion with several new foci of gas. Differential considerations include recent aspiration and infection. Correlate clinically.  4.  Hypodensity throughout the left calf musculature in the bilateral hip musculature as detailed above as can be seen with myositis.  5.  Suggestion of rim enhancement involving the left anterior calf musculature concerning for early abscess formation  6.  Edema between theleft medial gastrocnemius and soleus musculature, likely infectious  7.  Heterogeneous masses in the uterus, likely degenerating fibroids.    Findings were discussed with Dr. Cortez of Trauma on 2021 10:42 AM  with read back.    --- End of Report ---        LOREN ALEXANDER MD; Attending Radiologist  This document has been electronically signed. 2021 10:42AM    < end of copied text >        A/P:  Pt is a  51y Female s/p Right knee I&D POD# 7 (21),  Left knee I&D w [facility] left ankle POD#13 (21)    PLAN:   - B/l shoulder xrays negative for acute fractures or dislocations - PT/OT for ROM of shoulders  - F/u cell count and cultures for left knee aspiration: possible IR recc pending results   - Pain control  - DVTp  - recc AFO for left foot drop  - Weight Bearing Status: WBAT RLE, WBAT LLE in CAM boot  - Rec pain management consult  - Continue care as per primary team

## 2021-12-01 NOTE — PROGRESS NOTE ADULT - ASSESSMENT
52 yo woman admitted for necrotizing infection s/p fasciotomy of LLE now with proximal arm weakness and swelling    Proximal arm weakness in setting of significant pain  Weakness possibly due to swelling.  Patient has severe pain in shoulders limiting movement.  Orthopedic follow up appreciated.   If swelling improves and weakness persists, can consider to repeat MRI C Spine w/o contrast.  Mobilize upper extremities with physical therapy.    Upper extremity swelling and pain  Management as per primary team.    will follow with you    Hira Hays MD PhD   [id number]

## 2021-12-01 NOTE — PROGRESS NOTE ADULT - ATTENDING COMMENTS
Awake alert  Bilateral good BS  hemodynamic stable  Leg edema decreasing with excellent palpable distal pulses  Source of infection/inflammation remains elusive  Ct discussed with ortho  BENJAMIN pending  ID consult appreciated

## 2021-12-01 NOTE — PROGRESS NOTE ADULT - ASSESSMENT
51y Female present to ED with left leg swelling, erythema, pain. Patient endorses she had left knee pain for 1 week presented 3 days ago to ED  where she states was given an steroid shot, and ibuprofen. however pain and edema worsened. Patient states she wasnt feeling herself today reason why she came. Endorses chills initially with pain 3 days ago but none since, denies history of trauma, insect bites, recent interventions, or injections to the area, contact with ticks, history of diabetes.  PT AS ABOVE WITH LEFT LEG SWELLING PT WITH INCREASED WBC PLACED ON ABX FOR PRESUMED INFECTION  PT WITH CT SCAN WITH FASCIAL EDAM PT BROUGHT TO THE OR EMERGENTLY AND  UNDERWENT FASCIOTOMY   PT BROUGHT TO THE OR  BOTH TRAUMA AND ORTHO INVOLVED  PURULENT FLUID FOUND  NECROTIZING SOFT TISSUE INFECTION    BLOOD CX WITH GROUP A STREP AND  OPERATIVE FLUID WITH STREP   PT ON PCN AND CLINDA for POSSIBLE ANTITOXIN EFFECT IN GROUP A STREP INFECTION    PT WITH RIGHT KNEE PAIN WENT TO OR 11/24  AND WASHED OUT AND CULTURES TAKEN so far negative    now febrile with uptrending WBC    CXR and UA (-)  c/o b/l shoulder pain, and knee pain, MR as above  f/u repeat BCX  - new CT scan of lower ext on 11/29/21 ? showed early abscess in left anterior calf. also edema between left media gastroc and soleus  SURGERY FOLLOWING  s/p ASPIRATION LEFT KNEE BY ORTHO TODAY   on  ZOSYN to 4.5 grams Q8H  - continue CLINDAMYCIN  follow up on BENJAMIN results

## 2021-12-01 NOTE — PROGRESS NOTE ADULT - SUBJECTIVE AND OBJECTIVE BOX
[facility]                                        [facility] [place]                                 Lis [address], & [address]                                  [address]                                        [place], [place], [address]                                             [phone]        CC: proximal weakness of upper extremities.    HPI:   50 yo woman with no significant past medical history who was admitted for fasciotomy for necrotizing soft tissue infection. She states she had left lower extremity swelling at home and when she arrived here had emergent surgery for suspected infection. 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 She states at home, she also had pain in her arms and shoulders, but she could move them well. She states two days ago when she was in ICU, she noticed soreness in her shoulders. She states she has weakness in her proximal arms which is new. She denies numbness or tingling in her extremities. She denies headache, or change in vision. She states she feels her upper arms are swollen. She has no further complaints. (TK).    Interim history: Still with pain in both shoulders and proximal weakness in both upper extremities. Pain on passive movement at shoulder.    ROS:   Denies headache or dizziness.  Denies chest pain.  Denies shortness of breath.  (+) diffuse pain and spasms  (+) B/L shoulder pain     MEDICATIONS  (STANDING):  baclofen 10 milliGRAM(s) Oral three times a day  clindamycin IVPB 900 milliGRAM(s) IV Intermittent every 8 hours  collagenase Ointment 1 Application(s) Topical two times a day  gabapentin 200 milliGRAM(s) Oral three times a day  glucagon  Injectable 1 milliGRAM(s) IntraMuscular once  heparin   Injectable 5000 Unit(s) SubCutaneous every 8 hours  lisinopril 5 milliGRAM(s) Oral daily  methocarbamol 750 milliGRAM(s) Oral three times a day  pantoprazole    Tablet 40 milliGRAM(s) Oral two times a day  piperacillin/tazobactam IVPB.. 4.5 Gram(s) IV Intermittent every 8 hours  polyethylene glycol 3350 17 Gram(s) Oral daily  senna 2 Tablet(s) Oral at bedtime  sodium chloride 1 Gram(s) Oral every 12 hours  sodium chloride 0.9%. 1000 milliLiter(s) (100 mL/Hr) IV Continuous <Continuous>    MEDICATIONS  (PRN):  fentaNYL    Injectable 75 MICROGram(s) IV Push every 8 hours PRN Dressing changes  HYDROmorphone  Injectable 1 milliGRAM(s) IV Push every 6 hours PRN break thru  oxyCODONE    IR 5 milliGRAM(s) Oral every 4 hours PRN Moderate Pain (4 - 6)  oxyCODONE    IR 10 milliGRAM(s) Oral every 4 hours PRN Severe Pain (7 - 10)    Vital Signs Last 24 Hrs  T(C): 37.8 (01 Dec 2021 12:30), Max: 38.9 (30 Nov 2021 19:56)  T(F): 100 (01 Dec 2021 12:30), Max: 102.1 (30 Nov 2021 19:56)  HR: 86 (01 Dec 2021 12:30) (61 - 90)  BP: 125/60 (01 Dec 2021 12:30) (90/52 - 125/71)  BP(mean): --  RR: 18 (01 Dec 2021 10:20) (17 - 18)  SpO2: 94% (01 Dec 2021 10:20) (90% - 99%)    Detailed Neurologic Exam:    Mental status: The patient is awake and alert. There is no aphasia. There is no dysarthria.     Cranial nerves: Pupils equal and react symmetrically to light. There is no visual field deficit to threat. Extraocular motion is full with no nystagmus. Facial sensation is intact. Facial musculature is symmetric. Palate elevates symmetrically. Tongue is midline.    Motor: There is normal bulk and tone.  There is no tremor.  Deltoids 2/5 bilaterally but limited by pain bilaterally in the shoulders. Significant pain on passive movement of shoulder and can actually hold up arms with minimal support by me. Strength in upper extremities otherwise 5/5.  Bilateral lower extremities, moves spontaneously at least 1/5, however, full strength examination limited due to pain from recent surgery.    Sensation: Grossly intact to light touch and pin other than around surgical site.    Reflexes: 1+ in UE (LEs not assessed secondary to surgery) and plantar responses are flexor.    Cerebellar: Difficult to assess secondary to pain limited weakness of the shoulders    Labs:                           7.0    14.38 )-----------( 592      ( 01 Dec 2021 07:12 )             21.7     12-01    134<L>  |  101  |  5.3<L>  ----------------------------<  94  4.1   |  20.0<L>  |  0.66    Ca    7.6<L>      01 Dec 2021 07:12  Mg     1.5     12-01    TPro  x   /  Alb  x   /  TBili  0.2<L>  /  DBili  <0.1  /  AST  x   /  ALT  x   /  AlkPhos  x   12-01    Rads:  MRI brain w/o contrast - no acute findings  MRI C spine w/o contrast - no gross evidence of cord compression, however, severely motion limited limiting interpretation

## 2021-12-01 NOTE — CHART NOTE - NSCHARTNOTEFT_GEN_A_CORE
BENJAMIN cancelled today due to drop in Hgb from 9.1 to 7.0 after 1 unit of pRBCs. Patient notes to have s/p EGD revealing normal esophagus, dieulafoy int the body that was injected with epinephrine at the base and 2 hemostat clips placed. 5 Ulcers in  the stomach .  All were in the  antrum/prepyloric area,.  2 were large  1-2 cm deep ulcers, the other 3 were 5 mm ulcers. There was a dielafoy in the body. And since then has had drop in Hgb, requiring now 2 units of pRBCs   There is concern with placing a probe in the patient need for recurrent transfusions, and multiple above procedures performed; recommendation made to the team is to have GI re-evaluation (last eval was 11/24).      Leonor Giron D.O. [facility]  Cardiology/Vascular Cardiology -[facility]   Telephone # [phone]

## 2021-12-01 NOTE — PROGRESS NOTE ADULT - SUBJECTIVE AND OBJECTIVE BOX
SUBJECTIVE/24 hour events:  Patient is a 51yFemale complicated hospital course with bilateral le soft tissue infection, now with concerning findings on ct of b/l extremity, ortho early evening at bedside they used an 18 gauge needle to aspirate fluid . Bloody fluid was obtained from the joint. The site was bandaged and no complications were noted. Dr. Lu wants patient to be NPO aftermidnight may need additional intervention pending aspiration results. ID managing abx, cards consulted and patient going for BENJAMIN today. Patient continues to be febrile despite abx, spiked to 102 overnight, but no acute events occurred.     Vital Signs Last 24 Hrs  T(C): 38.1 (30 Nov 2021 23:36), Max: 39 (30 Nov 2021 08:55)  T(F): 100.6 (30 Nov 2021 23:36), Max: 102.2 (30 Nov 2021 08:55)  HR: 61 (30 Nov 2021 19:56) (60 - 69)  BP: 119/75 (30 Nov 2021 19:56) (112/76 - 125/71)  BP(mean): --  RR: 17 (30 Nov 2021 19:56) (17 - 18)  SpO2: 94% (30 Nov 2021 19:56) (92% - 99%)  Drug Dosing Weight  Height (cm): 167.6 (24 Nov 2021 06:36)  Weight (kg): 93.4 (24 Nov 2021 06:36)  BMI (kg/m2): 33.3 (24 Nov 2021 06:36)  BSA (m2): 2.02 (24 Nov 2021 06:36)  I&O's Detail    29 Nov 2021 07:01  -  30 Nov 2021 07:00  --------------------------------------------------------  IN:  Total IN: 0 mL    OUT:    Voided (mL): 1100 mL  Total OUT: 1100 mL    Total NET: -1100 mL      30 Nov 2021 07:01  -  01 Dec 2021 00:22  --------------------------------------------------------  IN:  Total IN: 0 mL    OUT:    VAC (Vacuum Assisted Closure) System (mL): 400 mL    Voided (mL): 300 mL  Total OUT: 700 mL    Total NET: -700 mL        Allergies    No Known Allergies    Intolerances                              9.1    14.55 )-----------( 534      ( 30 Nov 2021 07:18 )             31.6   11-30    127<L>  |  98  |  6.9<L>  ----------------------------<  77  5.9<H>   |  15.0<L>  |  0.75    Ca    7.5<L>      30 Nov 2021 07:18  Phos  3.6     11-29  Mg     1.7     11-30        ROS:    PHYSICAL EXAM:  Constitutional: resting comfortably   Respiratory: no respiratory distress, no dyspnea, no supplemental o2 needed   Cardiovascular: nsr  Gastrointestinal: abdomen soft, non-tender   Genitourinary: voiding   Extremities: dressings bilaterally c/d/i, edema present, warm to touch,   Neurological: A&OX3  Skin: warm, dry and no rashes         MEDICATIONS  (STANDING):  baclofen 10 milliGRAM(s) Oral three times a day  clindamycin IVPB 900 milliGRAM(s) IV Intermittent every 8 hours  collagenase Ointment 1 Application(s) Topical two times a day  gabapentin 200 milliGRAM(s) Oral three times a day  glucagon  Injectable 1 milliGRAM(s) IntraMuscular once  heparin   Injectable 5000 Unit(s) SubCutaneous every 8 hours  lisinopril 5 milliGRAM(s) Oral daily  methocarbamol 750 milliGRAM(s) Oral three times a day  pantoprazole    Tablet 40 milliGRAM(s) Oral two times a day  piperacillin/tazobactam IVPB.. 4.5 Gram(s) IV Intermittent every 8 hours  polyethylene glycol 3350 17 Gram(s) Oral daily  senna 2 Tablet(s) Oral at bedtime  sodium chloride 1 Gram(s) Oral every 12 hours  sodium chloride 0.9%. 1000 milliLiter(s) (100 mL/Hr) IV Continuous <Continuous>    MEDICATIONS  (PRN):  fentaNYL    Injectable 75 MICROGram(s) IV Push every 8 hours PRN Dressing changes  HYDROmorphone  Injectable 1 milliGRAM(s) IV Push every 6 hours PRN break thru  oxyCODONE    IR 5 milliGRAM(s) Oral every 4 hours PRN Moderate Pain (4 - 6)  oxyCODONE    IR 10 milliGRAM(s) Oral every 4 hours PRN Severe Pain (7 - 10)      RADIOLOGY STUDIES:    CULTURES:

## 2021-12-01 NOTE — PROGRESS NOTE ADULT - ASSESSMENT
51y Female present to ED with left leg swelling, erythema, pain, leukocytosis and acute renal failure. CT scan concerning for necrotizing soft tissue infection. 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 Downgraded to floor from SICU 11/22/21.  Patient had complained or RLE swelling. Right lower extremity duplex and negative for DVT; however + swelling in popliteal fossa to R calf.  R knee I&D performed. LLE with streptococcus pyogenes. 11/30 ortho aspirated additional fluid from left knee awaiting effusion results     - Continue IV abx: Clinda, Zosyn   - npo for BENJAMIN and possible additional ortho intervention pending results of aspiration   - Pain control   - Monitor fevers  -  new blood cultures sent on 11/28, negative to date  - Daily dressing changes, wound care   - AM labs, monitor lytes, replete PRN   - PT/OT   - Wound vac change 12/4  - WBAT LLE in CAM boot  - BENJAMIN and ? additional ortho intervention for left knee today

## 2021-12-02 ENCOUNTER — RESULT REVIEW (OUTPATIENT)
Age: 51
End: 2021-12-02

## 2021-12-02 DIAGNOSIS — R71.0 PRECIPITOUS DROP IN HEMATOCRIT: ICD-10-CM

## 2021-12-02 LAB
ANION GAP SERPL CALC-SCNC: 15 MMOL/L — SIGNIFICANT CHANGE UP (ref 5–17)
APTT BLD: 34 SEC — SIGNIFICANT CHANGE UP (ref 27.5–35.5)
BUN SERPL-MCNC: 5.8 MG/DL — LOW (ref 8–20)
CALCIUM SERPL-MCNC: 7.6 MG/DL — LOW (ref 8.6–10.2)
CHLORIDE SERPL-SCNC: 98 MMOL/L — SIGNIFICANT CHANGE UP (ref 98–107)
CO2 SERPL-SCNC: 19 MMOL/L — LOW (ref 22–29)
CREAT SERPL-MCNC: 0.62 MG/DL — SIGNIFICANT CHANGE UP (ref 0.5–1.3)
ERYTHROCYTE [SEDIMENTATION RATE] IN BLOOD: 65 MM/HR — HIGH (ref 0–20)
GLUCOSE SERPL-MCNC: 94 MG/DL — SIGNIFICANT CHANGE UP (ref 70–99)
HCG SERPL-ACNC: <4 MIU/ML — SIGNIFICANT CHANGE UP
HCT VFR BLD CALC: 22.9 % — LOW (ref 34.5–45)
HCT VFR BLD CALC: 23.4 % — LOW (ref 34.5–45)
HGB BLD-MCNC: 7.2 G/DL — LOW (ref 11.5–15.5)
HGB BLD-MCNC: 7.3 G/DL — LOW (ref 11.5–15.5)
INR BLD: 1.56 RATIO — HIGH (ref 0.88–1.16)
LDH SERPL L TO P-CCNC: 311 U/L — HIGH (ref 98–192)
MAGNESIUM SERPL-MCNC: 1.8 MG/DL — SIGNIFICANT CHANGE UP (ref 1.6–2.6)
MCHC RBC-ENTMCNC: 26.9 PG — LOW (ref 27–34)
MCHC RBC-ENTMCNC: 27.4 PG — SIGNIFICANT CHANGE UP (ref 27–34)
MCHC RBC-ENTMCNC: 31.2 GM/DL — LOW (ref 32–36)
MCHC RBC-ENTMCNC: 31.4 GM/DL — LOW (ref 32–36)
MCV RBC AUTO: 86.3 FL — SIGNIFICANT CHANGE UP (ref 80–100)
MCV RBC AUTO: 87.1 FL — SIGNIFICANT CHANGE UP (ref 80–100)
PHOSPHATE SERPL-MCNC: 3.2 MG/DL — SIGNIFICANT CHANGE UP (ref 2.4–4.7)
PLATELET # BLD AUTO: 557 K/UL — HIGH (ref 150–400)
PLATELET # BLD AUTO: 567 K/UL — HIGH (ref 150–400)
POTASSIUM SERPL-MCNC: 3.9 MMOL/L — SIGNIFICANT CHANGE UP (ref 3.5–5.3)
POTASSIUM SERPL-SCNC: 3.9 MMOL/L — SIGNIFICANT CHANGE UP (ref 3.5–5.3)
PROTHROM AB SERPL-ACNC: 17.7 SEC — HIGH (ref 10.6–13.6)
RBC # BLD: 2.63 M/UL — LOW (ref 3.8–5.2)
RBC # BLD: 2.71 M/UL — LOW (ref 3.8–5.2)
RBC # FLD: 20.1 % — HIGH (ref 10.3–14.5)
RBC # FLD: 21.1 % — HIGH (ref 10.3–14.5)
SODIUM SERPL-SCNC: 132 MMOL/L — LOW (ref 135–145)
WBC # BLD: 14.93 K/UL — HIGH (ref 3.8–10.5)
WBC # BLD: 15.92 K/UL — HIGH (ref 3.8–10.5)
WBC # FLD AUTO: 14.93 K/UL — HIGH (ref 3.8–10.5)
WBC # FLD AUTO: 15.92 K/UL — HIGH (ref 3.8–10.5)

## 2021-12-02 PROCEDURE — 88304 TISSUE EXAM BY PATHOLOGIST: CPT | Mod: 26

## 2021-12-02 PROCEDURE — 99233 SBSQ HOSP IP/OBS HIGH 50: CPT

## 2021-12-02 RX ORDER — SENNA PLUS 8.6 MG/1
2 TABLET ORAL AT BEDTIME
Refills: 0 | Status: DISCONTINUED | OUTPATIENT
Start: 2021-12-02 | End: 2021-12-07

## 2021-12-02 RX ORDER — SODIUM CHLORIDE 9 MG/ML
1 INJECTION INTRAMUSCULAR; INTRAVENOUS; SUBCUTANEOUS EVERY 12 HOURS
Refills: 0 | Status: DISCONTINUED | OUTPATIENT
Start: 2021-12-02 | End: 2021-12-07

## 2021-12-02 RX ORDER — BACLOFEN 100 %
10 POWDER (GRAM) MISCELLANEOUS THREE TIMES A DAY
Refills: 0 | Status: DISCONTINUED | OUTPATIENT
Start: 2021-12-02 | End: 2021-12-07

## 2021-12-02 RX ORDER — HYDROMORPHONE HYDROCHLORIDE 2 MG/ML
0.5 INJECTION INTRAMUSCULAR; INTRAVENOUS; SUBCUTANEOUS
Refills: 0 | Status: DISCONTINUED | OUTPATIENT
Start: 2021-12-02 | End: 2021-12-02

## 2021-12-02 RX ORDER — ONDANSETRON 8 MG/1
4 TABLET, FILM COATED ORAL ONCE
Refills: 0 | Status: DISCONTINUED | OUTPATIENT
Start: 2021-12-02 | End: 2021-12-02

## 2021-12-02 RX ORDER — PIPERACILLIN AND TAZOBACTAM 4; .5 G/20ML; G/20ML
4.5 INJECTION, POWDER, LYOPHILIZED, FOR SOLUTION INTRAVENOUS EVERY 8 HOURS
Refills: 0 | Status: DISCONTINUED | OUTPATIENT
Start: 2021-12-02 | End: 2021-12-07

## 2021-12-02 RX ORDER — OXYCODONE HYDROCHLORIDE 5 MG/1
10 TABLET ORAL EVERY 4 HOURS
Refills: 0 | Status: DISCONTINUED | OUTPATIENT
Start: 2021-12-02 | End: 2021-12-02

## 2021-12-02 RX ORDER — HEPARIN SODIUM 5000 [USP'U]/ML
5000 INJECTION INTRAVENOUS; SUBCUTANEOUS EVERY 8 HOURS
Refills: 0 | Status: DISCONTINUED | OUTPATIENT
Start: 2021-12-02 | End: 2021-12-03

## 2021-12-02 RX ORDER — METHOCARBAMOL 500 MG/1
750 TABLET, FILM COATED ORAL THREE TIMES A DAY
Refills: 0 | Status: DISCONTINUED | OUTPATIENT
Start: 2021-12-02 | End: 2021-12-06

## 2021-12-02 RX ORDER — POLYETHYLENE GLYCOL 3350 17 G/17G
17 POWDER, FOR SOLUTION ORAL DAILY
Refills: 0 | Status: DISCONTINUED | OUTPATIENT
Start: 2021-12-02 | End: 2021-12-07

## 2021-12-02 RX ORDER — ONDANSETRON 8 MG/1
4 TABLET, FILM COATED ORAL ONCE
Refills: 0 | Status: DISCONTINUED | OUTPATIENT
Start: 2021-12-02 | End: 2021-12-07

## 2021-12-02 RX ORDER — PANTOPRAZOLE SODIUM 20 MG/1
40 TABLET, DELAYED RELEASE ORAL
Refills: 0 | Status: DISCONTINUED | OUTPATIENT
Start: 2021-12-02 | End: 2021-12-07

## 2021-12-02 RX ORDER — HYDROMORPHONE HYDROCHLORIDE 2 MG/ML
1 INJECTION INTRAMUSCULAR; INTRAVENOUS; SUBCUTANEOUS EVERY 6 HOURS
Refills: 0 | Status: DISCONTINUED | OUTPATIENT
Start: 2021-12-02 | End: 2021-12-06

## 2021-12-02 RX ORDER — HYDROMORPHONE HYDROCHLORIDE 2 MG/ML
0.5 INJECTION INTRAMUSCULAR; INTRAVENOUS; SUBCUTANEOUS ONCE
Refills: 0 | Status: DISCONTINUED | OUTPATIENT
Start: 2021-12-02 | End: 2021-12-02

## 2021-12-02 RX ORDER — OXYCODONE HYDROCHLORIDE 5 MG/1
10 TABLET ORAL EVERY 4 HOURS
Refills: 0 | Status: DISCONTINUED | OUTPATIENT
Start: 2021-12-02 | End: 2021-12-06

## 2021-12-02 RX ORDER — LISINOPRIL 2.5 MG/1
5 TABLET ORAL DAILY
Refills: 0 | Status: DISCONTINUED | OUTPATIENT
Start: 2021-12-02 | End: 2021-12-07

## 2021-12-02 RX ADMIN — Medication 100 MILLIGRAM(S): at 14:00

## 2021-12-02 RX ADMIN — Medication 10 MILLIGRAM(S): at 16:01

## 2021-12-02 RX ADMIN — PIPERACILLIN AND TAZOBACTAM 25 GRAM(S): 4; .5 INJECTION, POWDER, LYOPHILIZED, FOR SOLUTION INTRAVENOUS at 14:03

## 2021-12-02 RX ADMIN — LISINOPRIL 5 MILLIGRAM(S): 2.5 TABLET ORAL at 23:45

## 2021-12-02 RX ADMIN — METHOCARBAMOL 750 MILLIGRAM(S): 500 TABLET, FILM COATED ORAL at 23:45

## 2021-12-02 RX ADMIN — OXYCODONE HYDROCHLORIDE 10 MILLIGRAM(S): 5 TABLET ORAL at 12:04

## 2021-12-02 RX ADMIN — LISINOPRIL 5 MILLIGRAM(S): 2.5 TABLET ORAL at 05:13

## 2021-12-02 RX ADMIN — Medication 100 MILLIGRAM(S): at 05:12

## 2021-12-02 RX ADMIN — HEPARIN SODIUM 5000 UNIT(S): 5000 INJECTION INTRAVENOUS; SUBCUTANEOUS at 05:13

## 2021-12-02 RX ADMIN — PIPERACILLIN AND TAZOBACTAM 25 GRAM(S): 4; .5 INJECTION, POWDER, LYOPHILIZED, FOR SOLUTION INTRAVENOUS at 05:11

## 2021-12-02 RX ADMIN — OXYCODONE HYDROCHLORIDE 10 MILLIGRAM(S): 5 TABLET ORAL at 06:15

## 2021-12-02 RX ADMIN — SODIUM CHLORIDE 1 GRAM(S): 9 INJECTION INTRAMUSCULAR; INTRAVENOUS; SUBCUTANEOUS at 05:13

## 2021-12-02 RX ADMIN — OXYCODONE HYDROCHLORIDE 10 MILLIGRAM(S): 5 TABLET ORAL at 11:34

## 2021-12-02 RX ADMIN — GABAPENTIN 200 MILLIGRAM(S): 400 CAPSULE ORAL at 16:03

## 2021-12-02 RX ADMIN — HYDROMORPHONE HYDROCHLORIDE 0.5 MILLIGRAM(S): 2 INJECTION INTRAMUSCULAR; INTRAVENOUS; SUBCUTANEOUS at 23:38

## 2021-12-02 RX ADMIN — GABAPENTIN 200 MILLIGRAM(S): 400 CAPSULE ORAL at 05:18

## 2021-12-02 RX ADMIN — HEPARIN SODIUM 5000 UNIT(S): 5000 INJECTION INTRAVENOUS; SUBCUTANEOUS at 14:00

## 2021-12-02 RX ADMIN — HYDROMORPHONE HYDROCHLORIDE 0.5 MILLIGRAM(S): 2 INJECTION INTRAMUSCULAR; INTRAVENOUS; SUBCUTANEOUS at 21:28

## 2021-12-02 RX ADMIN — HYDROMORPHONE HYDROCHLORIDE 1 MILLIGRAM(S): 2 INJECTION INTRAMUSCULAR; INTRAVENOUS; SUBCUTANEOUS at 08:58

## 2021-12-02 RX ADMIN — PANTOPRAZOLE SODIUM 40 MILLIGRAM(S): 20 TABLET, DELAYED RELEASE ORAL at 05:13

## 2021-12-02 RX ADMIN — OXYCODONE HYDROCHLORIDE 10 MILLIGRAM(S): 5 TABLET ORAL at 06:28

## 2021-12-02 RX ADMIN — HYDROMORPHONE HYDROCHLORIDE 1 MILLIGRAM(S): 2 INJECTION INTRAMUSCULAR; INTRAVENOUS; SUBCUTANEOUS at 09:28

## 2021-12-02 RX ADMIN — METHOCARBAMOL 750 MILLIGRAM(S): 500 TABLET, FILM COATED ORAL at 05:13

## 2021-12-02 RX ADMIN — METHOCARBAMOL 750 MILLIGRAM(S): 500 TABLET, FILM COATED ORAL at 16:03

## 2021-12-02 RX ADMIN — Medication 10 MILLIGRAM(S): at 05:13

## 2021-12-02 NOTE — PROGRESS NOTE ADULT - ASSESSMENT
51y Female present to ED with left leg swelling, erythema, pain, leukocytosis and acute renal failure. CT scan concerning for necrotizing soft tissue infection. 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 Downgraded to floor from SICU 11/22/21.  Patient had complained or RLE swelling. Right lower extremity duplex and negative for DVT; however + swelling in popliteal fossa to R calf.  R knee I&D performed. LLE with streptococcus pyogenes. 11/30 ortho aspirated additional fluid from left knee awaiting effusion results   Plan:  - Continue IV abx: Clinda, Zosyn   -  BENJAMIN on hold cards would like GI to reconsult 2/2 continued drop in h/h  - Pain control   - Monitor fevers  -  new blood cultures sent on 11/28, negative to date  - Daily dressing changes, wound care   - AM labs, monitor lytes, replete PRN   - PT/OT   - Wound vac change 12/4  - WBAT LLE in CAM boot  - continue ROM of ue exercises

## 2021-12-02 NOTE — PROGRESS NOTE ADULT - ASSESSMENT
51y Female present to ED with left leg swelling, erythema, pain. Patient endorses she had left knee pain for 1 week presented 3 days ago to ED  where she states was given an steroid shot, and ibuprofen. however pain and edema worsened. Patient states she wasnt feeling herself today reason why she came. Endorses chills initially with pain 3 days ago but none since, denies history of trauma, insect bites, recent interventions, or injections to the area, contact with ticks, history of diabetes.  PT AS ABOVE WITH LEFT LEG SWELLING PT WITH INCREASED WBC PLACED ON ABX FOR PRESUMED INFECTION  PT WITH CT SCAN WITH FASCIAL EDAM PT BROUGHT TO THE OR EMERGENTLY AND  UNDERWENT FASCIOTOMY   PT BROUGHT TO THE OR  BOTH TRAUMA AND ORTHO INVOLVED  PURULENT FLUID FOUND  NECROTIZING SOFT TISSUE INFECTION    BLOOD CX WITH GROUP A STREP AND  OPERATIVE FLUID WITH STREP   PT ON PCN AND CLINDA for POSSIBLE ANTITOXIN EFFECT IN GROUP A STREP INFECTION    PT WITH RIGHT KNEE PAIN WENT TO OR 11/24  AND WASHED OUT AND CULTURES TAKEN so far negative    now febrile with uptrending WBC    CXR and UA (-)  c/o b/l shoulder pain, and knee pain, MR as above  f/u repeat BCX  - new CT scan of lower ext on 11/29/21 ? showed early abscess in left anterior calf. also edema between left media gastroc and soleus  SURGERY FOLLOWING  s/p ASPIRATION LEFT KNEE BY ORTHO  cx neg so FAR   on  ZOSYN to 4.5 grams Q8H  -  CLINDAMYCIN   PT WITH CONTINUED FEVERS DESPITE APPROPRIATE ABX  THERAPY   AND REPEAT BLOOD CX HAVE BEEN NEG  CONCERN ABOUT ONGOING NIDUS OF INFECTION   SPOKE TO TRAUMA SURGERY   PLAN IS TO EXPLORE /WASHOUT AREA OF LEFT LEG  WILL FOLLOW UP CONTINUE IV ABX

## 2021-12-02 NOTE — PROGRESS NOTE ADULT - PROBLEM SELECTOR PLAN 1
51y Female With complicated hospital stay admitted for necrotizing fascitis. Pt s/p LLE exploration and debridement. Patient was last seen by GI 11/24, for GI bleed, s/p EGD 11/20 that revealed normal esophagus, dieulafoy int the body that was injected with epinephrine at the base and 2 hemostat clips placed. 5 Ulcers in  the stomach. All were in the antrum/prepyloric area. 2 were large  1-2 cm deep ulcers, the other 3 were 5 mm ulcers. GI reconsulted for drop in hemoglobin. Patient hemoglobin went from 6.5 to 9.1 after 1 Unit PRBC then had drop to 7.0 following day, patient received a second unit with post hemoglobin of 7.3. Reports heartburn and mid epigastric discomfort for the last few days. She states to be tolerating diet. Reports BM this morning, unaware if there was any blood present.   -Continue to trend CBC and transfuse for a goal of hgb 8 or higher.   -Continue PPI BID for mucosal cytoprotection.  -Continue IV Abx  -Will order CBC, CMP, INR for AM.  -More recommendations to follow. 51y Female With complicated hospital stay admitted for necrotizing fascitis. Pt s/p LLE exploration and debridement. Patient was last seen by GI 11/24, for GI bleed, s/p EGD 11/20 that revealed normal esophagus, dieulafoy int the body that was injected with epinephrine at the base and 2 hemostat clips placed. 5 Ulcers in  the stomach. All were in the antrum/prepyloric area. 2 were large  1-2 cm deep ulcers, the other 3 were 5 mm ulcers. GI reconsulted for drop in hemoglobin. Patient hemoglobin went from 6.5 to 9.1 after 1 Unit PRBC then had drop to 7.0 following day, patient received a second unit with post hemoglobin of 7.3. Reports heartburn and mid epigastric discomfort for the last few days. She states to be tolerating diet. Reports BM this morning, unaware if there was any blood present. LOREN revealed light brown non-bloody stool.  -Continue to trend CBC and transfuse for a goal of hgb 8 or higher.   -Continue PPI BID for mucosal cytoprotection.  -Continue IV Abx  -More recommendations to follow.  -Keep patient NPO.   -Will order CBC, CMP, INR for AM.  -Will go for EGD tomorrow.

## 2021-12-02 NOTE — PROGRESS NOTE ADULT - ATTENDING COMMENTS
Patient with knee pain - fasciatitis .  had debridment and later wash out. Took NSAIDS prior to admission. After the debridement, pt noted to have melena and anemia  in SICU. EGD done noted to have at least 2 large, deep periampullary ulcers, and three 5 mm ulcers.  Patient noted to also have a dieulofoy lesion in body that was clipped.  Hgb was stable until 2 days ago.  Hgb dropped to 6.5 on 11/29.  Received 1 Unit PRBC blood and went to 9.1 ( likely  lab error). Next hemoglobin was 7.1. Additional unit brought hemoglobin to 7.3. No notes of melena. Exam today was light brown stool . + Epigastric pain. No heartburn.  Now with fevers. Planning for additional wash out of the knee. Cardiology planning for BENJAMIN but requesting GI evaluation.     A/P  PPI BID  NPO after midnight   EGD in am to revaluate for ulcer healing.  ( takes about 6 weeks for  ulcer to heal , longer if ulcer is large and deep )  hold lovenox

## 2021-12-02 NOTE — PROGRESS NOTE ADULT - SUBJECTIVE AND OBJECTIVE BOX
SUBJECTIVE/24 hour events:  Patient is a 51yFemale complicated hospital course with bilateral le soft tissue infection, now with concerning findings on ct of b/l extremity, Dr. Lu did an additional left knee aspiration at bedside yesterday morning, further results pending. ID managing abx no new recommendations at this time, cards consulted and holding off on BENJAMIN 2/2 drop in h/h from 9 to 7 requiring 1 unit prbce would prefer if GI reconsults prior to proceeding with BENJAMIN will call this time. Patient continues to be febrile despite abx, spiked to 102.1 overnight, but no acute events occurred. Neurology following for upper extremity weakness they are attributing to swelling for now, recommend range of motion exercises and elevation, possible repeat MRI of C-spine without contrast         Vital Signs Last 24 Hrs  T(C): 36.7 (01 Dec 2021 21:20), Max: 38.9 (01 Dec 2021 20:40)  T(F): 98 (01 Dec 2021 21:20), Max: 102.1 (01 Dec 2021 20:40)  HR: 91 (01 Dec 2021 21:20) (84 - 96)  BP: 115/65 (01 Dec 2021 21:20) (90/52 - 125/60)  BP(mean): --  RR: 18 (01 Dec 2021 21:20) (17 - 18)  SpO2: 100% (01 Dec 2021 21:20) (90% - 100%)  Drug Dosing Weight  Height (cm): 167.6 (24 Nov 2021 06:36)  Weight (kg): 93.4 (24 Nov 2021 06:36)  BMI (kg/m2): 33.3 (24 Nov 2021 06:36)  BSA (m2): 2.02 (24 Nov 2021 06:36)  I&O's Detail    30 Nov 2021 07:01  -  01 Dec 2021 07:00  --------------------------------------------------------  IN:  Total IN: 0 mL    OUT:    VAC (Vacuum Assisted Closure) System (mL): 400 mL    Voided (mL): 1000 mL  Total OUT: 1400 mL    Total NET: -1400 mL      01 Dec 2021 07:01  -  02 Dec 2021 00:22  --------------------------------------------------------  IN:  Total IN: 0 mL    OUT:    Voided (mL): 200 mL  Total OUT: 200 mL    Total NET: -200 mL        Allergies    No Known Allergies    Intolerances                              7.0    14.38 )-----------( 592      ( 01 Dec 2021 07:12 )             21.7   12-01    134<L>  |  101  |  5.3<L>  ----------------------------<  94  4.1   |  20.0<L>  |  0.66    Ca    7.6<L>      01 Dec 2021 07:12  Mg     2.1     12-01    TPro  x   /  Alb  x   /  TBili  0.2<L>  /  DBili  <0.1  /  AST  x   /  ALT  x   /  AlkPhos  x   12-01      ROS:    PHYSICAL EXAM:  Constitutional: resting comfortably   Respiratory: no respiratory distress, no dyspnea, no supplemental o2 needed   Cardiovascular: nsr  Gastrointestinal: abdomen soft, non-tender   Genitourinary: voiding   Extremities: dressings bilaterally c/d/i, edema present, warm to touch,   Neurological: A&OX3      MEDICATIONS  (STANDING):  baclofen 10 milliGRAM(s) Oral three times a day  clindamycin IVPB 900 milliGRAM(s) IV Intermittent every 8 hours  collagenase Ointment 1 Application(s) Topical two times a day  gabapentin 200 milliGRAM(s) Oral three times a day  glucagon  Injectable 1 milliGRAM(s) IntraMuscular once  heparin   Injectable 5000 Unit(s) SubCutaneous every 8 hours  lisinopril 5 milliGRAM(s) Oral daily  methocarbamol 750 milliGRAM(s) Oral three times a day  pantoprazole    Tablet 40 milliGRAM(s) Oral two times a day  piperacillin/tazobactam IVPB.. 4.5 Gram(s) IV Intermittent every 8 hours  polyethylene glycol 3350 17 Gram(s) Oral daily  senna 2 Tablet(s) Oral at bedtime  sodium chloride 1 Gram(s) Oral every 12 hours  sodium chloride 0.9%. 1000 milliLiter(s) (100 mL/Hr) IV Continuous <Continuous>    MEDICATIONS  (PRN):  fentaNYL    Injectable 75 MICROGram(s) IV Push every 8 hours PRN Dressing changes  HYDROmorphone  Injectable 1 milliGRAM(s) IV Push every 6 hours PRN break thru  oxyCODONE    IR 5 milliGRAM(s) Oral every 4 hours PRN Moderate Pain (4 - 6)  oxyCODONE    IR 10 milliGRAM(s) Oral every 4 hours PRN Severe Pain (7 - 10)      RADIOLOGY STUDIES:    CULTURES:

## 2021-12-02 NOTE — PROGRESS NOTE ADULT - SUBJECTIVE AND OBJECTIVE BOX
INFECTIOUS DISEASES AND INTERNAL MEDICINE at [facility]  =======================================================  Theo Morrow MD  Diplomates [employer] Board of Internal Medicine and Infectious Diseases  Telephone [phone]  Fax            [phone]  =======================================================    LUIS FERNANDO DAVIS [id number]  Follow up: group A STREP NECROTIZING SOFT TISSUE INFECTION     repeat CT scan of lower ext with collections.       Allergies:  No Known Allergies       FAMILY   FAMILY HISTORY:  FH: HTN (hypertension) (Mother)      REVIEW OF SYSTEMS:  CONSTITUTIONAL:  No Fever or chills  HEENT:   No diplopia or blurred vision.  No earache, sore throat or runny nose.  CARDIOVASCULAR:  No pressure, squeezing, strangling, tightness, heaviness or aching about the chest, neck, axilla or epigastrium.  RESPIRATORY:  No cough, shortness of breath, PND or orthopnea.  GASTROINTESTINAL:  No nausea, vomiting or diarrhea.  GENITOURINARY:  No dysuria, frequency or urgency. No Blood in urine  MUSCULOSKELETAL:   AS PER HPI   SKIN:  No change in skin, hair or nails.  NEUROLOGIC:  No paresthesias, fasciculations, seizures or weakness.  PSYCHIATRIC:  No disorder of thought or mood.  ENDOCRINE:  No heat or cold intolerance, polyuria or polydipsia.  HEMATOLOGICAL:  No easy bruising or bleeding.            Physical Exam:     GEN: NAD,    HEENT: normocephalic and atraumatic. EOMI. ASHISH.    NECK: Supple. No carotid bruits.  No lymphadenopathy or thyromegaly.  LUNGS: Clear to auscultation.  HEART: Regular rate and rhythm without murmur.  ABDOMEN: Soft, nontender, and nondistended.  Positive bowel sounds.    : No CVA tenderness  EXTREMITIES: LEFT LEG WRAPPED; left thigh vac in place  right knee with dressing in place   UPPER EXT STRENGTH GOOD BUT CAN T RAISE HANDS ALL THE WAY, localized tenderness to both shoulders   NEUROLOGIC:  C AWAKE ALERT Appropriate affect .  SKIN: No ulceration or induration present.           Vitals:  ===== Vital Signs Last 24 Hrs  T(C Vital Signs Last 24 Hrs  T(C): 38.3 (12-02-21 @ 07:42), Max: 38.9 (12-01-21 @ 20:40)  T(F): 100.9 (12-02-21 @ 07:42), Max: 102.1 (12-01-21 @ 20:40)  HR: 85 (12-02-21 @ 07:42) (84 - 96)  BP: 106/60 (12-02-21 @ 07:42) (90/52 - 125/60)  BP(mean): --  RR: 18 (12-02-21 @ 07:42) (17 - 18)  SpO2: 92% (12-02-21 @ 07:42) (90% - 100%)      =======================================================  Current Antibiotics:  clindamycin IVPB 900 milliGRAM(s) IV Intermittent every 8 hours  piperacillin/tazobactam IVPB.. 4.5 Gram(s) IV Intermittent every 8 hours    Other medications:  baclofen 10 milliGRAM(s) Oral three times a day  collagenase Ointment 1 Application(s) Topical two times a day  gabapentin 200 milliGRAM(s) Oral three times a day  glucagon  Injectable 1 milliGRAM(s) IntraMuscular once  heparin   Injectable 5000 Unit(s) SubCutaneous every 8 hours  lisinopril 5 milliGRAM(s) Oral daily  methocarbamol 750 milliGRAM(s) Oral three times a day  pantoprazole    Tablet 40 milliGRAM(s) Oral two times a day  polyethylene glycol 3350 17 Gram(s) Oral daily  senna 2 Tablet(s) Oral at bedtime  sodium chloride 1 Gram(s) Oral every 12 hours  sodium chloride 0.9%. 1000 milliLiter(s) IV Continuous <Continuous>      =======================================================  Labs:                                             7.0    14.38 )-----------( 592      ( 01 Dec 2021 07:12 )             21.7   12-01    134<L>  |  101  |  5.3<L>  ----------------------------<  94  4.1   |  20.0<L>  |  0.66    Ca    7.6<L>      01 Dec 2021 07:12  Phos  3.6     11-29  Mg     1.5     12-01            Culture - Blood (collected 11-28-21 @ 09:14)  Source: .Blood Blood-Peripheral    Culture - Blood (collected 11-28-21 @ 09:14)  Source: .Blood Blood-Peripheral    Culture - Fungal, Body Fluid (collected 11-24-21 @ 13:35)  Source: .Body Fluid Right Knee Fluid    Culture - Acid Fast - Body Fluid w/Smear (collected 11-24-21 @ 13:35)  Source: .Body Fluid Right Knee Fluid    Culture - Body Fluid with Gram Stain (collected 11-24-21 @ 13:35)  Source: .Body Fluid Right Knee Fluid  Gram Stain (11-25-21 @ 01:27):    polymorphonuclear leukocytes seen per low power field    No organisms seen per oil power field    Culture - Surgical Swab (collected 11-24-21 @ 12:30)  Source: .Surgical Swab Swab Right Knee #2  Final Report (11-29-21 @ 07:55):    No growth at 5 days    Culture - Surgical Swab (collected 11-24-21 @ 12:30)  Source: .Surgical Swab Swab Right Knee #1  Final Report (11-29-21 @ 07:56):    No growth at 5 days    Culture - Surgical Swab (collected 11-24-21 @ 12:28)  Source: .Surgical Swab Swab Right Knee #3  Final Report (11-29-21 @ 08:01):    No growth at 5 days    Culture - Body Fluid with Gram Stain (collected 11-23-21 @ 12:50)  Source: .Body Fluid Knee Fluid  Gram Stain (11-23-21 @ 23:34):    polymorphonuclear leukocytes seen    No organisms seen    by cytocentrifuge    Culture - Blood (collected 11-22-21 @ 08:44)  Source: .Blood Blood  Final Report (11-27-21 @ 09:00):    No growth at 5 days.    Culture - Blood (collected 11-20-21 @ 12:57)  Source: .Blood Blood  Final Report (11-25-21 @ 13:00):    No growth at 5 days.    Culture - Body Fluid with Gram Stain (collected 11-18-21 @ 16:20)  Source: .Body Fluid OR Posterior Calf Left Lower Extremity Fluid  Gram Stain (11-18-21 @ 21:39):    polymorphonuclear leukocytes seen    Gram Positive Cocci in Pairs and Chains seen    by cytocentrifuge  Final Report (11-23-21 @ 09:40):    Moderate Streptococcus pyogenes (Group A) Penicillin and ampicillin are    drugs of choice for    treatment of beta-hemolytic streptococcal infections.    Susceptibility testing is not performed routinely because    S. pyogenes (GAS) is universally susceptible to penicillin    and resistance in other strains is extremely rare.    Culture - Body Fluid with Gram Stain (collected 11-18-21 @ 16:20)  Source: .Body Fluid OR Swab Left Knee Lower Extremity Fluid  Gram Stain (11-18-21 @ 19:36):    polymorphonuclear leukocytes seen    Gram Positive Cocci in Pairs and Chains seen    by cytocentrifuge  Final Report (11-23-21 @ 09:41):    Numerous Streptococcus pyogenes (Group A) Penicillin and ampicillin are    drugs of choice for    treatment of beta-hemolytic streptococcal infections.    Susceptibility testing is not performed routinely because    S. pyogenes (GAS) is universally susceptible to penicillin    and resistance in other strains is extremely rare.    Culture - Body Fluid with Gram Stain (collected 11-18-21 @ 16:20)  Source: .Body Fluid OR - Left Lower Extremity Fluid  Gram Stain (11-18-21 @ 20:10):    No polymorphonuclear leukocytes seen    No organisms seen    by cytocentrifuge  Final Report (11-23-21 @ 09:40):    Rare Streptococcus pyogenes (Group A)    Penicillin and ampicillin are drugs of choice for treatment of    beta-hemolytic streptococcal infections.    Susceptibility testing is not performed routinely because S. pyogenes    (GAS) is universally susceptibleto penicillin    and resistance in other strains is extremely rare.    Culture - Urine (collected 11-18-21 @ 10:07)  Source: Catheterized Catheterized  Final Report (11-19-21 @ 07:03):    No growth    Culture - Urine (collected 11-18-21 @ 00:25)  Source: Catheterized Catheterized  Final Report (11-18-21 @ 22:09):    <10,000 CFU/mL Normal Urogenital Aimee    Culture - Blood (collected 11-17-21 @ 14:45)  Source: .Blood Blood  Gram Stain (11-18-21 @ 09:44):    Growth in aerobic and anaerobic bottles: Gram Positive Cocci in Pairs and    Chains    Gram Stain performed by:    [facility]    [address] [place] [id number]    .    TYPE: (C=Critical, N=Notification, A=Abnormal) C    TESTS:  _ GS    DATE/TIME CALLED: _ 11/18/2021 09:42:19    CALLED TO: Chris Hernandez RN    READ BACK (2 Patient Identifiers)(Y/N): _ Y    READ BACK VALUES (Y/N): _ Y    CALLED BY: Chris Quiñones  Final Report (11-21-21 @ 12:28):    Growth in aerobic and anaerobic bottles: Streptococcus pyogenes (Group A)    See previous culture [id number]    Culture - Blood (collected 11-17-21 @ 14:45)  Source: .Blood Blood  Gram Stain (11-18-21 @ 09:40):    Growth in aerobic and anaerobic bottles: Gram Positive Cocci in Pairs and    Chains    ***Blood Panel PCR results on this specimen are available    approximately 3 hours after the Gram stain result.***    Gram stain, PCR, and/or culture results may not always    correspond due to difference in methodologies.    ************************************************************    This PCR assay was performed using [employer].    The following targets are tested for: Enterococcus,    vancomycin resistant enterococci, Listeria monocytogenes,    coagulase negative staphylococci, S. aureus,    methicillin resistant S. aureus, Streptococcus agalactiae    (Group B), S. pneumoniae, S. pyogenes (Group A),    Acinetobacter baumannii, Enterobacter cloacae, E. coli,    Klebsiella oxytoca, K. pneumoniae, Proteus sp.,    Serratia marcescens, Haemophilus influenzae,    Neisseria meningitidis, Pseudomonas aeruginosa, Candida    albicans, C. glabrata, C krusei, C parapsilosis,    C. tropicalis and the KPC resistance gene.    Gram Stain and BCID performed by:    [facility]    [address] [place] [id number]    .    TYPE: (C=Critical, N=Notification, A=Abnormal) C    TESTS:  _ GS    DATE/TIME CALLED: _ 11/18/2021 09:40:08    CALLED TO: Chris Hernandez RN    READ BACK (2 Patient Identifiers)(Y/N): _ Y    READ BACK VALUES (Y/N): _ Y    CALLED BY: Chris Quiñones  Final Report (11-21-21 @ 12:28):    Growth in aerobic and anaerobic bottles: Streptococcus pyogenes (Group A)  Organism: Blood Culture PCR  Streptococcus pyogenes (Group A) (11-21-21 @ 12:28)  Organism: Streptococcus pyogenes (Group A) (11-21-21 @ 12:28)    Sensitivities:      -  Ceftriaxone: S 0.016      -  Penicillin: S 0.016 Predicts results for ampicillin, amoxicillin, amoxicillin/clavulanate, ampicillin/sulbactam, 1st, 2nd and 3rd generation cephalosporins and carbapenems.      Method Type: ETEST  Organism: Streptococcus pyogenes (Group A) (11-21-21 @ 12:28)    Sensitivities:      -  Vancomycin: S      Method Type: KB  Organism: Blood Culture PCR (11-21-21 @ 12:28)    Sensitivities:      -  Streptococcus pyogenes (Group A): Detec      Method Type: PCR      Creatinine, Serum: 0.75 mg/dL (11-30-21 @ 07:18)  Creatinine, Serum: 0.68 mg/dL (11-29-21 @ 09:55)  Creatinine, Serum: 0.64 mg/dL (11-28-21 @ 09:14)  Creatinine, Serum: 0.65 mg/dL (11-27-21 @ 11:51)  Creatinine, Serum: 0.57 mg/dL (11-27-21 @ 05:35)  Creatinine, Serum: 0.61 mg/dL (11-26-21 @ 09:01)        Ferritin, Serum: 388 ng/mL (11-17-21 @ 13:21)    C-Reactive Protein, Serum: 197 mg/L (11-26-21 @ 21:52)  C-Reactive Protein, Serum: 211 mg/L (11-26-21 @ 09:01)  C-Reactive Protein, Serum: 53 mg/L (11-23-21 @ 07:42)  C-Reactive Protein, Serum: >422 mg/L (11-17-21 @ 13:21)    Sedimentation Rate, Erythrocyte: 65 mm/hr (11-26-21 @ 21:52)  Sedimentation Rate, Erythrocyte: 63 mm/hr (11-26-21 @ 09:01)  Sedimentation Rate, Erythrocyte: 55 mm/hr (11-17-21 @ 13:21)    WBC Count: 14.55 K/uL (11-30-21 @ 07:18)  WBC Count: 16.71 K/uL (11-29-21 @ 09:54)  WBC Count: 17.02 K/uL (11-28-21 @ 09:14)  WBC Count: 15.99 K/uL (11-27-21 @ 05:33)  WBC Count: 18.73 K/uL (11-26-21 @ 09:01)      COVID-19 PCR: NotDetec (11-30-21 @ 01:59)  COVID-19 PCR: NotDetec (11-23-21 @ 18:50)  COVID-19 PCR: NotDetec (11-17-21 @ 13:18)    Lactate Dehydrogenase, Serum: 769 U/L (11-20-21 @ 03:56)    Alkaline Phosphatase, Serum: 88 U/L (11-27-21 @ 05:35)  Alanine Aminotransferase (ALT/SGPT): 11 U/L (11-27-21 @ 05:35)  Aspartate Aminotransferase (AST/SGOT): 25 U/L (11-27-21 @ 05:35)  Bilirubin Total, Serum: 0.3 mg/dL (11-27-21 @ 05:35)        < from: CT Lower Extremity w/ IV Cont, Bilateral (11.30.21 @ 09:55) >     EXAM:  CT LWR EXT IC BI                          PROCEDURE DATE:  11/30/2021          INTERPRETATION:  CT LOWER EXTREMITY WITH IV CONTRAST BILATERAL dated 11/30/2021 9:55 AM    INDICATION: Fever. Status post incision and drainage bilaterally. Persistent fevers to 104.    COMPARISON: CT of the right knee dated 11/23/2021. CT of the left lower extremity dated 11/17/2021    TECHNIQUE: CT imaging of the bilateral lower extremities was performed with contrast. The data was reformatted in the axial,coronal, and sagittal planes.  Contrast: Omnipaque 300. Administered: 96 cc. Discarded: 4 cc.    FINDINGS:    OSSEOUS STRUCTURES: Mild degenerative changes at the lower lumbar spine including sclerosis and narrowing at the facet joints. Moderate sclerosis at the right lower sacroiliac joint. Moderate narrowing the bilateral hip joint spaces which is worse posteriorly. There is marginal productive changes. Moderate narrowing at the pubic symphysis. Small cysts appreciated at the left patella suggesting overlying cartilage loss. Postsurgical changes noted at the left distal fibula with a small screw tracts through the distal fibula. A portion of the distal fibula is exposed by an overlying skin wound. No fracture or osteonecrosis is seen. Thereis no periosteal reaction or large erosion appreciated.  SYNOVIUM/ JOINT FLUID: There is a large left knee joint effusion containing several small new locules of gas. A moderate right knee joint effusion is identified. No hip joint effusion is seen.  TENDONS: The tendons are intact.  MUSCLES: There is hypodensity within the left soleus and gastrocnemius musculature. Confluent edema is seen between the soleus and gastrocnemius musculature. Moderate edema is noted in the left anterior compartment calf musculature. There is suggestion of rim enhancement, new compared to the prior exam. There is a hazy appearance of the right facets and lateralis and tensor fascia yohan muscle. Moderate hazy hypodensity throughout the left sartorius and left vastus lateralis muscle. Edema overlies the left rectus femoris muscle.  NEUROVASCULAR STRUCTURES: Mild vascular calcifications.  INTRAPELVIC SOFT TISSUES: Multiple uterine masses are noted without enhancement likely degenerating uterine fibroids, unchanged.  SUBCUTANEOUS SOFT TISSUES: There is diffuse soft tissue swelling and skin thickening. There are skin wounds along the medial lateral aspect of the left calf. No subcutaneous fluid collection is noted.    3-D reformatted imaging confirms these findings.    IMPRESSION:    1.  Soft tissue swelling bilaterally. Medial lateral left calf skin wounds possibly from prior surgery or infection. Nonspecific but can be seen with cellulitis.  2.  Exposure of the left distal fibula by an overlying skin wound raises concern for osteomyelitis. New compared to the prior study.  3.  Large left knee joint effusion with several new foci of gas. Differential considerations include recent aspiration and infection. Correlate clinically.  4.  Hypodensity throughout the left calf musculature in the bilateral hip musculature as detailed above as can be seen with myositis.  5.  Suggestion of rim enhancement involving the left anterior calf musculature concerning for early abscess formation  6.  Edema between the left medial gastrocnemius and soleus musculature, likely infectious  7.  Heterogeneous masses in the uterus, likely degenerating fibroids.    Findings were discussed with Dr. Cortez of Trauma on 11/30/2021 10:42 AM  with read back.    --- End of Report ---            LOREN ALEXANDER MD; Attending Radiologist  This document has been electronically signed. Nov 30 2021 10:42AM    < end of copied text >

## 2021-12-02 NOTE — PROGRESS NOTE ADULT - SUBJECTIVE AND OBJECTIVE BOX
Chief Complaint:  Patient is a 51y old  Female who presents with a chief complaint of  left leg swelling, erythema. GI Reconsulted for drop in hemoglobin.       Interval Events / Subjective: 51y Female With complicated hospital stay admitted for necrotizing fascitis. Pt s/p LLE exploration and debridement. Patient was last seen by GI  , for GI bleed, s/p EGD  that revealed normal esophagus, dieulafoy int the body that was injected with epinephrine at the base and 2 hemostat clips placed. 5 Ulcers in  the stomach. All were in the antrum/prepyloric area. 2 were large  1-2 cm deep ulcers, the other 3 were 5 mm ulcers. GI reconsulted for drop in hemoglobin. Patient hemoglobin went from 6.5 to 9.1 after 1 Unit PRBC then had drop to 7.0 following day, patient received a second unit with post hemoglobin of 7.3. Patient seen and evaluated at bedside and reports heartburn and mid epigastric discomfort for the last few days. Patient continues to be febrile despite Abx, last Blood cultures collected . Patient denies nausea, vomiting, abdominal pain, chest pain, shortness of breath. She states to be tolerating diet. Reports BM this morning, unaware if there was any blood present.       REVIEW OF SYSTEMS:   General: Negative  HEENT: Negative  CV: Negative  Respiratory: Negative  GI: See HPI  : Negative  MSK: Negative  Hematologic: Negative  Skin: Negative    MEDICATIONS:   MEDICATIONS  (STANDING):  baclofen 10 milliGRAM(s) Oral three times a day  clindamycin IVPB 900 milliGRAM(s) IV Intermittent every 8 hours  collagenase Ointment 1 Application(s) Topical two times a day  gabapentin 200 milliGRAM(s) Oral three times a day  glucagon  Injectable 1 milliGRAM(s) IntraMuscular once  heparin   Injectable 5000 Unit(s) SubCutaneous every 8 hours  lisinopril 5 milliGRAM(s) Oral daily  methocarbamol 750 milliGRAM(s) Oral three times a day  pantoprazole    Tablet 40 milliGRAM(s) Oral two times a day  piperacillin/tazobactam IVPB.. 4.5 Gram(s) IV Intermittent every 8 hours  polyethylene glycol 3350 17 Gram(s) Oral daily  senna 2 Tablet(s) Oral at bedtime  sodium chloride 1 Gram(s) Oral every 12 hours  sodium chloride 0.9%. 1000 milliLiter(s) (100 mL/Hr) IV Continuous <Continuous>    MEDICATIONS  (PRN):  HYDROmorphone  Injectable 1 milliGRAM(s) IV Push every 6 hours PRN break thru  oxyCODONE    IR 10 milliGRAM(s) Oral every 4 hours PRN Severe Pain (7 - 10)      ALLERGIES:   Allergies    No Known Allergies    Intolerances        VITAL SIGNS:   Vital Signs Last 24 Hrs  T(C): 38.3 (02 Dec 2021 07:42), Max: 38.9 (01 Dec 2021 20:40)  T(F): 100.9 (02 Dec 2021 07:42), Max: 102.1 (01 Dec 2021 20:40)  HR: 85 (02 Dec 2021 07:42) (85 - 96)  BP: 106/60 (02 Dec 2021 07:42) (106/60 - 125/60)  BP(mean): --  RR: 18 (02 Dec 2021 07:42) (18 - 18)  SpO2: 92% (02 Dec 2021 07:42) (90% - 100%)  I&O's Summary    01 Dec 2021 07:01  -  02 Dec 2021 07:00  --------------------------------------------------------  IN: 0 mL / OUT: 800 mL / NET: -800 mL        PHYSICAL EXAM:   GENERAL:  Very pleasant woman, no acute distress  HEENT:  NC/AT,  conjunctivae clear, sclera -anicteric  CHEST:  Full & symmetric excursion, no increased effort, breath sounds clear  HEART:  Regular rhythm, S1, S2, no murmur/rub/S3/S4,  no edema  ABDOMEN:  Soft, non-tender, non-distended, normoactive bowel sounds,  no masses, no hepatosplenomegaly,   EXTREMITIES: No cyanosis, clubbing or edema  SKIN:  No rash/erythema/ecchymoses/petechiae/wounds/abscess/warm/dry  NEURO:  Alert, oriented        LABS:  CBC Full  -  ( 02 Dec 2021 05:45 )  WBC Count : 14.93 K/uL  RBC Count : 2.71 M/uL  Hemoglobin : 7.3 g/dL  Hematocrit : 23.4 %  Platelet Count - Automated : 567 K/uL  Mean Cell Volume : 86.3 fl  Mean Cell Hemoglobin : 26.9 pg  Mean Cell Hemoglobin Concentration : 31.2 gm/dL  Auto Neutrophil # : x  Auto Lymphocyte # : x  Auto Monocyte # : x  Auto Eosinophil # : x  Auto Basophil # : x  Auto Neutrophil % : x  Auto Lymphocyte % : x  Auto Monocyte % : x  Auto Eosinophil % : x  Auto Basophil % : x        132<L>  |  98  |  5.8<L>  ----------------------------<  94  3.9   |  19.0<L>  |  0.62    Ca    7.6<L>      02 Dec 2021 05:45  Phos  3.2       Mg     1.8         TPro  x   /  Alb  x   /  TBili  0.2<L>  /  DBili  <0.1  /  AST  x   /  ALT  x   /  AlkPhos  x               Culture - Aspirate with Gram Stain (collected 01 Dec 2021 12:54)  Source: .Aspirate  Gram Stain (01 Dec 2021 18:23):    Few polymorphonuclear leukocytes    No organisms seen per oil power field        RADIOLOGY & ADDITIONAL STUDIES (The following images were personally reviewed):          EGD Report Date: 2021     Patient Name: LUIS FERNANDO DAVIS     MRN: [id number]     Account Number: [id number]    Gender: Female      (age): 1970 (51)     Instrument(s): GIF H190 ([id number])([id number])        Attending/Fellow:     Shawna Castellanos MD, DO       Findings:     Esophagus Normal esophagus.     Duodenum Additional duodenum findings - There was a Dieulafoy in the body. The    base was injected with 9 cc epinephrine and 2 hemostat clips placed. There were    5 ulcers in the antrum/peripyloric area. There was a large 2 cm deep ulcer on    the right of pylorus. On the left of the pylorus there was a 1 cm ulcer. Deep ,    but no visible vessel. There were three 5 mm ulcers just proximal to the 1 cm    ulcer .            Impressions:      Normal esophagus.      - There was a deulofoy in the body. The base was injected with 9 cc epinephrine    and 2 hemostat clips placed. There were 5 ulcers in the antrum/peripyloric area.    There was a large 2 cm deep ulcer on the right of pylorus. On the left of the    pylorus there was a 1 cm ulcer. Deep, but no visible vessel. There were three 5    mm ulcers just proximal to the 1 cm ulcer. .          Chief Complaint:  Patient is a 51y old  Female who presents with a chief complaint of  left leg swelling, erythema. GI Reconsulted for drop in hemoglobin.       Interval Events / Subjective: 51y Female With complicated hospital stay admitted for necrotizing fascitis. Pt s/p LLE exploration and debridement. Patient was last seen by GI  , for GI bleed, s/p EGD  that revealed normal esophagus, dieulafoy int the body that was injected with epinephrine at the base and 2 hemostat clips placed. 5 Ulcers in  the stomach. All were in the antrum/prepyloric area. 2 were large  1-2 cm deep ulcers, the other 3 were 5 mm ulcers. GI reconsulted for drop in hemoglobin. Patient hemoglobin went from 6.5 to 9.1 after 1 Unit PRBC then had drop to 7.0 following day, patient received a second unit with post hemoglobin of 7.3. Patient seen and evaluated at bedside and reports heartburn and mid epigastric discomfort for the last few days. Patient continues to be febrile despite Abx, last Blood cultures collected . Patient denies nausea, vomiting, abdominal pain, chest pain, shortness of breath. She states to be tolerating diet. Reports BM this morning, unaware if there was any blood present. LOREN revealed light brown non-bloody stool.      REVIEW OF SYSTEMS:   General: Negative  HEENT: Negative  CV: Negative  Respiratory: Negative  GI: See HPI  : Negative  MSK: Negative  Hematologic: Negative  Skin: Negative    MEDICATIONS:   MEDICATIONS  (STANDING):  baclofen 10 milliGRAM(s) Oral three times a day  clindamycin IVPB 900 milliGRAM(s) IV Intermittent every 8 hours  collagenase Ointment 1 Application(s) Topical two times a day  gabapentin 200 milliGRAM(s) Oral three times a day  glucagon  Injectable 1 milliGRAM(s) IntraMuscular once  heparin   Injectable 5000 Unit(s) SubCutaneous every 8 hours  lisinopril 5 milliGRAM(s) Oral daily  methocarbamol 750 milliGRAM(s) Oral three times a day  pantoprazole    Tablet 40 milliGRAM(s) Oral two times a day  piperacillin/tazobactam IVPB.. 4.5 Gram(s) IV Intermittent every 8 hours  polyethylene glycol 3350 17 Gram(s) Oral daily  senna 2 Tablet(s) Oral at bedtime  sodium chloride 1 Gram(s) Oral every 12 hours  sodium chloride 0.9%. 1000 milliLiter(s) (100 mL/Hr) IV Continuous <Continuous>    MEDICATIONS  (PRN):  HYDROmorphone  Injectable 1 milliGRAM(s) IV Push every 6 hours PRN break thru  oxyCODONE    IR 10 milliGRAM(s) Oral every 4 hours PRN Severe Pain (7 - 10)      ALLERGIES:   Allergies    No Known Allergies    Intolerances        VITAL SIGNS:   Vital Signs Last 24 Hrs  T(C): 38.3 (02 Dec 2021 07:42), Max: 38.9 (01 Dec 2021 20:40)  T(F): 100.9 (02 Dec 2021 07:42), Max: 102.1 (01 Dec 2021 20:40)  HR: 85 (02 Dec 2021 07:42) (85 - 96)  BP: 106/60 (02 Dec 2021 07:42) (106/60 - 125/60)  BP(mean): --  RR: 18 (02 Dec 2021 07:42) (18 - 18)  SpO2: 92% (02 Dec 2021 07:42) (90% - 100%)  I&O's Summary    01 Dec 2021 07:01  -  02 Dec 2021 07:00  --------------------------------------------------------  IN: 0 mL / OUT: 800 mL / NET: -800 mL        PHYSICAL EXAM:   GENERAL:  Very pleasant woman, no acute distress  HEENT:  NC/AT,  conjunctivae clear, sclera -anicteric  CHEST:  Full & symmetric excursion, no increased effort, breath sounds clear  HEART:  Regular rhythm, S1, S2, no murmur/rub/S3/S4,  no edema  ABDOMEN:  Soft, non-tender, non-distended, normoactive bowel sounds,  no masses, no hepatosplenomegaly,   EXTREMITIES: No cyanosis, clubbing or edema  SKIN:  No rash/erythema/ecchymoses/petechiae/wounds/abscess/warm/dry  NEURO:  Alert, oriented  LOREN revealed light brown non-bloody stool.      LABS:  CBC Full  -  ( 02 Dec 2021 05:45 )  WBC Count : 14.93 K/uL  RBC Count : 2.71 M/uL  Hemoglobin : 7.3 g/dL  Hematocrit : 23.4 %  Platelet Count - Automated : 567 K/uL  Mean Cell Volume : 86.3 fl  Mean Cell Hemoglobin : 26.9 pg  Mean Cell Hemoglobin Concentration : 31.2 gm/dL  Auto Neutrophil # : x  Auto Lymphocyte # : x  Auto Monocyte # : x  Auto Eosinophil # : x  Auto Basophil # : x  Auto Neutrophil % : x  Auto Lymphocyte % : x  Auto Monocyte % : x  Auto Eosinophil % : x  Auto Basophil % : x        132<L>  |  98  |  5.8<L>  ----------------------------<  94  3.9   |  19.0<L>  |  0.62    Ca    7.6<L>      02 Dec 2021 05:45  Phos  3.2       Mg     1.8         TPro  x   /  Alb  x   /  TBili  0.2<L>  /  DBili  <0.1  /  AST  x   /  ALT  x   /  AlkPhos  x               Culture - Aspirate with Gram Stain (collected 01 Dec 2021 12:54)  Source: .Aspirate  Gram Stain (01 Dec 2021 18:23):    Few polymorphonuclear leukocytes    No organisms seen per oil power field        RADIOLOGY & ADDITIONAL STUDIES (The following images were personally reviewed):          EGD Report Date: 2021     Patient Name: LUIS FERNANDO DAVIS     MRN: [id number]     Account Number: [id number]    Gender: Female      (age): 1970 (51)     Instrument(s): GIF [id number])        Attending/Fellow:     Shawna Castellanos MD, DO       Findings:     Esophagus Normal esophagus.     Duodenum Additional duodenum findings - There was a Dieulafoy in the body. The    base was injected with 9 cc epinephrine and 2 hemostat clips placed. There were    5 ulcers in the antrum/peripyloric area. There was a large 2 cm deep ulcer on    the right of pylorus. On the left of the pylorus there was a 1 cm ulcer. Deep ,    but no visible vessel. There were three 5 mm ulcers just proximal to the 1 cm    ulcer .            Impressions:      Normal esophagus.      - There was a deulofoy in the body. The base was injected with 9 cc epinephrine    and 2 hemostat clips placed. There were 5 ulcers in the antrum/peripyloric area.    There was a large 2 cm deep ulcer on the right of pylorus. On the left of the    pylorus there was a 1 cm ulcer. Deep, but no visible vessel. There were three 5    mm ulcers just proximal to the 1 cm ulcer. .          Chief Complaint:  Patient is a 51y old  Female who presents with a chief complaint of  left leg swelling, erythema. GI Reconsulted for drop in hemoglobin.       Interval Events / Subjective: 51y Female With complicated hospital stay admitted for necrotizing fascitis. Pt s/p LLE exploration and debridement. Patient was last seen by GI  , for GI bleed, s/p EGD  that revealed normal esophagus, dieulafoy int the body that was injected with epinephrine at the base and 2 hemostat clips placed. 5 Ulcers in  the stomach. All were in the antrum/prepyloric area. 2 were large  1-2 cm deep ulcers, the other 3 were 5 mm ulcers. GI reconsulted for drop in hemoglobin. Patient hemoglobin went from 6.5 to 9.1 after 1 Unit PRBC then had drop to 7.0 following day, patient received a second unit with post hemoglobin of 7.3. Patient seen and evaluated at bedside and reports heartburn and mid epigastric discomfort for the last few days. Patient continues to be febrile despite Abx, last Blood cultures collected . Patient denies nausea, vomiting, abdominal pain, chest pain, shortness of breath. She states to be tolerating diet. Reports BM this morning, unaware if there was any blood present. LOREN revealed light brown non-bloody stool.      REVIEW OF SYSTEMS:   General: Negative  HEENT: Negative  CV: Negative  Respiratory: Negative  GI: See HPI  : Negative  MSK: Negative  Hematologic: Negative  Skin: Negative    MEDICATIONS:   MEDICATIONS  (STANDING):  baclofen 10 milliGRAM(s) Oral three times a day  clindamycin IVPB 900 milliGRAM(s) IV Intermittent every 8 hours  collagenase Ointment 1 Application(s) Topical two times a day  gabapentin 200 milliGRAM(s) Oral three times a day  glucagon  Injectable 1 milliGRAM(s) IntraMuscular once  heparin   Injectable 5000 Unit(s) SubCutaneous every 8 hours  lisinopril 5 milliGRAM(s) Oral daily  methocarbamol 750 milliGRAM(s) Oral three times a day  pantoprazole    Tablet 40 milliGRAM(s) Oral two times a day  piperacillin/tazobactam IVPB.. 4.5 Gram(s) IV Intermittent every 8 hours  polyethylene glycol 3350 17 Gram(s) Oral daily  senna 2 Tablet(s) Oral at bedtime  sodium chloride 1 Gram(s) Oral every 12 hours  sodium chloride 0.9%. 1000 milliLiter(s) (100 mL/Hr) IV Continuous <Continuous>    MEDICATIONS  (PRN):  HYDROmorphone  Injectable 1 milliGRAM(s) IV Push every 6 hours PRN break thru  oxyCODONE    IR 10 milliGRAM(s) Oral every 4 hours PRN Severe Pain (7 - 10)      ALLERGIES:   Allergies    No Known Allergies    Intolerances        VITAL SIGNS:   Vital Signs Last 24 Hrs  T(C): 38.3 (02 Dec 2021 07:42), Max: 38.9 (01 Dec 2021 20:40)  T(F): 100.9 (02 Dec 2021 07:42), Max: 102.1 (01 Dec 2021 20:40)  HR: 85 (02 Dec 2021 07:42) (85 - 96)  BP: 106/60 (02 Dec 2021 07:42) (106/60 - 125/60)  BP(mean): --  RR: 18 (02 Dec 2021 07:42) (18 - 18)  SpO2: 92% (02 Dec 2021 07:42) (90% - 100%)  I&O's Summary    01 Dec 2021 07:01  -  02 Dec 2021 07:00  --------------------------------------------------------  IN: 0 mL / OUT: 800 mL / NET: -800 mL        PHYSICAL EXAM:   GENERAL:  Very pleasant woman, no acute distress  HEENT:  NC/AT,  conjunctivae clear, sclera -anicteric  CHEST:  Full & symmetric excursion, no increased effort, breath sounds clear  HEART:  Regular rhythm, S1, S2, no murmur/rub/S3/S4,  no edema  ABDOMEN:  Soft, non-tender, non-distended, normoactive bowel sounds,  no masses, no hepatosplenomegaly  EXTREMITIES: LE edema in bandages   SKIN:  No rash/erythema/ecchymoses/petechiae/wounds/abscess/warm/dry  NEURO:  Alert, oriented  LOREN revealed light brown non-bloody stool.      LABS:  CBC Full  -  ( 02 Dec 2021 05:45 )  WBC Count : 14.93 K/uL  RBC Count : 2.71 M/uL  Hemoglobin : 7.3 g/dL  Hematocrit : 23.4 %  Platelet Count - Automated : 567 K/uL  Mean Cell Volume : 86.3 fl  Mean Cell Hemoglobin : 26.9 pg  Mean Cell Hemoglobin Concentration : 31.2 gm/dL  Auto Neutrophil # : x  Auto Lymphocyte # : x  Auto Monocyte # : x  Auto Eosinophil # : x  Auto Basophil # : x  Auto Neutrophil % : x  Auto Lymphocyte % : x  Auto Monocyte % : x  Auto Eosinophil % : x  Auto Basophil % : x        132<L>  |  98  |  5.8<L>  ----------------------------<  94  3.9   |  19.0<L>  |  0.62    Ca    7.6<L>      02 Dec 2021 05:45  Phos  3.2       Mg     1.8         TPro  x   /  Alb  x   /  TBili  0.2<L>  /  DBili  <0.1  /  AST  x   /  ALT  x   /  AlkPhos  x               Culture - Aspirate with Gram Stain (collected 01 Dec 2021 12:54)  Source: .Aspirate  Gram Stain (01 Dec 2021 18:23):    Few polymorphonuclear leukocytes    No organisms seen per oil power field        RADIOLOGY & ADDITIONAL STUDIES (The following images were personally reviewed):          EGD Report Date: 2021     Patient Name: LUIS FERNANDO DAVIS     MRN: [id number]     Account Number: [id number]    Gender: Female      (age): 1970 (51)     Instrument(s): GIF H190 ([id number])([id number])        Attending/Fellow:     Shawna Castellanos MD, DO       Findings:     Esophagus Normal esophagus.     Duodenum Additional duodenum findings - There was a Dieulafoy in the body. The    base was injected with 9 cc epinephrine and 2 hemostat clips placed. There were    5 ulcers in the antrum/peripyloric area. There was a large 2 cm deep ulcer on    the right of pylorus. On the left of the pylorus there was a 1 cm ulcer. Deep ,    but no visible vessel. There were three 5 mm ulcers just proximal to the 1 cm    ulcer .            Impressions:      Normal esophagus.      - There was a deulofoy in the body. The base was injected with 9 cc epinephrine    and 2 hemostat clips placed. There were 5 ulcers in the antrum/peripyloric area.    There was a large 2 cm deep ulcer on the right of pylorus. On the left of the    pylorus there was a 1 cm ulcer. Deep, but no visible vessel. There were three 5    mm ulcers just proximal to the 1 cm ulcer. .

## 2021-12-02 NOTE — PROGRESS NOTE ADULT - ATTENDING COMMENTS
Patient is a 51yFemale complicated hospital course with bilateral le soft tissue infection, now with concerning findings on ct of b/l extremity, Dr. Lu did an additional left knee aspiration at bedside yesterday morning, further results pending.  ID managing abx no new recommendations at this time, cards consulted and holding off on BENJAMIN 2/2 drop in h/h from 9 to 7 requiring 1 unit PRBC would prefer if GI reconsults prior to proceeding with BENJAMIN will call this time.  Patient continues to be febrile despite abx, spiked to 102.1 overnight, but no acute events occurred. Neurology following for upper extremity weakness they are attributing to swelling for now, recommend range of motion exercises and elevation, possible repeat MRI of C-spine without contrast   I carefully evaluated the patient  She is scheduled to undergo debridement and washout L leg for there may be devitalized skin and tissue needing periodic debridement  As far as the unified cause of the patient's problems, we are still in an elusive area. Patient has some ulcers of the colon, has unusual pain in the arms and shoulders, anemia, cellulitis and infections of the knee. All these must have a common denominator. 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

## 2021-12-03 ENCOUNTER — TRANSCRIPTION ENCOUNTER (OUTPATIENT)
Age: 51
End: 2021-12-03

## 2021-12-03 LAB
ALBUMIN SERPL ELPH-MCNC: 1.6 G/DL — LOW (ref 3.3–5.2)
ALP SERPL-CCNC: 91 U/L — SIGNIFICANT CHANGE UP (ref 40–120)
ALT FLD-CCNC: 9 U/L — SIGNIFICANT CHANGE UP
ANION GAP SERPL CALC-SCNC: 13 MMOL/L — SIGNIFICANT CHANGE UP (ref 5–17)
APTT BLD: 40.7 SEC — HIGH (ref 27.5–35.5)
AST SERPL-CCNC: 22 U/L — SIGNIFICANT CHANGE UP
BILIRUB SERPL-MCNC: 0.2 MG/DL — LOW (ref 0.4–2)
BUN SERPL-MCNC: 8 MG/DL — SIGNIFICANT CHANGE UP (ref 8–20)
CALCIUM SERPL-MCNC: 7.7 MG/DL — LOW (ref 8.6–10.2)
CHLORIDE SERPL-SCNC: 100 MMOL/L — SIGNIFICANT CHANGE UP (ref 98–107)
CO2 SERPL-SCNC: 20 MMOL/L — LOW (ref 22–29)
CREAT SERPL-MCNC: 0.52 MG/DL — SIGNIFICANT CHANGE UP (ref 0.5–1.3)
CULTURE RESULTS: SIGNIFICANT CHANGE UP
CULTURE RESULTS: SIGNIFICANT CHANGE UP
GLUCOSE SERPL-MCNC: 106 MG/DL — HIGH (ref 70–99)
GRAM STN FLD: SIGNIFICANT CHANGE UP
GRAM STN FLD: SIGNIFICANT CHANGE UP
HCT VFR BLD CALC: 30.3 % — LOW (ref 34.5–45)
HGB BLD-MCNC: 9.6 G/DL — LOW (ref 11.5–15.5)
INR BLD: 1.48 RATIO — HIGH (ref 0.88–1.16)
MAGNESIUM SERPL-MCNC: 1.8 MG/DL — SIGNIFICANT CHANGE UP (ref 1.6–2.6)
MCHC RBC-ENTMCNC: 28.1 PG — SIGNIFICANT CHANGE UP (ref 27–34)
MCHC RBC-ENTMCNC: 31.7 GM/DL — LOW (ref 32–36)
MCV RBC AUTO: 88.6 FL — SIGNIFICANT CHANGE UP (ref 80–100)
NIGHT BLUE STAIN TISS: SIGNIFICANT CHANGE UP
NIGHT BLUE STAIN TISS: SIGNIFICANT CHANGE UP
PLATELET # BLD AUTO: 594 K/UL — HIGH (ref 150–400)
POTASSIUM SERPL-MCNC: 4.1 MMOL/L — SIGNIFICANT CHANGE UP (ref 3.5–5.3)
POTASSIUM SERPL-SCNC: 4.1 MMOL/L — SIGNIFICANT CHANGE UP (ref 3.5–5.3)
PROT SERPL-MCNC: 6.8 G/DL — SIGNIFICANT CHANGE UP (ref 6.6–8.7)
PROTHROM AB SERPL-ACNC: 16.9 SEC — HIGH (ref 10.6–13.6)
RBC # BLD: 3.42 M/UL — LOW (ref 3.8–5.2)
RBC # FLD: 20.2 % — HIGH (ref 10.3–14.5)
SARS-COV-2 RNA SPEC QL NAA+PROBE: SIGNIFICANT CHANGE UP
SODIUM SERPL-SCNC: 133 MMOL/L — LOW (ref 135–145)
SPECIMEN SOURCE: SIGNIFICANT CHANGE UP
WBC # BLD: 15.82 K/UL — HIGH (ref 3.8–10.5)
WBC # FLD AUTO: 15.82 K/UL — HIGH (ref 3.8–10.5)

## 2021-12-03 PROCEDURE — 99232 SBSQ HOSP IP/OBS MODERATE 35: CPT

## 2021-12-03 PROCEDURE — 99255 IP/OBS CONSLTJ NEW/EST HI 80: CPT | Mod: 25

## 2021-12-03 PROCEDURE — 93970 EXTREMITY STUDY: CPT | Mod: 26

## 2021-12-03 PROCEDURE — 43235 EGD DIAGNOSTIC BRUSH WASH: CPT

## 2021-12-03 RX ORDER — MAGNESIUM SULFATE 500 MG/ML
2 VIAL (ML) INJECTION ONCE
Refills: 0 | Status: COMPLETED | OUTPATIENT
Start: 2021-12-03 | End: 2021-12-03

## 2021-12-03 RX ORDER — ENOXAPARIN SODIUM 100 MG/ML
30 INJECTION SUBCUTANEOUS
Refills: 0 | Status: DISCONTINUED | OUTPATIENT
Start: 2021-12-03 | End: 2021-12-07

## 2021-12-03 RX ADMIN — METHOCARBAMOL 750 MILLIGRAM(S): 500 TABLET, FILM COATED ORAL at 12:39

## 2021-12-03 RX ADMIN — HYDROMORPHONE HYDROCHLORIDE 1 MILLIGRAM(S): 2 INJECTION INTRAMUSCULAR; INTRAVENOUS; SUBCUTANEOUS at 09:51

## 2021-12-03 RX ADMIN — POLYETHYLENE GLYCOL 3350 17 GRAM(S): 17 POWDER, FOR SOLUTION ORAL at 12:39

## 2021-12-03 RX ADMIN — HYDROMORPHONE HYDROCHLORIDE 1 MILLIGRAM(S): 2 INJECTION INTRAMUSCULAR; INTRAVENOUS; SUBCUTANEOUS at 23:34

## 2021-12-03 RX ADMIN — Medication 10 MILLIGRAM(S): at 05:22

## 2021-12-03 RX ADMIN — METHOCARBAMOL 750 MILLIGRAM(S): 500 TABLET, FILM COATED ORAL at 05:19

## 2021-12-03 RX ADMIN — PANTOPRAZOLE SODIUM 40 MILLIGRAM(S): 20 TABLET, DELAYED RELEASE ORAL at 16:56

## 2021-12-03 RX ADMIN — SENNA PLUS 2 TABLET(S): 8.6 TABLET ORAL at 21:50

## 2021-12-03 RX ADMIN — Medication 100 MILLIGRAM(S): at 14:01

## 2021-12-03 RX ADMIN — OXYCODONE HYDROCHLORIDE 10 MILLIGRAM(S): 5 TABLET ORAL at 12:39

## 2021-12-03 RX ADMIN — HYDROMORPHONE HYDROCHLORIDE 1 MILLIGRAM(S): 2 INJECTION INTRAMUSCULAR; INTRAVENOUS; SUBCUTANEOUS at 16:56

## 2021-12-03 RX ADMIN — SODIUM CHLORIDE 1 GRAM(S): 9 INJECTION INTRAMUSCULAR; INTRAVENOUS; SUBCUTANEOUS at 16:56

## 2021-12-03 RX ADMIN — OXYCODONE HYDROCHLORIDE 10 MILLIGRAM(S): 5 TABLET ORAL at 09:48

## 2021-12-03 RX ADMIN — ENOXAPARIN SODIUM 30 MILLIGRAM(S): 100 INJECTION SUBCUTANEOUS at 16:56

## 2021-12-03 RX ADMIN — LISINOPRIL 5 MILLIGRAM(S): 2.5 TABLET ORAL at 05:20

## 2021-12-03 RX ADMIN — OXYCODONE HYDROCHLORIDE 10 MILLIGRAM(S): 5 TABLET ORAL at 14:02

## 2021-12-03 RX ADMIN — PIPERACILLIN AND TAZOBACTAM 25 GRAM(S): 4; .5 INJECTION, POWDER, LYOPHILIZED, FOR SOLUTION INTRAVENOUS at 05:21

## 2021-12-03 RX ADMIN — Medication 100 MILLIGRAM(S): at 21:48

## 2021-12-03 RX ADMIN — PANTOPRAZOLE SODIUM 40 MILLIGRAM(S): 20 TABLET, DELAYED RELEASE ORAL at 05:20

## 2021-12-03 RX ADMIN — Medication 50 GRAM(S): at 15:08

## 2021-12-03 RX ADMIN — SODIUM CHLORIDE 1 GRAM(S): 9 INJECTION INTRAMUSCULAR; INTRAVENOUS; SUBCUTANEOUS at 05:20

## 2021-12-03 RX ADMIN — Medication 10 MILLIGRAM(S): at 21:50

## 2021-12-03 RX ADMIN — HYDROMORPHONE HYDROCHLORIDE 1 MILLIGRAM(S): 2 INJECTION INTRAMUSCULAR; INTRAVENOUS; SUBCUTANEOUS at 18:50

## 2021-12-03 RX ADMIN — HYDROMORPHONE HYDROCHLORIDE 1 MILLIGRAM(S): 2 INJECTION INTRAMUSCULAR; INTRAVENOUS; SUBCUTANEOUS at 10:15

## 2021-12-03 RX ADMIN — METHOCARBAMOL 750 MILLIGRAM(S): 500 TABLET, FILM COATED ORAL at 21:48

## 2021-12-03 RX ADMIN — PIPERACILLIN AND TAZOBACTAM 25 GRAM(S): 4; .5 INJECTION, POWDER, LYOPHILIZED, FOR SOLUTION INTRAVENOUS at 13:59

## 2021-12-03 RX ADMIN — Medication 100 MILLIGRAM(S): at 05:21

## 2021-12-03 RX ADMIN — OXYCODONE HYDROCHLORIDE 10 MILLIGRAM(S): 5 TABLET ORAL at 20:38

## 2021-12-03 RX ADMIN — OXYCODONE HYDROCHLORIDE 10 MILLIGRAM(S): 5 TABLET ORAL at 08:22

## 2021-12-03 RX ADMIN — HEPARIN SODIUM 5000 UNIT(S): 5000 INJECTION INTRAVENOUS; SUBCUTANEOUS at 05:20

## 2021-12-03 RX ADMIN — OXYCODONE HYDROCHLORIDE 10 MILLIGRAM(S): 5 TABLET ORAL at 21:30

## 2021-12-03 RX ADMIN — PIPERACILLIN AND TAZOBACTAM 25 GRAM(S): 4; .5 INJECTION, POWDER, LYOPHILIZED, FOR SOLUTION INTRAVENOUS at 21:51

## 2021-12-03 RX ADMIN — Medication 10 MILLIGRAM(S): at 12:39

## 2021-12-03 NOTE — CONSULT NOTE ADULT - SUBJECTIVE AND OBJECTIVE BOX
[facility] Rheumatology [facility]                                     Cole Pacheco MD, PhD, OVI                                    [address], [place], [place]  ---------------------------------------------------------------------------------------------------------    HISTORY OF PRESENT ILLNESS:  51y F p/w LLE swelling, erythema, pain that began in her L knee for 1 week and progressed throughout her extremity. She reported a corticosteroid injection, NSAIDs for treatment however it did not help and progressed. She reported chills, weakness. Denies any preceding infection.  Has had course complicated w/ leukocytosis, fevers, purulent discharge from necrotizing soft tissue. Septicemia w/ GAS, operative fluid w/ strep as well. Went to OR 11/24 for washout, cultures ngtd.    PAST MEDICAL & SURGICAL HISTORY:  No pertinent past medical history    History of ankle surgery        REVIEW OF SYSTEMS      General:	fevers, weakness  Skin/Breast: tenderness LLE, RLE pain  Ophthalmologic:  neg  ENMT: neg  Respiratory and Thorax: neg  Cardiovascular:	 neg  Gastrointestinal:	 neg  Genitourinary:	neg  Musculoskeletal:	 as above, synovitis, joint pain.  Neurological:	 neg  Psychiatric:	neg  Hematology/Lymphatics:	neg  Endocrine:	neg  Allergic/Immunologic:	 neg      MEDICATIONS  (STANDING):  baclofen 10 milliGRAM(s) Oral three times a day  clindamycin IVPB 900 milliGRAM(s) IV Intermittent every 8 hours  lisinopril 5 milliGRAM(s) Oral daily  methocarbamol 750 milliGRAM(s) Oral three times a day  pantoprazole    Tablet 40 milliGRAM(s) Oral two times a day  piperacillin/tazobactam IVPB.. 4.5 Gram(s) IV Intermittent every 8 hours  polyethylene glycol 3350 17 Gram(s) Oral daily  senna 2 Tablet(s) Oral at bedtime  sodium chloride 1 Gram(s) Oral every 12 hours    MEDICATIONS  (PRN):  HYDROmorphone  Injectable 1 milliGRAM(s) IV Push every 6 hours PRN break thru  ondansetron Injectable 4 milliGRAM(s) IV Push once PRN Nausea and/or Vomiting  oxyCODONE    IR 10 milliGRAM(s) Oral every 4 hours PRN Severe Pain (7 - 10)      Allergies    No Known Allergies    Intolerances        PERTINENT MEDICATION HISTORY:    SOCIAL HISTORY:     Tobacco: N     Alcohol use: N     Illicit drug use: N    FAMILY HISTORY:  FH: HTN (hypertension) (Mother)        Vital Signs Last 24 Hrs  T(C): 36.4 (03 Dec 2021 04:58), Max: 39.1 (02 Dec 2021 13:33)  T(F): 97.6 (03 Dec 2021 04:58), Max: 102.3 (02 Dec 2021 13:33)  HR: 69 (03 Dec 2021 04:58) (69 - 87)  BP: 118/73 (03 Dec 2021 04:58) (106/53 - 124/56)  BP(mean): --  RR: 18 (03 Dec 2021 04:58) (12 - 18)  SpO2: 96% (03 Dec 2021 04:58) (89% - 100%)    Daily Height in cm: 167.6 (02 Dec 2021 16:56)    Daily     PHYSICAL EXAM:    Constitutional: NAD  Eyes: EOMI, no lacrimal enlargement. No conjunctivitis or photosensitivity  ENMT: no parotid tenderness/enlargement, no oral/nasal ulcers.   Neck: supple  Respiratory: CTA  Cardiovascular: S1S2  Gastrointestinal: soft, NT  Vascular: 2+ PT  Neurological:  Skin:  Lymph Nodes:  Musculoskeletal: TTP RLE/LLE,. B/L UE myalgia/TTP. LLE/knee C/D/I dressing w/o erythema. +Wound vac/dressing.  RLE/knee - C/D/I, tenderness w/o erythema  Psychiatric: appropriate    LABS:                        9.6    15.82 )-----------( 594      ( 03 Dec 2021 08:06 )             30.3     12-02    132<L>  |  98  |  5.8<L>  ----------------------------<  94  3.9   |  19.0<L>  |  0.62    Ca    7.6<L>      02 Dec 2021 05:45  Phos  3.2     12-02  Mg     1.8     12-02      PT/INR - ( 02 Dec 2021 15:56 )   PT: 17.7 sec;   INR: 1.56 ratio         PTT - ( 02 Dec 2021 15:56 )  PTT:34.0 sec    Sedimentation Rate, Erythrocyte: 65 mm/hr *H* [0 - 20] (12-02-21 @ 05:45)  Lactate Dehydrogenase, Serum: 311 U/L *H* [98 - 192] (12-02-21 @ 05:45)  Culture Results:   No growth (12-01-21 @ 12:54)  Haptoglobin, Serum: 354 mg/dL *H* [34 - 200] (12-01-21 @ 12:23)  Schistocytes: Slight (12-01-21 @ 07:12)  Culture Results:   No growth at 48 hours (11-30-21 @ 10:40)  Culture Results:   No growth at 48 hours (11-28-21 @ 09:14)  Culture Results:   No growth at 48 hours (11-28-21 @ 09:14)      RADIOLOGY & ADDITIONAL STUDIES:  < from: CT Lower Extremity w/ IV Cont, Bilateral (11.30.21 @ 09:55) >  IMPRESSION:    1.  Soft tissue swelling bilaterally. Medial lateral left calf skin wounds possibly from prior surgery or infection. Nonspecific but can be seen with cellulitis.  2.  Exposure of the left distal fibula by an overlying skin wound raises concern for osteomyelitis. New compared to the prior study.  3.  Large left knee joint effusion with several new foci of gas. Differential considerations include recent aspiration and infection. Correlate clinically.  4.  Hypodensity throughout the left calf musculature in the bilateral hip musculature as detailed above as can be seen with myositis.  5.  Suggestion of rim enhancement involving the left anterior calf musculature concerning for early abscess formation  6.  Edema between theleft medial gastrocnemius and soleus musculature, likely infectious  7.  Heterogeneous masses in the uterus, likely degenerating fibroids.

## 2021-12-03 NOTE — ADVANCED PRACTICE NURSE CONSULT - RECOMMEDATIONS
·	Please reconsult if patient is agreeable for skin assessment by Wound RN.   ·	Cavilon Skin Barrier to affected area - daily   ·	Please reconsult if patient is agreeable for skin assessment by Wound RN.   ·	Cavilon Skin Barrier to affected area - daily    ·	Offload sacrum/ buttocks with positioning pillow

## 2021-12-03 NOTE — PROGRESS NOTE ADULT - SUBJECTIVE AND OBJECTIVE BOX
Acute Care Surgery/Trauma Surgery Progress Note/Post OP Check:    S/P excisional debridement (12/2/21). On reassessment of patient upon return from PACU, patient was in stable condition with wound vacs in place b/l. Patient complaining of generalized pain. Vital signs stable. Denied n/v/f/c/cp/sob.  Following cards for BENJAMIN, GI, ID.         Diet, Regular (12-02-21 @ 23:00)      Scheduled Medications:   baclofen 10 milliGRAM(s) Oral three times a day  clindamycin IVPB 900 milliGRAM(s) IV Intermittent every 8 hours  heparin   Injectable 5000 Unit(s) SubCutaneous every 8 hours  lisinopril 5 milliGRAM(s) Oral daily  methocarbamol 750 milliGRAM(s) Oral three times a day  pantoprazole    Tablet 40 milliGRAM(s) Oral two times a day  piperacillin/tazobactam IVPB.. 4.5 Gram(s) IV Intermittent every 8 hours  polyethylene glycol 3350 17 Gram(s) Oral daily  senna 2 Tablet(s) Oral at bedtime  sodium chloride 1 Gram(s) Oral every 12 hours    PRN Medications:  HYDROmorphone  Injectable 1 milliGRAM(s) IV Push every 6 hours PRN break thru  ondansetron Injectable 4 milliGRAM(s) IV Push once PRN Nausea and/or Vomiting  oxyCODONE    IR 10 milliGRAM(s) Oral every 4 hours PRN Severe Pain (7 - 10)      Objective:   T(F): 98.3 (12-02 @ 22:07), Max: 102.3 (12-02 @ 13:33)  HR: 75 (12-02 @ 22:07) (74 - 87)  BP: 123/72 (12-02 @ 22:07) (106/53 - 124/56)  BP(mean): --  ABP: --  ABP(mean): --  RR: 17 (12-02 @ 22:07) (12 - 18)  SpO2: 92% (12-02 @ 22:07) (89% - 100%)      Physical Exam:   GEN: patient resting comfortably in bed, in no acute distress  RESP: respirations are unlabored, no accessory muscle use, no conversational dyspnea  CVS: RRR  GI: Abdomen soft, non-tender, non-distended, no rebound tenderness / guarding    I&O's    12-01 @ 07:01  -  12-02 @ 07:00  --------------------------------------------------------  IN:  Total IN: 0 mL    OUT:    Voided (mL): 800 mL  Total OUT: 800 mL    Total NET: -800 mL      12-02 @ 07:01  -  12-03 @ 01:18  --------------------------------------------------------  IN:    Oral Fluid: 240 mL  Total IN: 240 mL    OUT:    Voided (mL): 700 mL  Total OUT: 700 mL    Total NET: -460 mL          LABS:                        7.2    15.92 )-----------( 557      ( 02 Dec 2021 15:56 )             22.9     12-02    132<L>  |  98  |  5.8<L>  ----------------------------<  94  3.9   |  19.0<L>  |  0.62    Ca    7.6<L>      02 Dec 2021 05:45  Phos  3.2     12-02  Mg     1.8     12-02    TPro  x   /  Alb  x   /  TBili  0.2<L>  /  DBili  <0.1  /  AST  x   /  ALT  x   /  AlkPhos  x   12-01    PT/INR - ( 02 Dec 2021 15:56 )   PT: 17.7 sec;   INR: 1.56 ratio         PTT - ( 02 Dec 2021 15:56 )  PTT:34.0 sec      MICROBIOLOGY:     Culture - Aspirate with Gram Stain (collected 12-01 @ 12:54)  Source: .Aspirate  Gram Stain (12-01 @ 18:23):    Few polymorphonuclear leukocytes    No organisms seen per oil power field  Preliminary Report (12-02 @ 12:26):    No growth    Culture - Blood (collected 11-30 @ 10:40)  Source: .Blood Blood  Preliminary Report (12-02 @ 11:01):    No growth at 48 hours    Culture - Blood (collected 11-28 @ 09:14)  Source: .Blood Blood-Peripheral  Preliminary Report (11-30 @ 11:00):    No growth at 48 hours    Culture - Blood (collected 11-28 @ 09:14)  Source: .Blood Blood-Peripheral  Preliminary Report (11-30 @ 11:00):    No growth at 48 hours        PATHOLOGY:

## 2021-12-03 NOTE — PROGRESS NOTE ADULT - ATTENDING COMMENTS
Patient post debridement and washout L leg  Awake alert  hgb - 1 U PRBC post surgery  Discussed with ID specialist  Rheumatology / immunology consult appreciated   Wound vac for periodic change  Patient has significant tissue loss mainly skin sc tissue and some fascia, but deeper loss to the tendons on dorsum of the L foot  Will retain questionable function to L leg

## 2021-12-03 NOTE — PROGRESS NOTE ADULT - SUBJECTIVE AND OBJECTIVE BOX
INFECTIOUS DISEASES AND INTERNAL MEDICINE at [place]  =======================================================  Theo Morrow MD  Diplomates American Board of Internal Medicine and Infectious Diseases  Telephone [phone]  Fax            [phone]  =======================================================    LUIS FERNANDO DAVIS [id number]  Follow up: group A STREP NECROTIZING SOFT TISSUE INFECTION     repeat CT scan of lower ext with collections.   S/P OR WASHOUT 12/2      Allergies:  No Known Allergies       FAMILY   FAMILY HISTORY:  FH: HTN (hypertension) (Mother)      REVIEW OF SYSTEMS:  CONSTITUTIONAL:  No Fever or chills  HEENT:   No diplopia or blurred vision.  No earache, sore throat or runny nose.  CARDIOVASCULAR:  No pressure, squeezing, strangling, tightness, heaviness or aching about the chest, neck, axilla or epigastrium.  RESPIRATORY:  No cough, shortness of breath, PND or orthopnea.  GASTROINTESTINAL:  No nausea, vomiting or diarrhea.  GENITOURINARY:  No dysuria, frequency or urgency. No Blood in urine  MUSCULOSKELETAL:   AS PER HPI   SKIN:  No change in skin, hair or nails.  NEUROLOGIC:  No paresthesias, fasciculations, seizures or weakness.  PSYCHIATRIC:  No disorder of thought or mood.  ENDOCRINE:  No heat or cold intolerance, polyuria or polydipsia.  HEMATOLOGICAL:  No easy bruising or bleeding.            Physical Exam:     GEN: NAD,    HEENT: normocephalic and atraumatic. EOMI. ASHISH.    NECK: Supple. No carotid bruits.  No lymphadenopathy or thyromegaly.  LUNGS: Clear to auscultation.  HEART: Regular rate and rhythm without murmur.  ABDOMEN: Soft, nontender, and nondistended.  Positive bowel sounds.    : No CVA tenderness  EXTREMITIES: LEFT LEG WRAPPED; left thigh vac in place  right knee with dressing in place   UPPER EXT STRENGTH GOOD BUT CAN T RAISE HANDS ALL THE WAY, localized tenderness to both shoulders   NEUROLOGIC:  C AWAKE ALERT Appropriate affect .  SKIN: No ulceration or induration present.           Vitals:  ===== Vital Signs Last 24 Hrs  T(C): 36.7 (03 Dec 2021 08:18), Max: 38.8 (02 Dec 2021 15:38)  T(F): 98 (03 Dec 2021 08:18), Max: 101.8 (02 Dec 2021 15:38)  HR: 84 (03 Dec 2021 08:18) (69 - 87)  BP: 122/60 (03 Dec 2021 08:18) (106/53 - 124/56)  BP(mean): --  RR: 19 (03 Dec 2021 08:18) (12 - 19)  SpO2: 96% (03 Dec 2021 04:58) (92% - 100%)      =======================================================  Current Antibiotics:  clindamycin IVPB 900 milliGRAM(s) IV Intermittent every 8 hours  piperacillin/tazobactam IVPB.. 4.5 Gram(s) IV Intermittent every 8 hours    Other medications:  baclofen 10 milliGRAM(s) Oral three times a day  collagenase Ointment 1 Application(s) Topical two times a day  gabapentin 200 milliGRAM(s) Oral three times a day  glucagon  Injectable 1 milliGRAM(s) IntraMuscular once  heparin   Injectable 5000 Unit(s) SubCutaneous every 8 hours  lisinopril 5 milliGRAM(s) Oral daily  methocarbamol 750 milliGRAM(s) Oral three times a day  pantoprazole    Tablet 40 milliGRAM(s) Oral two times a day  polyethylene glycol 3350 17 Gram(s) Oral daily  senna 2 Tablet(s) Oral at bedtime  sodium chloride 1 Gram(s) Oral every 12 hours  sodium chloride 0.9%. 1000 milliLiter(s) IV Continuous <Continuous>      =======================================================  Labs:                                                9.6    15.82 )-----------( 594      ( 03 Dec 2021 08:06 )             30.3   12-03    133<L>  |  100  |  8.0  ----------------------------<  106<H>  4.1   |  20.0<L>  |  0.52    Ca    7.7<L>      03 Dec 2021 08:06  Phos  3.2     12-02  Mg     1.8     12-03    TPro  6.8  /  Alb  1.6<L>  /  TBili  0.2<L>  /  DBili  x   /  AST  22  /  ALT  9   /  AlkPhos  91  12-03              Culture - Blood (collected 11-28-21 @ 09:14)  Source: .Blood Blood-Peripheral    Culture - Blood (collected 11-28-21 @ 09:14)  Source: .Blood Blood-Peripheral    Culture - Fungal, Body Fluid (collected 11-24-21 @ 13:35)  Source: .Body Fluid Right Knee Fluid    Culture - Acid Fast - Body Fluid w/Smear (collected 11-24-21 @ 13:35)  Source: .Body Fluid Right Knee Fluid    Culture - Body Fluid with Gram Stain (collected 11-24-21 @ 13:35)  Source: .Body Fluid Right Knee Fluid  Gram Stain (11-25-21 @ 01:27):    polymorphonuclear leukocytes seen per low power field    No organisms seen per oil power field    Culture - Surgical Swab (collected 11-24-21 @ 12:30)  Source: .Surgical Swab Swab Right Knee #2  Final Report (11-29-21 @ 07:55):    No growth at 5 days    Culture - Surgical Swab (collected 11-24-21 @ 12:30)  Source: .Surgical Swab Swab Right Knee #1  Final Report (11-29-21 @ 07:56):    No growth at 5 days    Culture - Surgical Swab (collected 11-24-21 @ 12:28)  Source: .Surgical Swab Swab Right Knee #3  Final Report (11-29-21 @ 08:01):    No growth at 5 days    Culture - Body Fluid with Gram Stain (collected 11-23-21 @ 12:50)  Source: .Body Fluid Knee Fluid  Gram Stain (11-23-21 @ 23:34):    polymorphonuclear leukocytes seen    No organisms seen    by cytocentrifuge    Culture - Blood (collected 11-22-21 @ 08:44)  Source: .Blood Blood  Final Report (11-27-21 @ 09:00):    No growth at 5 days.    Culture - Blood (collected 11-20-21 @ 12:57)  Source: .Blood Blood  Final Report (11-25-21 @ 13:00):    No growth at 5 days.    Culture - Body Fluid with Gram Stain (collected 11-18-21 @ 16:20)  Source: .Body Fluid OR Posterior Calf Left Lower Extremity Fluid  Gram Stain (11-18-21 @ 21:39):    polymorphonuclear leukocytes seen    Gram Positive Cocci in Pairs and Chains seen    by cytocentrifuge  Final Report (11-23-21 @ 09:40):    Moderate Streptococcus pyogenes (Group A) Penicillin and ampicillin are    drugs of choice for    treatment of beta-hemolytic streptococcal infections.    Susceptibility testing is not performed routinely because    S. pyogenes (GAS) is universally susceptible to penicillin    and resistance in other strains is extremely rare.    Culture - Body Fluid with Gram Stain (collected 11-18-21 @ 16:20)  Source: .Body Fluid OR Swab Left Knee Lower Extremity Fluid  Gram Stain (11-18-21 @ 19:36):    polymorphonuclear leukocytes seen    Gram Positive Cocci in Pairs and Chains seen    by cytocentrifuge  Final Report (11-23-21 @ 09:41):    Numerous Streptococcus pyogenes (Group A) Penicillin and ampicillin are    drugs of choice for    treatment of beta-hemolytic streptococcal infections.    Susceptibility testing is not performed routinely because    S. pyogenes (GAS) is universally susceptible to penicillin    and resistance in other strains is extremely rare.    Culture - Body Fluid with Gram Stain (collected 11-18-21 @ 16:20)  Source: .Body Fluid OR - Left Lower Extremity Fluid  Gram Stain (11-18-21 @ 20:10):    No polymorphonuclear leukocytes seen    No organisms seen    by cytocentrifuge  Final Report (11-23-21 @ 09:40):    Rare Streptococcus pyogenes (Group A)    Penicillin and ampicillin are drugs of choice for treatment of    beta-hemolytic streptococcal infections.    Susceptibility testing is not performed routinely because S. pyogenes    (GAS) is universally susceptibleto penicillin    and resistance in other strains is extremely rare.    Culture - Urine (collected 11-18-21 @ 10:07)  Source: Catheterized Catheterized  Final Report (11-19-21 @ 07:03):    No growth    Culture - Urine (collected 11-18-21 @ 00:25)  Source: Catheterized Catheterized  Final Report (11-18-21 @ 22:09):    <10,000 CFU/mL Normal Urogenital Aimee    Culture - Blood (collected 11-17-21 @ 14:45)  Source: .Blood Blood  Gram Stain (11-18-21 @ 09:44):    Growth in aerobic and anaerobic bottles: Gram Positive Cocci in Pairs and    Chains    Gram Stain performed by:    [facility]    [address] [place] [id number]    .    TYPE: (C=Critical, N=Notification, A=Abnormal) C    TESTS:  _ GS    DATE/TIME CALLED: _ 11/18/2021 09:42:19    CALLED TO: Chris Hernandez RN    READ BACK (2 Patient Identifiers)(Y/N): _ Y    READ BACK VALUES (Y/N): _ Y    CALLED BY: Chris Quiñones  Final Report (11-21-21 @ 12:28):    Growth in aerobic and anaerobic bottles: Streptococcus pyogenes (Group A)    See previous culture [id number]    Culture - Blood (collected 11-17-21 @ 14:45)  Source: .Blood Blood  Gram Stain (11-18-21 @ 09:40):    Growth in aerobic and anaerobic bottles: Gram Positive Cocci in Pairs and    Chains    ***Blood Panel PCR results on this specimen are available    approximately 3 hours after the Gram stain result.***    Gram stain, PCR, and/or culture results may not always    correspond due to difference in methodologies.    ************************************************************    This PCR assay was performed using [employer].    The following targets are tested for: Enterococcus,    vancomycin resistant enterococci, Listeria monocytogenes,    coagulase negative staphylococci, S. aureus,    methicillin resistant S. aureus, Streptococcus agalactiae    (Group B), S. pneumoniae, S. pyogenes (Group A),    Acinetobacter baumannii, Enterobacter cloacae, E. coli,    Klebsiella oxytoca, K. pneumoniae, Proteus sp.,    Serratia marcescens, Haemophilus influenzae,    Neisseria meningitidis, Pseudomonas aeruginosa, Candida    albicans, C. glabrata, C krusei, C parapsilosis,    C. tropicalis and the KPC resistance gene.    Gram Stain and BCID performed by:    [facility]    [address] [place] [id number]    .    TYPE: (C=Critical, N=Notification, A=Abnormal) C    TESTS:  _ GS    DATE/TIME CALLED: _ 11/18/2021 09:40:08    CALLED TO: Chris Hernandez RN    READ BACK (2 Patient Identifiers)(Y/N): _ Y    READ BACK VALUES (Y/N): _ Y    CALLED BY: Chris Quiñones  Final Report (11-21-21 @ 12:28):    Growth in aerobic and anaerobic bottles: Streptococcus pyogenes (Group A)  Organism: Blood Culture PCR  Streptococcus pyogenes (Group A) (11-21-21 @ 12:28)  Organism: Streptococcus pyogenes (Group A) (11-21-21 @ 12:28)    Sensitivities:      -  Ceftriaxone: S 0.016      -  Penicillin: S 0.016 Predicts results for ampicillin, amoxicillin, amoxicillin/clavulanate, ampicillin/sulbactam, 1st, 2nd and 3rd generation cephalosporins and carbapenems.      Method Type: ETEST  Organism: Streptococcus pyogenes (Group A) (11-21-21 @ 12:28)    Sensitivities:      -  Vancomycin: S      Method Type: KB  Organism: Blood Culture PCR (11-21-21 @ 12:28)    Sensitivities:      -  Streptococcus pyogenes (Group A): Detec      Method Type: PCR      Creatinine, Serum: 0.75 mg/dL (11-30-21 @ 07:18)  Creatinine, Serum: 0.68 mg/dL (11-29-21 @ 09:55)  Creatinine, Serum: 0.64 mg/dL (11-28-21 @ 09:14)  Creatinine, Serum: 0.65 mg/dL (11-27-21 @ 11:51)  Creatinine, Serum: 0.57 mg/dL (11-27-21 @ 05:35)  Creatinine, Serum: 0.61 mg/dL (11-26-21 @ 09:01)        Ferritin, Serum: 388 ng/mL (11-17-21 @ 13:21)    C-Reactive Protein, Serum: 197 mg/L (11-26-21 @ 21:52)  C-Reactive Protein, Serum: 211 mg/L (11-26-21 @ 09:01)  C-Reactive Protein, Serum: 53 mg/L (11-23-21 @ 07:42)  C-Reactive Protein, Serum: >422 mg/L (11-17-21 @ 13:21)    Sedimentation Rate, Erythrocyte: 65 mm/hr (11-26-21 @ 21:52)  Sedimentation Rate, Erythrocyte: 63 mm/hr (11-26-21 @ 09:01)  Sedimentation Rate, Erythrocyte: 55 mm/hr (11-17-21 @ 13:21)    WBC Count: 14.55 K/uL (11-30-21 @ 07:18)  WBC Count: 16.71 K/uL (11-29-21 @ 09:54)  WBC Count: 17.02 K/uL (11-28-21 @ 09:14)  WBC Count: 15.99 K/uL (11-27-21 @ 05:33)  WBC Count: 18.73 K/uL (11-26-21 @ 09:01)      COVID-19 PCR: NotDetec (11-30-21 @ 01:59)  COVID-19 PCR: NotDetec (11-23-21 @ 18:50)  COVID-19 PCR: NotDetec (11-17-21 @ 13:18)    Lactate Dehydrogenase, Serum: 769 U/L (11-20-21 @ 03:56)    Alkaline Phosphatase, Serum: 88 U/L (11-27-21 @ 05:35)  Alanine Aminotransferase (ALT/SGPT): 11 U/L (11-27-21 @ 05:35)  Aspartate Aminotransferase (AST/SGOT): 25 U/L (11-27-21 @ 05:35)  Bilirubin Total, Serum: 0.3 mg/dL (11-27-21 @ 05:35)        < from: CT Lower Extremity w/ IV Cont, Bilateral (11.30.21 @ 09:55) >     EXAM:  CT LWR EXT IC BI                          PROCEDURE DATE:  11/30/2021          INTERPRETATION:  CT LOWER EXTREMITY WITH IV CONTRAST BILATERAL dated 11/30/2021 9:55 AM    INDICATION: Fever. Status post incision and drainage bilaterally. Persistent fevers to 104.    COMPARISON: CT of the right knee dated 11/23/2021. CT of the left lower extremity dated 11/17/2021    TECHNIQUE: CT imaging of the bilateral lower extremities was performed with contrast. The data was reformatted in the axial,coronal, and sagittal planes.  Contrast: Omnipaque 300. Administered: 96 cc. Discarded: 4 cc.    FINDINGS:    OSSEOUS STRUCTURES: Mild degenerative changes at the lower lumbar spine including sclerosis and narrowing at the facet joints. Moderate sclerosis at the right lower sacroiliac joint. Moderate narrowing the bilateral hip joint spaces which is worse posteriorly. There is marginal productive changes. Moderate narrowing at the pubic symphysis. Small cysts appreciated at the left patella suggesting overlying cartilage loss. Postsurgical changes noted at the left distal fibula with a small screw tracts through the distal fibula. A portion of the distal fibula is exposed by an overlying skin wound. No fracture or osteonecrosis is seen. Thereis no periosteal reaction or large erosion appreciated.  SYNOVIUM/ JOINT FLUID: There is a large left knee joint effusion containing several small new locules of gas. A moderate right knee joint effusion is identified. No hip joint effusion is seen.  TENDONS: The tendons are intact.  MUSCLES: There is hypodensity within the left soleus and gastrocnemius musculature. Confluent edema is seen between the soleus and gastrocnemius musculature. Moderate edema is noted in the left anterior compartment calf musculature. There is suggestion of rim enhancement, new compared to the prior exam. There is a hazy appearance of the right facets and lateralis and tensor fascia yohan muscle. Moderate hazy hypodensity throughout the left sartorius and left vastus lateralis muscle. Edema overlies the left rectus femoris muscle.  NEUROVASCULAR STRUCTURES: Mild vascular calcifications.  INTRAPELVIC SOFT TISSUES: Multiple uterine masses are noted without enhancement likely degenerating uterine fibroids, unchanged.  SUBCUTANEOUS SOFT TISSUES: There is diffuse soft tissue swelling and skin thickening. There are skin wounds along the medial lateral aspect of the left calf. No subcutaneous fluid collection is noted.    3-D reformatted imaging confirms these findings.    IMPRESSION:    1.  Soft tissue swelling bilaterally. Medial lateral left calf skin wounds possibly from prior surgery or infection. Nonspecific but can be seen with cellulitis.  2.  Exposure of the left distal fibula by an overlying skin wound raises concern for osteomyelitis. New compared to the prior study.  3.  Large left knee joint effusion with several new foci of gas. Differential considerations include recent aspiration and infection. Correlate clinically.  4.  Hypodensity throughout the left calf musculature in the bilateral hip musculature as detailed above as can be seen with myositis.  5.  Suggestion of rim enhancement involving the left anterior calf musculature concerning for early abscess formation  6.  Edema between the left medial gastrocnemius and soleus musculature, likely infectious  7.  Heterogeneous masses in the uterus, likely degenerating fibroids.    Findings were discussed with Dr. Cortez of Trauma on 11/30/2021 10:42 AM  with read back.    --- End of Report ---            LOREN ALEXANDER MD; Attending Radiologist  This document has been electronically signed. Nov 30 2021 10:42AM    < end of copied text >

## 2021-12-03 NOTE — PROGRESS NOTE ADULT - ASSESSMENT
51y Female present to ED with left leg swelling, erythema, pain, leukocytosis and acute renal failure. CT scan concerning for necrotizing soft tissue infection. 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 Downgraded to floor from SICU 11/22/21.  Patient had complained or RLE swelling. Right lower extremity duplex and negative for DVT; however + swelling in popliteal fossa to R calf.  R knee I&D performed. LLE with streptococcus pyogenes. 11/30 ortho aspirated additional fluid from left knee awaiting effusion results     - s/p excisional debridement  - Continue IV abx: Clinda, Zosyn   - f/u CARDS for BENJAMIN scheduling   - Pain control   - Monitor fevers  - new blood cultures sent on 11/28, negative to date  - Daily dressing changes, wound care  - f/u labs by consult services  - PT/OT   - continue wound vac b/l  - WBAT LLE in CAM boot  - continue ROM of ue exercises  - per GI; continue to trend CBC and transfuse for a goal of hgb 8 or higher  - continue PPI BID for mucosal cytoprotection  - plan for EGD 12/3/21

## 2021-12-03 NOTE — PROGRESS NOTE ADULT - SUBJECTIVE AND OBJECTIVE BOX
· Subjective and Objective:   Pt Name: LUIS FERNANDO DAVIS  MRN: [id number]    Procedure: Right knee I&D POD# 8 (11/24/21),  Left knee I&D w MASON left ankle POD#14 (11/17/21)  Surgeon: Dr Tobias    Patient is a 51y Female seen and examined at bedside Patient is doing well, pt taken to the OR yesterday for left LE I&D with trauma surgery.  Patient complaining of muscle spasm to RLE and LLE. Also complaining of UE weakness and swelling. Patient states that she has been elevating RUE.  Denies numbness/tingling.  Left knee aspirated 12/1 no growth to date        PHYSICAL EXAM:    Vital Signs Last 24 Hrs  T(C): 36.7 (03 Dec 2021 08:18), Max: 39.1 (02 Dec 2021 13:33)  T(F): 98 (03 Dec 2021 08:18), Max: 102.3 (02 Dec 2021 13:33)  HR: 84 (03 Dec 2021 08:18) (69 - 87)  BP: 122/60 (03 Dec 2021 08:18) (106/53 - 124/56)  BP(mean): --  RR: 19 (03 Dec 2021 08:18) (12 - 19)  SpO2: 96% (03 Dec 2021 04:58) (89% - 100%)    Appearance: Alert, responsive, in no acute distress.  Musculoskeletal:       Left Lower Extremity: Left knee dressing is clean, dry, and intact. Wound vac in place to LLE functioning - managed by ACS.  No bleeding noted. SILT. + plantarflexion/FHL. 0/5EHL. +footdrop. DP pulses 2+. Cap refill < 2 seconds. No cyanosis. No signs of venous insufficiency or stasis.        Right Lower Extremity: No abrasions, ecchymosis, or erythema. No bleeding. SILT. + dorsi/plantarflexion/EHL/FHL. DP pulses 2+. Cap refill < 2 seconds. No cyanosis. No signs of venous insufficiency or stasis.           A/P:  Pt is a  51y Female s/p Right knee I&D POD# 8 (11/24/21),  Left knee I&D w MASON left ankle POD#14 (11/17/21)    PLAN:   - PT/OT for ROM of shoulders  - F/u cell count and cultures for left knee aspiration: possible IR recc pending results   - Pain control  - DVTp  - Weight Bearing Status: WBAT RLE, WBAT LLE in CAM boot  - Continue care as per primary team

## 2021-12-03 NOTE — CHART NOTE - NSCHARTNOTEFT_GEN_A_CORE
MD Santacruz Acute Care Surgery/Trauma Surgeon asked for follow up on BENJAMIN.      51y Female present to ED with left leg swelling, erythema, pain, leukocytosis and acute renal failure. CT scan concerning for necrotizing soft tissue infection. Pt s/p LLE exploration and debridement, admitted to SICU for hemorrhagic + septic shock and renal disease, course complicated with GI bleed.    Patient is s/pR knee I&D performed. LLE with streptococcus pyogenes.   Patient's renal failure is improving and h and h stable, received 1 unit prbc's yesterday.      Chart reviewed and case d/w with MD Giron.  Plan is for BENJAMIN on Monday.   NPO Sunday night

## 2021-12-03 NOTE — PROGRESS NOTE ADULT - ASSESSMENT
51y Female present to ED with left leg swelling, erythema, pain. Patient endorses she had left knee pain for 1 week presented 3 days ago to ED  where she states was given an steroid shot, and ibuprofen. however pain and edema worsened. Patient states she wasnt feeling herself today reason why she came. Endorses chills initially with pain 3 days ago but none since, denies history of trauma, insect bites, recent interventions, or injections to the area, contact with ticks, history of diabetes.  PT AS ABOVE WITH LEFT LEG SWELLING PT WITH INCREASED WBC PLACED ON ABX FOR PRESUMED INFECTION  PT WITH CT SCAN WITH FASCIAL EDAM PT BROUGHT TO THE OR EMERGENTLY AND  UNDERWENT FASCIOTOMY   PT BROUGHT TO THE OR  BOTH TRAUMA AND ORTHO INVOLVED  PURULENT FLUID FOUND  NECROTIZING SOFT TISSUE INFECTION    BLOOD CX WITH GROUP A STREP AND  OPERATIVE FLUID WITH STREP   PT ON PCN AND CLINDA for POSSIBLE ANTITOXIN EFFECT IN GROUP A STREP INFECTION    PT WITH RIGHT KNEE PAIN WENT TO OR 11/24  AND WASHED OUT AND CULTURES TAKEN so far negative    now febrile with uptrending WBC    CXR and UA (-)  c/o b/l shoulder pain, and knee pain, MR as above  f/u repeat BCX  - new CT scan of lower ext on 11/29/21 ? showed early abscess in left anterior calf. also edema between left media gastroc and soleus  SURGERY FOLLOWING  s/p ASPIRATION LEFT KNEE BY ORTHO  cx neg so FAR   on  ZOSYN to 4.5 grams Q8H  -  CLINDAMYCIN   PT  S/P OR WASHOUT 12.2  AND NOW  AFEBRILE POST OP   CONTINUE CURRENT IV ABX THERAPY  WILL FOLLOW UP

## 2021-12-03 NOTE — ADVANCED PRACTICE NURSE CONSULT - ASSESSMENT
Patient is A&Ox4 and able to participate in POC. Patient refusing skin assessment at this time.  Wound and Primary RN present at bedside.

## 2021-12-04 LAB
-  AMIKACIN: SIGNIFICANT CHANGE UP
-  AZTREONAM: SIGNIFICANT CHANGE UP
-  CEFEPIME: SIGNIFICANT CHANGE UP
-  CEFTAZIDIME: SIGNIFICANT CHANGE UP
-  CEFTAZIDIME: SIGNIFICANT CHANGE UP
-  CIPROFLOXACIN: SIGNIFICANT CHANGE UP
-  GENTAMICIN: SIGNIFICANT CHANGE UP
-  IMIPENEM: SIGNIFICANT CHANGE UP
-  LEVOFLOXACIN: SIGNIFICANT CHANGE UP
-  LEVOFLOXACIN: SIGNIFICANT CHANGE UP
-  MEROPENEM: SIGNIFICANT CHANGE UP
-  PIPERACILLIN/TAZOBACTAM: SIGNIFICANT CHANGE UP
-  TOBRAMYCIN: SIGNIFICANT CHANGE UP
-  TRIMETHOPRIM/SULFAMETHOXAZOLE: SIGNIFICANT CHANGE UP
ANION GAP SERPL CALC-SCNC: 13 MMOL/L — SIGNIFICANT CHANGE UP (ref 5–17)
APTT BLD: 41.2 SEC — HIGH (ref 27.5–35.5)
BASOPHILS # BLD AUTO: 0.11 K/UL — SIGNIFICANT CHANGE UP (ref 0–0.2)
BASOPHILS NFR BLD AUTO: 0.8 % — SIGNIFICANT CHANGE UP (ref 0–2)
BUN SERPL-MCNC: 9.2 MG/DL — SIGNIFICANT CHANGE UP (ref 8–20)
CALCIUM SERPL-MCNC: 7.9 MG/DL — LOW (ref 8.6–10.2)
CHLORIDE SERPL-SCNC: 99 MMOL/L — SIGNIFICANT CHANGE UP (ref 98–107)
CO2 SERPL-SCNC: 18 MMOL/L — LOW (ref 22–29)
CREAT SERPL-MCNC: 0.68 MG/DL — SIGNIFICANT CHANGE UP (ref 0.5–1.3)
EOSINOPHIL # BLD AUTO: 0.69 K/UL — HIGH (ref 0–0.5)
EOSINOPHIL NFR BLD AUTO: 5.1 % — SIGNIFICANT CHANGE UP (ref 0–6)
GLUCOSE SERPL-MCNC: 85 MG/DL — SIGNIFICANT CHANGE UP (ref 70–99)
HCT VFR BLD CALC: 28.7 % — LOW (ref 34.5–45)
HGB BLD-MCNC: 9 G/DL — LOW (ref 11.5–15.5)
IMM GRANULOCYTES NFR BLD AUTO: 2.3 % — HIGH (ref 0–1.5)
INR BLD: 1.33 RATIO — HIGH (ref 0.88–1.16)
LYMPHOCYTES # BLD AUTO: 1.92 K/UL — SIGNIFICANT CHANGE UP (ref 1–3.3)
LYMPHOCYTES # BLD AUTO: 14.2 % — SIGNIFICANT CHANGE UP (ref 13–44)
MAGNESIUM SERPL-MCNC: 1.6 MG/DL — SIGNIFICANT CHANGE UP (ref 1.6–2.6)
MCHC RBC-ENTMCNC: 28 PG — SIGNIFICANT CHANGE UP (ref 27–34)
MCHC RBC-ENTMCNC: 31.4 GM/DL — LOW (ref 32–36)
MCV RBC AUTO: 89.4 FL — SIGNIFICANT CHANGE UP (ref 80–100)
METHOD TYPE: SIGNIFICANT CHANGE UP
METHOD TYPE: SIGNIFICANT CHANGE UP
MONOCYTES # BLD AUTO: 0.81 K/UL — SIGNIFICANT CHANGE UP (ref 0–0.9)
MONOCYTES NFR BLD AUTO: 6 % — SIGNIFICANT CHANGE UP (ref 2–14)
NEUTROPHILS # BLD AUTO: 9.64 K/UL — HIGH (ref 1.8–7.4)
NEUTROPHILS NFR BLD AUTO: 71.6 % — SIGNIFICANT CHANGE UP (ref 43–77)
PHOSPHATE SERPL-MCNC: 3.4 MG/DL — SIGNIFICANT CHANGE UP (ref 2.4–4.7)
PLATELET # BLD AUTO: 656 K/UL — HIGH (ref 150–400)
POTASSIUM SERPL-MCNC: 4.1 MMOL/L — SIGNIFICANT CHANGE UP (ref 3.5–5.3)
POTASSIUM SERPL-SCNC: 4.1 MMOL/L — SIGNIFICANT CHANGE UP (ref 3.5–5.3)
PROTHROM AB SERPL-ACNC: 15.2 SEC — HIGH (ref 10.6–13.6)
RBC # BLD: 3.21 M/UL — LOW (ref 3.8–5.2)
RBC # FLD: 20.7 % — HIGH (ref 10.3–14.5)
SODIUM SERPL-SCNC: 130 MMOL/L — LOW (ref 135–145)
WBC # BLD: 13.48 K/UL — HIGH (ref 3.8–10.5)
WBC # FLD AUTO: 13.48 K/UL — HIGH (ref 3.8–10.5)

## 2021-12-04 PROCEDURE — 99232 SBSQ HOSP IP/OBS MODERATE 35: CPT

## 2021-12-04 PROCEDURE — 99233 SBSQ HOSP IP/OBS HIGH 50: CPT

## 2021-12-04 RX ORDER — CIPROFLOXACIN LACTATE 400MG/40ML
400 VIAL (ML) INTRAVENOUS ONCE
Refills: 0 | Status: COMPLETED | OUTPATIENT
Start: 2021-12-04 | End: 2021-12-04

## 2021-12-04 RX ORDER — CIPROFLOXACIN LACTATE 400MG/40ML
VIAL (ML) INTRAVENOUS
Refills: 0 | Status: DISCONTINUED | OUTPATIENT
Start: 2021-12-04 | End: 2021-12-07

## 2021-12-04 RX ORDER — MAGNESIUM SULFATE 500 MG/ML
2 VIAL (ML) INJECTION ONCE
Refills: 0 | Status: COMPLETED | OUTPATIENT
Start: 2021-12-04 | End: 2021-12-04

## 2021-12-04 RX ORDER — CIPROFLOXACIN LACTATE 400MG/40ML
400 VIAL (ML) INTRAVENOUS EVERY 12 HOURS
Refills: 0 | Status: DISCONTINUED | OUTPATIENT
Start: 2021-12-04 | End: 2021-12-07

## 2021-12-04 RX ADMIN — PANTOPRAZOLE SODIUM 40 MILLIGRAM(S): 20 TABLET, DELAYED RELEASE ORAL at 05:45

## 2021-12-04 RX ADMIN — OXYCODONE HYDROCHLORIDE 10 MILLIGRAM(S): 5 TABLET ORAL at 04:08

## 2021-12-04 RX ADMIN — HYDROMORPHONE HYDROCHLORIDE 1 MILLIGRAM(S): 2 INJECTION INTRAMUSCULAR; INTRAVENOUS; SUBCUTANEOUS at 06:07

## 2021-12-04 RX ADMIN — Medication 50 GRAM(S): at 13:30

## 2021-12-04 RX ADMIN — OXYCODONE HYDROCHLORIDE 10 MILLIGRAM(S): 5 TABLET ORAL at 12:24

## 2021-12-04 RX ADMIN — Medication 200 MILLIGRAM(S): at 17:11

## 2021-12-04 RX ADMIN — HYDROMORPHONE HYDROCHLORIDE 1 MILLIGRAM(S): 2 INJECTION INTRAMUSCULAR; INTRAVENOUS; SUBCUTANEOUS at 05:37

## 2021-12-04 RX ADMIN — OXYCODONE HYDROCHLORIDE 10 MILLIGRAM(S): 5 TABLET ORAL at 17:10

## 2021-12-04 RX ADMIN — Medication 10 MILLIGRAM(S): at 05:46

## 2021-12-04 RX ADMIN — SODIUM CHLORIDE 1 GRAM(S): 9 INJECTION INTRAMUSCULAR; INTRAVENOUS; SUBCUTANEOUS at 05:45

## 2021-12-04 RX ADMIN — HYDROMORPHONE HYDROCHLORIDE 1 MILLIGRAM(S): 2 INJECTION INTRAMUSCULAR; INTRAVENOUS; SUBCUTANEOUS at 16:15

## 2021-12-04 RX ADMIN — OXYCODONE HYDROCHLORIDE 10 MILLIGRAM(S): 5 TABLET ORAL at 10:54

## 2021-12-04 RX ADMIN — Medication 10 MILLIGRAM(S): at 21:11

## 2021-12-04 RX ADMIN — Medication 200 MILLIGRAM(S): at 11:24

## 2021-12-04 RX ADMIN — Medication 100 MILLIGRAM(S): at 13:30

## 2021-12-04 RX ADMIN — Medication 10 MILLIGRAM(S): at 13:30

## 2021-12-04 RX ADMIN — PANTOPRAZOLE SODIUM 40 MILLIGRAM(S): 20 TABLET, DELAYED RELEASE ORAL at 17:10

## 2021-12-04 RX ADMIN — Medication 100 MILLIGRAM(S): at 05:43

## 2021-12-04 RX ADMIN — PIPERACILLIN AND TAZOBACTAM 25 GRAM(S): 4; .5 INJECTION, POWDER, LYOPHILIZED, FOR SOLUTION INTRAVENOUS at 21:12

## 2021-12-04 RX ADMIN — PIPERACILLIN AND TAZOBACTAM 25 GRAM(S): 4; .5 INJECTION, POWDER, LYOPHILIZED, FOR SOLUTION INTRAVENOUS at 05:42

## 2021-12-04 RX ADMIN — OXYCODONE HYDROCHLORIDE 10 MILLIGRAM(S): 5 TABLET ORAL at 18:15

## 2021-12-04 RX ADMIN — METHOCARBAMOL 750 MILLIGRAM(S): 500 TABLET, FILM COATED ORAL at 05:45

## 2021-12-04 RX ADMIN — METHOCARBAMOL 750 MILLIGRAM(S): 500 TABLET, FILM COATED ORAL at 13:30

## 2021-12-04 RX ADMIN — Medication 100 MILLIGRAM(S): at 21:11

## 2021-12-04 RX ADMIN — PIPERACILLIN AND TAZOBACTAM 25 GRAM(S): 4; .5 INJECTION, POWDER, LYOPHILIZED, FOR SOLUTION INTRAVENOUS at 13:31

## 2021-12-04 RX ADMIN — ENOXAPARIN SODIUM 30 MILLIGRAM(S): 100 INJECTION SUBCUTANEOUS at 05:44

## 2021-12-04 RX ADMIN — OXYCODONE HYDROCHLORIDE 10 MILLIGRAM(S): 5 TABLET ORAL at 04:38

## 2021-12-04 RX ADMIN — ENOXAPARIN SODIUM 30 MILLIGRAM(S): 100 INJECTION SUBCUTANEOUS at 17:10

## 2021-12-04 RX ADMIN — METHOCARBAMOL 750 MILLIGRAM(S): 500 TABLET, FILM COATED ORAL at 21:11

## 2021-12-04 RX ADMIN — SODIUM CHLORIDE 1 GRAM(S): 9 INJECTION INTRAMUSCULAR; INTRAVENOUS; SUBCUTANEOUS at 17:12

## 2021-12-04 RX ADMIN — HYDROMORPHONE HYDROCHLORIDE 1 MILLIGRAM(S): 2 INJECTION INTRAMUSCULAR; INTRAVENOUS; SUBCUTANEOUS at 14:38

## 2021-12-04 RX ADMIN — HYDROMORPHONE HYDROCHLORIDE 1 MILLIGRAM(S): 2 INJECTION INTRAMUSCULAR; INTRAVENOUS; SUBCUTANEOUS at 00:30

## 2021-12-04 NOTE — PROGRESS NOTE ADULT - SUBJECTIVE AND OBJECTIVE BOX
[facility]                                                        Office: [address] [address]                                                       Telephone: [phone]. Fax:[phone]                                                                             PROGRESS NOTE   Reason for follow up:  fevers. for BENJAMIN r/o endcoarditis.                            Overnight: No new events.   Update: got EGD done  I 11/24, for GI bleed, s/p EGD 11/20 that revealed normal esophagus, dieulafoy int the body that was injected with epinephrine at the base and 2 hemostat clips placed. 5 Ulcers in  the stomach. All were in the antrum/prepyloric area. 2 were large  1-2 cm deep ulcers, the other 3 were 5 mm ulcers.     Subjective: "  i am oke_"   Complains of:  no new symptoms.   Review of symptoms: Cardiac:  No chest pain. No dyspnea. No palpitations.  Respiratory:no cough. No dyspnea  Gastrointestinal: No diarrhea. No abdominal pain. No bleeding.     Past medical history: No updates.   Chronic conditions:  Hypertension: controlled.    Vitals:  T(C): 37.4 (12-04-21 @ 18:08), Max: 37.4 (12-04-21 @ 18:08)  HR: 81 (12-04-21 @ 18:08) (80 - 87)  BP: 111/65 (12-04-21 @ 18:08) (103/52 - 112/70)  RR: 18 (12-04-21 @ 18:08) (17 - 18)  SpO2: 94% (12-04-21 @ 18:08) (94% - 97%)  Wt(kg): --  I&O's Summary    03 Dec 2021 07:01  -  04 Dec 2021 07:00  --------------------------------------------------------  IN: 0 mL / OUT: 1000 mL / NET: -1000 mL      Weight (kg): 93.4 (12-02 @ 17:08)    PHYSICAL EXAM:  Appearance: Comfortable. No acute distress  HEENT:  Head and neck: Atraumatic. Normocephalic.  Normal oral mucosa, PERRL, Neck is supple   Neurologic: A & O x 3, no focal deficits. EOMI , Cranial nerves are intact.  Lymphatic: No cervical lymphadenopathy  Cardiovascular: Normal S1 S2, No murmur,   Respiratory: Lungs clear to auscultation  Gastrointestinal:  Soft, Non-tender, + BS  Lower Extremities: No edema  Psychiatry: Patient is calm. No agitation. Mood & affect appropriate  Skin:  she has a vwound vac i nthe thigh.   bed bound    CURRENT MEDICATIONS:  lisinopril 5 milliGRAM(s) Oral daily    ciprofloxacin   IVPB  ciprofloxacin   IVPB  clindamycin IVPB  piperacillin/tazobactam IVPB..  baclofen  methocarbamol  pantoprazole    Tablet  polyethylene glycol 3350  senna  enoxaparin Injectable  sodium chloride      LABS:	 	                              9.0    13.48 )-----------( 656      ( 04 Dec 2021 09:36 )             28.7     12-04    130<L>  |  99  |  9.2  ----------------------------<  85  4.1   |  18.0<L>  |  0.68    Ca    7.9<L>      04 Dec 2021 09:36  Phos  3.4     12-04  Mg     1.6     12-04    TPro  6.8  /  Alb  1.6<L>  /  TBili  0.2<L>  /  DBili  x   /  AST  22  /  ALT  9   /  AlkPhos  91  12-03    proBNP:   Lipid Profile:   HgA1c: TSH:          DIAGNOSTIC TESTING:   < from: TTE Echo Complete w/ Contrast w/ Doppler (11.24.21 @ 13:26) >  ti:   1. Left ventricular ejection fraction, by visual estimation, is 60 to 65%.   2. Normal global left ventricular systolic function.   3. Normal left ventricular internal cavity size.   4. Normal right ventricular size and function.   5. The left atrium is normal in size.   6. Mild thickening of the anterior and posterior mitral valve leaflets.   7. Mild Mitral valve prolapse.   8. Mild to moderate mitral valve regurgitation.   9. Normal trileaflet aortic valve with normal opening.  10. There is no evidence of pericardial effusion.    MD Jeovanny Electronically signed on 11/24/2021 at 3:43:18 PM    < end of copied text >

## 2021-12-04 NOTE — PROGRESS NOTE ADULT - ASSESSMENT
51y Female present to ED with left leg swelling, erythema, pain, leukocytosis and acute renal failure. CT scan concerning for necrotizing soft tissue infection. 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 Downgraded to floor from SICU 11/22/21.  Patient had complained or RLE swelling. Right lower extremity duplex and negative for DVT; however + swelling in popliteal fossa to R calf.  R knee I&D performed. LLE with streptococcus pyogenes. 11/30 ortho aspirated additional fluid from left knee awaiting effusion results     - s/p excisional debridement  - Continue IV abx: Clinda, Zosyn   - f/u tissue cultures  - BENJAMIN for Monday. NPO Sunday after midnight  - Pain control   - Monitor fevers  - new blood cultures sent on 11/28  - Daily dressing changes, wound care  - f/u labs by consult services  - PT/OT   - continue wound vac b/l  - WBAT LLE in CAM boot  - continue ROM of ue exercises  - per GI; continue to trend CBC and transfuse for a goal of hgb 8 or higher  - continue PPI BID for mucosal cytoprotection  - s/p EGD

## 2021-12-04 NOTE — PROGRESS NOTE ADULT - ASSESSMENT
50 yo woman admitted for necrotizing infection s/p fasciotomy of LLE now with proximal arm weakness and swelling    Proximal arm weakness in setting of significant pain  Weakness possibly due to swelling.  Patient has severe pain in shoulders limiting movement.  Orthopedic follow up appreciated.   Mobilize upper extremities with physical therapy.    Upper extremity swelling and pain  Management as per primary team.

## 2021-12-04 NOTE — PROGRESS NOTE ADULT - ASSESSMENT
51y Female present to ED with left leg swelling, erythema, pain. Patient endorses she had left knee pain for 1 week presented 3 days ago to ED  where she states was given an steroid shot, and ibuprofen. however pain and edema worsened. Patient states she wasn't feeling herself today reason why she came. Endorses chills initially with pain 3 days ago but none since, denies history of trauma, insect bites, recent interventions, or injections to the area, contact with ticks, history of diabetes. Patient of note has increased WBC with persistent fevers;     1. Persistent fevers with leukocytosis with Group B strep   - patient is still febrile with leukocytosis despite Abx   - discussed risk and benefits of BENJAMIN to assess for any valvular vegetations    nothing per orally after midnight. on sunday.  BENJAMIN on monday   DO NOT TAKE GASSTRIC VIEWS> ALl view form esphagus  as recen tanemia and gastric ulcers.    2) PUD  :  PPI .    Deep venous thrombosis prophylaxis: heparin or lovenox SQ. or intermittent sequential compression devices

## 2021-12-04 NOTE — PROGRESS NOTE ADULT - ATTENDING COMMENTS
Post L leg debridement  Remains afebrile  WBC gradually decreasing  Patient cannot dorsiflex the foot at this point and will need extensive rehab. Likely peroneal nerve damage/ischemia vs necrosis  Excellent proximal and distal pulses  Antibiotics adjusted  Stenotrophomonas maltophilia  Pseudomonas aeruginosa  Wound vac applied and to be changed 2x weekly  ID consult and care appreciated

## 2021-12-04 NOTE — PROGRESS NOTE ADULT - SUBJECTIVE AND OBJECTIVE BOX
[facility]                                        [facility] [place]                                 [address]                                  [address]                                        [place], [place], [address]                                             [phone]        CC: weakness of upper extremities     HPI:   50 yo woman with no significant past medical history who was admitted for fasciotomy for necrotizing soft tissue infection. She states she had left lower extremity swelling at home and when she arrived here had emergent surgery for suspected infection. 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 She states at home, she also had pain in her arms and shoulders, but she could move them well. She states two days ago when she was in ICU, she noticed soreness in her shoulders. She states she has weakness in her proximal arms which is new. She denies numbness or tingling in her extremities. She denies headache, or change in vision. She states she feels her upper arms are swollen. She has no further complaints. (TK).    Interim history:  Remains on [address].    ROS:   Denies headache or dizziness.  Denies chest pain.  Denies shortness of breath.    MEDICATIONS  (STANDING):  baclofen 10 milliGRAM(s) Oral three times a day  ciprofloxacin   IVPB      ciprofloxacin   IVPB 400 milliGRAM(s) IV Intermittent every 12 hours  clindamycin IVPB 900 milliGRAM(s) IV Intermittent every 8 hours  enoxaparin Injectable 30 milliGRAM(s) SubCutaneous two times a day  lisinopril 5 milliGRAM(s) Oral daily  magnesium sulfate  IVPB 2 Gram(s) IV Intermittent once  methocarbamol 750 milliGRAM(s) Oral three times a day  pantoprazole    Tablet 40 milliGRAM(s) Oral two times a day  piperacillin/tazobactam IVPB.. 4.5 Gram(s) IV Intermittent every 8 hours  polyethylene glycol 3350 17 Gram(s) Oral daily  senna 2 Tablet(s) Oral at bedtime  sodium chloride 1 Gram(s) Oral every 12 hours    Vital Signs Last 24 Hrs  T(C): 37.3 (04 Dec 2021 05:00), Max: 37.3 (04 Dec 2021 05:00)  T(F): 99.1 (04 Dec 2021 05:00), Max: 99.1 (04 Dec 2021 05:00)  HR: 87 (04 Dec 2021 05:00) (80 - 87)  BP: 103/52 (04 Dec 2021 05:00) (103/52 - 112/70)  RR: 18 (04 Dec 2021 05:00) (17 - 18)  SpO2: 94% (04 Dec 2021 05:00) (94% - 98%)    Detailed Neurologic Exam:    Mental status: The patient is awake and alert. There is no aphasia. There is no dysarthria.     Cranial nerves: Pupils equal and react symmetrically to light. There is no visual field deficit to threat. Extraocular motion is full with no nystagmus. Facial sensation is intact. Facial musculature is symmetric. Palate elevates symmetrically. Tongue is midline.    Motor: There is normal bulk and tone.  There is no tremor.  Deltoids 2/5 bilaterally but limited by pain bilaterally in the shoulders. Significant pain on passive movement of shoulder and can actually hold up arms with minimal support by me. Strength in upper extremities otherwise 5/5.  Bilateral lower extremities, moves spontaneously at least 1/5, however, full strength examination limited due to pain from recent surgery.    Sensation: Grossly intact to light touch and pin other than around surgical site.    Reflexes: 1+ in UE (LEs not assessed secondary to surgery) and plantar responses are flexor.    Cerebellar: Difficult to assess secondary to pain limited weakness of the shoulders    Labs:     12-04    130<L>  |  99  |  9.2  ----------------------------<  85  4.1   |  18.0<L>  |  0.68    Ca    7.9<L>      04 Dec 2021 09:36  Phos  3.4     12-04  Mg     1.6     12-04    TPro  6.8  /  Alb  1.6<L>  /  TBili  0.2<L>  /  DBili  x   /  AST  22  /  ALT  9   /  AlkPhos  91  12-03                            9.0    13.48 )-----------( 656      ( 04 Dec 2021 09:36 )             28.7       Rad:   MRI brain w/o contrast - no acute findings  MRI C spine w/o contrast - no gross evidence of cord compression, however, severely motion limited limiting interpretation.

## 2021-12-04 NOTE — PROGRESS NOTE ADULT - SUBJECTIVE AND OBJECTIVE BOX
Acute Care Surgery/Trauma Surgery Progress Note:    No acute overnight events. Patient afebrile, VSS. Pain well controlled. Tolerating diet. Denies n/v/f/c/cp/sob.     US Duplex Venous LE b/L: 12/3/21 @ 18:46  No evidence of deep venous thrombosis in the right lower extremity. The right peroneal vein is not visualized. No evidence for DVT within the left common femoral or proximal-mid femoral veins.      Diet, Regular (12-02-21 @ 23:00)      Scheduled Medications:   baclofen 10 milliGRAM(s) Oral three times a day  clindamycin IVPB 900 milliGRAM(s) IV Intermittent every 8 hours  enoxaparin Injectable 30 milliGRAM(s) SubCutaneous two times a day  lisinopril 5 milliGRAM(s) Oral daily  methocarbamol 750 milliGRAM(s) Oral three times a day  pantoprazole    Tablet 40 milliGRAM(s) Oral two times a day  piperacillin/tazobactam IVPB.. 4.5 Gram(s) IV Intermittent every 8 hours  polyethylene glycol 3350 17 Gram(s) Oral daily  senna 2 Tablet(s) Oral at bedtime  sodium chloride 1 Gram(s) Oral every 12 hours    PRN Medications:  HYDROmorphone  Injectable 1 milliGRAM(s) IV Push every 6 hours PRN break thru  ondansetron Injectable 4 milliGRAM(s) IV Push once PRN Nausea and/or Vomiting  oxyCODONE    IR 10 milliGRAM(s) Oral every 4 hours PRN Severe Pain (7 - 10)      Objective:   T(F): 98.4 (12-03 @ 21:25), Max: 98.9 (12-03 @ 02:41)  HR: 80 (12-03 @ 21:25) (69 - 84)  BP: 112/70 (12-03 @ 21:25) (103/68 - 122/60)  BP(mean): --  ABP: --  ABP(mean): --  RR: 17 (12-03 @ 21:25) (17 - 19)  SpO2: 97% (12-03 @ 21:25) (95% - 98%)      Physical Exam:   GEN: patient resting comfortably in bed, in no acute distress  RESP: respirations are unlabored, no accessory muscle use, no conversational dyspnea  CVS: RRR  GI: Abdomen soft, non-tender, non-distended, no rebound tenderness / guarding    I&O's    12-02 @ 07:01  -  12-03 @ 07:00  --------------------------------------------------------  IN:    Oral Fluid: 240 mL  Total IN: 240 mL    OUT:    Voided (mL): 1000 mL  Total OUT: 1000 mL    Total NET: -760 mL      12-03 @ 07:01  -  12-04 @ 00:59  --------------------------------------------------------  IN:  Total IN: 0 mL    OUT:    Voided (mL): 300 mL  Total OUT: 300 mL    Total NET: -300 mL          LABS:                        9.6    15.82 )-----------( 594      ( 03 Dec 2021 08:06 )             30.3     12-03    133<L>  |  100  |  8.0  ----------------------------<  106<H>  4.1   |  20.0<L>  |  0.52    Ca    7.7<L>      03 Dec 2021 08:06  Phos  3.2     12-02  Mg     1.8     12-03    TPro  6.8  /  Alb  1.6<L>  /  TBili  0.2<L>  /  DBili  x   /  AST  22  /  ALT  9   /  AlkPhos  91  12-03    PT/INR - ( 03 Dec 2021 08:06 )   PT: 16.9 sec;   INR: 1.48 ratio         PTT - ( 03 Dec 2021 08:06 )  PTT:40.7 sec      MICROBIOLOGY:     Culture - Fungal, Tissue (collected 12-03 @ 00:24)  Source: .Tissue anterior left leg  Preliminary Report (12-03 @ 08:59):    Testing in progress    Culture - Acid Fast - Tissue w/Smear (collected 12-03 @ 00:24)  Source: .Tissue anterior left leg    Culture - Tissue with Gram Stain (collected 12-03 @ 00:24)  Source: .Tissue anterior left leg  Gram Stain (12-03 @ 02:29):    No polymorphonuclear leukocytes seen per low power field    Rare Gram Variable Rods seen per oil power field  Preliminary Report (12-03 @ 18:36):    Moderate Pseudomonas aeruginosa    Culture - Fungal, Tissue (collected 12-03 @ 00:21)  Source: .Tissue left lower leg  Preliminary Report (12-03 @ 08:18):    Testing in progress    Culture - Acid Fast - Tissue w/Smear (collected 12-03 @ 00:21)  Source: .Tissue left lower leg    Culture - Tissue with Gram Stain (collected 12-03 @ 00:21)  Source: .Tissue left lower leg  Gram Stain (12-03 @ 02:28):    Rare polymorphonuclear leukocytes seen per low power field    No organisms seen per oil power field  Preliminary Report (12-03 @ 21:18):    Few Stenotrophomonas maltophilia    Culture - Aspirate with Gram Stain (collected 12-01 @ 12:54)  Source: .Aspirate  Gram Stain (12-01 @ 18:23):    Few polymorphonuclear leukocytes    No organisms seen per oil power field  Preliminary Report (12-02 @ 12:26):    No growth    Culture - Blood (collected 11-30 @ 10:40)  Source: .Blood Blood  Preliminary Report (12-02 @ 11:01):    No growth at 48 hours    Culture - Blood (collected 11-28 @ 09:14)  Source: .Blood Blood-Peripheral  Final Report (12-03 @ 11:13):    No growth at 5 days.    Culture - Blood (collected 11-28 @ 09:14)  Source: .Blood Blood-Peripheral  Final Report (12-03 @ 11:13):    No growth at 5 days.        PATHOLOGY:

## 2021-12-05 LAB
ANION GAP SERPL CALC-SCNC: 11 MMOL/L — SIGNIFICANT CHANGE UP (ref 5–17)
APPEARANCE UR: CLEAR — SIGNIFICANT CHANGE UP
B BURGDOR C6 AB SER-ACNC: NEGATIVE — SIGNIFICANT CHANGE UP
B BURGDOR IGG+IGM SER-ACNC: 0.07 INDEX — SIGNIFICANT CHANGE UP (ref 0.01–0.89)
BACTERIA # UR AUTO: ABNORMAL
BASOPHILS # BLD AUTO: 0.08 K/UL — SIGNIFICANT CHANGE UP (ref 0–0.2)
BASOPHILS # BLD AUTO: 0.1 K/UL — SIGNIFICANT CHANGE UP (ref 0–0.2)
BASOPHILS NFR BLD AUTO: 0.5 % — SIGNIFICANT CHANGE UP (ref 0–2)
BASOPHILS NFR BLD AUTO: 0.8 % — SIGNIFICANT CHANGE UP (ref 0–2)
BILIRUB UR-MCNC: NEGATIVE — SIGNIFICANT CHANGE UP
BUN SERPL-MCNC: 8.3 MG/DL — SIGNIFICANT CHANGE UP (ref 8–20)
CALCIUM SERPL-MCNC: 7.6 MG/DL — LOW (ref 8.6–10.2)
CHLORIDE SERPL-SCNC: 100 MMOL/L — SIGNIFICANT CHANGE UP (ref 98–107)
CO2 SERPL-SCNC: 20 MMOL/L — LOW (ref 22–29)
COD CRY URNS QL: ABNORMAL
COLOR SPEC: YELLOW — SIGNIFICANT CHANGE UP
CREAT SERPL-MCNC: 0.6 MG/DL — SIGNIFICANT CHANGE UP (ref 0.5–1.3)
CULTURE RESULTS: SIGNIFICANT CHANGE UP
DIFF PNL FLD: NEGATIVE — SIGNIFICANT CHANGE UP
EOSINOPHIL # BLD AUTO: 0.6 K/UL — HIGH (ref 0–0.5)
EOSINOPHIL # BLD AUTO: 0.62 K/UL — HIGH (ref 0–0.5)
EOSINOPHIL NFR BLD AUTO: 4.1 % — SIGNIFICANT CHANGE UP (ref 0–6)
EOSINOPHIL NFR BLD AUTO: 5 % — SIGNIFICANT CHANGE UP (ref 0–6)
EPI CELLS # UR: SIGNIFICANT CHANGE UP
GLUCOSE SERPL-MCNC: 100 MG/DL — HIGH (ref 70–99)
GLUCOSE UR QL: NEGATIVE MG/DL — SIGNIFICANT CHANGE UP
HCT VFR BLD CALC: 22.3 % — LOW (ref 34.5–45)
HCT VFR BLD CALC: 23.2 % — LOW (ref 34.5–45)
HGB BLD-MCNC: 7.1 G/DL — LOW (ref 11.5–15.5)
HGB BLD-MCNC: 7.4 G/DL — LOW (ref 11.5–15.5)
IMM GRANULOCYTES NFR BLD AUTO: 3.9 % — HIGH (ref 0–1.5)
IMM GRANULOCYTES NFR BLD AUTO: 4.1 % — HIGH (ref 0–1.5)
KETONES UR-MCNC: NEGATIVE — SIGNIFICANT CHANGE UP
LACTATE SERPL-SCNC: 1.2 MMOL/L — SIGNIFICANT CHANGE UP (ref 0.5–2)
LEUKOCYTE ESTERASE UR-ACNC: NEGATIVE — SIGNIFICANT CHANGE UP
LYMPHOCYTES # BLD AUTO: 1.95 K/UL — SIGNIFICANT CHANGE UP (ref 1–3.3)
LYMPHOCYTES # BLD AUTO: 1.98 K/UL — SIGNIFICANT CHANGE UP (ref 1–3.3)
LYMPHOCYTES # BLD AUTO: 13.6 % — SIGNIFICANT CHANGE UP (ref 13–44)
LYMPHOCYTES # BLD AUTO: 15.6 % — SIGNIFICANT CHANGE UP (ref 13–44)
MAGNESIUM SERPL-MCNC: 1.6 MG/DL — LOW (ref 1.8–2.6)
MCHC RBC-ENTMCNC: 27.5 PG — SIGNIFICANT CHANGE UP (ref 27–34)
MCHC RBC-ENTMCNC: 27.8 PG — SIGNIFICANT CHANGE UP (ref 27–34)
MCHC RBC-ENTMCNC: 31.8 GM/DL — LOW (ref 32–36)
MCHC RBC-ENTMCNC: 31.9 GM/DL — LOW (ref 32–36)
MCV RBC AUTO: 86.4 FL — SIGNIFICANT CHANGE UP (ref 80–100)
MCV RBC AUTO: 87.2 FL — SIGNIFICANT CHANGE UP (ref 80–100)
MONOCYTES # BLD AUTO: 0.84 K/UL — SIGNIFICANT CHANGE UP (ref 0–0.9)
MONOCYTES # BLD AUTO: 0.85 K/UL — SIGNIFICANT CHANGE UP (ref 0–0.9)
MONOCYTES NFR BLD AUTO: 5.8 % — SIGNIFICANT CHANGE UP (ref 2–14)
MONOCYTES NFR BLD AUTO: 6.8 % — SIGNIFICANT CHANGE UP (ref 2–14)
NEUTROPHILS # BLD AUTO: 10.5 K/UL — HIGH (ref 1.8–7.4)
NEUTROPHILS # BLD AUTO: 8.44 K/UL — HIGH (ref 1.8–7.4)
NEUTROPHILS NFR BLD AUTO: 67.7 % — SIGNIFICANT CHANGE UP (ref 43–77)
NEUTROPHILS NFR BLD AUTO: 72.1 % — SIGNIFICANT CHANGE UP (ref 43–77)
NITRITE UR-MCNC: NEGATIVE — SIGNIFICANT CHANGE UP
PH UR: 5 — SIGNIFICANT CHANGE UP (ref 5–8)
PHOSPHATE SERPL-MCNC: 3.8 MG/DL — SIGNIFICANT CHANGE UP (ref 2.4–4.7)
PLATELET # BLD AUTO: 570 K/UL — HIGH (ref 150–400)
PLATELET # BLD AUTO: 636 K/UL — HIGH (ref 150–400)
POTASSIUM SERPL-MCNC: 4 MMOL/L — SIGNIFICANT CHANGE UP (ref 3.5–5.3)
POTASSIUM SERPL-SCNC: 4 MMOL/L — SIGNIFICANT CHANGE UP (ref 3.5–5.3)
PROT UR-MCNC: 15 MG/DL
RBC # BLD: 2.58 M/UL — LOW (ref 3.8–5.2)
RBC # BLD: 2.66 M/UL — LOW (ref 3.8–5.2)
RBC # FLD: 20.8 % — HIGH (ref 10.3–14.5)
RBC # FLD: 20.9 % — HIGH (ref 10.3–14.5)
RBC CASTS # UR COMP ASSIST: SIGNIFICANT CHANGE UP /HPF (ref 0–4)
SODIUM SERPL-SCNC: 131 MMOL/L — LOW (ref 135–145)
SP GR SPEC: 1.01 — SIGNIFICANT CHANGE UP (ref 1.01–1.02)
SPECIMEN SOURCE: SIGNIFICANT CHANGE UP
UROBILINOGEN FLD QL: NEGATIVE MG/DL — SIGNIFICANT CHANGE UP
WBC # BLD: 12.47 K/UL — HIGH (ref 3.8–10.5)
WBC # BLD: 14.57 K/UL — HIGH (ref 3.8–10.5)
WBC # FLD AUTO: 12.47 K/UL — HIGH (ref 3.8–10.5)
WBC # FLD AUTO: 14.57 K/UL — HIGH (ref 3.8–10.5)
WBC UR QL: SIGNIFICANT CHANGE UP

## 2021-12-05 PROCEDURE — 71045 X-RAY EXAM CHEST 1 VIEW: CPT | Mod: 26

## 2021-12-05 RX ORDER — ACETAMINOPHEN 500 MG
1000 TABLET ORAL ONCE
Refills: 0 | Status: COMPLETED | OUTPATIENT
Start: 2021-12-05 | End: 2021-12-05

## 2021-12-05 RX ORDER — SODIUM CHLORIDE 9 MG/ML
1000 INJECTION, SOLUTION INTRAVENOUS ONCE
Refills: 0 | Status: COMPLETED | OUTPATIENT
Start: 2021-12-05 | End: 2021-12-05

## 2021-12-05 RX ADMIN — Medication 200 MILLIGRAM(S): at 05:16

## 2021-12-05 RX ADMIN — PIPERACILLIN AND TAZOBACTAM 25 GRAM(S): 4; .5 INJECTION, POWDER, LYOPHILIZED, FOR SOLUTION INTRAVENOUS at 05:40

## 2021-12-05 RX ADMIN — METHOCARBAMOL 750 MILLIGRAM(S): 500 TABLET, FILM COATED ORAL at 21:21

## 2021-12-05 RX ADMIN — PIPERACILLIN AND TAZOBACTAM 25 GRAM(S): 4; .5 INJECTION, POWDER, LYOPHILIZED, FOR SOLUTION INTRAVENOUS at 13:49

## 2021-12-05 RX ADMIN — OXYCODONE HYDROCHLORIDE 10 MILLIGRAM(S): 5 TABLET ORAL at 00:49

## 2021-12-05 RX ADMIN — HYDROMORPHONE HYDROCHLORIDE 1 MILLIGRAM(S): 2 INJECTION INTRAMUSCULAR; INTRAVENOUS; SUBCUTANEOUS at 10:05

## 2021-12-05 RX ADMIN — HYDROMORPHONE HYDROCHLORIDE 1 MILLIGRAM(S): 2 INJECTION INTRAMUSCULAR; INTRAVENOUS; SUBCUTANEOUS at 01:14

## 2021-12-05 RX ADMIN — HYDROMORPHONE HYDROCHLORIDE 1 MILLIGRAM(S): 2 INJECTION INTRAMUSCULAR; INTRAVENOUS; SUBCUTANEOUS at 13:54

## 2021-12-05 RX ADMIN — HYDROMORPHONE HYDROCHLORIDE 1 MILLIGRAM(S): 2 INJECTION INTRAMUSCULAR; INTRAVENOUS; SUBCUTANEOUS at 20:15

## 2021-12-05 RX ADMIN — METHOCARBAMOL 750 MILLIGRAM(S): 500 TABLET, FILM COATED ORAL at 05:15

## 2021-12-05 RX ADMIN — OXYCODONE HYDROCHLORIDE 10 MILLIGRAM(S): 5 TABLET ORAL at 11:07

## 2021-12-05 RX ADMIN — OXYCODONE HYDROCHLORIDE 10 MILLIGRAM(S): 5 TABLET ORAL at 11:54

## 2021-12-05 RX ADMIN — HYDROMORPHONE HYDROCHLORIDE 1 MILLIGRAM(S): 2 INJECTION INTRAMUSCULAR; INTRAVENOUS; SUBCUTANEOUS at 14:32

## 2021-12-05 RX ADMIN — METHOCARBAMOL 750 MILLIGRAM(S): 500 TABLET, FILM COATED ORAL at 13:49

## 2021-12-05 RX ADMIN — OXYCODONE HYDROCHLORIDE 10 MILLIGRAM(S): 5 TABLET ORAL at 06:14

## 2021-12-05 RX ADMIN — PANTOPRAZOLE SODIUM 40 MILLIGRAM(S): 20 TABLET, DELAYED RELEASE ORAL at 17:26

## 2021-12-05 RX ADMIN — ENOXAPARIN SODIUM 30 MILLIGRAM(S): 100 INJECTION SUBCUTANEOUS at 05:40

## 2021-12-05 RX ADMIN — HYDROMORPHONE HYDROCHLORIDE 1 MILLIGRAM(S): 2 INJECTION INTRAMUSCULAR; INTRAVENOUS; SUBCUTANEOUS at 01:30

## 2021-12-05 RX ADMIN — Medication 200 MILLIGRAM(S): at 17:26

## 2021-12-05 RX ADMIN — PIPERACILLIN AND TAZOBACTAM 25 GRAM(S): 4; .5 INJECTION, POWDER, LYOPHILIZED, FOR SOLUTION INTRAVENOUS at 21:22

## 2021-12-05 RX ADMIN — Medication 10 MILLIGRAM(S): at 21:21

## 2021-12-05 RX ADMIN — HYDROMORPHONE HYDROCHLORIDE 1 MILLIGRAM(S): 2 INJECTION INTRAMUSCULAR; INTRAVENOUS; SUBCUTANEOUS at 19:59

## 2021-12-05 RX ADMIN — Medication 100 MILLIGRAM(S): at 21:22

## 2021-12-05 RX ADMIN — Medication 10 MILLIGRAM(S): at 13:49

## 2021-12-05 RX ADMIN — Medication 400 MILLIGRAM(S): at 17:27

## 2021-12-05 RX ADMIN — OXYCODONE HYDROCHLORIDE 10 MILLIGRAM(S): 5 TABLET ORAL at 05:14

## 2021-12-05 RX ADMIN — Medication 10 MILLIGRAM(S): at 05:15

## 2021-12-05 RX ADMIN — Medication 1000 MILLIGRAM(S): at 13:59

## 2021-12-05 RX ADMIN — HYDROMORPHONE HYDROCHLORIDE 1 MILLIGRAM(S): 2 INJECTION INTRAMUSCULAR; INTRAVENOUS; SUBCUTANEOUS at 08:34

## 2021-12-05 RX ADMIN — OXYCODONE HYDROCHLORIDE 10 MILLIGRAM(S): 5 TABLET ORAL at 18:11

## 2021-12-05 RX ADMIN — Medication 100 MILLIGRAM(S): at 13:51

## 2021-12-05 RX ADMIN — OXYCODONE HYDROCHLORIDE 10 MILLIGRAM(S): 5 TABLET ORAL at 00:12

## 2021-12-05 RX ADMIN — LISINOPRIL 5 MILLIGRAM(S): 2.5 TABLET ORAL at 05:15

## 2021-12-05 RX ADMIN — SODIUM CHLORIDE 1000 MILLILITER(S): 9 INJECTION, SOLUTION INTRAVENOUS at 17:26

## 2021-12-05 RX ADMIN — SODIUM CHLORIDE 1 GRAM(S): 9 INJECTION INTRAMUSCULAR; INTRAVENOUS; SUBCUTANEOUS at 05:15

## 2021-12-05 RX ADMIN — Medication 100 MILLIGRAM(S): at 05:16

## 2021-12-05 RX ADMIN — ENOXAPARIN SODIUM 30 MILLIGRAM(S): 100 INJECTION SUBCUTANEOUS at 17:26

## 2021-12-05 RX ADMIN — OXYCODONE HYDROCHLORIDE 10 MILLIGRAM(S): 5 TABLET ORAL at 18:41

## 2021-12-05 RX ADMIN — SODIUM CHLORIDE 1 GRAM(S): 9 INJECTION INTRAMUSCULAR; INTRAVENOUS; SUBCUTANEOUS at 17:26

## 2021-12-05 RX ADMIN — PANTOPRAZOLE SODIUM 40 MILLIGRAM(S): 20 TABLET, DELAYED RELEASE ORAL at 05:15

## 2021-12-05 NOTE — PROGRESS NOTE ADULT - SUBJECTIVE AND OBJECTIVE BOX
Patient seen and eval at bedside. Patient has no complaints. Denies CP, SOB, fever, chills, dizziness.    PE: NAD, alert awake  Left knee: Steri-strips proximally over incision intact, sutures distal 3/4 of wound intact, healing well, no drainage or bleeding  Right knee: Incision healing well, dressing C/D/I, no bleeding or drainage noted                          7.4    12.47 )-----------( 570      ( 05 Dec 2021 07:51 )             23.2     Culture - Aspirate with Gram Stain (12.01.21 @ 12:54)    Culture Results:   No growth    Gram Stain:   Few polymorphonuclear leukocytes  No organisms seen per oil power field    Specimen Source: .Aspirate    A/P:  Pt is a  51y Female s/p Right knee I&D POD# 11 (11/24/21),  Left knee I&D w [facility] left ankle POD#17 (11/17/21)    PLAN:   ·	Pain control  ·	DVTp  ·	Anemia - consider transfusion prbc - defer to primary team  ·	Weight Bearing Status: WBAT RLE, WBAT LLE in CAM boot  ·	Left knee aspirate cultures negative to date from 12/1/21  ·	Sutures removed left knee - additional sutures under steristrips to be removed once steristrips fall off on their own  ·	D/w Dr. Tobias  ·	Continue care as per primary team   Patient seen and eval at bedside. Patient has no complaints. Denies CP, SOB, fever, chills, dizziness.    PE: NAD, alert awake  Left knee: Steri-strips proximally over incision intact, sutures distal 3/4 of wound intact, healing well, no drainage or bleeding  Right knee: Incision healing well, dressing C/D/I, no bleeding or drainage noted                          7.4    12.47 )-----------( 570      ( 05 Dec 2021 07:51 )             23.2     Culture - Aspirate with Gram Stain (12.01.21 @ 12:54)    Culture Results:   No growth    Gram Stain:   Few polymorphonuclear leukocytes  No organisms seen per oil power field    Specimen Source: .Aspirate    A/P:  Pt is a  51y Female s/p Right knee I&D POD# 11 (11/24/21),  Left knee I&D w [facility] left ankle POD#17 (11/17/21)    PLAN:   ·	Pain control  ·	DVTp  ·	Anemia - consider transfusion prbc - defer to primary team  ·	Weight Bearing Status: WBAT RLE, WBAT LLE in CAM boot  ·	Left knee aspirate cultures negative to date from 12/1/21  ·	IV abx as per ID  ·	Sutures removed left knee - additional sutures under steristrips to be removed once steristrips fall off on their own  ·	D/w Dr. Tobias  ·	Continue care as per primary team

## 2021-12-05 NOTE — CHART NOTE - NSCHARTNOTEFT_GEN_A_CORE
CBRN, pt with fever to 101.5, no other complaints.  VSS otherwise. Exam unchanged from prior.    Ordered:    blood culture x 2  UA  CXR  1 liter bolus  lactate  repeat CBC with diff  Tylenol

## 2021-12-05 NOTE — PROGRESS NOTE ADULT - SUBJECTIVE AND OBJECTIVE BOX
Acute Care Surgery/Trauma Surgery Progress Note:    No acute overnight events. Patient afebrile, VSS. Pain well controlled. Tolerating diet. Denies n/v/f/c/cp/sob.     Diet, NPO:   NPO for Procedure/Test     NPO Start Date: 06-Dec-2021,   NPO Start Time: 00:01  Except Medications (12-04-21 @ 07:29)  Diet, Regular (12-02-21 @ 23:00)      Scheduled Medications:   baclofen 10 milliGRAM(s) Oral three times a day  ciprofloxacin   IVPB      ciprofloxacin   IVPB 400 milliGRAM(s) IV Intermittent every 12 hours  clindamycin IVPB 900 milliGRAM(s) IV Intermittent every 8 hours  enoxaparin Injectable 30 milliGRAM(s) SubCutaneous two times a day  lisinopril 5 milliGRAM(s) Oral daily  methocarbamol 750 milliGRAM(s) Oral three times a day  pantoprazole    Tablet 40 milliGRAM(s) Oral two times a day  piperacillin/tazobactam IVPB.. 4.5 Gram(s) IV Intermittent every 8 hours  polyethylene glycol 3350 17 Gram(s) Oral daily  senna 2 Tablet(s) Oral at bedtime  sodium chloride 1 Gram(s) Oral every 12 hours    PRN Medications:  HYDROmorphone  Injectable 1 milliGRAM(s) IV Push every 6 hours PRN break thru  ondansetron Injectable 4 milliGRAM(s) IV Push once PRN Nausea and/or Vomiting  oxyCODONE    IR 10 milliGRAM(s) Oral every 4 hours PRN Severe Pain (7 - 10)      Objective:   T(F): 98.8 (12-04 @ 21:28), Max: 99.3 (12-04 @ 18:08)  HR: 85 (12-04 @ 21:28) (81 - 87)  BP: 113/63 (12-04 @ 21:28) (103/52 - 113/63)  BP(mean): --  ABP: --  ABP(mean): --  RR: 18 (12-04 @ 21:28) (18 - 18)  SpO2: 100% (12-04 @ 21:28) (94% - 100%)      Physical Exam:   GEN: patient resting comfortably in bed, in no acute distress  RESP: respirations are unlabored, no accessory muscle use, no conversational dyspnea  CVS: RRR  GI: Abdomen soft, non-tender, non-distended, no rebound tenderness / guarding    I&O's    12-03 @ 07:01  -  12-04 @ 07:00  --------------------------------------------------------  IN:  Total IN: 0 mL    OUT:    Voided (mL): 1000 mL  Total OUT: 1000 mL    Total NET: -1000 mL          LABS:                        9.0    13.48 )-----------( 656      ( 04 Dec 2021 09:36 )             28.7     12-04    130<L>  |  99  |  9.2  ----------------------------<  85  4.1   |  18.0<L>  |  0.68    Ca    7.9<L>      04 Dec 2021 09:36  Phos  3.4     12-04  Mg     1.6     12-04    TPro  6.8  /  Alb  1.6<L>  /  TBili  0.2<L>  /  DBili  x   /  AST  22  /  ALT  9   /  AlkPhos  91  12-03    PT/INR - ( 04 Dec 2021 09:36 )   PT: 15.2 sec;   INR: 1.33 ratio         PTT - ( 04 Dec 2021 09:36 )  PTT:41.2 sec      MICROBIOLOGY:     Culture - Fungal, Tissue (collected 12-03 @ 00:24)  Source: .Tissue anterior left leg  Preliminary Report (12-03 @ 08:59):    Testing in progress    Culture - Acid Fast - Tissue w/Smear (collected 12-03 @ 00:24)  Source: .Tissue anterior left leg  Preliminary Report (12-04 @ 15:04):    Culture is being performed.    Culture - Tissue with Gram Stain (collected 12-03 @ 00:24)  Source: .Tissue anterior left leg  Gram Stain (12-03 @ 02:29):    No polymorphonuclear leukocytes seen per low power field    Rare Gram Variable Rods seen per oil power field  Preliminary Report (12-03 @ 18:36):    Moderate Pseudomonas aeruginosa  Organism: Pseudomonas aeruginosa (12-04 @ 19:41)  Organism: Pseudomonas aeruginosa (12-04 @ 19:41)      -  Amikacin: S <=16      -  Aztreonam: R >16      -  Cefepime: S 8      -  Ceftazidime: S 4      -  Ciprofloxacin: S 0.5      -  Gentamicin: S <=2      -  Imipenem: S <=1      -  Levofloxacin: I 2      -  Meropenem: S <=1      -  Piperacillin/Tazobactam: S 16      -  Tobramycin: S <=2      Method Type: ANASTACIO    Culture - Fungal, Tissue (collected 12-03 @ 00:21)  Source: .Tissue left lower leg  Preliminary Report (12-03 @ 08:18):    Testing in progress    Culture - Acid Fast - Tissue w/Smear (collected 12-03 @ 00:21)  Source: .Tissue left lower leg  Preliminary Report (12-04 @ 15:04):    Culture is being performed.    Culture - Tissue with Gram Stain (collected 12-03 @ 00:21)  Source: .Tissue left lower leg  Gram Stain (12-03 @ 02:28):    Rare polymorphonuclear leukocytes seen per low power field    No organisms seen per oil power field  Preliminary Report (12-04 @ 19:16):    Few Stenotrophomonas maltophilia    Few Pseudomonas aeruginosa    See previous culture [id number]  Organism: Stenotrophomonas maltophilia (12-04 @ 19:11)  Organism: Stenotrophomonas maltophilia (12-04 @ 19:11)      -  Ceftazidime: R >16      -  Levofloxacin: S <=0.5      -  Trimethoprim/Sulfamethoxazole: S <=0.5/9.5      Method Type: ANASTACIO    Culture - Aspirate with Gram Stain (collected 12-01 @ 12:54)  Source: .Aspirate  Gram Stain (12-01 @ 18:23):    Few polymorphonuclear leukocytes    No organisms seen per oil power field  Preliminary Report (12-02 @ 12:26):    No growth    Culture - Blood (collected 11-30 @ 10:40)  Source: .Blood Blood  Preliminary Report (12-02 @ 11:01):    No growth at 48 hours    Culture - Blood (collected 11-28 @ 09:14)  Source: .Blood Blood-Peripheral  Final Report (12-03 @ 11:13):    No growth at 5 days.    Culture - Blood (collected 11-28 @ 09:14)  Source: .Blood Blood-Peripheral  Final Report (12-03 @ 11:13):    No growth at 5 days.        PATHOLOGY:       Acute Care Surgery/Trauma Surgery Progress Note:    No acute overnight events. Patient afebrile, VSS. Pain well controlled. Tolerating diet. Denies n/v/f/c/cp/sob.     Diet, NPO:   NPO for Procedure/Test     NPO Start Date: 06-Dec-2021,   NPO Start Time: 00:01  Except Medications (12-04-21 @ 07:29)  Diet, Regular (12-02-21 @ 23:00)      Scheduled Medications:   baclofen 10 milliGRAM(s) Oral three times a day  ciprofloxacin   IVPB      ciprofloxacin   IVPB 400 milliGRAM(s) IV Intermittent every 12 hours  clindamycin IVPB 900 milliGRAM(s) IV Intermittent every 8 hours  enoxaparin Injectable 30 milliGRAM(s) SubCutaneous two times a day  lisinopril 5 milliGRAM(s) Oral daily  methocarbamol 750 milliGRAM(s) Oral three times a day  pantoprazole    Tablet 40 milliGRAM(s) Oral two times a day  piperacillin/tazobactam IVPB.. 4.5 Gram(s) IV Intermittent every 8 hours  polyethylene glycol 3350 17 Gram(s) Oral daily  senna 2 Tablet(s) Oral at bedtime  sodium chloride 1 Gram(s) Oral every 12 hours    PRN Medications:  HYDROmorphone  Injectable 1 milliGRAM(s) IV Push every 6 hours PRN break thru  ondansetron Injectable 4 milliGRAM(s) IV Push once PRN Nausea and/or Vomiting  oxyCODONE    IR 10 milliGRAM(s) Oral every 4 hours PRN Severe Pain (7 - 10)      Objective:   T(F): 98.8 (12-04 @ 21:28), Max: 99.3 (12-04 @ 18:08)  HR: 85 (12-04 @ 21:28) (81 - 87)  BP: 113/63 (12-04 @ 21:28) (103/52 - 113/63)  BP(mean): --  ABP: --  ABP(mean): --  RR: 18 (12-04 @ 21:28) (18 - 18)  SpO2: 100% (12-04 @ 21:28) (94% - 100%)    Physical Exam:   GEN: patient resting comfortably in bed, in no acute distress  RESP: respirations are unlabored, no accessory muscle use, no conversational dyspnea  CVS: RRR  GI: Abdomen soft, non-tender, non-distended, no rebound tenderness / guarding    I&O's    12-03 @ 07:01  -  12-04 @ 07:00  --------------------------------------------------------  IN:  Total IN: 0 mL    OUT:    Voided (mL): 1000 mL  Total OUT: 1000 mL    Total NET: -1000 mL          LABS:                        9.0    13.48 )-----------( 656      ( 04 Dec 2021 09:36 )             28.7     12-04    130<L>  |  99  |  9.2  ----------------------------<  85  4.1   |  18.0<L>  |  0.68    Ca    7.9<L>      04 Dec 2021 09:36  Phos  3.4     12-04  Mg     1.6     12-04    TPro  6.8  /  Alb  1.6<L>  /  TBili  0.2<L>  /  DBili  x   /  AST  22  /  ALT  9   /  AlkPhos  91  12-03    PT/INR - ( 04 Dec 2021 09:36 )   PT: 15.2 sec;   INR: 1.33 ratio         PTT - ( 04 Dec 2021 09:36 )  PTT:41.2 sec      MICROBIOLOGY:     Culture - Fungal, Tissue (collected 12-03 @ 00:24)  Source: .Tissue anterior left leg  Preliminary Report (12-03 @ 08:59):    Testing in progress    Culture - Acid Fast - Tissue w/Smear (collected 12-03 @ 00:24)  Source: .Tissue anterior left leg  Preliminary Report (12-04 @ 15:04):    Culture is being performed.    Culture - Tissue with Gram Stain (collected 12-03 @ 00:24)  Source: .Tissue anterior left leg  Gram Stain (12-03 @ 02:29):    No polymorphonuclear leukocytes seen per low power field    Rare Gram Variable Rods seen per oil power field  Preliminary Report (12-03 @ 18:36):    Moderate Pseudomonas aeruginosa  Organism: Pseudomonas aeruginosa (12-04 @ 19:41)  Organism: Pseudomonas aeruginosa (12-04 @ 19:41)      -  Amikacin: S <=16      -  Aztreonam: R >16      -  Cefepime: S 8      -  Ceftazidime: S 4      -  Ciprofloxacin: S 0.5      -  Gentamicin: S <=2      -  Imipenem: S <=1      -  Levofloxacin: I 2      -  Meropenem: S <=1      -  Piperacillin/Tazobactam: S 16      -  Tobramycin: S <=2      Method Type: ANASTACIO    Culture - Fungal, Tissue (collected 12-03 @ 00:21)  Source: .Tissue left lower leg  Preliminary Report (12-03 @ 08:18):    Testing in progress    Culture - Acid Fast - Tissue w/Smear (collected 12-03 @ 00:21)  Source: .Tissue left lower leg  Preliminary Report (12-04 @ 15:04):    Culture is being performed.    Culture - Tissue with Gram Stain (collected 12-03 @ 00:21)  Source: .Tissue left lower leg  Gram Stain (12-03 @ 02:28):    Rare polymorphonuclear leukocytes seen per low power field    No organisms seen per oil power field  Preliminary Report (12-04 @ 19:16):    Few Stenotrophomonas maltophilia    Few Pseudomonas aeruginosa    See previous culture [id number]  Organism: Stenotrophomonas maltophilia (12-04 @ 19:11)  Organism: Stenotrophomonas maltophilia (12-04 @ 19:11)      -  Ceftazidime: R >16      -  Levofloxacin: S <=0.5      -  Trimethoprim/Sulfamethoxazole: S <=0.5/9.5      Method Type: ANASTACIO    Culture - Aspirate with Gram Stain (collected 12-01 @ 12:54)  Source: .Aspirate  Gram Stain (12-01 @ 18:23):    Few polymorphonuclear leukocytes    No organisms seen per oil power field  Preliminary Report (12-02 @ 12:26):    No growth    Culture - Blood (collected 11-30 @ 10:40)  Source: .Blood Blood  Preliminary Report (12-02 @ 11:01):    No growth at 48 hours    Culture - Blood (collected 11-28 @ 09:14)  Source: .Blood Blood-Peripheral  Final Report (12-03 @ 11:13):    No growth at 5 days.    Culture - Blood (collected 11-28 @ 09:14)  Source: .Blood Blood-Peripheral  Final Report (12-03 @ 11:13):    No growth at 5 days.    PATHOLOGY: pending report of tissue analysis.    Assessment:    51y Female present to ED with left leg swelling, erythema, pain, leukocytosis and acute renal failure. CT scan concerning for necrotizing soft tissue infection.   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   Downgraded to floor from SICU 11/22/21.  Patient had complained or RLE swelling. Right lower extremity duplex and negative for DVT; however + swelling in popliteal fossa to R calf.  A Rt knee I&D performed. LLE with streptococcus pyogenes. 11/30 ortho aspirated additional fluid from left knee awaiting effusion results. Pt underwent debridement adn was out 12/3/21 and tissue culture and biopsy was change to pathology.       - s/p excisional debridement    Plan:  - Continue IV abx: Clinda, ciprofloxacin and zosing.  - BENJAMIN for Monday 12/6.  NPO Sunday after midnight  - Pain control   - Monitor fevers.  - new blood cultures sent on 11/28. Tissue culture and pathology 12/3--pending results.  - Daily dressing changes, wound care: wound vac change 12/6.  - f/u labs by consult services  - PT/OT consult and recs.  - continue wound vac b/l  - WBAT LLE in CAM boot  - continue ROM of exercises  - per GI; continue to trend CBC and transfuse for a goal of hgb 8 or higher.  - continue PPI BID for mucosal cytoprotection  - s/p EGD

## 2021-12-05 NOTE — PROGRESS NOTE ADULT - ATTENDING COMMENTS
Awake  hemodynamic stable  WBC decreasing since last debridement of the L leg  No sensation or movement in the foot  Will need repeated washouts and vac changes  Antibiotics adjusted by ID

## 2021-12-06 LAB
ALBUMIN SERPL ELPH-MCNC: 1.9 G/DL — LOW (ref 3.3–5.2)
ALP SERPL-CCNC: 65 U/L — SIGNIFICANT CHANGE UP (ref 40–120)
ALT FLD-CCNC: 9 U/L — SIGNIFICANT CHANGE UP
ANION GAP SERPL CALC-SCNC: 10 MMOL/L — SIGNIFICANT CHANGE UP (ref 5–17)
AST SERPL-CCNC: 22 U/L — SIGNIFICANT CHANGE UP
BASOPHILS # BLD AUTO: 0.06 K/UL — SIGNIFICANT CHANGE UP (ref 0–0.2)
BASOPHILS NFR BLD AUTO: 0.4 % — SIGNIFICANT CHANGE UP (ref 0–2)
BILIRUB SERPL-MCNC: <0.2 MG/DL — LOW (ref 0.4–2)
BUN SERPL-MCNC: 8.1 MG/DL — SIGNIFICANT CHANGE UP (ref 8–20)
CALCIUM SERPL-MCNC: 7.8 MG/DL — LOW (ref 8.6–10.2)
CHLORIDE SERPL-SCNC: 101 MMOL/L — SIGNIFICANT CHANGE UP (ref 98–107)
CO2 SERPL-SCNC: 23 MMOL/L — SIGNIFICANT CHANGE UP (ref 22–29)
CREAT SERPL-MCNC: 0.67 MG/DL — SIGNIFICANT CHANGE UP (ref 0.5–1.3)
CULTURE RESULTS: SIGNIFICANT CHANGE UP
EOSINOPHIL # BLD AUTO: 0.56 K/UL — HIGH (ref 0–0.5)
EOSINOPHIL NFR BLD AUTO: 3.7 % — SIGNIFICANT CHANGE UP (ref 0–6)
GLUCOSE SERPL-MCNC: 101 MG/DL — HIGH (ref 70–99)
HCT VFR BLD CALC: 22.8 % — LOW (ref 34.5–45)
HGB BLD-MCNC: 7.2 G/DL — LOW (ref 11.5–15.5)
IMM GRANULOCYTES NFR BLD AUTO: 3.6 % — HIGH (ref 0–1.5)
LYMPHOCYTES # BLD AUTO: 13.5 % — SIGNIFICANT CHANGE UP (ref 13–44)
LYMPHOCYTES # BLD AUTO: 2.02 K/UL — SIGNIFICANT CHANGE UP (ref 1–3.3)
MAGNESIUM SERPL-MCNC: 1.7 MG/DL — SIGNIFICANT CHANGE UP (ref 1.6–2.6)
MCHC RBC-ENTMCNC: 27.5 PG — SIGNIFICANT CHANGE UP (ref 27–34)
MCHC RBC-ENTMCNC: 31.6 GM/DL — LOW (ref 32–36)
MCV RBC AUTO: 87 FL — SIGNIFICANT CHANGE UP (ref 80–100)
MONOCYTES # BLD AUTO: 0.76 K/UL — SIGNIFICANT CHANGE UP (ref 0–0.9)
MONOCYTES NFR BLD AUTO: 5.1 % — SIGNIFICANT CHANGE UP (ref 2–14)
NEUTROPHILS # BLD AUTO: 11.05 K/UL — HIGH (ref 1.8–7.4)
NEUTROPHILS NFR BLD AUTO: 73.7 % — SIGNIFICANT CHANGE UP (ref 43–77)
PHOSPHATE SERPL-MCNC: 3.5 MG/DL — SIGNIFICANT CHANGE UP (ref 2.4–4.7)
PLATELET # BLD AUTO: 632 K/UL — HIGH (ref 150–400)
POTASSIUM SERPL-MCNC: 4.1 MMOL/L — SIGNIFICANT CHANGE UP (ref 3.5–5.3)
POTASSIUM SERPL-SCNC: 4.1 MMOL/L — SIGNIFICANT CHANGE UP (ref 3.5–5.3)
PROT SERPL-MCNC: 6.6 G/DL — SIGNIFICANT CHANGE UP (ref 6.6–8.7)
RBC # BLD: 2.62 M/UL — LOW (ref 3.8–5.2)
RBC # FLD: 21.1 % — HIGH (ref 10.3–14.5)
SODIUM SERPL-SCNC: 134 MMOL/L — LOW (ref 135–145)
SPECIMEN SOURCE: SIGNIFICANT CHANGE UP
WBC # BLD: 14.99 K/UL — HIGH (ref 3.8–10.5)
WBC # FLD AUTO: 14.99 K/UL — HIGH (ref 3.8–10.5)

## 2021-12-06 PROCEDURE — 93320 DOPPLER ECHO COMPLETE: CPT | Mod: 26

## 2021-12-06 PROCEDURE — 93325 DOPPLER ECHO COLOR FLOW MAPG: CPT | Mod: 26

## 2021-12-06 PROCEDURE — 99233 SBSQ HOSP IP/OBS HIGH 50: CPT

## 2021-12-06 PROCEDURE — 93312 ECHO TRANSESOPHAGEAL: CPT | Mod: 26

## 2021-12-06 RX ORDER — SODIUM CHLORIDE 9 MG/ML
1000 INJECTION, SOLUTION INTRAVENOUS
Refills: 0 | Status: DISCONTINUED | OUTPATIENT
Start: 2021-12-06 | End: 2021-12-07

## 2021-12-06 RX ORDER — HYDROMORPHONE HYDROCHLORIDE 2 MG/ML
4 INJECTION INTRAMUSCULAR; INTRAVENOUS; SUBCUTANEOUS EVERY 4 HOURS
Refills: 0 | Status: DISCONTINUED | OUTPATIENT
Start: 2021-12-06 | End: 2021-12-07

## 2021-12-06 RX ORDER — HYDROMORPHONE HYDROCHLORIDE 2 MG/ML
0.5 INJECTION INTRAMUSCULAR; INTRAVENOUS; SUBCUTANEOUS
Refills: 0 | Status: DISCONTINUED | OUTPATIENT
Start: 2021-12-06 | End: 2021-12-07

## 2021-12-06 RX ORDER — HYDROMORPHONE HYDROCHLORIDE 2 MG/ML
1 INJECTION INTRAMUSCULAR; INTRAVENOUS; SUBCUTANEOUS ONCE
Refills: 0 | Status: DISCONTINUED | OUTPATIENT
Start: 2021-12-06 | End: 2021-12-06

## 2021-12-06 RX ORDER — HYDROMORPHONE HYDROCHLORIDE 2 MG/ML
2 INJECTION INTRAMUSCULAR; INTRAVENOUS; SUBCUTANEOUS EVERY 4 HOURS
Refills: 0 | Status: DISCONTINUED | OUTPATIENT
Start: 2021-12-06 | End: 2021-12-07

## 2021-12-06 RX ORDER — GABAPENTIN 400 MG/1
300 CAPSULE ORAL THREE TIMES A DAY
Refills: 0 | Status: DISCONTINUED | OUTPATIENT
Start: 2021-12-06 | End: 2021-12-07

## 2021-12-06 RX ORDER — ACETAMINOPHEN 500 MG
650 TABLET ORAL ONCE
Refills: 0 | Status: COMPLETED | OUTPATIENT
Start: 2021-12-06 | End: 2021-12-06

## 2021-12-06 RX ORDER — ACETAMINOPHEN 500 MG
1000 TABLET ORAL ONCE
Refills: 0 | Status: COMPLETED | OUTPATIENT
Start: 2021-12-06 | End: 2021-12-06

## 2021-12-06 RX ORDER — ACETAMINOPHEN 500 MG
975 TABLET ORAL EVERY 6 HOURS
Refills: 0 | Status: DISCONTINUED | OUTPATIENT
Start: 2021-12-06 | End: 2021-12-07

## 2021-12-06 RX ORDER — METHOCARBAMOL 500 MG/1
500 TABLET, FILM COATED ORAL
Refills: 0 | Status: DISCONTINUED | OUTPATIENT
Start: 2021-12-06 | End: 2021-12-06

## 2021-12-06 RX ORDER — MAGNESIUM SULFATE 500 MG/ML
2 VIAL (ML) INJECTION ONCE
Refills: 0 | Status: COMPLETED | OUTPATIENT
Start: 2021-12-06 | End: 2021-12-06

## 2021-12-06 RX ADMIN — HYDROMORPHONE HYDROCHLORIDE 1 MILLIGRAM(S): 2 INJECTION INTRAMUSCULAR; INTRAVENOUS; SUBCUTANEOUS at 09:17

## 2021-12-06 RX ADMIN — Medication 975 MILLIGRAM(S): at 17:29

## 2021-12-06 RX ADMIN — GABAPENTIN 300 MILLIGRAM(S): 400 CAPSULE ORAL at 21:38

## 2021-12-06 RX ADMIN — HYDROMORPHONE HYDROCHLORIDE 1 MILLIGRAM(S): 2 INJECTION INTRAMUSCULAR; INTRAVENOUS; SUBCUTANEOUS at 02:02

## 2021-12-06 RX ADMIN — Medication 650 MILLIGRAM(S): at 05:38

## 2021-12-06 RX ADMIN — OXYCODONE HYDROCHLORIDE 10 MILLIGRAM(S): 5 TABLET ORAL at 00:00

## 2021-12-06 RX ADMIN — POLYETHYLENE GLYCOL 3350 17 GRAM(S): 17 POWDER, FOR SOLUTION ORAL at 14:09

## 2021-12-06 RX ADMIN — Medication 50 GRAM(S): at 08:50

## 2021-12-06 RX ADMIN — Medication 100 MILLIGRAM(S): at 21:39

## 2021-12-06 RX ADMIN — METHOCARBAMOL 750 MILLIGRAM(S): 500 TABLET, FILM COATED ORAL at 05:34

## 2021-12-06 RX ADMIN — Medication 10 MILLIGRAM(S): at 21:39

## 2021-12-06 RX ADMIN — Medication 10 MILLIGRAM(S): at 05:24

## 2021-12-06 RX ADMIN — HYDROMORPHONE HYDROCHLORIDE 1 MILLIGRAM(S): 2 INJECTION INTRAMUSCULAR; INTRAVENOUS; SUBCUTANEOUS at 13:54

## 2021-12-06 RX ADMIN — HYDROMORPHONE HYDROCHLORIDE 4 MILLIGRAM(S): 2 INJECTION INTRAMUSCULAR; INTRAVENOUS; SUBCUTANEOUS at 22:38

## 2021-12-06 RX ADMIN — Medication 650 MILLIGRAM(S): at 04:42

## 2021-12-06 RX ADMIN — LISINOPRIL 5 MILLIGRAM(S): 2.5 TABLET ORAL at 05:29

## 2021-12-06 RX ADMIN — HYDROMORPHONE HYDROCHLORIDE 1 MILLIGRAM(S): 2 INJECTION INTRAMUSCULAR; INTRAVENOUS; SUBCUTANEOUS at 02:22

## 2021-12-06 RX ADMIN — HYDROMORPHONE HYDROCHLORIDE 0.5 MILLIGRAM(S): 2 INJECTION INTRAMUSCULAR; INTRAVENOUS; SUBCUTANEOUS at 19:39

## 2021-12-06 RX ADMIN — HYDROMORPHONE HYDROCHLORIDE 4 MILLIGRAM(S): 2 INJECTION INTRAMUSCULAR; INTRAVENOUS; SUBCUTANEOUS at 21:38

## 2021-12-06 RX ADMIN — OXYCODONE HYDROCHLORIDE 10 MILLIGRAM(S): 5 TABLET ORAL at 01:30

## 2021-12-06 RX ADMIN — HYDROMORPHONE HYDROCHLORIDE 1 MILLIGRAM(S): 2 INJECTION INTRAMUSCULAR; INTRAVENOUS; SUBCUTANEOUS at 10:52

## 2021-12-06 RX ADMIN — SODIUM CHLORIDE 1 GRAM(S): 9 INJECTION INTRAMUSCULAR; INTRAVENOUS; SUBCUTANEOUS at 17:29

## 2021-12-06 RX ADMIN — ENOXAPARIN SODIUM 30 MILLIGRAM(S): 100 INJECTION SUBCUTANEOUS at 05:35

## 2021-12-06 RX ADMIN — Medication 1000 MILLIGRAM(S): at 13:45

## 2021-12-06 RX ADMIN — HYDROMORPHONE HYDROCHLORIDE 1 MILLIGRAM(S): 2 INJECTION INTRAMUSCULAR; INTRAVENOUS; SUBCUTANEOUS at 10:22

## 2021-12-06 RX ADMIN — HYDROMORPHONE HYDROCHLORIDE 1 MILLIGRAM(S): 2 INJECTION INTRAMUSCULAR; INTRAVENOUS; SUBCUTANEOUS at 14:24

## 2021-12-06 RX ADMIN — PANTOPRAZOLE SODIUM 40 MILLIGRAM(S): 20 TABLET, DELAYED RELEASE ORAL at 05:24

## 2021-12-06 RX ADMIN — METHOCARBAMOL 750 MILLIGRAM(S): 500 TABLET, FILM COATED ORAL at 14:09

## 2021-12-06 RX ADMIN — Medication 100 MILLIGRAM(S): at 05:23

## 2021-12-06 RX ADMIN — Medication 400 MILLIGRAM(S): at 13:15

## 2021-12-06 RX ADMIN — HYDROMORPHONE HYDROCHLORIDE 1 MILLIGRAM(S): 2 INJECTION INTRAMUSCULAR; INTRAVENOUS; SUBCUTANEOUS at 08:47

## 2021-12-06 RX ADMIN — HYDROMORPHONE HYDROCHLORIDE 2 MILLIGRAM(S): 2 INJECTION INTRAMUSCULAR; INTRAVENOUS; SUBCUTANEOUS at 17:28

## 2021-12-06 RX ADMIN — SODIUM CHLORIDE 1 GRAM(S): 9 INJECTION INTRAMUSCULAR; INTRAVENOUS; SUBCUTANEOUS at 05:24

## 2021-12-06 RX ADMIN — Medication 200 MILLIGRAM(S): at 17:29

## 2021-12-06 RX ADMIN — HYDROMORPHONE HYDROCHLORIDE 0.5 MILLIGRAM(S): 2 INJECTION INTRAMUSCULAR; INTRAVENOUS; SUBCUTANEOUS at 19:09

## 2021-12-06 RX ADMIN — PIPERACILLIN AND TAZOBACTAM 25 GRAM(S): 4; .5 INJECTION, POWDER, LYOPHILIZED, FOR SOLUTION INTRAVENOUS at 06:05

## 2021-12-06 RX ADMIN — Medication 200 MILLIGRAM(S): at 05:23

## 2021-12-06 RX ADMIN — Medication 100 MILLIGRAM(S): at 14:08

## 2021-12-06 RX ADMIN — Medication 10 MILLIGRAM(S): at 14:09

## 2021-12-06 RX ADMIN — HYDROMORPHONE HYDROCHLORIDE 2 MILLIGRAM(S): 2 INJECTION INTRAMUSCULAR; INTRAVENOUS; SUBCUTANEOUS at 17:58

## 2021-12-06 RX ADMIN — ENOXAPARIN SODIUM 30 MILLIGRAM(S): 100 INJECTION SUBCUTANEOUS at 17:29

## 2021-12-06 RX ADMIN — PIPERACILLIN AND TAZOBACTAM 25 GRAM(S): 4; .5 INJECTION, POWDER, LYOPHILIZED, FOR SOLUTION INTRAVENOUS at 14:03

## 2021-12-06 RX ADMIN — Medication 975 MILLIGRAM(S): at 23:44

## 2021-12-06 RX ADMIN — PANTOPRAZOLE SODIUM 40 MILLIGRAM(S): 20 TABLET, DELAYED RELEASE ORAL at 17:29

## 2021-12-06 RX ADMIN — PIPERACILLIN AND TAZOBACTAM 25 GRAM(S): 4; .5 INJECTION, POWDER, LYOPHILIZED, FOR SOLUTION INTRAVENOUS at 21:39

## 2021-12-06 NOTE — PROGRESS NOTE ADULT - ATTENDING COMMENTS
Awake  hemodynamic stable  WBC decreasing since last debridement of the L leg  No sensation or movement in the foot  Will need repeated washouts and vac changes  Antibiotics adjusted by ID Awake  hemodynamic stable  Bilateral BS  WBC decreasing since last debridement of the L leg  No sensation or movement in the foot  Will need repeated washouts and vac changes  Antibiotics adjusted by ID

## 2021-12-06 NOTE — PROGRESS NOTE ADULT - SUBJECTIVE AND OBJECTIVE BOX
Pt presents to cath holding for BENJAMIN sec to leukocytosis and continued fevers despite antibiotics  Pt reports continued pain in LLE; receives dilaudid with temporizing effects  Denies CP, palps or SOB    MEDICATIONS  (STANDING):  baclofen 10 milliGRAM(s) Oral three times a day  ciprofloxacin   IVPB      ciprofloxacin   IVPB 400 milliGRAM(s) IV Intermittent every 12 hours  clindamycin IVPB 900 milliGRAM(s) IV Intermittent every 8 hours  enoxaparin Injectable 30 milliGRAM(s) SubCutaneous two times a day  lisinopril 5 milliGRAM(s) Oral daily  methocarbamol 750 milliGRAM(s) Oral three times a day  methocarbamol 500 milliGRAM(s) Oral two times a day  pantoprazole    Tablet 40 milliGRAM(s) Oral two times a day  piperacillin/tazobactam IVPB.. 4.5 Gram(s) IV Intermittent every 8 hours  polyethylene glycol 3350 17 Gram(s) Oral daily  senna 2 Tablet(s) Oral at bedtime  sodium chloride 1 Gram(s) Oral every 12 hours    MEDICATIONS  (PRN):  HYDROmorphone  Injectable 1 milliGRAM(s) IV Push every 6 hours PRN break thru  ondansetron Injectable 4 milliGRAM(s) IV Push once PRN Nausea and/or Vomiting  oxyCODONE    IR 10 milliGRAM(s) Oral every 4 hours PRN Severe Pain (7 - 10)      Allergies    No Known Allergies    Intolerances      PAST MEDICAL & SURGICAL HISTORY:  No pertinent past medical history    History of ankle surgery        Vital Signs Last 24 Hrs  T(C): 37.1 (06 Dec 2021 09:09), Max: 38.6 (05 Dec 2021 16:53)  T(F): 98.7 (06 Dec 2021 09:09), Max: 101.5 (05 Dec 2021 16:53)  HR: 78 (06 Dec 2021 09:09) (75 - 88)  BP: 111/71 (06 Dec 2021 09:09) (100/61 - 119/66)  BP(mean): --  RR: 18 (06 Dec 2021 09:09) (18 - 20)  SpO2: 98% (06 Dec 2021 09:09) (91% - 99%)    ASA and Mallampati as per anesthesia      Physical Exam:  Constitutional: NAD, AAOx3  Cardiovascular: +S1S2 RRR  Pulmonary: CTA b/l, unlabored  GI: soft NTND +BS  Extremities: Wound vac in palce with good seal  Neuro: non focal, SILVA x4    LABS:                        7.2    14.99 )-----------( 632      ( 06 Dec 2021 05:35 )             22.8     12-    134<L>  |  101  |  8.1  ----------------------------<  101<H>  4.1   |  23.0  |  0.67    Ca    7.8<L>      06 Dec 2021 05:35  Phos  3.5     12-  Mg     1.7         TPro  6.6  /  Alb  1.9<L>  /  TBili  <0.2<L>  /  DBili  x   /  AST  22  /  ALT  9   /  AlkPhos  65        Urinalysis Basic - ( 05 Dec 2021 20:58 )    Color: Yellow / Appearance: Clear / S.015 / pH: x  Gluc: x / Ketone: Negative  / Bili: Negative / Urobili: Negative mg/dL   Blood: x / Protein: 15 mg/dL / Nitrite: Negative   Leuk Esterase: Negative / RBC: 0-2 /HPF / WBC 0-2   Sq Epi: x / Non Sq Epi: Few / Bacteria: Occasional    RECENT CULTURES:   .Tissue anterior left leg Pseudomonas aeruginosa   No polymorphonuclear leukocytes seen per low power field  Rare Gram Variable Rods seen per oil power field   Moderate Pseudomonas aeruginosa     .Tissue left lower leg Stenotrophomonas maltophilia   Rare polymorphonuclear leukocytes seen per low power field  No organisms seen per oil power field   Few Stenotrophomonas maltophilia  Few Pseudomonas aeruginosa  See previous culture [id number]     .Aspirate XXXX   Few polymorphonuclear leukocytes  No organisms seen per oil power field   No growth at 5 days    11-30 .Blood Blood XXXX XXXX   No growth at 5 days.

## 2021-12-06 NOTE — PROGRESS NOTE ADULT - ASSESSMENT
51y Female present to ED with left leg swelling, erythema, pain. Patient endorses she had left knee pain for 1 week presented 3 days ago to ED  where she states was given an steroid shot, and ibuprofen. however pain and edema worsened. Patient states she wasn't feeling herself today reason why she came. Endorses chills initially with pain 3 days ago but none since, denies history of trauma, insect bites, recent interventions, or injections to the area, contact with ticks, history of diabetes. Patient of note has increased WBC with persistent fevers;   s/p LLE exploration and debridement, admitted to SICU for hemorrhagic + septic shock, found to have GI bleed, now S/P EGD S/P clips  Has had continued leukocytosis and fevers despite antibiotic therapy    Presents for BENJAMIN and NPO confirmed  Consent to be obtained by MD     51y Female present to ED with left leg swelling, erythema, pain. Patient endorses she had left knee pain for 1 week presented 3 days ago to ED  where she states was given an steroid shot, and ibuprofen. however pain and edema worsened. Patient states she wasn't feeling herself today reason why she came. Endorses chills initially with pain 3 days ago but none since, denies history of trauma, insect bites, recent interventions, or injections to the area, contact with ticks, history of diabetes. Patient of note has increased WBC with persistent fevers;   s/p LLE exploration and debridement, admitted to SICU for hemorrhagic + septic shock, found to have GI bleed, now S/P EGD S/P clips  Has had continued leukocytosis and fevers despite antibiotic therapy    Presents for BENJAMIN and NPO confirmed  Consent to be obtained by MD                                                                            Post-Procedure Note: BENJAMIN      S/P BENJAMIN by Dr Zimmerman which did not demonstrate any evidence of vegetation    T(C): 37.1 (12-06-21 @ 09:09), Max: 38.6 (12-05-21 @ 16:53)  HR: 78 (12-06-21 @ 09:09) (75 - 88)  BP: 111/71 (12-06-21 @ 09:09) (100/61 - 119/66)  RR: 18 (12-06-21 @ 09:09) (18 - 20)  SpO2: 98% (12-06-21 @ 09:09) (91% - 99%)  Wt(kg): --    I&O's Summary    05 Dec 2021 07:01  -  06 Dec 2021 07:00  --------------------------------------------------------  IN: 200 mL / OUT: 1300 mL / NET: -1100 mL      Constitutional: A & O x 3  HEENT:   Normal oral mucosa, PERRL, EOMI	  Cardiovascular: Normal S1 S2, No JVD, No murmurs, No edema  Respiratory: Lungs clear to auscultation	  Gastrointestinal:  Soft, Non-tender, + BS	  Neurologic: Non-focal  Extremities: Normal range of motion, No clubbing, cyanosis or edema  Vascular: Peripheral pulses palpable 2+ bilaterally    TELEMETRY: SR	    	    DIAGNOSTIC TESTING:  [x ] Echocardiogram:        ASSESSMENT: 52y/o F with persistent leukocytosis and fevers despite antibiotic therapy S/P BENJAMIN without evidence of vegetations    -Post BENJAMIN orders  -preliminary verbal report; centricity pending  -Management as per hospitalist  -May return BTB

## 2021-12-06 NOTE — PROGRESS NOTE ADULT - SUBJECTIVE AND OBJECTIVE BOX
[facility]    [id number]    History:  The patient is status post Left irrigation and debridement of Left knee 11/17, Right knee 11/24.  Patient is comfortable at this time.  Comlpaints of Oxycodone not controlling her pain. Denies nausea, vomiting, chest pain, shortness of breath, abdominal pain or fever. No new complaints. No acute motor or sensory changes are reported.    Vital Signs Last 24 Hrs  T(C): 38.1 (06 Dec 2021 05:31), Max: 38.6 (05 Dec 2021 16:53)  T(F): 100.6 (06 Dec 2021 05:31), Max: 101.5 (05 Dec 2021 16:53)  HR: 85 (06 Dec 2021 04:35) (75 - 88)  BP: 119/66 (06 Dec 2021 04:35) (100/61 - 119/66)  BP(mean): --  RR: 20 (06 Dec 2021 04:35) (18 - 20)  SpO2: 91% (06 Dec 2021 04:35) (91% - 99%)  I&O's Summary    05 Dec 2021 07:01  -  06 Dec 2021 07:00  --------------------------------------------------------  IN: 200 mL / OUT: 1300 mL / NET: -1100 mL                              7.2    14.99 )-----------( 632      ( 06 Dec 2021 05:35 )             22.8     12-06    134<L>  |  101  |  8.1  ----------------------------<  101<H>  4.1   |  23.0  |  0.67    Ca    7.8<L>      06 Dec 2021 05:35  Phos  3.5     12-06  Mg     1.7     12-06    TPro  6.6  /  Alb  1.9<L>  /  TBili  <0.2<L>  /  DBili  x   /  AST  22  /  ALT  9   /  AlkPhos  65  12-06      MEDICATIONS  (STANDING):  baclofen 10 milliGRAM(s) Oral three times a day  ciprofloxacin   IVPB      ciprofloxacin   IVPB 400 milliGRAM(s) IV Intermittent every 12 hours  clindamycin IVPB 900 milliGRAM(s) IV Intermittent every 8 hours  enoxaparin Injectable 30 milliGRAM(s) SubCutaneous two times a day  lisinopril 5 milliGRAM(s) Oral daily  methocarbamol 750 milliGRAM(s) Oral three times a day  methocarbamol 500 milliGRAM(s) Oral two times a day  pantoprazole    Tablet 40 milliGRAM(s) Oral two times a day  piperacillin/tazobactam IVPB.. 4.5 Gram(s) IV Intermittent every 8 hours  polyethylene glycol 3350 17 Gram(s) Oral daily  senna 2 Tablet(s) Oral at bedtime  sodium chloride 1 Gram(s) Oral every 12 hours    MEDICATIONS  (PRN):  HYDROmorphone  Injectable 1 milliGRAM(s) IV Push every 6 hours PRN break thru  ondansetron Injectable 4 milliGRAM(s) IV Push once PRN Nausea and/or Vomiting  oxyCODONE    IR 10 milliGRAM(s) Oral every 4 hours PRN Severe Pain (7 - 10)      Physical exam: Well padded heel protectors in place bilateral lower extremity. The jknee dressings remain clean dry and intact.No wound erythema, discharge, drainage is noted. Sensation to light touch is grossly intact without focal deficit and is symmetric bilaterally. No calf tenderness. Sensation to light touch is grossly intact distally. Motor function distally is 5/5. RLE, no motor function noted to LLE, decreased sensation to LLE, dorsal. aspect of foot.   2+ dorsalis pedis pulse. Capillary refill is less than 2 seconds. No cyanosis.    Primary Orthopedic Assessment:  •   Secondary  Orthopedic Assessment(s):   •   Secondary  Medical Assessment(s):   •   Plan:   • DVT prophylaxis as prescribed, including use of compression devices and ankle pumps  • Continue physical therapy  • WBAT bilateral lower extremity. with Cam boot LLE  • • Pain control as clinically indicated  • Incentive spirometry encouraged

## 2021-12-06 NOTE — PROGRESS NOTE ADULT - ATTENDING COMMENTS
BENJAMIN done no evidence of any valvular vegetations   blood cultures negative for any organism   continue with antibiotic therapy as per ID   will signoff    can reconsult if persistent fevers and + blood cultures     Leonor Giron D.O. [facility]  Cardiology/Vascular Cardiology -[facility] Cardiology   Telephone # [phone]

## 2021-12-06 NOTE — PROGRESS NOTE ADULT - ASSESSMENT
Assessment:    51y Female present to ED with left leg swelling, erythema, pain, leukocytosis and acute renal failure. CT scan concerning for necrotizing soft tissue infection.   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   Downgraded to floor from SICU 11/22/21.  Patient had complained or RLE swelling. Right lower extremity duplex and negative for DVT; however + swelling in popliteal fossa to R calf.  A Rt knee I&D performed. LLE with streptococcus pyogenes. 11/30 ortho aspirated additional fluid from left knee awaiting effusion results. Pt underwent debridement adn was out 12/3/21 and tissue culture and biopsy was change to pathology.   - s/p excisional debridement  Cultures growing Pseudomonas and patient currently on Clinda, Cipro, and Zosyn    Plan:  - Continue IV abx: Clinda, ciprofloxacin and Zosyn.  - BENJAMIN for Monday 12/6.  NPO Sunday after midnight  - Pain control   - Monitor fevers.  - new blood cultures sent on 11/28. Tissue culture and pathology 12/3-Pseudomonas  - Daily dressing changes, wound care: wound vac change 12/6.  - f/u labs by consult services  - PT/OT consult and recs.  - continue wound vac b/l  - WBAT LLE in CAM boot  - continue ROM of exercises  - per GI; continue to trend CBC and transfuse for a goal of hgb 8 or higher.  - continue PPI BID for mucosal cytoprotection  - s/p EGD

## 2021-12-06 NOTE — CHART NOTE - NSCHARTNOTEFT_GEN_A_CORE
Source: Patient [x ]  Family [ ]   other [x ]    51y Female present to ED with left leg swelling, erythema, pain, leukocytosis and acute renal failure. CT scan concerning for necrotizing soft tissue infection.   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   Downgraded to floor from SICU 11/22/21.  Patient had complained or RLE swelling. Right lower extremity duplex and negative for DVT; however + swelling in popliteal fossa to R calf.  A Rt knee I&D performed. LLE with streptococcus pyogenes. 11/30 ortho aspirated additional fluid from left knee awaiting effusion results. Pt underwent debridement adn was out 12/3/21 and tissue culture and biopsy was change to pathology.   - s/p excisional debridement  Cultures growing Pseudomonas and patient currently on Clinda, Cipro, and Zosyn    Current Diet: Diet, NPO:   NPO for Procedure/Test     NPO Start Date: 06-Dec-2021,   NPO Start Time: 00:01  Except Medications (12-04-21 @ 07:29)  Diet, Regular (12-02-21 @ 23:00)      Patient reports [ ] nausea  [ ] vomiting [ ] diarrhea [ ] constipation  [ ]chewing problems [ ] swallowing issues  [ ] other:     PO intake:  < 50% [ ]   50-75%  [ ]   %  [ x]  other :    Source for PO intake [ x] Patient [ ] family [ ] chart [ ] staff [ ] other      Current Weight:   12/2 93.5 kg  12/1 106.5 kg  11/24 93.3 kg  11/22 111.2 kg  11/21 110.2 kg  11/20 103.5 kg  11/17 93.5 kg    % Weight Change   accuracy is questionable  Edema : 1+ generalized. 2+legs and ankles, 3+ l foot        Pertinent Medications: MEDICATIONS  (STANDING):  baclofen 10 milliGRAM(s) Oral three times a day  ciprofloxacin   IVPB      ciprofloxacin   IVPB 400 milliGRAM(s) IV Intermittent every 12 hours  clindamycin IVPB 900 milliGRAM(s) IV Intermittent every 8 hours  enoxaparin Injectable 30 milliGRAM(s) SubCutaneous two times a day  lisinopril 5 milliGRAM(s) Oral daily  methocarbamol 750 milliGRAM(s) Oral three times a day  methocarbamol 500 milliGRAM(s) Oral two times a day  pantoprazole    Tablet 40 milliGRAM(s) Oral two times a day  piperacillin/tazobactam IVPB.. 4.5 Gram(s) IV Intermittent every 8 hours  polyethylene glycol 3350 17 Gram(s) Oral daily  senna 2 Tablet(s) Oral at bedtime  sodium chloride 1 Gram(s) Oral every 12 hours    MEDICATIONS  (PRN):  HYDROmorphone  Injectable 1 milliGRAM(s) IV Push every 6 hours PRN break thru  ondansetron Injectable 4 milliGRAM(s) IV Push once PRN Nausea and/or Vomiting  oxyCODONE    IR 10 milliGRAM(s) Oral every 4 hours PRN Severe Pain (7 - 10)    Pertinent Labs: CBC Full  -  ( 06 Dec 2021 05:35 )  WBC Count : 14.99 K/uL  RBC Count : 2.62 M/uL  Hemoglobin : 7.2 g/dL  Hematocrit : 22.8 %  Platelet Count - Automated : 632 K/uL  Mean Cell Volume : 87.0 fl  Mean Cell Hemoglobin : 27.5 pg  Mean Cell Hemoglobin Concentration : 31.6 gm/dL  Auto Neutrophil # : 11.05 K/uL  Auto Lymphocyte # : 2.02 K/uL  Auto Monocyte # : 0.76 K/uL  Auto Eosinophil # : 0.56 K/uL  Auto Basophil # : 0.06 K/uL  Auto Neutrophil % : 73.7 %  Auto Lymphocyte % : 13.5 %  Auto Monocyte % : 5.1 %  Auto Eosinophil % : 3.7 %  Auto Basophil % : 0.4 %    12-06 Na134 mmol/L<L> Glu 101 mg/dL<H> K+ 4.1 mmol/L Cr  0.67 mg/dL BUN 8.1 mg/dL Phos 3.5 mg/dL Alb 1.9 g/dL<L> PAB n/a             Skin: sx incision     Nutrition focused physical exam conducted - found signs of malnutrition [ ]absent [ ]present    Subcutaneous fat loss: [ ] Orbital fat pads region, [ ]Buccal fat region, [ ]Triceps region,  [ ]Ribs region    Muscle wasting: [ ]Temples region, [ ]Clavicle region, [ ]Shoulder region, [ ]Scapula region, [ ]Interosseous region,  [ ]thigh region, [ ]Calf region    Estimated Needs:   [x ] no change since previous assessment  [ ] recalculated:     Current Nutrition Diagnosis: Pt presents at risk secondary to increased protein calorie needs, related to increased  physiologic demand stress factor as evidenced by s/p LLE exploration and debridement of L leg necrotizing soft tissue infection. PO intake is good.       Recommendations:   1) RX MVI, Vit C  2) Cont diet  3) consider ad Swapnil BID  4) monitor weights for accuracy and trends        Monitoring and Evaluation:   [ ] PO intake [ ] Tolerance to diet prescription [X] Weights  [X] Follow up per protocol [X] Labs:

## 2021-12-06 NOTE — PROGRESS NOTE ADULT - SUBJECTIVE AND OBJECTIVE BOX
Acute Care Surgery/Trauma Surgery Progress Note:    Patient evaluated at bedside. No acute distress. Patient continues with intermittent fever, work-up sent without any clear source, WBC rising. remains HDN stable.     Diet, NPO:   NPO for Procedure/Test     NPO Start Date: 06-Dec-2021,   NPO Start Time: 00:01  Except Medications (12-04-21 @ 07:29)  Diet, Regular (12-02-21 @ 23:00)      Scheduled Medications:   baclofen 10 milliGRAM(s) Oral three times a day  ciprofloxacin   IVPB      ciprofloxacin   IVPB 400 milliGRAM(s) IV Intermittent every 12 hours  clindamycin IVPB 900 milliGRAM(s) IV Intermittent every 8 hours  enoxaparin Injectable 30 milliGRAM(s) SubCutaneous two times a day  lisinopril 5 milliGRAM(s) Oral daily  methocarbamol 750 milliGRAM(s) Oral three times a day  pantoprazole    Tablet 40 milliGRAM(s) Oral two times a day  piperacillin/tazobactam IVPB.. 4.5 Gram(s) IV Intermittent every 8 hours  polyethylene glycol 3350 17 Gram(s) Oral daily  senna 2 Tablet(s) Oral at bedtime  sodium chloride 1 Gram(s) Oral every 12 hours    PRN Medications:  HYDROmorphone  Injectable 1 milliGRAM(s) IV Push every 6 hours PRN break thru  ondansetron Injectable 4 milliGRAM(s) IV Push once PRN Nausea and/or Vomiting  oxyCODONE    IR 10 milliGRAM(s) Oral every 4 hours PRN Severe Pain (7 - 10)      Objective:   T(F): 98.8 (12-04 @ 21:28), Max: 99.3 (12-04 @ 18:08)  HR: 85 (12-04 @ 21:28) (81 - 87)  BP: 113/63 (12-04 @ 21:28) (103/52 - 113/63)  BP(mean): --  ABP: --  ABP(mean): --  RR: 18 (12-04 @ 21:28) (18 - 18)  SpO2: 100% (12-04 @ 21:28) (94% - 100%)    Physical Exam:   GEN: patient resting comfortably in bed, in no acute distress  RESP: respirations are unlabored, no accessory muscle use, no conversational dyspnea  CVS: RRR  GI: Abdomen soft, non-tender, non-distended, no rebound tenderness / guarding    I&O's    12-03 @ 07:01  -  12-04 @ 07:00  --------------------------------------------------------  IN:  Total IN: 0 mL    OUT:    Voided (mL): 1000 mL  Total OUT: 1000 mL    Total NET: -1000 mL          LABS:                        9.0    13.48 )-----------( 656      ( 04 Dec 2021 09:36 )             28.7     12-04    130<L>  |  99  |  9.2  ----------------------------<  85  4.1   |  18.0<L>  |  0.68    Ca    7.9<L>      04 Dec 2021 09:36  Phos  3.4     12-04  Mg     1.6     12-04    TPro  6.8  /  Alb  1.6<L>  /  TBili  0.2<L>  /  DBili  x   /  AST  22  /  ALT  9   /  AlkPhos  91  12-03    PT/INR - ( 04 Dec 2021 09:36 )   PT: 15.2 sec;   INR: 1.33 ratio         PTT - ( 04 Dec 2021 09:36 )  PTT:41.2 sec      MICROBIOLOGY:     Culture - Fungal, Tissue (collected 12-03 @ 00:24)  Source: .Tissue anterior left leg  Preliminary Report (12-03 @ 08:59):    Testing in progress    Culture - Acid Fast - Tissue w/Smear (collected 12-03 @ 00:24)  Source: .Tissue anterior left leg  Preliminary Report (12-04 @ 15:04):    Culture is being performed.    Culture - Tissue with Gram Stain (collected 12-03 @ 00:24)  Source: .Tissue anterior left leg  Gram Stain (12-03 @ 02:29):    No polymorphonuclear leukocytes seen per low power field    Rare Gram Variable Rods seen per oil power field  Preliminary Report (12-03 @ 18:36):    Moderate Pseudomonas aeruginosa  Organism: Pseudomonas aeruginosa (12-04 @ 19:41)  Organism: Pseudomonas aeruginosa (12-04 @ 19:41)      -  Amikacin: S <=16      -  Aztreonam: R >16      -  Cefepime: S 8      -  Ceftazidime: S 4      -  Ciprofloxacin: S 0.5      -  Gentamicin: S <=2      -  Imipenem: S <=1      -  Levofloxacin: I 2      -  Meropenem: S <=1      -  Piperacillin/Tazobactam: S 16      -  Tobramycin: S <=2      Method Type: ANASTACIO    Culture - Fungal, Tissue (collected 12-03 @ 00:21)  Source: .Tissue left lower leg  Preliminary Report (12-03 @ 08:18):    Testing in progress    Culture - Acid Fast - Tissue w/Smear (collected 12-03 @ 00:21)  Source: .Tissue left lower leg  Preliminary Report (12-04 @ 15:04):    Culture is being performed.    Culture - Tissue with Gram Stain (collected 12-03 @ 00:21)  Source: .Tissue left lower leg  Gram Stain (12-03 @ 02:28):    Rare polymorphonuclear leukocytes seen per low power field    No organisms seen per oil power field  Preliminary Report (12-04 @ 19:16):    Few Stenotrophomonas maltophilia    Few Pseudomonas aeruginosa    See previous culture [id number]  Organism: Stenotrophomonas maltophilia (12-04 @ 19:11)  Organism: Stenotrophomonas maltophilia (12-04 @ 19:11)      -  Ceftazidime: R >16      -  Levofloxacin: S <=0.5      -  Trimethoprim/Sulfamethoxazole: S <=0.5/9.5      Method Type: ANASTACIO    Culture - Aspirate with Gram Stain (collected 12-01 @ 12:54)  Source: .Aspirate  Gram Stain (12-01 @ 18:23):    Few polymorphonuclear leukocytes    No organisms seen per oil power field  Preliminary Report (12-02 @ 12:26):    No growth    Culture - Blood (collected 11-30 @ 10:40)  Source: .Blood Blood  Preliminary Report (12-02 @ 11:01):    No growth at 48 hours    Culture - Blood (collected 11-28 @ 09:14)  Source: .Blood Blood-Peripheral  Final Report (12-03 @ 11:13):    No growth at 5 days.    Culture - Blood (collected 11-28 @ 09:14)  Source: .Blood Blood-Peripheral  Final Report (12-03 @ 11:13):    No growth at 5 days.    PATHOLOGY: pending report of tissue analysis.

## 2021-12-06 NOTE — CONSULT NOTE ADULT - TIME BILLING
reviewed notes, labs, spoke to SICU PA, spoke to anesthesia
Pain Management - chart review, patient interview

## 2021-12-06 NOTE — PROGRESS NOTE ADULT - ATTENDING COMMENTS
pt seen and examined. Agree /w above. plan for OR today for vac change and I&D /w ACS. Dressing c/d/i on L knee, sutures previously removed. R dressing c/d/i. Repeat tap negative on L knee, Cx no growth., COntinue PT for ROM of b/l knees. Continue IV abx. ACS for LLE VAC and wound care. Ortho to follow. Plan to remove R knee sutures tomorrow.

## 2021-12-07 LAB
ANION GAP SERPL CALC-SCNC: 13 MMOL/L — SIGNIFICANT CHANGE UP (ref 5–17)
BASOPHILS # BLD AUTO: 0.08 K/UL — SIGNIFICANT CHANGE UP (ref 0–0.2)
BASOPHILS NFR BLD AUTO: 0.5 % — SIGNIFICANT CHANGE UP (ref 0–2)
BUN SERPL-MCNC: 6 MG/DL — LOW (ref 8–20)
CALCIUM SERPL-MCNC: 8.1 MG/DL — LOW (ref 8.6–10.2)
CHLORIDE SERPL-SCNC: 100 MMOL/L — SIGNIFICANT CHANGE UP (ref 98–107)
CO2 SERPL-SCNC: 21 MMOL/L — LOW (ref 22–29)
CREAT SERPL-MCNC: 0.64 MG/DL — SIGNIFICANT CHANGE UP (ref 0.5–1.3)
CULTURE RESULTS: SIGNIFICANT CHANGE UP
EOSINOPHIL # BLD AUTO: 0.59 K/UL — HIGH (ref 0–0.5)
EOSINOPHIL NFR BLD AUTO: 3.7 % — SIGNIFICANT CHANGE UP (ref 0–6)
GLUCOSE SERPL-MCNC: 89 MG/DL — SIGNIFICANT CHANGE UP (ref 70–99)
HCT VFR BLD CALC: 27.8 % — LOW (ref 34.5–45)
HGB BLD-MCNC: 8.7 G/DL — LOW (ref 11.5–15.5)
IMM GRANULOCYTES NFR BLD AUTO: 3.3 % — HIGH (ref 0–1.5)
LYMPHOCYTES # BLD AUTO: 14.1 % — SIGNIFICANT CHANGE UP (ref 13–44)
LYMPHOCYTES # BLD AUTO: 2.25 K/UL — SIGNIFICANT CHANGE UP (ref 1–3.3)
MAGNESIUM SERPL-MCNC: 1.8 MG/DL — SIGNIFICANT CHANGE UP (ref 1.6–2.6)
MCHC RBC-ENTMCNC: 27.8 PG — SIGNIFICANT CHANGE UP (ref 27–34)
MCHC RBC-ENTMCNC: 31.3 GM/DL — LOW (ref 32–36)
MCV RBC AUTO: 88.8 FL — SIGNIFICANT CHANGE UP (ref 80–100)
MONOCYTES # BLD AUTO: 0.88 K/UL — SIGNIFICANT CHANGE UP (ref 0–0.9)
MONOCYTES NFR BLD AUTO: 5.5 % — SIGNIFICANT CHANGE UP (ref 2–14)
NEUTROPHILS # BLD AUTO: 11.61 K/UL — HIGH (ref 1.8–7.4)
NEUTROPHILS NFR BLD AUTO: 72.9 % — SIGNIFICANT CHANGE UP (ref 43–77)
ORGANISM # SPEC MICROSCOPIC CNT: SIGNIFICANT CHANGE UP
PHOSPHATE SERPL-MCNC: 3.2 MG/DL — SIGNIFICANT CHANGE UP (ref 2.4–4.7)
PLATELET # BLD AUTO: 789 K/UL — HIGH (ref 150–400)
POTASSIUM SERPL-MCNC: 3.9 MMOL/L — SIGNIFICANT CHANGE UP (ref 3.5–5.3)
POTASSIUM SERPL-SCNC: 3.9 MMOL/L — SIGNIFICANT CHANGE UP (ref 3.5–5.3)
RBC # BLD: 3.13 M/UL — LOW (ref 3.8–5.2)
RBC # FLD: 21.5 % — HIGH (ref 10.3–14.5)
SARS-COV-2 RNA SPEC QL NAA+PROBE: SIGNIFICANT CHANGE UP
SODIUM SERPL-SCNC: 134 MMOL/L — LOW (ref 135–145)
SPECIMEN SOURCE: SIGNIFICANT CHANGE UP
WBC # BLD: 15.93 K/UL — HIGH (ref 3.8–10.5)
WBC # FLD AUTO: 15.93 K/UL — HIGH (ref 3.8–10.5)

## 2021-12-07 PROCEDURE — 99232 SBSQ HOSP IP/OBS MODERATE 35: CPT

## 2021-12-07 RX ORDER — MAGNESIUM SULFATE 500 MG/ML
2 VIAL (ML) INJECTION ONCE
Refills: 0 | Status: COMPLETED | OUTPATIENT
Start: 2021-12-07 | End: 2021-12-07

## 2021-12-07 RX ORDER — FENTANYL CITRATE 50 UG/ML
50 INJECTION INTRAVENOUS
Refills: 0 | Status: DISCONTINUED | OUTPATIENT
Start: 2021-12-07 | End: 2021-12-07

## 2021-12-07 RX ORDER — PIPERACILLIN AND TAZOBACTAM 4; .5 G/20ML; G/20ML
4.5 INJECTION, POWDER, LYOPHILIZED, FOR SOLUTION INTRAVENOUS EVERY 8 HOURS
Refills: 0 | Status: ACTIVE | OUTPATIENT
Start: 2021-12-07 | End: 2022-01-04

## 2021-12-07 RX ORDER — GABAPENTIN 400 MG/1
300 CAPSULE ORAL THREE TIMES A DAY
Refills: 0 | Status: DISCONTINUED | OUTPATIENT
Start: 2021-12-07 | End: 2021-12-15

## 2021-12-07 RX ORDER — HYDROMORPHONE HYDROCHLORIDE 2 MG/ML
4 INJECTION INTRAMUSCULAR; INTRAVENOUS; SUBCUTANEOUS EVERY 4 HOURS
Refills: 0 | Status: DISCONTINUED | OUTPATIENT
Start: 2021-12-07 | End: 2021-12-14

## 2021-12-07 RX ORDER — PANTOPRAZOLE SODIUM 20 MG/1
40 TABLET, DELAYED RELEASE ORAL
Refills: 0 | Status: DISCONTINUED | OUTPATIENT
Start: 2021-12-07 | End: 2021-12-15

## 2021-12-07 RX ORDER — ACETAMINOPHEN 500 MG
975 TABLET ORAL EVERY 6 HOURS
Refills: 0 | Status: DISCONTINUED | OUTPATIENT
Start: 2021-12-07 | End: 2021-12-15

## 2021-12-07 RX ORDER — ENOXAPARIN SODIUM 100 MG/ML
30 INJECTION SUBCUTANEOUS
Refills: 0 | Status: DISCONTINUED | OUTPATIENT
Start: 2021-12-07 | End: 2021-12-14

## 2021-12-07 RX ORDER — ONDANSETRON 8 MG/1
4 TABLET, FILM COATED ORAL ONCE
Refills: 0 | Status: DISCONTINUED | OUTPATIENT
Start: 2021-12-07 | End: 2021-12-07

## 2021-12-07 RX ORDER — HYDROMORPHONE HYDROCHLORIDE 2 MG/ML
0.5 INJECTION INTRAMUSCULAR; INTRAVENOUS; SUBCUTANEOUS
Refills: 0 | Status: DISCONTINUED | OUTPATIENT
Start: 2021-12-07 | End: 2021-12-14

## 2021-12-07 RX ORDER — HYDROMORPHONE HYDROCHLORIDE 2 MG/ML
2 INJECTION INTRAMUSCULAR; INTRAVENOUS; SUBCUTANEOUS EVERY 4 HOURS
Refills: 0 | Status: DISCONTINUED | OUTPATIENT
Start: 2021-12-07 | End: 2021-12-14

## 2021-12-07 RX ORDER — LISINOPRIL 2.5 MG/1
5 TABLET ORAL DAILY
Refills: 0 | Status: DISCONTINUED | OUTPATIENT
Start: 2021-12-07 | End: 2021-12-10

## 2021-12-07 RX ORDER — BACLOFEN 100 %
10 POWDER (GRAM) MISCELLANEOUS THREE TIMES A DAY
Refills: 0 | Status: DISCONTINUED | OUTPATIENT
Start: 2021-12-07 | End: 2021-12-15

## 2021-12-07 RX ORDER — SODIUM CHLORIDE 9 MG/ML
1 INJECTION INTRAMUSCULAR; INTRAVENOUS; SUBCUTANEOUS EVERY 12 HOURS
Refills: 0 | Status: DISCONTINUED | OUTPATIENT
Start: 2021-12-07 | End: 2021-12-15

## 2021-12-07 RX ORDER — PIPERACILLIN AND TAZOBACTAM 4; .5 G/20ML; G/20ML
4.5 INJECTION, POWDER, LYOPHILIZED, FOR SOLUTION INTRAVENOUS EVERY 8 HOURS
Refills: 0 | Status: DISCONTINUED | OUTPATIENT
Start: 2021-12-07 | End: 2021-12-07

## 2021-12-07 RX ORDER — SODIUM CHLORIDE 9 MG/ML
1000 INJECTION, SOLUTION INTRAVENOUS
Refills: 0 | Status: DISCONTINUED | OUTPATIENT
Start: 2021-12-07 | End: 2021-12-11

## 2021-12-07 RX ORDER — SENNA PLUS 8.6 MG/1
2 TABLET ORAL AT BEDTIME
Refills: 0 | Status: DISCONTINUED | OUTPATIENT
Start: 2021-12-07 | End: 2021-12-15

## 2021-12-07 RX ORDER — SODIUM CHLORIDE 9 MG/ML
1000 INJECTION, SOLUTION INTRAVENOUS
Refills: 0 | Status: DISCONTINUED | OUTPATIENT
Start: 2021-12-07 | End: 2021-12-07

## 2021-12-07 RX ORDER — POLYETHYLENE GLYCOL 3350 17 G/17G
17 POWDER, FOR SOLUTION ORAL DAILY
Refills: 0 | Status: DISCONTINUED | OUTPATIENT
Start: 2021-12-07 | End: 2021-12-15

## 2021-12-07 RX ADMIN — Medication 975 MILLIGRAM(S): at 05:07

## 2021-12-07 RX ADMIN — FENTANYL CITRATE 50 MICROGRAM(S): 50 INJECTION INTRAVENOUS at 20:40

## 2021-12-07 RX ADMIN — GABAPENTIN 300 MILLIGRAM(S): 400 CAPSULE ORAL at 13:57

## 2021-12-07 RX ADMIN — PIPERACILLIN AND TAZOBACTAM 25 GRAM(S): 4; .5 INJECTION, POWDER, LYOPHILIZED, FOR SOLUTION INTRAVENOUS at 05:08

## 2021-12-07 RX ADMIN — Medication 200 MILLIGRAM(S): at 05:07

## 2021-12-07 RX ADMIN — HYDROMORPHONE HYDROCHLORIDE 0.5 MILLIGRAM(S): 2 INJECTION INTRAMUSCULAR; INTRAVENOUS; SUBCUTANEOUS at 16:00

## 2021-12-07 RX ADMIN — GABAPENTIN 300 MILLIGRAM(S): 400 CAPSULE ORAL at 05:10

## 2021-12-07 RX ADMIN — SODIUM CHLORIDE 125 MILLILITER(S): 9 INJECTION, SOLUTION INTRAVENOUS at 11:49

## 2021-12-07 RX ADMIN — GABAPENTIN 300 MILLIGRAM(S): 400 CAPSULE ORAL at 23:00

## 2021-12-07 RX ADMIN — SODIUM CHLORIDE 125 MILLILITER(S): 9 INJECTION, SOLUTION INTRAVENOUS at 23:38

## 2021-12-07 RX ADMIN — HYDROMORPHONE HYDROCHLORIDE 0.5 MILLIGRAM(S): 2 INJECTION INTRAMUSCULAR; INTRAVENOUS; SUBCUTANEOUS at 12:04

## 2021-12-07 RX ADMIN — Medication 10 MILLIGRAM(S): at 13:57

## 2021-12-07 RX ADMIN — SODIUM CHLORIDE 1 GRAM(S): 9 INJECTION INTRAMUSCULAR; INTRAVENOUS; SUBCUTANEOUS at 23:40

## 2021-12-07 RX ADMIN — HYDROMORPHONE HYDROCHLORIDE 4 MILLIGRAM(S): 2 INJECTION INTRAMUSCULAR; INTRAVENOUS; SUBCUTANEOUS at 06:07

## 2021-12-07 RX ADMIN — HYDROMORPHONE HYDROCHLORIDE 0.5 MILLIGRAM(S): 2 INJECTION INTRAMUSCULAR; INTRAVENOUS; SUBCUTANEOUS at 11:49

## 2021-12-07 RX ADMIN — PIPERACILLIN AND TAZOBACTAM 25 GRAM(S): 4; .5 INJECTION, POWDER, LYOPHILIZED, FOR SOLUTION INTRAVENOUS at 13:58

## 2021-12-07 RX ADMIN — SODIUM CHLORIDE 125 MILLILITER(S): 9 INJECTION, SOLUTION INTRAVENOUS at 01:43

## 2021-12-07 RX ADMIN — Medication 975 MILLIGRAM(S): at 12:40

## 2021-12-07 RX ADMIN — Medication 975 MILLIGRAM(S): at 00:44

## 2021-12-07 RX ADMIN — Medication 10 MILLIGRAM(S): at 23:05

## 2021-12-07 RX ADMIN — ENOXAPARIN SODIUM 30 MILLIGRAM(S): 100 INJECTION SUBCUTANEOUS at 23:00

## 2021-12-07 RX ADMIN — LISINOPRIL 5 MILLIGRAM(S): 2.5 TABLET ORAL at 05:10

## 2021-12-07 RX ADMIN — Medication 50 GRAM(S): at 08:48

## 2021-12-07 RX ADMIN — Medication 975 MILLIGRAM(S): at 22:58

## 2021-12-07 RX ADMIN — SODIUM CHLORIDE 1 GRAM(S): 9 INJECTION INTRAMUSCULAR; INTRAVENOUS; SUBCUTANEOUS at 05:11

## 2021-12-07 RX ADMIN — FENTANYL CITRATE 50 MICROGRAM(S): 50 INJECTION INTRAVENOUS at 21:18

## 2021-12-07 RX ADMIN — HYDROMORPHONE HYDROCHLORIDE 2 MILLIGRAM(S): 2 INJECTION INTRAMUSCULAR; INTRAVENOUS; SUBCUTANEOUS at 13:57

## 2021-12-07 RX ADMIN — PIPERACILLIN AND TAZOBACTAM 25 GRAM(S): 4; .5 INJECTION, POWDER, LYOPHILIZED, FOR SOLUTION INTRAVENOUS at 23:03

## 2021-12-07 RX ADMIN — HYDROMORPHONE HYDROCHLORIDE 0.5 MILLIGRAM(S): 2 INJECTION INTRAMUSCULAR; INTRAVENOUS; SUBCUTANEOUS at 16:15

## 2021-12-07 RX ADMIN — HYDROMORPHONE HYDROCHLORIDE 2 MILLIGRAM(S): 2 INJECTION INTRAMUSCULAR; INTRAVENOUS; SUBCUTANEOUS at 14:50

## 2021-12-07 RX ADMIN — HYDROMORPHONE HYDROCHLORIDE 4 MILLIGRAM(S): 2 INJECTION INTRAMUSCULAR; INTRAVENOUS; SUBCUTANEOUS at 09:33

## 2021-12-07 RX ADMIN — PANTOPRAZOLE SODIUM 40 MILLIGRAM(S): 20 TABLET, DELAYED RELEASE ORAL at 22:58

## 2021-12-07 RX ADMIN — Medication 975 MILLIGRAM(S): at 11:49

## 2021-12-07 RX ADMIN — Medication 100 MILLIGRAM(S): at 05:07

## 2021-12-07 RX ADMIN — HYDROMORPHONE HYDROCHLORIDE 4 MILLIGRAM(S): 2 INJECTION INTRAMUSCULAR; INTRAVENOUS; SUBCUTANEOUS at 05:06

## 2021-12-07 RX ADMIN — HYDROMORPHONE HYDROCHLORIDE 4 MILLIGRAM(S): 2 INJECTION INTRAMUSCULAR; INTRAVENOUS; SUBCUTANEOUS at 10:15

## 2021-12-07 RX ADMIN — Medication 975 MILLIGRAM(S): at 06:07

## 2021-12-07 RX ADMIN — Medication 10 MILLIGRAM(S): at 05:07

## 2021-12-07 NOTE — PROGRESS NOTE ADULT - ATTENDING COMMENTS
Awake alert  Unable to move foot   Excellent distal pulses  washout and debridement leg/vac change today   - s/p BENJAMIN 12/6 -  no evidence of any valvular vegetations   - continue IV abx: Clinda, ciprofloxacin and Zosyn  - continue pain control   - continue to trend fevers  - new blood cultures sent on 11/28. Tissue culture and pathology 12/3-Pseudomonas  - daily dressing changes, wound care: wound vac change 12/6.  - f/u labs by consult services  - f/u PT/OT consult and recs  - continue wound vac b/l  - WBAT LLE in CAM boot  - continue ROM of exercises  - per GI; continue to trend CBC and transfuse for a goal of hgb 8 or higher.  - continue PPI BID for mucosal cytoprotection

## 2021-12-07 NOTE — PROGRESS NOTE ADULT - SUBJECTIVE AND OBJECTIVE BOX
INFECTIOUS DISEASES AND INTERNAL MEDICINE at [place]  =======================================================  Theo Morrow MD  Diplomates American Board of Internal Medicine and Infectious Diseases  Telephone [phone]  Fax            [phone]  =======================================================    LUIS FERNANDO DAVIS [id number]  Follow up: group A STREP NECROTIZING SOFT TISSUE INFECTION     repeat CT scan of lower ext with collections.   S/P OR WASHOUT 12/2  AND PLAN FOR OR AGAIN  TODAY      Allergies:  No Known Allergies       FAMILY   FAMILY HISTORY:  FH: HTN (hypertension) (Mother)      REVIEW OF SYSTEMS:  CONSTITUTIONAL:  No Fever or chills  HEENT:   No diplopia or blurred vision.  No earache, sore throat or runny nose.  CARDIOVASCULAR:  No pressure, squeezing, strangling, tightness, heaviness or aching about the chest, neck, axilla or epigastrium.  RESPIRATORY:  No cough, shortness of breath, PND or orthopnea.  GASTROINTESTINAL:  No nausea, vomiting or diarrhea.  GENITOURINARY:  No dysuria, frequency or urgency. No Blood in urine  MUSCULOSKELETAL:   AS PER HPI   SKIN:  No change in skin, hair or nails.  NEUROLOGIC:  No paresthesias, fasciculations, seizures or weakness.  PSYCHIATRIC:  No disorder of thought or mood.  ENDOCRINE:  No heat or cold intolerance, polyuria or polydipsia.  HEMATOLOGICAL:  No easy bruising or bleeding.            Physical Exam:     GEN: NAD,    HEENT: normocephalic and atraumatic. EOMI. ASHISH.    NECK: Supple. No carotid bruits.  No lymphadenopathy or thyromegaly.  LUNGS: Clear to auscultation.  HEART: Regular rate and rhythm without murmur.  ABDOMEN: Soft, nontender, and nondistended.  Positive bowel sounds.    : No CVA tenderness  EXTREMITIES: LEFT LEG WRAPPED; left thigh vac in place  right knee with dressing in place   UPPER EXT STRENGTH GOOD BUT CAN T RAISE HANDS ALL THE WAY, localized tenderness to both shoulders   NEUROLOGIC:  C AWAKE ALERT Appropriate affect .  SKIN: No ulceration or induration present.           Vitals:  ==== Vital Signs Last 24 Hrs  T(C): 37.2 (07 Dec 2021 04:25), Max: 37.9 (06 Dec 2021 17:20)  T(F): 98.9 (07 Dec 2021 04:25), Max: 100.2 (06 Dec 2021 17:20)  HR: 63 (07 Dec 2021 04:25) (63 - 92)  BP: 112/72 (07 Dec 2021 04:25) (109/67 - 118/72)  BP(mean): --  RR: 18 (07 Dec 2021 04:25) (17 - 18)  SpO2: 93% (07 Dec 2021 04:25) (91% - 100%)      =======================================================  Current Antibiotics:  clindamycin IVPB 900 milliGRAM(s) IV Intermittent every 8 hours  piperacillin/tazobactam IVPB.. 4.5 Gram(s) IV Intermittent every 8 hours    Other medications:  baclofen 10 milliGRAM(s) Oral three times a day  collagenase Ointment 1 Application(s) Topical two times a day  gabapentin 200 milliGRAM(s) Oral three times a day  glucagon  Injectable 1 milliGRAM(s) IntraMuscular once  heparin   Injectable 5000 Unit(s) SubCutaneous every 8 hours  lisinopril 5 milliGRAM(s) Oral daily  methocarbamol 750 milliGRAM(s) Oral three times a day  pantoprazole    Tablet 40 milliGRAM(s) Oral two times a day  polyethylene glycol 3350 17 Gram(s) Oral daily  senna 2 Tablet(s) Oral at bedtime  sodium chloride 1 Gram(s) Oral every 12 hours  sodium chloride 0.9%. 1000 milliLiter(s) IV Continuous <Continuous>      =======================================================  Labs:                                                    8.7    15.93 )-----------( 789      ( 07 Dec 2021 06:36 )             27.8   12-07    134<L>  |  100  |  6.0<L>  ----------------------------<  89  3.9   |  21.0<L>  |  0.64    Ca    8.1<L>      07 Dec 2021 06:36  Phos  3.2     12-07  Mg     1.8     12-07    TPro  6.6  /  Alb  1.9<L>  /  TBili  <0.2<L>  /  DBili  x   /  AST  22  /  ALT  9   /  AlkPhos  65  12-06            Culture - Blood (collected 11-28-21 @ 09:14)  Source: .Blood Blood-Peripheral    Culture - Blood (collected 11-28-21 @ 09:14)  Source: .Blood Blood-Peripheral    Culture - Fungal, Body Fluid (collected 11-24-21 @ 13:35)  Source: .Body Fluid Right Knee Fluid    Culture - Acid Fast - Body Fluid w/Smear (collected 11-24-21 @ 13:35)  Source: .Body Fluid Right Knee Fluid    Culture - Body Fluid with Gram Stain (collected 11-24-21 @ 13:35)  Source: .Body Fluid Right Knee Fluid  Gram Stain (11-25-21 @ 01:27):    polymorphonuclear leukocytes seen per low power field    No organisms seen per oil power field    Culture - Surgical Swab (collected 11-24-21 @ 12:30)  Source: .Surgical Swab Swab Right Knee #2  Final Report (11-29-21 @ 07:55):    No growth at 5 days    Culture - Surgical Swab (collected 11-24-21 @ 12:30)  Source: .Surgical Swab Swab Right Knee #1  Final Report (11-29-21 @ 07:56):    No growth at 5 days    Culture - Surgical Swab (collected 11-24-21 @ 12:28)  Source: .Surgical Swab Swab Right Knee #3  Final Report (11-29-21 @ 08:01):    No growth at 5 days    Culture - Body Fluid with Gram Stain (collected 11-23-21 @ 12:50)  Source: .Body Fluid Knee Fluid  Gram Stain (11-23-21 @ 23:34):    polymorphonuclear leukocytes seen    No organisms seen    by cytocentrifuge    Culture - Blood (collected 11-22-21 @ 08:44)  Source: .Blood Blood  Final Report (11-27-21 @ 09:00):    No growth at 5 days.    Culture - Blood (collected 11-20-21 @ 12:57)  Source: .Blood Blood  Final Report (11-25-21 @ 13:00):    No growth at 5 days.    Culture - Body Fluid with Gram Stain (collected 11-18-21 @ 16:20)  Source: .Body Fluid OR Posterior Calf Left Lower Extremity Fluid  Gram Stain (11-18-21 @ 21:39):    polymorphonuclear leukocytes seen    Gram Positive Cocci in Pairs and Chains seen    by cytocentrifuge  Final Report (11-23-21 @ 09:40):    Moderate Streptococcus pyogenes (Group A) Penicillin and ampicillin are    drugs of choice for    treatment of beta-hemolytic streptococcal infections.    Susceptibility testing is not performed routinely because    S. pyogenes (GAS) is universally susceptible to penicillin    and resistance in other strains is extremely rare.    Culture - Body Fluid with Gram Stain (collected 11-18-21 @ 16:20)  Source: .Body Fluid OR Swab Left Knee Lower Extremity Fluid  Gram Stain (11-18-21 @ 19:36):    polymorphonuclear leukocytes seen    Gram Positive Cocci in Pairs and Chains seen    by cytocentrifuge  Final Report (11-23-21 @ 09:41):    Numerous Streptococcus pyogenes (Group A) Penicillin and ampicillin are    drugs of choice for    treatment of beta-hemolytic streptococcal infections.    Susceptibility testing is not performed routinely because    S. pyogenes (GAS) is universally susceptible to penicillin    and resistance in other strains is extremely rare.    Culture - Body Fluid with Gram Stain (collected 11-18-21 @ 16:20)  Source: .Body Fluid OR - Left Lower Extremity Fluid  Gram Stain (11-18-21 @ 20:10):    No polymorphonuclear leukocytes seen    No organisms seen    by cytocentrifuge  Final Report (11-23-21 @ 09:40):    Rare Streptococcus pyogenes (Group A)    Penicillin and ampicillin are drugs of choice for treatment of    beta-hemolytic streptococcal infections.    Susceptibility testing is not performed routinely because S. pyogenes    (GAS) is universally susceptibleto penicillin    and resistance in other strains is extremely rare.    Culture - Urine (collected 11-18-21 @ 10:07)  Source: Catheterized Catheterized  Final Report (11-19-21 @ 07:03):    No growth    Culture - Urine (collected 11-18-21 @ 00:25)  Source: Catheterized Catheterized  Final Report (11-18-21 @ 22:09):    <10,000 CFU/mL Normal Urogenital Aimee    Culture - Blood (collected 11-17-21 @ 14:45)  Source: .Blood Blood  Gram Stain (11-18-21 @ 09:44):    Growth in aerobic and anaerobic bottles: Gram Positive Cocci in Pairs and    Chains    Gram Stain performed by:    [facility]    [address] [place] [id number]    .    TYPE: (C=Critical, N=Notification, A=Abnormal) C    TESTS:  _ GS    DATE/TIME CALLED: _ 11/18/2021 09:42:19    CALLED TO: Chris Hernandez RN    READ BACK (2 Patient Identifiers)(Y/N): _ Y    READ BACK VALUES (Y/N): _ Y    CALLED BY: Chris Quiñones  Final Report (11-21-21 @ 12:28):    Growth in aerobic and anaerobic bottles: Streptococcus pyogenes (Group A)    See previous culture [id number]    Culture - Blood (collected 11-17-21 @ 14:45)  Source: .Blood Blood  Gram Stain (11-18-21 @ 09:40):    Growth in aerobic and anaerobic bottles: Gram Positive Cocci in Pairs and    Chains    ***Blood Panel PCR results on this specimen are available    approximately 3 hours after the Gram stain result.***    Gram stain, PCR, and/or culture results may not always    correspond due to difference in methodologies.    ************************************************************    This PCR assay was performed using [employer].    The following targets are tested for: Enterococcus,    vancomycin resistant enterococci, Listeria monocytogenes,    coagulase negative staphylococci, S. aureus,    methicillin resistant S. aureus, Streptococcus agalactiae    (Group B), S. pneumoniae, S. pyogenes (Group A),    Acinetobacter baumannii, Enterobacter cloacae, E. coli,    Klebsiella oxytoca, K. pneumoniae, Proteus sp.,    Serratia marcescens, Haemophilus influenzae,    Neisseria meningitidis, Pseudomonas aeruginosa, Candida    albicans, C. glabrata, C krusei, C parapsilosis,    C. tropicalis and the KPC resistance gene.    Gram Stain and BCID performed by:    [facility]    [address] [place] [id number]    .    TYPE: (C=Critical, N=Notification, A=Abnormal) C    TESTS:  _ GS    DATE/TIME CALLED: _ 11/18/2021 09:40:08    CALLED TO: Chris Hernandez RN    READ BACK (2 Patient Identifiers)(Y/N): _ Y    READ BACK VALUES (Y/N): _ Y    CALLED BY: Chris Quiñones  Final Report (11-21-21 @ 12:28):    Growth in aerobic and anaerobic bottles: Streptococcus pyogenes (Group A)  Organism: Blood Culture PCR  Streptococcus pyogenes (Group A) (11-21-21 @ 12:28)  Organism: Streptococcus pyogenes (Group A) (11-21-21 @ 12:28)    Sensitivities:      -  Ceftriaxone: S 0.016      -  Penicillin: S 0.016 Predicts results for ampicillin, amoxicillin, amoxicillin/clavulanate, ampicillin/sulbactam, 1st, 2nd and 3rd generation cephalosporins and carbapenems.      Method Type: ETEST  Organism: Streptococcus pyogenes (Group A) (11-21-21 @ 12:28)    Sensitivities:      -  Vancomycin: S      Method Type: KB  Organism: Blood Culture PCR (11-21-21 @ 12:28)    Sensitivities:      -  Streptococcus pyogenes (Group A): Detec      Method Type: PCR      Creatinine, Serum: 0.75 mg/dL (11-30-21 @ 07:18)  Creatinine, Serum: 0.68 mg/dL (11-29-21 @ 09:55)  Creatinine, Serum: 0.64 mg/dL (11-28-21 @ 09:14)  Creatinine, Serum: 0.65 mg/dL (11-27-21 @ 11:51)  Creatinine, Serum: 0.57 mg/dL (11-27-21 @ 05:35)  Creatinine, Serum: 0.61 mg/dL (11-26-21 @ 09:01)        Ferritin, Serum: 388 ng/mL (11-17-21 @ 13:21)    C-Reactive Protein, Serum: 197 mg/L (11-26-21 @ 21:52)  C-Reactive Protein, Serum: 211 mg/L (11-26-21 @ 09:01)  C-Reactive Protein, Serum: 53 mg/L (11-23-21 @ 07:42)  C-Reactive Protein, Serum: >422 mg/L (11-17-21 @ 13:21)    Sedimentation Rate, Erythrocyte: 65 mm/hr (11-26-21 @ 21:52)  Sedimentation Rate, Erythrocyte: 63 mm/hr (11-26-21 @ 09:01)  Sedimentation Rate, Erythrocyte: 55 mm/hr (11-17-21 @ 13:21)    WBC Count: 14.55 K/uL (11-30-21 @ 07:18)  WBC Count: 16.71 K/uL (11-29-21 @ 09:54)  WBC Count: 17.02 K/uL (11-28-21 @ 09:14)  WBC Count: 15.99 K/uL (11-27-21 @ 05:33)  WBC Count: 18.73 K/uL (11-26-21 @ 09:01)      COVID-19 PCR: NotDetec (11-30-21 @ 01:59)  COVID-19 PCR: NotDetec (11-23-21 @ 18:50)  COVID-19 PCR: NotDetec (11-17-21 @ 13:18)    Lactate Dehydrogenase, Serum: 769 U/L (11-20-21 @ 03:56)    Alkaline Phosphatase, Serum: 88 U/L (11-27-21 @ 05:35)  Alanine Aminotransferase (ALT/SGPT): 11 U/L (11-27-21 @ 05:35)  Aspartate Aminotransferase (AST/SGOT): 25 U/L (11-27-21 @ 05:35)  Bilirubin Total, Serum: 0.3 mg/dL (11-27-21 @ 05:35)        < from: CT Lower Extremity w/ IV Cont, Bilateral (11.30.21 @ 09:55) >     EXAM:  CT LWR EXT IC BI                          PROCEDURE DATE:  11/30/2021          INTERPRETATION:  CT LOWER EXTREMITY WITH IV CONTRAST BILATERAL dated 11/30/2021 9:55 AM    INDICATION: Fever. Status post incision and drainage bilaterally. Persistent fevers to 104.    COMPARISON: CT of the right knee dated 11/23/2021. CT of the left lower extremity dated 11/17/2021    TECHNIQUE: CT imaging of the bilateral lower extremities was performed with contrast. The data was reformatted in the axial,coronal, and sagittal planes.  Contrast: Omnipaque 300. Administered: 96 cc. Discarded: 4 cc.    FINDINGS:    OSSEOUS STRUCTURES: Mild degenerative changes at the lower lumbar spine including sclerosis and narrowing at the facet joints. Moderate sclerosis at the right lower sacroiliac joint. Moderate narrowing the bilateral hip joint spaces which is worse posteriorly. There is marginal productive changes. Moderate narrowing at the pubic symphysis. Small cysts appreciated at the left patella suggesting overlying cartilage loss. Postsurgical changes noted at the left distal fibula with a small screw tracts through the distal fibula. A portion of the distal fibula is exposed by an overlying skin wound. No fracture or osteonecrosis is seen. Thereis no periosteal reaction or large erosion appreciated.  SYNOVIUM/ JOINT FLUID: There is a large left knee joint effusion containing several small new locules of gas. A moderate right knee joint effusion is identified. No hip joint effusion is seen.  TENDONS: The tendons are intact.  MUSCLES: There is hypodensity within the left soleus and gastrocnemius musculature. Confluent edema is seen between the soleus and gastrocnemius musculature. Moderate edema is noted in the left anterior compartment calf musculature. There is suggestion of rim enhancement, new compared to the prior exam. There is a hazy appearance of the right facets and lateralis and tensor fascia yohan muscle. Moderate hazy hypodensity throughout the left sartorius and left vastus lateralis muscle. Edema overlies the left rectus femoris muscle.  NEUROVASCULAR STRUCTURES: Mild vascular calcifications.  INTRAPELVIC SOFT TISSUES: Multiple uterine masses are noted without enhancement likely degenerating uterine fibroids, unchanged.  SUBCUTANEOUS SOFT TISSUES: There is diffuse soft tissue swelling and skin thickening. There are skin wounds along the medial lateral aspect of the left calf. No subcutaneous fluid collection is noted.    3-D reformatted imaging confirms these findings.    IMPRESSION:    1.  Soft tissue swelling bilaterally. Medial lateral left calf skin wounds possibly from prior surgery or infection. Nonspecific but can be seen with cellulitis.  2.  Exposure of the left distal fibula by an overlying skin wound raises concern for osteomyelitis. New compared to the prior study.  3.  Large left knee joint effusion with several new foci of gas. Differential considerations include recent aspiration and infection. Correlate clinically.  4.  Hypodensity throughout the left calf musculature in the bilateral hip musculature as detailed above as can be seen with myositis.  5.  Suggestion of rim enhancement involving the left anterior calf musculature concerning for early abscess formation  6.  Edema between the left medial gastrocnemius and soleus musculature, likely infectious  7.  Heterogeneous masses in the uterus, likely degenerating fibroids.    Findings were discussed with Dr. Cortez of Trauma on 11/30/2021 10:42 AM  with read back.    --- End of Report ---            LOREN ALEXANDER MD; Attending Radiologist  This document has been electronically signed. Nov 30 2021 10:42AM    < end of copied text >

## 2021-12-07 NOTE — PROGRESS NOTE ADULT - SUBJECTIVE AND OBJECTIVE BOX
Interval Hx:  Patient seen during rounds  pain well controlled  due for washout today  will rec to keep same medications  Patient denies sedation with medications       T(C): 37.2 (21 @ 04:25), Max: 37.9 (21 @ 17:20)  HR: 63 (21 @ 04:25) (63 - 92)  BP: 112/72 (21 @ 04:25) (109/67 - 118/72)  RR: 18 (21 @ 04:25) (17 - 18)  SpO2: 93% (21 @ 04:25) (91% - 100%)      21 @ 07:01  -  21 @ 07:00  --------------------------------------------------------  IN: 1000 mL / OUT: 1200 mL / NET: -200 mL        acetaminophen     Tablet .. 975 milliGRAM(s) Oral every 6 hours  baclofen 10 milliGRAM(s) Oral three times a day  enoxaparin Injectable 30 milliGRAM(s) SubCutaneous two times a day  gabapentin 300 milliGRAM(s) Oral three times a day  HYDROmorphone   Tablet 2 milliGRAM(s) Oral every 4 hours PRN  HYDROmorphone   Tablet 4 milliGRAM(s) Oral every 4 hours PRN  HYDROmorphone  Injectable 0.5 milliGRAM(s) IV Push every 3 hours PRN  lactated ringers. 1000 milliLiter(s) IV Continuous <Continuous>  lisinopril 5 milliGRAM(s) Oral daily  ondansetron Injectable 4 milliGRAM(s) IV Push once PRN  pantoprazole    Tablet 40 milliGRAM(s) Oral two times a day  piperacillin/tazobactam IVPB.. 4.5 Gram(s) IV Intermittent every 8 hours  polyethylene glycol 3350 17 Gram(s) Oral daily  senna 2 Tablet(s) Oral at bedtime  sodium chloride 1 Gram(s) Oral every 12 hours  trimethoprim  160 mG/sulfamethoxazole 800 mG 1 Tablet(s) Oral two times a day                          8.7    15.93 )-----------( 789      ( 07 Dec 2021 06:36 )             27.8     12-    134<L>  |  100  |  6.0<L>  ----------------------------<  89  3.9   |  21.0<L>  |  0.64    Ca    8.1<L>      07 Dec 2021 06:36  Phos  3.2     12-  Mg     1.8         TPro  6.6  /  Alb  1.9<L>  /  TBili  <0.2<L>  /  DBili  x   /  AST  22  /  ALT  9   /  AlkPhos  65  12-      Urinalysis Basic - ( 05 Dec 2021 20:58 )    Color: Yellow / Appearance: Clear / S.015 / pH: x  Gluc: x / Ketone: Negative  / Bili: Negative / Urobili: Negative mg/dL   Blood: x / Protein: 15 mg/dL / Nitrite: Negative   Leuk Esterase: Negative / RBC: 0-2 /HPF / WBC 0-2   Sq Epi: x / Non Sq Epi: Few / Bacteria: Occasional        Pain Service   [phone]

## 2021-12-07 NOTE — PROGRESS NOTE ADULT - SUBJECTIVE AND OBJECTIVE BOX
Acute Care Surgery/Trauma Surgery Progress Note:    No acute overnight events. Intermittent fevers, VSS. Pain well controlled. Tolerating diet. Denies n/v/f/c/cp/sob.     Diet, NPO after Midnight:      NPO Start Date: 06-Dec-2021,   NPO Start Time: 23:59 (21 @ 18:17)  Diet, Regular (21 @ 23:00)      Scheduled Medications:   acetaminophen     Tablet .. 975 milliGRAM(s) Oral every 6 hours  baclofen 10 milliGRAM(s) Oral three times a day  ciprofloxacin   IVPB      ciprofloxacin   IVPB 400 milliGRAM(s) IV Intermittent every 12 hours  clindamycin IVPB 900 milliGRAM(s) IV Intermittent every 8 hours  enoxaparin Injectable 30 milliGRAM(s) SubCutaneous two times a day  gabapentin 300 milliGRAM(s) Oral three times a day  lactated ringers. 1000 milliLiter(s) (125 mL/Hr) IV Continuous <Continuous>  lisinopril 5 milliGRAM(s) Oral daily  pantoprazole    Tablet 40 milliGRAM(s) Oral two times a day  piperacillin/tazobactam IVPB.. 4.5 Gram(s) IV Intermittent every 8 hours  polyethylene glycol 3350 17 Gram(s) Oral daily  senna 2 Tablet(s) Oral at bedtime  sodium chloride 1 Gram(s) Oral every 12 hours    PRN Medications:  HYDROmorphone   Tablet 2 milliGRAM(s) Oral every 4 hours PRN Moderate Pain (4 - 6)  HYDROmorphone   Tablet 4 milliGRAM(s) Oral every 4 hours PRN Severe Pain (7 - 10)  HYDROmorphone  Injectable 0.5 milliGRAM(s) IV Push every 3 hours PRN breakthrough pain  ondansetron Injectable 4 milliGRAM(s) IV Push once PRN Nausea and/or Vomiting      Objective:   T(F): 100 ( @ 21:17), Max: 101.3 ( @ 04:35)  HR: 92 ( @ 21:17) (78 - 92)  BP: 118/72 ( @ 21:17) (109/67 - 119/66)  BP(mean): --  ABP: --  ABP(mean): --  RR: 17 ( @ 21:17) (17 - 20)  SpO2: 100% ( @ 21:17) (91% - 100%)      Physical Exam:   GEN: patient resting comfortably in bed, in no acute distress  RESP: respirations are unlabored, no accessory muscle use, no conversational dyspnea  CVS: RRR  GI: Abdomen soft, non-tender, non-distended, no rebound tenderness / guarding    I&O's     @ 07:01  -   @ 07:00  --------------------------------------------------------  IN:    Oral Fluid: 200 mL  Total IN: 200 mL    OUT:    Voided (mL): 1300 mL  Total OUT: 1300 mL    Total NET: -1100 mL       @ 07:01  -   @ 02:33  --------------------------------------------------------  IN:    Lactated Ringers: 375 mL  Total IN: 375 mL    OUT:    Voided (mL): 700 mL  Total OUT: 700 mL    Total NET: -325 mL          LABS:                        7.2    14.99 )-----------( 632      ( 06 Dec 2021 05:35 )             22.8         134<L>  |  101  |  8.1  ----------------------------<  101<H>  4.1   |  23.0  |  0.67    Ca    7.8<L>      06 Dec 2021 05:35  Phos  3.5       Mg     1.7         TPro  6.6  /  Alb  1.9<L>  /  TBili  <0.2<L>  /  DBili  x   /  AST  22  /  ALT  9   /  AlkPhos  65  12      Urinalysis Basic - ( 05 Dec 2021 20:58 )    Color: Yellow / Appearance: Clear / S.015 / pH: x  Gluc: x / Ketone: Negative  / Bili: Negative / Urobili: Negative mg/dL   Blood: x / Protein: 15 mg/dL / Nitrite: Negative   Leuk Esterase: Negative / RBC: 0-2 /HPF / WBC 0-2   Sq Epi: x / Non Sq Epi: Few / Bacteria: Occasional        MICROBIOLOGY:     Culture - Fungal, Tissue (collected  @ 00:24)  Source: .Tissue anterior left leg  Preliminary Report ( @ 08:59):    Testing in progress    Culture - Acid Fast - Tissue w/Smear (collected  00:24)  Source: .Tissue anterior left leg  Preliminary Report (12-04 @ 15:04):    Culture is being performed.    Culture - Tissue with Gram Stain (collected  @ 00:24)  Source: .Tissue anterior left leg  Gram Stain ( 02:29):    No polymorphonuclear leukocytes seen per low power field    Rare Gram Variable Rods seen per oil power field  Preliminary Report ( @ 18:36):    Moderate Pseudomonas aeruginosa  Organism: Pseudomonas aeruginosa ( @ 19:41)  Organism: Pseudomonas aeruginosa ( 19:41)      -  Amikacin: S <=16      -  Aztreonam: R >16      -  Cefepime: S 8      -  Ceftazidime: S 4      -  Ciprofloxacin: S 0.5      -  Gentamicin: S <=2      -  Imipenem: S <=1      -  Levofloxacin: I 2      -  Meropenem: S <=1      -  Piperacillin/Tazobactam: S 16      -  Tobramycin: S <=2      Method Type: ANASTACIO    Culture - Fungal, Tissue (collected  @ 00:21)  Source: .Tissue left lower leg  Preliminary Report ( 08:18):    Testing in progress    Culture - Acid Fast - Tissue w/Smear (collected  @ 00:21)  Source: .Tissue left lower leg  Preliminary Report ( @ 15:04):    Culture is being performed.    Culture - Tissue with Gram Stain (collected  @ 00:21)  Source: .Tissue left lower leg  Gram Stain ( 02:28):    Rare polymorphonuclear leukocytes seen per low power field    No organisms seen per oil power field  Preliminary Report ( @ 19:16):    Few Stenotrophomonas maltophilia    Few Pseudomonas aeruginosa    See previous culture [id number]  Organism: Stenotrophomonas maltophilia ( @ 19:11)  Organism: Stenotrophomonas maltophilia ( @ 19:11)      -  Ceftazidime: R >16      -  Levofloxacin: S <=0.5      -  Trimethoprim/Sulfamethoxazole: S <=0.5/9.5      Method Type: ANASTACIO    Culture - Aspirate with Gram Stain (collected  @ 12:54)  Source: .Aspirate  Gram Stain ( @ 18:23):    Few polymorphonuclear leukocytes    No organisms seen per oil power field  Final Report ( @ 10:30):    No growth at 5 days    Culture - Blood (collected  @ 10:40)  Source: .Blood Blood  Final Report ( @ 11:00):    No growth at 5 days.        PATHOLOGY:

## 2021-12-07 NOTE — PROGRESS NOTE ADULT - ASSESSMENT
dilaudid PO 2mg/4mg [id number] PRN mod/severe pain; hydromorphone IVP 0.5mg q3hour PRN breakthrough pain  acetaminophen PO 975mg [id number] standing. HOLD for liver function test dysfunction  gabapentin PO 300mg [id number] standing. HOLD for sedation  baclofen 10 milliGRAM(s) Oral three times a day HOLD for sedation     can DC Dilaudid IVP when due for discharge planning    Pain controlled  Pain service will sign off  Please reconsult as needed

## 2021-12-07 NOTE — PROGRESS NOTE ADULT - ASSESSMENT
51y Female present to ED with left leg swelling, erythema, pain. Patient endorses she had left knee pain for 1 week presented 3 days ago to ED  where she states was given an steroid shot, and ibuprofen. however pain and edema worsened. Patient states she wasnt feeling herself today reason why she came. Endorses chills initially with pain 3 days ago but none since, denies history of trauma, insect bites, recent interventions, or injections to the area, contact with ticks, history of diabetes.  PT AS ABOVE WITH LEFT LEG SWELLING PT WITH INCREASED WBC PLACED ON ABX FOR PRESUMED INFECTION  PT WITH CT SCAN WITH FASCIAL EDAM PT BROUGHT TO THE OR EMERGENTLY AND  UNDERWENT FASCIOTOMY   PT BROUGHT TO THE OR  BOTH TRAUMA AND ORTHO INVOLVED  PURULENT FLUID FOUND  NECROTIZING SOFT TISSUE INFECTION    BLOOD CX WITH GROUP A STREP AND  OPERATIVE FLUID WITH STREP   PT ON PCN AND CLINDA for POSSIBLE ANTITOXIN EFFECT IN GROUP A STREP INFECTION    PT WITH RIGHT KNEE PAIN WENT TO OR 11/24  AND WASHED OUT AND CULTURES TAKEN so far negative    now febrile with uptrending WBC    CXR and UA (-)  c/o b/l shoulder pain, and knee pain, MR as above  f/u repeat BCX  - new CT scan of lower ext on 11/29/21 ? showed early abscess in left anterior calf. also edema between left media gastroc and soleus  SURGERY FOLLOWING  s/p ASPIRATION LEFT KNEE BY ORTHO      on  ZOSYN to 4.5 grams Q8H   OR CX WITH PSEUDOMONAS   ADN STENOTROPHOMONAS   WILL D/C CIPRO CONTINUE ZOSYN FOR PSEUDOMONAS  ADD BACTRIM FOR STENOTROPHOMONAS  WILL D/C CLINDA AS IT WAS ORIGINALLY IN PLACE FOR ADDITIONAL ANTI TOXIN BENEFIT  FOR  GROUP A STREP BUT THIS POTENTIAL  BENEFIT IS ONLY DURING INITIAL PERIOD  SO WILL D/C CLINDA   PT  S/P OR WASHOUT 12.2  AND NOW FOR OR AGAIN   TODAY  WILL FOLLOW UP  OVERALL NON TOXIC APPEARS IMPROVED

## 2021-12-07 NOTE — PROGRESS NOTE ADULT - ASSESSMENT
51y Female present to ED with left leg swelling, erythema, pain, leukocytosis and acute renal failure. CT scan concerning for necrotizing soft tissue infection. 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 Downgraded to floor from SICU 11/22/21.  Patient had complained or RLE swelling. Right lower extremity duplex and negative for DVT; however + swelling in popliteal fossa to R calf.  A Rt knee I&D performed. LLE with streptococcus pyogenes. 11/30 ortho aspirated additional fluid from left knee awaiting effusion results. Pt underwent debridement adn was out 12/3/21 and tissue culture and biopsy was change to pathology.   - s/p excisional debridement. Cultures growing Pseudomonas and patient currently on Clinda, Cipro, and Zosyn      - s/p BENJAMIN 12/6 -  no evidence of any valvular vegetations   - continue IV abx: Clinda, ciprofloxacin and Zosyn  - continue pain control   - continue to trend fevers  - new blood cultures sent on 11/28. Tissue culture and pathology 12/3-Pseudomonas  - daily dressing changes, wound care: wound vac change 12/6.  - f/u labs by consult services  - f/u PT/OT consult and recs  - continue wound vac b/l  - WBAT LLE in CAM boot  - continue ROM of exercises  - per GI; continue to trend CBC and transfuse for a goal of hgb 8 or higher.  - continue PPI BID for mucosal cytoprotection  - s/p EGD  51y Female present to ED with left leg swelling, erythema, pain, leukocytosis and acute renal failure. CT scan concerning for necrotizing soft tissue infection. 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 Downgraded to floor from SICU 11/22/21.  Patient had complained or RLE swelling. Right lower extremity duplex and negative for DVT; however + swelling in popliteal fossa to R calf.  A Rt knee I&D performed. LLE with streptococcus pyogenes. 11/30 ortho aspirated additional fluid from left knee awaiting effusion results. Pt underwent debridement adn was out 12/3/21 and tissue culture and biopsy was change to pathology.   - s/p excisional debridement. Cultures growing Pseudomonas and patient currently on Clinda, Cipro, and Zosyn    - washout today   - s/p BENJAMIN 12/6 -  no evidence of any valvular vegetations   - continue IV abx: Clinda, ciprofloxacin and Zosyn  - continue pain control   - continue to trend fevers  - new blood cultures sent on 11/28. Tissue culture and pathology 12/3-Pseudomonas  - daily dressing changes, wound care: wound vac change 12/6.  - f/u labs by consult services  - f/u PT/OT consult and recs  - continue wound vac b/l  - WBAT LLE in CAM boot  - continue ROM of exercises  - per GI; continue to trend CBC and transfuse for a goal of hgb 8 or higher.  - continue PPI BID for mucosal cytoprotection  - s/p EGD

## 2021-12-08 LAB
ANION GAP SERPL CALC-SCNC: 11 MMOL/L — SIGNIFICANT CHANGE UP (ref 5–17)
BASOPHILS # BLD AUTO: 0.05 K/UL — SIGNIFICANT CHANGE UP (ref 0–0.2)
BASOPHILS NFR BLD AUTO: 0.3 % — SIGNIFICANT CHANGE UP (ref 0–2)
BUN SERPL-MCNC: 7.1 MG/DL — LOW (ref 8–20)
CALCIUM SERPL-MCNC: 7.8 MG/DL — LOW (ref 8.6–10.2)
CHLORIDE SERPL-SCNC: 102 MMOL/L — SIGNIFICANT CHANGE UP (ref 98–107)
CO2 SERPL-SCNC: 20 MMOL/L — LOW (ref 22–29)
CREAT SERPL-MCNC: 0.49 MG/DL — LOW (ref 0.5–1.3)
EOSINOPHIL # BLD AUTO: 0.07 K/UL — SIGNIFICANT CHANGE UP (ref 0–0.5)
EOSINOPHIL NFR BLD AUTO: 0.5 % — SIGNIFICANT CHANGE UP (ref 0–6)
GLUCOSE SERPL-MCNC: 101 MG/DL — HIGH (ref 70–99)
GRAM STN FLD: SIGNIFICANT CHANGE UP
HCT VFR BLD CALC: 26.3 % — LOW (ref 34.5–45)
HGB BLD-MCNC: 7.9 G/DL — LOW (ref 11.5–15.5)
IMM GRANULOCYTES NFR BLD AUTO: 1.6 % — HIGH (ref 0–1.5)
LYMPHOCYTES # BLD AUTO: 1.66 K/UL — SIGNIFICANT CHANGE UP (ref 1–3.3)
LYMPHOCYTES # BLD AUTO: 11.3 % — LOW (ref 13–44)
MAGNESIUM SERPL-MCNC: 1.9 MG/DL — SIGNIFICANT CHANGE UP (ref 1.6–2.6)
MCHC RBC-ENTMCNC: 27.6 PG — SIGNIFICANT CHANGE UP (ref 27–34)
MCHC RBC-ENTMCNC: 30 GM/DL — LOW (ref 32–36)
MCV RBC AUTO: 92 FL — SIGNIFICANT CHANGE UP (ref 80–100)
MONOCYTES # BLD AUTO: 0.58 K/UL — SIGNIFICANT CHANGE UP (ref 0–0.9)
MONOCYTES NFR BLD AUTO: 4 % — SIGNIFICANT CHANGE UP (ref 2–14)
NEUTROPHILS # BLD AUTO: 12.04 K/UL — HIGH (ref 1.8–7.4)
NEUTROPHILS NFR BLD AUTO: 82.3 % — HIGH (ref 43–77)
PHOSPHATE SERPL-MCNC: 4.1 MG/DL — SIGNIFICANT CHANGE UP (ref 2.4–4.7)
PLATELET # BLD AUTO: 725 K/UL — HIGH (ref 150–400)
POTASSIUM SERPL-MCNC: 4.8 MMOL/L — SIGNIFICANT CHANGE UP (ref 3.5–5.3)
POTASSIUM SERPL-SCNC: 4.8 MMOL/L — SIGNIFICANT CHANGE UP (ref 3.5–5.3)
RBC # BLD: 2.86 M/UL — LOW (ref 3.8–5.2)
RBC # FLD: 21.3 % — HIGH (ref 10.3–14.5)
SODIUM SERPL-SCNC: 133 MMOL/L — LOW (ref 135–145)
SPECIMEN SOURCE: SIGNIFICANT CHANGE UP
WBC # BLD: 14.64 K/UL — HIGH (ref 3.8–10.5)
WBC # FLD AUTO: 14.64 K/UL — HIGH (ref 3.8–10.5)

## 2021-12-08 PROCEDURE — 99024 POSTOP FOLLOW-UP VISIT: CPT

## 2021-12-08 PROCEDURE — 99232 SBSQ HOSP IP/OBS MODERATE 35: CPT

## 2021-12-08 RX ADMIN — SODIUM CHLORIDE 125 MILLILITER(S): 9 INJECTION, SOLUTION INTRAVENOUS at 04:37

## 2021-12-08 RX ADMIN — GABAPENTIN 300 MILLIGRAM(S): 400 CAPSULE ORAL at 22:59

## 2021-12-08 RX ADMIN — Medication 1 TABLET(S): at 08:09

## 2021-12-08 RX ADMIN — Medication 975 MILLIGRAM(S): at 18:14

## 2021-12-08 RX ADMIN — Medication 975 MILLIGRAM(S): at 09:47

## 2021-12-08 RX ADMIN — HYDROMORPHONE HYDROCHLORIDE 4 MILLIGRAM(S): 2 INJECTION INTRAMUSCULAR; INTRAVENOUS; SUBCUTANEOUS at 19:53

## 2021-12-08 RX ADMIN — HYDROMORPHONE HYDROCHLORIDE 4 MILLIGRAM(S): 2 INJECTION INTRAMUSCULAR; INTRAVENOUS; SUBCUTANEOUS at 00:39

## 2021-12-08 RX ADMIN — PIPERACILLIN AND TAZOBACTAM 25 GRAM(S): 4; .5 INJECTION, POWDER, LYOPHILIZED, FOR SOLUTION INTRAVENOUS at 06:18

## 2021-12-08 RX ADMIN — HYDROMORPHONE HYDROCHLORIDE 4 MILLIGRAM(S): 2 INJECTION INTRAMUSCULAR; INTRAVENOUS; SUBCUTANEOUS at 18:53

## 2021-12-08 RX ADMIN — GABAPENTIN 300 MILLIGRAM(S): 400 CAPSULE ORAL at 13:53

## 2021-12-08 RX ADMIN — Medication 975 MILLIGRAM(S): at 17:14

## 2021-12-08 RX ADMIN — PIPERACILLIN AND TAZOBACTAM 25 GRAM(S): 4; .5 INJECTION, POWDER, LYOPHILIZED, FOR SOLUTION INTRAVENOUS at 13:54

## 2021-12-08 RX ADMIN — HYDROMORPHONE HYDROCHLORIDE 0.5 MILLIGRAM(S): 2 INJECTION INTRAMUSCULAR; INTRAVENOUS; SUBCUTANEOUS at 05:55

## 2021-12-08 RX ADMIN — Medication 975 MILLIGRAM(S): at 18:29

## 2021-12-08 RX ADMIN — Medication 975 MILLIGRAM(S): at 00:30

## 2021-12-08 RX ADMIN — HYDROMORPHONE HYDROCHLORIDE 0.5 MILLIGRAM(S): 2 INJECTION INTRAMUSCULAR; INTRAVENOUS; SUBCUTANEOUS at 13:55

## 2021-12-08 RX ADMIN — HYDROMORPHONE HYDROCHLORIDE 0.5 MILLIGRAM(S): 2 INJECTION INTRAMUSCULAR; INTRAVENOUS; SUBCUTANEOUS at 14:09

## 2021-12-08 RX ADMIN — Medication 975 MILLIGRAM(S): at 06:41

## 2021-12-08 RX ADMIN — Medication 975 MILLIGRAM(S): at 23:00

## 2021-12-08 RX ADMIN — PANTOPRAZOLE SODIUM 40 MILLIGRAM(S): 20 TABLET, DELAYED RELEASE ORAL at 06:21

## 2021-12-08 RX ADMIN — ENOXAPARIN SODIUM 30 MILLIGRAM(S): 100 INJECTION SUBCUTANEOUS at 17:15

## 2021-12-08 RX ADMIN — Medication 1 TABLET(S): at 22:57

## 2021-12-08 RX ADMIN — HYDROMORPHONE HYDROCHLORIDE 4 MILLIGRAM(S): 2 INJECTION INTRAMUSCULAR; INTRAVENOUS; SUBCUTANEOUS at 10:57

## 2021-12-08 RX ADMIN — Medication 10 MILLIGRAM(S): at 22:58

## 2021-12-08 RX ADMIN — Medication 975 MILLIGRAM(S): at 06:20

## 2021-12-08 RX ADMIN — PIPERACILLIN AND TAZOBACTAM 25 GRAM(S): 4; .5 INJECTION, POWDER, LYOPHILIZED, FOR SOLUTION INTRAVENOUS at 22:59

## 2021-12-08 RX ADMIN — SODIUM CHLORIDE 1 GRAM(S): 9 INJECTION INTRAMUSCULAR; INTRAVENOUS; SUBCUTANEOUS at 06:18

## 2021-12-08 RX ADMIN — Medication 10 MILLIGRAM(S): at 06:21

## 2021-12-08 RX ADMIN — Medication 10 MILLIGRAM(S): at 13:53

## 2021-12-08 RX ADMIN — HYDROMORPHONE HYDROCHLORIDE 4 MILLIGRAM(S): 2 INJECTION INTRAMUSCULAR; INTRAVENOUS; SUBCUTANEOUS at 09:57

## 2021-12-08 RX ADMIN — Medication 975 MILLIGRAM(S): at 13:53

## 2021-12-08 RX ADMIN — GABAPENTIN 300 MILLIGRAM(S): 400 CAPSULE ORAL at 06:19

## 2021-12-08 RX ADMIN — ENOXAPARIN SODIUM 30 MILLIGRAM(S): 100 INJECTION SUBCUTANEOUS at 06:22

## 2021-12-08 RX ADMIN — HYDROMORPHONE HYDROCHLORIDE 0.5 MILLIGRAM(S): 2 INJECTION INTRAMUSCULAR; INTRAVENOUS; SUBCUTANEOUS at 06:41

## 2021-12-08 RX ADMIN — PANTOPRAZOLE SODIUM 40 MILLIGRAM(S): 20 TABLET, DELAYED RELEASE ORAL at 17:14

## 2021-12-08 RX ADMIN — SODIUM CHLORIDE 1 GRAM(S): 9 INJECTION INTRAMUSCULAR; INTRAVENOUS; SUBCUTANEOUS at 17:14

## 2021-12-08 RX ADMIN — POLYETHYLENE GLYCOL 3350 17 GRAM(S): 17 POWDER, FOR SOLUTION ORAL at 13:56

## 2021-12-08 RX ADMIN — HYDROMORPHONE HYDROCHLORIDE 4 MILLIGRAM(S): 2 INJECTION INTRAMUSCULAR; INTRAVENOUS; SUBCUTANEOUS at 01:40

## 2021-12-08 NOTE — PROGRESS NOTE ADULT - SUBJECTIVE AND OBJECTIVE BOX
INFECTIOUS DISEASES AND INTERNAL MEDICINE at [facility]  =======================================================  Theo Morrow MD  Diplomates American Board of Internal Medicine and Infectious Diseases  Telephone [phone]  Fax            [phone]  =======================================================    LUIS FERNANDO DAVIS [id number]  Follow up: group A STREP NECROTIZING SOFT TISSUE INFECTION     repeat CT scan of lower ext with collections.   S/P OR WASHOUT 12/2  AND   OR AGAIN  12/7      Allergies:  No Known Allergies       FAMILY   FAMILY HISTORY:  FH: HTN (hypertension) (Mother)      REVIEW OF SYSTEMS:  CONSTITUTIONAL:  No Fever or chills  HEENT:   No diplopia or blurred vision.  No earache, sore throat or runny nose.  CARDIOVASCULAR:  No pressure, squeezing, strangling, tightness, heaviness or aching about the chest, neck, axilla or epigastrium.  RESPIRATORY:  No cough, shortness of breath, PND or orthopnea.  GASTROINTESTINAL:  No nausea, vomiting or diarrhea.  GENITOURINARY:  No dysuria, frequency or urgency. No Blood in urine  MUSCULOSKELETAL:   AS PER HPI   SKIN:  No change in skin, hair or nails.  NEUROLOGIC:  No paresthesias, fasciculations, seizures or weakness.  PSYCHIATRIC:  No disorder of thought or mood.  ENDOCRINE:  No heat or cold intolerance, polyuria or polydipsia.  HEMATOLOGICAL:  No easy bruising or bleeding.            Physical Exam:     GEN: NAD,    HEENT: normocephalic and atraumatic. EOMI. ASHISH.    NECK: Supple. No carotid bruits.  No lymphadenopathy or thyromegaly.  LUNGS: Clear to auscultation.  HEART: Regular rate and rhythm without murmur.  ABDOMEN: Soft, nontender, and nondistended.  Positive bowel sounds.    : No CVA tenderness  EXTREMITIES: LEFT LEG WRAPPED; left thigh vac in place  right knee with dressing in place   UPPER EXT STRENGTH GOOD BUT CAN T RAISE HANDS ALL THE WAY, localized tenderness to both shoulders   NEUROLOGIC:  C AWAKE ALERT Appropriate affect .  SKIN: No ulceration or induration present.           Vitals:  ====  Vital Signs Last 24 Hrs  T(C): 36.4 (08 Dec 2021 04:44), Max: 37.4 (07 Dec 2021 17:17)  T(F): 97.6 (08 Dec 2021 04:44), Max: 99.4 (07 Dec 2021 17:17)  HR: 72 (08 Dec 2021 04:44) (72 - 92)  BP: 104/62 (08 Dec 2021 04:44) (90/53 - 142/78)  BP(mean): 71 (07 Dec 2021 20:30) (64 - 73)  RR: 18 (08 Dec 2021 04:44) (11 - 20)  SpO2: 92% (08 Dec 2021 04:44) (92% - 100%)    =======================================================  Current Antibiotics:  bactrim  piperacillin/tazobactam IVPB.. 4.5 Gram(s) IV Intermittent every 8 hours    Other medications:  baclofen 10 milliGRAM(s) Oral three times a day  collagenase Ointment 1 Application(s) Topical two times a day  gabapentin 200 milliGRAM(s) Oral three times a day  glucagon  Injectable 1 milliGRAM(s) IntraMuscular once  heparin   Injectable 5000 Unit(s) SubCutaneous every 8 hours  lisinopril 5 milliGRAM(s) Oral daily  methocarbamol 750 milliGRAM(s) Oral three times a day  pantoprazole    Tablet 40 milliGRAM(s) Oral two times a day  polyethylene glycol 3350 17 Gram(s) Oral daily  senna 2 Tablet(s) Oral at bedtime  sodium chloride 1 Gram(s) Oral every 12 hours  sodium chloride 0.9%. 1000 milliLiter(s) IV Continuous <Continuous>      =======================================================  Labs:                                           7.9    14.64 )-----------( 725      ( 08 Dec 2021 07:49 )             26.3   12-07    134<L>  |  100  |  6.0<L>  ----------------------------<  89  3.9   |  21.0<L>  |  0.64    Ca    8.1<L>      07 Dec 2021 06:36  Phos  3.2     12-07  Mg     1.8     12-07              Culture - Blood (collected 11-28-21 @ 09:14)  Source: .Blood Blood-Peripheral    Culture - Blood (collected 11-28-21 @ 09:14)  Source: .Blood Blood-Peripheral    Culture - Fungal, Body Fluid (collected 11-24-21 @ 13:35)  Source: .Body Fluid Right Knee Fluid    Culture - Acid Fast - Body Fluid w/Smear (collected 11-24-21 @ 13:35)  Source: .Body Fluid Right Knee Fluid    Culture - Body Fluid with Gram Stain (collected 11-24-21 @ 13:35)  Source: .Body Fluid Right Knee Fluid  Gram Stain (11-25-21 @ 01:27):    polymorphonuclear leukocytes seen per low power field    No organisms seen per oil power field    Culture - Surgical Swab (collected 11-24-21 @ 12:30)  Source: .Surgical Swab Swab Right Knee #2  Final Report (11-29-21 @ 07:55):    No growth at 5 days    Culture - Surgical Swab (collected 11-24-21 @ 12:30)  Source: .Surgical Swab Swab Right Knee #1  Final Report (11-29-21 @ 07:56):    No growth at 5 days    Culture - Surgical Swab (collected 11-24-21 @ 12:28)  Source: .Surgical Swab Swab Right Knee #3  Final Report (11-29-21 @ 08:01):    No growth at 5 days    Culture - Body Fluid with Gram Stain (collected 11-23-21 @ 12:50)  Source: .Body Fluid Knee Fluid  Gram Stain (11-23-21 @ 23:34):    polymorphonuclear leukocytes seen    No organisms seen    by cytocentrifuge    Culture - Blood (collected 11-22-21 @ 08:44)  Source: .Blood Blood  Final Report (11-27-21 @ 09:00):    No growth at 5 days.    Culture - Blood (collected 11-20-21 @ 12:57)  Source: .Blood Blood  Final Report (11-25-21 @ 13:00):    No growth at 5 days.    Culture - Body Fluid with Gram Stain (collected 11-18-21 @ 16:20)  Source: .Body Fluid OR Posterior Calf Left Lower Extremity Fluid  Gram Stain (11-18-21 @ 21:39):    polymorphonuclear leukocytes seen    Gram Positive Cocci in Pairs and Chains seen    by cytocentrifuge  Final Report (11-23-21 @ 09:40):    Moderate Streptococcus pyogenes (Group A) Penicillin and ampicillin are    drugs of choice for    treatment of beta-hemolytic streptococcal infections.    Susceptibility testing is not performed routinely because    S. pyogenes (GAS) is universally susceptible to penicillin    and resistance in other strains is extremely rare.    Culture - Body Fluid with Gram Stain (collected 11-18-21 @ 16:20)  Source: .Body Fluid OR Swab Left Knee Lower Extremity Fluid  Gram Stain (11-18-21 @ 19:36):    polymorphonuclear leukocytes seen    Gram Positive Cocci in Pairs and Chains seen    by cytocentrifuge  Final Report (11-23-21 @ 09:41):    Numerous Streptococcus pyogenes (Group A) Penicillin and ampicillin are    drugs of choice for    treatment of beta-hemolytic streptococcal infections.    Susceptibility testing is not performed routinely because    S. pyogenes (GAS) is universally susceptible to penicillin    and resistance in other strains is extremely rare.    Culture - Body Fluid with Gram Stain (collected 11-18-21 @ 16:20)  Source: .Body Fluid OR - Left Lower Extremity Fluid  Gram Stain (11-18-21 @ 20:10):    No polymorphonuclear leukocytes seen    No organisms seen    by cytocentrifuge  Final Report (11-23-21 @ 09:40):    Rare Streptococcus pyogenes (Group A)    Penicillin and ampicillin are drugs of choice for treatment of    beta-hemolytic streptococcal infections.    Susceptibility testing is not performed routinely because S. pyogenes    (GAS) is universally susceptibleto penicillin    and resistance in other strains is extremely rare.    Culture - Urine (collected 11-18-21 @ 10:07)  Source: Catheterized Catheterized  Final Report (11-19-21 @ 07:03):    No growth    Culture - Urine (collected 11-18-21 @ 00:25)  Source: Catheterized Catheterized  Final Report (11-18-21 @ 22:09):    <10,000 CFU/mL Normal Urogenital Aimee    Culture - Blood (collected 11-17-21 @ 14:45)  Source: .Blood Blood  Gram Stain (11-18-21 @ 09:44):    Growth in aerobic and anaerobic bottles: Gram Positive Cocci in Pairs and    Chains    Gram Stain performed by:    [facility]    [address] [place] [id number]    .    TYPE: (C=Critical, N=Notification, A=Abnormal) C    TESTS:  _ GS    DATE/TIME CALLED: _ 11/18/2021 09:42:19    CALLED TO: Chris Hernandez RN    READ BACK (2 Patient Identifiers)(Y/N): _ Y    READ BACK VALUES (Y/N): _ Y    CALLED BY: Chris Quiñones  Final Report (11-21-21 @ 12:28):    Growth in aerobic and anaerobic bottles: Streptococcus pyogenes (Group A)    See previous culture [id number]    Culture - Blood (collected 11-17-21 @ 14:45)  Source: .Blood Blood  Gram Stain (11-18-21 @ 09:40):    Growth in aerobic and anaerobic bottles: Gram Positive Cocci in Pairs and    Chains    ***Blood Panel PCR results on this specimen are available    approximately 3 hours after the Gram stain result.***    Gram stain, PCR, and/or culture results may not always    correspond due to difference in methodologies.    ************************************************************    This PCR assay was performed using [employer].    The following targets are tested for: Enterococcus,    vancomycin resistant enterococci, Listeria monocytogenes,    coagulase negative staphylococci, S. aureus,    methicillin resistant S. aureus, Streptococcus agalactiae    (Group B), S. pneumoniae, S. pyogenes (Group A),    Acinetobacter baumannii, Enterobacter cloacae, E. coli,    Klebsiella oxytoca, K. pneumoniae, Proteus sp.,    Serratia marcescens, Haemophilus influenzae,    Neisseria meningitidis, Pseudomonas aeruginosa, Candida    albicans, C. glabrata, C krusei, C parapsilosis,    C. tropicalis and the KPC resistance gene.    Gram Stain and BCID performed by:    [facility]    [address] [place] [id number]    .    TYPE: (C=Critical, N=Notification, A=Abnormal) C    TESTS:  _ GS    DATE/TIME CALLED: _ 11/18/2021 09:40:08    CALLED TO: Chris Hernandez RN    READ BACK (2 Patient Identifiers)(Y/N): _ Y    READ BACK VALUES (Y/N): _ Y    CALLED BY: Chris Quiñones  Final Report (11-21-21 @ 12:28):    Growth in aerobic and anaerobic bottles: Streptococcus pyogenes (Group A)  Organism: Blood Culture PCR  Streptococcus pyogenes (Group A) (11-21-21 @ 12:28)  Organism: Streptococcus pyogenes (Group A) (11-21-21 @ 12:28)    Sensitivities:      -  Ceftriaxone: S 0.016      -  Penicillin: S 0.016 Predicts results for ampicillin, amoxicillin, amoxicillin/clavulanate, ampicillin/sulbactam, 1st, 2nd and 3rd generation cephalosporins and carbapenems.      Method Type: ETEST  Organism: Streptococcus pyogenes (Group A) (11-21-21 @ 12:28)    Sensitivities:      -  Vancomycin: S      Method Type: KB  Organism: Blood Culture PCR (11-21-21 @ 12:28)    Sensitivities:      -  Streptococcus pyogenes (Group A): Detec      Method Type: PCR      Creatinine, Serum: 0.75 mg/dL (11-30-21 @ 07:18)  Creatinine, Serum: 0.68 mg/dL (11-29-21 @ 09:55)  Creatinine, Serum: 0.64 mg/dL (11-28-21 @ 09:14)  Creatinine, Serum: 0.65 mg/dL (11-27-21 @ 11:51)  Creatinine, Serum: 0.57 mg/dL (11-27-21 @ 05:35)  Creatinine, Serum: 0.61 mg/dL (11-26-21 @ 09:01)        Ferritin, Serum: 388 ng/mL (11-17-21 @ 13:21)    C-Reactive Protein, Serum: 197 mg/L (11-26-21 @ 21:52)  C-Reactive Protein, Serum: 211 mg/L (11-26-21 @ 09:01)  C-Reactive Protein, Serum: 53 mg/L (11-23-21 @ 07:42)  C-Reactive Protein, Serum: >422 mg/L (11-17-21 @ 13:21)    Sedimentation Rate, Erythrocyte: 65 mm/hr (11-26-21 @ 21:52)  Sedimentation Rate, Erythrocyte: 63 mm/hr (11-26-21 @ 09:01)  Sedimentation Rate, Erythrocyte: 55 mm/hr (11-17-21 @ 13:21)    WBC Count: 14.55 K/uL (11-30-21 @ 07:18)  WBC Count: 16.71 K/uL (11-29-21 @ 09:54)  WBC Count: 17.02 K/uL (11-28-21 @ 09:14)  WBC Count: 15.99 K/uL (11-27-21 @ 05:33)  WBC Count: 18.73 K/uL (11-26-21 @ 09:01)      COVID-19 PCR: NotDetec (11-30-21 @ 01:59)  COVID-19 PCR: NotDetec (11-23-21 @ 18:50)  COVID-19 PCR: NotDetec (11-17-21 @ 13:18)    Lactate Dehydrogenase, Serum: 769 U/L (11-20-21 @ 03:56)    Alkaline Phosphatase, Serum: 88 U/L (11-27-21 @ 05:35)  Alanine Aminotransferase (ALT/SGPT): 11 U/L (11-27-21 @ 05:35)  Aspartate Aminotransferase (AST/SGOT): 25 U/L (11-27-21 @ 05:35)  Bilirubin Total, Serum: 0.3 mg/dL (11-27-21 @ 05:35)        < from: CT Lower Extremity w/ IV Cont, Bilateral (11.30.21 @ 09:55) >     EXAM:  CT LWR EXT IC BI                          PROCEDURE DATE:  11/30/2021          INTERPRETATION:  CT LOWER EXTREMITY WITH IV CONTRAST BILATERAL dated 11/30/2021 9:55 AM    INDICATION: Fever. Status post incision and drainage bilaterally. Persistent fevers to 104.    COMPARISON: CT of the right knee dated 11/23/2021. CT of the left lower extremity dated 11/17/2021    TECHNIQUE: CT imaging of the bilateral lower extremities was performed with contrast. The data was reformatted in the axial,coronal, and sagittal planes.  Contrast: Omnipaque 300. Administered: 96 cc. Discarded: 4 cc.    FINDINGS:    OSSEOUS STRUCTURES: Mild degenerative changes at the lower lumbar spine including sclerosis and narrowing at the facet joints. Moderate sclerosis at the right lower sacroiliac joint. Moderate narrowing the bilateral hip joint spaces which is worse posteriorly. There is marginal productive changes. Moderate narrowing at the pubic symphysis. Small cysts appreciated at the left patella suggesting overlying cartilage loss. Postsurgical changes noted at the left distal fibula with a small screw tracts through the distal fibula. A portion of the distal fibula is exposed by an overlying skin wound. No fracture or osteonecrosis is seen. Thereis no periosteal reaction or large erosion appreciated.  SYNOVIUM/ JOINT FLUID: There is a large left knee joint effusion containing several small new locules of gas. A moderate right knee joint effusion is identified. No hip joint effusion is seen.  TENDONS: The tendons are intact.  MUSCLES: There is hypodensity within the left soleus and gastrocnemius musculature. Confluent edema is seen between the soleus and gastrocnemius musculature. Moderate edema is noted in the left anterior compartment calf musculature. There is suggestion of rim enhancement, new compared to the prior exam. There is a hazy appearance of the right facets and lateralis and tensor fascia yohan muscle. Moderate hazy hypodensity throughout the left sartorius and left vastus lateralis muscle. Edema overlies the left rectus femoris muscle.  NEUROVASCULAR STRUCTURES: Mild vascular calcifications.  INTRAPELVIC SOFT TISSUES: Multiple uterine masses are noted without enhancement likely degenerating uterine fibroids, unchanged.  SUBCUTANEOUS SOFT TISSUES: There is diffuse soft tissue swelling and skin thickening. There are skin wounds along the medial lateral aspect of the left calf. No subcutaneous fluid collection is noted.    3-D reformatted imaging confirms these findings.    IMPRESSION:    1.  Soft tissue swelling bilaterally. Medial lateral left calf skin wounds possibly from prior surgery or infection. Nonspecific but can be seen with cellulitis.  2.  Exposure of the left distal fibula by an overlying skin wound raises concern for osteomyelitis. New compared to the prior study.  3.  Large left knee joint effusion with several new foci of gas. Differential considerations include recent aspiration and infection. Correlate clinically.  4.  Hypodensity throughout the left calf musculature in the bilateral hip musculature as detailed above as can be seen with myositis.  5.  Suggestion of rim enhancement involving the left anterior calf musculature concerning for early abscess formation  6.  Edema between the left medial gastrocnemius and soleus musculature, likely infectious  7.  Heterogeneous masses in the uterus, likely degenerating fibroids.    Findings were discussed with Dr. Cortez of Trauma on 11/30/2021 10:42 AM  with read back.    --- End of Report ---            LOREN ALEXANDER MD; Attending Radiologist  This document has been electronically signed. Nov 30 2021 10:42AM    < end of copied text >

## 2021-12-08 NOTE — PROGRESS NOTE ADULT - SUBJECTIVE AND OBJECTIVE BOX
Acute Care Surgery/Trauma Surgery Progress Note:    s/p Irrigation and debridement of the left leg: medial and lateral fasciotomies irrigation and debridement with tissue culture performed. Patient tolerated procedure well. Seen post op; denies pain and reports she is feeling well and without pain or symptoms. Non-toxic appearing, hemodynamically stable. No acute overnight events. Patient afebrile, VSS. Pain well controlled. Tolerating diet. Denies n/v/f/c/cp/sob.     Diet, Regular (12-07-21 @ 20:14)  Diet, NPO:   Except Medications (12-07-21 @ 20:13)      Scheduled Medications:   acetaminophen     Tablet .. 975 milliGRAM(s) Oral every 6 hours  baclofen 10 milliGRAM(s) Oral three times a day  enoxaparin Injectable 30 milliGRAM(s) SubCutaneous two times a day  gabapentin 300 milliGRAM(s) Oral three times a day  lactated ringers. 1000 milliLiter(s) (125 mL/Hr) IV Continuous <Continuous>  lisinopril 5 milliGRAM(s) Oral daily  pantoprazole    Tablet 40 milliGRAM(s) Oral two times a day  piperacillin/tazobactam IVPB.. 4.5 Gram(s) IV Intermittent every 8 hours  polyethylene glycol 3350 17 Gram(s) Oral daily  senna 2 Tablet(s) Oral at bedtime  sodium chloride 1 Gram(s) Oral every 12 hours    PRN Medications:  HYDROmorphone   Tablet 2 milliGRAM(s) Oral every 4 hours PRN Moderate Pain (4 - 6)  HYDROmorphone   Tablet 4 milliGRAM(s) Oral every 4 hours PRN Severe Pain (7 - 10)  HYDROmorphone  Injectable 0.5 milliGRAM(s) IV Push every 3 hours PRN breakthrough pain      Objective:   T(F): 97.8 (12-07 @ 22:09), Max: 99.4 (12-07 @ 17:17)  HR: 73 (12-07 @ 22:09) (63 - 92)  BP: 117/77 (12-07 @ 22:09) (90/53 - 142/78)  BP(mean): 71 (12-07 @ 20:30) (64 - 73)  ABP: --  ABP(mean): --  RR: 16 (12-07 @ 22:09) (11 - 20)  SpO2: 92% (12-07 @ 22:09) (92% - 100%)      Physical Exam:   GEN: patient resting comfortably in bed, in no acute distress  RESP: respirations are unlabored, no accessory muscle use, no conversational dyspnea  CVS: RRR  GI: Abdomen soft, non-tender, non-distended, no rebound tenderness / guarding    I&O's    12-06 @ 07:01  -  12-07 @ 07:00  --------------------------------------------------------  IN:    Lactated Ringers: 1000 mL  Total IN: 1000 mL    OUT:    VAC (Vacuum Assisted Closure) System (mL): 100 mL    VAC (Vacuum Assisted Closure) System (mL): 100 mL    Voided (mL): 1000 mL  Total OUT: 1200 mL    Total NET: -200 mL      12-07 @ 07:01  -  12-08 @ 00:44  --------------------------------------------------------  IN:    Lactated Ringers: 150 mL  Total IN: 150 mL    OUT:    VAC (Vacuum Assisted Closure) System (mL): 70 mL    Voided (mL): 1000 mL  Total OUT: 1070 mL    Total NET: -920 mL          LABS:                        8.7    15.93 )-----------( 789      ( 07 Dec 2021 06:36 )             27.8     12-07    134<L>  |  100  |  6.0<L>  ----------------------------<  89  3.9   |  21.0<L>  |  0.64    Ca    8.1<L>      07 Dec 2021 06:36  Phos  3.2     12-07  Mg     1.8     12-07    TPro  6.6  /  Alb  1.9<L>  /  TBili  <0.2<L>  /  DBili  x   /  AST  22  /  ALT  9   /  AlkPhos  65  12-06          MICROBIOLOGY:     Culture - Blood (collected 12-05 @ 21:20)  Source: .Blood Blood-Peripheral  Preliminary Report (12-07 @ 23:00):    No growth at 48 hours    Culture - Blood (collected 12-05 @ 21:19)  Source: .Blood Blood-Peripheral  Preliminary Report (12-07 @ 23:00):    No growth at 48 hours    Culture - Fungal, Tissue (collected 12-03 @ 00:24)  Source: .Tissue anterior left leg  Preliminary Report (12-03 @ 08:59):    Testing in progress    Culture - Acid Fast - Tissue w/Smear (collected 12-03 @ 00:24)  Source: .Tissue anterior left leg  Preliminary Report (12-04 @ 15:04):    Culture is being performed.    Culture - Tissue with Gram Stain (collected 12-03 @ 00:24)  Source: .Tissue anterior left leg  Gram Stain (12-03 @ 02:29):    No polymorphonuclear leukocytes seen per low power field    Rare Gram Variable Rods seen per oil power field  Final Report (12-07 @ 17:55):    Moderate Pseudomonas aeruginosa  Organism: Pseudomonas aeruginosa (12-07 @ 17:55)  Organism: Pseudomonas aeruginosa (12-07 @ 17:55)      -  Amikacin: S <=16      -  Aztreonam: R >16      -  Cefepime: S 8      -  Ceftazidime: S 4      -  Ciprofloxacin: S 0.5      -  Gentamicin: S <=2      -  Imipenem: S <=1      -  Levofloxacin: I 2      -  Meropenem: S <=1      -  Piperacillin/Tazobactam: S 16      -  Tobramycin: S <=2      Method Type: ANASTACIO    Culture - Fungal, Tissue (collected 12-03 @ 00:21)  Source: .Tissue left lower leg  Preliminary Report (12-03 @ 08:18):    Testing in progress    Culture - Acid Fast - Tissue w/Smear (collected 12-03 @ 00:21)  Source: .Tissue left lower leg  Preliminary Report (12-04 @ 15:04):    Culture is being performed.    Culture - Tissue with Gram Stain (collected 12-03 @ 00:21)  Source: .Tissue left lower leg  Gram Stain (12-03 @ 02:28):    Rare polymorphonuclear leukocytes seen per low power field    No organisms seen per oil power field  Final Report (12-07 @ 17:52):    Few Stenotrophomonas maltophilia    Few Pseudomonas aeruginosa    See previous culture [id number]  Organism: Stenotrophomonas maltophilia (12-07 @ 17:52)  Organism: Stenotrophomonas maltophilia (12-07 @ 17:52)      -  Ceftazidime: R >16      -  Levofloxacin: S <=0.5      -  Trimethoprim/Sulfamethoxazole: S <=0.5/9.5      Method Type: ANASTACIO    Culture - Aspirate with Gram Stain (collected 12-01 @ 12:54)  Source: .Aspirate  Gram Stain (12-01 @ 18:23):    Few polymorphonuclear leukocytes    No organisms seen per oil power field  Final Report (12-06 @ 10:30):    No growth at 5 days        PATHOLOGY:

## 2021-12-08 NOTE — PROGRESS NOTE ADULT - ASSESSMENT
52yo female present to ED with left leg swelling, erythema, pain, leukocytosis and acute renal failure with CT scan concerning for necrotizing soft tissue infection. S/p LLE exploration and debridement admitted to SICU for hemorrhagic + septic shock, found to have GI bleed, now s/p endoscopic injection and clipping of a dieulofoy ulcer and strep bacteremia. Downgraded to floor from SICU 11/22/21.  Patient had complained or RLE swelling. Right lower extremity duplex and negative for DVT; however + swelling in popliteal fossa to R calf.  A Rt knee I&D performed. LLE with streptococcus pyogenes. 11/30 ortho aspirated additional fluid from left knee awaiting effusion results. s/p debridement and washout 12/3/21 with tissue culture and biopsy.    - s/p Irrigation and debridement of the left leg: medial and lateral fasciotomies irrigation and debridement with tissue culture performed 12/7  - trend WBC and fever  - continue pain control   - continue IV abx per ID recs  - f/u labs for lyme   - s/p BENJAMIN 12/6 -  no evidence of any valvular vegetations   - daily dressing changes, wound care: wound vac change 12/6  - f/u PT/OT with ROM exercises    - continue wound vac b/l  - WBAT LLE in CAM boot  - per GI; continue to trend CBC and transfuse for a goal of hgb 8 or higher.  - continue PPI BID for mucosal cytoprotection  - s/p EGD

## 2021-12-08 NOTE — PROGRESS NOTE ADULT - ATTENDING COMMENTS
pt seen and examined. L knee incision sutures removed, well healed w/o drainage or wound breakdown. Continued LLE swelling and pain, s/p washout yesterday /w ACS. Knee ROM continues to be limited due to pain. No erythema about the knee. Diffuse soft tissue swelling. Cx negative on last tap, only blood aspirated, no evidence of infection /w last tap. Continue IV abx. Continue PT for ROM of B/l Knees. Care as per ACS. ortho to follow pt seen and examined. L knee incision sutures removed, well healed w/o drainage or wound breakdown. Continued LLE swelling and pain, s/p washout yesterday /w ACS, purulence found during exploration. Knee ROM continues to be limited due to pain. No erythema about the knee. Diffuse soft tissue swelling. Cx negative on last tap, only blood aspirated, no evidence of infection /w last tap. Continue IV abx. Continue PT for ROM of B/l Knees. Care as per ACS. ortho to follow. if pt continues to be afebrile and making clinical improvement no plans for repeat washout at this time

## 2021-12-08 NOTE — PROGRESS NOTE ADULT - SUBJECTIVE AND OBJECTIVE BOX
Patient seen and eval at bedside. Patient has no new complaints. Denies CP, SOB, dizziness.    Vital Signs Last 24 Hrs  T(C): 36.4 (08 Dec 2021 04:44), Max: 37.4 (07 Dec 2021 17:17)  T(F): 97.6 (08 Dec 2021 04:44), Max: 99.4 (07 Dec 2021 17:17)  HR: 72 (08 Dec 2021 04:44) (72 - 92)  BP: 104/62 (08 Dec 2021 04:44) (90/53 - 142/78)  BP(mean): 71 (07 Dec 2021 20:30) (64 - 73)  RR: 18 (08 Dec 2021 04:44) (11 - 20)  SpO2: 92% (08 Dec 2021 04:44) (92% - 100%)    PE: NAD, alert awake  Right knee: sutures intact, no bleeding or drainage  EHL/TA/GS/FHL intact, Gross SILT s/s/DP/SP/tib distrib  DP pulse 2+, calf soft NT  Left knee: sutures x 2 proximally intact, incision otherwise healing well, intact, no drainage or bleeding  Motor diminished, SILT s/s/DP/tib distrib, diminished SP   Wound VAC on lower extremity managed by ACS                          7.9    14.64 )-----------( 725      ( 08 Dec 2021 07:49 )             26.3     A/P:  Pt is a  51y Female s/p Right knee I&D POD# 14 (11/24/21),  Left knee I&D w [facility] left ankle POD#20 (11/17/21)    PLAN:   ·	Pain control  ·	DVTp  ·	Weight Bearing Status: WBAT RLE, WBAT LLE in CAM boot  ·	Left knee aspirate cultures negative to date from 12/1/21  ·	IV abx as per ID  ·	2 remaining sutures removed left knee, right knee sutures removed without complication, no bleeding or drainage. Steri-strips placed with gauze dressing. Both incisions were cleaned with betadine swab.  ·	D/w Dr. Tobias  ·	Continue care as per primary team

## 2021-12-08 NOTE — PROGRESS NOTE ADULT - ASSESSMENT
51y Female present to ED with left leg swelling, erythema, pain. Patient endorses she had left knee pain for 1 week presented 3 days ago to ED  where she states was given an steroid shot, and ibuprofen. however pain and edema worsened. Patient states she wasnt feeling herself today reason why she came. Endorses chills initially with pain 3 days ago but none since, denies history of trauma, insect bites, recent interventions, or injections to the area, contact with ticks, history of diabetes.  PT AS ABOVE WITH LEFT LEG SWELLING PT WITH INCREASED WBC PLACED ON ABX FOR PRESUMED INFECTION  PT WITH CT SCAN WITH FASCIAL EDAM PT BROUGHT TO THE OR EMERGENTLY AND  UNDERWENT FASCIOTOMY   PT BROUGHT TO THE OR  BOTH TRAUMA AND ORTHO INVOLVED  PURULENT FLUID FOUND  NECROTIZING SOFT TISSUE INFECTION    BLOOD CX WITH GROUP A STREP AND  OPERATIVE FLUID WITH STREP   PT WAS  ON PCN AND CLINDA for POSSIBLE ANTITOXIN EFFECT IN GROUP A STREP INFECTION        - new CT scan of lower ext on 11/29/21 ? showed early abscess in left anterior calf. also edema between left media gastroc and soleus  SURGERY FOLLOWING  s/p ASPIRATION LEFT KNEE BY ORTHO      on  ZOSYN to 4.5 grams Q8H   OR CX WITH PSEUDOMONAS   ADN STENOTROPHOMONAS     ZOSYN FOR PSEUDOMONAS    BACTRIM FOR STENOTROPHOMONAS       PT  S/P OR WASHOUT 12.2  AND  OR AGAIN 12/7       WILL FOLLOW UP OPERATIVE CX  PT AFEBRILE   OVERALL NON TOXIC APPEARS IMPROVED

## 2021-12-08 NOTE — PROGRESS NOTE ADULT - ATTENDING COMMENTS
I have seen and examined the patient during rounds form 8-11a  OR last night with purulent drainage for posterior left calf  follow cultures.  appreciate ID input

## 2021-12-09 LAB
ANION GAP SERPL CALC-SCNC: 9 MMOL/L — SIGNIFICANT CHANGE UP (ref 5–17)
BASOPHILS # BLD AUTO: 0.11 K/UL — SIGNIFICANT CHANGE UP (ref 0–0.2)
BASOPHILS NFR BLD AUTO: 0.8 % — SIGNIFICANT CHANGE UP (ref 0–2)
BUN SERPL-MCNC: 7.5 MG/DL — LOW (ref 8–20)
CALCIUM SERPL-MCNC: 7.9 MG/DL — LOW (ref 8.6–10.2)
CHLORIDE SERPL-SCNC: 102 MMOL/L — SIGNIFICANT CHANGE UP (ref 98–107)
CO2 SERPL-SCNC: 22 MMOL/L — SIGNIFICANT CHANGE UP (ref 22–29)
CREAT SERPL-MCNC: 0.71 MG/DL — SIGNIFICANT CHANGE UP (ref 0.5–1.3)
CRP SERPL-MCNC: 110 MG/L — HIGH
EOSINOPHIL # BLD AUTO: 0.21 K/UL — SIGNIFICANT CHANGE UP (ref 0–0.5)
EOSINOPHIL NFR BLD AUTO: 1.6 % — SIGNIFICANT CHANGE UP (ref 0–6)
ERYTHROCYTE [SEDIMENTATION RATE] IN BLOOD: 63 MM/HR — HIGH (ref 0–20)
GLUCOSE SERPL-MCNC: 86 MG/DL — SIGNIFICANT CHANGE UP (ref 70–99)
HCT VFR BLD CALC: 25.8 % — LOW (ref 34.5–45)
HGB BLD-MCNC: 7.9 G/DL — LOW (ref 11.5–15.5)
IMM GRANULOCYTES NFR BLD AUTO: 1.8 % — HIGH (ref 0–1.5)
LYMPHOCYTES # BLD AUTO: 15.8 % — SIGNIFICANT CHANGE UP (ref 13–44)
LYMPHOCYTES # BLD AUTO: 2.13 K/UL — SIGNIFICANT CHANGE UP (ref 1–3.3)
MAGNESIUM SERPL-MCNC: 1.8 MG/DL — SIGNIFICANT CHANGE UP (ref 1.6–2.6)
MCHC RBC-ENTMCNC: 27.7 PG — SIGNIFICANT CHANGE UP (ref 27–34)
MCHC RBC-ENTMCNC: 30.6 GM/DL — LOW (ref 32–36)
MCV RBC AUTO: 90.5 FL — SIGNIFICANT CHANGE UP (ref 80–100)
MONOCYTES # BLD AUTO: 0.66 K/UL — SIGNIFICANT CHANGE UP (ref 0–0.9)
MONOCYTES NFR BLD AUTO: 4.9 % — SIGNIFICANT CHANGE UP (ref 2–14)
NEUTROPHILS # BLD AUTO: 10.16 K/UL — HIGH (ref 1.8–7.4)
NEUTROPHILS NFR BLD AUTO: 75.1 % — SIGNIFICANT CHANGE UP (ref 43–77)
PHOSPHATE SERPL-MCNC: 3.4 MG/DL — SIGNIFICANT CHANGE UP (ref 2.4–4.7)
PLATELET # BLD AUTO: 755 K/UL — HIGH (ref 150–400)
POTASSIUM SERPL-MCNC: 4.3 MMOL/L — SIGNIFICANT CHANGE UP (ref 3.5–5.3)
POTASSIUM SERPL-SCNC: 4.3 MMOL/L — SIGNIFICANT CHANGE UP (ref 3.5–5.3)
RBC # BLD: 2.85 M/UL — LOW (ref 3.8–5.2)
RBC # FLD: 21.5 % — HIGH (ref 10.3–14.5)
RHEUMATOID FACT SERPL-ACNC: <10 IU/ML — SIGNIFICANT CHANGE UP (ref 0–13)
SARS-COV-2 RNA SPEC QL NAA+PROBE: SIGNIFICANT CHANGE UP
SODIUM SERPL-SCNC: 133 MMOL/L — LOW (ref 135–145)
WBC # BLD: 13.52 K/UL — HIGH (ref 3.8–10.5)
WBC # FLD AUTO: 13.52 K/UL — HIGH (ref 3.8–10.5)

## 2021-12-09 RX ADMIN — Medication 975 MILLIGRAM(S): at 11:52

## 2021-12-09 RX ADMIN — HYDROMORPHONE HYDROCHLORIDE 4 MILLIGRAM(S): 2 INJECTION INTRAMUSCULAR; INTRAVENOUS; SUBCUTANEOUS at 11:50

## 2021-12-09 RX ADMIN — PANTOPRAZOLE SODIUM 40 MILLIGRAM(S): 20 TABLET, DELAYED RELEASE ORAL at 05:43

## 2021-12-09 RX ADMIN — Medication 10 MILLIGRAM(S): at 22:51

## 2021-12-09 RX ADMIN — SODIUM CHLORIDE 1 GRAM(S): 9 INJECTION INTRAMUSCULAR; INTRAVENOUS; SUBCUTANEOUS at 17:53

## 2021-12-09 RX ADMIN — Medication 975 MILLIGRAM(S): at 22:53

## 2021-12-09 RX ADMIN — HYDROMORPHONE HYDROCHLORIDE 4 MILLIGRAM(S): 2 INJECTION INTRAMUSCULAR; INTRAVENOUS; SUBCUTANEOUS at 22:51

## 2021-12-09 RX ADMIN — GABAPENTIN 300 MILLIGRAM(S): 400 CAPSULE ORAL at 22:51

## 2021-12-09 RX ADMIN — GABAPENTIN 300 MILLIGRAM(S): 400 CAPSULE ORAL at 05:43

## 2021-12-09 RX ADMIN — Medication 10 MILLIGRAM(S): at 13:10

## 2021-12-09 RX ADMIN — HYDROMORPHONE HYDROCHLORIDE 4 MILLIGRAM(S): 2 INJECTION INTRAMUSCULAR; INTRAVENOUS; SUBCUTANEOUS at 02:30

## 2021-12-09 RX ADMIN — Medication 1 TABLET(S): at 17:53

## 2021-12-09 RX ADMIN — HYDROMORPHONE HYDROCHLORIDE 4 MILLIGRAM(S): 2 INJECTION INTRAMUSCULAR; INTRAVENOUS; SUBCUTANEOUS at 23:51

## 2021-12-09 RX ADMIN — SODIUM CHLORIDE 125 MILLILITER(S): 9 INJECTION, SOLUTION INTRAVENOUS at 06:44

## 2021-12-09 RX ADMIN — Medication 975 MILLIGRAM(S): at 00:00

## 2021-12-09 RX ADMIN — Medication 975 MILLIGRAM(S): at 11:38

## 2021-12-09 RX ADMIN — Medication 975 MILLIGRAM(S): at 18:23

## 2021-12-09 RX ADMIN — ENOXAPARIN SODIUM 30 MILLIGRAM(S): 100 INJECTION SUBCUTANEOUS at 17:53

## 2021-12-09 RX ADMIN — Medication 975 MILLIGRAM(S): at 23:53

## 2021-12-09 RX ADMIN — SODIUM CHLORIDE 1 GRAM(S): 9 INJECTION INTRAMUSCULAR; INTRAVENOUS; SUBCUTANEOUS at 05:44

## 2021-12-09 RX ADMIN — HYDROMORPHONE HYDROCHLORIDE 4 MILLIGRAM(S): 2 INJECTION INTRAMUSCULAR; INTRAVENOUS; SUBCUTANEOUS at 17:53

## 2021-12-09 RX ADMIN — HYDROMORPHONE HYDROCHLORIDE 4 MILLIGRAM(S): 2 INJECTION INTRAMUSCULAR; INTRAVENOUS; SUBCUTANEOUS at 01:37

## 2021-12-09 RX ADMIN — PANTOPRAZOLE SODIUM 40 MILLIGRAM(S): 20 TABLET, DELAYED RELEASE ORAL at 17:53

## 2021-12-09 RX ADMIN — Medication 1 TABLET(S): at 05:52

## 2021-12-09 RX ADMIN — Medication 975 MILLIGRAM(S): at 05:42

## 2021-12-09 RX ADMIN — HYDROMORPHONE HYDROCHLORIDE 4 MILLIGRAM(S): 2 INJECTION INTRAMUSCULAR; INTRAVENOUS; SUBCUTANEOUS at 18:23

## 2021-12-09 RX ADMIN — Medication 975 MILLIGRAM(S): at 06:40

## 2021-12-09 RX ADMIN — PIPERACILLIN AND TAZOBACTAM 25 GRAM(S): 4; .5 INJECTION, POWDER, LYOPHILIZED, FOR SOLUTION INTRAVENOUS at 13:11

## 2021-12-09 RX ADMIN — Medication 975 MILLIGRAM(S): at 17:53

## 2021-12-09 RX ADMIN — PIPERACILLIN AND TAZOBACTAM 25 GRAM(S): 4; .5 INJECTION, POWDER, LYOPHILIZED, FOR SOLUTION INTRAVENOUS at 05:48

## 2021-12-09 RX ADMIN — ENOXAPARIN SODIUM 30 MILLIGRAM(S): 100 INJECTION SUBCUTANEOUS at 05:47

## 2021-12-09 RX ADMIN — HYDROMORPHONE HYDROCHLORIDE 4 MILLIGRAM(S): 2 INJECTION INTRAMUSCULAR; INTRAVENOUS; SUBCUTANEOUS at 12:20

## 2021-12-09 RX ADMIN — PIPERACILLIN AND TAZOBACTAM 25 GRAM(S): 4; .5 INJECTION, POWDER, LYOPHILIZED, FOR SOLUTION INTRAVENOUS at 22:51

## 2021-12-09 RX ADMIN — Medication 10 MILLIGRAM(S): at 05:44

## 2021-12-09 RX ADMIN — GABAPENTIN 300 MILLIGRAM(S): 400 CAPSULE ORAL at 13:11

## 2021-12-09 NOTE — PROGRESS NOTE ADULT - SUBJECTIVE AND OBJECTIVE BOX
SUBJECTIVE/24 hour events:  Patient is a 51yFemale with LLE necrotizing soft tissue infection and the subsequent right knee collection, complicated hospital course. Most recent procedure 12/7 for additional washout and debridement of lle with wound vac placement. Patient with no acute events overnight, has been afebrile, pain controlled on current regimen, tolerating a diet. Ortho and ID following. Dispo pending      Vital Signs Last 24 Hrs  T(C): 37.1 (08 Dec 2021 21:51), Max: 37.1 (08 Dec 2021 21:51)  T(F): 98.8 (08 Dec 2021 21:51), Max: 98.8 (08 Dec 2021 21:51)  HR: 86 (08 Dec 2021 21:51) (72 - 87)  BP: 108/62 (08 Dec 2021 21:51) (104/62 - 108/62)  BP(mean): --  RR: 18 (08 Dec 2021 21:51) (18 - 18)  SpO2: 92% (08 Dec 2021 21:51) (92% - 98%)  Drug Dosing Weight  Height (cm): 167.6 (07 Dec 2021 17:17)  Weight (kg): 93.4 (07 Dec 2021 17:17)  BMI (kg/m2): 33.3 (07 Dec 2021 17:17)  BSA (m2): 2.02 (07 Dec 2021 17:17)  I&O's Detail    07 Dec 2021 07:01  -  08 Dec 2021 07:00  --------------------------------------------------------  IN:    Lactated Ringers: 150 mL  Total IN: 150 mL    OUT:    VAC (Vacuum Assisted Closure) System (mL): 70 mL    Voided (mL): 1000 mL  Total OUT: 1070 mL    Total NET: -920 mL        Allergies    No Known Allergies    Intolerances                              7.9    14.64 )-----------( 725      ( 08 Dec 2021 07:49 )             26.3   12-08    133<L>  |  102  |  7.1<L>  ----------------------------<  101<H>  4.8   |  20.0<L>  |  0.49<L>    Ca    7.8<L>      08 Dec 2021 07:49  Phos  4.1     12-08  Mg     1.9     12-08        ROS:    PHYSICAL EXAM:  Constitutional: resting comfortably   Respiratory: no respiratory distress, no dyspnea, no supplemental o2 needed   Gastrointestinal: abdomen soft, non-tender, no guarding, no peritonitis   Genitourinary: voiding   Extremities: upper extremities mobility and edema improving, RLE NVI, dressing c/d/i, lle wound vac x2 well seated warm to touch, compartments soft  Neurological: A&OX3        MEDICATIONS  (STANDING):  acetaminophen     Tablet .. 975 milliGRAM(s) Oral every 6 hours  baclofen 10 milliGRAM(s) Oral three times a day  enoxaparin Injectable 30 milliGRAM(s) SubCutaneous two times a day  gabapentin 300 milliGRAM(s) Oral three times a day  lactated ringers. 1000 milliLiter(s) (125 mL/Hr) IV Continuous <Continuous>  lisinopril 5 milliGRAM(s) Oral daily  pantoprazole    Tablet 40 milliGRAM(s) Oral two times a day  piperacillin/tazobactam IVPB.. 4.5 Gram(s) IV Intermittent every 8 hours  polyethylene glycol 3350 17 Gram(s) Oral daily  senna 2 Tablet(s) Oral at bedtime  sodium chloride 1 Gram(s) Oral every 12 hours  trimethoprim  160 mG/sulfamethoxazole 800 mG 1 Tablet(s) Oral two times a day    MEDICATIONS  (PRN):  HYDROmorphone   Tablet 2 milliGRAM(s) Oral every 4 hours PRN Moderate Pain (4 - 6)  HYDROmorphone   Tablet 4 milliGRAM(s) Oral every 4 hours PRN Severe Pain (7 - 10)  HYDROmorphone  Injectable 0.5 milliGRAM(s) IV Push every 3 hours PRN breakthrough pain      RADIOLOGY STUDIES:    CULTURES:

## 2021-12-09 NOTE — PROGRESS NOTE ADULT - ASSESSMENT
A/P:  Pt is a  51y Female s/p Right knee I&D POD# 15 (11/24/21),  Left knee I&D w [facility] left ankle POD#21 (11/17/21) 12/2 I&D of left leg, medial/lateral fasciotomies I&D/ tissue culture, 12/7 LLE debridement and washout vac placement    PLAN:   ORTHO plan  ·	Pain control  ·	DVTp  ·	Weight Bearing Status: WBAT RLE, WBAT LLE in CAM boot  ·	Left knee aspirate cultures negative to date from 12/1/21  ·	IV abx as per ID  ·	2 remaining sutures removed left knee, right knee sutures removed without complication, no bleeding or drainage. Steri-strips placed with gauze dressing. Both incisions were cleaned with betadine swab.  ID PLAN:      on  ZOSYN to 4.5 grams Q8H  OR CX WITH PSEUDOMONAS   AND  STENOTROPHOMONAS    ZOSYN FOR PSEUDOMONAS  BACTRIM FOR STENOTROPHOMONAS

## 2021-12-09 NOTE — PROGRESS NOTE ADULT - ATTENDING COMMENTS
Pain better controlled.   Continues to exercise UEs, reports/demonstrates improved b/l UE strength and ROM.   Denies f/c.   WBC continues to downtrend.   LLE feels "heavy" - limiting mobility - but endorses some improvement.     --Continue to monitor vitals, labs, exams.   --Plan for OR tomorrow (NPWT change/wound check).

## 2021-12-09 NOTE — CHART NOTE - NSCHARTNOTEFT_GEN_A_CORE
Pt was consulted on 12/2, labs were ordered however they were never drawn and/or were cancelled.  No notification was relayed that labs were discontinued - review in [facility] states they were canceled by the lab- none were re-ordered.    Rheumatology on call, Dr. Mcneal, will follow-up and address.      Cole Pacheco MD

## 2021-12-10 ENCOUNTER — TRANSCRIPTION ENCOUNTER (OUTPATIENT)
Age: 51
End: 2021-12-10

## 2021-12-10 LAB
-  CEFTAZIDIME: SIGNIFICANT CHANGE UP
-  LEVOFLOXACIN: SIGNIFICANT CHANGE UP
-  TRIMETHOPRIM/SULFAMETHOXAZOLE: SIGNIFICANT CHANGE UP
ANA PAT FLD IF-IMP: ABNORMAL
ANA TITR SER: ABNORMAL
ANION GAP SERPL CALC-SCNC: 10 MMOL/L — SIGNIFICANT CHANGE UP (ref 5–17)
ANTI-RIBONUCLEAR PROTEIN: 0.3 AI — SIGNIFICANT CHANGE UP
APTT BLD: 40.6 SEC — HIGH (ref 27.5–35.5)
BASOPHILS # BLD AUTO: 0.06 K/UL — SIGNIFICANT CHANGE UP (ref 0–0.2)
BASOPHILS NFR BLD AUTO: 0.5 % — SIGNIFICANT CHANGE UP (ref 0–2)
BLD GP AB SCN SERPL QL: SIGNIFICANT CHANGE UP
BUN SERPL-MCNC: 4.4 MG/DL — LOW (ref 8–20)
CALCIUM SERPL-MCNC: 7.6 MG/DL — LOW (ref 8.6–10.2)
CHLORIDE SERPL-SCNC: 105 MMOL/L — SIGNIFICANT CHANGE UP (ref 98–107)
CO2 SERPL-SCNC: 23 MMOL/L — SIGNIFICANT CHANGE UP (ref 22–29)
CREAT SERPL-MCNC: 0.62 MG/DL — SIGNIFICANT CHANGE UP (ref 0.5–1.3)
CULTURE RESULTS: SIGNIFICANT CHANGE UP
CULTURE RESULTS: SIGNIFICANT CHANGE UP
DSDNA AB FLD-ACNC: <0.2 AI — SIGNIFICANT CHANGE UP
ENA SM AB FLD QL: <0.2 AI — SIGNIFICANT CHANGE UP
ENA SS-A AB FLD IA-ACNC: <0.2 AI — SIGNIFICANT CHANGE UP
EOSINOPHIL # BLD AUTO: 0.23 K/UL — SIGNIFICANT CHANGE UP (ref 0–0.5)
EOSINOPHIL NFR BLD AUTO: 1.8 % — SIGNIFICANT CHANGE UP (ref 0–6)
GLUCOSE SERPL-MCNC: 87 MG/DL — SIGNIFICANT CHANGE UP (ref 70–99)
HCT VFR BLD CALC: 22.4 % — LOW (ref 34.5–45)
HGB BLD-MCNC: 6.9 G/DL — CRITICAL LOW (ref 11.5–15.5)
IMM GRANULOCYTES NFR BLD AUTO: 1.5 % — SIGNIFICANT CHANGE UP (ref 0–1.5)
INR BLD: 1.39 RATIO — HIGH (ref 0.88–1.16)
LYMPHOCYTES # BLD AUTO: 2.52 K/UL — SIGNIFICANT CHANGE UP (ref 1–3.3)
LYMPHOCYTES # BLD AUTO: 20.2 % — SIGNIFICANT CHANGE UP (ref 13–44)
MAGNESIUM SERPL-MCNC: 1.7 MG/DL — SIGNIFICANT CHANGE UP (ref 1.6–2.6)
MCHC RBC-ENTMCNC: 27.8 PG — SIGNIFICANT CHANGE UP (ref 27–34)
MCHC RBC-ENTMCNC: 30.8 GM/DL — LOW (ref 32–36)
MCV RBC AUTO: 90.3 FL — SIGNIFICANT CHANGE UP (ref 80–100)
METHOD TYPE: SIGNIFICANT CHANGE UP
MONOCYTES # BLD AUTO: 0.59 K/UL — SIGNIFICANT CHANGE UP (ref 0–0.9)
MONOCYTES NFR BLD AUTO: 4.7 % — SIGNIFICANT CHANGE UP (ref 2–14)
NEUTROPHILS # BLD AUTO: 8.9 K/UL — HIGH (ref 1.8–7.4)
NEUTROPHILS NFR BLD AUTO: 71.3 % — SIGNIFICANT CHANGE UP (ref 43–77)
PHOSPHATE SERPL-MCNC: 3.8 MG/DL — SIGNIFICANT CHANGE UP (ref 2.4–4.7)
PLATELET # BLD AUTO: 703 K/UL — HIGH (ref 150–400)
POTASSIUM SERPL-MCNC: 3.9 MMOL/L — SIGNIFICANT CHANGE UP (ref 3.5–5.3)
POTASSIUM SERPL-SCNC: 3.9 MMOL/L — SIGNIFICANT CHANGE UP (ref 3.5–5.3)
PROTHROM AB SERPL-ACNC: 15.9 SEC — HIGH (ref 10.6–13.6)
RBC # BLD: 2.48 M/UL — LOW (ref 3.8–5.2)
RBC # FLD: 21.5 % — HIGH (ref 10.3–14.5)
SODIUM SERPL-SCNC: 138 MMOL/L — SIGNIFICANT CHANGE UP (ref 135–145)
SPECIMEN SOURCE: SIGNIFICANT CHANGE UP
SPECIMEN SOURCE: SIGNIFICANT CHANGE UP
WBC # BLD: 12.49 K/UL — HIGH (ref 3.8–10.5)
WBC # FLD AUTO: 12.49 K/UL — HIGH (ref 3.8–10.5)

## 2021-12-10 PROCEDURE — 99233 SBSQ HOSP IP/OBS HIGH 50: CPT

## 2021-12-10 RX ORDER — MAGNESIUM SULFATE 500 MG/ML
2 VIAL (ML) INJECTION ONCE
Refills: 0 | Status: COMPLETED | OUTPATIENT
Start: 2021-12-10 | End: 2021-12-10

## 2021-12-10 RX ORDER — LISINOPRIL 2.5 MG/1
5 TABLET ORAL DAILY
Refills: 0 | Status: DISCONTINUED | OUTPATIENT
Start: 2021-12-10 | End: 2021-12-15

## 2021-12-10 RX ADMIN — SODIUM CHLORIDE 75 MILLILITER(S): 9 INJECTION, SOLUTION INTRAVENOUS at 22:22

## 2021-12-10 RX ADMIN — HYDROMORPHONE HYDROCHLORIDE 4 MILLIGRAM(S): 2 INJECTION INTRAMUSCULAR; INTRAVENOUS; SUBCUTANEOUS at 12:33

## 2021-12-10 RX ADMIN — PANTOPRAZOLE SODIUM 40 MILLIGRAM(S): 20 TABLET, DELAYED RELEASE ORAL at 17:26

## 2021-12-10 RX ADMIN — Medication 1 TABLET(S): at 05:07

## 2021-12-10 RX ADMIN — Medication 975 MILLIGRAM(S): at 13:03

## 2021-12-10 RX ADMIN — Medication 975 MILLIGRAM(S): at 17:57

## 2021-12-10 RX ADMIN — GABAPENTIN 300 MILLIGRAM(S): 400 CAPSULE ORAL at 05:05

## 2021-12-10 RX ADMIN — HYDROMORPHONE HYDROCHLORIDE 0.5 MILLIGRAM(S): 2 INJECTION INTRAMUSCULAR; INTRAVENOUS; SUBCUTANEOUS at 06:23

## 2021-12-10 RX ADMIN — ENOXAPARIN SODIUM 30 MILLIGRAM(S): 100 INJECTION SUBCUTANEOUS at 05:07

## 2021-12-10 RX ADMIN — Medication 975 MILLIGRAM(S): at 05:05

## 2021-12-10 RX ADMIN — HYDROMORPHONE HYDROCHLORIDE 0.5 MILLIGRAM(S): 2 INJECTION INTRAMUSCULAR; INTRAVENOUS; SUBCUTANEOUS at 15:13

## 2021-12-10 RX ADMIN — Medication 10 MILLIGRAM(S): at 22:18

## 2021-12-10 RX ADMIN — Medication 1 TABLET(S): at 17:26

## 2021-12-10 RX ADMIN — Medication 10 MILLIGRAM(S): at 05:05

## 2021-12-10 RX ADMIN — Medication 975 MILLIGRAM(S): at 12:03

## 2021-12-10 RX ADMIN — SODIUM CHLORIDE 1 GRAM(S): 9 INJECTION INTRAMUSCULAR; INTRAVENOUS; SUBCUTANEOUS at 05:05

## 2021-12-10 RX ADMIN — SODIUM CHLORIDE 1 GRAM(S): 9 INJECTION INTRAMUSCULAR; INTRAVENOUS; SUBCUTANEOUS at 17:26

## 2021-12-10 RX ADMIN — ENOXAPARIN SODIUM 30 MILLIGRAM(S): 100 INJECTION SUBCUTANEOUS at 17:27

## 2021-12-10 RX ADMIN — PIPERACILLIN AND TAZOBACTAM 25 GRAM(S): 4; .5 INJECTION, POWDER, LYOPHILIZED, FOR SOLUTION INTRAVENOUS at 14:45

## 2021-12-10 RX ADMIN — HYDROMORPHONE HYDROCHLORIDE 4 MILLIGRAM(S): 2 INJECTION INTRAMUSCULAR; INTRAVENOUS; SUBCUTANEOUS at 05:03

## 2021-12-10 RX ADMIN — PIPERACILLIN AND TAZOBACTAM 25 GRAM(S): 4; .5 INJECTION, POWDER, LYOPHILIZED, FOR SOLUTION INTRAVENOUS at 22:14

## 2021-12-10 RX ADMIN — GABAPENTIN 300 MILLIGRAM(S): 400 CAPSULE ORAL at 14:45

## 2021-12-10 RX ADMIN — HYDROMORPHONE HYDROCHLORIDE 0.5 MILLIGRAM(S): 2 INJECTION INTRAMUSCULAR; INTRAVENOUS; SUBCUTANEOUS at 06:08

## 2021-12-10 RX ADMIN — PANTOPRAZOLE SODIUM 40 MILLIGRAM(S): 20 TABLET, DELAYED RELEASE ORAL at 05:07

## 2021-12-10 RX ADMIN — GABAPENTIN 300 MILLIGRAM(S): 400 CAPSULE ORAL at 22:18

## 2021-12-10 RX ADMIN — Medication 25 GRAM(S): at 12:02

## 2021-12-10 RX ADMIN — Medication 975 MILLIGRAM(S): at 06:05

## 2021-12-10 RX ADMIN — PIPERACILLIN AND TAZOBACTAM 25 GRAM(S): 4; .5 INJECTION, POWDER, LYOPHILIZED, FOR SOLUTION INTRAVENOUS at 05:08

## 2021-12-10 RX ADMIN — HYDROMORPHONE HYDROCHLORIDE 4 MILLIGRAM(S): 2 INJECTION INTRAMUSCULAR; INTRAVENOUS; SUBCUTANEOUS at 06:03

## 2021-12-10 RX ADMIN — Medication 10 MILLIGRAM(S): at 14:45

## 2021-12-10 RX ADMIN — HYDROMORPHONE HYDROCHLORIDE 4 MILLIGRAM(S): 2 INJECTION INTRAMUSCULAR; INTRAVENOUS; SUBCUTANEOUS at 12:03

## 2021-12-10 RX ADMIN — Medication 975 MILLIGRAM(S): at 17:27

## 2021-12-10 RX ADMIN — SODIUM CHLORIDE 125 MILLILITER(S): 9 INJECTION, SOLUTION INTRAVENOUS at 05:04

## 2021-12-10 RX ADMIN — HYDROMORPHONE HYDROCHLORIDE 0.5 MILLIGRAM(S): 2 INJECTION INTRAMUSCULAR; INTRAVENOUS; SUBCUTANEOUS at 14:58

## 2021-12-10 NOTE — PROGRESS NOTE ADULT - SUBJECTIVE AND OBJECTIVE BOX
Patient seen with Dr. Tobias at 16:15. Left knee aspiration attempted.    ARTHROCENTSIS  PROCEDURE NOTE: Arthrocentesis    Performed by: Dr. Tobias    Indication: Effusion / rule out septic joint    Consent: the risks and benefits of arthrocentesis discussed with patient, including the risk of bleeding, infection, and technical failure. The risks of not performing the procedure, failure to diagnose septic joint with resultant systemic infection, discussed with the patient. The alternatives of performing the procedure also discussed. Written consent was obtained following the discussion.    Universal Protocol: A time out was performed and the correct patient and site were verified.    The left knee joint was prepped and draped in proper sterile fashion.  A 18 gauge needle was used to aspirate fluid from the joint using appropriate anatomic landmarks. 0cc fluid was obtained from the joint.  No fluid available to be sent for culture. The site was bandaged and no complications were noted. The patient tolerated the procedure well.    Post-Procedure Diagnosis: Effusion  Complications: None  Specimens Removed: none

## 2021-12-10 NOTE — CONSULT NOTE ADULT - CONSULT REQUESTED DATE/TIME
17-Nov-2021 17:26
20-Nov-2021
20-Nov-2021 11:35
03-Dec-2021 08:16
06-Dec-2021 14:41
17-Nov-2021 18:24
26-Nov-2021 11:00
17-Nov-2021 15:40
30-Nov-2021 10:13
09-Dec-2022

## 2021-12-10 NOTE — PROGRESS NOTE ADULT - PROBLEM SELECTOR PLAN 1
Patient with chronic anemia probably multifactorial as patient with multiple exploration and debridement for LLE necrotizing fascitis. Hemoglobin from 7.9 to 6.9. Patient received 1 unit PRBC today. Prior to that last unit was 12/1. Patient without any GI complaints at this time. Reports regular brown BMs. rectal exam revealing yellow-brown stool. No plan for any GI intervention at this time as drop in hemoglobin is most likely not due to a GI bleed. Patient scheduled for OR today for debridement, washout of LLE.   -11/20 EGD that revealed normal esophagus, dieulafoy int the body that was injected with epinephrine at the base and 2 hemostat clips placed. 5 Ulcers in  the stomach. All were in the antrum/prepyloric area. 2 were large  1-2 cm deep ulcers, the other 3 were 5 mm ulcers.   -12/2 and EGD was performed 12/3 that showed normal mucosa in the whole esophagus. Gastric ulcers. 2 clips present in the proximal fundus with no underlying pathology or bleeding at the site of her prior dieulafoy.  -Continue to trend CBC and transfuse for a goal of hgb of 7 or higher.  -Continue PPI BID for cytoprotection.  -GI signing off. Patient with chronic anemia probably multifactorial as patient with multiple exploration and debridement for LLE necrotizing fascitis. Hemoglobin from 7.9 to 6.9. Patient received 1 unit PRBC today. Prior to that last unit was 12/1. Patient without any GI complaints at this time. Reports regular brown BMs. rectal exam revealing yellow-brown stool. No plan for any GI intervention at this time as drop in hemoglobin is not due to a GI bleed and appearance of ulcers were improved on 12/3 EGD, dieulfoy was resolved. Patient scheduled for OR today for debridement, washout of LLE.   -11/20 EGD that revealed normal esophagus, dieulafoy int the body that was injected with epinephrine at the base and 2 hemostat clips placed. 5 Ulcers in  the stomach. All were in the antrum/prepyloric area. 2 were large  1-2 cm deep ulcers, the other 3 were 5 mm ulcers.   -12/2 and EGD was performed 12/3 that showed normal mucosa in the whole esophagus. Gastric ulcers. 2 clips present in the proximal fundus with no underlying pathology or bleeding at the site of her prior dieulafoy.  -Continue to trend CBC and transfuse for a goal of hgb of 7 or higher.  -Continue PPI BID for cytoprotection.  -GI signing off. F/U for outpatient EGD in 8 weeks to confirm ulcer healing

## 2021-12-10 NOTE — PROGRESS NOTE ADULT - SUBJECTIVE AND OBJECTIVE BOX
Patient is a 51y old  Female who presents with a chief complaint of  left leg swelling, erythema. GI Reconsulted for drop in hemoglobin.     Interval Events / Subjective: 51y Female With complicated hospital stay admitted for necrotizing fascitis. Pt s/p LLE exploration and debridement. Patient was last seen by GI  11/24, for GI bleed, s/p EGD 11/20 that revealed normal esophagus, dieulafoy int the body that was injected with epinephrine at the base and 2 hemostat clips placed. 5 Ulcers in  the stomach. All were in the antrum/prepyloric area. 2 were large  1-2 cm deep ulcers, the other 3 were 5 mm ulcers. GI reconsulted for drop in hemoglobin 12/2 and EGD was performed 12/3 that showed normal mucosa in the whole esophagus. Gastric ulcers. 2 clips present in the proximal fundus with no underlying pathology or bleeding at the site of her prior dieulafoy. Now GI reconsulted once again for drop in hemoglobin from 7.9 to 6.9. Patient received 1 unit PRBC today. Prior to that last unit was 12/1. Patient seen and evaluated at bedside, denies nausea, vomiting, abdominal pain, chest pain, shortness of breath. She is tolerating diet. Reports normal brown non-bloody BM this morning. LOREN revealed light brown non-bloody stool.       REVIEW OF SYSTEMS:   General: Negative  HEENT: Negative  CV: Negative  Respiratory: Negative  GI: See HPI  : Negative  MSK: Negative  Hematologic: Negative  Skin: Negative    MEDICATIONS:   MEDICATIONS  (STANDING):  acetaminophen     Tablet .. 975 milliGRAM(s) Oral every 6 hours  baclofen 10 milliGRAM(s) Oral three times a day  enoxaparin Injectable 30 milliGRAM(s) SubCutaneous two times a day  gabapentin 300 milliGRAM(s) Oral three times a day  lactated ringers. 1000 milliLiter(s) (75 mL/Hr) IV Continuous <Continuous>  lisinopril 5 milliGRAM(s) Oral daily  pantoprazole    Tablet 40 milliGRAM(s) Oral two times a day  piperacillin/tazobactam IVPB.. 4.5 Gram(s) IV Intermittent every 8 hours  polyethylene glycol 3350 17 Gram(s) Oral daily  senna 2 Tablet(s) Oral at bedtime  sodium chloride 1 Gram(s) Oral every 12 hours  trimethoprim  160 mG/sulfamethoxazole 800 mG 1 Tablet(s) Oral two times a day    MEDICATIONS  (PRN):  HYDROmorphone   Tablet 2 milliGRAM(s) Oral every 4 hours PRN Moderate Pain (4 - 6)  HYDROmorphone   Tablet 4 milliGRAM(s) Oral every 4 hours PRN Severe Pain (7 - 10)  HYDROmorphone  Injectable 0.5 milliGRAM(s) IV Push every 3 hours PRN breakthrough pain      ALLERGIES:   Allergies    No Known Allergies    Intolerances        VITAL SIGNS:   Vital Signs Last 24 Hrs  T(C): 36.8 (10 Dec 2021 11:55), Max: 37.6 (09 Dec 2021 21:20)  T(F): 98.3 (10 Dec 2021 11:55), Max: 99.7 (09 Dec 2021 21:20)  HR: 88 (10 Dec 2021 11:55) (77 - 90)  BP: 144/78 (10 Dec 2021 11:55) (107/60 - 144/78)  BP(mean): --  RR: 18 (10 Dec 2021 11:55) (18 - 18)  SpO2: 98% (10 Dec 2021 11:55) (95% - 100%)  I&O's Summary    09 Dec 2021 07:01  -  10 Dec 2021 07:00  --------------------------------------------------------  IN: 1500 mL / OUT: 3750 mL / NET: -2250 mL        PHYSICAL EXAM:   GENERAL:  Appears stated age, no distress  HEENT:  NC/AT,  conjunctivae clear, sclera -anicteric  CHEST:  Full & symmetric excursion, no increased effort, breath sounds clear  HEART:  Regular rhythm, S1, S2, no murmur/rub/S3/S4,  no edema  ABDOMEN:  Soft, non-tender, non-distended, normoactive bowel sounds,  no masses, no hepatosplenomegaly,   EXTREMITIES: LLE wound vac in place. No cyanosis, clubbing or edema  SKIN:  No rash/erythema/ecchymoses/petechiae/wounds/abscess/warm/dry  NEURO:  Alert, oriented    LABS:  CBC Full  -  ( 10 Dec 2021 07:50 )  WBC Count : 12.49 K/uL  RBC Count : 2.48 M/uL  Hemoglobin : 6.9 g/dL  Hematocrit : 22.4 %  Platelet Count - Automated : 703 K/uL  Mean Cell Volume : 90.3 fl  Mean Cell Hemoglobin : 27.8 pg  Mean Cell Hemoglobin Concentration : 30.8 gm/dL  Auto Neutrophil # : 8.90 K/uL  Auto Lymphocyte # : 2.52 K/uL  Auto Monocyte # : 0.59 K/uL  Auto Eosinophil # : 0.23 K/uL  Auto Basophil # : 0.06 K/uL  Auto Neutrophil % : 71.3 %  Auto Lymphocyte % : 20.2 %  Auto Monocyte % : 4.7 %  Auto Eosinophil % : 1.8 %  Auto Basophil % : 0.5 %    12-10    138  |  105  |  4.4<L>  ----------------------------<  87  3.9   |  23.0  |  0.62    Ca    7.6<L>      10 Dec 2021 07:50  Phos  3.8     12-10  Mg     1.7     12-10        PT/INR - ( 10 Dec 2021 07:50 )   PT: 15.9 sec;   INR: 1.39 ratio         PTT - ( 10 Dec 2021 07:50 )  PTT:40.6 sec      Culture - Surgical Swab (collected 08 Dec 2021 01:25)  Source: .Surgical Swab left leg wound (swab)  Preliminary Report (09 Dec 2021 08:43):    No growth    Culture - Surgical Swab (collected 08 Dec 2021 01:25)  Source: .Surgical Swab left leg deep wound (swab)  Preliminary Report (09 Dec 2021 11:19):    Moderate Stenotrophomonas maltophilia  Organism: Stenotrophomonas maltophilia (10 Dec 2021 10:18)  Organism: Stenotrophomonas maltophilia (10 Dec 2021 10:18)    Culture - Tissue with Gram Stain (collected 08 Dec 2021 01:25)  Source: .Tissue left leg devitalized muscle  Gram Stain (08 Dec 2021 07:34):    Rare polymorphonuclear leukocytes seen per low power field    Numerous Gram Variable Rods seen per oil power field  Preliminary Report (09 Dec 2021 07:21):    No growth        RADIOLOGY & ADDITIONAL STUDIES (The following images were personally reviewed):    EGD Report Date: 12/3/2021 10:21 AM     Findings:     Esophagus Mucosa Normal mucosa was noted in the whole esophagus.     Stomach Excavated lesions Multiple cratered non-bleeding ulcers ranging in size    from 5 mm to 2 cm were found in the stomach. 2 of the ulcers will prepyloric    with 1 about 2 cm in diameter and the other about 1 cm in diameter and on    opposite walls and both white-based without visible vessel, stain, friability or    bleeding. There was another ulcer on the anterior wall in the mid antrum of    about 8 mm in diameter and white-based and a fourth linear ulcer in the more    proximal antrum on the greater curvature which was also white-based without any    stigmata of recent or prior bleeding.     Additional stomach findings -There were also 2 clips present in the proximal    fundus with no underlying pathology or bleeding at the site of her prior    dieulafoy.     Duodenum Mucosa Normal mucosa was noted in the whole duodenum.                                       Impressions:      Normal mucosa in the whole esophagus.      Gastric ulcers.      -There were also 2 clips present in the proximal fundus with no underlying    pathology or bleeding at the site of her prior dieulafoy.      Normal mucosa in the whole examined duodenum.          Patient is a 51y old  Female who presents with a chief complaint of  left leg swelling, erythema. GI Reconsulted for drop in hemoglobin.     Interval Events / Subjective: 51y Female With complicated hospital stay admitted for necrotizing fascitis. Pt s/p LLE exploration and debridement. Patient was last seen by GI  , for GI bleed, s/p EGD 11/20 that revealed normal esophagus, dieulafoy int the body that was injected with epinephrine at the base and 2 hemostat clips placed. 5 Ulcers in  the stomach. All were in the antrum/prepyloric area. 2 were large  1-2 cm deep ulcers, the other 3 were 5 mm ulcers. GI reconsulted for drop in hemoglobin 12/2 and EGD was performed 12/3 that showed normal mucosa in the whole esophagus. Gastric ulcers  ( looked improved compared to 11/20 EGD) . 2 clips present in the proximal fundus with no underlying pathology or bleeding at the site of her prior dieulafoy. Now GI reconsulted once again for drop in hemoglobin from 7.9 to 6.9. Patient received 1 unit PRBC today. Prior to that last unit was 12/1. Patient seen and evaluated at bedside, denies nausea, vomiting, abdominal pain, chest pain, shortness of breath. She is tolerating diet. Reports normal brown non-bloody BM this morning. LOREN revealed light brown non-bloody stool.  Patient does note that she has bloody discharge from the vacuum wound suction.       REVIEW OF SYSTEMS:   General: Negative  HEENT: Negative  CV: Negative  Respiratory: Negative  GI: See HPI  : Negative  MSK: Negative  Hematologic: Negative  Skin: Negative    MEDICATIONS:   MEDICATIONS  (STANDING):  acetaminophen     Tablet .. 975 milliGRAM(s) Oral every 6 hours  baclofen 10 milliGRAM(s) Oral three times a day  enoxaparin Injectable 30 milliGRAM(s) SubCutaneous two times a day  gabapentin 300 milliGRAM(s) Oral three times a day  lactated ringers. 1000 milliLiter(s) (75 mL/Hr) IV Continuous <Continuous>  lisinopril 5 milliGRAM(s) Oral daily  pantoprazole    Tablet 40 milliGRAM(s) Oral two times a day  piperacillin/tazobactam IVPB.. 4.5 Gram(s) IV Intermittent every 8 hours  polyethylene glycol 3350 17 Gram(s) Oral daily  senna 2 Tablet(s) Oral at bedtime  sodium chloride 1 Gram(s) Oral every 12 hours  trimethoprim  160 mG/sulfamethoxazole 800 mG 1 Tablet(s) Oral two times a day    MEDICATIONS  (PRN):  HYDROmorphone   Tablet 2 milliGRAM(s) Oral every 4 hours PRN Moderate Pain (4 - 6)  HYDROmorphone   Tablet 4 milliGRAM(s) Oral every 4 hours PRN Severe Pain (7 - 10)  HYDROmorphone  Injectable 0.5 milliGRAM(s) IV Push every 3 hours PRN breakthrough pain      ALLERGIES:   Allergies    No Known Allergies    Intolerances        VITAL SIGNS:   Vital Signs Last 24 Hrs  T(C): 36.8 (10 Dec 2021 11:55), Max: 37.6 (09 Dec 2021 21:20)  T(F): 98.3 (10 Dec 2021 11:55), Max: 99.7 (09 Dec 2021 21:20)  HR: 88 (10 Dec 2021 11:55) (77 - 90)  BP: 144/78 (10 Dec 2021 11:55) (107/60 - 144/78)  BP(mean): --  RR: 18 (10 Dec 2021 11:55) (18 - 18)  SpO2: 98% (10 Dec 2021 11:55) (95% - 100%)  I&O's Summary    09 Dec 2021 07:01  -  10 Dec 2021 07:00  --------------------------------------------------------  IN: 1500 mL / OUT: 3750 mL / NET: -2250 mL        PHYSICAL EXAM:   GENERAL:  Appears stated age, no distress  HEENT:  NC/AT,  conjunctivae clear, sclera -anicteric  CHEST:  Full & symmetric excursion, no increased effort, breath sounds clear  HEART:  Regular rhythm, S1, S2, no murmur/rub/S3/S4,  no edema  ABDOMEN:  Soft, non-tender, non-distended, normoactive bowel sounds,  no masses, no hepatosplenomegaly,   EXTREMITIES: LLE wound vac in place. bandage on right knee as well. No cyanosis, clubbing ++ B/L edema LE  SKIN:  No rash/erythema/ecchymoses/petechiae/wounds/abscess/warm/dry  NEURO:  Alert, oriented  rectal- light brown stool     LABS:  CBC Full  -  ( 10 Dec 2021 07:50 )  WBC Count : 12.49 K/uL  RBC Count : 2.48 M/uL  Hemoglobin : 6.9 g/dL  Hematocrit : 22.4 %  Platelet Count - Automated : 703 K/uL  Mean Cell Volume : 90.3 fl  Mean Cell Hemoglobin : 27.8 pg  Mean Cell Hemoglobin Concentration : 30.8 gm/dL  Auto Neutrophil # : 8.90 K/uL  Auto Lymphocyte # : 2.52 K/uL  Auto Monocyte # : 0.59 K/uL  Auto Eosinophil # : 0.23 K/uL  Auto Basophil # : 0.06 K/uL  Auto Neutrophil % : 71.3 %  Auto Lymphocyte % : 20.2 %  Auto Monocyte % : 4.7 %  Auto Eosinophil % : 1.8 %  Auto Basophil % : 0.5 %    12-10    138  |  105  |  4.4<L>  ----------------------------<  87  3.9   |  23.0  |  0.62    Ca    7.6<L>      10 Dec 2021 07:50  Phos  3.8     12-10  Mg     1.7     12-10        PT/INR - ( 10 Dec 2021 07:50 )   PT: 15.9 sec;   INR: 1.39 ratio         PTT - ( 10 Dec 2021 07:50 )  PTT:40.6 sec      Culture - Surgical Swab (collected 08 Dec 2021 01:25)  Source: .Surgical Swab left leg wound (swab)  Preliminary Report (09 Dec 2021 08:43):    No growth    Culture - Surgical Swab (collected 08 Dec 2021 01:25)  Source: .Surgical Swab left leg deep wound (swab)  Preliminary Report (09 Dec 2021 11:19):    Moderate Stenotrophomonas maltophilia  Organism: Stenotrophomonas maltophilia (10 Dec 2021 10:18)  Organism: Stenotrophomonas maltophilia (10 Dec 2021 10:18)    Culture - Tissue with Gram Stain (collected 08 Dec 2021 01:25)  Source: .Tissue left leg devitalized muscle  Gram Stain (08 Dec 2021 07:34):    Rare polymorphonuclear leukocytes seen per low power field    Numerous Gram Variable Rods seen per oil power field  Preliminary Report (09 Dec 2021 07:21):    No growth        RADIOLOGY & ADDITIONAL STUDIES (The following images were personally reviewed):    EGD Report Date: 12/3/2021 10:21 AM     Findings:     Esophagus Mucosa Normal mucosa was noted in the whole esophagus.     Stomach Excavated lesions Multiple cratered non-bleeding ulcers ranging in size    from 5 mm to 2 cm were found in the stomach. 2 of the ulcers will prepyloric    with 1 about 2 cm in diameter and the other about 1 cm in diameter and on    opposite walls and both white-based without visible vessel, stain, friability or    bleeding. There was another ulcer on the anterior wall in the mid antrum of    about 8 mm in diameter and white-based and a fourth linear ulcer in the more    proximal antrum on the greater curvature which was also white-based without any    stigmata of recent or prior bleeding.     Additional stomach findings -There were also 2 clips present in the proximal    fundus with no underlying pathology or bleeding at the site of her prior    dieulafoy.     Duodenum Mucosa Normal mucosa was noted in the whole duodenum.                                       Impressions:      Normal mucosa in the whole esophagus.      Gastric ulcers.      -There were also 2 clips present in the proximal fundus with no underlying    pathology or bleeding at the site of her prior dieulafoy.      Normal mucosa in the whole examined duodenum.

## 2021-12-10 NOTE — CONSULT NOTE ADULT - CONSULT REASON
Left lower extremity swelling
Persistent fevers and leukocytosis
knee swelling, skin issues and gastric ulcers
Pain Management
Septic arthritis / synovitis / sepsis
GROUP A STREP BACTEREMIA SOFT TISSUE INFECTION
bilateral deltoid weakness
RONNIE
Soft tissue infuction
rei

## 2021-12-10 NOTE — PROGRESS NOTE ADULT - SUBJECTIVE AND OBJECTIVE BOX
Acute Care Surgery/Trauma Surgery Progress Note:    No acute overnight events. Patient afebrile, VSS. Pain well controlled. Tolerating diet. Denies n/v/f/c/cp/sob.     Diet, NPO after Midnight:      NPO Start Date: 09-Dec-2021,   NPO Start Time: 23:59 (12-09-21 @ 07:47)  Diet, Regular (12-07-21 @ 20:14)      Scheduled Medications:   acetaminophen     Tablet .. 975 milliGRAM(s) Oral every 6 hours  baclofen 10 milliGRAM(s) Oral three times a day  enoxaparin Injectable 30 milliGRAM(s) SubCutaneous two times a day  gabapentin 300 milliGRAM(s) Oral three times a day  lactated ringers. 1000 milliLiter(s) (125 mL/Hr) IV Continuous <Continuous>  lisinopril 5 milliGRAM(s) Oral daily  pantoprazole    Tablet 40 milliGRAM(s) Oral two times a day  piperacillin/tazobactam IVPB.. 4.5 Gram(s) IV Intermittent every 8 hours  polyethylene glycol 3350 17 Gram(s) Oral daily  senna 2 Tablet(s) Oral at bedtime  sodium chloride 1 Gram(s) Oral every 12 hours  trimethoprim  160 mG/sulfamethoxazole 800 mG 1 Tablet(s) Oral two times a day    PRN Medications:  HYDROmorphone   Tablet 2 milliGRAM(s) Oral every 4 hours PRN Moderate Pain (4 - 6)  HYDROmorphone   Tablet 4 milliGRAM(s) Oral every 4 hours PRN Severe Pain (7 - 10)  HYDROmorphone  Injectable 0.5 milliGRAM(s) IV Push every 3 hours PRN breakthrough pain      Objective:   T(F): 99.7 (12-09 @ 21:20), Max: 99.8 (12-09 @ 13:53)  HR: 87 (12-09 @ 21:20) (72 - 90)  BP: 107/64 (12-09 @ 21:20) (99/55 - 116/69)  BP(mean): --  ABP: --  ABP(mean): --  RR: 18 (12-09 @ 21:20) (16 - 18)  SpO2: 95% (12-09 @ 21:20) (90% - 100%)      Physical Exam:   GEN: patient resting comfortably in bed, in no acute distress  RESP: respirations are unlabored, no accessory muscle use, no conversational dyspnea  CVS: RRR  GI: Abdomen soft, non-tender, non-distended, no rebound tenderness / guarding    I&O's    12-08 @ 07:01  -  12-09 @ 07:00  --------------------------------------------------------  IN:  Total IN: 0 mL    OUT:    Voided (mL): 700 mL  Total OUT: 700 mL    Total NET: -700 mL      12-09 @ 07:01  -  12-10 @ 00:21  --------------------------------------------------------  IN:    Lactated Ringers: 500 mL  Total IN: 500 mL    OUT:    Voided (mL): 1900 mL  Total OUT: 1900 mL    Total NET: -1400 mL          LABS:                        7.9    13.52 )-----------( 755      ( 09 Dec 2021 07:12 )             25.8     12-09    133<L>  |  102  |  7.5<L>  ----------------------------<  86  4.3   |  22.0  |  0.71    Ca    7.9<L>      09 Dec 2021 07:12  Phos  3.4     12-09  Mg     1.8     12-09            MICROBIOLOGY:     Culture - Surgical Swab (collected 12-08 @ 01:25)  Source: .Surgical Swab left leg wound (swab)  Preliminary Report (12-09 @ 08:43):    No growth    Culture - Surgical Swab (collected 12-08 @ 01:25)  Source: .Surgical Swab left leg deep wound (swab)  Preliminary Report (12-09 @ 11:19):    Moderate Stenotrophomonas maltophilia    Culture - Tissue with Gram Stain (collected 12-08 @ 01:25)  Source: .Tissue left leg devitalized muscle  Gram Stain (12-08 @ 07:34):    Rare polymorphonuclear leukocytes seen per low power field    Numerous Gram Variable Rods seen per oil power field  Preliminary Report (12-09 @ 07:21):    No growth    Culture - Blood (collected 12-05 @ 21:20)  Source: .Blood Blood-Peripheral  Preliminary Report (12-07 @ 23:00):    No growth at 48 hours    Culture - Blood (collected 12-05 @ 21:19)  Source: .Blood Blood-Peripheral  Preliminary Report (12-07 @ 23:00):    No growth at 48 hours    Culture - Fungal, Tissue (collected 12-03 @ 00:24)  Source: .Tissue anterior left leg  Preliminary Report (12-03 @ 08:59):    Testing in progress    Culture - Acid Fast - Tissue w/Smear (collected 12-03 @ 00:24)  Source: .Tissue anterior left leg  Preliminary Report (12-04 @ 15:04):    Culture is being performed.    Culture - Tissue with Gram Stain (collected 12-03 @ 00:24)  Source: .Tissue anterior left leg  Gram Stain (12-03 @ 02:29):    No polymorphonuclear leukocytes seen per low power field    Rare Gram Variable Rods seen per oil power field  Final Report (12-07 @ 17:55):    Moderate Pseudomonas aeruginosa  Organism: Pseudomonas aeruginosa (12-07 @ 17:55)  Organism: Pseudomonas aeruginosa (12-07 @ 17:55)      -  Amikacin: S <=16      -  Aztreonam: R >16      -  Cefepime: S 8      -  Ceftazidime: S 4      -  Ciprofloxacin: S 0.5      -  Gentamicin: S <=2      -  Imipenem: S <=1      -  Levofloxacin: I 2      -  Meropenem: S <=1      -  Piperacillin/Tazobactam: S 16      -  Tobramycin: S <=2      Method Type: ANASTACIO        PATHOLOGY:

## 2021-12-10 NOTE — CONSULT NOTE ADULT - PROVIDER SPECIALTY LIST ADULT
Surgery
Cardiology
Rheumatology
Infectious Disease
Neurology
Orthopedics
Pain Medicine
Gastroenterology
Rheumatology
Nephrology

## 2021-12-10 NOTE — CHART NOTE - NSCHARTNOTEFT_GEN_A_CORE
Source: Patient [x ]  Family [ ]   other [ x]    Current Diet: Diet, NPO:   Except Medications (12-10-21 @ 07:31)  Diet, NPO after Midnight:      NPO Start Date: 09-Dec-2021,   NPO Start Time: 23:59 (12-09-21 @ 07:47)    Patient reports [ ] nausea  [ ] vomiting [ ] diarrhea [ ] constipation  [ ]chewing problems [ ] swallowing issues  [ ] other:     PO intake:  < 50% [ ]   50-75%  [x ]   %  [ ]  other :    Source for PO intake [x ] Patient [ ] family [ x] chart [ ] staff [ ] other    Current Weight:   (12/8) 251.9 lbs  (12/7) 205.9 lbs  (12/1) 234.7 lbs  (11/24) 221.3 lbs  (11/22) 245.1 lbs  (11/21) 242.9 lbs  (11/20) 228.1 lbs  ? accuracy of weights, noted with 1+ b/l arm and b/l wrist and 3+ b/l leg and b/l foot edema, continue to trend and maintain strict Is&Os     Pertinent Medications: MEDICATIONS  (STANDING):  acetaminophen     Tablet .. 975 milliGRAM(s) Oral every 6 hours  baclofen 10 milliGRAM(s) Oral three times a day  enoxaparin Injectable 30 milliGRAM(s) SubCutaneous two times a day  gabapentin 300 milliGRAM(s) Oral three times a day  lactated ringers. 1000 milliLiter(s) (75 mL/Hr) IV Continuous <Continuous>  lisinopril 5 milliGRAM(s) Oral daily  magnesium sulfate  IVPB 2 Gram(s) IV Intermittent once  pantoprazole    Tablet 40 milliGRAM(s) Oral two times a day  piperacillin/tazobactam IVPB.. 4.5 Gram(s) IV Intermittent every 8 hours  polyethylene glycol 3350 17 Gram(s) Oral daily  senna 2 Tablet(s) Oral at bedtime  sodium chloride 1 Gram(s) Oral every 12 hours  trimethoprim  160 mG/sulfamethoxazole 800 mG 1 Tablet(s) Oral two times a day    MEDICATIONS  (PRN):  HYDROmorphone   Tablet 2 milliGRAM(s) Oral every 4 hours PRN Moderate Pain (4 - 6)  HYDROmorphone   Tablet 4 milliGRAM(s) Oral every 4 hours PRN Severe Pain (7 - 10)  HYDROmorphone  Injectable 0.5 milliGRAM(s) IV Push every 3 hours PRN breakthrough pain    Pertinent Labs: CBC Full  -  ( 10 Dec 2021 07:50 )  WBC Count : 12.49 K/uL  RBC Count : 2.48 M/uL  Hemoglobin : 6.9 g/dL  Hematocrit : 22.4 %  Platelet Count - Automated : 703 K/uL  Mean Cell Volume : 90.3 fl  Mean Cell Hemoglobin : 27.8 pg  Mean Cell Hemoglobin Concentration : 30.8 gm/dL  Auto Neutrophil # : 8.90 K/uL  Auto Lymphocyte # : 2.52 K/uL  Auto Monocyte # : 0.59 K/uL  Auto Eosinophil # : 0.23 K/uL  Auto Basophil # : 0.06 K/uL  Auto Neutrophil % : 71.3 %  Auto Lymphocyte % : 20.2 %  Auto Monocyte % : 4.7 %  Auto Eosinophil % : 1.8 %  Auto Basophil % : 0.5 %    12-10 Na138 mmol/L Glu 87 mg/dL K+ 3.9 mmol/L Cr  0.62 mg/dL BUN 4.4 mg/dL<L> Phos 3.8 mg/dL Alb n/a   PAB n/a       Skin: Surgical incision to left upper and lower leg, right knee, and left knee and skin tear to right buttocks per documentation     Nutrition focused physical exam conducted - found signs of malnutrition [ ]absent [x ]present    Subcutaneous fat loss: [ x] Orbital fat pads region, [ ]Buccal fat region, [ ]Triceps region,  [ ]Ribs region    Muscle wasting: [x ]Temples region, [x ]Clavicle region, [ ]Shoulder region, [ ]Scapula region, [ ]Interosseous region,  [ ]thigh region, [ ]Calf region    Estimated Needs:   [x ] no change since previous assessment  [ ] recalculated:     Current Nutrition Diagnosis: Pt remains at high nutrition risk secondary to malnutrition (moderate, acute) related to prolonged hospitalization and inability to meet increased nutrient needs (s/p LLE exploration and debridement, c/b hemorrhagic + septic shock, found to have GI bleed,  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 Currently NPO for LLE wound vac change. Pt reports overall appetite/po intake has been fair. Some days she eats better than others, stating sometimes she will just have a soup or small sandwich for meals. Encourage HBV protein sources pt verbalized understanding and states she has been choosing good protein sources at all meals. RD to follow up.    Recommendations:   1) Resume po diet as medically feasible/tolerable.  2) Once diet resumed, add Ensure Enlive TID to optimize po intake and provide an additional 350 kcal, 20g protein per serving; also suggest Swapnil BID to aid in healing (90 kcal, 14g amino acids per packet).  3) Rx: MVI and vitamin C 500mg daily.  4) Bowel regimen PRN.  5) Obtain daily weights to monitor trends.     Monitoring and Evaluation:   [ ] PO intake [ ] Tolerance to diet prescription [X] Weights  [X] Follow up per protocol [X] Labs

## 2021-12-10 NOTE — DIETITIAN NUTRITION RISK NOTIFICATION - ADDITIONAL COMMENTS/DIETITIAN RECOMMENDATIONS
Resume po diet as medically feasible/tolerable.  Once diet resumed, add Ensure Enlive TID to optimize po intake and provide an additional 350 kcal, 20g protein per serving; also suggest Swapnil BID to aid in healing (90 kcal, 14g amino acids per packet).  Rx: MVI and vitamin C 500mg daily.  Bowel regimen PRN.  Obtain daily weights to monitor trends.

## 2021-12-10 NOTE — PROGRESS NOTE ADULT - SUBJECTIVE AND OBJECTIVE BOX
Patient seen and evaluated resting comfortably in bed in NAD.   States she is going to the OR today for wound vac removal/ change  Denies any new knee pain or swelling    Vital Signs Last 24 Hrs  T(C): 37.1 (10 Dec 2021 08:28), Max: 37.7 (09 Dec 2021 13:53)  T(F): 98.8 (10 Dec 2021 08:28), Max: 99.8 (09 Dec 2021 13:53)  HR: 77 (10 Dec 2021 08:28) (77 - 90)  BP: 113/70 (10 Dec 2021 08:28) (105/60 - 116/69)  BP(mean): --  RR: 18 (10 Dec 2021 08:28) (16 - 18)  SpO2: 95% (10 Dec 2021 08:28) (92% - 100%)    PE: NAD, alert awake  Right knee dressing C/D/I  EHL/ TA/ GS/ FHL intact, Gross SILT  DP pulse 2+, calf soft NT  Left knee dressing C/D/I  Motor diminished, SILT, diminished SP   Wound VAC on lower extremity managed by ACS                        6.9    12.49 )-----------( 703      ( 10 Dec 2021 07:50 )             22.4     A/P:  Pt is a  51y Female s/p Right knee I&D (11/24/21), Left knee I&D w [facility] left ankle (11/17/21)    PLAN:   Pain control  DVTp  Continue PT  Weight Bearing Status: WBAT RLE, WBAT LLE in CAM boot  Left knee aspirate cultures negative to date from 12/1/21  Continue IV abx as per ID  Continue care per primary team

## 2021-12-10 NOTE — PROGRESS NOTE ADULT - ATTENDING COMMENTS
Pt seen with NP  history as above- Dieulfoy  and multiple antral ulcers on 11/20 EGD.  Improvement of ulcer seen on EGD 12/3.   brown stool, no active bleeding  anemia may be due to both the bloody discharge  and friable ulcers in stomach

## 2021-12-10 NOTE — PROGRESS NOTE ADULT - ATTENDING COMMENTS
Pt seen and examined. plan for Vac change today /w acs. Sill /w c/o severe pain in L knee and leg. TTp over L knee, ROM severely limited due to pain, 0-20 degrees. No erythema or large effusion palpated on exam. LLE diffusely edematous, wound vacs in place and functioning. +foot drop. Dressing over knee c/d/i. Pt afebrile over last 48 hours + since repeat I&D by ACS. Due to severe pain in L knee and reduced ROM, repeat aspiration performed. 0.5cc of blood aspirated, no synovial fluid or pus expressed. No indication for acute ortho intervention on the L knee at this time. Will continue to follow clinically. Care as per ACS and ID. Ortho to follow

## 2021-12-10 NOTE — CONSULT NOTE ADULT - ASSESSMENT
50 yo woman admitted for necrotizing infection s/p fasciotomy of LLE now with proximal arm weakness and swelling    Proximal arm weakness/Swelling  unclear etiology, possibly due to pain from swelling  MRI brain and C spine w/o contrast  Consider upper extremity dopplers or medicine eval for swelling    Plan discussed with primary team    Will continue to follow
RONNIE , Metabolic Acidosis,     ? Sepsis,    Profound Anemia,     Uterine Leiomyomas,     Avoidance of nephrotoxins/nephrotoxin medication adjustment  Medication dosage adjustment for kidney function  Continuous IVF administration (guided by CVP and testing of fluid responsiveness for 6 hours )  Discontinuation of ACE/ARBs   Close monitoring of serum Creatinine and UO  Acid-base, electrolyte and albumin status management  Avoidance of hyperglycemia   Consider alternatives to radiocontrast administration  Optimizing hemodynamics:    [facility] , US Kidneys,     D/W Dr. Garcia,     
ASSESSMENT: Patient is a 51y old female with LLE edema, apparent cellultiis, significant derangement in labs however physical exam not likely necrotizing fascitis, also evidence of a left knee efusion. Patient needs close monitoring and resucitation already in RONNIE. Recommend US venous to assess for DVTs, continue vanc (renally dose) agree with ortho evaluation    PLAN:    - Recommend medicine eval/admission for cellulitis/RONNIE  - Agree with ortho eval  - Agree with Abx (renally dose)  - Agree with DVT US  - Surgery to follow
51y F with septic arthritis from unknown GAS infection also in soft tissues, s/p aspiration/washout. Unknown etiology of infection, now c/o upper extremity pain/myalgia. Would monitor for other septic joints and continue on IV abx. Would examine for underlying rheumatic disease as cause, as these can increase chance for septic arthritis in patients.  Serologies/labs ordered    - cont IV abx  - joint management/arthrocentesis/washout as per orthopedics. Monitor UE closely for involvement    Will follow  Call for any questions  [phone]  Cole Pacheco MD
51y Female present to ED with left leg swelling, erythema, pain. Patient endorses she had left knee pain for 1 week presented 3 days ago to ED  where she states was given an steroid shot, and ibuprofen. however pain and edema worsened. Patient states she wasn't feeling herself today reason why she came. Endorses chills initially with pain 3 days ago but none since, denies history of trauma, insect bites, recent interventions, or injections to the area, contact with ticks, history of diabetes. Patient of note has increased WBC with persistent fevers;     1. Persistent fevers with leukocytosis with Group B strep   - patient is still febrile with leukocytosis despite Abx   - discussed risk and benefits of BENJAMIN to assess for any valvular vegetations   - continue with Abx as per ID   - NPO after midnight   - no prior hx of GI bleed or difficulty swallowing       Leonor Giron D.O. [facility]  Cardiology/Vascular Cardiology -[facility] Cardiology   Telephone # [phone]  
50 yo woman comes in with septicemia ( with strep pyogenous in blood cultures and wounds) further complicated by knee pain with effusions.  these were aspirated and washed out.  Her  synovial cultures were negative however patient was on antibiotics.  the synovial fluid was inflammatory with no crystals.  patient does not have a clinical history of that suggest a history of ongoing  inflammatory arthritis or CTD.  primary team was questioning Crohn's-give gastric ulcers, skin manifestation and joint issues.  patient with little GI issues otherwise.   will await for serologies however there is low suspicion for underlying CTD and inflammatory arthritis.  
DC oxy PO  DC dilaudid IVP  DC robaxin PO  - Recommend starting dilaudid PO 2mg/4mg [id number] PRN mod/severe pain; hydromorphone IVP 0.5mg q3hour PRN breakthrough pain  - Recommend starting acetaminophen PO 975mg c2suuol standing. HOLD for liver function test dysfunction  - Recommend starting gabapentin PO 300mg [id number] standing. HOLD for sedation  baclofen 10 milliGRAM(s) Oral three times a day HOLD for sedation

## 2021-12-10 NOTE — PROGRESS NOTE ADULT - ASSESSMENT
52yo Female s/p Right knee I&D POD# 15 (11/24/21),  Left knee I&D w [facility] left ankle POD#21 (11/17/21) 12/2 I&D of left leg, medial/lateral fasciotomies I&D/ tissue culture, 12/7 LLE debridement and washout vac placement.    - LLE wound vac changes today 12/10  - continue to trend H/H  - continue pain control   - continue DVTp  - Weight Bearing Status: WBAT RLE, WBAT LLE in CAM boot  - Left knee aspirate cultures negative to date from 12/1/21  - IV abx as per ID:   ZOSYN to 4.5 grams Q8H OR CX WITH PSEUDOMONAS         AND    STENOTROPHOMONAS   ZOSYN FOR PSEUDOMONAS  BACTRIM FOR STENOTROPHOMONAS

## 2021-12-10 NOTE — PROGRESS NOTE ADULT - ATTENDING COMMENTS
Reports persistent LE pain.   Afebrile, downtrending WBC (12.5 from 13.5). Hgb 6.9 today.   Palpable pulses, decreased LLE mobility - reports 2/2 pain and "heaviness"    --Pain control.  --1u PRBC.   --PT/OT.   --OR today for washout, possible debridement, NPWT change.

## 2021-12-11 LAB
ALBUMIN SERPL ELPH-MCNC: 2 G/DL — LOW (ref 3.3–5.2)
ALP SERPL-CCNC: 77 U/L — SIGNIFICANT CHANGE UP (ref 40–120)
ALT FLD-CCNC: 14 U/L — SIGNIFICANT CHANGE UP
ANION GAP SERPL CALC-SCNC: 11 MMOL/L — SIGNIFICANT CHANGE UP (ref 5–17)
ANISOCYTOSIS BLD QL: SLIGHT — SIGNIFICANT CHANGE UP
AST SERPL-CCNC: 42 U/L — HIGH
BASOPHILS # BLD AUTO: 0 K/UL — SIGNIFICANT CHANGE UP (ref 0–0.2)
BASOPHILS NFR BLD AUTO: 0 % — SIGNIFICANT CHANGE UP (ref 0–2)
BILIRUB SERPL-MCNC: 0.2 MG/DL — LOW (ref 0.4–2)
BUN SERPL-MCNC: 4.4 MG/DL — LOW (ref 8–20)
CALCIUM SERPL-MCNC: 6.9 MG/DL — LOW (ref 8.6–10.2)
CCP IGG SERPL-ACNC: <8 UNITS — SIGNIFICANT CHANGE UP
CHLORIDE SERPL-SCNC: 106 MMOL/L — SIGNIFICANT CHANGE UP (ref 98–107)
CO2 SERPL-SCNC: 22 MMOL/L — SIGNIFICANT CHANGE UP (ref 22–29)
CREAT SERPL-MCNC: 0.59 MG/DL — SIGNIFICANT CHANGE UP (ref 0.5–1.3)
DSDNA AB SER-ACNC: 13 IU/ML — SIGNIFICANT CHANGE UP
ELLIPTOCYTES BLD QL SMEAR: SLIGHT — SIGNIFICANT CHANGE UP
EOSINOPHIL # BLD AUTO: 0 K/UL — SIGNIFICANT CHANGE UP (ref 0–0.5)
EOSINOPHIL NFR BLD AUTO: 0 % — SIGNIFICANT CHANGE UP (ref 0–6)
GIANT PLATELETS BLD QL SMEAR: PRESENT — SIGNIFICANT CHANGE UP
GLUCOSE SERPL-MCNC: 106 MG/DL — HIGH (ref 70–99)
HCT VFR BLD CALC: 25.7 % — LOW (ref 34.5–45)
HGB BLD-MCNC: 8.1 G/DL — LOW (ref 11.5–15.5)
HYPOCHROMIA BLD QL: SLIGHT — SIGNIFICANT CHANGE UP
LYMPHOCYTES # BLD AUTO: 0.83 K/UL — LOW (ref 1–3.3)
LYMPHOCYTES # BLD AUTO: 4.3 % — LOW (ref 13–44)
MAGNESIUM SERPL-MCNC: 1.9 MG/DL — SIGNIFICANT CHANGE UP (ref 1.6–2.6)
MANUAL SMEAR VERIFICATION: SIGNIFICANT CHANGE UP
MCHC RBC-ENTMCNC: 27.9 PG — SIGNIFICANT CHANGE UP (ref 27–34)
MCHC RBC-ENTMCNC: 31.5 GM/DL — LOW (ref 32–36)
MCV RBC AUTO: 88.6 FL — SIGNIFICANT CHANGE UP (ref 80–100)
MICROCYTES BLD QL: SLIGHT — SIGNIFICANT CHANGE UP
MONOCYTES # BLD AUTO: 0.17 K/UL — SIGNIFICANT CHANGE UP (ref 0–0.9)
MONOCYTES NFR BLD AUTO: 0.9 % — LOW (ref 2–14)
NEUTROPHILS # BLD AUTO: 18.28 K/UL — HIGH (ref 1.8–7.4)
NEUTROPHILS NFR BLD AUTO: 93.9 % — HIGH (ref 43–77)
NEUTS BAND # BLD: 0.9 % — SIGNIFICANT CHANGE UP (ref 0–8)
OVALOCYTES BLD QL SMEAR: SLIGHT — SIGNIFICANT CHANGE UP
PLAT MORPH BLD: NORMAL — SIGNIFICANT CHANGE UP
PLATELET # BLD AUTO: 708 K/UL — HIGH (ref 150–400)
POIKILOCYTOSIS BLD QL AUTO: SLIGHT — SIGNIFICANT CHANGE UP
POLYCHROMASIA BLD QL SMEAR: SIGNIFICANT CHANGE UP
POTASSIUM SERPL-MCNC: 4.3 MMOL/L — SIGNIFICANT CHANGE UP (ref 3.5–5.3)
POTASSIUM SERPL-SCNC: 4.3 MMOL/L — SIGNIFICANT CHANGE UP (ref 3.5–5.3)
PROT SERPL-MCNC: 6.5 G/DL — LOW (ref 6.6–8.7)
RBC # BLD: 2.9 M/UL — LOW (ref 3.8–5.2)
RBC # FLD: 20.3 % — HIGH (ref 10.3–14.5)
RBC BLD AUTO: ABNORMAL
RF+CCP IGG SER-IMP: NEGATIVE — SIGNIFICANT CHANGE UP
SODIUM SERPL-SCNC: 138 MMOL/L — SIGNIFICANT CHANGE UP (ref 135–145)
WBC # BLD: 19.28 K/UL — HIGH (ref 3.8–10.5)
WBC # FLD AUTO: 19.28 K/UL — HIGH (ref 3.8–10.5)

## 2021-12-11 PROCEDURE — 10060 I&D ABSCESS SIMPLE/SINGLE: CPT | Mod: 58

## 2021-12-11 PROCEDURE — 99232 SBSQ HOSP IP/OBS MODERATE 35: CPT

## 2021-12-11 PROCEDURE — 99024 POSTOP FOLLOW-UP VISIT: CPT | Mod: GC

## 2021-12-11 RX ORDER — FENTANYL CITRATE 50 UG/ML
25 INJECTION INTRAVENOUS
Refills: 0 | Status: DISCONTINUED | OUTPATIENT
Start: 2021-12-11 | End: 2021-12-11

## 2021-12-11 RX ORDER — HYDROMORPHONE HYDROCHLORIDE 2 MG/ML
0.5 INJECTION INTRAMUSCULAR; INTRAVENOUS; SUBCUTANEOUS
Refills: 0 | Status: DISCONTINUED | OUTPATIENT
Start: 2021-12-11 | End: 2021-12-11

## 2021-12-11 RX ORDER — SODIUM CHLORIDE 9 MG/ML
1000 INJECTION, SOLUTION INTRAVENOUS
Refills: 0 | Status: DISCONTINUED | OUTPATIENT
Start: 2021-12-11 | End: 2021-12-11

## 2021-12-11 RX ADMIN — GABAPENTIN 300 MILLIGRAM(S): 400 CAPSULE ORAL at 22:36

## 2021-12-11 RX ADMIN — ENOXAPARIN SODIUM 30 MILLIGRAM(S): 100 INJECTION SUBCUTANEOUS at 05:47

## 2021-12-11 RX ADMIN — HYDROMORPHONE HYDROCHLORIDE 0.5 MILLIGRAM(S): 2 INJECTION INTRAMUSCULAR; INTRAVENOUS; SUBCUTANEOUS at 07:56

## 2021-12-11 RX ADMIN — GABAPENTIN 300 MILLIGRAM(S): 400 CAPSULE ORAL at 05:42

## 2021-12-11 RX ADMIN — PIPERACILLIN AND TAZOBACTAM 25 GRAM(S): 4; .5 INJECTION, POWDER, LYOPHILIZED, FOR SOLUTION INTRAVENOUS at 22:36

## 2021-12-11 RX ADMIN — PIPERACILLIN AND TAZOBACTAM 25 GRAM(S): 4; .5 INJECTION, POWDER, LYOPHILIZED, FOR SOLUTION INTRAVENOUS at 05:42

## 2021-12-11 RX ADMIN — PANTOPRAZOLE SODIUM 40 MILLIGRAM(S): 20 TABLET, DELAYED RELEASE ORAL at 17:21

## 2021-12-11 RX ADMIN — Medication 10 MILLIGRAM(S): at 22:36

## 2021-12-11 RX ADMIN — Medication 10 MILLIGRAM(S): at 05:42

## 2021-12-11 RX ADMIN — SODIUM CHLORIDE 1 GRAM(S): 9 INJECTION INTRAMUSCULAR; INTRAVENOUS; SUBCUTANEOUS at 17:21

## 2021-12-11 RX ADMIN — Medication 975 MILLIGRAM(S): at 16:33

## 2021-12-11 RX ADMIN — Medication 975 MILLIGRAM(S): at 22:37

## 2021-12-11 RX ADMIN — ENOXAPARIN SODIUM 30 MILLIGRAM(S): 100 INJECTION SUBCUTANEOUS at 17:21

## 2021-12-11 RX ADMIN — HYDROMORPHONE HYDROCHLORIDE 4 MILLIGRAM(S): 2 INJECTION INTRAMUSCULAR; INTRAVENOUS; SUBCUTANEOUS at 10:12

## 2021-12-11 RX ADMIN — Medication 975 MILLIGRAM(S): at 16:03

## 2021-12-11 RX ADMIN — Medication 1 TABLET(S): at 17:21

## 2021-12-11 RX ADMIN — GABAPENTIN 300 MILLIGRAM(S): 400 CAPSULE ORAL at 16:03

## 2021-12-11 RX ADMIN — HYDROMORPHONE HYDROCHLORIDE 4 MILLIGRAM(S): 2 INJECTION INTRAMUSCULAR; INTRAVENOUS; SUBCUTANEOUS at 11:32

## 2021-12-11 RX ADMIN — HYDROMORPHONE HYDROCHLORIDE 0.5 MILLIGRAM(S): 2 INJECTION INTRAMUSCULAR; INTRAVENOUS; SUBCUTANEOUS at 11:00

## 2021-12-11 RX ADMIN — Medication 1 TABLET(S): at 05:45

## 2021-12-11 RX ADMIN — HYDROMORPHONE HYDROCHLORIDE 0.5 MILLIGRAM(S): 2 INJECTION INTRAMUSCULAR; INTRAVENOUS; SUBCUTANEOUS at 08:26

## 2021-12-11 RX ADMIN — Medication 975 MILLIGRAM(S): at 07:57

## 2021-12-11 RX ADMIN — Medication 10 MILLIGRAM(S): at 16:04

## 2021-12-11 RX ADMIN — HYDROMORPHONE HYDROCHLORIDE 0.5 MILLIGRAM(S): 2 INJECTION INTRAMUSCULAR; INTRAVENOUS; SUBCUTANEOUS at 11:30

## 2021-12-11 RX ADMIN — Medication 975 MILLIGRAM(S): at 08:27

## 2021-12-11 RX ADMIN — SODIUM CHLORIDE 1 GRAM(S): 9 INJECTION INTRAMUSCULAR; INTRAVENOUS; SUBCUTANEOUS at 05:46

## 2021-12-11 RX ADMIN — PANTOPRAZOLE SODIUM 40 MILLIGRAM(S): 20 TABLET, DELAYED RELEASE ORAL at 05:43

## 2021-12-11 RX ADMIN — PIPERACILLIN AND TAZOBACTAM 25 GRAM(S): 4; .5 INJECTION, POWDER, LYOPHILIZED, FOR SOLUTION INTRAVENOUS at 16:04

## 2021-12-11 NOTE — PROGRESS NOTE ADULT - SUBJECTIVE AND OBJECTIVE BOX
Acute Care Surgery/Trauma Surgery Progress Note:      s/p LLE wound vac changes early this AM. Sensation and motor present in LE b/l/ vac in place. No acute overnight events. Patient afebrile, VSS. Pain well controlled. Denies n/v/f/c/cp/sob.     Diet, Regular (12-11-21 @ 02:31)      Scheduled Medications:   acetaminophen     Tablet .. 975 milliGRAM(s) Oral every 6 hours  baclofen 10 milliGRAM(s) Oral three times a day  enoxaparin Injectable 30 milliGRAM(s) SubCutaneous two times a day  gabapentin 300 milliGRAM(s) Oral three times a day  lactated ringers. 1000 milliLiter(s) (75 mL/Hr) IV Continuous <Continuous>  lisinopril 5 milliGRAM(s) Oral daily  pantoprazole    Tablet 40 milliGRAM(s) Oral two times a day  piperacillin/tazobactam IVPB.. 4.5 Gram(s) IV Intermittent every 8 hours  polyethylene glycol 3350 17 Gram(s) Oral daily  senna 2 Tablet(s) Oral at bedtime  sodium chloride 1 Gram(s) Oral every 12 hours  trimethoprim  160 mG/sulfamethoxazole 800 mG 1 Tablet(s) Oral two times a day    PRN Medications:  HYDROmorphone   Tablet 2 milliGRAM(s) Oral every 4 hours PRN Moderate Pain (4 - 6)  HYDROmorphone   Tablet 4 milliGRAM(s) Oral every 4 hours PRN Severe Pain (7 - 10)  HYDROmorphone  Injectable 0.5 milliGRAM(s) IV Push every 3 hours PRN breakthrough pain      Objective:   T(F): 98.8 (12-11 @ 03:47), Max: 100 (12-10 @ 15:08)  HR: 78 (12-11 @ 03:47) (68 - 88)  BP: 110/68 (12-11 @ 03:47) (102/53 - 148/75)  BP(mean): 66 (12-11 @ 03:10) (52 - 66)  ABP: --  ABP(mean): --  RR: 16 (12-11 @ 03:47) (16 - 18)  SpO2: 93% (12-11 @ 03:47) (91% - 98%)      Physical Exam:   GEN: patient resting comfortably in bed, in no acute distress  RESP: respirations are unlabored, no accessory muscle use, no conversational dyspnea  CVS: RRR  GI: Abdomen soft, non-tender, non-distended, no rebound tenderness / guarding    I&O's    12-09 @ 07:01  -  12-10 @ 07:00  --------------------------------------------------------  IN:    Lactated Ringers: 1500 mL  Total IN: 1500 mL    OUT:    VAC (Vacuum Assisted Closure) System (mL): 150 mL    VAC (Vacuum Assisted Closure) System (mL): 100 mL    Voided (mL): 3500 mL  Total OUT: 3750 mL    Total NET: -2250 mL      12-10 @ 07:01  -  12-11 @ 04:39  --------------------------------------------------------  IN:  Total IN: 0 mL    OUT:    VAC (Vacuum Assisted Closure) System (mL): 0 mL  Total OUT: 0 mL    Total NET: 0 mL          LABS:                        6.9    12.49 )-----------( 703      ( 10 Dec 2021 07:50 )             22.4     12-10    138  |  105  |  4.4<L>  ----------------------------<  87  3.9   |  23.0  |  0.62    Ca    7.6<L>      10 Dec 2021 07:50  Phos  3.8     12-10  Mg     1.7     12-10      PT/INR - ( 10 Dec 2021 07:50 )   PT: 15.9 sec;   INR: 1.39 ratio         PTT - ( 10 Dec 2021 07:50 )  PTT:40.6 sec      MICROBIOLOGY:     Culture - Surgical Swab (collected 12-08 @ 01:25)  Source: .Surgical Swab left leg wound (swab)  Preliminary Report (12-09 @ 08:43):    No growth    Culture - Surgical Swab (collected 12-08 @ 01:25)  Source: .Surgical Swab left leg deep wound (swab)  Preliminary Report (12-09 @ 11:19):    Moderate Stenotrophomonas maltophilia  Organism: Stenotrophomonas maltophilia (12-10 @ 10:18)  Organism: Stenotrophomonas maltophilia (12-10 @ 10:18)      -  Ceftazidime: R >16      -  Levofloxacin: S <=0.5      -  Trimethoprim/Sulfamethoxazole: S <=0.5/9.5      Method Type: ANASTACIO    Culture - Tissue with Gram Stain (collected 12-08 @ 01:25)  Source: .Tissue left leg devitalized muscle  Gram Stain (12-08 @ 07:34):    Rare polymorphonuclear leukocytes seen per low power field    Numerous Gram Variable Rods seen per oil power field  Preliminary Report (12-09 @ 07:21):    No growth    Culture - Blood (collected 12-05 @ 21:20)  Source: .Blood Blood-Peripheral  Final Report (12-10 @ 23:00):    No growth at 5 days.    Culture - Blood (collected 12-05 @ 21:19)  Source: .Blood Blood-Peripheral  Final Report (12-10 @ 23:00):    No growth at 5 days.        PATHOLOGY:

## 2021-12-11 NOTE — PROGRESS NOTE ADULT - ATTENDING COMMENTS
Agree with above assessment.  The patient was seen and examined by me.  The patient is with pain in the left lower leg.  The wound VAC is in place.  The wound VAC is due to be changed on Monday.  Continue with pain control, antibiotics, and wound VAC therapy.

## 2021-12-11 NOTE — PROGRESS NOTE ADULT - ASSESSMENT
52yo Female s/p Right knee I&D POD# 15 (11/24/21),  Left knee I&D w [facility] left ankle POD#21 (11/17/21) 12/2 I&D of left leg, medial/lateral fasciotomies I&D/ tissue culture, 12/7, 12/11 LLE debridement and washout vac placement.    - s/p LLE wound vac changes early this morning  - f/u consulting recs  - continue to trend H/H  - continue pain control   - continue DVTp  - WBAT RLE, WBAT LLE in CAM boot  - Left knee aspirate cultures negative to date from 12/1/21  - IV abx as per ID:   ZOSYN to 4.5 grams Q8H OR CX WITH PSEUDOMONAS         AND    STENOTROPHOMONAS   ZOSYN FOR PSEUDOMONAS  BACTRIM FOR STENOTROPHOMONAS

## 2021-12-11 NOTE — PROGRESS NOTE ADULT - SUBJECTIVE AND OBJECTIVE BOX
INFECTIOUS DISEASES AND INTERNAL MEDICINE at [facility]  =======================================================  Theo Morrow MD  Diplomates American Board of Internal Medicine and Infectious Diseases  Telephone [phone]  Fax            [phone]  =======================================================    LUIS FERNANDO DAVIS [id number]  Follow up: group A STREP NECROTIZING SOFT TISSUE INFECTION     repeat CT scan of lower ext with collections.   S/P OR WASHOUT 12/2  AND   OR AGAIN  12/7 adn  late  on 12/10       Allergies:  No Known Allergies       FAMILY   FAMILY HISTORY:  FH: HTN (hypertension) (Mother)      REVIEW OF SYSTEMS:  CONSTITUTIONAL:  No Fever or chills  HEENT:   No diplopia or blurred vision.  No earache, sore throat or runny nose.  CARDIOVASCULAR:  No pressure, squeezing, strangling, tightness, heaviness or aching about the chest, neck, axilla or epigastrium.  RESPIRATORY:  No cough, shortness of breath, PND or orthopnea.  GASTROINTESTINAL:  No nausea, vomiting or diarrhea.  GENITOURINARY:  No dysuria, frequency or urgency. No Blood in urine  MUSCULOSKELETAL:   AS PER HPI   SKIN:  No change in skin, hair or nails.  NEUROLOGIC:  No paresthesias, fasciculations, seizures or weakness.  PSYCHIATRIC:  No disorder of thought or mood.  ENDOCRINE:  No heat or cold intolerance, polyuria or polydipsia.  HEMATOLOGICAL:  No easy bruising or bleeding.            Physical Exam:     GEN: NAD,    HEENT: normocephalic and atraumatic. EOMI. ASHISH.    NECK: Supple. No carotid bruits.  No lymphadenopathy or thyromegaly.  LUNGS: Clear to auscultation.  HEART: Regular rate and rhythm without murmur.  ABDOMEN: Soft, nontender, and nondistended.  Positive bowel sounds.    : No CVA tenderness  EXTREMITIES: LEFT LEG WRAPPED; left thigh vac in place  right knee with dressing in place   UPPER EXT STRENGTH GOOD BUT CAN T RAISE HANDS ALL THE WAY, localized tenderness to both shoulders   NEUROLOGIC:  C AWAKE ALERT Appropriate affect .  SKIN: No ulceration or induration present.           Vitals:  ====  Vital Signs Last 24 Hrs  T(C): 37.2 (11 Dec 2021 14:20), Max: 37.8 (10 Dec 2021 15:08)  T(F): 99 (11 Dec 2021 14:20), Max: 100 (10 Dec 2021 15:08)  HR: 77 (11 Dec 2021 14:20) (71 - 86)  BP: 111/66 (11 Dec 2021 14:20) (102/53 - 121/71)  BP(mean): 66 (11 Dec 2021 03:10) (52 - 66)  RR: 18 (11 Dec 2021 14:20) (16 - 18)  SpO2: 91% (11 Dec 2021 14:20) (91% - 98%)  =======================================================  Current Antibiotics:  bactrim  piperacillin/tazobactam IVPB.. 4.5 Gram(s) IV Intermittent every 8 hours    Other medications:  baclofen 10 milliGRAM(s) Oral three times a day  collagenase Ointment 1 Application(s) Topical two times a day  gabapentin 200 milliGRAM(s) Oral three times a day  glucagon  Injectable 1 milliGRAM(s) IntraMuscular once  heparin   Injectable 5000 Unit(s) SubCutaneous every 8 hours  lisinopril 5 milliGRAM(s) Oral daily  methocarbamol 750 milliGRAM(s) Oral three times a day  pantoprazole    Tablet 40 milliGRAM(s) Oral two times a day  polyethylene glycol 3350 17 Gram(s) Oral daily  senna 2 Tablet(s) Oral at bedtime  sodium chloride 1 Gram(s) Oral every 12 hours  sodium chloride 0.9%. 1000 milliLiter(s) IV Continuous <Continuous>      =======================================================  Labs:                                                                8.1    19.28 )-----------( 708      ( 11 Dec 2021 08:35 )             25.7   12-11    138  |  106  |  4.4<L>  ----------------------------<  106<H>  4.3   |  22.0  |  0.59    Ca    6.9<L>      11 Dec 2021 08:35  Phos  3.8     12-10  Mg     1.9     12-11    TPro  6.5<L>  /  Alb  2.0<L>  /  TBili  0.2<L>  /  DBili  x   /  AST  42<H>  /  ALT  14  /  AlkPhos  77  12-11              Culture - Blood (collected 11-28-21 @ 09:14)  Source: .Blood Blood-Peripheral    Culture - Blood (collected 11-28-21 @ 09:14)  Source: .Blood Blood-Peripheral    Culture - Fungal, Body Fluid (collected 11-24-21 @ 13:35)  Source: .Body Fluid Right Knee Fluid    Culture - Acid Fast - Body Fluid w/Smear (collected 11-24-21 @ 13:35)  Source: .Body Fluid Right Knee Fluid    Culture - Body Fluid with Gram Stain (collected 11-24-21 @ 13:35)  Source: .Body Fluid Right Knee Fluid  Gram Stain (11-25-21 @ 01:27):    polymorphonuclear leukocytes seen per low power field    No organisms seen per oil power field    Culture - Surgical Swab (collected 11-24-21 @ 12:30)  Source: .Surgical Swab Swab Right Knee #2  Final Report (11-29-21 @ 07:55):    No growth at 5 days    Culture - Surgical Swab (collected 11-24-21 @ 12:30)  Source: .Surgical Swab Swab Right Knee #1  Final Report (11-29-21 @ 07:56):    No growth at 5 days    Culture - Surgical Swab (collected 11-24-21 @ 12:28)  Source: .Surgical Swab Swab Right Knee #3  Final Report (11-29-21 @ 08:01):    No growth at 5 days    Culture - Body Fluid with Gram Stain (collected 11-23-21 @ 12:50)  Source: .Body Fluid Knee Fluid  Gram Stain (11-23-21 @ 23:34):    polymorphonuclear leukocytes seen    No organisms seen    by cytocentrifuge    Culture - Blood (collected 11-22-21 @ 08:44)  Source: .Blood Blood  Final Report (11-27-21 @ 09:00):    No growth at 5 days.    Culture - Blood (collected 11-20-21 @ 12:57)  Source: .Blood Blood  Final Report (11-25-21 @ 13:00):    No growth at 5 days.    Culture - Body Fluid with Gram Stain (collected 11-18-21 @ 16:20)  Source: .Body Fluid OR Posterior Calf Left Lower Extremity Fluid  Gram Stain (11-18-21 @ 21:39):    polymorphonuclear leukocytes seen    Gram Positive Cocci in Pairs and Chains seen    by cytocentrifuge  Final Report (11-23-21 @ 09:40):    Moderate Streptococcus pyogenes (Group A) Penicillin and ampicillin are    drugs of choice for    treatment of beta-hemolytic streptococcal infections.    Susceptibility testing is not performed routinely because    S. pyogenes (GAS) is universally susceptible to penicillin    and resistance in other strains is extremely rare.    Culture - Body Fluid with Gram Stain (collected 11-18-21 @ 16:20)  Source: .Body Fluid OR Swab Left Knee Lower Extremity Fluid  Gram Stain (11-18-21 @ 19:36):    polymorphonuclear leukocytes seen    Gram Positive Cocci in Pairs and Chains seen    by cytocentrifuge  Final Report (11-23-21 @ 09:41):    Numerous Streptococcus pyogenes (Group A) Penicillin and ampicillin are    drugs of choice for    treatment of beta-hemolytic streptococcal infections.    Susceptibility testing is not performed routinely because    S. pyogenes (GAS) is universally susceptible to penicillin    and resistance in other strains is extremely rare.    Culture - Body Fluid with Gram Stain (collected 11-18-21 @ 16:20)  Source: .Body Fluid OR - Left Lower Extremity Fluid  Gram Stain (11-18-21 @ 20:10):    No polymorphonuclear leukocytes seen    No organisms seen    by cytocentrifuge  Final Report (11-23-21 @ 09:40):    Rare Streptococcus pyogenes (Group A)    Penicillin and ampicillin are drugs of choice for treatment of    beta-hemolytic streptococcal infections.    Susceptibility testing is not performed routinely because S. pyogenes    (GAS) is universally susceptibleto penicillin    and resistance in other strains is extremely rare.    Culture - Urine (collected 11-18-21 @ 10:07)  Source: Catheterized Catheterized  Final Report (11-19-21 @ 07:03):    No growth    Culture - Urine (collected 11-18-21 @ 00:25)  Source: Catheterized Catheterized  Final Report (11-18-21 @ 22:09):    <10,000 CFU/mL Normal Urogenital Aimee    Culture - Blood (collected 11-17-21 @ 14:45)  Source: .Blood Blood  Gram Stain (11-18-21 @ 09:44):    Growth in aerobic and anaerobic bottles: Gram Positive Cocci in Pairs and    Chains    Gram Stain performed by:    [facility]    [address] [place] [id number]    .    TYPE: (C=Critical, N=Notification, A=Abnormal) C    TESTS:  _ GS    DATE/TIME CALLED: _ 11/18/2021 09:42:19    CALLED TO: Chris Hernandez RN    READ BACK (2 Patient Identifiers)(Y/N): _ Y    READ BACK VALUES (Y/N): _ Y    CALLED BY: Chris Quiñones  Final Report (11-21-21 @ 12:28):    Growth in aerobic and anaerobic bottles: Streptococcus pyogenes (Group A)    See previous culture [id number]    Culture - Blood (collected 11-17-21 @ 14:45)  Source: .Blood Blood  Gram Stain (11-18-21 @ 09:40):    Growth in aerobic and anaerobic bottles: Gram Positive Cocci in Pairs and    Chains    ***Blood Panel PCR results on this specimen are available    approximately 3 hours after the Gram stain result.***    Gram stain, PCR, and/or culture results may not always    correspond due to difference in methodologies.    ************************************************************    This PCR assay was performed using [employer].    The following targets are tested for: Enterococcus,    vancomycin resistant enterococci, Listeria monocytogenes,    coagulase negative staphylococci, S. aureus,    methicillin resistant S. aureus, Streptococcus agalactiae    (Group B), S. pneumoniae, S. pyogenes (Group A),    Acinetobacter baumannii, Enterobacter cloacae, E. coli,    Klebsiella oxytoca, K. pneumoniae, Proteus sp.,    Serratia marcescens, Haemophilus influenzae,    Neisseria meningitidis, Pseudomonas aeruginosa, Candida    albicans, C. glabrata, C krusei, C parapsilosis,    C. tropicalis and the KPC resistance gene.    Gram Stain and BCID performed by:    [facility]    [address] [place] [address]    .    TYPE: (C=Critical, N=Notification, A=Abnormal) C    TESTS:  _ GS    DATE/TIME CALLED: _ 11/18/2021 09:40:08    CALLED TO: Chris Hernandez RN    READ BACK (2 Patient Identifiers)(Y/N): _ Y    READ BACK VALUES (Y/N): _ Y    CALLED BY: Chris Quiñones  Final Report (11-21-21 @ 12:28):    Growth in aerobic and anaerobic bottles: Streptococcus pyogenes (Group A)  Organism: Blood Culture PCR  Streptococcus pyogenes (Group A) (11-21-21 @ 12:28)  Organism: Streptococcus pyogenes (Group A) (11-21-21 @ 12:28)    Sensitivities:      -  Ceftriaxone: S 0.016      -  Penicillin: S 0.016 Predicts results for ampicillin, amoxicillin, amoxicillin/clavulanate, ampicillin/sulbactam, 1st, 2nd and 3rd generation cephalosporins and carbapenems.      Method Type: ETEST  Organism: Streptococcus pyogenes (Group A) (11-21-21 @ 12:28)    Sensitivities:      -  Vancomycin: S      Method Type: KB  Organism: Blood Culture PCR (11-21-21 @ 12:28)    Sensitivities:      -  Streptococcus pyogenes (Group A): Detec      Method Type: PCR      Creatinine, Serum: 0.75 mg/dL (11-30-21 @ 07:18)  Creatinine, Serum: 0.68 mg/dL (11-29-21 @ 09:55)  Creatinine, Serum: 0.64 mg/dL (11-28-21 @ 09:14)  Creatinine, Serum: 0.65 mg/dL (11-27-21 @ 11:51)  Creatinine, Serum: 0.57 mg/dL (11-27-21 @ 05:35)  Creatinine, Serum: 0.61 mg/dL (11-26-21 @ 09:01)        Ferritin, Serum: 388 ng/mL (11-17-21 @ 13:21)    C-Reactive Protein, Serum: 197 mg/L (11-26-21 @ 21:52)  C-Reactive Protein, Serum: 211 mg/L (11-26-21 @ 09:01)  C-Reactive Protein, Serum: 53 mg/L (11-23-21 @ 07:42)  C-Reactive Protein, Serum: >422 mg/L (11-17-21 @ 13:21)    Sedimentation Rate, Erythrocyte: 65 mm/hr (11-26-21 @ 21:52)  Sedimentation Rate, Erythrocyte: 63 mm/hr (11-26-21 @ 09:01)  Sedimentation Rate, Erythrocyte: 55 mm/hr (11-17-21 @ 13:21)    WBC Count: 14.55 K/uL (11-30-21 @ 07:18)  WBC Count: 16.71 K/uL (11-29-21 @ 09:54)  WBC Count: 17.02 K/uL (11-28-21 @ 09:14)  WBC Count: 15.99 K/uL (11-27-21 @ 05:33)  WBC Count: 18.73 K/uL (11-26-21 @ 09:01)      COVID-19 PCR: NotDetec (11-30-21 @ 01:59)  COVID-19 PCR: NotDetec (11-23-21 @ 18:50)  COVID-19 PCR: NotDetec (11-17-21 @ 13:18)    Lactate Dehydrogenase, Serum: 769 U/L (11-20-21 @ 03:56)    Alkaline Phosphatase, Serum: 88 U/L (11-27-21 @ 05:35)  Alanine Aminotransferase (ALT/SGPT): 11 U/L (11-27-21 @ 05:35)  Aspartate Aminotransferase (AST/SGOT): 25 U/L (11-27-21 @ 05:35)  Bilirubin Total, Serum: 0.3 mg/dL (11-27-21 @ 05:35)        < from: CT Lower Extremity w/ IV Cont, Bilateral (11.30.21 @ 09:55) >     EXAM:  CT LWR EXT IC BI                          PROCEDURE DATE:  11/30/2021          INTERPRETATION:  CT LOWER EXTREMITY WITH IV CONTRAST BILATERAL dated 11/30/2021 9:55 AM    INDICATION: Fever. Status post incision and drainage bilaterally. Persistent fevers to 104.    COMPARISON: CT of the right knee dated 11/23/2021. CT of the left lower extremity dated 11/17/2021    TECHNIQUE: CT imaging of the bilateral lower extremities was performed with contrast. The data was reformatted in the axial,coronal, and sagittal planes.  Contrast: Omnipaque 300. Administered: 96 cc. Discarded: 4 cc.    FINDINGS:    OSSEOUS STRUCTURES: Mild degenerative changes at the lower lumbar spine including sclerosis and narrowing at the facet joints. Moderate sclerosis at the right lower sacroiliac joint. Moderate narrowing the bilateral hip joint spaces which is worse posteriorly. There is marginal productive changes. Moderate narrowing at the pubic symphysis. Small cysts appreciated at the left patella suggesting overlying cartilage loss. Postsurgical changes noted at the left distal fibula with a small screw tracts through the distal fibula. A portion of the distal fibula is exposed by an overlying skin wound. No fracture or osteonecrosis is seen. Thereis no periosteal reaction or large erosion appreciated.  SYNOVIUM/ JOINT FLUID: There is a large left knee joint effusion containing several small new locules of gas. A moderate right knee joint effusion is identified. No hip joint effusion is seen.  TENDONS: The tendons are intact.  MUSCLES: There is hypodensity within the left soleus and gastrocnemius musculature. Confluent edema is seen between the soleus and gastrocnemius musculature. Moderate edema is noted in the left anterior compartment calf musculature. There is suggestion of rim enhancement, new compared to the prior exam. There is a hazy appearance of the right facets and lateralis and tensor fascia yohan muscle. Moderate hazy hypodensity throughout the left sartorius and left vastus lateralis muscle. Edema overlies the left rectus femoris muscle.  NEUROVASCULAR STRUCTURES: Mild vascular calcifications.  INTRAPELVIC SOFT TISSUES: Multiple uterine masses are noted without enhancement likely degenerating uterine fibroids, unchanged.  SUBCUTANEOUS SOFT TISSUES: There is diffuse soft tissue swelling and skin thickening. There are skin wounds along the medial lateral aspect of the left calf. No subcutaneous fluid collection is noted.    3-D reformatted imaging confirms these findings.    IMPRESSION:    1.  Soft tissue swelling bilaterally. Medial lateral left calf skin wounds possibly from prior surgery or infection. Nonspecific but can be seen with cellulitis.  2.  Exposure of the left distal fibula by an overlying skin wound raises concern for osteomyelitis. New compared to the prior study.  3.  Large left knee joint effusion with several new foci of gas. Differential considerations include recent aspiration and infection. Correlate clinically.  4.  Hypodensity throughout the left calf musculature in the bilateral hip musculature as detailed above as can be seen with myositis.  5.  Suggestion of rim enhancement involving the left anterior calf musculature concerning for early abscess formation  6.  Edema between the left medial gastrocnemius and soleus musculature, likely infectious  7.  Heterogeneous masses in the uterus, likely degenerating fibroids.    Findings were discussed with Dr. Cortez of Trauma on 11/30/2021 10:42 AM  with read back.    --- End of Report ---            LOREN ALEXANDER MD; Attending Radiologist  This document has been electronically signed. Nov 30 2021 10:42AM    < end of copied text >

## 2021-12-12 LAB
ANION GAP SERPL CALC-SCNC: 11 MMOL/L — SIGNIFICANT CHANGE UP (ref 5–17)
ANISOCYTOSIS BLD QL: SIGNIFICANT CHANGE UP
BASOPHILS # BLD AUTO: 0.11 K/UL — SIGNIFICANT CHANGE UP (ref 0–0.2)
BASOPHILS NFR BLD AUTO: 0.9 % — SIGNIFICANT CHANGE UP (ref 0–2)
BUN SERPL-MCNC: 7.7 MG/DL — LOW (ref 8–20)
CALCIUM SERPL-MCNC: 8.3 MG/DL — LOW (ref 8.6–10.2)
CHLORIDE SERPL-SCNC: 104 MMOL/L — SIGNIFICANT CHANGE UP (ref 98–107)
CO2 SERPL-SCNC: 24 MMOL/L — SIGNIFICANT CHANGE UP (ref 22–29)
CREAT SERPL-MCNC: 0.64 MG/DL — SIGNIFICANT CHANGE UP (ref 0.5–1.3)
EOSINOPHIL # BLD AUTO: 0.43 K/UL — SIGNIFICANT CHANGE UP (ref 0–0.5)
EOSINOPHIL NFR BLD AUTO: 3.6 % — SIGNIFICANT CHANGE UP (ref 0–6)
GIANT PLATELETS BLD QL SMEAR: PRESENT — SIGNIFICANT CHANGE UP
GLUCOSE SERPL-MCNC: 93 MG/DL — SIGNIFICANT CHANGE UP (ref 70–99)
HCT VFR BLD CALC: 29.7 % — LOW (ref 34.5–45)
HGB BLD-MCNC: 9.1 G/DL — LOW (ref 11.5–15.5)
HYPOCHROMIA BLD QL: SLIGHT — SIGNIFICANT CHANGE UP
LYMPHOCYTES # BLD AUTO: 1.39 K/UL — SIGNIFICANT CHANGE UP (ref 1–3.3)
LYMPHOCYTES # BLD AUTO: 11.6 % — LOW (ref 13–44)
MACROCYTES BLD QL: SIGNIFICANT CHANGE UP
MAGNESIUM SERPL-MCNC: 1.9 MG/DL — SIGNIFICANT CHANGE UP (ref 1.6–2.6)
MANUAL SMEAR VERIFICATION: SIGNIFICANT CHANGE UP
MCHC RBC-ENTMCNC: 28.3 PG — SIGNIFICANT CHANGE UP (ref 27–34)
MCHC RBC-ENTMCNC: 30.6 GM/DL — LOW (ref 32–36)
MCV RBC AUTO: 92.2 FL — SIGNIFICANT CHANGE UP (ref 80–100)
METAMYELOCYTES # FLD: 1.8 % — HIGH (ref 0–0)
MICROCYTES BLD QL: SLIGHT — SIGNIFICANT CHANGE UP
MONOCYTES # BLD AUTO: 0.53 K/UL — SIGNIFICANT CHANGE UP (ref 0–0.9)
MONOCYTES NFR BLD AUTO: 4.4 % — SIGNIFICANT CHANGE UP (ref 2–14)
NEUTROPHILS # BLD AUTO: 9.18 K/UL — HIGH (ref 1.8–7.4)
NEUTROPHILS NFR BLD AUTO: 76.8 % — SIGNIFICANT CHANGE UP (ref 43–77)
OVALOCYTES BLD QL SMEAR: SLIGHT — SIGNIFICANT CHANGE UP
PHOSPHATE SERPL-MCNC: 3.6 MG/DL — SIGNIFICANT CHANGE UP (ref 2.4–4.7)
PLAT MORPH BLD: NORMAL — SIGNIFICANT CHANGE UP
PLATELET # BLD AUTO: 721 K/UL — HIGH (ref 150–400)
POIKILOCYTOSIS BLD QL AUTO: SLIGHT — SIGNIFICANT CHANGE UP
POLYCHROMASIA BLD QL SMEAR: SIGNIFICANT CHANGE UP
POTASSIUM SERPL-MCNC: 4 MMOL/L — SIGNIFICANT CHANGE UP (ref 3.5–5.3)
POTASSIUM SERPL-SCNC: 4 MMOL/L — SIGNIFICANT CHANGE UP (ref 3.5–5.3)
RBC # BLD: 3.22 M/UL — LOW (ref 3.8–5.2)
RBC # FLD: 20.7 % — HIGH (ref 10.3–14.5)
RBC BLD AUTO: ABNORMAL
SMUDGE CELLS # BLD: PRESENT — SIGNIFICANT CHANGE UP
SODIUM SERPL-SCNC: 139 MMOL/L — SIGNIFICANT CHANGE UP (ref 135–145)
VARIANT LYMPHS # BLD: 0.9 % — SIGNIFICANT CHANGE UP (ref 0–6)
WBC # BLD: 11.95 K/UL — HIGH (ref 3.8–10.5)
WBC # FLD AUTO: 11.95 K/UL — HIGH (ref 3.8–10.5)

## 2021-12-12 PROCEDURE — 99024 POSTOP FOLLOW-UP VISIT: CPT | Mod: GC

## 2021-12-12 RX ORDER — KETOROLAC TROMETHAMINE 30 MG/ML
15 SYRINGE (ML) INJECTION ONCE
Refills: 0 | Status: DISCONTINUED | OUTPATIENT
Start: 2021-12-12 | End: 2021-12-12

## 2021-12-12 RX ADMIN — Medication 1 TABLET(S): at 16:38

## 2021-12-12 RX ADMIN — HYDROMORPHONE HYDROCHLORIDE 4 MILLIGRAM(S): 2 INJECTION INTRAMUSCULAR; INTRAVENOUS; SUBCUTANEOUS at 09:59

## 2021-12-12 RX ADMIN — HYDROMORPHONE HYDROCHLORIDE 0.5 MILLIGRAM(S): 2 INJECTION INTRAMUSCULAR; INTRAVENOUS; SUBCUTANEOUS at 11:19

## 2021-12-12 RX ADMIN — PIPERACILLIN AND TAZOBACTAM 25 GRAM(S): 4; .5 INJECTION, POWDER, LYOPHILIZED, FOR SOLUTION INTRAVENOUS at 22:07

## 2021-12-12 RX ADMIN — Medication 10 MILLIGRAM(S): at 05:31

## 2021-12-12 RX ADMIN — Medication 975 MILLIGRAM(S): at 06:24

## 2021-12-12 RX ADMIN — PANTOPRAZOLE SODIUM 40 MILLIGRAM(S): 20 TABLET, DELAYED RELEASE ORAL at 16:38

## 2021-12-12 RX ADMIN — HYDROMORPHONE HYDROCHLORIDE 4 MILLIGRAM(S): 2 INJECTION INTRAMUSCULAR; INTRAVENOUS; SUBCUTANEOUS at 15:34

## 2021-12-12 RX ADMIN — ENOXAPARIN SODIUM 30 MILLIGRAM(S): 100 INJECTION SUBCUTANEOUS at 05:39

## 2021-12-12 RX ADMIN — Medication 10 MILLIGRAM(S): at 13:38

## 2021-12-12 RX ADMIN — GABAPENTIN 300 MILLIGRAM(S): 400 CAPSULE ORAL at 13:38

## 2021-12-12 RX ADMIN — GABAPENTIN 300 MILLIGRAM(S): 400 CAPSULE ORAL at 05:31

## 2021-12-12 RX ADMIN — SODIUM CHLORIDE 1 GRAM(S): 9 INJECTION INTRAMUSCULAR; INTRAVENOUS; SUBCUTANEOUS at 05:32

## 2021-12-12 RX ADMIN — HYDROMORPHONE HYDROCHLORIDE 4 MILLIGRAM(S): 2 INJECTION INTRAMUSCULAR; INTRAVENOUS; SUBCUTANEOUS at 16:28

## 2021-12-12 RX ADMIN — Medication 975 MILLIGRAM(S): at 00:34

## 2021-12-12 RX ADMIN — PIPERACILLIN AND TAZOBACTAM 25 GRAM(S): 4; .5 INJECTION, POWDER, LYOPHILIZED, FOR SOLUTION INTRAVENOUS at 05:38

## 2021-12-12 RX ADMIN — Medication 15 MILLIGRAM(S): at 23:17

## 2021-12-12 RX ADMIN — PANTOPRAZOLE SODIUM 40 MILLIGRAM(S): 20 TABLET, DELAYED RELEASE ORAL at 05:36

## 2021-12-12 RX ADMIN — Medication 975 MILLIGRAM(S): at 20:39

## 2021-12-12 RX ADMIN — GABAPENTIN 300 MILLIGRAM(S): 400 CAPSULE ORAL at 22:04

## 2021-12-12 RX ADMIN — Medication 975 MILLIGRAM(S): at 05:32

## 2021-12-12 RX ADMIN — SODIUM CHLORIDE 1 GRAM(S): 9 INJECTION INTRAMUSCULAR; INTRAVENOUS; SUBCUTANEOUS at 16:37

## 2021-12-12 RX ADMIN — HYDROMORPHONE HYDROCHLORIDE 0.5 MILLIGRAM(S): 2 INJECTION INTRAMUSCULAR; INTRAVENOUS; SUBCUTANEOUS at 16:37

## 2021-12-12 RX ADMIN — ENOXAPARIN SODIUM 30 MILLIGRAM(S): 100 INJECTION SUBCUTANEOUS at 16:38

## 2021-12-12 RX ADMIN — Medication 975 MILLIGRAM(S): at 13:38

## 2021-12-12 RX ADMIN — Medication 975 MILLIGRAM(S): at 21:09

## 2021-12-12 RX ADMIN — HYDROMORPHONE HYDROCHLORIDE 4 MILLIGRAM(S): 2 INJECTION INTRAMUSCULAR; INTRAVENOUS; SUBCUTANEOUS at 10:40

## 2021-12-12 RX ADMIN — Medication 10 MILLIGRAM(S): at 22:04

## 2021-12-12 RX ADMIN — PIPERACILLIN AND TAZOBACTAM 25 GRAM(S): 4; .5 INJECTION, POWDER, LYOPHILIZED, FOR SOLUTION INTRAVENOUS at 15:32

## 2021-12-12 RX ADMIN — Medication 1 TABLET(S): at 05:33

## 2021-12-12 RX ADMIN — HYDROMORPHONE HYDROCHLORIDE 0.5 MILLIGRAM(S): 2 INJECTION INTRAMUSCULAR; INTRAVENOUS; SUBCUTANEOUS at 17:00

## 2021-12-12 RX ADMIN — HYDROMORPHONE HYDROCHLORIDE 0.5 MILLIGRAM(S): 2 INJECTION INTRAMUSCULAR; INTRAVENOUS; SUBCUTANEOUS at 11:40

## 2021-12-12 RX ADMIN — Medication 975 MILLIGRAM(S): at 14:03

## 2021-12-12 NOTE — PROGRESS NOTE ADULT - ASSESSMENT
50yo Female s/p Right knee I&D POD# 15 (11/24/21),  Left knee I&D w [facility] left ankle POD#21 (11/17/21) 12/2 I&D of left leg, medial/lateral fasciotomies I&D/ tissue culture, 12/7, 12/11 LLE debridement and washout vac placement.    - s/p LLE wound vac changed 12/11  - f/u consulting recs  - continue to trend H/H  - continue pain control   - continue DVTp  - WBAT RLE, WBAT LLE in CAM boot  - Left knee aspirate cultures negative to date from 12/1/21  - IV abx as per ID:   ZOSYN to 4.5 grams Q8H OR CX WITH PSEUDOMONAS         AND    STENOTROPHOMONAS   ZOSYN FOR PSEUDOMONAS  BACTRIM FOR STENOTROPHOMONAS

## 2021-12-12 NOTE — PROGRESS NOTE ADULT - SUBJECTIVE AND OBJECTIVE BOX
Acute Care Surgery/Trauma Surgery Progress Note:      No issues overnight. Sensation and motor present in LE b/l/ vac in place. Patient afebrile, VSS. Pain well controlled. Denies n/v/f/c/cp/sob.           Objective:   ICU Vital Signs Last 24 Hrs  T(C): 36.9 (11 Dec 2021 22:31), Max: 37.2 (11 Dec 2021 14:20)  T(F): 98.4 (11 Dec 2021 22:31), Max: 99 (11 Dec 2021 14:20)  HR: 79 (11 Dec 2021 22:31) (76 - 79)  BP: 106/62 (11 Dec 2021 22:31) (106/62 - 116/69)  BP(mean): 66 (11 Dec 2021 03:10) (66 - 66)  ABP: --  ABP(mean): --  RR: 17 (11 Dec 2021 22:31) (16 - 18)  SpO2: 93% (11 Dec 2021 22:31) (91% - 96%)      Physical Exam:   GEN: patient resting comfortably in bed, in no acute distress  RESP: respirations are unlabored, no accessory muscle use, no conversational dyspnea  CVS: RRR  GI: Abdomen soft, non-tender, non-distended, no rebound tenderness / guarding        I&O's Detail    10 Dec 2021 07:01  -  11 Dec 2021 07:00  --------------------------------------------------------  IN:    Lactated Ringers: 900 mL  Total IN: 900 mL    OUT:    VAC (Vacuum Assisted Closure) System (mL): 0 mL    Voided (mL): 300 mL  Total OUT: 300 mL    Total NET: 600 mL              LABS:                                   8.1    19.28 )-----------( 708      ( 11 Dec 2021 08:35 )             25.7   12-11    138  |  106  |  4.4<L>  ----------------------------<  106<H>  4.3   |  22.0  |  0.59    Ca    6.9<L>      11 Dec 2021 08:35  Phos  3.8     12-10  Mg     1.9     12-11    TPro  6.5<L>  /  Alb  2.0<L>  /  TBili  0.2<L>  /  DBili  x   /  AST  42<H>  /  ALT  14  /  AlkPhos  77  12-11

## 2021-12-12 NOTE — PROGRESS NOTE ADULT - ATTENDING COMMENTS
Agree with above assessment. The patient was seen and examined by me.  The patient is without new complaints.  The patient's wound VAC is in place with no leaks.  Continue with pain control, wound VAC therapy.  Continue with Abx's.

## 2021-12-13 LAB
A-TUMOR NECROSIS FACT SERPL-MCNC: 2 PG/ML — SIGNIFICANT CHANGE UP
ANION GAP SERPL CALC-SCNC: 10 MMOL/L — SIGNIFICANT CHANGE UP (ref 5–17)
BASOPHILS # BLD AUTO: 0.07 K/UL — SIGNIFICANT CHANGE UP (ref 0–0.2)
BASOPHILS # BLD AUTO: 0.07 K/UL — SIGNIFICANT CHANGE UP (ref 0–0.2)
BASOPHILS NFR BLD AUTO: 0.7 % — SIGNIFICANT CHANGE UP (ref 0–2)
BASOPHILS NFR BLD AUTO: 0.7 % — SIGNIFICANT CHANGE UP (ref 0–2)
BUN SERPL-MCNC: 6.6 MG/DL — LOW (ref 8–20)
CALCIUM SERPL-MCNC: 7.4 MG/DL — LOW (ref 8.6–10.2)
CHLORIDE SERPL-SCNC: 102 MMOL/L — SIGNIFICANT CHANGE UP (ref 98–107)
CO2 SERPL-SCNC: 22 MMOL/L — SIGNIFICANT CHANGE UP (ref 22–29)
CREAT SERPL-MCNC: 0.65 MG/DL — SIGNIFICANT CHANGE UP (ref 0.5–1.3)
CULTURE RESULTS: SIGNIFICANT CHANGE UP
EOSINOPHIL # BLD AUTO: 0.2 K/UL — SIGNIFICANT CHANGE UP (ref 0–0.5)
EOSINOPHIL # BLD AUTO: 0.25 K/UL — SIGNIFICANT CHANGE UP (ref 0–0.5)
EOSINOPHIL NFR BLD AUTO: 1.9 % — SIGNIFICANT CHANGE UP (ref 0–6)
EOSINOPHIL NFR BLD AUTO: 2.5 % — SIGNIFICANT CHANGE UP (ref 0–6)
GLUCOSE SERPL-MCNC: 85 MG/DL — SIGNIFICANT CHANGE UP (ref 70–99)
HCT VFR BLD CALC: 23.6 % — LOW (ref 34.5–45)
HCT VFR BLD CALC: 26.4 % — LOW (ref 34.5–45)
HGB BLD-MCNC: 7.3 G/DL — LOW (ref 11.5–15.5)
HGB BLD-MCNC: 8.2 G/DL — LOW (ref 11.5–15.5)
IL10 SERPL-MCNC: 33.2 PG/ML — HIGH
IL12 SERPL-MCNC: <1.9 PG/ML — SIGNIFICANT CHANGE UP
IL13 SERPL-MCNC: 3.9 PG/ML — HIGH
IL17A SERPL-MCNC: 2.5 PG/ML — HIGH
IL2 SERPL-MCNC: 1894.1 PG/ML — HIGH (ref 175.3–858.2)
IL2 SERPL-MCNC: <2.1 PG/ML — SIGNIFICANT CHANGE UP
IL4 SERPL-MCNC: <2.2 PG/ML — SIGNIFICANT CHANGE UP
IL6 SERPL-MCNC: 9.2 PG/ML — HIGH
IL8 SERPL-MCNC: <3 PG/ML — SIGNIFICANT CHANGE UP
IMM GRANULOCYTES NFR BLD AUTO: 1.1 % — SIGNIFICANT CHANGE UP (ref 0–1.5)
IMM GRANULOCYTES NFR BLD AUTO: 1.1 % — SIGNIFICANT CHANGE UP (ref 0–1.5)
INTERFERON GAMMA, BLOOD: <4.2 PG/ML — SIGNIFICANT CHANGE UP
INTERLEUKIN 1 BETA: <6.5 PG/ML — SIGNIFICANT CHANGE UP
INTERLEUKIN 5: <2.1 PG/ML — SIGNIFICANT CHANGE UP
LYMPHOCYTES # BLD AUTO: 2.02 K/UL — SIGNIFICANT CHANGE UP (ref 1–3.3)
LYMPHOCYTES # BLD AUTO: 2.14 K/UL — SIGNIFICANT CHANGE UP (ref 1–3.3)
LYMPHOCYTES # BLD AUTO: 20 % — SIGNIFICANT CHANGE UP (ref 13–44)
LYMPHOCYTES # BLD AUTO: 20.6 % — SIGNIFICANT CHANGE UP (ref 13–44)
MAGNESIUM SERPL-MCNC: 1.7 MG/DL — SIGNIFICANT CHANGE UP (ref 1.6–2.6)
MCHC RBC-ENTMCNC: 27.9 PG — SIGNIFICANT CHANGE UP (ref 27–34)
MCHC RBC-ENTMCNC: 27.9 PG — SIGNIFICANT CHANGE UP (ref 27–34)
MCHC RBC-ENTMCNC: 30.9 GM/DL — LOW (ref 32–36)
MCHC RBC-ENTMCNC: 31.1 GM/DL — LOW (ref 32–36)
MCV RBC AUTO: 89.8 FL — SIGNIFICANT CHANGE UP (ref 80–100)
MCV RBC AUTO: 90.1 FL — SIGNIFICANT CHANGE UP (ref 80–100)
MONOCYTES # BLD AUTO: 0.54 K/UL — SIGNIFICANT CHANGE UP (ref 0–0.9)
MONOCYTES # BLD AUTO: 0.62 K/UL — SIGNIFICANT CHANGE UP (ref 0–0.9)
MONOCYTES NFR BLD AUTO: 5.2 % — SIGNIFICANT CHANGE UP (ref 2–14)
MONOCYTES NFR BLD AUTO: 6.1 % — SIGNIFICANT CHANGE UP (ref 2–14)
NEUTROPHILS # BLD AUTO: 7.02 K/UL — SIGNIFICANT CHANGE UP (ref 1.8–7.4)
NEUTROPHILS # BLD AUTO: 7.34 K/UL — SIGNIFICANT CHANGE UP (ref 1.8–7.4)
NEUTROPHILS NFR BLD AUTO: 69.6 % — SIGNIFICANT CHANGE UP (ref 43–77)
NEUTROPHILS NFR BLD AUTO: 70.5 % — SIGNIFICANT CHANGE UP (ref 43–77)
ORGANISM # SPEC MICROSCOPIC CNT: SIGNIFICANT CHANGE UP
ORGANISM # SPEC MICROSCOPIC CNT: SIGNIFICANT CHANGE UP
PHOSPHATE SERPL-MCNC: 3.9 MG/DL — SIGNIFICANT CHANGE UP (ref 2.4–4.7)
PLATELET # BLD AUTO: 568 K/UL — HIGH (ref 150–400)
PLATELET # BLD AUTO: 624 K/UL — HIGH (ref 150–400)
POTASSIUM SERPL-MCNC: 3.9 MMOL/L — SIGNIFICANT CHANGE UP (ref 3.5–5.3)
POTASSIUM SERPL-SCNC: 3.9 MMOL/L — SIGNIFICANT CHANGE UP (ref 3.5–5.3)
RBC # BLD: 2.62 M/UL — LOW (ref 3.8–5.2)
RBC # BLD: 2.94 M/UL — LOW (ref 3.8–5.2)
RBC # FLD: 20.7 % — HIGH (ref 10.3–14.5)
RBC # FLD: 20.7 % — HIGH (ref 10.3–14.5)
SODIUM SERPL-SCNC: 134 MMOL/L — LOW (ref 135–145)
SPECIMEN SOURCE: SIGNIFICANT CHANGE UP
WBC # BLD: 10.09 K/UL — SIGNIFICANT CHANGE UP (ref 3.8–10.5)
WBC # BLD: 10.4 K/UL — SIGNIFICANT CHANGE UP (ref 3.8–10.5)
WBC # FLD AUTO: 10.09 K/UL — SIGNIFICANT CHANGE UP (ref 3.8–10.5)
WBC # FLD AUTO: 10.4 K/UL — SIGNIFICANT CHANGE UP (ref 3.8–10.5)

## 2021-12-13 PROCEDURE — 99024 POSTOP FOLLOW-UP VISIT: CPT

## 2021-12-13 PROCEDURE — 76882 US LMTD JT/FCL EVL NVASC XTR: CPT | Mod: 26,LT

## 2021-12-13 RX ORDER — MAGNESIUM SULFATE 500 MG/ML
2 VIAL (ML) INJECTION ONCE
Refills: 0 | Status: COMPLETED | OUTPATIENT
Start: 2021-12-13 | End: 2021-12-13

## 2021-12-13 RX ADMIN — SODIUM CHLORIDE 1 GRAM(S): 9 INJECTION INTRAMUSCULAR; INTRAVENOUS; SUBCUTANEOUS at 05:51

## 2021-12-13 RX ADMIN — PIPERACILLIN AND TAZOBACTAM 25 GRAM(S): 4; .5 INJECTION, POWDER, LYOPHILIZED, FOR SOLUTION INTRAVENOUS at 15:07

## 2021-12-13 RX ADMIN — PIPERACILLIN AND TAZOBACTAM 25 GRAM(S): 4; .5 INJECTION, POWDER, LYOPHILIZED, FOR SOLUTION INTRAVENOUS at 22:02

## 2021-12-13 RX ADMIN — Medication 1 TABLET(S): at 17:46

## 2021-12-13 RX ADMIN — PANTOPRAZOLE SODIUM 40 MILLIGRAM(S): 20 TABLET, DELAYED RELEASE ORAL at 17:46

## 2021-12-13 RX ADMIN — HYDROMORPHONE HYDROCHLORIDE 4 MILLIGRAM(S): 2 INJECTION INTRAMUSCULAR; INTRAVENOUS; SUBCUTANEOUS at 09:08

## 2021-12-13 RX ADMIN — Medication 975 MILLIGRAM(S): at 13:42

## 2021-12-13 RX ADMIN — PIPERACILLIN AND TAZOBACTAM 25 GRAM(S): 4; .5 INJECTION, POWDER, LYOPHILIZED, FOR SOLUTION INTRAVENOUS at 05:55

## 2021-12-13 RX ADMIN — HYDROMORPHONE HYDROCHLORIDE 4 MILLIGRAM(S): 2 INJECTION INTRAMUSCULAR; INTRAVENOUS; SUBCUTANEOUS at 14:40

## 2021-12-13 RX ADMIN — HYDROMORPHONE HYDROCHLORIDE 4 MILLIGRAM(S): 2 INJECTION INTRAMUSCULAR; INTRAVENOUS; SUBCUTANEOUS at 10:09

## 2021-12-13 RX ADMIN — SODIUM CHLORIDE 1 GRAM(S): 9 INJECTION INTRAMUSCULAR; INTRAVENOUS; SUBCUTANEOUS at 17:46

## 2021-12-13 RX ADMIN — Medication 975 MILLIGRAM(S): at 22:15

## 2021-12-13 RX ADMIN — Medication 25 GRAM(S): at 09:11

## 2021-12-13 RX ADMIN — PANTOPRAZOLE SODIUM 40 MILLIGRAM(S): 20 TABLET, DELAYED RELEASE ORAL at 05:50

## 2021-12-13 RX ADMIN — HYDROMORPHONE HYDROCHLORIDE 4 MILLIGRAM(S): 2 INJECTION INTRAMUSCULAR; INTRAVENOUS; SUBCUTANEOUS at 13:43

## 2021-12-13 RX ADMIN — GABAPENTIN 300 MILLIGRAM(S): 400 CAPSULE ORAL at 22:02

## 2021-12-13 RX ADMIN — Medication 975 MILLIGRAM(S): at 04:30

## 2021-12-13 RX ADMIN — GABAPENTIN 300 MILLIGRAM(S): 400 CAPSULE ORAL at 05:50

## 2021-12-13 RX ADMIN — HYDROMORPHONE HYDROCHLORIDE 0.5 MILLIGRAM(S): 2 INJECTION INTRAMUSCULAR; INTRAVENOUS; SUBCUTANEOUS at 11:10

## 2021-12-13 RX ADMIN — ENOXAPARIN SODIUM 30 MILLIGRAM(S): 100 INJECTION SUBCUTANEOUS at 05:56

## 2021-12-13 RX ADMIN — Medication 10 MILLIGRAM(S): at 13:43

## 2021-12-13 RX ADMIN — Medication 975 MILLIGRAM(S): at 10:00

## 2021-12-13 RX ADMIN — HYDROMORPHONE HYDROCHLORIDE 0.5 MILLIGRAM(S): 2 INJECTION INTRAMUSCULAR; INTRAVENOUS; SUBCUTANEOUS at 20:05

## 2021-12-13 RX ADMIN — GABAPENTIN 300 MILLIGRAM(S): 400 CAPSULE ORAL at 13:43

## 2021-12-13 RX ADMIN — Medication 975 MILLIGRAM(S): at 09:08

## 2021-12-13 RX ADMIN — HYDROMORPHONE HYDROCHLORIDE 0.5 MILLIGRAM(S): 2 INJECTION INTRAMUSCULAR; INTRAVENOUS; SUBCUTANEOUS at 10:43

## 2021-12-13 RX ADMIN — Medication 975 MILLIGRAM(S): at 16:30

## 2021-12-13 RX ADMIN — ENOXAPARIN SODIUM 30 MILLIGRAM(S): 100 INJECTION SUBCUTANEOUS at 17:46

## 2021-12-13 RX ADMIN — Medication 975 MILLIGRAM(S): at 02:08

## 2021-12-13 RX ADMIN — Medication 10 MILLIGRAM(S): at 05:50

## 2021-12-13 RX ADMIN — Medication 15 MILLIGRAM(S): at 00:27

## 2021-12-13 RX ADMIN — HYDROMORPHONE HYDROCHLORIDE 0.5 MILLIGRAM(S): 2 INJECTION INTRAMUSCULAR; INTRAVENOUS; SUBCUTANEOUS at 20:35

## 2021-12-13 RX ADMIN — Medication 1 TABLET(S): at 05:51

## 2021-12-13 NOTE — PROGRESS NOTE ADULT - ATTENDING COMMENTS
Patient seen and examined on am rounds. Will need wound vac change at bedside tomorrow, will need to try to transition to not having OR vac changes. US of left knee today at it appears tight and swollen and pt complaining of pain. f/u ID recs for IV abx as pt has spiked fevers again.

## 2021-12-13 NOTE — PROGRESS NOTE ADULT - ASSESSMENT
52yo Female s/p Right knee I&D POD# 16 (11/24/21),  Left knee I&D w [facility] left ankle POD#22 (11/17/21) 12/2 I&D of left leg, medial/lateral fasciotomies I&D/ tissue culture, 12/7, 12/11 LLE debridement and washout vac placement.    - s/p LLE wound vac changed 12/11  - f/u consulting recs  - continue to trend H/H  - continue pain control   - continue DVTp  - WBAT RLE, WBAT LLE in CAM boot  - Left knee aspirate cultures negative to date from 12/1/21  - IV abx as per ID:   ZOSYN to 4.5 grams Q8H OR CX WITH PSEUDOMONAS         AND    STENOTROPHOMONAS   ZOSYN FOR PSEUDOMONAS  BACTRIM FOR STENOTROPHOMONAS

## 2021-12-13 NOTE — PROGRESS NOTE ADULT - SUBJECTIVE AND OBJECTIVE BOX
Pt Name: LUIS FERNANDO DAVIS    MRN: [id number]      Patient is a being followed for bilateral knee effusions.    Patient underwent   Left knee I/D 11/17  Right knee I/D 11/24      PAST MEDICAL & SURGICAL HISTORY:  PAST MEDICAL & SURGICAL HISTORY:  No pertinent past medical history    History of ankle surgery        Allergies: No Known Allergies      Medications: acetaminophen     Tablet .. 975 milliGRAM(s) Oral every 6 hours  baclofen 10 milliGRAM(s) Oral three times a day  enoxaparin Injectable 30 milliGRAM(s) SubCutaneous two times a day  gabapentin 300 milliGRAM(s) Oral three times a day  HYDROmorphone   Tablet 2 milliGRAM(s) Oral every 4 hours PRN  HYDROmorphone   Tablet 4 milliGRAM(s) Oral every 4 hours PRN  HYDROmorphone  Injectable 0.5 milliGRAM(s) IV Push every 3 hours PRN  lisinopril 5 milliGRAM(s) Oral daily  magnesium sulfate  IVPB 2 Gram(s) IV Intermittent once  pantoprazole    Tablet 40 milliGRAM(s) Oral two times a day  piperacillin/tazobactam IVPB.. 4.5 Gram(s) IV Intermittent every 8 hours  polyethylene glycol 3350 17 Gram(s) Oral daily  senna 2 Tablet(s) Oral at bedtime  sodium chloride 1 Gram(s) Oral every 12 hours  trimethoprim  160 mG/sulfamethoxazole 800 mG 1 Tablet(s) Oral two times a day        Ambulation: Bedbound                         7.3    10.09 )-----------( 624      ( 13 Dec 2021 05:49 )             23.6     12-13    134<L>  |  102  |  6.6<L>  ----------------------------<  85  3.9   |  22.0  |  0.65    Ca    7.4<L>      13 Dec 2021 05:49  Phos  3.9     12-13  Mg     1.7     12-13    TPro  6.5<L>  /  Alb  2.0<L>  /  TBili  0.2<L>  /  DBili  x   /  AST  42<H>  /  ALT  14  /  AlkPhos  77  12-11      PHYSICAL EXAM:    Vital Signs Last 24 Hrs  T(C): 36.9 (13 Dec 2021 05:41), Max: 39.1 (12 Dec 2021 22:18)  T(F): 98.5 (13 Dec 2021 05:41), Max: 102.3 (12 Dec 2021 22:18)  HR: 75 (13 Dec 2021 05:41) (75 - 91)  BP: 105/70 (13 Dec 2021 05:41) (105/70 - 126/70)  BP(mean): --  RR: 14 (13 Dec 2021 05:41) (14 - 18)  SpO2: 99% (13 Dec 2021 05:41) (94% - 99%)  Daily     Daily     Appearance: Alert, responsive, in no acute distress.    Neurological: RLE: Sensation is grossly intact to light touch. 5/5 motor function of Right lower extremities.  LLE: Unable to dorsiflex foot, great toe, decreased sensation noted over left foot, great toe.   Skin: no rash on visible skin. Skin is clean, dry and intact. No bleeding. No abrasions. No ulcerations. Wound vac in place LLE    Vascular: 2+ distal pulses. Cap refill < 2 sec. No signs of venous   insufficiency   or stasis. No extremity ulcerations. No cyanosis.    Musculoskeletal:           right  Lower Extremity: Left knee incision well healed.  steri strips remain in place. No effusion noted on exam.  Motor and sensory intact LLE. EHL/FHL intact.        Left  Lower Extremity: Right knee dressings removed.   Compartments soft non tender.  No effusion noted.  Decreased dorsiflexion of great toe  Wound vac in place LLE. Diminished sensation over great toe.     A/P:  Pt is a  51y Female with bilateral knee effusions.     PLAN:   * WBAT LLE in cam boot  WBAT RLE  PT gait training, ROM bilateral knee  Wound vac managed by ACS team

## 2021-12-13 NOTE — PROGRESS NOTE ADULT - SUBJECTIVE AND OBJECTIVE BOX
HPI/OVERNIGHT EVENTS: Patient seen and examined at bedside this AM. spiked temp pf 102.3F around 2200 other VSS, sent blood cultures, pain controlled  Vital Signs Last 24 Hrs  T(C): 37.7 (13 Dec 2021 00:26), Max: 39.1 (12 Dec 2021 22:18)  T(F): 99.9 (13 Dec 2021 00:26), Max: 102.3 (12 Dec 2021 22:18)  HR: 91 (12 Dec 2021 22:18) (70 - 91)  BP: 126/70 (12 Dec 2021 22:18) (109/62 - 126/70)  BP(mean): --  RR: 17 (12 Dec 2021 22:18) (17 - 18)  SpO2: 97% (12 Dec 2021 22:18) (94% - 97%)    I&O's Detail    12 Dec 2021 07:01  -  13 Dec 2021 01:02  --------------------------------------------------------  IN:  Total IN: 0 mL    OUT:    Voided (mL): 500 mL  Total OUT: 500 mL    Total NET: -500 mL    GEN: NAD in bed  HEENT: MMM, NC  Resp: equal ches rise  CV: RRR  Abdomen: Soft NT/ND  EXT: LLE with wound vac in place, good suction, no leak        LABS:                        9.1    11.95 )-----------( 721      ( 12 Dec 2021 07:13 )             29.7     12-12    139  |  104  |  7.7<L>  ----------------------------<  93  4.0   |  24.0  |  0.64    Ca    8.3<L>      12 Dec 2021 07:13  Phos  3.6     12-12  Mg     1.9     12-12    TPro  6.5<L>  /  Alb  2.0<L>  /  TBili  0.2<L>  /  DBili  x   /  AST  42<H>  /  ALT  14  /  AlkPhos  77  12-11          MEDICATIONS  (STANDING):  acetaminophen     Tablet .. 975 milliGRAM(s) Oral every 6 hours  baclofen 10 milliGRAM(s) Oral three times a day  enoxaparin Injectable 30 milliGRAM(s) SubCutaneous two times a day  gabapentin 300 milliGRAM(s) Oral three times a day  lisinopril 5 milliGRAM(s) Oral daily  pantoprazole    Tablet 40 milliGRAM(s) Oral two times a day  piperacillin/tazobactam IVPB.. 4.5 Gram(s) IV Intermittent every 8 hours  polyethylene glycol 3350 17 Gram(s) Oral daily  senna 2 Tablet(s) Oral at bedtime  sodium chloride 1 Gram(s) Oral every 12 hours  trimethoprim  160 mG/sulfamethoxazole 800 mG 1 Tablet(s) Oral two times a day    MEDICATIONS  (PRN):  HYDROmorphone   Tablet 2 milliGRAM(s) Oral every 4 hours PRN Moderate Pain (4 - 6)  HYDROmorphone   Tablet 4 milliGRAM(s) Oral every 4 hours PRN Severe Pain (7 - 10)  HYDROmorphone  Injectable 0.5 milliGRAM(s) IV Push every 3 hours PRN breakthrough pain      MICRO:   Cultures     STUDIES:   EKG, CXR, U/S, CT, MRI

## 2021-12-13 NOTE — CHART NOTE - NSCHARTNOTEFT_GEN_A_CORE
Source: Patient [x ]  Family [ ]   other [ ]    Current Diet: Diet, Regular (12-11-21 @ 02:31)    PO intake:  < 50% [ ]   50-75%  [ x]   %  [ ]  other :    Current Weight:   (12/8) 251.9 lbs  (12/7) 205.9 lbs  (12/1) 234.7 lbs  (11/24) 221.3 lbs  (11/22) 245.1 lbs  (11/21) 242.9 lbs  (11/20) 228.1 lbs  ? accuracy of weights, noted with 2+ R leg, R ankle, R foot; 3+ L foot, L ankle, L leg edema noted, continue to trend and maintain strict Is&Os     Pertinent Medications: MEDICATIONS  (STANDING):  acetaminophen     Tablet .. 975 milliGRAM(s) Oral every 6 hours  baclofen 10 milliGRAM(s) Oral three times a day  enoxaparin Injectable 30 milliGRAM(s) SubCutaneous two times a day  gabapentin 300 milliGRAM(s) Oral three times a day  lisinopril 5 milliGRAM(s) Oral daily  pantoprazole    Tablet 40 milliGRAM(s) Oral two times a day  piperacillin/tazobactam IVPB.. 4.5 Gram(s) IV Intermittent every 8 hours  polyethylene glycol 3350 17 Gram(s) Oral daily  senna 2 Tablet(s) Oral at bedtime  sodium chloride 1 Gram(s) Oral every 12 hours  trimethoprim  160 mG/sulfamethoxazole 800 mG 1 Tablet(s) Oral two times a day    MEDICATIONS  (PRN):  HYDROmorphone   Tablet 2 milliGRAM(s) Oral every 4 hours PRN Moderate Pain (4 - 6)  HYDROmorphone   Tablet 4 milliGRAM(s) Oral every 4 hours PRN Severe Pain (7 - 10)  HYDROmorphone  Injectable 0.5 milliGRAM(s) IV Push every 3 hours PRN breakthrough pain    Pertinent Labs: CBC Full  -  ( 13 Dec 2021 12:21 )  WBC Count : 10.40 K/uL  RBC Count : 2.94 M/uL  Hemoglobin : 8.2 g/dL  Hematocrit : 26.4 %  Platelet Count - Automated : 568 K/uL  Mean Cell Volume : 89.8 fl  Mean Cell Hemoglobin : 27.9 pg  Mean Cell Hemoglobin Concentration : 31.1 gm/dL  Auto Neutrophil # : 7.34 K/uL  Auto Lymphocyte # : 2.14 K/uL  Auto Monocyte # : 0.54 K/uL  Auto Eosinophil # : 0.20 K/uL  Auto Basophil # : 0.07 K/uL  Auto Neutrophil % : 70.5 %  Auto Lymphocyte % : 20.6 %  Auto Monocyte % : 5.2 %  Auto Eosinophil % : 1.9 %  Auto Basophil % : 0.7 %  12-13 Na134 mmol/L<L> Glu 85 mg/dL K+ 3.9 mmol/L Cr  0.65 mg/dL BUN 6.6 mg/dL<L> Phos 3.9 mg/dL Alb n/a   PAB n/a       Skin: Surgical incision to left upper and lower leg, right knee, and left knee and skin tear to right buttocks per documentation     Nutrition focused physical exam conducted - found signs of malnutrition [ ]absent [x ]present    Subcutaneous fat loss: [ x] Orbital fat pads region, [ ]Buccal fat region, [ ]Triceps region,  [ ]Ribs region    Muscle wasting: [x ]Temples region, [x ]Clavicle region, [ ]Shoulder region, [ ]Scapula region, [ ]Interosseous region,  [ ]thigh region, [ ]Calf region    Estimated Needs:   [x ] no change since previous assessment  [ ] recalculated:     Current Nutrition Diagnosis: Pt remains at high nutrition risk secondary to malnutrition (moderate, acute) related to prolonged hospitalization and inability to meet increased nutrient needs (s/p LLE exploration and debridement, c/b hemorrhagic + septic shock, found to have GI bleed,  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 Pt reported fair appetite, having soup/sandwiches. Encouraged HBV protein sources pt verbalized understanding and states she has been choosing good protein sources at all meals. Last documented BM 12/12. RD to follow up.    Recommendations:   1) Continue diet as tolerated. Add Ensure Enlive TID to optimize po intake and provide an additional 350 kcal, 20g protein per serving; also suggest Swapnil BID to aid in healing (90 kcal, 14g amino acids per packet).  2) Rx: MVI and vitamin C 500mg daily.  3) Bowel regimen PRN.  4) Obtain daily weights to monitor trends.     Monitoring and Evaluation:   [ x] PO intake [x ] Tolerance to diet prescription [X] Weights  [X] Follow up per protocol [X] Labs.

## 2021-12-14 ENCOUNTER — TRANSCRIPTION ENCOUNTER (OUTPATIENT)
Age: 51
End: 2021-12-14

## 2021-12-14 LAB
ANION GAP SERPL CALC-SCNC: 10 MMOL/L — SIGNIFICANT CHANGE UP (ref 5–17)
B PERT IGG+IGM PNL SER: ABNORMAL
BASOPHILS # BLD AUTO: 0.08 K/UL — SIGNIFICANT CHANGE UP (ref 0–0.2)
BASOPHILS NFR BLD AUTO: 0.8 % — SIGNIFICANT CHANGE UP (ref 0–2)
BLD GP AB SCN SERPL QL: SIGNIFICANT CHANGE UP
BUN SERPL-MCNC: 7 MG/DL — LOW (ref 8–20)
CALCIUM SERPL-MCNC: 7.8 MG/DL — LOW (ref 8.6–10.2)
CHLORIDE SERPL-SCNC: 106 MMOL/L — SIGNIFICANT CHANGE UP (ref 98–107)
CO2 SERPL-SCNC: 23 MMOL/L — SIGNIFICANT CHANGE UP (ref 22–29)
COLOR FLD: ABNORMAL
CREAT SERPL-MCNC: 0.62 MG/DL — SIGNIFICANT CHANGE UP (ref 0.5–1.3)
EOSINOPHIL # BLD AUTO: 0.21 K/UL — SIGNIFICANT CHANGE UP (ref 0–0.5)
EOSINOPHIL NFR BLD AUTO: 2 % — SIGNIFICANT CHANGE UP (ref 0–6)
FLUID INTAKE SUBSTANCE CLASS: SIGNIFICANT CHANGE UP
FLUID SEGMENTED GRANULOCYTES: 98 % — SIGNIFICANT CHANGE UP
GLUCOSE SERPL-MCNC: 87 MG/DL — SIGNIFICANT CHANGE UP (ref 70–99)
HCT VFR BLD CALC: 25.8 % — LOW (ref 34.5–45)
HGB BLD-MCNC: 8 G/DL — LOW (ref 11.5–15.5)
IMM GRANULOCYTES NFR BLD AUTO: 0.8 % — SIGNIFICANT CHANGE UP (ref 0–1.5)
LYMPHOCYTES # BLD AUTO: 2.8 K/UL — SIGNIFICANT CHANGE UP (ref 1–3.3)
LYMPHOCYTES # BLD AUTO: 26.4 % — SIGNIFICANT CHANGE UP (ref 13–44)
LYMPHOCYTES # FLD: 1 % — SIGNIFICANT CHANGE UP
MAGNESIUM SERPL-MCNC: 1.8 MG/DL — SIGNIFICANT CHANGE UP (ref 1.6–2.6)
MCHC RBC-ENTMCNC: 27.8 PG — SIGNIFICANT CHANGE UP (ref 27–34)
MCHC RBC-ENTMCNC: 31 GM/DL — LOW (ref 32–36)
MCV RBC AUTO: 89.6 FL — SIGNIFICANT CHANGE UP (ref 80–100)
MONOCYTES # BLD AUTO: 0.71 K/UL — SIGNIFICANT CHANGE UP (ref 0–0.9)
MONOCYTES NFR BLD AUTO: 6.7 % — SIGNIFICANT CHANGE UP (ref 2–14)
MONOS+MACROS # FLD: 1 % — SIGNIFICANT CHANGE UP
MYELOPEROXIDASE AB SER-ACNC: <5 UNITS — SIGNIFICANT CHANGE UP
MYELOPEROXIDASE CELLS FLD QL: NEGATIVE — SIGNIFICANT CHANGE UP
NEUTROPHILS # BLD AUTO: 6.72 K/UL — SIGNIFICANT CHANGE UP (ref 1.8–7.4)
NEUTROPHILS NFR BLD AUTO: 63.3 % — SIGNIFICANT CHANGE UP (ref 43–77)
PLATELET # BLD AUTO: 546 K/UL — HIGH (ref 150–400)
POTASSIUM SERPL-MCNC: 3.9 MMOL/L — SIGNIFICANT CHANGE UP (ref 3.5–5.3)
POTASSIUM SERPL-SCNC: 3.9 MMOL/L — SIGNIFICANT CHANGE UP (ref 3.5–5.3)
PROTEINASE3 AB FLD-ACNC: <5 UNITS — SIGNIFICANT CHANGE UP
PROTEINASE3 AB SER-ACNC: NEGATIVE — SIGNIFICANT CHANGE UP
RBC # BLD: 2.88 M/UL — LOW (ref 3.8–5.2)
RBC # FLD: 20.5 % — HIGH (ref 10.3–14.5)
RCV VOL RI: HIGH /UL (ref 0–0)
SARS-COV-2 RNA SPEC QL NAA+PROBE: SIGNIFICANT CHANGE UP
SODIUM SERPL-SCNC: 139 MMOL/L — SIGNIFICANT CHANGE UP (ref 135–145)
SYNOVIAL CRYSTALS CLARITY: ABNORMAL
SYNOVIAL CRYSTALS COLOR: ABNORMAL
SYNOVIAL CRYSTALS ID: SIGNIFICANT CHANGE UP
SYNOVIAL CRYSTALS TUBE: SIGNIFICANT CHANGE UP
TOTAL NUCLEATED CELL COUNT, BODY FLUID: 7277 /UL — SIGNIFICANT CHANGE UP
TUBE TYPE: SIGNIFICANT CHANGE UP
WBC # BLD: 10.61 K/UL — HIGH (ref 3.8–10.5)
WBC # FLD AUTO: 10.61 K/UL — HIGH (ref 3.8–10.5)

## 2021-12-14 PROCEDURE — 99232 SBSQ HOSP IP/OBS MODERATE 35: CPT | Mod: 24,57

## 2021-12-14 PROCEDURE — 71260 CT THORAX DX C+: CPT | Mod: 26

## 2021-12-14 PROCEDURE — 74177 CT ABD & PELVIS W/CONTRAST: CPT | Mod: 26

## 2021-12-14 PROCEDURE — 99233 SBSQ HOSP IP/OBS HIGH 50: CPT

## 2021-12-14 PROCEDURE — 73701 CT LOWER EXTREMITY W/DYE: CPT | Mod: 26,50

## 2021-12-14 RX ORDER — SODIUM CHLORIDE 9 MG/ML
1000 INJECTION, SOLUTION INTRAVENOUS
Refills: 0 | Status: DISCONTINUED | OUTPATIENT
Start: 2021-12-14 | End: 2021-12-15

## 2021-12-14 RX ORDER — VANCOMYCIN HCL 1 G
1250 VIAL (EA) INTRAVENOUS EVERY 12 HOURS
Refills: 0 | Status: DISCONTINUED | OUTPATIENT
Start: 2021-12-14 | End: 2021-12-15

## 2021-12-14 RX ORDER — CEFAZOLIN SODIUM 1 G
2000 VIAL (EA) INJECTION ONCE
Refills: 0 | Status: DISCONTINUED | OUTPATIENT
Start: 2021-12-15 | End: 2021-12-17

## 2021-12-14 RX ORDER — HYDROMORPHONE HYDROCHLORIDE 2 MG/ML
2 INJECTION INTRAMUSCULAR; INTRAVENOUS; SUBCUTANEOUS EVERY 4 HOURS
Refills: 0 | Status: DISCONTINUED | OUTPATIENT
Start: 2021-12-14 | End: 2021-12-15

## 2021-12-14 RX ORDER — ENOXAPARIN SODIUM 100 MG/ML
30 INJECTION SUBCUTANEOUS ONCE
Refills: 0 | Status: COMPLETED | OUTPATIENT
Start: 2021-12-14 | End: 2021-12-14

## 2021-12-14 RX ORDER — MEROPENEM 1 G/30ML
1000 INJECTION INTRAVENOUS EVERY 8 HOURS
Refills: 0 | Status: DISCONTINUED | OUTPATIENT
Start: 2021-12-14 | End: 2021-12-15

## 2021-12-14 RX ORDER — MAGNESIUM SULFATE 500 MG/ML
2 VIAL (ML) INJECTION ONCE
Refills: 0 | Status: COMPLETED | OUTPATIENT
Start: 2021-12-14 | End: 2021-12-14

## 2021-12-14 RX ORDER — HYDROMORPHONE HYDROCHLORIDE 2 MG/ML
4 INJECTION INTRAMUSCULAR; INTRAVENOUS; SUBCUTANEOUS EVERY 4 HOURS
Refills: 0 | Status: DISCONTINUED | OUTPATIENT
Start: 2021-12-14 | End: 2021-12-15

## 2021-12-14 RX ADMIN — Medication 975 MILLIGRAM(S): at 20:36

## 2021-12-14 RX ADMIN — HYDROMORPHONE HYDROCHLORIDE 4 MILLIGRAM(S): 2 INJECTION INTRAMUSCULAR; INTRAVENOUS; SUBCUTANEOUS at 17:58

## 2021-12-14 RX ADMIN — PANTOPRAZOLE SODIUM 40 MILLIGRAM(S): 20 TABLET, DELAYED RELEASE ORAL at 17:59

## 2021-12-14 RX ADMIN — HYDROMORPHONE HYDROCHLORIDE 4 MILLIGRAM(S): 2 INJECTION INTRAMUSCULAR; INTRAVENOUS; SUBCUTANEOUS at 07:15

## 2021-12-14 RX ADMIN — Medication 975 MILLIGRAM(S): at 21:36

## 2021-12-14 RX ADMIN — Medication 10 MILLIGRAM(S): at 05:24

## 2021-12-14 RX ADMIN — GABAPENTIN 300 MILLIGRAM(S): 400 CAPSULE ORAL at 05:24

## 2021-12-14 RX ADMIN — Medication 166.67 MILLIGRAM(S): at 11:59

## 2021-12-14 RX ADMIN — SODIUM CHLORIDE 100 MILLILITER(S): 9 INJECTION, SOLUTION INTRAVENOUS at 21:36

## 2021-12-14 RX ADMIN — HYDROMORPHONE HYDROCHLORIDE 4 MILLIGRAM(S): 2 INJECTION INTRAMUSCULAR; INTRAVENOUS; SUBCUTANEOUS at 14:01

## 2021-12-14 RX ADMIN — Medication 25 GRAM(S): at 12:01

## 2021-12-14 RX ADMIN — Medication 975 MILLIGRAM(S): at 02:03

## 2021-12-14 RX ADMIN — HYDROMORPHONE HYDROCHLORIDE 4 MILLIGRAM(S): 2 INJECTION INTRAMUSCULAR; INTRAVENOUS; SUBCUTANEOUS at 22:16

## 2021-12-14 RX ADMIN — Medication 10 MILLIGRAM(S): at 21:36

## 2021-12-14 RX ADMIN — HYDROMORPHONE HYDROCHLORIDE 0.5 MILLIGRAM(S): 2 INJECTION INTRAMUSCULAR; INTRAVENOUS; SUBCUTANEOUS at 12:17

## 2021-12-14 RX ADMIN — PANTOPRAZOLE SODIUM 40 MILLIGRAM(S): 20 TABLET, DELAYED RELEASE ORAL at 06:06

## 2021-12-14 RX ADMIN — HYDROMORPHONE HYDROCHLORIDE 4 MILLIGRAM(S): 2 INJECTION INTRAMUSCULAR; INTRAVENOUS; SUBCUTANEOUS at 06:45

## 2021-12-14 RX ADMIN — HYDROMORPHONE HYDROCHLORIDE 4 MILLIGRAM(S): 2 INJECTION INTRAMUSCULAR; INTRAVENOUS; SUBCUTANEOUS at 18:46

## 2021-12-14 RX ADMIN — HYDROMORPHONE HYDROCHLORIDE 4 MILLIGRAM(S): 2 INJECTION INTRAMUSCULAR; INTRAVENOUS; SUBCUTANEOUS at 14:15

## 2021-12-14 RX ADMIN — HYDROMORPHONE HYDROCHLORIDE 4 MILLIGRAM(S): 2 INJECTION INTRAMUSCULAR; INTRAVENOUS; SUBCUTANEOUS at 23:16

## 2021-12-14 RX ADMIN — HYDROMORPHONE HYDROCHLORIDE 0.5 MILLIGRAM(S): 2 INJECTION INTRAMUSCULAR; INTRAVENOUS; SUBCUTANEOUS at 12:04

## 2021-12-14 RX ADMIN — MEROPENEM 100 MILLIGRAM(S): 1 INJECTION INTRAVENOUS at 21:36

## 2021-12-14 RX ADMIN — MEROPENEM 100 MILLIGRAM(S): 1 INJECTION INTRAVENOUS at 14:04

## 2021-12-14 RX ADMIN — PIPERACILLIN AND TAZOBACTAM 25 GRAM(S): 4; .5 INJECTION, POWDER, LYOPHILIZED, FOR SOLUTION INTRAVENOUS at 05:26

## 2021-12-14 RX ADMIN — LISINOPRIL 5 MILLIGRAM(S): 2.5 TABLET ORAL at 05:25

## 2021-12-14 RX ADMIN — Medication 10 MILLIGRAM(S): at 14:01

## 2021-12-14 RX ADMIN — ENOXAPARIN SODIUM 30 MILLIGRAM(S): 100 INJECTION SUBCUTANEOUS at 05:26

## 2021-12-14 RX ADMIN — Medication 975 MILLIGRAM(S): at 01:33

## 2021-12-14 RX ADMIN — GABAPENTIN 300 MILLIGRAM(S): 400 CAPSULE ORAL at 21:36

## 2021-12-14 RX ADMIN — GABAPENTIN 300 MILLIGRAM(S): 400 CAPSULE ORAL at 14:01

## 2021-12-14 RX ADMIN — SODIUM CHLORIDE 1 GRAM(S): 9 INJECTION INTRAMUSCULAR; INTRAVENOUS; SUBCUTANEOUS at 05:25

## 2021-12-14 RX ADMIN — Medication 1 TABLET(S): at 05:25

## 2021-12-14 RX ADMIN — Medication 2 TABLET(S): at 17:58

## 2021-12-14 RX ADMIN — ENOXAPARIN SODIUM 30 MILLIGRAM(S): 100 INJECTION SUBCUTANEOUS at 20:36

## 2021-12-14 RX ADMIN — Medication 166.67 MILLIGRAM(S): at 17:58

## 2021-12-14 NOTE — PROGRESS NOTE ADULT - SUBJECTIVE AND OBJECTIVE BOX
As per attending, patient going for I&D tomorrow of left knee with ACS and ortho, wants right knee aspirated due to fluid on CT     ARTHROCENTSIS  PROCEDURE NOTE: Arthrocentesis    Performed by:  Jamie Kilgore PA-C    Indication: Effusion / rule out septic joint    Consent: the risks and benefits of arthrocentesis discussed with patient, including the risk of bleeding, infection, and technical failure. The risks of not performing the procedure, failure to diagnose septic joint with resultant systemic infection, discussed with the patient. The alternatives of performing the procedure also discussed. Written consent was obtained following the discussion.    Universal Protocol: A time out was performed and the correct patient and site were verified.    The Right knee joint was prepped and draped in proper sterile fashion.  A 18 gauge needle was used to aspirate fluid from the joint using appropriate anatomic landmarks. Approximately 15 milliliters of fluid was obtained. The fluid was then sent to the lab for appropriate studies. The site was bandaged and no complications were noted. The patient tolerated the procedure well.    Post-Procedure Diagnosis: Effusion  Complications: None  Specimens Removed: fluid only  Prosthetic devices/implants:  total joint

## 2021-12-14 NOTE — PROGRESS NOTE ADULT - SUBJECTIVE AND OBJECTIVE BOX
INFECTIOUS DISEASES AND INTERNAL MEDICINE at [facility]  =======================================================  Theo Morrow MD  Diplomates American Board of Internal Medicine and Infectious Diseases  Telephone [phone]  Fax            [phone]  =======================================================    LUIS FERNANDO DAVIS [id number]  Follow up: group A STREP NECROTIZING SOFT TISSUE INFECTION     repeat CT scan of lower ext with collections.   S/P OR WASHOUT 12/2  AND   OR AGAIN  12/7 adn  late  on 12/10   WAS AFEBRILE BUT NOW SPIKING AGAIN      Allergies:  No Known Allergies       FAMILY   FAMILY HISTORY:  FH: HTN (hypertension) (Mother)      REVIEW OF SYSTEMS:  CONSTITUTIONAL:  No Fever or chills  HEENT:   No diplopia or blurred vision.  No earache, sore throat or runny nose.  CARDIOVASCULAR:  No pressure, squeezing, strangling, tightness, heaviness or aching about the chest, neck, axilla or epigastrium.  RESPIRATORY:  No cough, shortness of breath, PND or orthopnea.  GASTROINTESTINAL:  No nausea, vomiting or diarrhea.  GENITOURINARY:  No dysuria, frequency or urgency. No Blood in urine  MUSCULOSKELETAL:   AS PER HPI   SKIN:  No change in skin, hair or nails.  NEUROLOGIC:  No paresthesias, fasciculations, seizures or weakness.  PSYCHIATRIC:  No disorder of thought or mood.  ENDOCRINE:  No heat or cold intolerance, polyuria or polydipsia.  HEMATOLOGICAL:  No easy bruising or bleeding.            Physical Exam:     GEN: NAD,    HEENT: normocephalic and atraumatic. EOMI. ASHISH.    NECK: Supple. No carotid bruits.  No lymphadenopathy or thyromegaly.  LUNGS: Clear to auscultation.  HEART: Regular rate and rhythm without murmur.  ABDOMEN: Soft, nontender, and nondistended.  Positive bowel sounds.    : No CVA tenderness  EXTREMITIES: LEFT LEG WRAPPED; left thigh vac in place  right knee with dressing in place   UPPER EXT STRENGTH GOOD BUT CAN T RAISE HANDS ALL THE WAY, localized tenderness to both shoulders   NEUROLOGIC:  C AWAKE ALERT Appropriate affect .  SKIN: No ulceration or induration present.           Vitals:  ==== Vital Signs Last 24 Hrs  T(C): 37.5 (14 Dec 2021 08:53), Max: 39.4 (13 Dec 2021 21:43)  T(F): 99.5 (14 Dec 2021 08:53), Max: 102.9 (13 Dec 2021 21:43)  HR: 79 (14 Dec 2021 08:53) (79 - 105)  BP: 125/78 (14 Dec 2021 08:53) (104/65 - 162/89)  BP(mean): --  RR: 18 (14 Dec 2021 08:53) (18 - 18)  SpO2: 93% (14 Dec 2021 08:53) (92% - 100%)  =======================================================  Current Antibiotics:  bactrim  piperacillin/tazobactam IVPB.. 4.5 Gram(s) IV Intermittent every 8 hours    Other medications:  baclofen 10 milliGRAM(s) Oral three times a day  collagenase Ointment 1 Application(s) Topical two times a day  gabapentin 200 milliGRAM(s) Oral three times a day  glucagon  Injectable 1 milliGRAM(s) IntraMuscular once  heparin   Injectable 5000 Unit(s) SubCutaneous every 8 hours  lisinopril 5 milliGRAM(s) Oral daily  methocarbamol 750 milliGRAM(s) Oral three times a day  pantoprazole    Tablet 40 milliGRAM(s) Oral two times a day  polyethylene glycol 3350 17 Gram(s) Oral daily  senna 2 Tablet(s) Oral at bedtime  sodium chloride 1 Gram(s) Oral every 12 hours  sodium chloride 0.9%. 1000 milliLiter(s) IV Continuous <Continuous>      =======================================================  Labs:                                                                          8.0    10.61 )-----------( 546      ( 14 Dec 2021 07:10 )             25.8   12-14    139  |  106  |  7.0<L>  ----------------------------<  87  3.9   |  23.0  |  0.62    Ca    7.8<L>      14 Dec 2021 07:10  Phos  3.9     12-13  Mg     1.8     12-14                Culture - Blood (collected 11-28-21 @ 09:14)  Source: .Blood Blood-Peripheral    Culture - Blood (collected 11-28-21 @ 09:14)  Source: .Blood Blood-Peripheral    Culture - Fungal, Body Fluid (collected 11-24-21 @ 13:35)  Source: .Body Fluid Right Knee Fluid    Culture - Acid Fast - Body Fluid w/Smear (collected 11-24-21 @ 13:35)  Source: .Body Fluid Right Knee Fluid    Culture - Body Fluid with Gram Stain (collected 11-24-21 @ 13:35)  Source: .Body Fluid Right Knee Fluid  Gram Stain (11-25-21 @ 01:27):    polymorphonuclear leukocytes seen per low power field    No organisms seen per oil power field    Culture - Surgical Swab (collected 11-24-21 @ 12:30)  Source: .Surgical Swab Swab Right Knee #2  Final Report (11-29-21 @ 07:55):    No growth at 5 days    Culture - Surgical Swab (collected 11-24-21 @ 12:30)  Source: .Surgical Swab Swab Right Knee #1  Final Report (11-29-21 @ 07:56):    No growth at 5 days    Culture - Surgical Swab (collected 11-24-21 @ 12:28)  Source: .Surgical Swab Swab Right Knee #3  Final Report (11-29-21 @ 08:01):    No growth at 5 days    Culture - Body Fluid with Gram Stain (collected 11-23-21 @ 12:50)  Source: .Body Fluid Knee Fluid  Gram Stain (11-23-21 @ 23:34):    polymorphonuclear leukocytes seen    No organisms seen    by cytocentrifuge    Culture - Blood (collected 11-22-21 @ 08:44)  Source: .Blood Blood  Final Report (11-27-21 @ 09:00):    No growth at 5 days.    Culture - Blood (collected 11-20-21 @ 12:57)  Source: .Blood Blood  Final Report (11-25-21 @ 13:00):    No growth at 5 days.    Culture - Body Fluid with Gram Stain (collected 11-18-21 @ 16:20)  Source: .Body Fluid OR Posterior Calf Left Lower Extremity Fluid  Gram Stain (11-18-21 @ 21:39):    polymorphonuclear leukocytes seen    Gram Positive Cocci in Pairs and Chains seen    by cytocentrifuge  Final Report (11-23-21 @ 09:40):    Moderate Streptococcus pyogenes (Group A) Penicillin and ampicillin are    drugs of choice for    treatment of beta-hemolytic streptococcal infections.    Susceptibility testing is not performed routinely because    S. pyogenes (GAS) is universally susceptible to penicillin    and resistance in other strains is extremely rare.    Culture - Body Fluid with Gram Stain (collected 11-18-21 @ 16:20)  Source: .Body Fluid OR Swab Left Knee Lower Extremity Fluid  Gram Stain (11-18-21 @ 19:36):    polymorphonuclear leukocytes seen    Gram Positive Cocci in Pairs and Chains seen    by cytocentrifuge  Final Report (11-23-21 @ 09:41):    Numerous Streptococcus pyogenes (Group A) Penicillin and ampicillin are    drugs of choice for    treatment of beta-hemolytic streptococcal infections.    Susceptibility testing is not performed routinely because    S. pyogenes (GAS) is universally susceptible to penicillin    and resistance in other strains is extremely rare.    Culture - Body Fluid with Gram Stain (collected 11-18-21 @ 16:20)  Source: .Body Fluid OR - Left Lower Extremity Fluid  Gram Stain (11-18-21 @ 20:10):    No polymorphonuclear leukocytes seen    No organisms seen    by cytocentrifuge  Final Report (11-23-21 @ 09:40):    Rare Streptococcus pyogenes (Group A)    Penicillin and ampicillin are drugs of choice for treatment of    beta-hemolytic streptococcal infections.    Susceptibility testing is not performed routinely because S. pyogenes    (GAS) is universally susceptibleto penicillin    and resistance in other strains is extremely rare.    Culture - Urine (collected 11-18-21 @ 10:07)  Source: Catheterized Catheterized  Final Report (11-19-21 @ 07:03):    No growth    Culture - Urine (collected 11-18-21 @ 00:25)  Source: Catheterized Catheterized  Final Report (11-18-21 @ 22:09):    <10,000 CFU/mL Normal Urogenital Aimee    Culture - Blood (collected 11-17-21 @ 14:45)  Source: .Blood Blood  Gram Stain (11-18-21 @ 09:44):    Growth in aerobic and anaerobic bottles: Gram Positive Cocci in Pairs and    Chains    Gram Stain performed by:    [facility]    [address] [place] [id number]    .    TYPE: (C=Critical, N=Notification, A=Abnormal) C    TESTS:  _ GS    DATE/TIME CALLED: _ 11/18/2021 09:42:19    CALLED TO: Chris Hernandez RN    READ BACK (2 Patient Identifiers)(Y/N): _ Y    READ BACK VALUES (Y/N): _ Y    CALLED BY: Chris Quiñones  Final Report (11-21-21 @ 12:28):    Growth in aerobic and anaerobic bottles: Streptococcus pyogenes (Group A)    See previous culture [id number]    Culture - Blood (collected 11-17-21 @ 14:45)  Source: .Blood Blood  Gram Stain (11-18-21 @ 09:40):    Growth in aerobic and anaerobic bottles: Gram Positive Cocci in Pairs and    Chains    ***Blood Panel PCR results on this specimen are available    approximately 3 hours after the Gram stain result.***    Gram stain, PCR, and/or culture results may not always    correspond due to difference in methodologies.    ************************************************************    This PCR assay was performed using [employer].    The following targets are tested for: Enterococcus,    vancomycin resistant enterococci, Listeria monocytogenes,    coagulase negative staphylococci, S. aureus,    methicillin resistant S. aureus, Streptococcus agalactiae    (Group B), S. pneumoniae, S. pyogenes (Group A),    Acinetobacter baumannii, Enterobacter cloacae, E. coli,    Klebsiella oxytoca, K. pneumoniae, Proteus sp.,    Serratia marcescens, Haemophilus influenzae,    Neisseria meningitidis, Pseudomonas aeruginosa, Candida    albicans, C. glabrata, C krusei, C parapsilosis,    C. tropicalis and the KPC resistance gene.    Gram Stain and BCID performed by:    [facility]    [address] [place] [id number]    .    TYPE: (C=Critical, N=Notification, A=Abnormal) C    TESTS:  _ GS    DATE/TIME CALLED: _ 11/18/2021 09:40:08    CALLED TO: Chris Hernandez RN    READ BACK (2 Patient Identifiers)(Y/N): _ Y    READ BACK VALUES (Y/N): _ Y    CALLED BY: Chris Quiñones  Final Report (11-21-21 @ 12:28):    Growth in aerobic and anaerobic bottles: Streptococcus pyogenes (Group A)  Organism: Blood Culture PCR  Streptococcus pyogenes (Group A) (11-21-21 @ 12:28)  Organism: Streptococcus pyogenes (Group A) (11-21-21 @ 12:28)    Sensitivities:      -  Ceftriaxone: S 0.016      -  Penicillin: S 0.016 Predicts results for ampicillin, amoxicillin, amoxicillin/clavulanate, ampicillin/sulbactam, 1st, 2nd and 3rd generation cephalosporins and carbapenems.      Method Type: ETEST  Organism: Streptococcus pyogenes (Group A) (11-21-21 @ 12:28)    Sensitivities:      -  Vancomycin: S      Method Type: KB  Organism: Blood Culture PCR (11-21-21 @ 12:28)    Sensitivities:      -  Streptococcus pyogenes (Group A): Detec      Method Type: PCR      Creatinine, Serum: 0.75 mg/dL (11-30-21 @ 07:18)  Creatinine, Serum: 0.68 mg/dL (11-29-21 @ 09:55)  Creatinine, Serum: 0.64 mg/dL (11-28-21 @ 09:14)  Creatinine, Serum: 0.65 mg/dL (11-27-21 @ 11:51)  Creatinine, Serum: 0.57 mg/dL (11-27-21 @ 05:35)  Creatinine, Serum: 0.61 mg/dL (11-26-21 @ 09:01)        Ferritin, Serum: 388 ng/mL (11-17-21 @ 13:21)    C-Reactive Protein, Serum: 197 mg/L (11-26-21 @ 21:52)  C-Reactive Protein, Serum: 211 mg/L (11-26-21 @ 09:01)  C-Reactive Protein, Serum: 53 mg/L (11-23-21 @ 07:42)  C-Reactive Protein, Serum: >422 mg/L (11-17-21 @ 13:21)    Sedimentation Rate, Erythrocyte: 65 mm/hr (11-26-21 @ 21:52)  Sedimentation Rate, Erythrocyte: 63 mm/hr (11-26-21 @ 09:01)  Sedimentation Rate, Erythrocyte: 55 mm/hr (11-17-21 @ 13:21)    WBC Count: 14.55 K/uL (11-30-21 @ 07:18)  WBC Count: 16.71 K/uL (11-29-21 @ 09:54)  WBC Count: 17.02 K/uL (11-28-21 @ 09:14)  WBC Count: 15.99 K/uL (11-27-21 @ 05:33)  WBC Count: 18.73 K/uL (11-26-21 @ 09:01)      COVID-19 PCR: NotDetec (11-30-21 @ 01:59)  COVID-19 PCR: NotDetec (11-23-21 @ 18:50)  COVID-19 PCR: NotDetec (11-17-21 @ 13:18)    Lactate Dehydrogenase, Serum: 769 U/L (11-20-21 @ 03:56)    Alkaline Phosphatase, Serum: 88 U/L (11-27-21 @ 05:35)  Alanine Aminotransferase (ALT/SGPT): 11 U/L (11-27-21 @ 05:35)  Aspartate Aminotransferase (AST/SGOT): 25 U/L (11-27-21 @ 05:35)  Bilirubin Total, Serum: 0.3 mg/dL (11-27-21 @ 05:35)        < from: CT Lower Extremity w/ IV Cont, Bilateral (11.30.21 @ 09:55) >     EXAM:  CT LWR EXT IC BI                          PROCEDURE DATE:  11/30/2021          INTERPRETATION:  CT LOWER EXTREMITY WITH IV CONTRAST BILATERAL dated 11/30/2021 9:55 AM    INDICATION: Fever. Status post incision and drainage bilaterally. Persistent fevers to 104.    COMPARISON: CT of the right knee dated 11/23/2021. CT of the left lower extremity dated 11/17/2021    TECHNIQUE: CT imaging of the bilateral lower extremities was performed with contrast. The data was reformatted in the axial,coronal, and sagittal planes.  Contrast: Omnipaque 300. Administered: 96 cc. Discarded: 4 cc.    FINDINGS:    OSSEOUS STRUCTURES: Mild degenerative changes at the lower lumbar spine including sclerosis and narrowing at the facet joints. Moderate sclerosis at the right lower sacroiliac joint. Moderate narrowing the bilateral hip joint spaces which is worse posteriorly. There is marginal productive changes. Moderate narrowing at the pubic symphysis. Small cysts appreciated at the left patella suggesting overlying cartilage loss. Postsurgical changes noted at the left distal fibula with a small screw tracts through the distal fibula. A portion of the distal fibula is exposed by an overlying skin wound. No fracture or osteonecrosis is seen. Thereis no periosteal reaction or large erosion appreciated.  SYNOVIUM/ JOINT FLUID: There is a large left knee joint effusion containing several small new locules of gas. A moderate right knee joint effusion is identified. No hip joint effusion is seen.  TENDONS: The tendons are intact.  MUSCLES: There is hypodensity within the left soleus and gastrocnemius musculature. Confluent edema is seen between the soleus and gastrocnemius musculature. Moderate edema is noted in the left anterior compartment calf musculature. There is suggestion of rim enhancement, new compared to the prior exam. There is a hazy appearance of the right facets and lateralis and tensor fascia yohan muscle. Moderate hazy hypodensity throughout the left sartorius and left vastus lateralis muscle. Edema overlies the left rectus femoris muscle.  NEUROVASCULAR STRUCTURES: Mild vascular calcifications.  INTRAPELVIC SOFT TISSUES: Multiple uterine masses are noted without enhancement likely degenerating uterine fibroids, unchanged.  SUBCUTANEOUS SOFT TISSUES: There is diffuse soft tissue swelling and skin thickening. There are skin wounds along the medial lateral aspect of the left calf. No subcutaneous fluid collection is noted.    3-D reformatted imaging confirms these findings.    IMPRESSION:    1.  Soft tissue swelling bilaterally. Medial lateral left calf skin wounds possibly from prior surgery or infection. Nonspecific but can be seen with cellulitis.  2.  Exposure of the left distal fibula by an overlying skin wound raises concern for osteomyelitis. New compared to the prior study.  3.  Large left knee joint effusion with several new foci of gas. Differential considerations include recent aspiration and infection. Correlate clinically.  4.  Hypodensity throughout the left calf musculature in the bilateral hip musculature as detailed above as can be seen with myositis.  5.  Suggestion of rim enhancement involving the left anterior calf musculature concerning for early abscess formation  6.  Edema between the left medial gastrocnemius and soleus musculature, likely infectious  7.  Heterogeneous masses in the uterus, likely degenerating fibroids.    Findings were discussed with Dr. Cortez of Trauma on 11/30/2021 10:42 AM  with read back.    --- End of Report ---            LOREN ALEXANDER MD; Attending Radiologist  This document has been electronically signed. Nov 30 2021 10:42AM    < end of copied text >

## 2021-12-14 NOTE — PROGRESS NOTE ADULT - ASSESSMENT
50yo Female s/p Right knee I&D POD# 16 (11/24/21),  Left knee I&D w [facility] left ankle POD#22 (11/17/21) 12/2 I&D of left leg, medial/lateral fasciotomies I&D/ tissue culture, 12/7, 12/11 LLE debridement and washout vac placement.    - monitor evening febrile temps  - s/p LLE wound vac changed 12/11 -> planning for wound washout in OR   - f/u consulting recs  - continue to trend H/H  - continue pain control   - continue DVTp  - WBAT RLE, WBAT LLE in CAM boot  - Left knee aspirate cultures negative to date from 12/1/21  - IV abx as per ID:   ZOSYN to 4.5 grams Q8H   OR CX WITH PSEUDOMONAS AND STENOTROPHOMONAS    --> ZOSYN FOR PSEUDOMONAS  --> BACTRIM FOR STENOTROPHOMONAS

## 2021-12-14 NOTE — PROGRESS NOTE ADULT - ATTENDING COMMENTS
pt seen and examined. new onset fevers over the last 2 days. repeat CT shows collection in pop fossa possibly communicating with joint. repeat taps of the left knee have been negative. repeat tap of right knee sent today to r/o repeat infection. plan for I&D of L knee tomorrow in coordination with ACS due to repeat collection in calf as well as posterior fossa. d/w pt, in agreement.

## 2021-12-14 NOTE — PROGRESS NOTE ADULT - SUBJECTIVE AND OBJECTIVE BOX
Acute Care Surgery/Trauma Surgery Progress Note:    Patient with continued pain to bilateral lower extremities that is moderately being controlled by medication.  She states the Dilaudid does not do much for pain relief any longer.  She has had febrile temperatures throughout the evening, not relieved by Tylenol.  Wound vacs are in place.    LLE USG revealed fluid collection in distal medial thigh and anterior patella.     Otherwise, tolerating diet. Denies n/v/cp/sob.     Diet, Regular (12-11-21 @ 02:31)      Scheduled Medications:   acetaminophen     Tablet .. 975 milliGRAM(s) Oral every 6 hours  baclofen 10 milliGRAM(s) Oral three times a day  enoxaparin Injectable 30 milliGRAM(s) SubCutaneous two times a day  gabapentin 300 milliGRAM(s) Oral three times a day  lisinopril 5 milliGRAM(s) Oral daily  pantoprazole    Tablet 40 milliGRAM(s) Oral two times a day  piperacillin/tazobactam IVPB.. 4.5 Gram(s) IV Intermittent every 8 hours  polyethylene glycol 3350 17 Gram(s) Oral daily  senna 2 Tablet(s) Oral at bedtime  sodium chloride 1 Gram(s) Oral every 12 hours  trimethoprim  160 mG/sulfamethoxazole 800 mG 1 Tablet(s) Oral two times a day    PRN Medications:  HYDROmorphone   Tablet 2 milliGRAM(s) Oral every 4 hours PRN Moderate Pain (4 - 6)  HYDROmorphone   Tablet 4 milliGRAM(s) Oral every 4 hours PRN Severe Pain (7 - 10)  HYDROmorphone  Injectable 0.5 milliGRAM(s) IV Push every 3 hours PRN breakthrough pain      Objective:   T(F): 100.9 (12-14 @ 01:43), Max: 102.9 (12-13 @ 21:43)  HR: 105 (12-13 @ 23:37) (73 - 105)  BP: 122/59 (12-13 @ 23:37) (105/70 - 162/89)  BP(mean): --  ABP: --  ABP(mean): --  RR: 18 (12-13 @ 23:37) (14 - 18)  SpO2: 92% (12-13 @ 23:37) (92% - 100%)      Physical Exam:   GEN: patient resting comfortably in bed, in no acute distress  RESP: respirations are unlabored, no accessory muscle use, no conversational dyspnea  CVS: RRR  GI: Abdomen soft, non-tender, non-distended, no rebound tenderness / guarding  MSK: bilateral lower extremities with normal sensation and movement to digits; wound vac connected to suction (x2 on LLE)    I&O's    12-12 @ 07:01  -  12-13 @ 07:00  --------------------------------------------------------  IN:  Total IN: 0 mL    OUT:    Voided (mL): 1100 mL  Total OUT: 1100 mL    Total NET: -1100 mL      12-13 @ 07:01  -  12-14 @ 02:49  --------------------------------------------------------  IN:  Total IN: 0 mL    OUT:    Voided (mL): 1450 mL  Total OUT: 1450 mL    Total NET: -1450 mL          LABS:                        8.2    10.40 )-----------( 568      ( 13 Dec 2021 12:21 )             26.4     12-13    134<L>  |  102  |  6.6<L>  ----------------------------<  85  3.9   |  22.0  |  0.65    Ca    7.4<L>      13 Dec 2021 05:49  Phos  3.9     12-13  Mg     1.7     12-13            MICROBIOLOGY:     Culture - Surgical Swab (collected 12-12 @ 12:01)  Source: .Surgical Swab Left leg  Preliminary Report (12-13 @ 11:40):    No growth    Culture - Surgical Swab (collected 12-08 @ 01:25)  Source: .Surgical Swab left leg wound (swab)  Final Report (12-13 @ 10:05):    No growth at 5 days    Culture - Surgical Swab (collected 12-08 @ 01:25)  Source: .Surgical Swab left leg deep wound (swab)  Final Report (12-13 @ 10:11):    Moderate Stenotrophomonas maltophilia  Organism: Stenotrophomonas maltophilia (12-13 @ 10:11)  Organism: Stenotrophomonas maltophilia (12-13 @ 10:11)      -  Ceftazidime: R >16      -  Levofloxacin: S <=0.5      -  Trimethoprim/Sulfamethoxazole: S <=0.5/9.5      Method Type: ANASTACIO    Culture - Tissue with Gram Stain (collected 12-08 @ 01:25)  Source: .Tissue left leg devitalized muscle  Gram Stain (12-08 @ 07:34):    Rare polymorphonuclear leukocytes seen per low power field    Numerous Gram Variable Rods seen per oil power field  Final Report (12-13 @ 10:04):    No growth at 5 days        PATHOLOGY:

## 2021-12-14 NOTE — PROGRESS NOTE ADULT - ATTENDING COMMENTS
Pt seen and examined on am rounds. KIZZY pelayo, with 3 fluid collections. Discussed with ortho, will post for combined washout tomorrow. R knee aspirated by ortho.

## 2021-12-14 NOTE — PROGRESS NOTE ADULT - ASSESSMENT
51y Female present to ED with left leg swelling, erythema, pain. Patient endorses she had left knee pain for 1 week presented 3 days ago to ED  where she states was given an steroid shot, and ibuprofen. however pain and edema worsened. Patient states she wasnt feeling herself today reason why she came. Endorses chills initially with pain 3 days ago but none since, denies history of trauma, insect bites, recent interventions, or injections to the area, contact with ticks, history of diabetes.  PT AS ABOVE WITH LEFT LEG SWELLING PT WITH INCREASED WBC PLACED ON ABX FOR PRESUMED INFECTION  PT WITH CT SCAN WITH FASCIAL EDAM PT BROUGHT TO THE OR EMERGENTLY AND  UNDERWENT FASCIOTOMY   PT BROUGHT TO THE OR  BOTH TRAUMA AND ORTHO INVOLVED  PURULENT FLUID FOUND  NECROTIZING SOFT TISSUE INFECTION    BLOOD CX WITH GROUP A STREP AND  OPERATIVE FLUID WITH STREP   PT WAS  ON PCN AND CLINDA for POSSIBLE ANTITOXIN EFFECT IN GROUP A STREP INFECTION        - new CT scan of lower ext on 11/29/21 ? showed early abscess in left anterior calf. also edema between left media gastroc and soleus  SURGERY FOLLOWING  s/p ASPIRATION LEFT KNEE BY ORTHO      on  ZOSYN to 4.5 grams Q8H   OR CX WITH PSEUDOMONAS   And STENOTROPHOMONAS     ZOSYN FOR PSEUDOMONAS    BACTRIM FOR STENOTROPHOMONAS       PT  S/P OR WASHOUT 12.2  AND  OR AGAIN 12/7  and late on 12/10        OPERATIVE CX NEG  PT NOW WITH FEVERS AGAIN  DEPSITE APPROPRIATE ABX  REPEAT BLOOD CX DONE  FOR REPEAT IMAGING  AND DRESSING NASIMNE   WILL CHANGE ABX TO MERREM VANCO   AND CONTINUE ORAL BACTRIM FOR STENOTROPHOMONAS

## 2021-12-15 LAB
ANION GAP SERPL CALC-SCNC: 11 MMOL/L — SIGNIFICANT CHANGE UP (ref 5–17)
ANION GAP SERPL CALC-SCNC: 12 MMOL/L — SIGNIFICANT CHANGE UP (ref 5–17)
BASOPHILS # BLD AUTO: 0.07 K/UL — SIGNIFICANT CHANGE UP (ref 0–0.2)
BASOPHILS NFR BLD AUTO: 0.6 % — SIGNIFICANT CHANGE UP (ref 0–2)
BUN SERPL-MCNC: 5.3 MG/DL — LOW (ref 8–20)
BUN SERPL-MCNC: 6.1 MG/DL — LOW (ref 8–20)
CALCIUM SERPL-MCNC: 7.9 MG/DL — LOW (ref 8.6–10.2)
CALCIUM SERPL-MCNC: 8.2 MG/DL — LOW (ref 8.6–10.2)
CHLORIDE SERPL-SCNC: 100 MMOL/L — SIGNIFICANT CHANGE UP (ref 98–107)
CHLORIDE SERPL-SCNC: 99 MMOL/L — SIGNIFICANT CHANGE UP (ref 98–107)
CO2 SERPL-SCNC: 22 MMOL/L — SIGNIFICANT CHANGE UP (ref 22–29)
CO2 SERPL-SCNC: 23 MMOL/L — SIGNIFICANT CHANGE UP (ref 22–29)
CREAT SERPL-MCNC: 0.57 MG/DL — SIGNIFICANT CHANGE UP (ref 0.5–1.3)
CREAT SERPL-MCNC: 0.71 MG/DL — SIGNIFICANT CHANGE UP (ref 0.5–1.3)
EOSINOPHIL # BLD AUTO: 0.35 K/UL — SIGNIFICANT CHANGE UP (ref 0–0.5)
EOSINOPHIL NFR BLD AUTO: 3.1 % — SIGNIFICANT CHANGE UP (ref 0–6)
GLUCOSE SERPL-MCNC: 91 MG/DL — SIGNIFICANT CHANGE UP (ref 70–99)
GLUCOSE SERPL-MCNC: 92 MG/DL — SIGNIFICANT CHANGE UP (ref 70–99)
GRAM STN FLD: SIGNIFICANT CHANGE UP
HCT VFR BLD CALC: 23.8 % — LOW (ref 34.5–45)
HCT VFR BLD CALC: 28.8 % — LOW (ref 34.5–45)
HGB BLD-MCNC: 7.5 G/DL — LOW (ref 11.5–15.5)
HGB BLD-MCNC: 9.1 G/DL — LOW (ref 11.5–15.5)
IMM GRANULOCYTES NFR BLD AUTO: 0.5 % — SIGNIFICANT CHANGE UP (ref 0–1.5)
LYMPHOCYTES # BLD AUTO: 28.1 % — SIGNIFICANT CHANGE UP (ref 13–44)
LYMPHOCYTES # BLD AUTO: 3.21 K/UL — SIGNIFICANT CHANGE UP (ref 1–3.3)
MAGNESIUM SERPL-MCNC: 1.9 MG/DL — SIGNIFICANT CHANGE UP (ref 1.6–2.6)
MAGNESIUM SERPL-MCNC: 2.1 MG/DL — SIGNIFICANT CHANGE UP (ref 1.6–2.6)
MCHC RBC-ENTMCNC: 27.8 PG — SIGNIFICANT CHANGE UP (ref 27–34)
MCHC RBC-ENTMCNC: 28.4 PG — SIGNIFICANT CHANGE UP (ref 27–34)
MCHC RBC-ENTMCNC: 31.5 GM/DL — LOW (ref 32–36)
MCHC RBC-ENTMCNC: 31.6 GM/DL — LOW (ref 32–36)
MCV RBC AUTO: 88.1 FL — SIGNIFICANT CHANGE UP (ref 80–100)
MCV RBC AUTO: 90 FL — SIGNIFICANT CHANGE UP (ref 80–100)
MONOCYTES # BLD AUTO: 0.82 K/UL — SIGNIFICANT CHANGE UP (ref 0–0.9)
MONOCYTES NFR BLD AUTO: 7.2 % — SIGNIFICANT CHANGE UP (ref 2–14)
NEUTROPHILS # BLD AUTO: 6.92 K/UL — SIGNIFICANT CHANGE UP (ref 1.8–7.4)
NEUTROPHILS NFR BLD AUTO: 60.5 % — SIGNIFICANT CHANGE UP (ref 43–77)
PHOSPHATE SERPL-MCNC: 4.2 MG/DL — SIGNIFICANT CHANGE UP (ref 2.4–4.7)
PHOSPHATE SERPL-MCNC: 4.8 MG/DL — HIGH (ref 2.4–4.7)
PLATELET # BLD AUTO: 433 K/UL — HIGH (ref 150–400)
PLATELET # BLD AUTO: 466 K/UL — HIGH (ref 150–400)
POTASSIUM SERPL-MCNC: 4 MMOL/L — SIGNIFICANT CHANGE UP (ref 3.5–5.3)
POTASSIUM SERPL-MCNC: 4.4 MMOL/L — SIGNIFICANT CHANGE UP (ref 3.5–5.3)
POTASSIUM SERPL-SCNC: 4 MMOL/L — SIGNIFICANT CHANGE UP (ref 3.5–5.3)
POTASSIUM SERPL-SCNC: 4.4 MMOL/L — SIGNIFICANT CHANGE UP (ref 3.5–5.3)
RBC # BLD: 2.7 M/UL — LOW (ref 3.8–5.2)
RBC # BLD: 3.2 M/UL — LOW (ref 3.8–5.2)
RBC # FLD: 19.8 % — HIGH (ref 10.3–14.5)
RBC # FLD: 20.8 % — HIGH (ref 10.3–14.5)
SODIUM SERPL-SCNC: 133 MMOL/L — LOW (ref 135–145)
SODIUM SERPL-SCNC: 134 MMOL/L — LOW (ref 135–145)
SPECIMEN SOURCE: SIGNIFICANT CHANGE UP
SURGICAL PATHOLOGY STUDY: SIGNIFICANT CHANGE UP
VANCOMYCIN TROUGH SERPL-MCNC: 10 UG/ML — SIGNIFICANT CHANGE UP (ref 10–20)
WBC # BLD: 11.43 K/UL — HIGH (ref 3.8–10.5)
WBC # BLD: 22.19 K/UL — HIGH (ref 3.8–10.5)
WBC # FLD AUTO: 11.43 K/UL — HIGH (ref 3.8–10.5)
WBC # FLD AUTO: 22.19 K/UL — HIGH (ref 3.8–10.5)

## 2021-12-15 PROCEDURE — 99231 SBSQ HOSP IP/OBS SF/LOW 25: CPT | Mod: 24,57

## 2021-12-15 PROCEDURE — 27310 EXPLORATION OF KNEE JOINT: CPT | Mod: 78,LT

## 2021-12-15 PROCEDURE — 27603 DRAIN LOWER LEG LESION: CPT | Mod: 78

## 2021-12-15 PROCEDURE — 99232 SBSQ HOSP IP/OBS MODERATE 35: CPT

## 2021-12-15 RX ORDER — HYDROMORPHONE HYDROCHLORIDE 2 MG/ML
4 INJECTION INTRAMUSCULAR; INTRAVENOUS; SUBCUTANEOUS EVERY 4 HOURS
Refills: 0 | Status: DISCONTINUED | OUTPATIENT
Start: 2021-12-15 | End: 2021-12-17

## 2021-12-15 RX ORDER — MAGNESIUM SULFATE 500 MG/ML
2 VIAL (ML) INJECTION ONCE
Refills: 0 | Status: COMPLETED | OUTPATIENT
Start: 2021-12-15 | End: 2021-12-15

## 2021-12-15 RX ORDER — SENNA PLUS 8.6 MG/1
2 TABLET ORAL AT BEDTIME
Refills: 0 | Status: DISCONTINUED | OUTPATIENT
Start: 2021-12-15 | End: 2021-12-17

## 2021-12-15 RX ORDER — HYDROMORPHONE HYDROCHLORIDE 2 MG/ML
0.5 INJECTION INTRAMUSCULAR; INTRAVENOUS; SUBCUTANEOUS
Refills: 0 | Status: DISCONTINUED | OUTPATIENT
Start: 2021-12-15 | End: 2021-12-15

## 2021-12-15 RX ORDER — PANTOPRAZOLE SODIUM 20 MG/1
40 TABLET, DELAYED RELEASE ORAL
Refills: 0 | Status: DISCONTINUED | OUTPATIENT
Start: 2021-12-15 | End: 2021-12-17

## 2021-12-15 RX ORDER — SODIUM CHLORIDE 9 MG/ML
1000 INJECTION, SOLUTION INTRAVENOUS
Refills: 0 | Status: DISCONTINUED | OUTPATIENT
Start: 2021-12-15 | End: 2021-12-15

## 2021-12-15 RX ORDER — HYDROMORPHONE HYDROCHLORIDE 2 MG/ML
0.5 INJECTION INTRAMUSCULAR; INTRAVENOUS; SUBCUTANEOUS EVERY 4 HOURS
Refills: 0 | Status: DISCONTINUED | OUTPATIENT
Start: 2021-12-15 | End: 2021-12-17

## 2021-12-15 RX ORDER — VANCOMYCIN HCL 1 G
1250 VIAL (EA) INTRAVENOUS EVERY 12 HOURS
Refills: 0 | Status: DISCONTINUED | OUTPATIENT
Start: 2021-12-15 | End: 2021-12-17

## 2021-12-15 RX ORDER — SODIUM CHLORIDE 9 MG/ML
1 INJECTION INTRAMUSCULAR; INTRAVENOUS; SUBCUTANEOUS EVERY 12 HOURS
Refills: 0 | Status: DISCONTINUED | OUTPATIENT
Start: 2021-12-15 | End: 2021-12-17

## 2021-12-15 RX ORDER — POLYETHYLENE GLYCOL 3350 17 G/17G
17 POWDER, FOR SOLUTION ORAL DAILY
Refills: 0 | Status: DISCONTINUED | OUTPATIENT
Start: 2021-12-15 | End: 2021-12-17

## 2021-12-15 RX ORDER — MEROPENEM 1 G/30ML
1000 INJECTION INTRAVENOUS EVERY 8 HOURS
Refills: 0 | Status: DISCONTINUED | OUTPATIENT
Start: 2021-12-15 | End: 2021-12-17

## 2021-12-15 RX ORDER — LISINOPRIL 2.5 MG/1
5 TABLET ORAL DAILY
Refills: 0 | Status: DISCONTINUED | OUTPATIENT
Start: 2021-12-15 | End: 2021-12-17

## 2021-12-15 RX ORDER — HYDROMORPHONE HYDROCHLORIDE 2 MG/ML
1 INJECTION INTRAMUSCULAR; INTRAVENOUS; SUBCUTANEOUS
Refills: 0 | Status: DISCONTINUED | OUTPATIENT
Start: 2021-12-15 | End: 2021-12-15

## 2021-12-15 RX ORDER — HYDROMORPHONE HYDROCHLORIDE 2 MG/ML
2 INJECTION INTRAMUSCULAR; INTRAVENOUS; SUBCUTANEOUS EVERY 4 HOURS
Refills: 0 | Status: DISCONTINUED | OUTPATIENT
Start: 2021-12-15 | End: 2021-12-17

## 2021-12-15 RX ORDER — HYDROMORPHONE HYDROCHLORIDE 2 MG/ML
0.5 INJECTION INTRAMUSCULAR; INTRAVENOUS; SUBCUTANEOUS ONCE
Refills: 0 | Status: DISCONTINUED | OUTPATIENT
Start: 2021-12-15 | End: 2021-12-15

## 2021-12-15 RX ORDER — GABAPENTIN 400 MG/1
300 CAPSULE ORAL THREE TIMES A DAY
Refills: 0 | Status: DISCONTINUED | OUTPATIENT
Start: 2021-12-15 | End: 2021-12-17

## 2021-12-15 RX ORDER — ACETAMINOPHEN 500 MG
975 TABLET ORAL EVERY 6 HOURS
Refills: 0 | Status: DISCONTINUED | OUTPATIENT
Start: 2021-12-15 | End: 2021-12-15

## 2021-12-15 RX ORDER — HYDROMORPHONE HYDROCHLORIDE 2 MG/ML
0.5 INJECTION INTRAMUSCULAR; INTRAVENOUS; SUBCUTANEOUS EVERY 4 HOURS
Refills: 0 | Status: DISCONTINUED | OUTPATIENT
Start: 2021-12-15 | End: 2021-12-15

## 2021-12-15 RX ORDER — BACLOFEN 100 %
10 POWDER (GRAM) MISCELLANEOUS THREE TIMES A DAY
Refills: 0 | Status: DISCONTINUED | OUTPATIENT
Start: 2021-12-15 | End: 2021-12-17

## 2021-12-15 RX ORDER — ACETAMINOPHEN 500 MG
1000 TABLET ORAL ONCE
Refills: 0 | Status: COMPLETED | OUTPATIENT
Start: 2021-12-15 | End: 2021-12-15

## 2021-12-15 RX ORDER — ENOXAPARIN SODIUM 100 MG/ML
30 INJECTION SUBCUTANEOUS
Refills: 0 | Status: DISCONTINUED | OUTPATIENT
Start: 2021-12-15 | End: 2021-12-17

## 2021-12-15 RX ORDER — ONDANSETRON 8 MG/1
4 TABLET, FILM COATED ORAL ONCE
Refills: 0 | Status: DISCONTINUED | OUTPATIENT
Start: 2021-12-15 | End: 2021-12-15

## 2021-12-15 RX ORDER — OXYCODONE AND ACETAMINOPHEN 5; 325 MG/1; MG/1
1 TABLET ORAL ONCE
Refills: 0 | Status: DISCONTINUED | OUTPATIENT
Start: 2021-12-15 | End: 2021-12-15

## 2021-12-15 RX ORDER — SODIUM CHLORIDE 9 MG/ML
1000 INJECTION, SOLUTION INTRAVENOUS
Refills: 0 | Status: DISCONTINUED | OUTPATIENT
Start: 2021-12-15 | End: 2021-12-17

## 2021-12-15 RX ADMIN — HYDROMORPHONE HYDROCHLORIDE 0.5 MILLIGRAM(S): 2 INJECTION INTRAMUSCULAR; INTRAVENOUS; SUBCUTANEOUS at 04:19

## 2021-12-15 RX ADMIN — MEROPENEM 100 MILLIGRAM(S): 1 INJECTION INTRAVENOUS at 22:23

## 2021-12-15 RX ADMIN — HYDROMORPHONE HYDROCHLORIDE 1 MILLIGRAM(S): 2 INJECTION INTRAMUSCULAR; INTRAVENOUS; SUBCUTANEOUS at 19:03

## 2021-12-15 RX ADMIN — MEROPENEM 100 MILLIGRAM(S): 1 INJECTION INTRAVENOUS at 14:11

## 2021-12-15 RX ADMIN — Medication 166.67 MILLIGRAM(S): at 22:25

## 2021-12-15 RX ADMIN — GABAPENTIN 300 MILLIGRAM(S): 400 CAPSULE ORAL at 05:43

## 2021-12-15 RX ADMIN — Medication 10 MILLIGRAM(S): at 05:44

## 2021-12-15 RX ADMIN — Medication 166.67 MILLIGRAM(S): at 05:42

## 2021-12-15 RX ADMIN — Medication 975 MILLIGRAM(S): at 02:10

## 2021-12-15 RX ADMIN — Medication 2 TABLET(S): at 22:23

## 2021-12-15 RX ADMIN — MEROPENEM 100 MILLIGRAM(S): 1 INJECTION INTRAVENOUS at 05:42

## 2021-12-15 RX ADMIN — Medication 400 MILLIGRAM(S): at 22:23

## 2021-12-15 RX ADMIN — HYDROMORPHONE HYDROCHLORIDE 0.5 MILLIGRAM(S): 2 INJECTION INTRAMUSCULAR; INTRAVENOUS; SUBCUTANEOUS at 14:25

## 2021-12-15 RX ADMIN — Medication 25 GRAM(S): at 09:00

## 2021-12-15 RX ADMIN — HYDROMORPHONE HYDROCHLORIDE 4 MILLIGRAM(S): 2 INJECTION INTRAMUSCULAR; INTRAVENOUS; SUBCUTANEOUS at 21:53

## 2021-12-15 RX ADMIN — Medication 10 MILLIGRAM(S): at 20:54

## 2021-12-15 RX ADMIN — HYDROMORPHONE HYDROCHLORIDE 1 MILLIGRAM(S): 2 INJECTION INTRAMUSCULAR; INTRAVENOUS; SUBCUTANEOUS at 19:33

## 2021-12-15 RX ADMIN — Medication 975 MILLIGRAM(S): at 09:10

## 2021-12-15 RX ADMIN — Medication 975 MILLIGRAM(S): at 03:10

## 2021-12-15 RX ADMIN — HYDROMORPHONE HYDROCHLORIDE 4 MILLIGRAM(S): 2 INJECTION INTRAMUSCULAR; INTRAVENOUS; SUBCUTANEOUS at 10:52

## 2021-12-15 RX ADMIN — Medication 975 MILLIGRAM(S): at 08:40

## 2021-12-15 RX ADMIN — GABAPENTIN 300 MILLIGRAM(S): 400 CAPSULE ORAL at 14:11

## 2021-12-15 RX ADMIN — HYDROMORPHONE HYDROCHLORIDE 0.5 MILLIGRAM(S): 2 INJECTION INTRAMUSCULAR; INTRAVENOUS; SUBCUTANEOUS at 04:04

## 2021-12-15 RX ADMIN — HYDROMORPHONE HYDROCHLORIDE 4 MILLIGRAM(S): 2 INJECTION INTRAMUSCULAR; INTRAVENOUS; SUBCUTANEOUS at 11:24

## 2021-12-15 RX ADMIN — HYDROMORPHONE HYDROCHLORIDE 0.5 MILLIGRAM(S): 2 INJECTION INTRAMUSCULAR; INTRAVENOUS; SUBCUTANEOUS at 19:29

## 2021-12-15 RX ADMIN — GABAPENTIN 300 MILLIGRAM(S): 400 CAPSULE ORAL at 20:52

## 2021-12-15 RX ADMIN — HYDROMORPHONE HYDROCHLORIDE 0.5 MILLIGRAM(S): 2 INJECTION INTRAMUSCULAR; INTRAVENOUS; SUBCUTANEOUS at 18:59

## 2021-12-15 RX ADMIN — SODIUM CHLORIDE 1 GRAM(S): 9 INJECTION INTRAMUSCULAR; INTRAVENOUS; SUBCUTANEOUS at 05:44

## 2021-12-15 RX ADMIN — PANTOPRAZOLE SODIUM 40 MILLIGRAM(S): 20 TABLET, DELAYED RELEASE ORAL at 05:44

## 2021-12-15 RX ADMIN — Medication 10 MILLIGRAM(S): at 14:11

## 2021-12-15 RX ADMIN — Medication 2 TABLET(S): at 05:43

## 2021-12-15 RX ADMIN — HYDROMORPHONE HYDROCHLORIDE 4 MILLIGRAM(S): 2 INJECTION INTRAMUSCULAR; INTRAVENOUS; SUBCUTANEOUS at 20:53

## 2021-12-15 RX ADMIN — Medication 1000 MILLIGRAM(S): at 22:55

## 2021-12-15 RX ADMIN — HYDROMORPHONE HYDROCHLORIDE 0.5 MILLIGRAM(S): 2 INJECTION INTRAMUSCULAR; INTRAVENOUS; SUBCUTANEOUS at 14:10

## 2021-12-15 NOTE — PROGRESS NOTE ADULT - ASSESSMENT
50yo Female s/p Right knee I&D POD# 16 (11/24/21),  Left knee I&D w [facility] left ankle POD#22 (11/17/21) 12/2 I&D of left leg, medial/lateral fasciotomies I&D/ tissue culture, 12/7, 12/11 LLE debridement and washout vac placement.    - monitor evening febrile temps  - s/p LLE wound vac changed 12/11 -> planning for wound washout in OR today 12/15  - f/u consulting recs  - continue to trend H/H  - continue pain control   - continue DVTp  - WBAT RLE, WBAT LLE in CAM boot  - Left knee aspirate cultures negative to date from 12/1/21  - IV abx as per ID:   [email or web address]    bactrim  vanco

## 2021-12-15 NOTE — PROGRESS NOTE ADULT - ASSESSMENT
51y Female present to ED with left leg swelling, erythema, pain. Patient endorses she had left knee pain for 1 week presented 3 days ago to ED  where she states was given an steroid shot, and ibuprofen. however pain and edema worsened. Patient states she wasnt feeling herself today reason why she came. Endorses chills initially with pain 3 days ago but none since, denies history of trauma, insect bites, recent interventions, or injections to the area, contact with ticks, history of diabetes.  PT AS ABOVE WITH LEFT LEG SWELLING PT WITH INCREASED WBC PLACED ON ABX FOR PRESUMED INFECTION  PT WITH CT SCAN WITH FASCIAL EDAM PT BROUGHT TO THE OR EMERGENTLY AND  UNDERWENT FASCIOTOMY   PT BROUGHT TO THE OR  BOTH TRAUMA AND ORTHO INVOLVED  PURULENT FLUID FOUND  NECROTIZING SOFT TISSUE INFECTION    BLOOD CX WITH GROUP A STREP AND  OPERATIVE FLUID WITH STREP   PT WAS  ON PCN AND CLINDA for POSSIBLE ANTITOXIN EFFECT IN GROUP A STREP INFECTION     PT  S/P OR WASHOUT 12.2  AND  OR AGAIN 12/7  and late on 12/10    REPEAT   OPERATIVE CX NEG  WAS ON ZOSYN BCTRIM   PT NOW WITH FEVERS AGAIN  DEPSITE APPROPRIATE ABX  REPEAT BLOOD CX DONE    REPEAT IMAGING  12/14   WITH LEFT KNEE COLLECTION     CHANGED ABX TO MERREM VANCO   AND CONTINUE ORAL BACTRIM FOR STENOTROPHOMONAS    FOR REPEAT OR TODAY WITH ORTHO AND SURGERY  WILL FOLLOWUP

## 2021-12-15 NOTE — PROGRESS NOTE ADULT - SUBJECTIVE AND OBJECTIVE BOX
SUBJECTIVE/24 hour events:  Patient is a 51yFemale complicated hospital course 2/2 necrotizing soft tissue infection lle. No acute events overnight, going to the OR today for an additional washout for multiple collections found on recent ct obtained which is likely cause of continued spiking of fevers. Patient had no complaints of pain, recent covid negative, type and screen is active.      Vital Signs Last 24 Hrs  T(C): 37.5 (15 Dec 2021 00:00), Max: 38.6 (14 Dec 2021 17:40)  T(F): 99.5 (15 Dec 2021 00:00), Max: 101.4 (14 Dec 2021 17:40)  HR: 97 (15 Dec 2021 00:00) (79 - 97)  BP: 108/65 (15 Dec 2021 00:00) (104/65 - 132/70)  BP(mean): --  RR: 16 (15 Dec 2021 00:00) (16 - 18)  SpO2: 97% (15 Dec 2021 00:00) (92% - 98%)  Drug Dosing Weight  Height (cm): 167.6 (07 Dec 2021 17:17)  Weight (kg): 93.4 (07 Dec 2021 17:17)  BMI (kg/m2): 33.3 (07 Dec 2021 17:17)  BSA (m2): 2.02 (07 Dec 2021 17:17)  I&O's Detail    13 Dec 2021 07:01  -  14 Dec 2021 07:00  --------------------------------------------------------  IN:  Total IN: 0 mL    OUT:    Voided (mL): 2700 mL  Total OUT: 2700 mL    Total NET: -2700 mL      14 Dec 2021 07:01  -  15 Dec 2021 00:40  --------------------------------------------------------  IN:    Lactated Ringers: 600 mL  Total IN: 600 mL    OUT:    Voided (mL): 1550 mL  Total OUT: 1550 mL    Total NET: -950 mL        Allergies    No Known Allergies    Intolerances                              8.0    10.61 )-----------( 546      ( 14 Dec 2021 07:10 )             25.8   12-14    139  |  106  |  7.0<L>  ----------------------------<  87  3.9   |  23.0  |  0.62    Ca    7.8<L>      14 Dec 2021 07:10  Phos  3.9     12-13  Mg     1.8     12-14        ROS:    PHYSICAL EXAM:  Constitutional:  Eyes:  ENMT:  Neck:  Breasts:  Back:  Respiratory:  Cardiovascular:  Gastrointestinal:  Genitourinary:  Rectal:  Extremities:  Vascular:  Neurological:  Skin:  Musculoskeletal:  Psychiatric:        MEDICATIONS  (STANDING):  acetaminophen     Tablet .. 975 milliGRAM(s) Oral every 6 hours  baclofen 10 milliGRAM(s) Oral three times a day  gabapentin 300 milliGRAM(s) Oral three times a day  lactated ringers. 1000 milliLiter(s) (100 mL/Hr) IV Continuous <Continuous>  lisinopril 5 milliGRAM(s) Oral daily  meropenem  IVPB 1000 milliGRAM(s) IV Intermittent every 8 hours  pantoprazole    Tablet 40 milliGRAM(s) Oral two times a day  polyethylene glycol 3350 17 Gram(s) Oral daily  senna 2 Tablet(s) Oral at bedtime  sodium chloride 1 Gram(s) Oral every 12 hours  trimethoprim  160 mG/sulfamethoxazole 800 mG 2 Tablet(s) Oral every 12 hours  vancomycin  IVPB 1250 milliGRAM(s) IV Intermittent every 12 hours    MEDICATIONS  (PRN):  HYDROmorphone   Tablet 2 milliGRAM(s) Oral every 4 hours PRN Moderate Pain (4 - 6)  HYDROmorphone   Tablet 4 milliGRAM(s) Oral every 4 hours PRN Severe Pain (7 - 10)  HYDROmorphone  Injectable 0.5 milliGRAM(s) IV Push every 3 hours PRN breakthrough pain      RADIOLOGY STUDIES:    CULTURES:         SUBJECTIVE/24 hour events:  Patient is a 51yFemale complicated hospital course 2/2 necrotizing soft tissue infection lle. No acute events overnight, going to the OR today for an additional washout for multiple collections found on recent ct obtained which is likely cause of continued spiking of fevers. Patient had no complaints of pain, recent covid negative, type and screen is active.      Vital Signs Last 24 Hrs  T(C): 37.5 (15 Dec 2021 00:00), Max: 38.6 (14 Dec 2021 17:40)  T(F): 99.5 (15 Dec 2021 00:00), Max: 101.4 (14 Dec 2021 17:40)  HR: 97 (15 Dec 2021 00:00) (79 - 97)  BP: 108/65 (15 Dec 2021 00:00) (104/65 - 132/70)  BP(mean): --  RR: 16 (15 Dec 2021 00:00) (16 - 18)  SpO2: 97% (15 Dec 2021 00:00) (92% - 98%)  Drug Dosing Weight  Height (cm): 167.6 (07 Dec 2021 17:17)  Weight (kg): 93.4 (07 Dec 2021 17:17)  BMI (kg/m2): 33.3 (07 Dec 2021 17:17)  BSA (m2): 2.02 (07 Dec 2021 17:17)  I&O's Detail    13 Dec 2021 07:01  -  14 Dec 2021 07:00  --------------------------------------------------------  IN:  Total IN: 0 mL    OUT:    Voided (mL): 2700 mL  Total OUT: 2700 mL    Total NET: -2700 mL      14 Dec 2021 07:01  -  15 Dec 2021 00:40  --------------------------------------------------------  IN:    Lactated Ringers: 600 mL  Total IN: 600 mL    OUT:    Voided (mL): 1550 mL  Total OUT: 1550 mL    Total NET: -950 mL        Allergies    No Known Allergies    Intolerances                              8.0    10.61 )-----------( 546      ( 14 Dec 2021 07:10 )             25.8   12-14    139  |  106  |  7.0<L>  ----------------------------<  87  3.9   |  23.0  |  0.62    Ca    7.8<L>      14 Dec 2021 07:10  Phos  3.9     12-13  Mg     1.8     12-14        ROS:    PHYSICAL EXAM:  Constitutional: in good spirits   Respiratory: no respiratory distress, no dyspnea, no supplemental o2 needed  Gastrointestinal: abdomen soft, non-tender, no guarding, no peritonitis   Genitourinary: valle in place   Extremities: LLE warm to touch, sensation intact, wound vac well seated   Neurological: A&OX3        MEDICATIONS  (STANDING):  acetaminophen     Tablet .. 975 milliGRAM(s) Oral every 6 hours  baclofen 10 milliGRAM(s) Oral three times a day  gabapentin 300 milliGRAM(s) Oral three times a day  lactated ringers. 1000 milliLiter(s) (100 mL/Hr) IV Continuous <Continuous>  lisinopril 5 milliGRAM(s) Oral daily  meropenem  IVPB 1000 milliGRAM(s) IV Intermittent every 8 hours  pantoprazole    Tablet 40 milliGRAM(s) Oral two times a day  polyethylene glycol 3350 17 Gram(s) Oral daily  senna 2 Tablet(s) Oral at bedtime  sodium chloride 1 Gram(s) Oral every 12 hours  trimethoprim  160 mG/sulfamethoxazole 800 mG 2 Tablet(s) Oral every 12 hours  vancomycin  IVPB 1250 milliGRAM(s) IV Intermittent every 12 hours    MEDICATIONS  (PRN):  HYDROmorphone   Tablet 2 milliGRAM(s) Oral every 4 hours PRN Moderate Pain (4 - 6)  HYDROmorphone   Tablet 4 milliGRAM(s) Oral every 4 hours PRN Severe Pain (7 - 10)  HYDROmorphone  Injectable 0.5 milliGRAM(s) IV Push every 3 hours PRN breakthrough pain      RADIOLOGY STUDIES:    CULTURES:

## 2021-12-15 NOTE — PROGRESS NOTE ADULT - SUBJECTIVE AND OBJECTIVE BOX
INFECTIOUS DISEASES AND INTERNAL MEDICINE at [facility]  =======================================================  Theo Morrow MD  Diplomates American Board of Internal Medicine and Infectious Diseases  Telephone [phone]  Fax            [phone]  =======================================================    LUIS FERNANDO DAVIS [id number]  Follow up: group A STREP NECROTIZING SOFT TISSUE INFECTION     repeat CT scan of lower ext with collections.   S/P OR WASHOUT 12/2  AND   OR AGAIN  12/7 adn  late  on 12/10   WAS AFEBRILE BUT NOW SPIKING AGAIN  AN PLAN FOR OR TODAY      Allergies:  No Known Allergies       FAMILY   FAMILY HISTORY:  FH: HTN (hypertension) (Mother)      REVIEW OF SYSTEMS:  CONSTITUTIONAL:  No Fever or chills  HEENT:   No diplopia or blurred vision.  No earache, sore throat or runny nose.  CARDIOVASCULAR:  No pressure, squeezing, strangling, tightness, heaviness or aching about the chest, neck, axilla or epigastrium.  RESPIRATORY:  No cough, shortness of breath, PND or orthopnea.  GASTROINTESTINAL:  No nausea, vomiting or diarrhea.  GENITOURINARY:  No dysuria, frequency or urgency. No Blood in urine  MUSCULOSKELETAL:   AS PER HPI   SKIN:  No change in skin, hair or nails.  NEUROLOGIC:  No paresthesias, fasciculations, seizures or weakness.  PSYCHIATRIC:  No disorder of thought or mood.  ENDOCRINE:  No heat or cold intolerance, polyuria or polydipsia.  HEMATOLOGICAL:  No easy bruising or bleeding.            Physical Exam:     GEN: NAD,    HEENT: normocephalic and atraumatic. EOMI. ASHISH.    NECK: Supple. No carotid bruits.  No lymphadenopathy or thyromegaly.  LUNGS: Clear to auscultation.  HEART: Regular rate and rhythm without murmur.  ABDOMEN: Soft, nontender, and nondistended.  Positive bowel sounds.    : No CVA tenderness  EXTREMITIES: LEFT LEG WRAPPED; left thigh vac in place  right knee with dressing in place   UPPER EXT STRENGTH GOOD BUT CAN T RAISE HANDS ALL THE WAY, localized tenderness to both shoulders   NEUROLOGIC:  C AWAKE ALERT Appropriate affect .  SKIN: No ulceration or induration present.           Vitals:  ==== Vital Signs Last 24 Hrs  T(C): 36.9 (15 Dec 2021 09:12), Max: 38.6 (14 Dec 2021 17:40)  T(F): 98.5 (15 Dec 2021 09:12), Max: 101.4 (14 Dec 2021 17:40)  HR: 79 (15 Dec 2021 09:12) (76 - 97)  BP: 125/80 (15 Dec 2021 09:12) (107/62 - 132/70)  BP(mean): --  RR: 16 (15 Dec 2021 09:12) (16 - 16)  SpO2: 92% (15 Dec 2021 09:12) (92% - 98%)      =======================================================  Current Antibiotics:  bactrim  piperacillin/tazobactam IVPB.. 4.5 Gram(s) IV Intermittent every 8 hours    Other medications:  baclofen 10 milliGRAM(s) Oral three times a day  collagenase Ointment 1 Application(s) Topical two times a day  gabapentin 200 milliGRAM(s) Oral three times a day  glucagon  Injectable 1 milliGRAM(s) IntraMuscular once  heparin   Injectable 5000 Unit(s) SubCutaneous every 8 hours  lisinopril 5 milliGRAM(s) Oral daily  methocarbamol 750 milliGRAM(s) Oral three times a day  pantoprazole    Tablet 40 milliGRAM(s) Oral two times a day  polyethylene glycol 3350 17 Gram(s) Oral daily  senna 2 Tablet(s) Oral at bedtime  sodium chloride 1 Gram(s) Oral every 12 hours  sodium chloride 0.9%. 1000 milliLiter(s) IV Continuous <Continuous>      =======================================================  Labs:                                                             7.5    11.43 )-----------( 466      ( 15 Dec 2021 05:45 )             23.8   12-15    134<L>  |  100  |  6.1<L>  ----------------------------<  91  4.0   |  22.0  |  0.57    Ca    8.2<L>      15 Dec 2021 05:45  Phos  4.2     12-15  Mg     1.9     12-15              Culture - Blood (collected 11-28-21 @ 09:14)  Source: .Blood Blood-Peripheral    Culture - Blood (collected 11-28-21 @ 09:14)  Source: .Blood Blood-Peripheral    Culture - Fungal, Body Fluid (collected 11-24-21 @ 13:35)  Source: .Body Fluid Right Knee Fluid    Culture - Acid Fast - Body Fluid w/Smear (collected 11-24-21 @ 13:35)  Source: .Body Fluid Right Knee Fluid    Culture - Body Fluid with Gram Stain (collected 11-24-21 @ 13:35)  Source: .Body Fluid Right Knee Fluid  Gram Stain (11-25-21 @ 01:27):    polymorphonuclear leukocytes seen per low power field    No organisms seen per oil power field    Culture - Surgical Swab (collected 11-24-21 @ 12:30)  Source: .Surgical Swab Swab Right Knee #2  Final Report (11-29-21 @ 07:55):    No growth at 5 days    Culture - Surgical Swab (collected 11-24-21 @ 12:30)  Source: .Surgical Swab Swab Right Knee #1  Final Report (11-29-21 @ 07:56):    No growth at 5 days    Culture - Surgical Swab (collected 11-24-21 @ 12:28)  Source: .Surgical Swab Swab Right Knee #3  Final Report (11-29-21 @ 08:01):    No growth at 5 days    Culture - Body Fluid with Gram Stain (collected 11-23-21 @ 12:50)  Source: .Body Fluid Knee Fluid  Gram Stain (11-23-21 @ 23:34):    polymorphonuclear leukocytes seen    No organisms seen    by cytocentrifuge    Culture - Blood (collected 11-22-21 @ 08:44)  Source: .Blood Blood  Final Report (11-27-21 @ 09:00):    No growth at 5 days.    Culture - Blood (collected 11-20-21 @ 12:57)  Source: .Blood Blood  Final Report (11-25-21 @ 13:00):    No growth at 5 days.    Culture - Body Fluid with Gram Stain (collected 11-18-21 @ 16:20)  Source: .Body Fluid OR Posterior Calf Left Lower Extremity Fluid  Gram Stain (11-18-21 @ 21:39):    polymorphonuclear leukocytes seen    Gram Positive Cocci in Pairs and Chains seen    by cytocentrifuge  Final Report (11-23-21 @ 09:40):    Moderate Streptococcus pyogenes (Group A) Penicillin and ampicillin are    drugs of choice for    treatment of beta-hemolytic streptococcal infections.    Susceptibility testing is not performed routinely because    S. pyogenes (GAS) is universally susceptible to penicillin    and resistance in other strains is extremely rare.    Culture - Body Fluid with Gram Stain (collected 11-18-21 @ 16:20)  Source: .Body Fluid OR Swab Left Knee Lower Extremity Fluid  Gram Stain (11-18-21 @ 19:36):    polymorphonuclear leukocytes seen    Gram Positive Cocci in Pairs and Chains seen    by cytocentrifuge  Final Report (11-23-21 @ 09:41):    Numerous Streptococcus pyogenes (Group A) Penicillin and ampicillin are    drugs of choice for    treatment of beta-hemolytic streptococcal infections.    Susceptibility testing is not performed routinely because    S. pyogenes (GAS) is universally susceptible to penicillin    and resistance in other strains is extremely rare.    Culture - Body Fluid with Gram Stain (collected 11-18-21 @ 16:20)  Source: .Body Fluid OR - Left Lower Extremity Fluid  Gram Stain (11-18-21 @ 20:10):    No polymorphonuclear leukocytes seen    No organisms seen    by cytocentrifuge  Final Report (11-23-21 @ 09:40):    Rare Streptococcus pyogenes (Group A)    Penicillin and ampicillin are drugs of choice for treatment of    beta-hemolytic streptococcal infections.    Susceptibility testing is not performed routinely because S. pyogenes    (GAS) is universally susceptibleto penicillin    and resistance in other strains is extremely rare.    Culture - Urine (collected 11-18-21 @ 10:07)  Source: Catheterized Catheterized  Final Report (11-19-21 @ 07:03):    No growth    Culture - Urine (collected 11-18-21 @ 00:25)  Source: Catheterized Catheterized  Final Report (11-18-21 @ 22:09):    <10,000 CFU/mL Normal Urogenital Aimee    Culture - Blood (collected 11-17-21 @ 14:45)  Source: .Blood Blood  Gram Stain (11-18-21 @ 09:44):    Growth in aerobic and anaerobic bottles: Gram Positive Cocci in Pairs and    Chains    Gram Stain performed by:    [facility]    [address] [place] [id number]    .    TYPE: (C=Critical, N=Notification, A=Abnormal) C    TESTS:  _ GS    DATE/TIME CALLED: _ 11/18/2021 09:42:19    CALLED TO: Chris Hernandez RN    READ BACK (2 Patient Identifiers)(Y/N): _ Y    READ BACK VALUES (Y/N): _ Y    CALLED BY: Chris Quiñones  Final Report (11-21-21 @ 12:28):    Growth in aerobic and anaerobic bottles: Streptococcus pyogenes (Group A)    See previous culture [id number]    Culture - Blood (collected 11-17-21 @ 14:45)  Source: .Blood Blood  Gram Stain (11-18-21 @ 09:40):    Growth in aerobic and anaerobic bottles: Gram Positive Cocci in Pairs and    Chains    ***Blood Panel PCR results on this specimen are available    approximately 3 hours after the Gram stain result.***    Gram stain, PCR, and/or culture results may not always    correspond due to difference in methodologies.    ************************************************************    This PCR assay was performed using [employer].    The following targets are tested for: Enterococcus,    vancomycin resistant enterococci, Listeria monocytogenes,    coagulase negative staphylococci, S. aureus,    methicillin resistant S. aureus, Streptococcus agalactiae    (Group B), S. pneumoniae, S. pyogenes (Group A),    Acinetobacter baumannii, Enterobacter cloacae, E. coli,    Klebsiella oxytoca, K. pneumoniae, Proteus sp.,    Serratia marcescens, Haemophilus influenzae,    Neisseria meningitidis, Pseudomonas aeruginosa, Candida    albicans, C. glabrata, C krusei, C parapsilosis,    C. tropicalis and the KPC resistance gene.    Gram Stain and BCID performed by:    [facility]    [address] [place] [id number]    .    TYPE: (C=Critical, N=Notification, A=Abnormal) C    TESTS:  _ GS    DATE/TIME CALLED: _ 11/18/2021 09:40:08    CALLED TO: Chris Hernandez RN    READ BACK (2 Patient Identifiers)(Y/N): _ Y    READ BACK VALUES (Y/N): _ Y    CALLED BY: Chris Quiñones  Final Report (11-21-21 @ 12:28):    Growth in aerobic and anaerobic bottles: Streptococcus pyogenes (Group A)  Organism: Blood Culture PCR  Streptococcus pyogenes (Group A) (11-21-21 @ 12:28)  Organism: Streptococcus pyogenes (Group A) (11-21-21 @ 12:28)    Sensitivities:      -  Ceftriaxone: S 0.016      -  Penicillin: S 0.016 Predicts results for ampicillin, amoxicillin, amoxicillin/clavulanate, ampicillin/sulbactam, 1st, 2nd and 3rd generation cephalosporins and carbapenems.      Method Type: ETEST  Organism: Streptococcus pyogenes (Group A) (11-21-21 @ 12:28)    Sensitivities:      -  Vancomycin: S      Method Type: KB  Organism: Blood Culture PCR (11-21-21 @ 12:28)    Sensitivities:      -  Streptococcus pyogenes (Group A): Detec      Method Type: PCR      Creatinine, Serum: 0.75 mg/dL (11-30-21 @ 07:18)  Creatinine, Serum: 0.68 mg/dL (11-29-21 @ 09:55)  Creatinine, Serum: 0.64 mg/dL (11-28-21 @ 09:14)  Creatinine, Serum: 0.65 mg/dL (11-27-21 @ 11:51)  Creatinine, Serum: 0.57 mg/dL (11-27-21 @ 05:35)  Creatinine, Serum: 0.61 mg/dL (11-26-21 @ 09:01)        Ferritin, Serum: 388 ng/mL (11-17-21 @ 13:21)    C-Reactive Protein, Serum: 197 mg/L (11-26-21 @ 21:52)  C-Reactive Protein, Serum: 211 mg/L (11-26-21 @ 09:01)  C-Reactive Protein, Serum: 53 mg/L (11-23-21 @ 07:42)  C-Reactive Protein, Serum: >422 mg/L (11-17-21 @ 13:21)    Sedimentation Rate, Erythrocyte: 65 mm/hr (11-26-21 @ 21:52)  Sedimentation Rate, Erythrocyte: 63 mm/hr (11-26-21 @ 09:01)  Sedimentation Rate, Erythrocyte: 55 mm/hr (11-17-21 @ 13:21)    WBC Count: 14.55 K/uL (11-30-21 @ 07:18)  WBC Count: 16.71 K/uL (11-29-21 @ 09:54)  WBC Count: 17.02 K/uL (11-28-21 @ 09:14)  WBC Count: 15.99 K/uL (11-27-21 @ 05:33)  WBC Count: 18.73 K/uL (11-26-21 @ 09:01)      COVID-19 PCR: NotDetec (11-30-21 @ 01:59)  COVID-19 PCR: NotDetec (11-23-21 @ 18:50)  COVID-19 PCR: NotDetec (11-17-21 @ 13:18)    Lactate Dehydrogenase, Serum: 769 U/L (11-20-21 @ 03:56)    Alkaline Phosphatase, Serum: 88 U/L (11-27-21 @ 05:35)  Alanine Aminotransferase (ALT/SGPT): 11 U/L (11-27-21 @ 05:35)  Aspartate Aminotransferase (AST/SGOT): 25 U/L (11-27-21 @ 05:35)  Bilirubin Total, Serum: 0.3 mg/dL (11-27-21 @ 05:35)        < from: CT Lower Extremity w/ IV Cont, Bilateral (11.30.21 @ 09:55) >     EXAM:  CT LWR EXT IC BI                          PROCEDURE DATE:  11/30/2021          INTERPRETATION:  CT LOWER EXTREMITY WITH IV CONTRAST BILATERAL dated 11/30/2021 9:55 AM    INDICATION: Fever. Status post incision and drainage bilaterally. Persistent fevers to 104.    COMPARISON: CT of the right knee dated 11/23/2021. CT of the left lower extremity dated 11/17/2021    TECHNIQUE: CT imaging of the bilateral lower extremities was performed with contrast. The data was reformatted in the axial,coronal, and sagittal planes.  Contrast: Omnipaque 300. Administered: 96 cc. Discarded: 4 cc.    FINDINGS:    OSSEOUS STRUCTURES: Mild degenerative changes at the lower lumbar spine including sclerosis and narrowing at the facet joints. Moderate sclerosis at the right lower sacroiliac joint. Moderate narrowing the bilateral hip joint spaces which is worse posteriorly. There is marginal productive changes. Moderate narrowing at the pubic symphysis. Small cysts appreciated at the left patella suggesting overlying cartilage loss. Postsurgical changes noted at the left distal fibula with a small screw tracts through the distal fibula. A portion of the distal fibula is exposed by an overlying skin wound. No fracture or osteonecrosis is seen. Thereis no periosteal reaction or large erosion appreciated.  SYNOVIUM/ JOINT FLUID: There is a large left knee joint effusion containing several small new locules of gas. A moderate right knee joint effusion is identified. No hip joint effusion is seen.  TENDONS: The tendons are intact.  MUSCLES: There is hypodensity within the left soleus and gastrocnemius musculature. Confluent edema is seen between the soleus and gastrocnemius musculature. Moderate edema is noted in the left anterior compartment calf musculature. There is suggestion of rim enhancement, new compared to the prior exam. There is a hazy appearance of the right facets and lateralis and tensor fascia yohan muscle. Moderate hazy hypodensity throughout the left sartorius and left vastus lateralis muscle. Edema overlies the left rectus femoris muscle.  NEUROVASCULAR STRUCTURES: Mild vascular calcifications.  INTRAPELVIC SOFT TISSUES: Multiple uterine masses are noted without enhancement likely degenerating uterine fibroids, unchanged.  SUBCUTANEOUS SOFT TISSUES: There is diffuse soft tissue swelling and skin thickening. There are skin wounds along the medial lateral aspect of the left calf. No subcutaneous fluid collection is noted.    3-D reformatted imaging confirms these findings.    IMPRESSION:    1.  Soft tissue swelling bilaterally. Medial lateral left calf skin wounds possibly from prior surgery or infection. Nonspecific but can be seen with cellulitis.  2.  Exposure of the left distal fibula by an overlying skin wound raises concern for osteomyelitis. New compared to the prior study.  3.  Large left knee joint effusion with several new foci of gas. Differential considerations include recent aspiration and infection. Correlate clinically.  4.  Hypodensity throughout the left calf musculature in the bilateral hip musculature as detailed above as can be seen with myositis.  5.  Suggestion of rim enhancement involving the left anterior calf musculature concerning for early abscess formation  6.  Edema between the left medial gastrocnemius and soleus musculature, likely infectious  7.  Heterogeneous masses in the uterus, likely degenerating fibroids.    Findings were discussed with Dr. Cortez of Trauma on 11/30/2021 10:42 AM  with read back.    --- End of Report ---            LOREN ALEXANDER MD; Attending Radiologist  This document has been electronically signed. Nov 30 2021 10:42AM    < end of copied text >

## 2021-12-16 ENCOUNTER — TRANSCRIPTION ENCOUNTER (OUTPATIENT)
Age: 51
End: 2021-12-16

## 2021-12-16 LAB
ANION GAP SERPL CALC-SCNC: 13 MMOL/L — SIGNIFICANT CHANGE UP (ref 5–17)
BLD GP AB SCN SERPL QL: SIGNIFICANT CHANGE UP
BUN SERPL-MCNC: 4.7 MG/DL — LOW (ref 8–20)
C TRACH RRNA SPEC QL NAA+PROBE: SIGNIFICANT CHANGE UP
CALCIUM SERPL-MCNC: 7.8 MG/DL — LOW (ref 8.6–10.2)
CHLORIDE SERPL-SCNC: 97 MMOL/L — LOW (ref 98–107)
CO2 SERPL-SCNC: 22 MMOL/L — SIGNIFICANT CHANGE UP (ref 22–29)
CREAT SERPL-MCNC: 0.54 MG/DL — SIGNIFICANT CHANGE UP (ref 0.5–1.3)
GLUCOSE BLDC GLUCOMTR-MCNC: 143 MG/DL — HIGH (ref 70–99)
GLUCOSE SERPL-MCNC: 81 MG/DL — SIGNIFICANT CHANGE UP (ref 70–99)
GRAM STN FLD: SIGNIFICANT CHANGE UP
HCT VFR BLD CALC: 28.5 % — LOW (ref 34.5–45)
HGB BLD-MCNC: 8.8 G/DL — LOW (ref 11.5–15.5)
MAGNESIUM SERPL-MCNC: 1.9 MG/DL — SIGNIFICANT CHANGE UP (ref 1.6–2.6)
MCHC RBC-ENTMCNC: 27.6 PG — SIGNIFICANT CHANGE UP (ref 27–34)
MCHC RBC-ENTMCNC: 30.9 GM/DL — LOW (ref 32–36)
MCV RBC AUTO: 89.3 FL — SIGNIFICANT CHANGE UP (ref 80–100)
N GONORRHOEA RRNA SPEC QL NAA+PROBE: SIGNIFICANT CHANGE UP
PHOSPHATE SERPL-MCNC: 4 MG/DL — SIGNIFICANT CHANGE UP (ref 2.4–4.7)
PLATELET # BLD AUTO: 440 K/UL — HIGH (ref 150–400)
POTASSIUM SERPL-MCNC: 4.4 MMOL/L — SIGNIFICANT CHANGE UP (ref 3.5–5.3)
POTASSIUM SERPL-SCNC: 4.4 MMOL/L — SIGNIFICANT CHANGE UP (ref 3.5–5.3)
RBC # BLD: 3.19 M/UL — LOW (ref 3.8–5.2)
RBC # FLD: 19.9 % — HIGH (ref 10.3–14.5)
SARS-COV-2 RNA SPEC QL NAA+PROBE: SIGNIFICANT CHANGE UP
SODIUM SERPL-SCNC: 132 MMOL/L — LOW (ref 135–145)
SPECIMEN SOURCE: SIGNIFICANT CHANGE UP
SPECIMEN SOURCE: SIGNIFICANT CHANGE UP
WBC # BLD: 14.12 K/UL — HIGH (ref 3.8–10.5)
WBC # FLD AUTO: 14.12 K/UL — HIGH (ref 3.8–10.5)

## 2021-12-16 PROCEDURE — 99233 SBSQ HOSP IP/OBS HIGH 50: CPT | Mod: 25

## 2021-12-16 RX ORDER — MAGNESIUM SULFATE 500 MG/ML
2 VIAL (ML) INJECTION ONCE
Refills: 0 | Status: COMPLETED | OUTPATIENT
Start: 2021-12-16 | End: 2021-12-16

## 2021-12-16 RX ORDER — ACETAMINOPHEN 500 MG
1000 TABLET ORAL ONCE
Refills: 0 | Status: COMPLETED | OUTPATIENT
Start: 2021-12-16 | End: 2021-12-16

## 2021-12-16 RX ORDER — SODIUM CHLORIDE 9 MG/ML
500 INJECTION, SOLUTION INTRAVENOUS ONCE
Refills: 0 | Status: COMPLETED | OUTPATIENT
Start: 2021-12-16 | End: 2021-12-16

## 2021-12-16 RX ADMIN — HYDROMORPHONE HYDROCHLORIDE 0.5 MILLIGRAM(S): 2 INJECTION INTRAMUSCULAR; INTRAVENOUS; SUBCUTANEOUS at 20:40

## 2021-12-16 RX ADMIN — Medication 10 MILLIGRAM(S): at 06:13

## 2021-12-16 RX ADMIN — HYDROMORPHONE HYDROCHLORIDE 0.5 MILLIGRAM(S): 2 INJECTION INTRAMUSCULAR; INTRAVENOUS; SUBCUTANEOUS at 20:55

## 2021-12-16 RX ADMIN — HYDROMORPHONE HYDROCHLORIDE 4 MILLIGRAM(S): 2 INJECTION INTRAMUSCULAR; INTRAVENOUS; SUBCUTANEOUS at 20:22

## 2021-12-16 RX ADMIN — SODIUM CHLORIDE 100 MILLILITER(S): 9 INJECTION, SOLUTION INTRAVENOUS at 19:32

## 2021-12-16 RX ADMIN — HYDROMORPHONE HYDROCHLORIDE 0.5 MILLIGRAM(S): 2 INJECTION INTRAMUSCULAR; INTRAVENOUS; SUBCUTANEOUS at 11:00

## 2021-12-16 RX ADMIN — SODIUM CHLORIDE 1 GRAM(S): 9 INJECTION INTRAMUSCULAR; INTRAVENOUS; SUBCUTANEOUS at 06:12

## 2021-12-16 RX ADMIN — Medication 2 TABLET(S): at 10:36

## 2021-12-16 RX ADMIN — Medication 166.67 MILLIGRAM(S): at 10:44

## 2021-12-16 RX ADMIN — MEROPENEM 100 MILLIGRAM(S): 1 INJECTION INTRAVENOUS at 06:11

## 2021-12-16 RX ADMIN — Medication 400 MILLIGRAM(S): at 16:59

## 2021-12-16 RX ADMIN — PANTOPRAZOLE SODIUM 40 MILLIGRAM(S): 20 TABLET, DELAYED RELEASE ORAL at 16:29

## 2021-12-16 RX ADMIN — SODIUM CHLORIDE 100 MILLILITER(S): 9 INJECTION, SOLUTION INTRAVENOUS at 03:22

## 2021-12-16 RX ADMIN — LISINOPRIL 5 MILLIGRAM(S): 2.5 TABLET ORAL at 06:13

## 2021-12-16 RX ADMIN — GABAPENTIN 300 MILLIGRAM(S): 400 CAPSULE ORAL at 21:13

## 2021-12-16 RX ADMIN — Medication 2 TABLET(S): at 21:06

## 2021-12-16 RX ADMIN — HYDROMORPHONE HYDROCHLORIDE 4 MILLIGRAM(S): 2 INJECTION INTRAMUSCULAR; INTRAVENOUS; SUBCUTANEOUS at 01:46

## 2021-12-16 RX ADMIN — HYDROMORPHONE HYDROCHLORIDE 4 MILLIGRAM(S): 2 INJECTION INTRAMUSCULAR; INTRAVENOUS; SUBCUTANEOUS at 19:32

## 2021-12-16 RX ADMIN — SENNA PLUS 2 TABLET(S): 8.6 TABLET ORAL at 21:06

## 2021-12-16 RX ADMIN — HYDROMORPHONE HYDROCHLORIDE 4 MILLIGRAM(S): 2 INJECTION INTRAMUSCULAR; INTRAVENOUS; SUBCUTANEOUS at 06:32

## 2021-12-16 RX ADMIN — ENOXAPARIN SODIUM 30 MILLIGRAM(S): 100 INJECTION SUBCUTANEOUS at 16:29

## 2021-12-16 RX ADMIN — SODIUM CHLORIDE 1 GRAM(S): 9 INJECTION INTRAMUSCULAR; INTRAVENOUS; SUBCUTANEOUS at 16:28

## 2021-12-16 RX ADMIN — GABAPENTIN 300 MILLIGRAM(S): 400 CAPSULE ORAL at 14:02

## 2021-12-16 RX ADMIN — Medication 400 MILLIGRAM(S): at 06:33

## 2021-12-16 RX ADMIN — GABAPENTIN 300 MILLIGRAM(S): 400 CAPSULE ORAL at 06:12

## 2021-12-16 RX ADMIN — MEROPENEM 100 MILLIGRAM(S): 1 INJECTION INTRAVENOUS at 14:03

## 2021-12-16 RX ADMIN — HYDROMORPHONE HYDROCHLORIDE 4 MILLIGRAM(S): 2 INJECTION INTRAMUSCULAR; INTRAVENOUS; SUBCUTANEOUS at 02:46

## 2021-12-16 RX ADMIN — HYDROMORPHONE HYDROCHLORIDE 0.5 MILLIGRAM(S): 2 INJECTION INTRAMUSCULAR; INTRAVENOUS; SUBCUTANEOUS at 10:36

## 2021-12-16 RX ADMIN — PANTOPRAZOLE SODIUM 40 MILLIGRAM(S): 20 TABLET, DELAYED RELEASE ORAL at 06:12

## 2021-12-16 RX ADMIN — MEROPENEM 100 MILLIGRAM(S): 1 INJECTION INTRAVENOUS at 22:16

## 2021-12-16 RX ADMIN — Medication 25 GRAM(S): at 06:17

## 2021-12-16 RX ADMIN — HYDROMORPHONE HYDROCHLORIDE 4 MILLIGRAM(S): 2 INJECTION INTRAMUSCULAR; INTRAVENOUS; SUBCUTANEOUS at 07:32

## 2021-12-16 RX ADMIN — ENOXAPARIN SODIUM 30 MILLIGRAM(S): 100 INJECTION SUBCUTANEOUS at 06:11

## 2021-12-16 RX ADMIN — Medication 1000 MILLIGRAM(S): at 17:29

## 2021-12-16 RX ADMIN — HYDROMORPHONE HYDROCHLORIDE 0.5 MILLIGRAM(S): 2 INJECTION INTRAMUSCULAR; INTRAVENOUS; SUBCUTANEOUS at 03:37

## 2021-12-16 RX ADMIN — Medication 1000 MILLIGRAM(S): at 06:48

## 2021-12-16 RX ADMIN — HYDROMORPHONE HYDROCHLORIDE 0.5 MILLIGRAM(S): 2 INJECTION INTRAMUSCULAR; INTRAVENOUS; SUBCUTANEOUS at 03:21

## 2021-12-16 RX ADMIN — SODIUM CHLORIDE 1000 MILLILITER(S): 9 INJECTION, SOLUTION INTRAVENOUS at 06:32

## 2021-12-16 RX ADMIN — Medication 166.67 MILLIGRAM(S): at 20:41

## 2021-12-16 RX ADMIN — Medication 10 MILLIGRAM(S): at 14:03

## 2021-12-16 RX ADMIN — Medication 10 MILLIGRAM(S): at 21:13

## 2021-12-16 NOTE — PROGRESS NOTE ADULT - SUBJECTIVE AND OBJECTIVE BOX
SUBJECTIVE/24 hour events:  Patient is a 51yFemale complicated hospital course 2/2 necrotizing soft tissue infection lle. Went to OR 12/15 for I&D Medial and lateral lower leg wounds clean appearing, good granulation tissue. Planes bluntly dissected with some purulent drainage. Significant pus also expressed from proximal shin down towards lower part of lateral incision. Incision made along tract at anterior lower leg, between two old wounds. Bluntly dissected with notable purulence. Each wound pulse irrigated. Wound VAC to medial and lateral old wounds. New incision packed with wet to dry Kerlix. Medial thigh incision clean appearing, closed with interrupted sutures  No acute events post-op, pain controlled on current regimen, tolerating a diet, continued on abx, post-op labs stable    Vital Signs Last 24 Hrs  T(C): 36.9 (15 Dec 2021 20:45), Max: 39.2 (15 Dec 2021 12:39)  T(F): 98.4 (15 Dec 2021 20:45), Max: 102.6 (15 Dec 2021 12:39)  HR: 90 (15 Dec 2021 20:45) (76 - 95)  BP: 107/64 (15 Dec 2021 20:45) (106/64 - 132/73)  BP(mean): --  RR: 18 (15 Dec 2021 20:45) (12 - 20)  SpO2: 100% (15 Dec 2021 20:45) (92% - 100%)  Drug Dosing Weight  Height (cm): 167.6 (15 Dec 2021 14:58)  Weight (kg): 93.4 (15 Dec 2021 14:58)  BMI (kg/m2): 33.3 (15 Dec 2021 14:58)  BSA (m2): 2.02 (15 Dec 2021 14:58)  I&O's Detail    14 Dec 2021 07:01  -  15 Dec 2021 07:00  --------------------------------------------------------  IN:    Lactated Ringers: 1200 mL  Total IN: 1200 mL    OUT:    Voided (mL): 2650 mL  Total OUT: 2650 mL    Total NET: -1450 mL      15 Dec 2021 07:01  -  16 Dec 2021 01:42  --------------------------------------------------------  IN:  Total IN: 0 mL    OUT:    Voided (mL): 1350 mL  Total OUT: 1350 mL    Total NET: -1350 mL        Allergies    No Known Allergies    Intolerances                              9.1    22.19 )-----------( 433      ( 15 Dec 2021 20:04 )             28.8   12-15    133<L>  |  99  |  5.3<L>  ----------------------------<  92  4.4   |  23.0  |  0.71    Ca    7.9<L>      15 Dec 2021 20:04  Phos  4.8     12-15  Mg     2.1     12-15        ROS:    PHYSICAL EXAM:  Constitutional: tearful  Respiratory: no respiratory distress, no dyspnea, no supplemental o2 needed   Gastrointestinal: abdomen soft, non-tender, atraumatic   Genitourinary: voiding   Extremities: LLE edematous, wound vac well seated   Neurological: A&OX3  Skin: warm, dry and no rashes           MEDICATIONS  (STANDING):  baclofen 10 milliGRAM(s) Oral three times a day  ceFAZolin   IVPB 2000 milliGRAM(s) IV Intermittent once  enoxaparin Injectable 30 milliGRAM(s) SubCutaneous two times a day  gabapentin 300 milliGRAM(s) Oral three times a day  lactated ringers. 1000 milliLiter(s) (100 mL/Hr) IV Continuous <Continuous>  lisinopril 5 milliGRAM(s) Oral daily  meropenem  IVPB 1000 milliGRAM(s) IV Intermittent every 8 hours  pantoprazole    Tablet 40 milliGRAM(s) Oral two times a day  polyethylene glycol 3350 17 Gram(s) Oral daily  senna 2 Tablet(s) Oral at bedtime  sodium chloride 1 Gram(s) Oral every 12 hours  trimethoprim  160 mG/sulfamethoxazole 800 mG 2 Tablet(s) Oral every 12 hours  vancomycin  IVPB 1250 milliGRAM(s) IV Intermittent every 12 hours    MEDICATIONS  (PRN):  HYDROmorphone   Tablet 2 milliGRAM(s) Oral every 4 hours PRN Moderate Pain (4 - 6)  HYDROmorphone   Tablet 4 milliGRAM(s) Oral every 4 hours PRN Severe Pain (7 - 10)  HYDROmorphone  Injectable 0.5 milliGRAM(s) IV Push every 4 hours PRN severe breakthrough pain      RADIOLOGY STUDIES:    CULTURES:

## 2021-12-16 NOTE — PROGRESS NOTE ADULT - ASSESSMENT
50yo Female s/p Right knee I&D POD# 16 (11/24/21),  Left knee I&D w [facility] left ankle POD#22 (11/17/21) 12/2 I&D of left leg, medial/lateral fasciotomies I&D/ tissue culture, 12/7, 12/11 LLE debridement and washout vac placement. 12/15 additional washout     - monitor fever curve  - wound vac management   - f/u consulting recommendation   - continue to trend H/H  - continue pain control   - continue DVTp  - WBAT RLE, WBAT LLE in CAM boot  - Left knee aspirate cultures negative to date from 12/1/21  - IV abx as per ID:   meropenum    bactrim  vancomycin

## 2021-12-16 NOTE — PROGRESS NOTE ADULT - ATTENDING COMMENTS
pt seen and examined. agree /w above. s/p L knee revision washout yesterday. continue IV abx. PT for ROM. DVT ppx

## 2021-12-16 NOTE — PROGRESS NOTE ADULT - ATTENDING COMMENTS
Patient seen on rounds. Discussed findings in OR yesterday, discussed concerns with worsening and continued infection amputation is definitely within the realm of possibility. Will take back to OR tomorrow for washout

## 2021-12-17 LAB
ANION GAP SERPL CALC-SCNC: 10 MMOL/L — SIGNIFICANT CHANGE UP (ref 5–17)
BASOPHILS # BLD AUTO: 0.06 K/UL — SIGNIFICANT CHANGE UP (ref 0–0.2)
BASOPHILS NFR BLD AUTO: 0.6 % — SIGNIFICANT CHANGE UP (ref 0–2)
BUN SERPL-MCNC: 5.3 MG/DL — LOW (ref 8–20)
CALCIUM SERPL-MCNC: 7.9 MG/DL — LOW (ref 8.6–10.2)
CHLORIDE SERPL-SCNC: 97 MMOL/L — LOW (ref 98–107)
CO2 SERPL-SCNC: 21 MMOL/L — LOW (ref 22–29)
CREAT SERPL-MCNC: 0.5 MG/DL — SIGNIFICANT CHANGE UP (ref 0.5–1.3)
CULTURE RESULTS: SIGNIFICANT CHANGE UP
EOSINOPHIL # BLD AUTO: 0.35 K/UL — SIGNIFICANT CHANGE UP (ref 0–0.5)
EOSINOPHIL NFR BLD AUTO: 3.3 % — SIGNIFICANT CHANGE UP (ref 0–6)
GLUCOSE BLDC GLUCOMTR-MCNC: 148 MG/DL — HIGH (ref 70–99)
GLUCOSE BLDC GLUCOMTR-MCNC: 88 MG/DL — SIGNIFICANT CHANGE UP (ref 70–99)
GLUCOSE SERPL-MCNC: 88 MG/DL — SIGNIFICANT CHANGE UP (ref 70–99)
HCG SERPL-ACNC: <4 MIU/ML — SIGNIFICANT CHANGE UP
HCT VFR BLD CALC: 24.1 % — LOW (ref 34.5–45)
HGB BLD-MCNC: 7.6 G/DL — LOW (ref 11.5–15.5)
IMM GRANULOCYTES NFR BLD AUTO: 0.5 % — SIGNIFICANT CHANGE UP (ref 0–1.5)
LYMPHOCYTES # BLD AUTO: 2.9 K/UL — SIGNIFICANT CHANGE UP (ref 1–3.3)
LYMPHOCYTES # BLD AUTO: 27.4 % — SIGNIFICANT CHANGE UP (ref 13–44)
MAGNESIUM SERPL-MCNC: 1.8 MG/DL — SIGNIFICANT CHANGE UP (ref 1.6–2.6)
MCHC RBC-ENTMCNC: 27.5 PG — SIGNIFICANT CHANGE UP (ref 27–34)
MCHC RBC-ENTMCNC: 31.5 GM/DL — LOW (ref 32–36)
MCV RBC AUTO: 87.3 FL — SIGNIFICANT CHANGE UP (ref 80–100)
MONOCYTES # BLD AUTO: 1.08 K/UL — HIGH (ref 0–0.9)
MONOCYTES NFR BLD AUTO: 10.2 % — SIGNIFICANT CHANGE UP (ref 2–14)
NEUTROPHILS # BLD AUTO: 6.16 K/UL — SIGNIFICANT CHANGE UP (ref 1.8–7.4)
NEUTROPHILS NFR BLD AUTO: 58 % — SIGNIFICANT CHANGE UP (ref 43–77)
PHOSPHATE SERPL-MCNC: 3.2 MG/DL — SIGNIFICANT CHANGE UP (ref 2.4–4.7)
PLATELET # BLD AUTO: 395 K/UL — SIGNIFICANT CHANGE UP (ref 150–400)
POTASSIUM SERPL-MCNC: 4.2 MMOL/L — SIGNIFICANT CHANGE UP (ref 3.5–5.3)
POTASSIUM SERPL-SCNC: 4.2 MMOL/L — SIGNIFICANT CHANGE UP (ref 3.5–5.3)
RBC # BLD: 2.76 M/UL — LOW (ref 3.8–5.2)
RBC # FLD: 19.4 % — HIGH (ref 10.3–14.5)
SODIUM SERPL-SCNC: 128 MMOL/L — LOW (ref 135–145)
SPECIMEN SOURCE: SIGNIFICANT CHANGE UP
VANCOMYCIN TROUGH SERPL-MCNC: 15.3 UG/ML — SIGNIFICANT CHANGE UP (ref 10–20)
WBC # BLD: 10.6 K/UL — HIGH (ref 3.8–10.5)
WBC # FLD AUTO: 10.6 K/UL — HIGH (ref 3.8–10.5)

## 2021-12-17 PROCEDURE — 27603 DRAIN LOWER LEG LESION: CPT | Mod: 58

## 2021-12-17 RX ORDER — BACLOFEN 100 %
10 POWDER (GRAM) MISCELLANEOUS THREE TIMES A DAY
Refills: 0 | Status: DISCONTINUED | OUTPATIENT
Start: 2021-12-17 | End: 2021-12-20

## 2021-12-17 RX ORDER — SODIUM CHLORIDE 9 MG/ML
1 INJECTION INTRAMUSCULAR; INTRAVENOUS; SUBCUTANEOUS EVERY 12 HOURS
Refills: 0 | Status: DISCONTINUED | OUTPATIENT
Start: 2021-12-17 | End: 2021-12-20

## 2021-12-17 RX ORDER — ACETAMINOPHEN 500 MG
1000 TABLET ORAL ONCE
Refills: 0 | Status: DISCONTINUED | OUTPATIENT
Start: 2021-12-17 | End: 2021-12-17

## 2021-12-17 RX ORDER — MEROPENEM 1 G/30ML
1000 INJECTION INTRAVENOUS EVERY 8 HOURS
Refills: 0 | Status: DISCONTINUED | OUTPATIENT
Start: 2021-12-17 | End: 2021-12-20

## 2021-12-17 RX ORDER — LISINOPRIL 2.5 MG/1
5 TABLET ORAL DAILY
Refills: 0 | Status: DISCONTINUED | OUTPATIENT
Start: 2021-12-17 | End: 2021-12-20

## 2021-12-17 RX ORDER — SODIUM CHLORIDE 9 MG/ML
1000 INJECTION, SOLUTION INTRAVENOUS
Refills: 0 | Status: DISCONTINUED | OUTPATIENT
Start: 2021-12-17 | End: 2021-12-17

## 2021-12-17 RX ORDER — GABAPENTIN 400 MG/1
300 CAPSULE ORAL THREE TIMES A DAY
Refills: 0 | Status: DISCONTINUED | OUTPATIENT
Start: 2021-12-17 | End: 2021-12-20

## 2021-12-17 RX ORDER — HYDROMORPHONE HYDROCHLORIDE 2 MG/ML
0.5 INJECTION INTRAMUSCULAR; INTRAVENOUS; SUBCUTANEOUS ONCE
Refills: 0 | Status: DISCONTINUED | OUTPATIENT
Start: 2021-12-17 | End: 2021-12-17

## 2021-12-17 RX ORDER — HYDROMORPHONE HYDROCHLORIDE 2 MG/ML
2 INJECTION INTRAMUSCULAR; INTRAVENOUS; SUBCUTANEOUS EVERY 4 HOURS
Refills: 0 | Status: DISCONTINUED | OUTPATIENT
Start: 2021-12-17 | End: 2021-12-20

## 2021-12-17 RX ORDER — VANCOMYCIN HCL 1 G
1250 VIAL (EA) INTRAVENOUS EVERY 12 HOURS
Refills: 0 | Status: DISCONTINUED | OUTPATIENT
Start: 2021-12-17 | End: 2021-12-17

## 2021-12-17 RX ORDER — FENTANYL CITRATE 50 UG/ML
50 INJECTION INTRAVENOUS
Refills: 0 | Status: DISCONTINUED | OUTPATIENT
Start: 2021-12-17 | End: 2021-12-17

## 2021-12-17 RX ORDER — ACETAMINOPHEN 500 MG
1000 TABLET ORAL ONCE
Refills: 0 | Status: COMPLETED | OUTPATIENT
Start: 2021-12-17 | End: 2021-12-17

## 2021-12-17 RX ORDER — HYDROMORPHONE HYDROCHLORIDE 2 MG/ML
2 INJECTION INTRAMUSCULAR; INTRAVENOUS; SUBCUTANEOUS EVERY 4 HOURS
Refills: 0 | Status: DISCONTINUED | OUTPATIENT
Start: 2021-12-17 | End: 2021-12-17

## 2021-12-17 RX ORDER — INSULIN LISPRO 100/ML
VIAL (ML) SUBCUTANEOUS
Refills: 0 | Status: DISCONTINUED | OUTPATIENT
Start: 2021-12-17 | End: 2021-12-20

## 2021-12-17 RX ORDER — HYDROMORPHONE HYDROCHLORIDE 2 MG/ML
0.5 INJECTION INTRAMUSCULAR; INTRAVENOUS; SUBCUTANEOUS
Refills: 0 | Status: DISCONTINUED | OUTPATIENT
Start: 2021-12-17 | End: 2021-12-17

## 2021-12-17 RX ORDER — ACETAMINOPHEN 500 MG
1000 TABLET ORAL EVERY 8 HOURS
Refills: 0 | Status: COMPLETED | OUTPATIENT
Start: 2021-12-17 | End: 2021-12-18

## 2021-12-17 RX ORDER — ENOXAPARIN SODIUM 100 MG/ML
30 INJECTION SUBCUTANEOUS
Refills: 0 | Status: DISCONTINUED | OUTPATIENT
Start: 2021-12-17 | End: 2021-12-20

## 2021-12-17 RX ORDER — POLYETHYLENE GLYCOL 3350 17 G/17G
17 POWDER, FOR SOLUTION ORAL DAILY
Refills: 0 | Status: DISCONTINUED | OUTPATIENT
Start: 2021-12-17 | End: 2021-12-20

## 2021-12-17 RX ORDER — PANTOPRAZOLE SODIUM 20 MG/1
40 TABLET, DELAYED RELEASE ORAL
Refills: 0 | Status: DISCONTINUED | OUTPATIENT
Start: 2021-12-17 | End: 2021-12-20

## 2021-12-17 RX ORDER — VANCOMYCIN HCL 1 G
1250 VIAL (EA) INTRAVENOUS EVERY 8 HOURS
Refills: 0 | Status: DISCONTINUED | OUTPATIENT
Start: 2021-12-17 | End: 2021-12-18

## 2021-12-17 RX ORDER — VANCOMYCIN HCL 1 G
750 VIAL (EA) INTRAVENOUS EVERY 8 HOURS
Refills: 0 | Status: DISCONTINUED | OUTPATIENT
Start: 2021-12-17 | End: 2021-12-17

## 2021-12-17 RX ORDER — SENNA PLUS 8.6 MG/1
2 TABLET ORAL AT BEDTIME
Refills: 0 | Status: DISCONTINUED | OUTPATIENT
Start: 2021-12-17 | End: 2021-12-20

## 2021-12-17 RX ORDER — ONDANSETRON 8 MG/1
4 TABLET, FILM COATED ORAL ONCE
Refills: 0 | Status: DISCONTINUED | OUTPATIENT
Start: 2021-12-17 | End: 2021-12-17

## 2021-12-17 RX ADMIN — Medication 400 MILLIGRAM(S): at 17:16

## 2021-12-17 RX ADMIN — Medication 400 MILLIGRAM(S): at 04:25

## 2021-12-17 RX ADMIN — HYDROMORPHONE HYDROCHLORIDE 0.5 MILLIGRAM(S): 2 INJECTION INTRAMUSCULAR; INTRAVENOUS; SUBCUTANEOUS at 11:42

## 2021-12-17 RX ADMIN — HYDROMORPHONE HYDROCHLORIDE 0.5 MILLIGRAM(S): 2 INJECTION INTRAMUSCULAR; INTRAVENOUS; SUBCUTANEOUS at 17:46

## 2021-12-17 RX ADMIN — SODIUM CHLORIDE 1 GRAM(S): 9 INJECTION INTRAMUSCULAR; INTRAVENOUS; SUBCUTANEOUS at 05:44

## 2021-12-17 RX ADMIN — MEROPENEM 100 MILLIGRAM(S): 1 INJECTION INTRAVENOUS at 23:04

## 2021-12-17 RX ADMIN — Medication 166.67 MILLIGRAM(S): at 09:37

## 2021-12-17 RX ADMIN — MEROPENEM 100 MILLIGRAM(S): 1 INJECTION INTRAVENOUS at 05:43

## 2021-12-17 RX ADMIN — ENOXAPARIN SODIUM 30 MILLIGRAM(S): 100 INJECTION SUBCUTANEOUS at 05:54

## 2021-12-17 RX ADMIN — PANTOPRAZOLE SODIUM 40 MILLIGRAM(S): 20 TABLET, DELAYED RELEASE ORAL at 18:43

## 2021-12-17 RX ADMIN — HYDROMORPHONE HYDROCHLORIDE 0.5 MILLIGRAM(S): 2 INJECTION INTRAMUSCULAR; INTRAVENOUS; SUBCUTANEOUS at 11:12

## 2021-12-17 RX ADMIN — PANTOPRAZOLE SODIUM 40 MILLIGRAM(S): 20 TABLET, DELAYED RELEASE ORAL at 05:44

## 2021-12-17 RX ADMIN — Medication 166.67 MILLIGRAM(S): at 23:05

## 2021-12-17 RX ADMIN — Medication 2 TABLET(S): at 09:36

## 2021-12-17 RX ADMIN — LISINOPRIL 5 MILLIGRAM(S): 2.5 TABLET ORAL at 05:44

## 2021-12-17 RX ADMIN — Medication 10 MILLIGRAM(S): at 05:44

## 2021-12-17 RX ADMIN — HYDROMORPHONE HYDROCHLORIDE 4 MILLIGRAM(S): 2 INJECTION INTRAMUSCULAR; INTRAVENOUS; SUBCUTANEOUS at 10:07

## 2021-12-17 RX ADMIN — Medication 1000 MILLIGRAM(S): at 17:46

## 2021-12-17 RX ADMIN — SODIUM CHLORIDE 100 MILLILITER(S): 9 INJECTION, SOLUTION INTRAVENOUS at 05:43

## 2021-12-17 RX ADMIN — Medication 1000 MILLIGRAM(S): at 05:00

## 2021-12-17 RX ADMIN — SODIUM CHLORIDE 1 GRAM(S): 9 INJECTION INTRAMUSCULAR; INTRAVENOUS; SUBCUTANEOUS at 18:43

## 2021-12-17 RX ADMIN — Medication 10 MILLIGRAM(S): at 21:09

## 2021-12-17 RX ADMIN — SENNA PLUS 2 TABLET(S): 8.6 TABLET ORAL at 21:09

## 2021-12-17 RX ADMIN — GABAPENTIN 300 MILLIGRAM(S): 400 CAPSULE ORAL at 21:09

## 2021-12-17 RX ADMIN — ENOXAPARIN SODIUM 30 MILLIGRAM(S): 100 INJECTION SUBCUTANEOUS at 18:44

## 2021-12-17 RX ADMIN — GABAPENTIN 300 MILLIGRAM(S): 400 CAPSULE ORAL at 05:44

## 2021-12-17 RX ADMIN — HYDROMORPHONE HYDROCHLORIDE 4 MILLIGRAM(S): 2 INJECTION INTRAMUSCULAR; INTRAVENOUS; SUBCUTANEOUS at 09:37

## 2021-12-17 RX ADMIN — HYDROMORPHONE HYDROCHLORIDE 0.5 MILLIGRAM(S): 2 INJECTION INTRAMUSCULAR; INTRAVENOUS; SUBCUTANEOUS at 17:16

## 2021-12-17 NOTE — PROGRESS NOTE ADULT - SUBJECTIVE AND OBJECTIVE BOX
Pt Name: LUIS FERNANDO DAVIS  MRN: [id number]      Patient is a 51 year old female who is now POD 2 from repeat left knee I&D and I&D of Right knee. Patient had I&D of left knee on 11/17.  Patient will be going to the OR today with ACS for washout. patient in bed comfortable. Patient states that pain to knees have improved and that she has better range of motion to right knee compared to previous days. Patient denies acute motor or sensory changes.         PAST MEDICAL & SURGICAL HISTORY:  No pertinent past medical history    History of ankle surgery        Allergies: No Known Allergies                            7.6    10.60 )-----------( 395      ( 17 Dec 2021 05:37 )             24.1     12-17    128<L>  |  97<L>  |  5.3<L>  ----------------------------<  88  4.2   |  21.0<L>  |  0.50    Ca    7.9<L>      17 Dec 2021 05:37  Phos  3.2     12-17  Mg     1.8     12-17        PHYSICAL EXAM:    Vital Signs Last 24 Hrs  T(C): 36.8 (17 Dec 2021 08:08), Max: 39.4 (16 Dec 2021 16:20)  T(F): 98.3 (17 Dec 2021 08:08), Max: 102.9 (16 Dec 2021 16:20)  HR: 83 (17 Dec 2021 08:08) (83 - 112)  BP: 112/69 (17 Dec 2021 08:08) (112/69 - 148/75)  BP(mean): --  RR: 18 (17 Dec 2021 08:08) (18 - 18)  SpO2: 95% (17 Dec 2021 08:08) (92% - 98%)  Daily     Daily     Appearance: Alert, responsive, in no acute distress.    Neurological: Sensation is grossly intact to light touch. . No focal deficits    Skin: b/l knee incisions clean, dry, intact. ACE wrapping to LLE. New dressings applied to right knee.     Vascular: 2+ distal pulses. Cap refill < 2 sec.     Musculoskeletal:    Left leg: Ace wrap C/D/I, wound vacs in place, FROM to toes     Right Leg: +ROM to right knee - about 20-30 degrees of flexion. +DF/PF/EHL/FHL.       A/P:  Pt is a  51 year old female who is now POD 2 from repeat left knee I&D and I&D of Right knee    PLAN:   * Continue IV abx per ID  Will follow up cx  DVTP per ACS team   * Continue care per primary team

## 2021-12-17 NOTE — PROGRESS NOTE ADULT - SUBJECTIVE AND OBJECTIVE BOX
HPI/OVERNIGHT EVENTS: Patient seen and examined at bedside this AM. episode of fever at 1600, pain controlled, hemodynamically normal, plan to return to OR today    Vital Signs Last 24 Hrs  T(C): 36.8 (16 Dec 2021 21:26), Max: 39.4 (16 Dec 2021 16:20)  T(F): 98.2 (16 Dec 2021 21:26), Max: 102.9 (16 Dec 2021 16:20)  HR: 90 (16 Dec 2021 21:26) (90 - 112)  BP: 131/77 (16 Dec 2021 21:26) (116/62 - 148/75)  BP(mean): --  RR: 18 (16 Dec 2021 21:26) (18 - 18)  SpO2: 96% (16 Dec 2021 21:26) (92% - 100%)    I&O's Detail    15 Dec 2021 07:01  -  16 Dec 2021 07:00  --------------------------------------------------------  IN:    Lactated Ringers: 900 mL    Lactated Ringers Bolus: 500 mL  Total IN: 1400 mL    OUT:    Voided (mL): 2550 mL  Total OUT: 2550 mL    Total NET: -1150 mL      16 Dec 2021 07:01  -  17 Dec 2021 02:31  --------------------------------------------------------  IN:    Oral Fluid: 480 mL  Total IN: 480 mL    OUT:    Voided (mL): 3900 mL  Total OUT: 3900 mL    Total NET: -3420 mL      Constitutional: tearful  Respiratory: no respiratory distress, no dyspnea, no supplemental o2 needed   Gastrointestinal: abdomen soft, non-tender, atraumatic   Genitourinary: voiding   Extremities: LLE edematous, wound vac well seated   Neurological: A&OX3  Skin: warm, dry and no rashes       LABS:                        8.8    14.12 )-----------( 440      ( 16 Dec 2021 04:21 )             28.5     12-16    132<L>  |  97<L>  |  4.7<L>  ----------------------------<  81  4.4   |  22.0  |  0.54    Ca    7.8<L>      16 Dec 2021 04:21  Phos  4.0     12-16  Mg     1.9     12-16            MEDICATIONS  (STANDING):  baclofen 10 milliGRAM(s) Oral three times a day  ceFAZolin   IVPB 2000 milliGRAM(s) IV Intermittent once  enoxaparin Injectable 30 milliGRAM(s) SubCutaneous two times a day  gabapentin 300 milliGRAM(s) Oral three times a day  lactated ringers. 1000 milliLiter(s) (100 mL/Hr) IV Continuous <Continuous>  lisinopril 5 milliGRAM(s) Oral daily  meropenem  IVPB 1000 milliGRAM(s) IV Intermittent every 8 hours  pantoprazole    Tablet 40 milliGRAM(s) Oral two times a day  polyethylene glycol 3350 17 Gram(s) Oral daily  senna 2 Tablet(s) Oral at bedtime  sodium chloride 1 Gram(s) Oral every 12 hours  trimethoprim  160 mG/sulfamethoxazole 800 mG 2 Tablet(s) Oral every 12 hours  vancomycin  IVPB 1250 milliGRAM(s) IV Intermittent every 12 hours    MEDICATIONS  (PRN):  HYDROmorphone   Tablet 2 milliGRAM(s) Oral every 4 hours PRN Moderate Pain (4 - 6)  HYDROmorphone   Tablet 4 milliGRAM(s) Oral every 4 hours PRN Severe Pain (7 - 10)  HYDROmorphone  Injectable 0.5 milliGRAM(s) IV Push every 4 hours PRN severe breakthrough pain      MICRO:   Cultures     STUDIES:   EKG, CXR, U/S, CT, MRI

## 2021-12-17 NOTE — PROGRESS NOTE ADULT - ASSESSMENT
52yo Female s/p Right knee I&D POD# 17 (11/24/21),  Left knee I&D w [facility] left ankle POD#23 (11/17/21) 12/2 I&D of left leg, medial/lateral fasciotomies I&D/ tissue culture, 12/7, 12/11 LLE debridement and washout vac placement. 12/15 additional washout     - Plan for OR washout 12/17  - monitor fever curve  - wound vac management   - f/u consulting recommendation   - continue to trend H/H  - continue pain control   - continue DVTp  - WBAT RLE, WBAT LLE in CAM boot  - Left knee aspirate cultures negative to date from 12/1/21  - IV abx as per ID:   meropenum    bactrim  vancomycin

## 2021-12-18 LAB
ALBUMIN SERPL ELPH-MCNC: 1.9 G/DL — LOW (ref 3.3–5.2)
ALP SERPL-CCNC: 62 U/L — SIGNIFICANT CHANGE UP (ref 40–120)
ALT FLD-CCNC: 6 U/L — SIGNIFICANT CHANGE UP
ANION GAP SERPL CALC-SCNC: 12 MMOL/L — SIGNIFICANT CHANGE UP (ref 5–17)
AST SERPL-CCNC: 11 U/L — SIGNIFICANT CHANGE UP
BASOPHILS # BLD AUTO: 0.05 K/UL — SIGNIFICANT CHANGE UP (ref 0–0.2)
BASOPHILS NFR BLD AUTO: 0.5 % — SIGNIFICANT CHANGE UP (ref 0–2)
BILIRUB SERPL-MCNC: <0.2 MG/DL — LOW (ref 0.4–2)
BUN SERPL-MCNC: 5.4 MG/DL — LOW (ref 8–20)
CALCIUM SERPL-MCNC: 8.3 MG/DL — LOW (ref 8.6–10.2)
CHLORIDE SERPL-SCNC: 100 MMOL/L — SIGNIFICANT CHANGE UP (ref 98–107)
CO2 SERPL-SCNC: 21 MMOL/L — LOW (ref 22–29)
CREAT SERPL-MCNC: 0.45 MG/DL — LOW (ref 0.5–1.3)
CULTURE RESULTS: SIGNIFICANT CHANGE UP
CULTURE RESULTS: SIGNIFICANT CHANGE UP
EOSINOPHIL # BLD AUTO: 0.04 K/UL — SIGNIFICANT CHANGE UP (ref 0–0.5)
EOSINOPHIL NFR BLD AUTO: 0.4 % — SIGNIFICANT CHANGE UP (ref 0–6)
GLUCOSE BLDC GLUCOMTR-MCNC: 117 MG/DL — HIGH (ref 70–99)
GLUCOSE BLDC GLUCOMTR-MCNC: 91 MG/DL — SIGNIFICANT CHANGE UP (ref 70–99)
GLUCOSE SERPL-MCNC: 93 MG/DL — SIGNIFICANT CHANGE UP (ref 70–99)
HCT VFR BLD CALC: 23.3 % — LOW (ref 34.5–45)
HGB BLD-MCNC: 7.6 G/DL — LOW (ref 11.5–15.5)
HLA-B27 QL: NEGATIVE — SIGNIFICANT CHANGE UP
IMM GRANULOCYTES NFR BLD AUTO: 0.5 % — SIGNIFICANT CHANGE UP (ref 0–1.5)
LYMPHOCYTES # BLD AUTO: 2.8 K/UL — SIGNIFICANT CHANGE UP (ref 1–3.3)
LYMPHOCYTES # BLD AUTO: 29.6 % — SIGNIFICANT CHANGE UP (ref 13–44)
MAGNESIUM SERPL-MCNC: 1.8 MG/DL — SIGNIFICANT CHANGE UP (ref 1.6–2.6)
MCHC RBC-ENTMCNC: 28.6 PG — SIGNIFICANT CHANGE UP (ref 27–34)
MCHC RBC-ENTMCNC: 32.6 GM/DL — SIGNIFICANT CHANGE UP (ref 32–36)
MCV RBC AUTO: 87.6 FL — SIGNIFICANT CHANGE UP (ref 80–100)
MONOCYTES # BLD AUTO: 0.92 K/UL — HIGH (ref 0–0.9)
MONOCYTES NFR BLD AUTO: 9.7 % — SIGNIFICANT CHANGE UP (ref 2–14)
NEUTROPHILS # BLD AUTO: 5.59 K/UL — SIGNIFICANT CHANGE UP (ref 1.8–7.4)
NEUTROPHILS NFR BLD AUTO: 59.3 % — SIGNIFICANT CHANGE UP (ref 43–77)
PHOSPHATE SERPL-MCNC: 4.1 MG/DL — SIGNIFICANT CHANGE UP (ref 2.4–4.7)
PLATELET # BLD AUTO: 448 K/UL — HIGH (ref 150–400)
POTASSIUM SERPL-MCNC: 4.4 MMOL/L — SIGNIFICANT CHANGE UP (ref 3.5–5.3)
POTASSIUM SERPL-SCNC: 4.4 MMOL/L — SIGNIFICANT CHANGE UP (ref 3.5–5.3)
PROT SERPL-MCNC: 6.3 G/DL — LOW (ref 6.6–8.7)
RBC # BLD: 2.66 M/UL — LOW (ref 3.8–5.2)
RBC # FLD: 19.3 % — HIGH (ref 10.3–14.5)
SODIUM SERPL-SCNC: 133 MMOL/L — LOW (ref 135–145)
SPECIMEN SOURCE: SIGNIFICANT CHANGE UP
SPECIMEN SOURCE: SIGNIFICANT CHANGE UP
VANCOMYCIN TROUGH SERPL-MCNC: 16.4 UG/ML — SIGNIFICANT CHANGE UP (ref 10–20)
WBC # BLD: 9.45 K/UL — SIGNIFICANT CHANGE UP (ref 3.8–10.5)
WBC # FLD AUTO: 9.45 K/UL — SIGNIFICANT CHANGE UP (ref 3.8–10.5)

## 2021-12-18 PROCEDURE — 99024 POSTOP FOLLOW-UP VISIT: CPT | Mod: GC

## 2021-12-18 PROCEDURE — 99233 SBSQ HOSP IP/OBS HIGH 50: CPT

## 2021-12-18 RX ORDER — MAGNESIUM SULFATE 500 MG/ML
2 VIAL (ML) INJECTION ONCE
Refills: 0 | Status: COMPLETED | OUTPATIENT
Start: 2021-12-18 | End: 2021-12-18

## 2021-12-18 RX ORDER — HYDROMORPHONE HYDROCHLORIDE 2 MG/ML
0.5 INJECTION INTRAMUSCULAR; INTRAVENOUS; SUBCUTANEOUS ONCE
Refills: 0 | Status: DISCONTINUED | OUTPATIENT
Start: 2021-12-18 | End: 2021-12-18

## 2021-12-18 RX ORDER — VANCOMYCIN HCL 1 G
1250 VIAL (EA) INTRAVENOUS EVERY 12 HOURS
Refills: 0 | Status: DISCONTINUED | OUTPATIENT
Start: 2021-12-18 | End: 2021-12-20

## 2021-12-18 RX ADMIN — SENNA PLUS 2 TABLET(S): 8.6 TABLET ORAL at 21:49

## 2021-12-18 RX ADMIN — Medication 400 MILLIGRAM(S): at 02:51

## 2021-12-18 RX ADMIN — HYDROMORPHONE HYDROCHLORIDE 2 MILLIGRAM(S): 2 INJECTION INTRAMUSCULAR; INTRAVENOUS; SUBCUTANEOUS at 01:04

## 2021-12-18 RX ADMIN — Medication 25 GRAM(S): at 13:33

## 2021-12-18 RX ADMIN — HYDROMORPHONE HYDROCHLORIDE 2 MILLIGRAM(S): 2 INJECTION INTRAMUSCULAR; INTRAVENOUS; SUBCUTANEOUS at 02:00

## 2021-12-18 RX ADMIN — PANTOPRAZOLE SODIUM 40 MILLIGRAM(S): 20 TABLET, DELAYED RELEASE ORAL at 06:15

## 2021-12-18 RX ADMIN — Medication 10 MILLIGRAM(S): at 21:49

## 2021-12-18 RX ADMIN — Medication 166.67 MILLIGRAM(S): at 17:06

## 2021-12-18 RX ADMIN — GABAPENTIN 300 MILLIGRAM(S): 400 CAPSULE ORAL at 21:49

## 2021-12-18 RX ADMIN — Medication 10 MILLIGRAM(S): at 06:15

## 2021-12-18 RX ADMIN — GABAPENTIN 300 MILLIGRAM(S): 400 CAPSULE ORAL at 13:33

## 2021-12-18 RX ADMIN — SODIUM CHLORIDE 1 GRAM(S): 9 INJECTION INTRAMUSCULAR; INTRAVENOUS; SUBCUTANEOUS at 17:04

## 2021-12-18 RX ADMIN — HYDROMORPHONE HYDROCHLORIDE 0.5 MILLIGRAM(S): 2 INJECTION INTRAMUSCULAR; INTRAVENOUS; SUBCUTANEOUS at 07:00

## 2021-12-18 RX ADMIN — Medication 166.67 MILLIGRAM(S): at 06:45

## 2021-12-18 RX ADMIN — MEROPENEM 100 MILLIGRAM(S): 1 INJECTION INTRAVENOUS at 13:33

## 2021-12-18 RX ADMIN — HYDROMORPHONE HYDROCHLORIDE 2 MILLIGRAM(S): 2 INJECTION INTRAMUSCULAR; INTRAVENOUS; SUBCUTANEOUS at 17:05

## 2021-12-18 RX ADMIN — Medication 2 TABLET(S): at 17:04

## 2021-12-18 RX ADMIN — PANTOPRAZOLE SODIUM 40 MILLIGRAM(S): 20 TABLET, DELAYED RELEASE ORAL at 17:04

## 2021-12-18 RX ADMIN — ENOXAPARIN SODIUM 30 MILLIGRAM(S): 100 INJECTION SUBCUTANEOUS at 06:15

## 2021-12-18 RX ADMIN — Medication 10 MILLIGRAM(S): at 13:33

## 2021-12-18 RX ADMIN — Medication 1000 MILLIGRAM(S): at 03:30

## 2021-12-18 RX ADMIN — SODIUM CHLORIDE 1 GRAM(S): 9 INJECTION INTRAMUSCULAR; INTRAVENOUS; SUBCUTANEOUS at 06:16

## 2021-12-18 RX ADMIN — HYDROMORPHONE HYDROCHLORIDE 0.5 MILLIGRAM(S): 2 INJECTION INTRAMUSCULAR; INTRAVENOUS; SUBCUTANEOUS at 06:42

## 2021-12-18 RX ADMIN — ENOXAPARIN SODIUM 30 MILLIGRAM(S): 100 INJECTION SUBCUTANEOUS at 17:07

## 2021-12-18 RX ADMIN — MEROPENEM 100 MILLIGRAM(S): 1 INJECTION INTRAVENOUS at 21:49

## 2021-12-18 RX ADMIN — HYDROMORPHONE HYDROCHLORIDE 2 MILLIGRAM(S): 2 INJECTION INTRAMUSCULAR; INTRAVENOUS; SUBCUTANEOUS at 12:54

## 2021-12-18 RX ADMIN — Medication 2 TABLET(S): at 06:23

## 2021-12-18 RX ADMIN — MEROPENEM 100 MILLIGRAM(S): 1 INJECTION INTRAVENOUS at 06:24

## 2021-12-18 RX ADMIN — GABAPENTIN 300 MILLIGRAM(S): 400 CAPSULE ORAL at 06:15

## 2021-12-18 NOTE — PROGRESS NOTE ADULT - ASSESSMENT
51y Female present to ED with left leg swelling, erythema, pain. Patient endorses she had left knee pain x 1 week presented  to ED  where she states was given an steroid shot, and ibuprofen. however pain and edema worsened. Patient states she wasnt feeling herself reason why she came. Endorses chills initially with pain, denies history of trauma, insect bites, recent interventions, or injections to the area, contact with ticks, history of diabetes.      Nec fascitis   Group A strep sepsis/bacteremia   s/p Left knee I&D w [facility] left ankle (11/17/21)   s/p I&D of left leg 12/2  s/p medial/lateral fasciotomies I&D/ tissue culture, 12/7, 12/11   s/p LLE debridement and washout vac placement. 12/15  s/p LLE wound washout, drainage, and wound vac placement 12/17      - Blood cultures 11/17 with group A strep   - Repeat blood cultures no growth since Nov 20  - Tissue cultures from 11/18 with group A strep  - Tissue cultures 12/3 and 12/8  with Stenotrophomonas   - Tissue culture 12/3 Pseudomonas   - All tissue/OR cultures since have been negative  - s/p Vancomycin 11/17 - 11/20  - s/p Zosyn  11/17 -11/20; 11/28 - 12/14  - s/p Ceftriaxone 11/20 - 11/24  - s/p PCN G 11/24 - 11/28  - s/p Clindamycin 11/17 - 12/7  - s/p Cipro 12/4 - 12/7  - On Bactrim 12/7 -   - On Vancomycin 12/14 -  - On Meropenem 12/14 -  - Continue for now  - Follow up new cultures  - Trend Fever  - Trend WBC      Will Follow

## 2021-12-18 NOTE — PROGRESS NOTE ADULT - ATTENDING COMMENTS
Agree with above assessment.  The patient was seen and examined by me.  The patient is without new events overnight, no fevers overnight.  The wound VAC remains in place on the left lower leg wounds.  Awaiting MRI to assess for osteomyelitis. Continue with antibiotics.

## 2021-12-18 NOTE — PROGRESS NOTE ADULT - SUBJECTIVE AND OBJECTIVE BOX
[facility]  INFECTIOUS DISEASES AND INTERNAL MEDICINE at [place]  =======================================================  Theo Morrow MD  Diplomates American Board of Internal Medicine and Infectious Diseases  Tel: [phone]      Fax: [phone] / [phone]  =======================================================    [place] FERNANDO [place] [id number]    Follow up: group A STREP NECROTIZING SOFT TISSUE INFECTION    No fever since 12/16      s/p Left knee I&D w [facility] left ankle (11/17/21)   s/p I&D of left leg 12/2  s/p medial/lateral fasciotomies I&D/ tissue culture, 12/7, 12/11   s/p LLE debridement and washout vac placement. 12/15  s/p LLE wound washout, drainage, and wound vac placement 12/17      Allergies:  No Known Allergies      REVIEW OF SYSTEMS:  CONSTITUTIONAL:  No Fever or chills  HEENT:   No diplopia or blurred vision.  No earache, sore throat or runny nose.  CARDIOVASCULAR:  No pressure, squeezing, strangling, tightness, heaviness or aching about the chest, neck, axilla or epigastrium.  RESPIRATORY:  No cough, shortness of breath  GASTROINTESTINAL:  No nausea, vomiting or diarrhea.  GENITOURINARY:  No dysuria, frequency or urgency.   MUSCULOSKELETAL:  left leg pain   SKIN:  No change in skin, hair or nails.  NEUROLOGIC:  No Headaches, seizures  PSYCHIATRIC:  No disorder of thought or mood.  ENDOCRINE:  No heat or cold intolerance  HEMATOLOGICAL:  No easy bruising or bleeding.       Physical Exam:  GEN: NAD, pleasant  HEENT: normocephalic and atraumatic. EOMI. PERRL.  Anicteric   NECK: Supple.   LUNGS: Clear to auscultation.  HEART: Regular rate and rhythm  ABDOMEN: Soft, nontender, and nondistended.  Positive bowel sounds.    : No CVA tenderness  EXTREMITIES: Without any edema.  MSK: no joint swelling  NEUROLOGIC: No focal deficits  PSYCHIATRIC: Appropriate affect .  SKIN: LLE with wound vac       Vitals:  T(F): 98.1 (18 Dec 2021 05:00), Max: 100.3 (17 Dec 2021 13:28)  HR: 77 (18 Dec 2021 05:00)  BP: 111/66 (18 Dec 2021 05:00)  RR: 18 (18 Dec 2021 05:00)  SpO2: 96% (18 Dec 2021 05:00) (92% - 100%)  temp max in last 48H T(F): , Max: 102.9 (12-16-21 @ 16:20)      Current Antibiotics:  meropenem  IVPB 1000 milliGRAM(s) IV Intermittent every 8 hours  trimethoprim  160 mG/sulfamethoxazole 800 mG 2 Tablet(s) Oral every 12 hours  vancomycin  IVPB 1250 milliGRAM(s) IV Intermittent every 12 hours    Other medications:  baclofen 10 milliGRAM(s) Oral three times a day  enoxaparin Injectable 30 milliGRAM(s) SubCutaneous two times a day  gabapentin 300 milliGRAM(s) Oral three times a day  insulin lispro (ADMELOG) corrective regimen sliding scale   SubCutaneous Before meals and at bedtime  lisinopril 5 milliGRAM(s) Oral daily  magnesium sulfate  IVPB 2 Gram(s) IV Intermittent once  pantoprazole    Tablet 40 milliGRAM(s) Oral two times a day  polyethylene glycol 3350 17 Gram(s) Oral daily  senna 2 Tablet(s) Oral at bedtime  sodium chloride 1 Gram(s) Oral every 12 hours                   7.6    9.45  )-----------( 448      ( 18 Dec 2021 06:17 )             23.3     12-18    133<L>  |  100  |  5.4<L>  ----------------------------<  93  4.4   |  21.0<L>  |  0.45<L>    Ca    8.3<L>      18 Dec 2021 06:17  Phos  4.1     12-18  Mg     1.8     12-18    TPro  6.3<L>  /  Alb  1.9<L>  /  TBili  <0.2<L>  /  DBili  x   /  AST  11  /  ALT  6   /  AlkPhos  62  12-18      RECENT CULTURES:  12-16 @ 00:34 .Abscess left calf abscess anterior (swab)     No growth    12-16 @ 00:32 .Tissue anterior calf muscle     No growth  Rare polymorphonuclear leukocytes seen per low power field  Few Gram positive cocci in pairs seen per oil power field    12-14 @ 22:10 .Body Fluid Knee Fluid     No growth  polymorphonuclear leukocytes seen  No organisms seen  by cytocentrifuge    12-13 @ 01:26 .Blood Blood-Peripheral     No growth at 5 days.    12-12 @ 12:01 .Surgical Swab Left leg     No growth at 5 days    12-08 @ 01:25 .Tissue left leg devitalized muscle Stenotrophomonas maltophilia    No growth at 5 days  Rare polymorphonuclear leukocytes seen per low power field  Numerous Gram Variable Rods seen per oil power field    12-05 @ 21:20 .Blood Blood-Peripheral     No growth at 5 days.    12-05 @ 21:19 .Blood Blood-Peripheral     No growth at 5 days.      WBC Count: 9.45 K/uL (12-18-21 @ 06:17)  WBC Count: 10.60 K/uL (12-17-21 @ 05:37)  WBC Count: 14.12 K/uL (12-16-21 @ 04:21)  WBC Count: 22.19 K/uL (12-15-21 @ 20:04)  WBC Count: 11.43 K/uL (12-15-21 @ 05:45)  WBC Count: 10.61 K/uL (12-14-21 @ 07:10)  WBC Count: 10.40 K/uL (12-13-21 @ 12:21)    Creatinine, Serum: 0.45 mg/dL (12-18-21 @ 06:17)  Creatinine, Serum: 0.50 mg/dL (12-17-21 @ 05:37)  Creatinine, Serum: 0.54 mg/dL (12-16-21 @ 04:21)  Creatinine, Serum: 0.71 mg/dL (12-15-21 @ 20:04)  Creatinine, Serum: 0.57 mg/dL (12-15-21 @ 05:45)  Creatinine, Serum: 0.62 mg/dL (12-14-21 @ 07:10)    C-Reactive Protein, Serum: 110 mg/L (12-09-21 @ 07:12)    Sedimentation Rate, Erythrocyte: 63 mm/hr (12-09-21 @ 13:03)    COVID-19 PCR: NotDetec (12-16-21 @ 15:18)  COVID-19 PCR: NotDetec (12-14-21 @ 15:29)  COVID-19 PCR: NotDetec (12-09-21 @ 11:15)  COVID-19 PCR: NotDetec (12-06-21 @ 23:56)  COVID-19 PCR: NotDetec (12-03-21 @ 08:34)  COVID-19 PCR: NotDetec (11-30-21 @ 01:59)  COVID-19 PCR: NotDetec (11-23-21 @ 18:50)

## 2021-12-18 NOTE — PROGRESS NOTE ADULT - TIME BILLING
Coordination of care with multiple providers, reviewed radiology and laboratory data.
Pain Management - chart review, patient interview
as above.
reviewed labs, meds, notes
I reviewed egd reports, labs, notes
Reviewed labs, notes, spoke to surgical PA
reviewed notes, labs , meds
Fevers of unknown origin
reviewed the notes, endoscopy report, labs

## 2021-12-18 NOTE — PROGRESS NOTE ADULT - ASSESSMENT
52yo Female s/p Right knee I&D POD# 17 (11/24/21),  Left knee I&D w [facility] left ankle POD#23 (11/17/21) 12/2 I&D of left leg, medial/lateral fasciotomies I&D/ tissue culture, 12/7, 12/11 LLE debridement and washout vac placement. 12/15 additional washout     - MRI of LLE, r/o osteomyelitis  - plan for OR washout every few days prn  - monitor fever curve  - ID: meropenum, bactrim, vancomycin   - wound vac management   - f/u consulting recommendations daily  - continue to trend H/H  - continue pain control   - continue DVTppx  - WBAT RLE, WBAT LLE in CAM boot  - Left knee aspirate cultures negative to date from 12/1/21  - IV abx as per ID:

## 2021-12-18 NOTE — PROGRESS NOTE ADULT - SUBJECTIVE AND OBJECTIVE BOX
Trauma Surgery Progress Note:    Patient post op from LLE wound washout, drainage, and wound vac placement.   Patient doing well in the postoperative period and experiencing muscle spasms and moderate pain, being controlled with Gabapentin, Baclofen, and Tylenol.     No acute overnight events. Patient afebrile, VSS. Pain well controlled. Tolerating diet. Denies n/v/f/c/cp/sob.     Diet, Regular (12-17-21 @ 16:10)      Scheduled Medications:   baclofen 10 milliGRAM(s) Oral three times a day  enoxaparin Injectable 30 milliGRAM(s) SubCutaneous two times a day  gabapentin 300 milliGRAM(s) Oral three times a day  insulin lispro (ADMELOG) corrective regimen sliding scale   SubCutaneous Before meals and at bedtime  lisinopril 5 milliGRAM(s) Oral daily  meropenem  IVPB 1000 milliGRAM(s) IV Intermittent every 8 hours  pantoprazole    Tablet 40 milliGRAM(s) Oral two times a day  polyethylene glycol 3350 17 Gram(s) Oral daily  senna 2 Tablet(s) Oral at bedtime  sodium chloride 1 Gram(s) Oral every 12 hours  trimethoprim  160 mG/sulfamethoxazole 800 mG 2 Tablet(s) Oral every 12 hours  vancomycin  IVPB 1250 milliGRAM(s) IV Intermittent every 8 hours    PRN Medications:  acetaminophen   IVPB .. 1000 milliGRAM(s) IV Intermittent every 8 hours PRN Temp greater or equal to 38.5C (101.3F), Moderate Pain (4 - 6), Severe Pain (7 - 10)  HYDROmorphone   Tablet 2 milliGRAM(s) Oral every 4 hours PRN Severe Pain (7 - 10)      Objective:   T(F): 98.2 (12-17 @ 21:46), Max: 102.2 (12-17 @ 04:04)  HR: 85 (12-17 @ 21:46) (71 - 99)  BP: 105/60 (12-17 @ 21:46) (100/55 - 129/74)  BP(mean): --  ABP: --  ABP(mean): --  RR: 16 (12-17 @ 21:46) (11 - 18)  SpO2: 95% (12-17 @ 21:46) (92% - 100%)      Physical Exam:   GEN: patient resting comfortably in bed, in no acute distress  RESP: respirations are unlabored, no accessory muscle use, no conversational dyspnea  CVS: RRR  GI: Abdomen soft, non-tender, non-distended, no rebound tenderness / guarding  MSK: LLE with wound vacx3 to LLE, knee and medial thigh with sutures intact    I&O's    12-16 @ 07:01  -  12-17 @ 07:00  --------------------------------------------------------  IN:    Oral Fluid: 480 mL  Total IN: 480 mL    OUT:    Voided (mL): 4500 mL  Total OUT: 4500 mL    Total NET: -4020 mL      12-17 @ 07:01  -  12-18 @ 01:11  --------------------------------------------------------  IN:  Total IN: 0 mL    OUT:    Voided (mL): 1500 mL  Total OUT: 1500 mL    Total NET: -1500 mL          LABS:                        7.6    10.60 )-----------( 395      ( 17 Dec 2021 05:37 )             24.1     12-17    128<L>  |  97<L>  |  5.3<L>  ----------------------------<  88  4.2   |  21.0<L>  |  0.50    Ca    7.9<L>      17 Dec 2021 05:37  Phos  3.2     12-17  Mg     1.8     12-17            MICROBIOLOGY:     Culture - Abscess with Gram Stain (collected 12-16 @ 00:34)  Source: .Abscess left calf abscess medial (swab)  Preliminary Report (12-17 @ 11:55):    No growth    Culture - Abscess with Gram Stain (collected 12-16 @ 00:34)  Source: .Abscess left calf abscess anterior #2 (swab)  Preliminary Report (12-17 @ 10:07):    No growth    Culture - Abscess with Gram Stain (collected 12-16 @ 00:34)  Source: .Abscess left calf abscess anterior (swab)  Preliminary Report (12-17 @ 10:09):    No growth    Culture - Tissue with Gram Stain (collected 12-16 @ 00:32)  Source: .Tissue anterior calf muscle  Gram Stain (12-16 @ 02:57):    Rare polymorphonuclear leukocytes seen per low power field    Few Gram positive cocci in pairs seen per oil power field  Preliminary Report (12-16 @ 20:49):    No growth    Culture - Body Fluid with Gram Stain (collected 12-14 @ 22:10)  Source: .Body Fluid Knee Fluid  Gram Stain (12-15 @ 03:17):    polymorphonuclear leukocytes seen    No organisms seen    by cytocentrifuge  Preliminary Report (12-15 @ 18:22):    No growth    Culture - Blood (collected 12-13 @ 01:26)  Source: .Blood Blood-Peripheral  Preliminary Report (12-15 @ 03:01):    No growth at 48 hours    Culture - Blood (collected 12-13 @ 01:26)  Source: .Blood Blood-Peripheral  Preliminary Report (12-15 @ 03:01):    No growth at 48 hours    Culture - Surgical Swab (collected 12-12 @ 12:01)  Source: .Surgical Swab Left leg  Final Report (12-17 @ 09:50):    No growth at 5 days        PATHOLOGY:

## 2021-12-18 NOTE — PROGRESS NOTE ADULT - ATTENDING COMMENTS
pt seen and examined. agree /w above. s/p repeat L knee i&d. Still /w pus as per ACS in soft tissues. Continue IV abx. PT for L knee ROM. no evidence of repeat infection in R knee. Continue PT. ortho to follow.

## 2021-12-18 NOTE — PROGRESS NOTE ADULT - SUBJECTIVE AND OBJECTIVE BOX
LUIS FERNANDO [facility]  [id number]    patient seen and examined. She is status post left knee I&D 11/17, right knee I&D 11/24, now status post repeat left knee I &D on 12/15, POD # 3. States pain controlled with current pain med regimen. Denies nausea, vomiting, chest pain, shortness of breath, abdominal pain, dizziness or constitutional symptoms. No new complaints.  of note, pt also s/p multiple I&D of left leg and fasciotomies left leg with ACS team, most recent repeat I&D, 12/17      Vital Signs Last 24 Hrs  T(C): 36.7 (18 Dec 2021 05:00), Max: 37.9 (17 Dec 2021 13:28)  T(F): 98.1 (18 Dec 2021 05:00), Max: 100.3 (17 Dec 2021 13:28)  HR: 77 (18 Dec 2021 05:00) (71 - 86)  BP: 111/66 (18 Dec 2021 05:00) (100/55 - 129/74)  BP(mean): --  RR: 18 (18 Dec 2021 05:00) (11 - 18)  SpO2: 96% (18 Dec 2021 05:00) (92% - 100%)                          7.6    9.45  )-----------( 448      ( 18 Dec 2021 06:17 )             23.3     12-18    133<L>  |  100  |  5.4<L>  ----------------------------<  93  4.4   |  21.0<L>  |  0.45<L>    Ca    8.3<L>      18 Dec 2021 06:17  Phos  4.1     12-18  Mg     1.8     12-18    TPro  6.3<L>  /  Alb  1.9<L>  /  TBili  <0.2<L>  /  DBili  x   /  AST  11  /  ALT  6   /  AlkPhos  62  12-18      MEDICATIONS  (STANDING):  baclofen 10 milliGRAM(s) Oral three times a day  enoxaparin Injectable 30 milliGRAM(s) SubCutaneous two times a day  gabapentin 300 milliGRAM(s) Oral three times a day  insulin lispro (ADMELOG) corrective regimen sliding scale   SubCutaneous Before meals and at bedtime  lisinopril 5 milliGRAM(s) Oral daily  magnesium sulfate  IVPB 2 Gram(s) IV Intermittent once  meropenem  IVPB 1000 milliGRAM(s) IV Intermittent every 8 hours  pantoprazole    Tablet 40 milliGRAM(s) Oral two times a day  polyethylene glycol 3350 17 Gram(s) Oral daily  senna 2 Tablet(s) Oral at bedtime  sodium chloride 1 Gram(s) Oral every 12 hours  trimethoprim  160 mG/sulfamethoxazole 800 mG 2 Tablet(s) Oral every 12 hours  vancomycin  IVPB 1250 milliGRAM(s) IV Intermittent every 12 hours    MEDICATIONS  (PRN):  HYDROmorphone   Tablet 2 milliGRAM(s) Oral every 4 hours PRN Severe Pain (7 - 10)      Physical exam: NAD, AAO, resting comfortably  RLE: dressing is clean, dry and intact. No drainage or discharge.  motor intact : + dorsi/plantar, EHL/FHL  gross sensation intact to light touch  pulses appreciated  calf soft NT   LLE: dressing C/D/I, sutures intact, No drainage or discharge. No erythema is noted  wound vacs noted anterior and lateral calf and ankle   patient able to wiggle toes  gross sensation is intact      Culture - Body Fluid with Gram Stain (12.14.21 @ 22:10)    Gram Stain:   polymorphonuclear leukocytes seen  No organisms seen  by cytocentrifuge    Specimen Source: .Body Fluid Knee Fluid    Culture Results:   No growth    Primary Orthopedic Assessment:   status post left knee I&D 11/17, right knee I&D 11/24, now status post repeat left knee I &D on 12/15, POD # 3    of note, pt also underwent multiple I&D of left leg and fasciotomies left leg with ACS team, most recent repeat I& D, 12/17      Plan:   •DVT prophylaxis as per ACS team  •from ortho standpoint, pt is Weightbearing as tolerated of the right lower extremity and left lower extremity in cam boot  . continue IV abx as per ID  . follow up cultures  •Incentive spirometry encouraged  •Pain control as clinically indicated  •continue care with primary team

## 2021-12-19 ENCOUNTER — TRANSCRIPTION ENCOUNTER (OUTPATIENT)
Age: 51
End: 2021-12-19

## 2021-12-19 LAB
14-3-3 ETA ANTIGEN: <0.2 NG/ML — SIGNIFICANT CHANGE UP
ANION GAP SERPL CALC-SCNC: 12 MMOL/L — SIGNIFICANT CHANGE UP (ref 5–17)
BASOPHILS # BLD AUTO: 0.06 K/UL — SIGNIFICANT CHANGE UP (ref 0–0.2)
BASOPHILS NFR BLD AUTO: 0.7 % — SIGNIFICANT CHANGE UP (ref 0–2)
BUN SERPL-MCNC: 4 MG/DL — LOW (ref 8–20)
CALCIUM SERPL-MCNC: 8.3 MG/DL — LOW (ref 8.6–10.2)
CHLORIDE SERPL-SCNC: 101 MMOL/L — SIGNIFICANT CHANGE UP (ref 98–107)
CO2 SERPL-SCNC: 19 MMOL/L — LOW (ref 22–29)
CREAT SERPL-MCNC: 0.64 MG/DL — SIGNIFICANT CHANGE UP (ref 0.5–1.3)
CULTURE RESULTS: SIGNIFICANT CHANGE UP
CULTURE RESULTS: SIGNIFICANT CHANGE UP
EOSINOPHIL # BLD AUTO: 0.23 K/UL — SIGNIFICANT CHANGE UP (ref 0–0.5)
EOSINOPHIL NFR BLD AUTO: 2.5 % — SIGNIFICANT CHANGE UP (ref 0–6)
GLUCOSE BLDC GLUCOMTR-MCNC: 119 MG/DL — HIGH (ref 70–99)
GLUCOSE BLDC GLUCOMTR-MCNC: 121 MG/DL — HIGH (ref 70–99)
GLUCOSE BLDC GLUCOMTR-MCNC: 169 MG/DL — HIGH (ref 70–99)
GLUCOSE BLDC GLUCOMTR-MCNC: 469 MG/DL — CRITICAL HIGH (ref 70–99)
GLUCOSE BLDC GLUCOMTR-MCNC: 81 MG/DL — SIGNIFICANT CHANGE UP (ref 70–99)
GLUCOSE SERPL-MCNC: 78 MG/DL — SIGNIFICANT CHANGE UP (ref 70–99)
HCT VFR BLD CALC: 24 % — LOW (ref 34.5–45)
HGB BLD-MCNC: 7.2 G/DL — LOW (ref 11.5–15.5)
IMM GRANULOCYTES NFR BLD AUTO: 0.3 % — SIGNIFICANT CHANGE UP (ref 0–1.5)
LYMPHOCYTES # BLD AUTO: 2.9 K/UL — SIGNIFICANT CHANGE UP (ref 1–3.3)
LYMPHOCYTES # BLD AUTO: 31.5 % — SIGNIFICANT CHANGE UP (ref 13–44)
MAGNESIUM SERPL-MCNC: 1.8 MG/DL — SIGNIFICANT CHANGE UP (ref 1.6–2.6)
MCHC RBC-ENTMCNC: 27.4 PG — SIGNIFICANT CHANGE UP (ref 27–34)
MCHC RBC-ENTMCNC: 30 GM/DL — LOW (ref 32–36)
MCV RBC AUTO: 91.3 FL — SIGNIFICANT CHANGE UP (ref 80–100)
MONOCYTES # BLD AUTO: 0.82 K/UL — SIGNIFICANT CHANGE UP (ref 0–0.9)
MONOCYTES NFR BLD AUTO: 8.9 % — SIGNIFICANT CHANGE UP (ref 2–14)
NEUTROPHILS # BLD AUTO: 5.18 K/UL — SIGNIFICANT CHANGE UP (ref 1.8–7.4)
NEUTROPHILS NFR BLD AUTO: 56.1 % — SIGNIFICANT CHANGE UP (ref 43–77)
PHOSPHATE SERPL-MCNC: 3.9 MG/DL — SIGNIFICANT CHANGE UP (ref 2.4–4.7)
PLATELET # BLD AUTO: 388 K/UL — SIGNIFICANT CHANGE UP (ref 150–400)
POTASSIUM SERPL-MCNC: 4.7 MMOL/L — SIGNIFICANT CHANGE UP (ref 3.5–5.3)
POTASSIUM SERPL-SCNC: 4.7 MMOL/L — SIGNIFICANT CHANGE UP (ref 3.5–5.3)
RBC # BLD: 2.63 M/UL — LOW (ref 3.8–5.2)
RBC # FLD: 19.5 % — HIGH (ref 10.3–14.5)
SODIUM SERPL-SCNC: 132 MMOL/L — LOW (ref 135–145)
SPECIMEN SOURCE: SIGNIFICANT CHANGE UP
WBC # BLD: 9.22 K/UL — SIGNIFICANT CHANGE UP (ref 3.8–10.5)
WBC # FLD AUTO: 9.22 K/UL — SIGNIFICANT CHANGE UP (ref 3.8–10.5)

## 2021-12-19 PROCEDURE — 99024 POSTOP FOLLOW-UP VISIT: CPT | Mod: GC

## 2021-12-19 RX ORDER — MAGNESIUM SULFATE 500 MG/ML
2 VIAL (ML) INJECTION ONCE
Refills: 0 | Status: COMPLETED | OUTPATIENT
Start: 2021-12-19 | End: 2021-12-19

## 2021-12-19 RX ADMIN — GABAPENTIN 300 MILLIGRAM(S): 400 CAPSULE ORAL at 14:34

## 2021-12-19 RX ADMIN — MEROPENEM 100 MILLIGRAM(S): 1 INJECTION INTRAVENOUS at 22:25

## 2021-12-19 RX ADMIN — Medication 10 MILLIGRAM(S): at 05:48

## 2021-12-19 RX ADMIN — SODIUM CHLORIDE 1 GRAM(S): 9 INJECTION INTRAMUSCULAR; INTRAVENOUS; SUBCUTANEOUS at 05:50

## 2021-12-19 RX ADMIN — ENOXAPARIN SODIUM 30 MILLIGRAM(S): 100 INJECTION SUBCUTANEOUS at 17:27

## 2021-12-19 RX ADMIN — Medication 10 MILLIGRAM(S): at 14:33

## 2021-12-19 RX ADMIN — Medication 10 MILLIGRAM(S): at 22:26

## 2021-12-19 RX ADMIN — ENOXAPARIN SODIUM 30 MILLIGRAM(S): 100 INJECTION SUBCUTANEOUS at 05:49

## 2021-12-19 RX ADMIN — Medication 2 TABLET(S): at 05:48

## 2021-12-19 RX ADMIN — GABAPENTIN 300 MILLIGRAM(S): 400 CAPSULE ORAL at 22:26

## 2021-12-19 RX ADMIN — HYDROMORPHONE HYDROCHLORIDE 2 MILLIGRAM(S): 2 INJECTION INTRAMUSCULAR; INTRAVENOUS; SUBCUTANEOUS at 03:26

## 2021-12-19 RX ADMIN — Medication 25 GRAM(S): at 12:17

## 2021-12-19 RX ADMIN — Medication 166.67 MILLIGRAM(S): at 06:35

## 2021-12-19 RX ADMIN — MEROPENEM 100 MILLIGRAM(S): 1 INJECTION INTRAVENOUS at 14:33

## 2021-12-19 RX ADMIN — Medication 2 TABLET(S): at 17:26

## 2021-12-19 RX ADMIN — Medication 4: at 12:25

## 2021-12-19 RX ADMIN — HYDROMORPHONE HYDROCHLORIDE 2 MILLIGRAM(S): 2 INJECTION INTRAMUSCULAR; INTRAVENOUS; SUBCUTANEOUS at 02:56

## 2021-12-19 RX ADMIN — MEROPENEM 100 MILLIGRAM(S): 1 INJECTION INTRAVENOUS at 05:49

## 2021-12-19 RX ADMIN — LISINOPRIL 5 MILLIGRAM(S): 2.5 TABLET ORAL at 05:48

## 2021-12-19 RX ADMIN — GABAPENTIN 300 MILLIGRAM(S): 400 CAPSULE ORAL at 05:48

## 2021-12-19 RX ADMIN — Medication 166.67 MILLIGRAM(S): at 17:31

## 2021-12-19 RX ADMIN — PANTOPRAZOLE SODIUM 40 MILLIGRAM(S): 20 TABLET, DELAYED RELEASE ORAL at 17:27

## 2021-12-19 RX ADMIN — PANTOPRAZOLE SODIUM 40 MILLIGRAM(S): 20 TABLET, DELAYED RELEASE ORAL at 05:48

## 2021-12-19 RX ADMIN — SODIUM CHLORIDE 1 GRAM(S): 9 INJECTION INTRAMUSCULAR; INTRAVENOUS; SUBCUTANEOUS at 17:27

## 2021-12-19 NOTE — PROGRESS NOTE ADULT - SUBJECTIVE AND OBJECTIVE BOX
INTERVAL HPI/OVERNIGHT EVENTS: sleeping when seen, no new complaints or events per bedside RN    STATUS POST:   Right knee I&D 11/24/21  Left knee I&D w [facility] left ankle 11/17/21  I&D of left leg, medial/lateral fasciotomies 12/2  I&D/ tissue culture 12/7  LLE debridement and washout vac placement 12/11  washout LLE and wound vac change 12/15  Washout, Drainage, wound vac 12/17      MEDICATIONS  (STANDING):  baclofen 10 milliGRAM(s) Oral three times a day  enoxaparin Injectable 30 milliGRAM(s) SubCutaneous two times a day  gabapentin 300 milliGRAM(s) Oral three times a day  insulin lispro (ADMELOG) corrective regimen sliding scale   SubCutaneous Before meals and at bedtime  lisinopril 5 milliGRAM(s) Oral daily  meropenem  IVPB 1000 milliGRAM(s) IV Intermittent every 8 hours  pantoprazole    Tablet 40 milliGRAM(s) Oral two times a day  polyethylene glycol 3350 17 Gram(s) Oral daily  senna 2 Tablet(s) Oral at bedtime  sodium chloride 1 Gram(s) Oral every 12 hours  trimethoprim  160 mG/sulfamethoxazole 800 mG 2 Tablet(s) Oral every 12 hours  vancomycin  IVPB 1250 milliGRAM(s) IV Intermittent every 12 hours    MEDICATIONS  (PRN):  HYDROmorphone   Tablet 2 milliGRAM(s) Oral every 4 hours PRN Severe Pain (7 - 10)      Vital Signs Last 24 Hrs  T(C): 37.7 (18 Dec 2021 22:22), Max: 37.7 (18 Dec 2021 22:22)  T(F): 99.9 (18 Dec 2021 22:22), Max: 99.9 (18 Dec 2021 22:22)  HR: 91 (18 Dec 2021 22:22) (77 - 91)  BP: 112/69 (18 Dec 2021 22:22) (111/66 - 119/69)  BP(mean): --  RR: 18 (18 Dec 2021 22:22) (18 - 18)  SpO2: 97% (18 Dec 2021 22:22) (95% - 97%)    PHYSICAL EXAM:      Constitutional: NAD, sleeping    Respiratory: no accessory muscle use    Cardiovascular: RRR    Gastrointestinal: soft, NT/ND    Extremities: Vacs in place with good seal          I&O's Detail    17 Dec 2021 07:01  -  18 Dec 2021 07:00  --------------------------------------------------------  IN:  Total IN: 0 mL    OUT:    Voided (mL): 2900 mL  Total OUT: 2900 mL    Total NET: -2900 mL      18 Dec 2021 07:01  -  19 Dec 2021 02:59  --------------------------------------------------------  IN:    IV PiggyBack: 50 mL  Total IN: 50 mL    OUT:    Voided (mL): 1000 mL  Total OUT: 1000 mL    Total NET: -950 mL          LABS:                        7.6    9.45  )-----------( 448      ( 18 Dec 2021 06:17 )             23.3     12-18    133<L>  |  100  |  5.4<L>  ----------------------------<  93  4.4   |  21.0<L>  |  0.45<L>    Ca    8.3<L>      18 Dec 2021 06:17  Phos  4.1     12-18  Mg     1.8     12-18    TPro  6.3<L>  /  Alb  1.9<L>  /  TBili  <0.2<L>  /  DBili  x   /  AST  11  /  ALT  6   /  AlkPhos  62  12-18          RADIOLOGY & ADDITIONAL STUDIES:

## 2021-12-19 NOTE — PROGRESS NOTE ADULT - ATTENDING COMMENTS
Agree with above assessment.  The patient was seen and examined by me. The patient is with no new events overnight.  The left leg wound VAC remains in place and without leak. Awaiting MRI of the left LE to assess for osteomyelitis.  Continue with antibiotics, local wound care.

## 2021-12-19 NOTE — PROGRESS NOTE ADULT - SUBJECTIVE AND OBJECTIVE BOX
[facility]  [id number]    patient seen and examined. She is status post left knee I&D 11/17, right knee I&D 11/24, now status post repeat left knee I &D on 12/15, POD # 4 States pain controlled with current pain med regimen. Denies nausea, vomiting, chest pain, shortness of breath, abdominal pain, dizziness or constitutional symptoms. No new complaints.  of note, pt also s/p multiple I&D of left leg and fasciotomies left leg with ACS team, most recent repeat I&D, 12/17      Vital Signs Last 24 Hrs  T(C): 38.1 (19 Dec 2021 05:03), Max: 38.1 (19 Dec 2021 05:03)  T(F): 100.5 (19 Dec 2021 05:03), Max: 100.5 (19 Dec 2021 05:03)  HR: 85 (19 Dec 2021 08:00) (83 - 91)  BP: 122/73 (19 Dec 2021 08:00) (109/67 - 122/73)  BP(mean): --  RR: 16 (19 Dec 2021 08:00) (16 - 18)  SpO2: 96% (19 Dec 2021 08:00) (92% - 97%)                                      7.2    9.22  )-----------( 388      ( 19 Dec 2021 05:53 )             24.0   12-19    132<L>  |  101  |  4.0<L>  ----------------------------<  78  4.7   |  19.0<L>  |  0.64    Ca    8.3<L>      19 Dec 2021 05:53  Phos  3.9     12-19  Mg     1.8     12-19    TPro  6.3<L>  /  Alb  1.9<L>  /  TBili  <0.2<L>  /  DBili  x   /  AST  11  /  ALT  6   /  AlkPhos  62  12-18        Physical exam: NAD, AAO, resting comfortably  RLE: dressing is clean, dry and intact. No drainage or discharge.  motor intact : + dorsi/plantar, EHL/FHL  gross sensation intact to light touch  pulses appreciated  calf soft NT   LLE: dressing C/D/I, sutures intact, No drainage or discharge. No erythema is noted  wound vacs noted anterior and lateral calf and ankle   patient able to wiggle toes  gross sensation is intact        Culture - Surgical Swab (collected 18 Dec 2021 00:35)  Source: .Surgical Swab anterior wound left leg (swab)  Preliminary Report (18 Dec 2021 22:09):    No growth to date.        Primary Orthopedic Assessment:   status post left knee I&D 11/17, right knee I&D 11/24, now status post repeat left knee I &D on 12/15, POD # 4    of note, pt also underwent multiple I&D of left leg and fasciotomies left leg with ACS team, most recent repeat I& D, 12/17      Plan:   •DVT prophylaxis as per ACS team  •from ortho standpoint, pt is Weightbearing as tolerated of the right lower extremity and left lower extremity in cam boot  . continue IV abx as per ID  . follow up cultures  •Incentive spirometry encouraged  •Pain control as clinically indicated  •continue care with primary team      Attestation Statements:    Attestation Statements:  Attending supervision statement: I have personally seen and examined the patient.  I fully participated in the care of this patient.  I have made amendments to the documentation where necessary, and agree with the history, physical exam, and plan as documented by the ACP.     pt seen and examined. agree /w above. s/p repeat L knee i&d. Still /w pus as per ACS in soft tissues. Continue IV abx. PT for L knee ROM. no evidence of repeat infection in R knee. Continue PT. ortho to follow.

## 2021-12-19 NOTE — PROGRESS NOTE ADULT - ASSESSMENT
51F no PMHx s/p multiple LLE procedures    - await MRI of LLE, r/o osteomyelitis  - plan for OR washout every few days prn  - monitor fever curve  - ID following, current regimen: meropenum, bactrim, vancomycin   - wound vac management   - continue pain control   - DVTppx  - WBAT RLE, WBAT LLE in CAM boot  - Left knee aspirate cultures negative to date from 12/1/21

## 2021-12-20 LAB
GLUCOSE BLDC GLUCOMTR-MCNC: 120 MG/DL — HIGH (ref 70–99)
GLUCOSE BLDC GLUCOMTR-MCNC: 124 MG/DL — HIGH (ref 70–99)
GLUCOSE BLDC GLUCOMTR-MCNC: 144 MG/DL — HIGH (ref 70–99)
SARS-COV-2 RNA SPEC QL NAA+PROBE: SIGNIFICANT CHANGE UP
VANCOMYCIN TROUGH SERPL-MCNC: 13.1 UG/ML — SIGNIFICANT CHANGE UP (ref 10–20)

## 2021-12-20 PROCEDURE — 27603 DRAIN LOWER LEG LESION: CPT | Mod: 58

## 2021-12-20 PROCEDURE — 99024 POSTOP FOLLOW-UP VISIT: CPT

## 2021-12-20 PROCEDURE — 99232 SBSQ HOSP IP/OBS MODERATE 35: CPT

## 2021-12-20 RX ORDER — SODIUM CHLORIDE 9 MG/ML
1 INJECTION INTRAMUSCULAR; INTRAVENOUS; SUBCUTANEOUS EVERY 12 HOURS
Refills: 0 | Status: DISCONTINUED | OUTPATIENT
Start: 2021-12-20 | End: 2021-12-23

## 2021-12-20 RX ORDER — MEROPENEM 1 G/30ML
1000 INJECTION INTRAVENOUS EVERY 8 HOURS
Refills: 0 | Status: DISCONTINUED | OUTPATIENT
Start: 2021-12-20 | End: 2021-12-23

## 2021-12-20 RX ORDER — SENNA PLUS 8.6 MG/1
2 TABLET ORAL AT BEDTIME
Refills: 0 | Status: DISCONTINUED | OUTPATIENT
Start: 2021-12-20 | End: 2021-12-23

## 2021-12-20 RX ORDER — ENOXAPARIN SODIUM 100 MG/ML
30 INJECTION SUBCUTANEOUS
Refills: 0 | Status: DISCONTINUED | OUTPATIENT
Start: 2021-12-20 | End: 2021-12-23

## 2021-12-20 RX ORDER — VANCOMYCIN HCL 1 G
1250 VIAL (EA) INTRAVENOUS EVERY 12 HOURS
Refills: 0 | Status: DISCONTINUED | OUTPATIENT
Start: 2021-12-20 | End: 2021-12-23

## 2021-12-20 RX ORDER — BACLOFEN 100 %
10 POWDER (GRAM) MISCELLANEOUS THREE TIMES A DAY
Refills: 0 | Status: DISCONTINUED | OUTPATIENT
Start: 2021-12-20 | End: 2021-12-23

## 2021-12-20 RX ORDER — ONDANSETRON 8 MG/1
4 TABLET, FILM COATED ORAL ONCE
Refills: 0 | Status: DISCONTINUED | OUTPATIENT
Start: 2021-12-20 | End: 2021-12-20

## 2021-12-20 RX ORDER — GABAPENTIN 400 MG/1
300 CAPSULE ORAL THREE TIMES A DAY
Refills: 0 | Status: DISCONTINUED | OUTPATIENT
Start: 2021-12-20 | End: 2021-12-23

## 2021-12-20 RX ORDER — HYDROMORPHONE HYDROCHLORIDE 2 MG/ML
2 INJECTION INTRAMUSCULAR; INTRAVENOUS; SUBCUTANEOUS EVERY 4 HOURS
Refills: 0 | Status: DISCONTINUED | OUTPATIENT
Start: 2021-12-20 | End: 2021-12-23

## 2021-12-20 RX ORDER — POLYETHYLENE GLYCOL 3350 17 G/17G
17 POWDER, FOR SOLUTION ORAL DAILY
Refills: 0 | Status: DISCONTINUED | OUTPATIENT
Start: 2021-12-20 | End: 2021-12-23

## 2021-12-20 RX ORDER — LISINOPRIL 2.5 MG/1
5 TABLET ORAL DAILY
Refills: 0 | Status: DISCONTINUED | OUTPATIENT
Start: 2021-12-20 | End: 2021-12-23

## 2021-12-20 RX ORDER — PANTOPRAZOLE SODIUM 20 MG/1
40 TABLET, DELAYED RELEASE ORAL
Refills: 0 | Status: DISCONTINUED | OUTPATIENT
Start: 2021-12-20 | End: 2021-12-23

## 2021-12-20 RX ORDER — HYDROMORPHONE HYDROCHLORIDE 2 MG/ML
0.5 INJECTION INTRAMUSCULAR; INTRAVENOUS; SUBCUTANEOUS
Refills: 0 | Status: DISCONTINUED | OUTPATIENT
Start: 2021-12-20 | End: 2021-12-20

## 2021-12-20 RX ADMIN — MEROPENEM 100 MILLIGRAM(S): 1 INJECTION INTRAVENOUS at 05:08

## 2021-12-20 RX ADMIN — GABAPENTIN 300 MILLIGRAM(S): 400 CAPSULE ORAL at 21:07

## 2021-12-20 RX ADMIN — Medication 10 MILLIGRAM(S): at 05:16

## 2021-12-20 RX ADMIN — HYDROMORPHONE HYDROCHLORIDE 2 MILLIGRAM(S): 2 INJECTION INTRAMUSCULAR; INTRAVENOUS; SUBCUTANEOUS at 06:28

## 2021-12-20 RX ADMIN — ENOXAPARIN SODIUM 30 MILLIGRAM(S): 100 INJECTION SUBCUTANEOUS at 05:09

## 2021-12-20 RX ADMIN — SENNA PLUS 2 TABLET(S): 8.6 TABLET ORAL at 21:05

## 2021-12-20 RX ADMIN — SODIUM CHLORIDE 1 GRAM(S): 9 INJECTION INTRAMUSCULAR; INTRAVENOUS; SUBCUTANEOUS at 18:38

## 2021-12-20 RX ADMIN — Medication 166.67 MILLIGRAM(S): at 21:06

## 2021-12-20 RX ADMIN — SODIUM CHLORIDE 1 GRAM(S): 9 INJECTION INTRAMUSCULAR; INTRAVENOUS; SUBCUTANEOUS at 05:16

## 2021-12-20 RX ADMIN — MEROPENEM 100 MILLIGRAM(S): 1 INJECTION INTRAVENOUS at 21:06

## 2021-12-20 RX ADMIN — HYDROMORPHONE HYDROCHLORIDE 0.5 MILLIGRAM(S): 2 INJECTION INTRAMUSCULAR; INTRAVENOUS; SUBCUTANEOUS at 17:18

## 2021-12-20 RX ADMIN — ENOXAPARIN SODIUM 30 MILLIGRAM(S): 100 INJECTION SUBCUTANEOUS at 18:37

## 2021-12-20 RX ADMIN — PANTOPRAZOLE SODIUM 40 MILLIGRAM(S): 20 TABLET, DELAYED RELEASE ORAL at 05:09

## 2021-12-20 RX ADMIN — Medication 2 TABLET(S): at 05:15

## 2021-12-20 RX ADMIN — HYDROMORPHONE HYDROCHLORIDE 0.5 MILLIGRAM(S): 2 INJECTION INTRAMUSCULAR; INTRAVENOUS; SUBCUTANEOUS at 16:48

## 2021-12-20 RX ADMIN — HYDROMORPHONE HYDROCHLORIDE 2 MILLIGRAM(S): 2 INJECTION INTRAMUSCULAR; INTRAVENOUS; SUBCUTANEOUS at 05:08

## 2021-12-20 RX ADMIN — Medication 10 MILLIGRAM(S): at 21:05

## 2021-12-20 RX ADMIN — Medication 166.67 MILLIGRAM(S): at 06:41

## 2021-12-20 RX ADMIN — PANTOPRAZOLE SODIUM 40 MILLIGRAM(S): 20 TABLET, DELAYED RELEASE ORAL at 18:37

## 2021-12-20 RX ADMIN — GABAPENTIN 300 MILLIGRAM(S): 400 CAPSULE ORAL at 05:09

## 2021-12-20 RX ADMIN — Medication 2 TABLET(S): at 18:38

## 2021-12-20 NOTE — PROGRESS NOTE ADULT - SUBJECTIVE AND OBJECTIVE BOX
Patient seen and examined at bedside. comfortable in bed, pain controlled. Patient reports subjective improvement of b/l knees. No complaints at this time.    Vital Signs Last 24 Hrs  T(C): 36.8 (20 Dec 2021 05:25), Max: 37.8 (19 Dec 2021 12:00)  T(F): 98.3 (20 Dec 2021 05:25), Max: 100.1 (19 Dec 2021 17:23)  HR: 82 (20 Dec 2021 05:25) (82 - 88)  BP: 121/63 (20 Dec 2021 05:25) (105/63 - 121/63)  BP(mean): --  RR: 18 (20 Dec 2021 05:25) (18 - 18)  SpO2: 91% (20 Dec 2021 05:25) (91% - 95%)    RLE: Steri-strips C/D/I. No erythema. SILT. + dorsi/plantarflexion. calf soft, supple. + active flexion of knee to 20 degrees with no pain  LLE: Sutures C/D/I. no erythema to knee. minimal gross lateral tenderness to palpation, otherwise no TTP to knee. SILT. + FHL, patient unable to dorsiflex. multiple wound vacs noted, functioning. DP 2+.                          7.2    9.22  )-----------( 388      ( 19 Dec 2021 05:53 )             24.0     A/P: 51 y.o F s/p repeat right knee I&D POD #5  D/W Dr. Jatinder GARCIA ordered for left lower extremity  - cont abx as per ID  - wound vac management as per primary team  - H/H management as per primary team  - cont care primary team

## 2021-12-20 NOTE — PROGRESS NOTE ADULT - ASSESSMENT
51F no PMHx s/p multiple LLE procedures    - monitor fever curve  - await MRI of LLE, r/o osteomyelitis  - plan for OR washout every few days prn  - monitor fever curve  - ID following, current regimen: meropenum, bactrim, vancomycin   - wound vac management   - continue pain control   - DVTppx  - WBAT RLE, WBAT LLE in CAM boot  - Left knee aspirate cultures negative to date from 12/1/21

## 2021-12-20 NOTE — PROGRESS NOTE ADULT - ASSESSMENT
51y Female present to ED with left leg swelling, erythema, pain. Patient endorses she had left knee pain for 1 week presented 3 days ago to ED  where she states was given an steroid shot, and ibuprofen. however pain and edema worsened. Patient states she wasnt feeling herself today reason why she came. Endorses chills initially with pain 3 days ago but none since, denies history of trauma, insect bites, recent interventions, or injections to the area, contact with ticks, history of diabetes.  PT AS ABOVE WITH LEFT LEG SWELLING PT WITH INCREASED WBC PLACED ON ABX FOR PRESUMED INFECTION  PT WITH CT SCAN WITH FASCIAL EDAM PT BROUGHT TO THE OR EMERGENTLY AND  UNDERWENT FASCIOTOMY   PT BROUGHT TO THE OR  BOTH TRAUMA AND ORTHO INVOLVED  PURULENT FLUID FOUND  NECROTIZING SOFT TISSUE INFECTION    BLOOD CX WITH GROUP A STREP AND  OPERATIVE FLUID WITH STREP   PT WAS  ON PCN AND CLINDA for POSSIBLE ANTITOXIN EFFECT IN GROUP A STREP INFECTION     PT  S/P OR WASHOUT 12.2  AND  OR AGAIN 12/7  and late on 12/10 121/15 12/17    REPEAT   OPERATIVE CX NEG       ON MERREM VANCO   AND CONTINUE ORAL BACTRIM FOR STENOTROPHOMONAS    FOR REPEAT VAC CHANGE  OVERALL NON TOXIC WILL FOLLOW UP

## 2021-12-20 NOTE — PROGRESS NOTE ADULT - SUBJECTIVE AND OBJECTIVE BOX
INFECTIOUS DISEASES AND INTERNAL MEDICINE at [facility]  =======================================================  Theo Morrow MD  Diplomates American Board of Internal Medicine and Infectious Diseases  Telephone [phone]  Fax            [phone]  =======================================================    LUIS FERNANDO DAVIS [id number]  Follow up: group A STREP NECROTIZING SOFT TISSUE INFECTION     repeat CT scan of lower ext with collections.   S/P OR WASHOUT 12/2  AND   OR AGAIN  12/7   late  on 12/10 12/15 12/17     AFEBRILE THIS MORNING      Allergies:  No Known Allergies       FAMILY   FAMILY HISTORY:  FH: HTN (hypertension) (Mother)      REVIEW OF SYSTEMS:  CONSTITUTIONAL:  No Fever or chills  HEENT:   No diplopia or blurred vision.  No earache, sore throat or runny nose.  CARDIOVASCULAR:  No pressure, squeezing, strangling, tightness, heaviness or aching about the chest, neck, axilla or epigastrium.  RESPIRATORY:  No cough, shortness of breath, PND or orthopnea.  GASTROINTESTINAL:  No nausea, vomiting or diarrhea.  GENITOURINARY:  No dysuria, frequency or urgency. No Blood in urine  MUSCULOSKELETAL:   AS PER HPI   SKIN:  No change in skin, hair or nails.  NEUROLOGIC:  No paresthesias, fasciculations, seizures or weakness.  PSYCHIATRIC:  No disorder of thought or mood.  ENDOCRINE:  No heat or cold intolerance, polyuria or polydipsia.  HEMATOLOGICAL:  No easy bruising or bleeding.            Physical Exam:     GEN: NAD,    HEENT: normocephalic and atraumatic. EOMI. ASHISH.    NECK: Supple. No carotid bruits.  No lymphadenopathy or thyromegaly.  LUNGS: Clear to auscultation.  HEART: Regular rate and rhythm without murmur.  ABDOMEN: Soft, nontender, and nondistended.  Positive bowel sounds.    : No CVA tenderness  EXTREMITIES: LEFT LEG WRAPPED; left thigh vac in place  right knee with dressing in place       NEUROLOGIC:  C AWAKE ALERT Appropriate affect .  SKIN: No ulceration or induration present.           Vitals:  ==== Vital Signs Last 24 Hrs  T(C): 36.9 (15 Dec 2021 09:12), Max: 38.6 (14 Dec 2021 17:40)  T(F): 98.5 (15 Dec 2021 09:12), Max: 101.4 (14 Dec 2021 17:40)  HR: 79 (15 Dec 2021 09:12) (76 - 97)  BP: 125/80 (15 Dec 2021 09:12) (107/62 - 132/70)  BP(mean): --  RR: 16 (15 Dec 2021 09:12) (16 - 16)  SpO2: 92% (15 Dec 2021 09:12) (92% - 98%)      =======================================================  Current Antibiotics:  bactrim  piperacillin/tazobactam IVPB.. 4.5 Gram(s) IV Intermittent every 8 hours    Other medications:  baclofen 10 milliGRAM(s) Oral three times a day  collagenase Ointment 1 Application(s) Topical two times a day  gabapentin 200 milliGRAM(s) Oral three times a day  glucagon  Injectable 1 milliGRAM(s) IntraMuscular once  heparin   Injectable 5000 Unit(s) SubCutaneous every 8 hours  lisinopril 5 milliGRAM(s) Oral daily  methocarbamol 750 milliGRAM(s) Oral three times a day  pantoprazole    Tablet 40 milliGRAM(s) Oral two times a day  polyethylene glycol 3350 17 Gram(s) Oral daily  senna 2 Tablet(s) Oral at bedtime  sodium chloride 1 Gram(s) Oral every 12 hours  sodium chloride 0.9%. 1000 milliLiter(s) IV Continuous <Continuous>      =======================================================  Labs:                                                                       7.2    9.22  )-----------( 388      ( 19 Dec 2021 05:53 )             24.0   12-19    132<L>  |  101  |  4.0<L>  ----------------------------<  78  4.7   |  19.0<L>  |  0.64    Ca    8.3<L>      19 Dec 2021 05:53  Phos  3.9     12-19  Mg     1.8     12-19                Culture - Blood (collected 11-28-21 @ 09:14)  Source: .Blood Blood-Peripheral    Culture - Blood (collected 11-28-21 @ 09:14)  Source: .Blood Blood-Peripheral    Culture - Fungal, Body Fluid (collected 11-24-21 @ 13:35)  Source: .Body Fluid Right Knee Fluid    Culture - Acid Fast - Body Fluid w/Smear (collected 11-24-21 @ 13:35)  Source: .Body Fluid Right Knee Fluid    Culture - Body Fluid with Gram Stain (collected 11-24-21 @ 13:35)  Source: .Body Fluid Right Knee Fluid  Gram Stain (11-25-21 @ 01:27):    polymorphonuclear leukocytes seen per low power field    No organisms seen per oil power field    Culture - Surgical Swab (collected 11-24-21 @ 12:30)  Source: .Surgical Swab Swab Right Knee #2  Final Report (11-29-21 @ 07:55):    No growth at 5 days    Culture - Surgical Swab (collected 11-24-21 @ 12:30)  Source: .Surgical Swab Swab Right Knee #1  Final Report (11-29-21 @ 07:56):    No growth at 5 days    Culture - Surgical Swab (collected 11-24-21 @ 12:28)  Source: .Surgical Swab Swab Right Knee #3  Final Report (11-29-21 @ 08:01):    No growth at 5 days    Culture - Body Fluid with Gram Stain (collected 11-23-21 @ 12:50)  Source: .Body Fluid Knee Fluid  Gram Stain (11-23-21 @ 23:34):    polymorphonuclear leukocytes seen    No organisms seen    by cytocentrifuge    Culture - Blood (collected 11-22-21 @ 08:44)  Source: .Blood Blood  Final Report (11-27-21 @ 09:00):    No growth at 5 days.    Culture - Blood (collected 11-20-21 @ 12:57)  Source: .Blood Blood  Final Report (11-25-21 @ 13:00):    No growth at 5 days.    Culture - Body Fluid with Gram Stain (collected 11-18-21 @ 16:20)  Source: .Body Fluid OR Posterior Calf Left Lower Extremity Fluid  Gram Stain (11-18-21 @ 21:39):    polymorphonuclear leukocytes seen    Gram Positive Cocci in Pairs and Chains seen    by cytocentrifuge  Final Report (11-23-21 @ 09:40):    Moderate Streptococcus pyogenes (Group A) Penicillin and ampicillin are    drugs of choice for    treatment of beta-hemolytic streptococcal infections.    Susceptibility testing is not performed routinely because    S. pyogenes (GAS) is universally susceptible to penicillin    and resistance in other strains is extremely rare.    Culture - Body Fluid with Gram Stain (collected 11-18-21 @ 16:20)  Source: .Body Fluid OR Swab Left Knee Lower Extremity Fluid  Gram Stain (11-18-21 @ 19:36):    polymorphonuclear leukocytes seen    Gram Positive Cocci in Pairs and Chains seen    by cytocentrifuge  Final Report (11-23-21 @ 09:41):    Numerous Streptococcus pyogenes (Group A) Penicillin and ampicillin are    drugs of choice for    treatment of beta-hemolytic streptococcal infections.    Susceptibility testing is not performed routinely because    S. pyogenes (GAS) is universally susceptible to penicillin    and resistance in other strains is extremely rare.    Culture - Body Fluid with Gram Stain (collected 11-18-21 @ 16:20)  Source: .Body Fluid OR - Left Lower Extremity Fluid  Gram Stain (11-18-21 @ 20:10):    No polymorphonuclear leukocytes seen    No organisms seen    by cytocentrifuge  Final Report (11-23-21 @ 09:40):    Rare Streptococcus pyogenes (Group A)    Penicillin and ampicillin are drugs of choice for treatment of    beta-hemolytic streptococcal infections.    Susceptibility testing is not performed routinely because S. pyogenes    (GAS) is universally susceptibleto penicillin    and resistance in other strains is extremely rare.    Culture - Urine (collected 11-18-21 @ 10:07)  Source: Catheterized Catheterized  Final Report (11-19-21 @ 07:03):    No growth    Culture - Urine (collected 11-18-21 @ 00:25)  Source: Catheterized Catheterized  Final Report (11-18-21 @ 22:09):    <10,000 CFU/mL Normal Urogenital Aimee    Culture - Blood (collected 11-17-21 @ 14:45)  Source: .Blood Blood  Gram Stain (11-18-21 @ 09:44):    Growth in aerobic and anaerobic bottles: Gram Positive Cocci in Pairs and    Chains    Gram Stain performed by:    [facility]    [address] [place] [id number]    .    TYPE: (C=Critical, N=Notification, A=Abnormal) C    TESTS:  _ GS    DATE/TIME CALLED: _ 11/18/2021 09:42:19    CALLED TO: Chris Hernandez RN    READ BACK (2 Patient Identifiers)(Y/N): _ Y    READ BACK VALUES (Y/N): _ Y    CALLED BY: Chris Quiñones  Final Report (11-21-21 @ 12:28):    Growth in aerobic and anaerobic bottles: Streptococcus pyogenes (Group A)    See previous culture [id number]    Culture - Blood (collected 11-17-21 @ 14:45)  Source: .Blood Blood  Gram Stain (11-18-21 @ 09:40):    Growth in aerobic and anaerobic bottles: Gram Positive Cocci in Pairs and    Chains    ***Blood Panel PCR results on this specimen are available    approximately 3 hours after the Gram stain result.***    Gram stain, PCR, and/or culture results may not always    correspond due to difference in methodologies.    ************************************************************    This PCR assay was performed using [employer].    The following targets are tested for: Enterococcus,    vancomycin resistant enterococci, Listeria monocytogenes,    coagulase negative staphylococci, S. aureus,    methicillin resistant S. aureus, Streptococcus agalactiae    (Group B), S. pneumoniae, S. pyogenes (Group A),    Acinetobacter baumannii, Enterobacter cloacae, E. coli,    Klebsiella oxytoca, K. pneumoniae, Proteus sp.,    Serratia marcescens, Haemophilus influenzae,    Neisseria meningitidis, Pseudomonas aeruginosa, Candida    albicans, C. glabrata, C krusei, C parapsilosis,    C. tropicalis and the KPC resistance gene.    Gram Stain and BCID performed by:    [facility]    [address] [place] [id number]    .    TYPE: (C=Critical, N=Notification, A=Abnormal) C    TESTS:  _ GS    DATE/TIME CALLED: _ 11/18/2021 09:40:08    CALLED TO: Chris Hernandez RN    READ BACK (2 Patient Identifiers)(Y/N): _ Y    READ BACK VALUES (Y/N): _ Y    CALLED BY: Chris Quiñones  Final Report (11-21-21 @ 12:28):    Growth in aerobic and anaerobic bottles: Streptococcus pyogenes (Group A)  Organism: Blood Culture PCR  Streptococcus pyogenes (Group A) (11-21-21 @ 12:28)  Organism: Streptococcus pyogenes (Group A) (11-21-21 @ 12:28)    Sensitivities:      -  Ceftriaxone: S 0.016      -  Penicillin: S 0.016 Predicts results for ampicillin, amoxicillin, amoxicillin/clavulanate, ampicillin/sulbactam, 1st, 2nd and 3rd generation cephalosporins and carbapenems.      Method Type: ETEST  Organism: Streptococcus pyogenes (Group A) (11-21-21 @ 12:28)    Sensitivities:      -  Vancomycin: S      Method Type: KB  Organism: Blood Culture PCR (11-21-21 @ 12:28)    Sensitivities:      -  Streptococcus pyogenes (Group A): Detec      Method Type: PCR      Creatinine, Serum: 0.75 mg/dL (11-30-21 @ 07:18)  Creatinine, Serum: 0.68 mg/dL (11-29-21 @ 09:55)  Creatinine, Serum: 0.64 mg/dL (11-28-21 @ 09:14)  Creatinine, Serum: 0.65 mg/dL (11-27-21 @ 11:51)  Creatinine, Serum: 0.57 mg/dL (11-27-21 @ 05:35)  Creatinine, Serum: 0.61 mg/dL (11-26-21 @ 09:01)        Ferritin, Serum: 388 ng/mL (11-17-21 @ 13:21)    C-Reactive Protein, Serum: 197 mg/L (11-26-21 @ 21:52)  C-Reactive Protein, Serum: 211 mg/L (11-26-21 @ 09:01)  C-Reactive Protein, Serum: 53 mg/L (11-23-21 @ 07:42)  C-Reactive Protein, Serum: >422 mg/L (11-17-21 @ 13:21)    Sedimentation Rate, Erythrocyte: 65 mm/hr (11-26-21 @ 21:52)  Sedimentation Rate, Erythrocyte: 63 mm/hr (11-26-21 @ 09:01)  Sedimentation Rate, Erythrocyte: 55 mm/hr (11-17-21 @ 13:21)    WBC Count: 14.55 K/uL (11-30-21 @ 07:18)  WBC Count: 16.71 K/uL (11-29-21 @ 09:54)  WBC Count: 17.02 K/uL (11-28-21 @ 09:14)  WBC Count: 15.99 K/uL (11-27-21 @ 05:33)  WBC Count: 18.73 K/uL (11-26-21 @ 09:01)      COVID-19 PCR: NotDetec (11-30-21 @ 01:59)  COVID-19 PCR: NotDetec (11-23-21 @ 18:50)  COVID-19 PCR: NotDetec (11-17-21 @ 13:18)    Lactate Dehydrogenase, Serum: 769 U/L (11-20-21 @ 03:56)    Alkaline Phosphatase, Serum: 88 U/L (11-27-21 @ 05:35)  Alanine Aminotransferase (ALT/SGPT): 11 U/L (11-27-21 @ 05:35)  Aspartate Aminotransferase (AST/SGOT): 25 U/L (11-27-21 @ 05:35)  Bilirubin Total, Serum: 0.3 mg/dL (11-27-21 @ 05:35)        < from: CT Lower Extremity w/ IV Cont, Bilateral (11.30.21 @ 09:55) >     EXAM:  CT LWR EXT IC BI                          PROCEDURE DATE:  11/30/2021          INTERPRETATION:  CT LOWER EXTREMITY WITH IV CONTRAST BILATERAL dated 11/30/2021 9:55 AM    INDICATION: Fever. Status post incision and drainage bilaterally. Persistent fevers to 104.    COMPARISON: CT of the right knee dated 11/23/2021. CT of the left lower extremity dated 11/17/2021    TECHNIQUE: CT imaging of the bilateral lower extremities was performed with contrast. The data was reformatted in the axial,coronal, and sagittal planes.  Contrast: Omnipaque 300. Administered: 96 cc. Discarded: 4 cc.    FINDINGS:    OSSEOUS STRUCTURES: Mild degenerative changes at the lower lumbar spine including sclerosis and narrowing at the facet joints. Moderate sclerosis at the right lower sacroiliac joint. Moderate narrowing the bilateral hip joint spaces which is worse posteriorly. There is marginal productive changes. Moderate narrowing at the pubic symphysis. Small cysts appreciated at the left patella suggesting overlying cartilage loss. Postsurgical changes noted at the left distal fibula with a small screw tracts through the distal fibula. A portion of the distal fibula is exposed by an overlying skin wound. No fracture or osteonecrosis is seen. Thereis no periosteal reaction or large erosion appreciated.  SYNOVIUM/ JOINT FLUID: There is a large left knee joint effusion containing several small new locules of gas. A moderate right knee joint effusion is identified. No hip joint effusion is seen.  TENDONS: The tendons are intact.  MUSCLES: There is hypodensity within the left soleus and gastrocnemius musculature. Confluent edema is seen between the soleus and gastrocnemius musculature. Moderate edema is noted in the left anterior compartment calf musculature. There is suggestion of rim enhancement, new compared to the prior exam. There is a hazy appearance of the right facets and lateralis and tensor fascia yohan muscle. Moderate hazy hypodensity throughout the left sartorius and left vastus lateralis muscle. Edema overlies the left rectus femoris muscle.  NEUROVASCULAR STRUCTURES: Mild vascular calcifications.  INTRAPELVIC SOFT TISSUES: Multiple uterine masses are noted without enhancement likely degenerating uterine fibroids, unchanged.  SUBCUTANEOUS SOFT TISSUES: There is diffuse soft tissue swelling and skin thickening. There are skin wounds along the medial lateral aspect of the left calf. No subcutaneous fluid collection is noted.    3-D reformatted imaging confirms these findings.    IMPRESSION:    1.  Soft tissue swelling bilaterally. Medial lateral left calf skin wounds possibly from prior surgery or infection. Nonspecific but can be seen with cellulitis.  2.  Exposure of the left distal fibula by an overlying skin wound raises concern for osteomyelitis. New compared to the prior study.  3.  Large left knee joint effusion with several new foci of gas. Differential considerations include recent aspiration and infection. Correlate clinically.  4.  Hypodensity throughout the left calf musculature in the bilateral hip musculature as detailed above as can be seen with myositis.  5.  Suggestion of rim enhancement involving the left anterior calf musculature concerning for early abscess formation  6.  Edema between the left medial gastrocnemius and soleus musculature, likely infectious  7.  Heterogeneous masses in the uterus, likely degenerating fibroids.    Findings were discussed with Dr. Cortez of Trauma on 11/30/2021 10:42 AM  with read back.    --- End of Report ---            LOREN ALEXANDER MD; Attending Radiologist  This document has been electronically signed. Nov 30 2021 10:42AM    < end of copied text >

## 2021-12-20 NOTE — CHART NOTE - NSCHARTNOTEFT_GEN_A_CORE
Source: Patient [ ]  Family [ ]   other [ ]  51F no PMHx s/p multiple LLE procedures    Current Diet: Diet, NPO:   Except Medications (12-20-21 @ 07:18)   for procedure      Patient reports [ ] nausea  [ ] vomiting [ ] diarrhea [ ] constipation  [ ]chewing problems [ ] swallowing issues  [ ] other:     PO intake:  < 50% [ ]   50-75%  [ ]   %  [x ]  other :    Source for PO intake [ x] Patient [ ] family [ ] chart [ ] staff [ ] other    Current Weight:      (12/8) 251.9 lbs  (12/7) 205.9 lbs  (12/1) 234.7 lbs  (11/24) 221.3 lbs  (11/22) 245.1 lbs  (11/21) 242.9 lbs  (11/20) 228.1 lbs    ? accuracy of weights, noted with 2+ R leg, R ankle, R foot; 3+ L foot, L ankle, L leg edema noted, continue to trend and maintain strict Is&Os       Pertinent Medications: MEDICATIONS  (STANDING):  baclofen 10 milliGRAM(s) Oral three times a day  enoxaparin Injectable 30 milliGRAM(s) SubCutaneous two times a day  gabapentin 300 milliGRAM(s) Oral three times a day  insulin lispro (ADMELOG) corrective regimen sliding scale   SubCutaneous Before meals and at bedtime  lisinopril 5 milliGRAM(s) Oral daily  meropenem  IVPB 1000 milliGRAM(s) IV Intermittent every 8 hours  pantoprazole    Tablet 40 milliGRAM(s) Oral two times a day  polyethylene glycol 3350 17 Gram(s) Oral daily  senna 2 Tablet(s) Oral at bedtime  sodium chloride 1 Gram(s) Oral every 12 hours  trimethoprim  160 mG/sulfamethoxazole 800 mG 2 Tablet(s) Oral every 12 hours  vancomycin  IVPB 1250 milliGRAM(s) IV Intermittent every 12 hours    MEDICATIONS  (PRN):  HYDROmorphone   Tablet 2 milliGRAM(s) Oral every 4 hours PRN Severe Pain (7 - 10)    Pertinent Labs: CBC Full  -  ( 19 Dec 2021 05:53 )  WBC Count : 9.22 K/uL  RBC Count : 2.63 M/uL  Hemoglobin : 7.2 g/dL  Hematocrit : 24.0 %  Platelet Count - Automated : 388 K/uL  Mean Cell Volume : 91.3 fl  Mean Cell Hemoglobin : 27.4 pg  Mean Cell Hemoglobin Concentration : 30.0 gm/dL  Auto Neutrophil # : 5.18 K/uL  Auto Lymphocyte # : 2.90 K/uL  Auto Monocyte # : 0.82 K/uL  Auto Eosinophil # : 0.23 K/uL  Auto Basophil # : 0.06 K/uL  Auto Neutrophil % : 56.1 %  Auto Lymphocyte % : 31.5 %  Auto Monocyte % : 8.9 %  Auto Eosinophil % : 2.5 %  Auto Basophil % : 0.7 %    12-19 Na132 mmol/L<L> Glu 78 mg/dL K+ 4.7 mmol/L Cr  0.64 mg/dL BUN 4.0 mg/dL<L> Phos 3.9 mg/dL Alb n/a   PAB n/a             Skin:  sx incision to L upper and lower extremity's and knee    Edema : 1+ R knee, 2+ feet    Nutrition focused physical exam conducted - found signs of malnutrition [ ]absent [x ]present    Subcutaneous fat loss: [ x] Orbital fat pads region, [ ]Buccal fat region, [ ]Triceps region,  [ ]Ribs region    Muscle wasting: [x ]Temples region, [x ]Clavicle region, [ ]Shoulder region, [ ]Scapula region, [ ]Interosseous region,  [ ]thigh region, [ ]Calf region    Estimated Needs:   [x ] no change since previous assessment  [ ] recalculated:     Current Nutrition Diagnosis: Pt remains at high nutrition risk secondary to malnutrition (moderate, acute) related to prolonged hospitalization and inability to meet increased nutrient needs (s/p LLE exploration and debridement, c/b hemorrhagic + septic shock, found to have GI bleed,  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 PT NPO for procedure.    Recommendations:   1) Continue diet as tolerated. Add Ensure Enlive TID to optimize po intake and provide an additional 350 kcal, 20g protein per serving; also suggest Swapnil BID to aid in healing (90 kcal, 14g amino acids per packet).  2) Rx: MVI and vitamin C 500mg daily.  3) Obtain daily weights to monitor trends.     Monitoring and Evaluation:   [ x] PO intake [x ] Tolerance to diet prescription [X] Weights  [X] Follow up per protocol [X] Labs:

## 2021-12-20 NOTE — PROGRESS NOTE ADULT - SUBJECTIVE AND OBJECTIVE BOX
SUBJECTIVE/24 hour events:  Patient is a 51yFemale with complicated necrotizing soft tissue infection lle requiring multiple procedures. Patient with no acute events overnight, Tmax 100.5, pain controlled on current regimen, awaiting MRI to r/o osteomyelitis.     Vital Signs Last 24 Hrs  T(C): 37.5 (19 Dec 2021 21:56), Max: 37.8 (19 Dec 2021 08:00)  T(F): 99.5 (19 Dec 2021 21:56), Max: 100.1 (19 Dec 2021 17:23)  HR: 88 (19 Dec 2021 21:56) (85 - 88)  BP: 105/63 (19 Dec 2021 21:56) (105/63 - 122/73)  BP(mean): --  RR: 18 (19 Dec 2021 21:56) (16 - 18)  SpO2: 95% (19 Dec 2021 17:23) (95% - 96%)  Drug Dosing Weight  Height (cm): 167 (17 Dec 2021 13:28)  Weight (kg): 93 (17 Dec 2021 13:28)  BMI (kg/m2): 33.3 (17 Dec 2021 13:28)  BSA (m2): 2.02 (17 Dec 2021 13:28)  I&O's Detail    18 Dec 2021 07:01  -  19 Dec 2021 07:00  --------------------------------------------------------  IN:    IV PiggyBack: 50 mL  Total IN: 50 mL    OUT:    Voided (mL): 1000 mL  Total OUT: 1000 mL    Total NET: -950 mL      19 Dec 2021 07:01  -  20 Dec 2021 05:21  --------------------------------------------------------  IN:    IV PiggyBack: 50 mL    IV PiggyBack: 50 mL    IV PiggyBack: 250 mL  Total IN: 350 mL    OUT:    Voided (mL): 2400 mL  Total OUT: 2400 mL    Total NET: -2050 mL        Allergies    No Known Allergies    Intolerances                              7.2    9.22  )-----------( 388      ( 19 Dec 2021 05:53 )             24.0   12-19    132<L>  |  101  |  4.0<L>  ----------------------------<  78  4.7   |  19.0<L>  |  0.64    Ca    8.3<L>      19 Dec 2021 05:53  Phos  3.9     12-19  Mg     1.8     12-19    TPro  6.3<L>  /  Alb  1.9<L>  /  TBili  <0.2<L>  /  DBili  x   /  AST  11  /  ALT  6   /  AlkPhos  62  12-18      ROS:    PHYSICAL EXAM:  Constitutional: in good spirits   Respiratory: no respiratory distress, no dyspnea  Gastrointestinal: abdomen soft, non-tender, atraumatic  Genitourinary: voiding spontaneously   Extremities: LLE: dressing C/D/I, sutures intact, No drainage or discharge. No erythema is noted  wound vacs noted anterior and lateral calf and ankle   patient able to wiggle toes  gross sensation is intact  Neurological: A&OX3          MEDICATIONS  (STANDING):  baclofen 10 milliGRAM(s) Oral three times a day  enoxaparin Injectable 30 milliGRAM(s) SubCutaneous two times a day  gabapentin 300 milliGRAM(s) Oral three times a day  insulin lispro (ADMELOG) corrective regimen sliding scale   SubCutaneous Before meals and at bedtime  lisinopril 5 milliGRAM(s) Oral daily  meropenem  IVPB 1000 milliGRAM(s) IV Intermittent every 8 hours  pantoprazole    Tablet 40 milliGRAM(s) Oral two times a day  polyethylene glycol 3350 17 Gram(s) Oral daily  senna 2 Tablet(s) Oral at bedtime  sodium chloride 1 Gram(s) Oral every 12 hours  trimethoprim  160 mG/sulfamethoxazole 800 mG 2 Tablet(s) Oral every 12 hours  vancomycin  IVPB 1250 milliGRAM(s) IV Intermittent every 12 hours    MEDICATIONS  (PRN):  HYDROmorphone   Tablet 2 milliGRAM(s) Oral every 4 hours PRN Severe Pain (7 - 10)      RADIOLOGY STUDIES:    CULTURES:

## 2021-12-21 LAB
ALBUMIN SERPL ELPH-MCNC: 2.3 G/DL — LOW (ref 3.3–5.2)
ALP SERPL-CCNC: 65 U/L — SIGNIFICANT CHANGE UP (ref 40–120)
ALT FLD-CCNC: 7 U/L — SIGNIFICANT CHANGE UP
ANION GAP SERPL CALC-SCNC: 11 MMOL/L — SIGNIFICANT CHANGE UP (ref 5–17)
AST SERPL-CCNC: 16 U/L — SIGNIFICANT CHANGE UP
BASOPHILS # BLD AUTO: 0.06 K/UL — SIGNIFICANT CHANGE UP (ref 0–0.2)
BASOPHILS NFR BLD AUTO: 0.7 % — SIGNIFICANT CHANGE UP (ref 0–2)
BILIRUB SERPL-MCNC: 0.2 MG/DL — LOW (ref 0.4–2)
BUN SERPL-MCNC: 7.9 MG/DL — LOW (ref 8–20)
CALCIUM SERPL-MCNC: 8.8 MG/DL — SIGNIFICANT CHANGE UP (ref 8.6–10.2)
CHLORIDE SERPL-SCNC: 98 MMOL/L — SIGNIFICANT CHANGE UP (ref 98–107)
CO2 SERPL-SCNC: 22 MMOL/L — SIGNIFICANT CHANGE UP (ref 22–29)
CREAT SERPL-MCNC: 0.55 MG/DL — SIGNIFICANT CHANGE UP (ref 0.5–1.3)
CULTURE RESULTS: SIGNIFICANT CHANGE UP
EOSINOPHIL # BLD AUTO: 0.2 K/UL — SIGNIFICANT CHANGE UP (ref 0–0.5)
EOSINOPHIL NFR BLD AUTO: 2.2 % — SIGNIFICANT CHANGE UP (ref 0–6)
GLUCOSE BLDC GLUCOMTR-MCNC: 151 MG/DL — HIGH (ref 70–99)
GLUCOSE SERPL-MCNC: 93 MG/DL — SIGNIFICANT CHANGE UP (ref 70–99)
HCT VFR BLD CALC: 24.6 % — LOW (ref 34.5–45)
HGB BLD-MCNC: 7.7 G/DL — LOW (ref 11.5–15.5)
IMM GRANULOCYTES NFR BLD AUTO: 0.4 % — SIGNIFICANT CHANGE UP (ref 0–1.5)
LYMPHOCYTES # BLD AUTO: 3.23 K/UL — SIGNIFICANT CHANGE UP (ref 1–3.3)
LYMPHOCYTES # BLD AUTO: 35.3 % — SIGNIFICANT CHANGE UP (ref 13–44)
MAGNESIUM SERPL-MCNC: 2 MG/DL — SIGNIFICANT CHANGE UP (ref 1.6–2.6)
MCHC RBC-ENTMCNC: 28.3 PG — SIGNIFICANT CHANGE UP (ref 27–34)
MCHC RBC-ENTMCNC: 31.3 GM/DL — LOW (ref 32–36)
MCV RBC AUTO: 90.4 FL — SIGNIFICANT CHANGE UP (ref 80–100)
MONOCYTES # BLD AUTO: 0.99 K/UL — HIGH (ref 0–0.9)
MONOCYTES NFR BLD AUTO: 10.8 % — SIGNIFICANT CHANGE UP (ref 2–14)
NEUTROPHILS # BLD AUTO: 4.64 K/UL — SIGNIFICANT CHANGE UP (ref 1.8–7.4)
NEUTROPHILS NFR BLD AUTO: 50.6 % — SIGNIFICANT CHANGE UP (ref 43–77)
PHOSPHATE SERPL-MCNC: 3.9 MG/DL — SIGNIFICANT CHANGE UP (ref 2.4–4.7)
PLATELET # BLD AUTO: 457 K/UL — HIGH (ref 150–400)
POTASSIUM SERPL-MCNC: 4.4 MMOL/L — SIGNIFICANT CHANGE UP (ref 3.5–5.3)
POTASSIUM SERPL-SCNC: 4.4 MMOL/L — SIGNIFICANT CHANGE UP (ref 3.5–5.3)
PROT SERPL-MCNC: 6.9 G/DL — SIGNIFICANT CHANGE UP (ref 6.6–8.7)
RBC # BLD: 2.72 M/UL — LOW (ref 3.8–5.2)
RBC # FLD: 19.3 % — HIGH (ref 10.3–14.5)
SODIUM SERPL-SCNC: 131 MMOL/L — LOW (ref 135–145)
SPECIMEN SOURCE: SIGNIFICANT CHANGE UP
WBC # BLD: 9.16 K/UL — SIGNIFICANT CHANGE UP (ref 3.8–10.5)
WBC # FLD AUTO: 9.16 K/UL — SIGNIFICANT CHANGE UP (ref 3.8–10.5)

## 2021-12-21 PROCEDURE — 99232 SBSQ HOSP IP/OBS MODERATE 35: CPT

## 2021-12-21 PROCEDURE — 99024 POSTOP FOLLOW-UP VISIT: CPT

## 2021-12-21 RX ORDER — KETOROLAC TROMETHAMINE 30 MG/ML
15 SYRINGE (ML) INJECTION ONCE
Refills: 0 | Status: DISCONTINUED | OUTPATIENT
Start: 2021-12-21 | End: 2021-12-21

## 2021-12-21 RX ADMIN — Medication 166.67 MILLIGRAM(S): at 21:48

## 2021-12-21 RX ADMIN — ENOXAPARIN SODIUM 30 MILLIGRAM(S): 100 INJECTION SUBCUTANEOUS at 17:30

## 2021-12-21 RX ADMIN — SODIUM CHLORIDE 1 GRAM(S): 9 INJECTION INTRAMUSCULAR; INTRAVENOUS; SUBCUTANEOUS at 17:29

## 2021-12-21 RX ADMIN — MEROPENEM 100 MILLIGRAM(S): 1 INJECTION INTRAVENOUS at 13:02

## 2021-12-21 RX ADMIN — HYDROMORPHONE HYDROCHLORIDE 2 MILLIGRAM(S): 2 INJECTION INTRAMUSCULAR; INTRAVENOUS; SUBCUTANEOUS at 22:57

## 2021-12-21 RX ADMIN — Medication 10 MILLIGRAM(S): at 06:13

## 2021-12-21 RX ADMIN — SODIUM CHLORIDE 1 GRAM(S): 9 INJECTION INTRAMUSCULAR; INTRAVENOUS; SUBCUTANEOUS at 06:12

## 2021-12-21 RX ADMIN — HYDROMORPHONE HYDROCHLORIDE 2 MILLIGRAM(S): 2 INJECTION INTRAMUSCULAR; INTRAVENOUS; SUBCUTANEOUS at 17:31

## 2021-12-21 RX ADMIN — Medication 2 TABLET(S): at 17:29

## 2021-12-21 RX ADMIN — PANTOPRAZOLE SODIUM 40 MILLIGRAM(S): 20 TABLET, DELAYED RELEASE ORAL at 17:29

## 2021-12-21 RX ADMIN — MEROPENEM 100 MILLIGRAM(S): 1 INJECTION INTRAVENOUS at 21:50

## 2021-12-21 RX ADMIN — GABAPENTIN 300 MILLIGRAM(S): 400 CAPSULE ORAL at 06:13

## 2021-12-21 RX ADMIN — HYDROMORPHONE HYDROCHLORIDE 2 MILLIGRAM(S): 2 INJECTION INTRAMUSCULAR; INTRAVENOUS; SUBCUTANEOUS at 00:07

## 2021-12-21 RX ADMIN — HYDROMORPHONE HYDROCHLORIDE 2 MILLIGRAM(S): 2 INJECTION INTRAMUSCULAR; INTRAVENOUS; SUBCUTANEOUS at 21:57

## 2021-12-21 RX ADMIN — HYDROMORPHONE HYDROCHLORIDE 2 MILLIGRAM(S): 2 INJECTION INTRAMUSCULAR; INTRAVENOUS; SUBCUTANEOUS at 01:07

## 2021-12-21 RX ADMIN — MEROPENEM 100 MILLIGRAM(S): 1 INJECTION INTRAVENOUS at 06:13

## 2021-12-21 RX ADMIN — HYDROMORPHONE HYDROCHLORIDE 2 MILLIGRAM(S): 2 INJECTION INTRAMUSCULAR; INTRAVENOUS; SUBCUTANEOUS at 18:01

## 2021-12-21 RX ADMIN — LISINOPRIL 5 MILLIGRAM(S): 2.5 TABLET ORAL at 06:14

## 2021-12-21 RX ADMIN — Medication 15 MILLIGRAM(S): at 02:40

## 2021-12-21 RX ADMIN — Medication 2 TABLET(S): at 06:12

## 2021-12-21 RX ADMIN — PANTOPRAZOLE SODIUM 40 MILLIGRAM(S): 20 TABLET, DELAYED RELEASE ORAL at 06:13

## 2021-12-21 RX ADMIN — Medication 10 MILLIGRAM(S): at 13:01

## 2021-12-21 RX ADMIN — Medication 15 MILLIGRAM(S): at 02:24

## 2021-12-21 RX ADMIN — Medication 10 MILLIGRAM(S): at 21:52

## 2021-12-21 RX ADMIN — GABAPENTIN 300 MILLIGRAM(S): 400 CAPSULE ORAL at 13:01

## 2021-12-21 RX ADMIN — ENOXAPARIN SODIUM 30 MILLIGRAM(S): 100 INJECTION SUBCUTANEOUS at 06:12

## 2021-12-21 RX ADMIN — GABAPENTIN 300 MILLIGRAM(S): 400 CAPSULE ORAL at 21:50

## 2021-12-21 RX ADMIN — Medication 166.67 MILLIGRAM(S): at 08:58

## 2021-12-21 NOTE — PROGRESS NOTE ADULT - SUBJECTIVE AND OBJECTIVE BOX
Bryce was awake and alert in bed as I fit her with a PRAFO for her left foot ankle, to maintain a more plantigrade position. Straps do not interfere with wound vac sites. Brace should be wiped clean daily. Contact info provided.   [facility]  [phone]

## 2021-12-21 NOTE — PROGRESS NOTE ADULT - ASSESSMENT
51y Female present to ED with left leg swelling, erythema, pain. Patient endorses she had left knee pain for 1 week presented 3 days ago to ED  where she states was given an steroid shot, and ibuprofen. however pain and edema worsened. Patient states she wasnt feeling herself today reason why she came. Endorses chills initially with pain 3 days ago but none since, denies history of trauma, insect bites, recent interventions, or injections to the area, contact with ticks, history of diabetes.  PT AS ABOVE WITH LEFT LEG SWELLING PT WITH INCREASED WBC PLACED ON ABX FOR PRESUMED INFECTION  PT WITH CT SCAN WITH FASCIAL EDAM PT BROUGHT TO THE OR EMERGENTLY AND  UNDERWENT FASCIOTOMY   PT BROUGHT TO THE OR  BOTH TRAUMA AND ORTHO INVOLVED  PURULENT FLUID FOUND  NECROTIZING SOFT TISSUE INFECTION    BLOOD CX WITH GROUP A STREP AND  OPERATIVE FLUID WITH STREP   PT WAS  ON PCN AND CLINDA for POSSIBLE ANTITOXIN EFFECT IN GROUP A STREP INFECTION     PT  S/P OR WASHOUT 12.2  AND  OR AGAIN 12/7  and late on 12/10 121/15 12/17 and 12/20    REPEAT   OPERATIVE CX NEG       ON MERREM VANCO   AND CONTINUE ORAL BACTRIM FOR STENOTROPHOMONAS    AS ABOVE  REPEAT  OR 12/20  NO PURULENCE   OVERALL NON TOXIC  AFEBRILE    WILL FOLLOW UP  CONTINUE IV ABX  WILL FOLLOW UP

## 2021-12-21 NOTE — PROGRESS NOTE ADULT - ATTENDING COMMENTS
S/p washout yesterday, no pus noted, muscles not reactive to electrocautery.   On rounds today: reports generally feels like LLE is "less heavy", demonstrates ability to move left foot and toes (albeit with severe weakness/diminished capability). Intact sensation along LLE.       --Continue current management.   --Likely return to OR for another look on Thursday; will consider STSG early next week if wounds continues to look clean.

## 2021-12-21 NOTE — PROGRESS NOTE ADULT - ATTENDING COMMENTS
pt seen and examined this am. overall well appearing. LLE incision c/d/i. knee rom 0-30 w/o micromotion tenderness. swelling improved, NTTP about the knee. no erythema. wound vacs in place. +foot drop. RLE knee rom 0-60, no micromotion tenderness. NVID. afebrile. Continue IV abx. AFO for LLE. PT for L knee ROM. care as per ACS. DVT ppx as per acs.  ortho to follow.

## 2021-12-21 NOTE — PROGRESS NOTE ADULT - ASSESSMENT
51F no PMHx s/p multiple LLE procedures now POD 1 dfor most recent clean washout, plan to go back on 11/22, conitnue wound vac therapy    - monitor fever curve  - await MRI of LLE, r/o osteomyelitis  - plan for OR washout every few days  - monitor fever curve  - ID following, current regimen: meropenum, bactrim, vancomycin   - wound vac management   - continue pain control   - DVTppx  - WBAT RLE, WBAT LLE in CAM boot  - Left knee aspirate cultures negative to date from 12/1/21

## 2021-12-21 NOTE — PROGRESS NOTE ADULT - SUBJECTIVE AND OBJECTIVE BOX
Pt Name: LUIS FERNANDO DAVIS    MRN: [id number]      Patient is a 51 year old female who being followed for bilateral septic knee (11/24), with MASON from left ankle (11/17).  patient also s/p I&D left leg by ACS team.  patient most recently POD 6 from I&D of left knee and aspiration of right knee.  Surgical cx and right knee aspiration are NGTD      PAST MEDICAL & SURGICAL HISTORY:  PAST MEDICAL & SURGICAL HISTORY:  No pertinent past medical history    History of ankle surgery        Allergies: No Known Allergies      Medications: baclofen 10 milliGRAM(s) Oral three times a day  enoxaparin Injectable 30 milliGRAM(s) SubCutaneous two times a day  gabapentin 300 milliGRAM(s) Oral three times a day  HYDROmorphone   Tablet 2 milliGRAM(s) Oral every 4 hours PRN  lisinopril 5 milliGRAM(s) Oral daily  meropenem  IVPB 1000 milliGRAM(s) IV Intermittent every 8 hours  pantoprazole    Tablet 40 milliGRAM(s) Oral two times a day  polyethylene glycol 3350 17 Gram(s) Oral daily  senna 2 Tablet(s) Oral at bedtime  sodium chloride 1 Gram(s) Oral every 12 hours  trimethoprim  160 mG/sulfamethoxazole 800 mG 2 Tablet(s) Oral every 12 hours  vancomycin  IVPB 1250 milliGRAM(s) IV Intermittent every 12 hours                  PHYSICAL EXAM:    Vital Signs Last 24 Hrs  T(C): 36.4 (21 Dec 2021 05:14), Max: 37.6 (20 Dec 2021 08:19)  T(F): 97.6 (21 Dec 2021 05:14), Max: 99.6 (20 Dec 2021 08:19)  HR: 73 (21 Dec 2021 05:14) (73 - 85)  BP: 110/67 (21 Dec 2021 05:14) (103/65 - 121/74)  BP(mean): --  RR: 18 (21 Dec 2021 05:14) (13 - 18)  SpO2: 95% (21 Dec 2021 05:14) (92% - 100%)  Daily Height in cm: 167 (20 Dec 2021 12:48)    Daily     Appearance: Alert, responsive, in no acute distress.    Neurological: Sensation is grossly intact to light touch. 5/5 motor function of all extremities. No focal deficits or weaknesses found.    Skin: no rash on visible skin. Skin is clean, dry and intact. No bleeding. No abrasions. No ulcerations.    Vascular: 2+ distal pulses. Cap refill < 2 sec. No signs of venous   insufficiency   or stasis. No extremity ulcerations. No cyanosis.    Musculoskeletal:       Right knee steri strips in place, no erythema, no warmth, no discharge,     Left leg positive foot drop, wound vacs in place to left lower leg  no discharge or erythema to knee       A/P:  Pt is a 51y Female POD 5 left knee I&D    PLAN:   * Will continue to follow up cx   Continue IV abx per ID  AFO brace ordered

## 2021-12-21 NOTE — PROGRESS NOTE ADULT - SUBJECTIVE AND OBJECTIVE BOX
This note to also serve as postop check  HPI/OVERNIGHT EVENTS: Patient seen and examined at bedside this AM. POD 1 for most recent washout, no purulence found, afebrile, VSS,, voiding, sill endorses pain    Vital Signs Last 24 Hrs  T(C): 37 (20 Dec 2021 22:10), Max: 37.6 (20 Dec 2021 08:19)  T(F): 98.6 (20 Dec 2021 22:10), Max: 99.6 (20 Dec 2021 08:19)  HR: 85 (20 Dec 2021 22:10) (78 - 85)  BP: 103/65 (20 Dec 2021 22:10) (103/65 - 121/74)  BP(mean): --  RR: 16 (20 Dec 2021 22:10) (13 - 18)  SpO2: 92% (20 Dec 2021 22:10) (91% - 100%)    I&O's Detail    19 Dec 2021 07:01  -  20 Dec 2021 07:00  --------------------------------------------------------  IN:    IV PiggyBack: 50 mL    IV PiggyBack: 500 mL    IV PiggyBack: 100 mL  Total IN: 650 mL    OUT:    Voided (mL): 3500 mL  Total OUT: 3500 mL    Total NET: -2850 mL      20 Dec 2021 07:01  -  21 Dec 2021 01:52  --------------------------------------------------------  IN:  Total IN: 0 mL    OUT:    Voided (mL): 775 mL  Total OUT: 775 mL    Total NET: -775 mL          Constitutional: patient resting comfortably in bed, in no acute distress      LABS:                        7.2    9.22  )-----------( 388      ( 19 Dec 2021 05:53 )             24.0     12-19    132<L>  |  101  |  4.0<L>  ----------------------------<  78  4.7   |  19.0<L>  |  0.64    Ca    8.3<L>      19 Dec 2021 05:53  Phos  3.9     12-19  Mg     1.8     12-19            MEDICATIONS  (STANDING):  baclofen 10 milliGRAM(s) Oral three times a day  enoxaparin Injectable 30 milliGRAM(s) SubCutaneous two times a day  gabapentin 300 milliGRAM(s) Oral three times a day  lisinopril 5 milliGRAM(s) Oral daily  meropenem  IVPB 1000 milliGRAM(s) IV Intermittent every 8 hours  pantoprazole    Tablet 40 milliGRAM(s) Oral two times a day  polyethylene glycol 3350 17 Gram(s) Oral daily  senna 2 Tablet(s) Oral at bedtime  sodium chloride 1 Gram(s) Oral every 12 hours  trimethoprim  160 mG/sulfamethoxazole 800 mG 2 Tablet(s) Oral every 12 hours  vancomycin  IVPB 1250 milliGRAM(s) IV Intermittent every 12 hours    MEDICATIONS  (PRN):  HYDROmorphone   Tablet 2 milliGRAM(s) Oral every 4 hours PRN Severe Pain (7 - 10)      MICRO:   Cultures     STUDIES:   EKG, CXR, U/S, CT, MRI

## 2021-12-21 NOTE — PROGRESS NOTE ADULT - SUBJECTIVE AND OBJECTIVE BOX
INFECTIOUS DISEASES AND INTERNAL MEDICINE at [place]  =======================================================  Theo Morrow MD  Diplomates American Board of Internal Medicine and Infectious Diseases  Telephone [phone]  Fax            [phone]  =======================================================    LUIS FERNANDO DAVIS [id number]  Follow up: group A STREP NECROTIZING SOFT TISSUE INFECTION     repeat CT scan of lower ext with collections.   S/P OR WASHOUT 12/2  AND   OR AGAIN  12/7   late  on 12/10 12/15 12/17  12/20    AFEBRILE THIS MORNING      Allergies:  No Known Allergies       FAMILY   FAMILY HISTORY:  FH: HTN (hypertension) (Mother)      REVIEW OF SYSTEMS:  CONSTITUTIONAL:  No Fever or chills  HEENT:   No diplopia or blurred vision.  No earache, sore throat or runny nose.  CARDIOVASCULAR:  No pressure, squeezing, strangling, tightness, heaviness or aching about the chest, neck, axilla or epigastrium.  RESPIRATORY:  No cough, shortness of breath, PND or orthopnea.  GASTROINTESTINAL:  No nausea, vomiting or diarrhea.  GENITOURINARY:  No dysuria, frequency or urgency. No Blood in urine  MUSCULOSKELETAL:   AS PER HPI   SKIN:  No change in skin, hair or nails.  NEUROLOGIC:  No paresthesias, fasciculations, seizures or weakness.  PSYCHIATRIC:  No disorder of thought or mood.  ENDOCRINE:  No heat or cold intolerance, polyuria or polydipsia.  HEMATOLOGICAL:  No easy bruising or bleeding.            Physical Exam:     GEN: NAD,    HEENT: normocephalic and atraumatic. EOMI. ASHISH.    NECK: Supple. No carotid bruits.  No lymphadenopathy or thyromegaly.  LUNGS: Clear to auscultation.  HEART: Regular rate and rhythm without murmur.  ABDOMEN: Soft, nontender, and nondistended.  Positive bowel sounds.    : No CVA tenderness  EXTREMITIES: LEFT LEG WRAPPED; left thigh vac in place  right knee with dressing in place       NEUROLOGIC:  C AWAKE ALERT Appropriate affect .  SKIN: No ulceration or induration present.           Vitals:  ==== Vital Signs Last 24 Hrs  T(C): 36.9 (15 Dec 2021 09:12), Max: 38.6 (14 Dec 2021 17:40)  T(F): 98.5 (15 Dec 2021 09:12), Max: 101.4 (14 Dec 2021 17:40)  HR: 79 (15 Dec 2021 09:12) (76 - 97)  BP: 125/80 (15 Dec 2021 09:12) (107/62 - 132/70)  BP(mean): --  RR: 16 (15 Dec 2021 09:12) (16 - 16)  SpO2: 92% (15 Dec 2021 09:12) (92% - 98%)      =======================================================  Current Antibiotics:  bactrim  piperacillin/tazobactam IVPB.. 4.5 Gram(s) IV Intermittent every 8 hours    Other medications:  baclofen 10 milliGRAM(s) Oral three times a day  collagenase Ointment 1 Application(s) Topical two times a day  gabapentin 200 milliGRAM(s) Oral three times a day  glucagon  Injectable 1 milliGRAM(s) IntraMuscular once  heparin   Injectable 5000 Unit(s) SubCutaneous every 8 hours  lisinopril 5 milliGRAM(s) Oral daily  methocarbamol 750 milliGRAM(s) Oral three times a day  pantoprazole    Tablet 40 milliGRAM(s) Oral two times a day  polyethylene glycol 3350 17 Gram(s) Oral daily  senna 2 Tablet(s) Oral at bedtime  sodium chloride 1 Gram(s) Oral every 12 hours  sodium chloride 0.9%. 1000 milliLiter(s) IV Continuous <Continuous>      =======================================================  Labs:                                                                       7.2    9.22  )-----------( 388      ( 19 Dec 2021 05:53 )             24.0   12-19    132<L>  |  101  |  4.0<L>  ----------------------------<  78  4.7   |  19.0<L>  |  0.64    Ca    8.3<L>      19 Dec 2021 05:53  Phos  3.9     12-19  Mg     1.8     12-19                Culture - Blood (collected 11-28-21 @ 09:14)  Source: .Blood Blood-Peripheral    Culture - Blood (collected 11-28-21 @ 09:14)  Source: .Blood Blood-Peripheral    Culture - Fungal, Body Fluid (collected 11-24-21 @ 13:35)  Source: .Body Fluid Right Knee Fluid    Culture - Acid Fast - Body Fluid w/Smear (collected 11-24-21 @ 13:35)  Source: .Body Fluid Right Knee Fluid    Culture - Body Fluid with Gram Stain (collected 11-24-21 @ 13:35)  Source: .Body Fluid Right Knee Fluid  Gram Stain (11-25-21 @ 01:27):    polymorphonuclear leukocytes seen per low power field    No organisms seen per oil power field    Culture - Surgical Swab (collected 11-24-21 @ 12:30)  Source: .Surgical Swab Swab Right Knee #2  Final Report (11-29-21 @ 07:55):    No growth at 5 days    Culture - Surgical Swab (collected 11-24-21 @ 12:30)  Source: .Surgical Swab Swab Right Knee #1  Final Report (11-29-21 @ 07:56):    No growth at 5 days    Culture - Surgical Swab (collected 11-24-21 @ 12:28)  Source: .Surgical Swab Swab Right Knee #3  Final Report (11-29-21 @ 08:01):    No growth at 5 days    Culture - Body Fluid with Gram Stain (collected 11-23-21 @ 12:50)  Source: .Body Fluid Knee Fluid  Gram Stain (11-23-21 @ 23:34):    polymorphonuclear leukocytes seen    No organisms seen    by cytocentrifuge    Culture - Blood (collected 11-22-21 @ 08:44)  Source: .Blood Blood  Final Report (11-27-21 @ 09:00):    No growth at 5 days.    Culture - Blood (collected 11-20-21 @ 12:57)  Source: .Blood Blood  Final Report (11-25-21 @ 13:00):    No growth at 5 days.    Culture - Body Fluid with Gram Stain (collected 11-18-21 @ 16:20)  Source: .Body Fluid OR Posterior Calf Left Lower Extremity Fluid  Gram Stain (11-18-21 @ 21:39):    polymorphonuclear leukocytes seen    Gram Positive Cocci in Pairs and Chains seen    by cytocentrifuge  Final Report (11-23-21 @ 09:40):    Moderate Streptococcus pyogenes (Group A) Penicillin and ampicillin are    drugs of choice for    treatment of beta-hemolytic streptococcal infections.    Susceptibility testing is not performed routinely because    S. pyogenes (GAS) is universally susceptible to penicillin    and resistance in other strains is extremely rare.    Culture - Body Fluid with Gram Stain (collected 11-18-21 @ 16:20)  Source: .Body Fluid OR Swab Left Knee Lower Extremity Fluid  Gram Stain (11-18-21 @ 19:36):    polymorphonuclear leukocytes seen    Gram Positive Cocci in Pairs and Chains seen    by cytocentrifuge  Final Report (11-23-21 @ 09:41):    Numerous Streptococcus pyogenes (Group A) Penicillin and ampicillin are    drugs of choice for    treatment of beta-hemolytic streptococcal infections.    Susceptibility testing is not performed routinely because    S. pyogenes (GAS) is universally susceptible to penicillin    and resistance in other strains is extremely rare.    Culture - Body Fluid with Gram Stain (collected 11-18-21 @ 16:20)  Source: .Body Fluid OR - Left Lower Extremity Fluid  Gram Stain (11-18-21 @ 20:10):    No polymorphonuclear leukocytes seen    No organisms seen    by cytocentrifuge  Final Report (11-23-21 @ 09:40):    Rare Streptococcus pyogenes (Group A)    Penicillin and ampicillin are drugs of choice for treatment of    beta-hemolytic streptococcal infections.    Susceptibility testing is not performed routinely because S. pyogenes    (GAS) is universally susceptibleto penicillin    and resistance in other strains is extremely rare.    Culture - Urine (collected 11-18-21 @ 10:07)  Source: Catheterized Catheterized  Final Report (11-19-21 @ 07:03):    No growth    Culture - Urine (collected 11-18-21 @ 00:25)  Source: Catheterized Catheterized  Final Report (11-18-21 @ 22:09):    <10,000 CFU/mL Normal Urogenital Aimee    Culture - Blood (collected 11-17-21 @ 14:45)  Source: .Blood Blood  Gram Stain (11-18-21 @ 09:44):    Growth in aerobic and anaerobic bottles: Gram Positive Cocci in Pairs and    Chains    Gram Stain performed by:    [facility]    [address] [place] [id number]    .    TYPE: (C=Critical, N=Notification, A=Abnormal) C    TESTS:  _ GS    DATE/TIME CALLED: _ 11/18/2021 09:42:19    CALLED TO: Chris Hernandez RN    READ BACK (2 Patient Identifiers)(Y/N): _ Y    READ BACK VALUES (Y/N): _ Y    CALLED BY: Chris Quiñones  Final Report (11-21-21 @ 12:28):    Growth in aerobic and anaerobic bottles: Streptococcus pyogenes (Group A)    See previous culture [id number]    Culture - Blood (collected 11-17-21 @ 14:45)  Source: .Blood Blood  Gram Stain (11-18-21 @ 09:40):    Growth in aerobic and anaerobic bottles: Gram Positive Cocci in Pairs and    Chains    ***Blood Panel PCR results on this specimen are available    approximately 3 hours after the Gram stain result.***    Gram stain, PCR, and/or culture results may not always    correspond due to difference in methodologies.    ************************************************************    This PCR assay was performed using [employer].    The following targets are tested for: Enterococcus,    vancomycin resistant enterococci, Listeria monocytogenes,    coagulase negative staphylococci, S. aureus,    methicillin resistant S. aureus, Streptococcus agalactiae    (Group B), S. pneumoniae, S. pyogenes (Group A),    Acinetobacter baumannii, Enterobacter cloacae, E. coli,    Klebsiella oxytoca, K. pneumoniae, Proteus sp.,    Serratia marcescens, Haemophilus influenzae,    Neisseria meningitidis, Pseudomonas aeruginosa, Candida    albicans, C. glabrata, C krusei, C parapsilosis,    C. tropicalis and the KPC resistance gene.    Gram Stain and BCID performed by:    [facility]    [address] [place] [id number]    .    TYPE: (C=Critical, N=Notification, A=Abnormal) C    TESTS:  _ GS    DATE/TIME CALLED: _ 11/18/2021 09:40:08    CALLED TO: Chris Hernandez RN    READ BACK (2 Patient Identifiers)(Y/N): _ Y    READ BACK VALUES (Y/N): _ Y    CALLED BY: Chris Quiñones  Final Report (11-21-21 @ 12:28):    Growth in aerobic and anaerobic bottles: Streptococcus pyogenes (Group A)  Organism: Blood Culture PCR  Streptococcus pyogenes (Group A) (11-21-21 @ 12:28)  Organism: Streptococcus pyogenes (Group A) (11-21-21 @ 12:28)    Sensitivities:      -  Ceftriaxone: S 0.016      -  Penicillin: S 0.016 Predicts results for ampicillin, amoxicillin, amoxicillin/clavulanate, ampicillin/sulbactam, 1st, 2nd and 3rd generation cephalosporins and carbapenems.      Method Type: ETEST  Organism: Streptococcus pyogenes (Group A) (11-21-21 @ 12:28)    Sensitivities:      -  Vancomycin: S      Method Type: KB  Organism: Blood Culture PCR (11-21-21 @ 12:28)    Sensitivities:      -  Streptococcus pyogenes (Group A): Detec      Method Type: PCR      Creatinine, Serum: 0.75 mg/dL (11-30-21 @ 07:18)  Creatinine, Serum: 0.68 mg/dL (11-29-21 @ 09:55)  Creatinine, Serum: 0.64 mg/dL (11-28-21 @ 09:14)  Creatinine, Serum: 0.65 mg/dL (11-27-21 @ 11:51)  Creatinine, Serum: 0.57 mg/dL (11-27-21 @ 05:35)  Creatinine, Serum: 0.61 mg/dL (11-26-21 @ 09:01)        Ferritin, Serum: 388 ng/mL (11-17-21 @ 13:21)    C-Reactive Protein, Serum: 197 mg/L (11-26-21 @ 21:52)  C-Reactive Protein, Serum: 211 mg/L (11-26-21 @ 09:01)  C-Reactive Protein, Serum: 53 mg/L (11-23-21 @ 07:42)  C-Reactive Protein, Serum: >422 mg/L (11-17-21 @ 13:21)    Sedimentation Rate, Erythrocyte: 65 mm/hr (11-26-21 @ 21:52)  Sedimentation Rate, Erythrocyte: 63 mm/hr (11-26-21 @ 09:01)  Sedimentation Rate, Erythrocyte: 55 mm/hr (11-17-21 @ 13:21)    WBC Count: 14.55 K/uL (11-30-21 @ 07:18)  WBC Count: 16.71 K/uL (11-29-21 @ 09:54)  WBC Count: 17.02 K/uL (11-28-21 @ 09:14)  WBC Count: 15.99 K/uL (11-27-21 @ 05:33)  WBC Count: 18.73 K/uL (11-26-21 @ 09:01)      COVID-19 PCR: NotDetec (11-30-21 @ 01:59)  COVID-19 PCR: NotDetec (11-23-21 @ 18:50)  COVID-19 PCR: NotDetec (11-17-21 @ 13:18)    Lactate Dehydrogenase, Serum: 769 U/L (11-20-21 @ 03:56)    Alkaline Phosphatase, Serum: 88 U/L (11-27-21 @ 05:35)  Alanine Aminotransferase (ALT/SGPT): 11 U/L (11-27-21 @ 05:35)  Aspartate Aminotransferase (AST/SGOT): 25 U/L (11-27-21 @ 05:35)  Bilirubin Total, Serum: 0.3 mg/dL (11-27-21 @ 05:35)        < from: CT Lower Extremity w/ IV Cont, Bilateral (11.30.21 @ 09:55) >     EXAM:  CT LWR EXT IC BI                          PROCEDURE DATE:  11/30/2021          INTERPRETATION:  CT LOWER EXTREMITY WITH IV CONTRAST BILATERAL dated 11/30/2021 9:55 AM    INDICATION: Fever. Status post incision and drainage bilaterally. Persistent fevers to 104.    COMPARISON: CT of the right knee dated 11/23/2021. CT of the left lower extremity dated 11/17/2021    VICKI -

## 2021-12-21 NOTE — PRE-ANESTHESIA EVALUATION ADULT - NSANTHADDINFOFT_GEN_ALL_CORE
Active type and screen  needs medical optimization/clearance.......transfuse to hg >/= 9   2U PRBC on hold for OR  NPO>8 HR  otherwise clear

## 2021-12-22 ENCOUNTER — TRANSCRIPTION ENCOUNTER (OUTPATIENT)
Age: 51
End: 2021-12-22

## 2021-12-22 LAB
ANION GAP SERPL CALC-SCNC: 12 MMOL/L — SIGNIFICANT CHANGE UP (ref 5–17)
BUN SERPL-MCNC: 7.6 MG/DL — LOW (ref 8–20)
CALCIUM SERPL-MCNC: 8.4 MG/DL — LOW (ref 8.6–10.2)
CHLORIDE SERPL-SCNC: 99 MMOL/L — SIGNIFICANT CHANGE UP (ref 98–107)
CO2 SERPL-SCNC: 22 MMOL/L — SIGNIFICANT CHANGE UP (ref 22–29)
CREAT SERPL-MCNC: 0.6 MG/DL — SIGNIFICANT CHANGE UP (ref 0.5–1.3)
CULTURE RESULTS: SIGNIFICANT CHANGE UP
GLUCOSE SERPL-MCNC: 79 MG/DL — SIGNIFICANT CHANGE UP (ref 70–99)
HCT VFR BLD CALC: 24.1 % — LOW (ref 34.5–45)
HGB BLD-MCNC: 7.4 G/DL — LOW (ref 11.5–15.5)
MAGNESIUM SERPL-MCNC: 1.7 MG/DL — SIGNIFICANT CHANGE UP (ref 1.6–2.6)
MCHC RBC-ENTMCNC: 27.4 PG — SIGNIFICANT CHANGE UP (ref 27–34)
MCHC RBC-ENTMCNC: 30.7 GM/DL — LOW (ref 32–36)
MCV RBC AUTO: 89.3 FL — SIGNIFICANT CHANGE UP (ref 80–100)
PHOSPHATE SERPL-MCNC: 3.9 MG/DL — SIGNIFICANT CHANGE UP (ref 2.4–4.7)
PLATELET # BLD AUTO: 517 K/UL — HIGH (ref 150–400)
POTASSIUM SERPL-MCNC: 4.7 MMOL/L — SIGNIFICANT CHANGE UP (ref 3.5–5.3)
POTASSIUM SERPL-SCNC: 4.7 MMOL/L — SIGNIFICANT CHANGE UP (ref 3.5–5.3)
RBC # BLD: 2.7 M/UL — LOW (ref 3.8–5.2)
RBC # FLD: 19.5 % — HIGH (ref 10.3–14.5)
SARS-COV-2 RNA SPEC QL NAA+PROBE: SIGNIFICANT CHANGE UP
SODIUM SERPL-SCNC: 133 MMOL/L — LOW (ref 135–145)
SPECIMEN SOURCE: SIGNIFICANT CHANGE UP
VANCOMYCIN TROUGH SERPL-MCNC: 18.1 UG/ML — SIGNIFICANT CHANGE UP (ref 10–20)
WBC # BLD: 10.1 K/UL — SIGNIFICANT CHANGE UP (ref 3.8–10.5)
WBC # FLD AUTO: 10.1 K/UL — SIGNIFICANT CHANGE UP (ref 3.8–10.5)

## 2021-12-22 PROCEDURE — 99024 POSTOP FOLLOW-UP VISIT: CPT

## 2021-12-22 RX ORDER — MAGNESIUM SULFATE 500 MG/ML
2 VIAL (ML) INJECTION ONCE
Refills: 0 | Status: COMPLETED | OUTPATIENT
Start: 2021-12-22 | End: 2021-12-22

## 2021-12-22 RX ADMIN — MEROPENEM 100 MILLIGRAM(S): 1 INJECTION INTRAVENOUS at 06:44

## 2021-12-22 RX ADMIN — Medication 10 MILLIGRAM(S): at 22:00

## 2021-12-22 RX ADMIN — Medication 2 TABLET(S): at 06:43

## 2021-12-22 RX ADMIN — GABAPENTIN 300 MILLIGRAM(S): 400 CAPSULE ORAL at 16:10

## 2021-12-22 RX ADMIN — PANTOPRAZOLE SODIUM 40 MILLIGRAM(S): 20 TABLET, DELAYED RELEASE ORAL at 06:43

## 2021-12-22 RX ADMIN — GABAPENTIN 300 MILLIGRAM(S): 400 CAPSULE ORAL at 22:00

## 2021-12-22 RX ADMIN — SENNA PLUS 2 TABLET(S): 8.6 TABLET ORAL at 21:59

## 2021-12-22 RX ADMIN — SODIUM CHLORIDE 1 GRAM(S): 9 INJECTION INTRAMUSCULAR; INTRAVENOUS; SUBCUTANEOUS at 17:48

## 2021-12-22 RX ADMIN — Medication 166.67 MILLIGRAM(S): at 10:01

## 2021-12-22 RX ADMIN — HYDROMORPHONE HYDROCHLORIDE 2 MILLIGRAM(S): 2 INJECTION INTRAMUSCULAR; INTRAVENOUS; SUBCUTANEOUS at 03:17

## 2021-12-22 RX ADMIN — ENOXAPARIN SODIUM 30 MILLIGRAM(S): 100 INJECTION SUBCUTANEOUS at 06:46

## 2021-12-22 RX ADMIN — Medication 2 TABLET(S): at 17:48

## 2021-12-22 RX ADMIN — Medication 25 GRAM(S): at 16:09

## 2021-12-22 RX ADMIN — HYDROMORPHONE HYDROCHLORIDE 2 MILLIGRAM(S): 2 INJECTION INTRAMUSCULAR; INTRAVENOUS; SUBCUTANEOUS at 16:40

## 2021-12-22 RX ADMIN — Medication 166.67 MILLIGRAM(S): at 22:00

## 2021-12-22 RX ADMIN — HYDROMORPHONE HYDROCHLORIDE 2 MILLIGRAM(S): 2 INJECTION INTRAMUSCULAR; INTRAVENOUS; SUBCUTANEOUS at 16:10

## 2021-12-22 RX ADMIN — SODIUM CHLORIDE 1 GRAM(S): 9 INJECTION INTRAMUSCULAR; INTRAVENOUS; SUBCUTANEOUS at 06:44

## 2021-12-22 RX ADMIN — MEROPENEM 100 MILLIGRAM(S): 1 INJECTION INTRAVENOUS at 22:00

## 2021-12-22 RX ADMIN — MEROPENEM 100 MILLIGRAM(S): 1 INJECTION INTRAVENOUS at 14:30

## 2021-12-22 RX ADMIN — Medication 10 MILLIGRAM(S): at 06:45

## 2021-12-22 RX ADMIN — GABAPENTIN 300 MILLIGRAM(S): 400 CAPSULE ORAL at 06:45

## 2021-12-22 RX ADMIN — PANTOPRAZOLE SODIUM 40 MILLIGRAM(S): 20 TABLET, DELAYED RELEASE ORAL at 17:48

## 2021-12-22 RX ADMIN — Medication 10 MILLIGRAM(S): at 16:10

## 2021-12-22 RX ADMIN — ENOXAPARIN SODIUM 30 MILLIGRAM(S): 100 INJECTION SUBCUTANEOUS at 17:48

## 2021-12-22 RX ADMIN — HYDROMORPHONE HYDROCHLORIDE 2 MILLIGRAM(S): 2 INJECTION INTRAMUSCULAR; INTRAVENOUS; SUBCUTANEOUS at 04:17

## 2021-12-22 NOTE — PROGRESS NOTE ADULT - SUBJECTIVE AND OBJECTIVE BOX
Pt Name: LUIS FERNANDO DAVIS    MRN: [id number]      Patient is a 51 year old female who being followed for bilateral septic knee (11/24), with MASON from left ankle (11/17).  patient also s/p I&D left leg by ACS team.  Patient in bed, comfortable. Pain controlled. Patient seen by Dr. Tobias this AM          PHYSICAL EXAM:    Vital Signs Last 24 Hrs  T(C): 37.6 (22 Dec 2021 05:30), Max: 37.6 (22 Dec 2021 05:30)  T(F): 99.7 (22 Dec 2021 05:30), Max: 99.7 (22 Dec 2021 05:30)  HR: 55 (22 Dec 2021 05:30) (55 - 86)  BP: 100/63 (22 Dec 2021 05:30) (100/63 - 121/80)  BP(mean): --  RR: 18 (22 Dec 2021 05:30) (18 - 18)  SpO2: 93% (22 Dec 2021 05:30) (93% - 96%)    Appearance: Alert, responsive, in no acute distress.    Neurological: Sensation is grossly intact to light touch.    Vascular: 2+ distal pulses.    Musculoskeletal:       Right knee steri strips in place, no erythema, no warmth, no discharge,     Left leg positive foot drop, wound vacs in place to left lower leg. AFO noted, in place.  Sutures C/D/I  no discharge or erythema to knee       A/P:  Pt is a 51y Female POD 6 left knee I&D    PLAN:   Continue IV abx per ID  cont AFO brace  wound vac management as per primary team  cont care primary team

## 2021-12-22 NOTE — PROGRESS NOTE ADULT - ATTENDING COMMENTS
Clinically stable.   Afebrile, no leukocytosis.     No appreciable fluctuance on palpation of LLE; intact sensation + able to minimally move LLE (through to toes).     --OR eval of wounds tomorrow; if clean, will plan for STSG early next week [OF NOTE, PATIENT REQUESTS THAT DONOR SITES BE INNER THIGH/LEG, FOR COSMESIS].

## 2021-12-22 NOTE — PROGRESS NOTE ADULT - ASSESSMENT
51F no PMHx s/p multiple LLE procedures now POD 2 dfor most recent clean washout, plan to go back on 11/23, conitnue wound vac therapy    - monitor fever curve  - await MRI of LLE, r/o osteomyelitis  - plan for OR washout every few days  - monitor fever curve  - ID following, current regimen: meropenum, bactrim, vancomycin   - wound vac management   - continue pain control   - DVTppx  - WBAT RLE, WBAT LLE in CAM boot  - Left knee aspirate cultures negative to date from 12/1/21   51F no PMHx s/p multiple LLE procedures now POD 2 dfor most recent clean washout, plan to go back on 11/23, andrei wound vac therapy    - monitor fever curve  - plan for OR washout/evaluation of wounds tomorrow  - ID following, current regimen: meropenum, bactrim, vancomycin   - wound vac management   - continue pain control   - DVTppx  - WBAT RLE, WBAT LLE in CAM boot  - Left knee aspirate cultures negative to date from 12/1/21

## 2021-12-22 NOTE — PROGRESS NOTE ADULT - SUBJECTIVE AND OBJECTIVE BOX
HPI/OVERNIGHT EVENTS: Patient seen and examined at bedside this AM. No overnight events. afebrile, WBC downtrending, fitted for AFO boot, good spirits, plan for washout tomorrow    Vital Signs Last 24 Hrs  T(C): 37 (21 Dec 2021 22:16), Max: 37 (21 Dec 2021 11:25)  T(F): 98.6 (21 Dec 2021 22:16), Max: 98.6 (21 Dec 2021 11:25)  HR: 86 (21 Dec 2021 22:16) (80 - 86)  BP: 115/66 (21 Dec 2021 22:16) (107/69 - 121/80)  BP(mean): --  RR: 18 (21 Dec 2021 22:16) (18 - 18)  SpO2: 95% (21 Dec 2021 22:16) (95% - 98%)    I&O's Detail    20 Dec 2021 07:01  -  21 Dec 2021 07:00  --------------------------------------------------------  IN:  Total IN: 0 mL    OUT:    Voided (mL): 1375 mL  Total OUT: 1375 mL    Total NET: -1375 mL      21 Dec 2021 07:01  -  22 Dec 2021 06:02  --------------------------------------------------------  IN:  Total IN: 0 mL    OUT:    Voided (mL): 300 mL  Total OUT: 300 mL    Total NET: -300 mL        Constitutional: in good spirits   Respiratory: no respiratory distress, no dyspnea  Gastrointestinal: abdomen soft, non-tender, atraumatic  Genitourinary: voiding spontaneously   Extremities: LLE: dressing C/D/I, sutures intact, No drainage or discharge. No erythema is noted  wound vacs noted anterior and lateral calf and ankle   patient able to wiggle toes  gross sensation is intact  Neurological: A&OX3        LABS:                        7.7    9.16  )-----------( 457      ( 21 Dec 2021 07:10 )             24.6     12-21    131<L>  |  98  |  7.9<L>  ----------------------------<  93  4.4   |  22.0  |  0.55    Ca    8.8      21 Dec 2021 07:10  Phos  3.9     12-21  Mg     2.0     12-21    TPro  6.9  /  Alb  2.3<L>  /  TBili  0.2<L>  /  DBili  x   /  AST  16  /  ALT  7   /  AlkPhos  65  12-21          MEDICATIONS  (STANDING):  baclofen 10 milliGRAM(s) Oral three times a day  enoxaparin Injectable 30 milliGRAM(s) SubCutaneous two times a day  gabapentin 300 milliGRAM(s) Oral three times a day  lisinopril 5 milliGRAM(s) Oral daily  meropenem  IVPB 1000 milliGRAM(s) IV Intermittent every 8 hours  pantoprazole    Tablet 40 milliGRAM(s) Oral two times a day  polyethylene glycol 3350 17 Gram(s) Oral daily  senna 2 Tablet(s) Oral at bedtime  sodium chloride 1 Gram(s) Oral every 12 hours  trimethoprim  160 mG/sulfamethoxazole 800 mG 2 Tablet(s) Oral every 12 hours  vancomycin  IVPB 1250 milliGRAM(s) IV Intermittent every 12 hours    MEDICATIONS  (PRN):  HYDROmorphone   Tablet 2 milliGRAM(s) Oral every 4 hours PRN Severe Pain (7 - 10)      MICRO:   Cultures     STUDIES:   EKG, CXR, U/S, CT, MRI

## 2021-12-22 NOTE — PROGRESS NOTE ADULT - SUBJECTIVE AND OBJECTIVE BOX
51F no PMHx s/p multiple LLE procedures/washouts. Pt to return to OR tomorrow. Please transfuse 1u pRBCs prior to OR tomorrow given Hb 7.4.     There are no further consults or tests required to proceed to OR tomorrow.      Migel Ontiveros MD  Dept of Anesthesia

## 2021-12-23 LAB
ANION GAP SERPL CALC-SCNC: 12 MMOL/L — SIGNIFICANT CHANGE UP (ref 5–17)
APTT BLD: 36.1 SEC — HIGH (ref 27.5–35.5)
BASOPHILS # BLD AUTO: 0.05 K/UL — SIGNIFICANT CHANGE UP (ref 0–0.2)
BASOPHILS NFR BLD AUTO: 0.5 % — SIGNIFICANT CHANGE UP (ref 0–2)
BLD GP AB SCN SERPL QL: SIGNIFICANT CHANGE UP
BUN SERPL-MCNC: 6.7 MG/DL — LOW (ref 8–20)
CALCIUM SERPL-MCNC: 8.9 MG/DL — SIGNIFICANT CHANGE UP (ref 8.6–10.2)
CHLORIDE SERPL-SCNC: 97 MMOL/L — LOW (ref 98–107)
CO2 SERPL-SCNC: 22 MMOL/L — SIGNIFICANT CHANGE UP (ref 22–29)
CREAT SERPL-MCNC: 0.58 MG/DL — SIGNIFICANT CHANGE UP (ref 0.5–1.3)
EOSINOPHIL # BLD AUTO: 0.6 K/UL — HIGH (ref 0–0.5)
EOSINOPHIL NFR BLD AUTO: 6 % — SIGNIFICANT CHANGE UP (ref 0–6)
GLUCOSE SERPL-MCNC: 85 MG/DL — SIGNIFICANT CHANGE UP (ref 70–99)
HCT VFR BLD CALC: 26.5 % — LOW (ref 34.5–45)
HGB BLD-MCNC: 8.4 G/DL — LOW (ref 11.5–15.5)
IMM GRANULOCYTES NFR BLD AUTO: 0.5 % — SIGNIFICANT CHANGE UP (ref 0–1.5)
INR BLD: 1.35 RATIO — HIGH (ref 0.88–1.16)
LYMPHOCYTES # BLD AUTO: 3.47 K/UL — HIGH (ref 1–3.3)
LYMPHOCYTES # BLD AUTO: 34.8 % — SIGNIFICANT CHANGE UP (ref 13–44)
MAGNESIUM SERPL-MCNC: 2.1 MG/DL — SIGNIFICANT CHANGE UP (ref 1.8–2.6)
MCHC RBC-ENTMCNC: 27.9 PG — SIGNIFICANT CHANGE UP (ref 27–34)
MCHC RBC-ENTMCNC: 31.7 GM/DL — LOW (ref 32–36)
MCV RBC AUTO: 88 FL — SIGNIFICANT CHANGE UP (ref 80–100)
MONOCYTES # BLD AUTO: 1.22 K/UL — HIGH (ref 0–0.9)
MONOCYTES NFR BLD AUTO: 12.2 % — SIGNIFICANT CHANGE UP (ref 2–14)
NEUTROPHILS # BLD AUTO: 4.57 K/UL — SIGNIFICANT CHANGE UP (ref 1.8–7.4)
NEUTROPHILS NFR BLD AUTO: 46 % — SIGNIFICANT CHANGE UP (ref 43–77)
PHOSPHATE SERPL-MCNC: 5 MG/DL — HIGH (ref 2.4–4.7)
PLATELET # BLD AUTO: 530 K/UL — HIGH (ref 150–400)
POTASSIUM SERPL-MCNC: 4.7 MMOL/L — SIGNIFICANT CHANGE UP (ref 3.5–5.3)
POTASSIUM SERPL-SCNC: 4.7 MMOL/L — SIGNIFICANT CHANGE UP (ref 3.5–5.3)
PROTHROM AB SERPL-ACNC: 15.4 SEC — HIGH (ref 10.6–13.6)
RBC # BLD: 3.01 M/UL — LOW (ref 3.8–5.2)
RBC # FLD: 19.1 % — HIGH (ref 10.3–14.5)
SODIUM SERPL-SCNC: 131 MMOL/L — LOW (ref 135–145)
WBC # BLD: 9.96 K/UL — SIGNIFICANT CHANGE UP (ref 3.8–10.5)
WBC # FLD AUTO: 9.96 K/UL — SIGNIFICANT CHANGE UP (ref 3.8–10.5)

## 2021-12-23 PROCEDURE — 99024 POSTOP FOLLOW-UP VISIT: CPT

## 2021-12-23 RX ORDER — HYDROMORPHONE HYDROCHLORIDE 2 MG/ML
0.5 INJECTION INTRAMUSCULAR; INTRAVENOUS; SUBCUTANEOUS
Refills: 0 | Status: DISCONTINUED | OUTPATIENT
Start: 2021-12-23 | End: 2021-12-23

## 2021-12-23 RX ORDER — BACLOFEN 100 %
10 POWDER (GRAM) MISCELLANEOUS THREE TIMES A DAY
Refills: 0 | Status: DISCONTINUED | OUTPATIENT
Start: 2021-12-23 | End: 2021-12-28

## 2021-12-23 RX ORDER — VANCOMYCIN HCL 1 G
1250 VIAL (EA) INTRAVENOUS EVERY 12 HOURS
Refills: 0 | Status: DISCONTINUED | OUTPATIENT
Start: 2021-12-23 | End: 2021-12-24

## 2021-12-23 RX ORDER — CALCIUM ACETATE 667 MG
667 TABLET ORAL
Refills: 0 | Status: COMPLETED | OUTPATIENT
Start: 2021-12-23 | End: 2021-12-24

## 2021-12-23 RX ORDER — HYDROMORPHONE HYDROCHLORIDE 2 MG/ML
2 INJECTION INTRAMUSCULAR; INTRAVENOUS; SUBCUTANEOUS EVERY 4 HOURS
Refills: 0 | Status: DISCONTINUED | OUTPATIENT
Start: 2021-12-23 | End: 2021-12-28

## 2021-12-23 RX ORDER — ENOXAPARIN SODIUM 100 MG/ML
30 INJECTION SUBCUTANEOUS
Refills: 0 | Status: DISCONTINUED | OUTPATIENT
Start: 2021-12-23 | End: 2021-12-28

## 2021-12-23 RX ORDER — SODIUM CHLORIDE 9 MG/ML
500 INJECTION INTRAMUSCULAR; INTRAVENOUS; SUBCUTANEOUS ONCE
Refills: 0 | Status: COMPLETED | OUTPATIENT
Start: 2021-12-23 | End: 2021-12-24

## 2021-12-23 RX ORDER — SODIUM CHLORIDE 9 MG/ML
1 INJECTION INTRAMUSCULAR; INTRAVENOUS; SUBCUTANEOUS EVERY 12 HOURS
Refills: 0 | Status: DISCONTINUED | OUTPATIENT
Start: 2021-12-23 | End: 2021-12-28

## 2021-12-23 RX ORDER — VANCOMYCIN HCL 1 G
1500 VIAL (EA) INTRAVENOUS EVERY 12 HOURS
Refills: 0 | Status: DISCONTINUED | OUTPATIENT
Start: 2021-12-23 | End: 2021-12-23

## 2021-12-23 RX ORDER — GABAPENTIN 400 MG/1
300 CAPSULE ORAL THREE TIMES A DAY
Refills: 0 | Status: DISCONTINUED | OUTPATIENT
Start: 2021-12-23 | End: 2021-12-28

## 2021-12-23 RX ORDER — CALCIUM ACETATE 667 MG
667 TABLET ORAL
Refills: 0 | Status: DISCONTINUED | OUTPATIENT
Start: 2021-12-23 | End: 2021-12-23

## 2021-12-23 RX ORDER — OXYCODONE HYDROCHLORIDE 5 MG/1
5 TABLET ORAL ONCE
Refills: 0 | Status: DISCONTINUED | OUTPATIENT
Start: 2021-12-23 | End: 2021-12-23

## 2021-12-23 RX ORDER — MEROPENEM 1 G/30ML
1000 INJECTION INTRAVENOUS EVERY 8 HOURS
Refills: 0 | Status: DISCONTINUED | OUTPATIENT
Start: 2021-12-23 | End: 2021-12-28

## 2021-12-23 RX ORDER — SODIUM CHLORIDE 9 MG/ML
1000 INJECTION, SOLUTION INTRAVENOUS
Refills: 0 | Status: DISCONTINUED | OUTPATIENT
Start: 2021-12-23 | End: 2021-12-23

## 2021-12-23 RX ORDER — LISINOPRIL 2.5 MG/1
5 TABLET ORAL DAILY
Refills: 0 | Status: DISCONTINUED | OUTPATIENT
Start: 2021-12-23 | End: 2021-12-28

## 2021-12-23 RX ORDER — PANTOPRAZOLE SODIUM 20 MG/1
40 TABLET, DELAYED RELEASE ORAL
Refills: 0 | Status: DISCONTINUED | OUTPATIENT
Start: 2021-12-23 | End: 2021-12-28

## 2021-12-23 RX ORDER — FENTANYL CITRATE 50 UG/ML
25 INJECTION INTRAVENOUS
Refills: 0 | Status: DISCONTINUED | OUTPATIENT
Start: 2021-12-23 | End: 2021-12-23

## 2021-12-23 RX ORDER — ONDANSETRON 8 MG/1
4 TABLET, FILM COATED ORAL ONCE
Refills: 0 | Status: DISCONTINUED | OUTPATIENT
Start: 2021-12-23 | End: 2021-12-23

## 2021-12-23 RX ORDER — POLYETHYLENE GLYCOL 3350 17 G/17G
17 POWDER, FOR SOLUTION ORAL DAILY
Refills: 0 | Status: DISCONTINUED | OUTPATIENT
Start: 2021-12-23 | End: 2021-12-28

## 2021-12-23 RX ADMIN — Medication 667 MILLIGRAM(S): at 21:30

## 2021-12-23 RX ADMIN — Medication 667 MILLIGRAM(S): at 13:16

## 2021-12-23 RX ADMIN — MEROPENEM 100 MILLIGRAM(S): 1 INJECTION INTRAVENOUS at 21:29

## 2021-12-23 RX ADMIN — HYDROMORPHONE HYDROCHLORIDE 2 MILLIGRAM(S): 2 INJECTION INTRAMUSCULAR; INTRAVENOUS; SUBCUTANEOUS at 07:15

## 2021-12-23 RX ADMIN — MEROPENEM 100 MILLIGRAM(S): 1 INJECTION INTRAVENOUS at 13:16

## 2021-12-23 RX ADMIN — GABAPENTIN 300 MILLIGRAM(S): 400 CAPSULE ORAL at 13:16

## 2021-12-23 RX ADMIN — Medication 166.67 MILLIGRAM(S): at 10:57

## 2021-12-23 RX ADMIN — HYDROMORPHONE HYDROCHLORIDE 0.5 MILLIGRAM(S): 2 INJECTION INTRAMUSCULAR; INTRAVENOUS; SUBCUTANEOUS at 17:00

## 2021-12-23 RX ADMIN — ENOXAPARIN SODIUM 30 MILLIGRAM(S): 100 INJECTION SUBCUTANEOUS at 18:38

## 2021-12-23 RX ADMIN — SODIUM CHLORIDE 1 GRAM(S): 9 INJECTION INTRAMUSCULAR; INTRAVENOUS; SUBCUTANEOUS at 05:32

## 2021-12-23 RX ADMIN — SODIUM CHLORIDE 1 GRAM(S): 9 INJECTION INTRAMUSCULAR; INTRAVENOUS; SUBCUTANEOUS at 18:37

## 2021-12-23 RX ADMIN — Medication 166.67 MILLIGRAM(S): at 21:26

## 2021-12-23 RX ADMIN — HYDROMORPHONE HYDROCHLORIDE 0.5 MILLIGRAM(S): 2 INJECTION INTRAMUSCULAR; INTRAVENOUS; SUBCUTANEOUS at 17:15

## 2021-12-23 RX ADMIN — Medication 10 MILLIGRAM(S): at 21:31

## 2021-12-23 RX ADMIN — GABAPENTIN 300 MILLIGRAM(S): 400 CAPSULE ORAL at 21:30

## 2021-12-23 RX ADMIN — MEROPENEM 100 MILLIGRAM(S): 1 INJECTION INTRAVENOUS at 05:34

## 2021-12-23 RX ADMIN — GABAPENTIN 300 MILLIGRAM(S): 400 CAPSULE ORAL at 05:33

## 2021-12-23 RX ADMIN — HYDROMORPHONE HYDROCHLORIDE 2 MILLIGRAM(S): 2 INJECTION INTRAMUSCULAR; INTRAVENOUS; SUBCUTANEOUS at 06:53

## 2021-12-23 RX ADMIN — PANTOPRAZOLE SODIUM 40 MILLIGRAM(S): 20 TABLET, DELAYED RELEASE ORAL at 05:33

## 2021-12-23 RX ADMIN — PANTOPRAZOLE SODIUM 40 MILLIGRAM(S): 20 TABLET, DELAYED RELEASE ORAL at 18:37

## 2021-12-23 RX ADMIN — Medication 667 MILLIGRAM(S): at 18:37

## 2021-12-23 RX ADMIN — LISINOPRIL 5 MILLIGRAM(S): 2.5 TABLET ORAL at 05:33

## 2021-12-23 RX ADMIN — Medication 10 MILLIGRAM(S): at 13:16

## 2021-12-23 RX ADMIN — Medication 10 MILLIGRAM(S): at 05:32

## 2021-12-23 RX ADMIN — Medication 2 TABLET(S): at 05:33

## 2021-12-23 RX ADMIN — FENTANYL CITRATE 25 MICROGRAM(S): 50 INJECTION INTRAVENOUS at 16:55

## 2021-12-23 RX ADMIN — FENTANYL CITRATE 25 MICROGRAM(S): 50 INJECTION INTRAVENOUS at 16:40

## 2021-12-23 RX ADMIN — Medication 2 TABLET(S): at 18:37

## 2021-12-23 NOTE — PROGRESS NOTE ADULT - ATTENDING COMMENTS
Agree with exam and A&P.   Clinically stable.   Likely OR today for evaluation, possible washout; will plan for potential STSG based on OR findings.

## 2021-12-23 NOTE — PROGRESS NOTE ADULT - SUBJECTIVE AND OBJECTIVE BOX
SUBJECTIVE/24 hour events:   Patient seen and examined at bedside this AM. No overnight events. afebrile, WBC downtrending, fitted for AFO boot, good spirits, plan for washout tomorrow      Vital Signs Last 24 Hrs  T(C): 37.2 (22 Dec 2021 22:20), Max: 37.8 (22 Dec 2021 17:54)  T(F): 99 (22 Dec 2021 22:20), Max: 100 (22 Dec 2021 17:54)  HR: 87 (23 Dec 2021 04:48) (55 - 89)  BP: 109/67 (23 Dec 2021 04:48) (100/63 - 122/75)  BP(mean): --  RR: 18 (23 Dec 2021 04:48) (18 - 18)  SpO2: 93% (23 Dec 2021 04:48) (93% - 95%)  Drug Dosing Weight  Height (cm): 167.6 (21 Dec 2021 16:09)  Weight (kg): 93 (21 Dec 2021 16:09)  BMI (kg/m2): 33.1 (21 Dec 2021 16:09)  BSA (m2): 2.02 (21 Dec 2021 16:09)  I&O's Detail    21 Dec 2021 07:01  -  22 Dec 2021 07:00  --------------------------------------------------------  IN:  Total IN: 0 mL    OUT:    Voided (mL): 300 mL  Total OUT: 300 mL    Total NET: -300 mL      22 Dec 2021 07:01  -  23 Dec 2021 05:26  --------------------------------------------------------  IN:  Total IN: 0 mL    OUT:    Voided (mL): 1850 mL  Total OUT: 1850 mL    Total NET: -1850 mL        Allergies    No Known Allergies    Intolerances                              7.4    10.10 )-----------( 517      ( 22 Dec 2021 07:11 )             24.1   12-22    133<L>  |  99  |  7.6<L>  ----------------------------<  79  4.7   |  22.0  |  0.60    Ca    8.4<L>      22 Dec 2021 07:11  Phos  3.9     12-22  Mg     1.7     12-22    TPro  6.9  /  Alb  2.3<L>  /  TBili  0.2<L>  /  DBili  x   /  AST  16  /  ALT  7   /  AlkPhos  65  12-21      ROS:    PHYSICAL EXAM:  Constitutional: resting comfortably    Respiratory: no respiratory distress, no dyspnea  Gastrointestinal: abdomen soft, non-tender, atraumatic  Genitourinary: voiding spontaneously   Extremities: LLE: dressing C/D/I, sutures intact, No drainage or discharge. No erythema is noted  wound vacs noted anterior and lateral calf and ankle   patient able to wiggle toes  gross sensation is intact  Neurological: A&OX3    MEDICATIONS  (STANDING):  baclofen 10 milliGRAM(s) Oral three times a day  enoxaparin Injectable 30 milliGRAM(s) SubCutaneous two times a day  gabapentin 300 milliGRAM(s) Oral three times a day  lisinopril 5 milliGRAM(s) Oral daily  meropenem  IVPB 1000 milliGRAM(s) IV Intermittent every 8 hours  pantoprazole    Tablet 40 milliGRAM(s) Oral two times a day  polyethylene glycol 3350 17 Gram(s) Oral daily  senna 2 Tablet(s) Oral at bedtime  sodium chloride 1 Gram(s) Oral every 12 hours  trimethoprim  160 mG/sulfamethoxazole 800 mG 2 Tablet(s) Oral every 12 hours  vancomycin  IVPB 1250 milliGRAM(s) IV Intermittent every 12 hours    MEDICATIONS  (PRN):  HYDROmorphone   Tablet 2 milliGRAM(s) Oral every 4 hours PRN Severe Pain (7 - 10)      RADIOLOGY STUDIES:    CULTURES:         SUBJECTIVE/24 hour events:   Patient seen and examined at bedside this AM. No overnight events. afebrile, WBC downtrending, fitted for AFO boot, good spirits, plan for washout today      Vital Signs Last 24 Hrs  T(C): 37.2 (22 Dec 2021 22:20), Max: 37.8 (22 Dec 2021 17:54)  T(F): 99 (22 Dec 2021 22:20), Max: 100 (22 Dec 2021 17:54)  HR: 87 (23 Dec 2021 04:48) (55 - 89)  BP: 109/67 (23 Dec 2021 04:48) (100/63 - 122/75)  BP(mean): --  RR: 18 (23 Dec 2021 04:48) (18 - 18)  SpO2: 93% (23 Dec 2021 04:48) (93% - 95%)  Drug Dosing Weight  Height (cm): 167.6 (21 Dec 2021 16:09)  Weight (kg): 93 (21 Dec 2021 16:09)  BMI (kg/m2): 33.1 (21 Dec 2021 16:09)  BSA (m2): 2.02 (21 Dec 2021 16:09)  I&O's Detail    21 Dec 2021 07:01  -  22 Dec 2021 07:00  --------------------------------------------------------  IN:  Total IN: 0 mL    OUT:    Voided (mL): 300 mL  Total OUT: 300 mL    Total NET: -300 mL      22 Dec 2021 07:01  -  23 Dec 2021 05:26  --------------------------------------------------------  IN:  Total IN: 0 mL    OUT:    Voided (mL): 1850 mL  Total OUT: 1850 mL    Total NET: -1850 mL        Allergies    No Known Allergies    Intolerances                              7.4    10.10 )-----------( 517      ( 22 Dec 2021 07:11 )             24.1   12-22    133<L>  |  99  |  7.6<L>  ----------------------------<  79  4.7   |  22.0  |  0.60    Ca    8.4<L>      22 Dec 2021 07:11  Phos  3.9     12-22  Mg     1.7     12-22    TPro  6.9  /  Alb  2.3<L>  /  TBili  0.2<L>  /  DBili  x   /  AST  16  /  ALT  7   /  AlkPhos  65  12-21      ROS:    PHYSICAL EXAM:  Constitutional: resting comfortably    Respiratory: no respiratory distress, no dyspnea  Gastrointestinal: abdomen soft, non-tender, atraumatic  Genitourinary: voiding spontaneously   Extremities: LLE: dressing C/D/I, sutures intact, No drainage or discharge. No erythema is noted  wound vacs noted anterior and lateral calf and ankle   patient able to wiggle toes  gross sensation is intact  Neurological: A&OX3    MEDICATIONS  (STANDING):  baclofen 10 milliGRAM(s) Oral three times a day  enoxaparin Injectable 30 milliGRAM(s) SubCutaneous two times a day  gabapentin 300 milliGRAM(s) Oral three times a day  lisinopril 5 milliGRAM(s) Oral daily  meropenem  IVPB 1000 milliGRAM(s) IV Intermittent every 8 hours  pantoprazole    Tablet 40 milliGRAM(s) Oral two times a day  polyethylene glycol 3350 17 Gram(s) Oral daily  senna 2 Tablet(s) Oral at bedtime  sodium chloride 1 Gram(s) Oral every 12 hours  trimethoprim  160 mG/sulfamethoxazole 800 mG 2 Tablet(s) Oral every 12 hours  vancomycin  IVPB 1250 milliGRAM(s) IV Intermittent every 12 hours    MEDICATIONS  (PRN):  HYDROmorphone   Tablet 2 milliGRAM(s) Oral every 4 hours PRN Severe Pain (7 - 10)      RADIOLOGY STUDIES:    CULTURES:

## 2021-12-23 NOTE — CHART NOTE - NSCHARTNOTEFT_GEN_A_CORE
RN called to inform patients BP 91/53,HR 87, respirations 18, saturation 91%. I reviewed patients trend ranges from systolic . Went to bedside, awake alert, in good spirits, pain controlled, wound vac to lle with no output as of yet. Patient denies dizziness, blurry vision, tunnel vision, feeling as if she is going to faint, shortness of breath. To note Patient did go to the OR today for additional washout and wound vac change to LLE, tolerated procedure well, no post-op events. Hemoglobin 8.4 from 7.4 with out intervention. Will give patient 500cc bolus IV fluids. recheck bp after bolus is finished running

## 2021-12-23 NOTE — PROGRESS NOTE ADULT - ASSESSMENT
51F no PMHx s/p multiple LLE procedures now POD 3 for most recent clean washout, plan to go back to the OR today for additional washout.  -will transfuse 1 unit this am   - monitor fever curve  - plan for OR washout/evaluation of wounds today 12/23  - ID following, current regimen: meropenum, bactrim, vancomycin   - wound vac management   - continue pain control   - DVTppx  - WBAT RLE, WBAT LLE in CAM boot  - Left knee aspirate cultures negative to date from 12/1/21   51F no PMHx s/p multiple LLE procedures now POD 3 for most recent clean washout, plan to go back to the OR today for additional washout.    - monitor fever curve  - plan for OR washout/evaluation of wounds today 12/23  - ID following, current regimen: meropenum, bactrim, vancomycin   - wound vac management   - continue pain control   - DVTppx  - WBAT RLE, WBAT LLE in CAM boot  - Left knee aspirate cultures negative to date from 12/1/21

## 2021-12-24 LAB
ANION GAP SERPL CALC-SCNC: 13 MMOL/L — SIGNIFICANT CHANGE UP (ref 5–17)
BASOPHILS # BLD AUTO: 0.06 K/UL — SIGNIFICANT CHANGE UP (ref 0–0.2)
BASOPHILS NFR BLD AUTO: 0.6 % — SIGNIFICANT CHANGE UP (ref 0–2)
BUN SERPL-MCNC: 11.7 MG/DL — SIGNIFICANT CHANGE UP (ref 8–20)
CALCIUM SERPL-MCNC: 9.3 MG/DL — SIGNIFICANT CHANGE UP (ref 8.6–10.2)
CHLORIDE SERPL-SCNC: 96 MMOL/L — LOW (ref 98–107)
CO2 SERPL-SCNC: 20 MMOL/L — LOW (ref 22–29)
CREAT SERPL-MCNC: 0.98 MG/DL — SIGNIFICANT CHANGE UP (ref 0.5–1.3)
EOSINOPHIL # BLD AUTO: 0.25 K/UL — SIGNIFICANT CHANGE UP (ref 0–0.5)
EOSINOPHIL NFR BLD AUTO: 2.6 % — SIGNIFICANT CHANGE UP (ref 0–6)
GLUCOSE SERPL-MCNC: 94 MG/DL — SIGNIFICANT CHANGE UP (ref 70–99)
HCT VFR BLD CALC: 25.7 % — LOW (ref 34.5–45)
HGB BLD-MCNC: 7.9 G/DL — LOW (ref 11.5–15.5)
IMM GRANULOCYTES NFR BLD AUTO: 0.5 % — SIGNIFICANT CHANGE UP (ref 0–1.5)
LYMPHOCYTES # BLD AUTO: 3.4 K/UL — HIGH (ref 1–3.3)
LYMPHOCYTES # BLD AUTO: 35.1 % — SIGNIFICANT CHANGE UP (ref 13–44)
MAGNESIUM SERPL-MCNC: 2 MG/DL — SIGNIFICANT CHANGE UP (ref 1.6–2.6)
MCHC RBC-ENTMCNC: 27.8 PG — SIGNIFICANT CHANGE UP (ref 27–34)
MCHC RBC-ENTMCNC: 30.7 GM/DL — LOW (ref 32–36)
MCV RBC AUTO: 90.5 FL — SIGNIFICANT CHANGE UP (ref 80–100)
MONOCYTES # BLD AUTO: 1.01 K/UL — HIGH (ref 0–0.9)
MONOCYTES NFR BLD AUTO: 10.4 % — SIGNIFICANT CHANGE UP (ref 2–14)
NEUTROPHILS # BLD AUTO: 4.93 K/UL — SIGNIFICANT CHANGE UP (ref 1.8–7.4)
NEUTROPHILS NFR BLD AUTO: 50.8 % — SIGNIFICANT CHANGE UP (ref 43–77)
PHOSPHATE SERPL-MCNC: 4.9 MG/DL — HIGH (ref 2.4–4.7)
PLATELET # BLD AUTO: 533 K/UL — HIGH (ref 150–400)
POTASSIUM SERPL-MCNC: 5.1 MMOL/L — SIGNIFICANT CHANGE UP (ref 3.5–5.3)
POTASSIUM SERPL-SCNC: 5.1 MMOL/L — SIGNIFICANT CHANGE UP (ref 3.5–5.3)
RBC # BLD: 2.84 M/UL — LOW (ref 3.8–5.2)
RBC # FLD: 18.9 % — HIGH (ref 10.3–14.5)
SODIUM SERPL-SCNC: 129 MMOL/L — LOW (ref 135–145)
VANCOMYCIN TROUGH SERPL-MCNC: 22.4 UG/ML — HIGH (ref 10–20)
WBC # BLD: 9.7 K/UL — SIGNIFICANT CHANGE UP (ref 3.8–10.5)
WBC # FLD AUTO: 9.7 K/UL — SIGNIFICANT CHANGE UP (ref 3.8–10.5)

## 2021-12-24 PROCEDURE — 99232 SBSQ HOSP IP/OBS MODERATE 35: CPT

## 2021-12-24 PROCEDURE — 99024 POSTOP FOLLOW-UP VISIT: CPT

## 2021-12-24 RX ORDER — VANCOMYCIN HCL 1 G
1000 VIAL (EA) INTRAVENOUS EVERY 12 HOURS
Refills: 0 | Status: DISCONTINUED | OUTPATIENT
Start: 2021-12-24 | End: 2021-12-26

## 2021-12-24 RX ADMIN — HYDROMORPHONE HYDROCHLORIDE 2 MILLIGRAM(S): 2 INJECTION INTRAMUSCULAR; INTRAVENOUS; SUBCUTANEOUS at 12:27

## 2021-12-24 RX ADMIN — Medication 10 MILLIGRAM(S): at 21:30

## 2021-12-24 RX ADMIN — HYDROMORPHONE HYDROCHLORIDE 2 MILLIGRAM(S): 2 INJECTION INTRAMUSCULAR; INTRAVENOUS; SUBCUTANEOUS at 04:36

## 2021-12-24 RX ADMIN — PANTOPRAZOLE SODIUM 40 MILLIGRAM(S): 20 TABLET, DELAYED RELEASE ORAL at 18:01

## 2021-12-24 RX ADMIN — SODIUM CHLORIDE 1 GRAM(S): 9 INJECTION INTRAMUSCULAR; INTRAVENOUS; SUBCUTANEOUS at 05:42

## 2021-12-24 RX ADMIN — SODIUM CHLORIDE 500 MILLILITER(S): 9 INJECTION INTRAMUSCULAR; INTRAVENOUS; SUBCUTANEOUS at 05:51

## 2021-12-24 RX ADMIN — HYDROMORPHONE HYDROCHLORIDE 2 MILLIGRAM(S): 2 INJECTION INTRAMUSCULAR; INTRAVENOUS; SUBCUTANEOUS at 07:43

## 2021-12-24 RX ADMIN — MEROPENEM 100 MILLIGRAM(S): 1 INJECTION INTRAVENOUS at 07:41

## 2021-12-24 RX ADMIN — HYDROMORPHONE HYDROCHLORIDE 2 MILLIGRAM(S): 2 INJECTION INTRAMUSCULAR; INTRAVENOUS; SUBCUTANEOUS at 08:13

## 2021-12-24 RX ADMIN — SODIUM CHLORIDE 1 GRAM(S): 9 INJECTION INTRAMUSCULAR; INTRAVENOUS; SUBCUTANEOUS at 18:02

## 2021-12-24 RX ADMIN — Medication 10 MILLIGRAM(S): at 07:42

## 2021-12-24 RX ADMIN — ENOXAPARIN SODIUM 30 MILLIGRAM(S): 100 INJECTION SUBCUTANEOUS at 05:42

## 2021-12-24 RX ADMIN — HYDROMORPHONE HYDROCHLORIDE 2 MILLIGRAM(S): 2 INJECTION INTRAMUSCULAR; INTRAVENOUS; SUBCUTANEOUS at 19:55

## 2021-12-24 RX ADMIN — GABAPENTIN 300 MILLIGRAM(S): 400 CAPSULE ORAL at 13:57

## 2021-12-24 RX ADMIN — MEROPENEM 100 MILLIGRAM(S): 1 INJECTION INTRAVENOUS at 21:29

## 2021-12-24 RX ADMIN — GABAPENTIN 300 MILLIGRAM(S): 400 CAPSULE ORAL at 05:43

## 2021-12-24 RX ADMIN — PANTOPRAZOLE SODIUM 40 MILLIGRAM(S): 20 TABLET, DELAYED RELEASE ORAL at 05:43

## 2021-12-24 RX ADMIN — ENOXAPARIN SODIUM 30 MILLIGRAM(S): 100 INJECTION SUBCUTANEOUS at 18:02

## 2021-12-24 RX ADMIN — HYDROMORPHONE HYDROCHLORIDE 2 MILLIGRAM(S): 2 INJECTION INTRAMUSCULAR; INTRAVENOUS; SUBCUTANEOUS at 11:57

## 2021-12-24 RX ADMIN — MEROPENEM 100 MILLIGRAM(S): 1 INJECTION INTRAVENOUS at 13:58

## 2021-12-24 RX ADMIN — HYDROMORPHONE HYDROCHLORIDE 2 MILLIGRAM(S): 2 INJECTION INTRAMUSCULAR; INTRAVENOUS; SUBCUTANEOUS at 20:30

## 2021-12-24 RX ADMIN — Medication 250 MILLIGRAM(S): at 20:01

## 2021-12-24 RX ADMIN — GABAPENTIN 300 MILLIGRAM(S): 400 CAPSULE ORAL at 21:30

## 2021-12-24 RX ADMIN — Medication 10 MILLIGRAM(S): at 13:57

## 2021-12-24 RX ADMIN — HYDROMORPHONE HYDROCHLORIDE 2 MILLIGRAM(S): 2 INJECTION INTRAMUSCULAR; INTRAVENOUS; SUBCUTANEOUS at 03:36

## 2021-12-24 RX ADMIN — Medication 667 MILLIGRAM(S): at 07:46

## 2021-12-24 RX ADMIN — Medication 667 MILLIGRAM(S): at 11:57

## 2021-12-24 RX ADMIN — Medication 2 TABLET(S): at 05:43

## 2021-12-24 NOTE — PROGRESS NOTE ADULT - SUBJECTIVE AND OBJECTIVE BOX
Pt Name: LUIS FERNANDO DAVIS  MRN: [id number]    Late chart entry. Patient seen around 7:15am.       Patient is a 51 year old female who being followed for bilateral septic knee (11/24), with MASON from left ankle (11/17).  patient also s/p I&D left leg by ACS team.  Patient in bed, comfortable. Pain controlled. Patient denies numbness/tingling, CP, SOB. No acute motor or sensory changes.       PAST MEDICAL & SURGICAL HISTORY:  No pertinent past medical history    History of ankle surgery        Allergies: No Known Allergies                            7.9    9.70  )-----------( 533      ( 24 Dec 2021 06:51 )             25.7     12-24    129<L>  |  96<L>  |  11.7  ----------------------------<  94  5.1   |  20.0<L>  |  0.98    Ca    9.3      24 Dec 2021 06:51  Phos  4.9     12-24  Mg     2.0     12-24        PHYSICAL EXAM:    Vital Signs Last 24 Hrs  T(C): 36.8 (24 Dec 2021 12:07), Max: 37.1 (23 Dec 2021 15:05)  T(F): 98.2 (24 Dec 2021 12:07), Max: 98.8 (23 Dec 2021 15:05)  HR: 82 (24 Dec 2021 12:07) (80 - 108)  BP: 94/58 (24 Dec 2021 12:07) (91/53 - 117/72)  BP(mean): --  RR: 18 (24 Dec 2021 04:35) (12 - 18)  SpO2: 95% (24 Dec 2021 12:07) (92% - 100%)  Daily Height in cm: 168.91 (23 Dec 2021 15:05)    Daily     Appearance: Alert, responsive, in no acute distress.    Neurological: Sensation is grossly intact to light touch.    Vascular: 2+ distal pulses.    Musculoskeletal:       Right knee steri strips in place, no erythema, no warmth, no discharge. + Range of motion of right knee - limited. 2+ DP pulse. + PF/DF/EHL/FHL. Sensation grossly intact to light touch.     Left leg positive foot drop, wound vacs in place to left lower leg. AFO noted, in place. Sensation grossly intact to light touch.  Sutures C/D/I  no discharge or erythema to knee       A/P:  Pt is a 51y Female POD 7 left knee I&D    PLAN:   Continue IV abx per ID  cont AFO brace  wound vac management as per primary team  cont care primary team

## 2021-12-24 NOTE — PROGRESS NOTE ADULT - SUBJECTIVE AND OBJECTIVE BOX
INFECTIOUS DISEASES AND INTERNAL MEDICINE at [facility]  =======================================================  Theo Morrow MD  Diplomates American Board of Internal Medicine and Infectious Diseases  Telephone [phone]  Fax            [phone]  =======================================================    LUIS FERNANDO DAVIS [id number]  Follow up: group A STREP NECROTIZING SOFT TISSUE INFECTION     repeat CT scan of lower ext with collections.   S/P OR WASHOUT 12/2  AND   OR AGAIN  12/7   late  on 12/10 12/15 12/17  12/20 12/23     AFEBRILE THIS MORNING      Allergies:  No Known Allergies       FAMILY   FAMILY HISTORY:  FH: HTN (hypertension) (Mother)      REVIEW OF SYSTEMS:  CONSTITUTIONAL:  No Fever or chills  HEENT:   No diplopia or blurred vision.  No earache, sore throat or runny nose.  CARDIOVASCULAR:  No pressure, squeezing, strangling, tightness, heaviness or aching about the chest, neck, axilla or epigastrium.  RESPIRATORY:  No cough, shortness of breath, PND or orthopnea.  GASTROINTESTINAL:  No nausea, vomiting or diarrhea.  GENITOURINARY:  No dysuria, frequency or urgency. No Blood in urine  MUSCULOSKELETAL:   AS PER HPI   SKIN:  No change in skin, hair or nails.  NEUROLOGIC:  No paresthesias, fasciculations, seizures or weakness.  PSYCHIATRIC:  No disorder of thought or mood.  ENDOCRINE:  No heat or cold intolerance, polyuria or polydipsia.  HEMATOLOGICAL:  No easy bruising or bleeding.            Physical Exam:     GEN: NAD,    HEENT: normocephalic and atraumatic. EOMI. ASHISH.    NECK: Supple. No carotid bruits.  No lymphadenopathy or thyromegaly.  LUNGS: Clear to auscultation.  HEART: Regular rate and rhythm without murmur.  ABDOMEN: Soft, nontender, and nondistended.  Positive bowel sounds.    : No CVA tenderness  EXTREMITIES: LEFT LEG WRAPPED; left thigh vac in place  right knee with dressing in place       NEUROLOGIC:  C AWAKE ALERT Appropriate affect .  SKIN: No ulceration or induration present.           Vitals:  ====  Vital Signs Last 24 Hrs  T(C): 36.8 (24 Dec 2021 04:35), Max: 37.1 (23 Dec 2021 15:05)  T(F): 98.3 (24 Dec 2021 04:35), Max: 98.8 (23 Dec 2021 15:05)  HR: 108 (24 Dec 2021 04:35) (80 - 108)  BP: 105/63 (24 Dec 2021 04:35) (91/53 - 117/72)  BP(mean): --  RR: 18 (24 Dec 2021 04:35) (12 - 18)  SpO2: 94% (23 Dec 2021 22:21) (92% - 100%)      =======================================================  Current Antibiotics:  bactrim  piperacillin/tazobactam IVPB.. 4.5 Gram(s) IV Intermittent every 8 hours    Other medications:  baclofen 10 milliGRAM(s) Oral three times a day  collagenase Ointment 1 Application(s) Topical two times a day  gabapentin 200 milliGRAM(s) Oral three times a day  glucagon  Injectable 1 milliGRAM(s) IntraMuscular once  heparin   Injectable 5000 Unit(s) SubCutaneous every 8 hours  lisinopril 5 milliGRAM(s) Oral daily  methocarbamol 750 milliGRAM(s) Oral three times a day  pantoprazole    Tablet 40 milliGRAM(s) Oral two times a day  polyethylene glycol 3350 17 Gram(s) Oral daily  senna 2 Tablet(s) Oral at bedtime  sodium chloride 1 Gram(s) Oral every 12 hours  sodium chloride 0.9%. 1000 milliLiter(s) IV Continuous <Continuous>      =======================================================  Labs:                                                  7.9    9.70  )-----------( 533      ( 24 Dec 2021 06:51 )             25.7   12-24    129<L>  |  96<L>  |  11.7  ----------------------------<  94  5.1   |  20.0<L>  |  0.98    Ca    9.3      24 Dec 2021 06:51  Phos  4.9     12-24  Mg     2.0     12-24                    Culture - Blood (collected 11-28-21 @ 09:14)  Source: .Blood Blood-Peripheral    Culture - Blood (collected 11-28-21 @ 09:14)  Source: .Blood Blood-Peripheral    Culture - Fungal, Body Fluid (collected 11-24-21 @ 13:35)  Source: .Body Fluid Right Knee Fluid    Culture - Acid Fast - Body Fluid w/Smear (collected 11-24-21 @ 13:35)  Source: .Body Fluid Right Knee Fluid    Culture - Body Fluid with Gram Stain (collected 11-24-21 @ 13:35)  Source: .Body Fluid Right Knee Fluid  Gram Stain (11-25-21 @ 01:27):    polymorphonuclear leukocytes seen per low power field    No organisms seen per oil power field    Culture - Surgical Swab (collected 11-24-21 @ 12:30)  Source: .Surgical Swab Swab Right Knee #2  Final Report (11-29-21 @ 07:55):    No growth at 5 days    Culture - Surgical Swab (collected 11-24-21 @ 12:30)  Source: .Surgical Swab Swab Right Knee #1  Final Report (11-29-21 @ 07:56):    No growth at 5 days    Culture - Surgical Swab (collected 11-24-21 @ 12:28)  Source: .Surgical Swab Swab Right Knee #3  Final Report (11-29-21 @ 08:01):    No growth at 5 days    Culture - Body Fluid with Gram Stain (collected 11-23-21 @ 12:50)  Source: .Body Fluid Knee Fluid  Gram Stain (11-23-21 @ 23:34):    polymorphonuclear leukocytes seen    No organisms seen    by cytocentrifuge    Culture - Blood (collected 11-22-21 @ 08:44)  Source: .Blood Blood  Final Report (11-27-21 @ 09:00):    No growth at 5 days.    Culture - Blood (collected 11-20-21 @ 12:57)  Source: .Blood Blood  Final Report (11-25-21 @ 13:00):    No growth at 5 days.    Culture - Body Fluid with Gram Stain (collected 11-18-21 @ 16:20)  Source: .Body Fluid OR Posterior Calf Left Lower Extremity Fluid  Gram Stain (11-18-21 @ 21:39):    polymorphonuclear leukocytes seen    Gram Positive Cocci in Pairs and Chains seen    by cytocentrifuge  Final Report (11-23-21 @ 09:40):    Moderate Streptococcus pyogenes (Group A) Penicillin and ampicillin are    drugs of choice for    treatment of beta-hemolytic streptococcal infections.    Susceptibility testing is not performed routinely because    S. pyogenes (GAS) is universally susceptible to penicillin    and resistance in other strains is extremely rare.    Culture - Body Fluid with Gram Stain (collected 11-18-21 @ 16:20)  Source: .Body Fluid OR Swab Left Knee Lower Extremity Fluid  Gram Stain (11-18-21 @ 19:36):    polymorphonuclear leukocytes seen    Gram Positive Cocci in Pairs and Chains seen    by cytocentrifuge  Final Report (11-23-21 @ 09:41):    Numerous Streptococcus pyogenes (Group A) Penicillin and ampicillin are    drugs of choice for    treatment of beta-hemolytic streptococcal infections.    Susceptibility testing is not performed routinely because    S. pyogenes (GAS) is universally susceptible to penicillin    and resistance in other strains is extremely rare.    Culture - Body Fluid with Gram Stain (collected 11-18-21 @ 16:20)  Source: .Body Fluid OR - Left Lower Extremity Fluid  Gram Stain (11-18-21 @ 20:10):    No polymorphonuclear leukocytes seen    No organisms seen    by cytocentrifuge  Final Report (11-23-21 @ 09:40):    Rare Streptococcus pyogenes (Group A)    Penicillin and ampicillin are drugs of choice for treatment of    beta-hemolytic streptococcal infections.    Susceptibility testing is not performed routinely because S. pyogenes    (GAS) is universally susceptibleto penicillin    and resistance in other strains is extremely rare.    Culture - Urine (collected 11-18-21 @ 10:07)  Source: Catheterized Catheterized  Final Report (11-19-21 @ 07:03):    No growth    Culture - Urine (collected 11-18-21 @ 00:25)  Source: Catheterized Catheterized  Final Report (11-18-21 @ 22:09):    <10,000 CFU/mL Normal Urogenital Aimee    Culture - Blood (collected 11-17-21 @ 14:45)  Source: .Blood Blood  Gram Stain (11-18-21 @ 09:44):    Growth in aerobic and anaerobic bottles: Gram Positive Cocci in Pairs and    Chains    Gram Stain performed by:    [facility]    [address] [place] [id number]    .    TYPE: (C=Critical, N=Notification, A=Abnormal) C    TESTS:  _ GS    DATE/TIME CALLED: _ 11/18/2021 09:42:19    CALLED TO: Chris Hernandez RN    READ BACK (2 Patient Identifiers)(Y/N): _ Y    READ BACK VALUES (Y/N): _ Y    CALLED BY: Chris Quiñones  Final Report (11-21-21 @ 12:28):    Growth in aerobic and anaerobic bottles: Streptococcus pyogenes (Group A)    See previous culture [id number]    Culture - Blood (collected 11-17-21 @ 14:45)  Source: .Blood Blood  Gram Stain (11-18-21 @ 09:40):    Growth in aerobic and anaerobic bottles: Gram Positive Cocci in Pairs and    Chains    ***Blood Panel PCR results on this specimen are available    approximately 3 hours after the Gram stain result.***    Gram stain, PCR, and/or culture results may not always    correspond due to difference in methodologies.    ************************************************************    This PCR assay was performed using [employer].    The following targets are tested for: Enterococcus,    vancomycin resistant enterococci, Listeria monocytogenes,    coagulase negative staphylococci, S. aureus,    methicillin resistant S. aureus, Streptococcus agalactiae    (Group B), S. pneumoniae, S. pyogenes (Group A),    Acinetobacter baumannii, Enterobacter cloacae, E. coli,    Klebsiella oxytoca, K. pneumoniae, Proteus sp.,    Serratia marcescens, Haemophilus influenzae,    Neisseria meningitidis, Pseudomonas aeruginosa, Candida    albicans, C. glabrata, C krusei, C parapsilosis,    C. tropicalis and the KPC resistance gene.    Gram Stain and BCID performed by:    [facility]    [address] [place] [id number]    .    TYPE: (C=Critical, N=Notification, A=Abnormal) C    TESTS:  _ GS    DATE/TIME CALLED: _ 11/18/2021 09:40:08    CALLED TO: Chris Hernandez RN    READ BACK (2 Patient Identifiers)(Y/N): _ Y    READ BACK VALUES (Y/N): _ Y    CALLED BY: Chris Quiñones  Final Report (11-21-21 @ 12:28):    Growth in aerobic and anaerobic bottles: Streptococcus pyogenes (Group A)  Organism: Blood Culture PCR  Streptococcus pyogenes (Group A) (11-21-21 @ 12:28)  Organism: Streptococcus pyogenes (Group A) (11-21-21 @ 12:28)    Sensitivities:      -  Ceftriaxone: S 0.016      -  Penicillin: S 0.016 Predicts results for ampicillin, amoxicillin, amoxicillin/clavulanate, ampicillin/sulbactam, 1st, 2nd and 3rd generation cephalosporins and carbapenems.      Method Type: ETEST  Organism: Streptococcus pyogenes (Group A) (11-21-21 @ 12:28)    Sensitivities:      -  Vancomycin: S      Method Type: KB  Organism: Blood Culture PCR (11-21-21 @ 12:28)    Sensitivities:      -  Streptococcus pyogenes (Group A): Detec      Method Type: PCR      Creatinine, Serum: 0.75 mg/dL (11-30-21 @ 07:18)  Creatinine, Serum: 0.68 mg/dL (11-29-21 @ 09:55)  Creatinine, Serum: 0.64 mg/dL (11-28-21 @ 09:14)  Creatinine, Serum: 0.65 mg/dL (11-27-21 @ 11:51)  Creatinine, Serum: 0.57 mg/dL (11-27-21 @ 05:35)  Creatinine, Serum: 0.61 mg/dL (11-26-21 @ 09:01)        Ferritin, Serum: 388 ng/mL (11-17-21 @ 13:21)    C-Reactive Protein, Serum: 197 mg/L (11-26-21 @ 21:52)  C-Reactive Protein, Serum: 211 mg/L (11-26-21 @ 09:01)  C-Reactive Protein, Serum: 53 mg/L (11-23-21 @ 07:42)  C-Reactive Protein, Serum: >422 mg/L (11-17-21 @ 13:21)    Sedimentation Rate, Erythrocyte: 65 mm/hr (11-26-21 @ 21:52)  Sedimentation Rate, Erythrocyte: 63 mm/hr (11-26-21 @ 09:01)  Sedimentation Rate, Erythrocyte: 55 mm/hr (11-17-21 @ 13:21)    WBC Count: 14.55 K/uL (11-30-21 @ 07:18)  WBC Count: 16.71 K/uL (11-29-21 @ 09:54)  WBC Count: 17.02 K/uL (11-28-21 @ 09:14)  WBC Count: 15.99 K/uL (11-27-21 @ 05:33)  WBC Count: 18.73 K/uL (11-26-21 @ 09:01)      COVID-19 PCR: NotDetec (11-30-21 @ 01:59)  COVID-19 PCR: NotDetec (11-23-21 @ 18:50)  COVID-19 PCR: NotDetec (11-17-21 @ 13:18)    Lactate Dehydrogenase, Serum: 769 U/L (11-20-21 @ 03:56)    Alkaline Phosphatase, Serum: 88 U/L (11-27-21 @ 05:35)  Alanine Aminotransferase (ALT/SGPT): 11 U/L (11-27-21 @ 05:35)  Aspartate Aminotransferase (AST/SGOT): 25 U/L (11-27-21 @ 05:35)  Bilirubin Total, Serum: 0.3 mg/dL (11-27-21 @ 05:35)        < from: CT Lower Extremity w/ IV Cont, Bilateral (11.30.21 @ 09:55) >     EXAM:  CT LWR EXT IC BI                          PROCEDURE DATE:  11/30/2021          INTERPRETATION:  CT LOWER EXTREMITY WITH IV CONTRAST BILATERAL dated 11/30/2021 9:55 AM    INDICATION: Fever. Status post incision and drainage bilaterally. Persistent fevers to 104.    COMPARISON: CT of the right knee dated 11/23/2021. CT of the left lower extremity dated 11/17/2021    VICKI -

## 2021-12-24 NOTE — PROGRESS NOTE ADULT - ASSESSMENT
51F no PMHx s/p multiple LLE procedures now POD 3 for most recent clean washout, went to OR additional washout and wound vac change on 12/23.    - monitor fever curve  - ID following, current regimen: meropenum, bactrim, vancomycin   - wound vac management   - continue pain control   - DVTppx  - WBAT RLE, WBAT LLE in CAM boot  - Left knee aspirate cultures negative to date from 12/1/21

## 2021-12-24 NOTE — PROGRESS NOTE ADULT - ATTENDING COMMENTS
pt seen and examined. making slow and steady improvement. recent washouts /w acs negative for infection, knee pain improved, still has reduced rom due to pain. the nature of her condition makes rom of the LLE difficult. RLE continues to improve. pt afebrile and wbc has normalized. continue care as per acs. PT for ROM of LLE. will continue to follow

## 2021-12-24 NOTE — PROGRESS NOTE ADULT - SUBJECTIVE AND OBJECTIVE BOX
SUBJECTIVE/24 hour events:  Patient is a 51yFemale with left lower extremity necrotizing soft tissue infection. Patient now pod#1 of additional washout and wound vac change. Patient with no acute post-op events, pain controlled on current regimen, tolerating a diet, extremity with sensation intact.       Vital Signs Last 24 Hrs  T(C): 37 (23 Dec 2021 22:21), Max: 37.1 (23 Dec 2021 15:05)  T(F): 98.6 (23 Dec 2021 22:21), Max: 98.8 (23 Dec 2021 15:05)  HR: 87 (23 Dec 2021 22:21) (80 - 89)  BP: 91/53 (23 Dec 2021 22:21) (91/53 - 117/72)  BP(mean): --  RR: 18 (23 Dec 2021 22:21) (12 - 18)  SpO2: 94% (23 Dec 2021 22:21) (92% - 100%)  Drug Dosing Weight  Height (cm): 168.9 (23 Dec 2021 15:05)  Weight (kg): 88.5 (23 Dec 2021 15:05)  BMI (kg/m2): 31 (23 Dec 2021 15:05)  BSA (m2): 1.99 (23 Dec 2021 15:05)  I&O's Detail    22 Dec 2021 07:01  -  23 Dec 2021 07:00  --------------------------------------------------------  IN:  Total IN: 0 mL    OUT:    Voided (mL): 2300 mL  Total OUT: 2300 mL    Total NET: -2300 mL      23 Dec 2021 07:01  -  24 Dec 2021 03:07  --------------------------------------------------------  IN:    Sodium Chloride 0.9% Bolus: 500 mL  Total IN: 500 mL    OUT:    Voided (mL): 350 mL  Total OUT: 350 mL    Total NET: 150 mL        Allergies    No Known Allergies    Intolerances                              8.4    9.96  )-----------( 530      ( 23 Dec 2021 05:47 )             26.5   12-23    131<L>  |  97<L>  |  6.7<L>  ----------------------------<  85  4.7   |  22.0  |  0.58    Ca    8.9      23 Dec 2021 05:47  Phos  5.0     12-23  Mg     2.1     12-23    PT/INR - ( 23 Dec 2021 06:34 )   PT: 15.4 sec;   INR: 1.35 ratio         PTT - ( 23 Dec 2021 06:34 )  PTT:36.1 sec    ROS:    PHYSICAL EXAM:  Constitutional: in good spirits   Respiratory: no respiratory distress, no dyspnea, no supplemental o2 needed   Gastrointestinal: abdomen soft, non-tender, atraumatic   Genitourinary: voiding spontaneously   Extremities: LLE warm to touch, edema improving, wound vac x2 well seated, able to move toes , sensation intaact  Neurological: A&OX3        MEDICATIONS  (STANDING):  baclofen 10 milliGRAM(s) Oral three times a day  calcium acetate 667 milliGRAM(s) Oral four times a day with meals  enoxaparin Injectable 30 milliGRAM(s) SubCutaneous two times a day  gabapentin 300 milliGRAM(s) Oral three times a day  lisinopril 5 milliGRAM(s) Oral daily  meropenem  IVPB 1000 milliGRAM(s) IV Intermittent every 8 hours  pantoprazole    Tablet 40 milliGRAM(s) Oral two times a day  polyethylene glycol 3350 17 Gram(s) Oral daily  sodium chloride 1 Gram(s) Oral every 12 hours  sodium chloride 0.9% Bolus 500 milliLiter(s) IV Bolus once  trimethoprim  160 mG/sulfamethoxazole 800 mG 2 Tablet(s) Oral every 12 hours  vancomycin  IVPB 1250 milliGRAM(s) IV Intermittent every 12 hours    MEDICATIONS  (PRN):  HYDROmorphone   Tablet 2 milliGRAM(s) Oral every 4 hours PRN Severe Pain (7 - 10)      RADIOLOGY STUDIES:    CULTURES:

## 2021-12-24 NOTE — PROGRESS NOTE ADULT - ASSESSMENT
51y Female present to ED with left leg swelling, erythema, pain. Patient endorses she had left knee pain for 1 week presented 3 days ago to ED  where she states was given an steroid shot, and ibuprofen. however pain and edema worsened. Patient states she wasnt feeling herself today reason why she came. Endorses chills initially with pain 3 days ago but none since, denies history of trauma, insect bites, recent interventions, or injections to the area, contact with ticks, history of diabetes.  PT AS ABOVE WITH LEFT LEG SWELLING PT WITH INCREASED WBC PLACED ON ABX FOR PRESUMED INFECTION  PT WITH CT SCAN WITH FASCIAL EDAM PT BROUGHT TO THE OR EMERGENTLY AND  UNDERWENT FASCIOTOMY   PT BROUGHT TO THE OR  BOTH TRAUMA AND ORTHO INVOLVED  PURULENT FLUID FOUND  NECROTIZING SOFT TISSUE INFECTION    BLOOD CX WITH GROUP A STREP AND  OPERATIVE FLUID WITH STREP   PT WAS  ON PCN AND CLINDA for POSSIBLE ANTITOXIN EFFECT IN GROUP A STREP INFECTION     PT  S/P OR WASHOUT 12.2  AND  OR AGAIN 12/7  and late on 12/10 121/15 12/17 and 12/20 12/23     REPEAT   OPERATIVE CX NEG       ON MERREM VANCO   AND   ORAL BACTRIM    AS ABOVE  REPEAT  OR  12/20 12/23      NO PURULENCE   OVERALL NON TOXIC  AFEBRILE     cr up to 0.9  WILL ADJUST VANCO  WILL D/C BACTRIM AS UNCLEAR IF STILL NEEDED AND CONCERN ABOUT RENAL FUNCTION   WILL FOLLOWUP  WITH FURTHER RECOMMENDATIONS

## 2021-12-25 LAB
ANION GAP SERPL CALC-SCNC: 13 MMOL/L — SIGNIFICANT CHANGE UP (ref 5–17)
BUN SERPL-MCNC: 12.6 MG/DL — SIGNIFICANT CHANGE UP (ref 8–20)
CALCIUM SERPL-MCNC: 9.5 MG/DL — SIGNIFICANT CHANGE UP (ref 8.6–10.2)
CHLORIDE SERPL-SCNC: 96 MMOL/L — LOW (ref 98–107)
CO2 SERPL-SCNC: 20 MMOL/L — LOW (ref 22–29)
CREAT SERPL-MCNC: 0.83 MG/DL — SIGNIFICANT CHANGE UP (ref 0.5–1.3)
GLUCOSE SERPL-MCNC: 86 MG/DL — SIGNIFICANT CHANGE UP (ref 70–99)
HCT VFR BLD CALC: 29 % — LOW (ref 34.5–45)
HGB BLD-MCNC: 8.9 G/DL — LOW (ref 11.5–15.5)
MAGNESIUM SERPL-MCNC: 1.8 MG/DL — SIGNIFICANT CHANGE UP (ref 1.6–2.6)
MCHC RBC-ENTMCNC: 27.9 PG — SIGNIFICANT CHANGE UP (ref 27–34)
MCHC RBC-ENTMCNC: 30.7 GM/DL — LOW (ref 32–36)
MCV RBC AUTO: 90.9 FL — SIGNIFICANT CHANGE UP (ref 80–100)
PHOSPHATE SERPL-MCNC: 4 MG/DL — SIGNIFICANT CHANGE UP (ref 2.4–4.7)
PLATELET # BLD AUTO: 581 K/UL — HIGH (ref 150–400)
POTASSIUM SERPL-MCNC: 5 MMOL/L — SIGNIFICANT CHANGE UP (ref 3.5–5.3)
POTASSIUM SERPL-SCNC: 5 MMOL/L — SIGNIFICANT CHANGE UP (ref 3.5–5.3)
RBC # BLD: 3.19 M/UL — LOW (ref 3.8–5.2)
RBC # FLD: 18.8 % — HIGH (ref 10.3–14.5)
SODIUM SERPL-SCNC: 129 MMOL/L — LOW (ref 135–145)
VANCOMYCIN FLD-MCNC: 17.9 UG/ML — SIGNIFICANT CHANGE UP
WBC # BLD: 13.24 K/UL — HIGH (ref 3.8–10.5)
WBC # FLD AUTO: 13.24 K/UL — HIGH (ref 3.8–10.5)

## 2021-12-25 PROCEDURE — 99024 POSTOP FOLLOW-UP VISIT: CPT

## 2021-12-25 RX ORDER — MAGNESIUM SULFATE 500 MG/ML
2 VIAL (ML) INJECTION ONCE
Refills: 0 | Status: COMPLETED | OUTPATIENT
Start: 2021-12-25 | End: 2021-12-25

## 2021-12-25 RX ORDER — ACETAMINOPHEN 500 MG
975 TABLET ORAL EVERY 6 HOURS
Refills: 0 | Status: DISCONTINUED | OUTPATIENT
Start: 2021-12-25 | End: 2021-12-28

## 2021-12-25 RX ADMIN — SODIUM CHLORIDE 1 GRAM(S): 9 INJECTION INTRAMUSCULAR; INTRAVENOUS; SUBCUTANEOUS at 05:04

## 2021-12-25 RX ADMIN — Medication 10 MILLIGRAM(S): at 15:48

## 2021-12-25 RX ADMIN — HYDROMORPHONE HYDROCHLORIDE 2 MILLIGRAM(S): 2 INJECTION INTRAMUSCULAR; INTRAVENOUS; SUBCUTANEOUS at 10:00

## 2021-12-25 RX ADMIN — Medication 250 MILLIGRAM(S): at 21:14

## 2021-12-25 RX ADMIN — HYDROMORPHONE HYDROCHLORIDE 2 MILLIGRAM(S): 2 INJECTION INTRAMUSCULAR; INTRAVENOUS; SUBCUTANEOUS at 09:00

## 2021-12-25 RX ADMIN — GABAPENTIN 300 MILLIGRAM(S): 400 CAPSULE ORAL at 15:48

## 2021-12-25 RX ADMIN — LISINOPRIL 5 MILLIGRAM(S): 2.5 TABLET ORAL at 05:05

## 2021-12-25 RX ADMIN — ENOXAPARIN SODIUM 30 MILLIGRAM(S): 100 INJECTION SUBCUTANEOUS at 05:05

## 2021-12-25 RX ADMIN — Medication 10 MILLIGRAM(S): at 05:04

## 2021-12-25 RX ADMIN — PANTOPRAZOLE SODIUM 40 MILLIGRAM(S): 20 TABLET, DELAYED RELEASE ORAL at 18:06

## 2021-12-25 RX ADMIN — MEROPENEM 100 MILLIGRAM(S): 1 INJECTION INTRAVENOUS at 21:14

## 2021-12-25 RX ADMIN — Medication 975 MILLIGRAM(S): at 13:24

## 2021-12-25 RX ADMIN — Medication 250 MILLIGRAM(S): at 08:58

## 2021-12-25 RX ADMIN — POLYETHYLENE GLYCOL 3350 17 GRAM(S): 17 POWDER, FOR SOLUTION ORAL at 15:49

## 2021-12-25 RX ADMIN — MEROPENEM 100 MILLIGRAM(S): 1 INJECTION INTRAVENOUS at 05:03

## 2021-12-25 RX ADMIN — MEROPENEM 100 MILLIGRAM(S): 1 INJECTION INTRAVENOUS at 15:49

## 2021-12-25 RX ADMIN — HYDROMORPHONE HYDROCHLORIDE 2 MILLIGRAM(S): 2 INJECTION INTRAMUSCULAR; INTRAVENOUS; SUBCUTANEOUS at 01:07

## 2021-12-25 RX ADMIN — Medication 975 MILLIGRAM(S): at 12:24

## 2021-12-25 RX ADMIN — Medication 975 MILLIGRAM(S): at 18:36

## 2021-12-25 RX ADMIN — ENOXAPARIN SODIUM 30 MILLIGRAM(S): 100 INJECTION SUBCUTANEOUS at 18:07

## 2021-12-25 RX ADMIN — PANTOPRAZOLE SODIUM 40 MILLIGRAM(S): 20 TABLET, DELAYED RELEASE ORAL at 05:04

## 2021-12-25 RX ADMIN — HYDROMORPHONE HYDROCHLORIDE 2 MILLIGRAM(S): 2 INJECTION INTRAMUSCULAR; INTRAVENOUS; SUBCUTANEOUS at 02:00

## 2021-12-25 RX ADMIN — GABAPENTIN 300 MILLIGRAM(S): 400 CAPSULE ORAL at 05:03

## 2021-12-25 RX ADMIN — Medication 975 MILLIGRAM(S): at 18:06

## 2021-12-25 RX ADMIN — Medication 25 GRAM(S): at 12:25

## 2021-12-25 RX ADMIN — SODIUM CHLORIDE 1 GRAM(S): 9 INJECTION INTRAMUSCULAR; INTRAVENOUS; SUBCUTANEOUS at 18:06

## 2021-12-25 NOTE — PROGRESS NOTE ADULT - ATTENDING COMMENTS
seen and examined 12-25-21 @ 1210    afeb  wound VAC x 2 functioning fine    WBC = 13      s/p multiple debridements of left leg soft tissue infection  -vancomycin (11/17 - 11/20), Bactrim DS (12/8 - 12/15), vancomycin (12/14 - ?)  -Zosyn (11/17 - 11/20, 11/28 - 12/14), meropenem (12/14 - ?)  -plan STSG on Mon 12/27    [facility]  -salt tabs

## 2021-12-25 NOTE — PROGRESS NOTE ADULT - ASSESSMENT
51F no PMHx s/p multiple LLE procedures now POD 2 for most recent clean washout, WBC normalized afebrile, no systemic signs of infection, likely wound coverage on 12/27    - Reg  - DVT PPX  - PT/OT  - Pain control  - monitor fevers  - plan for possible STSG on 12/27

## 2021-12-25 NOTE — PROGRESS NOTE ADULT - SUBJECTIVE AND OBJECTIVE BOX
HPI/OVERNIGHT EVENTS: Patient seen and examined at bedside this AM. afebrile overnight, good spirits, tolerating diet, no acute complaints    Vital Signs Last 24 Hrs  T(C): 37.3 (24 Dec 2021 22:10), Max: 37.3 (24 Dec 2021 22:10)  T(F): 99.1 (24 Dec 2021 22:10), Max: 99.1 (24 Dec 2021 22:10)  HR: 92 (24 Dec 2021 22:10) (82 - 108)  BP: 100/65 (24 Dec 2021 22:10) (94/58 - 105/65)  BP(mean): --  RR: 18 (24 Dec 2021 22:10) (18 - 18)  SpO2: 95% (24 Dec 2021 22:10) (94% - 95%)    I&O's Detail    23 Dec 2021 07:01  -  24 Dec 2021 07:00  --------------------------------------------------------  IN:    Sodium Chloride 0.9% Bolus: 500 mL  Total IN: 500 mL    OUT:    Voided (mL): 650 mL  Total OUT: 650 mL    Total NET: -150 mL      24 Dec 2021 07:01  -  25 Dec 2021 02:07  --------------------------------------------------------  IN:  Total IN: 0 mL    OUT:    Voided (mL): 525 mL  Total OUT: 525 mL    Total NET: -525 mL      Constitutional: in good spirits   Respiratory: no respiratory distress, no dyspnea, no supplemental o2 needed   Gastrointestinal: abdomen soft, non-tender, atraumatic   Genitourinary: voiding spontaneously   Extremities: LLE warm to touch, edema improving, wound vac x2 well seated, able to move toes , sensation intact  Neurological: A&OX3    LABS:                        7.9    9.70  )-----------( 533      ( 24 Dec 2021 06:51 )             25.7     12-24    129<L>  |  96<L>  |  11.7  ----------------------------<  94  5.1   |  20.0<L>  |  0.98    Ca    9.3      24 Dec 2021 06:51  Phos  4.9     12-24  Mg     2.0     12-24      PT/INR - ( 23 Dec 2021 06:34 )   PT: 15.4 sec;   INR: 1.35 ratio         PTT - ( 23 Dec 2021 06:34 )  PTT:36.1 sec      MEDICATIONS  (STANDING):  baclofen 10 milliGRAM(s) Oral three times a day  enoxaparin Injectable 30 milliGRAM(s) SubCutaneous two times a day  gabapentin 300 milliGRAM(s) Oral three times a day  lisinopril 5 milliGRAM(s) Oral daily  meropenem  IVPB 1000 milliGRAM(s) IV Intermittent every 8 hours  pantoprazole    Tablet 40 milliGRAM(s) Oral two times a day  polyethylene glycol 3350 17 Gram(s) Oral daily  sodium chloride 1 Gram(s) Oral every 12 hours  vancomycin  IVPB 1000 milliGRAM(s) IV Intermittent every 12 hours    MEDICATIONS  (PRN):  HYDROmorphone   Tablet 2 milliGRAM(s) Oral every 4 hours PRN Severe Pain (7 - 10)      MICRO:   Cultures     STUDIES:   EKG, CXR, U/S, CT, MRI

## 2021-12-26 LAB
ANION GAP SERPL CALC-SCNC: 12 MMOL/L — SIGNIFICANT CHANGE UP (ref 5–17)
BUN SERPL-MCNC: 15.6 MG/DL — SIGNIFICANT CHANGE UP (ref 8–20)
CALCIUM SERPL-MCNC: 9.5 MG/DL — SIGNIFICANT CHANGE UP (ref 8.6–10.2)
CHLORIDE SERPL-SCNC: 99 MMOL/L — SIGNIFICANT CHANGE UP (ref 98–107)
CO2 SERPL-SCNC: 22 MMOL/L — SIGNIFICANT CHANGE UP (ref 22–29)
CREAT SERPL-MCNC: 0.86 MG/DL — SIGNIFICANT CHANGE UP (ref 0.5–1.3)
GLUCOSE SERPL-MCNC: 98 MG/DL — SIGNIFICANT CHANGE UP (ref 70–99)
HCT VFR BLD CALC: 25.5 % — LOW (ref 34.5–45)
HGB BLD-MCNC: 7.9 G/DL — LOW (ref 11.5–15.5)
MAGNESIUM SERPL-MCNC: 1.8 MG/DL — SIGNIFICANT CHANGE UP (ref 1.6–2.6)
MCHC RBC-ENTMCNC: 27.5 PG — SIGNIFICANT CHANGE UP (ref 27–34)
MCHC RBC-ENTMCNC: 31 GM/DL — LOW (ref 32–36)
MCV RBC AUTO: 88.9 FL — SIGNIFICANT CHANGE UP (ref 80–100)
PHOSPHATE SERPL-MCNC: 4.5 MG/DL — SIGNIFICANT CHANGE UP (ref 2.4–4.7)
PLATELET # BLD AUTO: 554 K/UL — HIGH (ref 150–400)
POTASSIUM SERPL-MCNC: 5.2 MMOL/L — SIGNIFICANT CHANGE UP (ref 3.5–5.3)
POTASSIUM SERPL-SCNC: 5.2 MMOL/L — SIGNIFICANT CHANGE UP (ref 3.5–5.3)
RBC # BLD: 2.87 M/UL — LOW (ref 3.8–5.2)
RBC # FLD: 18.6 % — HIGH (ref 10.3–14.5)
SARS-COV-2 RNA SPEC QL NAA+PROBE: SIGNIFICANT CHANGE UP
SODIUM SERPL-SCNC: 133 MMOL/L — LOW (ref 135–145)
VANCOMYCIN TROUGH SERPL-MCNC: 24.1 UG/ML — HIGH (ref 10–20)
WBC # BLD: 11.6 K/UL — HIGH (ref 3.8–10.5)
WBC # FLD AUTO: 11.6 K/UL — HIGH (ref 3.8–10.5)

## 2021-12-26 PROCEDURE — 99024 POSTOP FOLLOW-UP VISIT: CPT

## 2021-12-26 RX ORDER — HYDROMORPHONE HYDROCHLORIDE 2 MG/ML
0.5 INJECTION INTRAMUSCULAR; INTRAVENOUS; SUBCUTANEOUS ONCE
Refills: 0 | Status: DISCONTINUED | OUTPATIENT
Start: 2021-12-26 | End: 2021-12-26

## 2021-12-26 RX ORDER — VANCOMYCIN HCL 1 G
750 VIAL (EA) INTRAVENOUS EVERY 12 HOURS
Refills: 0 | Status: DISCONTINUED | OUTPATIENT
Start: 2021-12-26 | End: 2021-12-28

## 2021-12-26 RX ORDER — MAGNESIUM OXIDE 400 MG ORAL TABLET 241.3 MG
400 TABLET ORAL
Refills: 0 | Status: DISCONTINUED | OUTPATIENT
Start: 2021-12-26 | End: 2021-12-28

## 2021-12-26 RX ADMIN — Medication 250 MILLIGRAM(S): at 20:31

## 2021-12-26 RX ADMIN — ENOXAPARIN SODIUM 30 MILLIGRAM(S): 100 INJECTION SUBCUTANEOUS at 17:05

## 2021-12-26 RX ADMIN — PANTOPRAZOLE SODIUM 40 MILLIGRAM(S): 20 TABLET, DELAYED RELEASE ORAL at 06:00

## 2021-12-26 RX ADMIN — SODIUM CHLORIDE 1 GRAM(S): 9 INJECTION INTRAMUSCULAR; INTRAVENOUS; SUBCUTANEOUS at 17:04

## 2021-12-26 RX ADMIN — ENOXAPARIN SODIUM 30 MILLIGRAM(S): 100 INJECTION SUBCUTANEOUS at 06:00

## 2021-12-26 RX ADMIN — HYDROMORPHONE HYDROCHLORIDE 0.5 MILLIGRAM(S): 2 INJECTION INTRAMUSCULAR; INTRAVENOUS; SUBCUTANEOUS at 22:46

## 2021-12-26 RX ADMIN — HYDROMORPHONE HYDROCHLORIDE 2 MILLIGRAM(S): 2 INJECTION INTRAMUSCULAR; INTRAVENOUS; SUBCUTANEOUS at 02:27

## 2021-12-26 RX ADMIN — Medication 10 MILLIGRAM(S): at 01:27

## 2021-12-26 RX ADMIN — MAGNESIUM OXIDE 400 MG ORAL TABLET 400 MILLIGRAM(S): 241.3 TABLET ORAL at 11:04

## 2021-12-26 RX ADMIN — HYDROMORPHONE HYDROCHLORIDE 2 MILLIGRAM(S): 2 INJECTION INTRAMUSCULAR; INTRAVENOUS; SUBCUTANEOUS at 17:04

## 2021-12-26 RX ADMIN — HYDROMORPHONE HYDROCHLORIDE 2 MILLIGRAM(S): 2 INJECTION INTRAMUSCULAR; INTRAVENOUS; SUBCUTANEOUS at 22:54

## 2021-12-26 RX ADMIN — HYDROMORPHONE HYDROCHLORIDE 2 MILLIGRAM(S): 2 INJECTION INTRAMUSCULAR; INTRAVENOUS; SUBCUTANEOUS at 18:55

## 2021-12-26 RX ADMIN — MEROPENEM 100 MILLIGRAM(S): 1 INJECTION INTRAVENOUS at 13:27

## 2021-12-26 RX ADMIN — GABAPENTIN 300 MILLIGRAM(S): 400 CAPSULE ORAL at 13:25

## 2021-12-26 RX ADMIN — Medication 975 MILLIGRAM(S): at 06:04

## 2021-12-26 RX ADMIN — PANTOPRAZOLE SODIUM 40 MILLIGRAM(S): 20 TABLET, DELAYED RELEASE ORAL at 17:04

## 2021-12-26 RX ADMIN — MEROPENEM 100 MILLIGRAM(S): 1 INJECTION INTRAVENOUS at 06:03

## 2021-12-26 RX ADMIN — MEROPENEM 100 MILLIGRAM(S): 1 INJECTION INTRAVENOUS at 21:54

## 2021-12-26 RX ADMIN — Medication 975 MILLIGRAM(S): at 13:56

## 2021-12-26 RX ADMIN — HYDROMORPHONE HYDROCHLORIDE 2 MILLIGRAM(S): 2 INJECTION INTRAMUSCULAR; INTRAVENOUS; SUBCUTANEOUS at 21:53

## 2021-12-26 RX ADMIN — HYDROMORPHONE HYDROCHLORIDE 2 MILLIGRAM(S): 2 INJECTION INTRAMUSCULAR; INTRAVENOUS; SUBCUTANEOUS at 01:27

## 2021-12-26 RX ADMIN — Medication 975 MILLIGRAM(S): at 21:54

## 2021-12-26 RX ADMIN — Medication 975 MILLIGRAM(S): at 22:54

## 2021-12-26 RX ADMIN — Medication 975 MILLIGRAM(S): at 07:41

## 2021-12-26 RX ADMIN — GABAPENTIN 300 MILLIGRAM(S): 400 CAPSULE ORAL at 21:53

## 2021-12-26 RX ADMIN — MAGNESIUM OXIDE 400 MG ORAL TABLET 400 MILLIGRAM(S): 241.3 TABLET ORAL at 17:05

## 2021-12-26 RX ADMIN — Medication 10 MILLIGRAM(S): at 13:25

## 2021-12-26 RX ADMIN — SODIUM CHLORIDE 1 GRAM(S): 9 INJECTION INTRAMUSCULAR; INTRAVENOUS; SUBCUTANEOUS at 05:59

## 2021-12-26 RX ADMIN — GABAPENTIN 300 MILLIGRAM(S): 400 CAPSULE ORAL at 01:27

## 2021-12-26 RX ADMIN — Medication 10 MILLIGRAM(S): at 21:54

## 2021-12-26 RX ADMIN — Medication 975 MILLIGRAM(S): at 13:26

## 2021-12-26 RX ADMIN — HYDROMORPHONE HYDROCHLORIDE 0.5 MILLIGRAM(S): 2 INJECTION INTRAMUSCULAR; INTRAVENOUS; SUBCUTANEOUS at 22:28

## 2021-12-26 NOTE — PROGRESS NOTE ADULT - ATTENDING COMMENTS
I have seen and examined the patient during rounds from 730-8a  no new issues  OR tomorrow for tentative STSG.  ABXC per ID  ID help appreciated.

## 2021-12-26 NOTE — PROGRESS NOTE ADULT - ASSESSMENT
51F no PMHx s/p multiple LLE procedures now POD 3 for most recent clean washout, WBC normalized afebrile, no systemic signs of infection, likely wound coverage on 12/27    - Reg  - DVT PPX  - PT/OT  - Pain control  - monitor fevers  - Booked for LLE STSG on 12/27

## 2021-12-26 NOTE — CHART NOTE - NSCHARTNOTEFT_GEN_A_CORE
RN called to inform patient requesting immediate doctor at bedside. BORIS Lynn and I went to examine patient promptly after phone. Patient complaining increased pain to lle after the vac lost suction. Her neurovascular exam wnl, warm to touch, compartments soft. Her vac had been malfunctioning all day due to blockage, lost of suction. Vac can not be taken at bedside 2/2 the great complexity of the wound. Patient is going tomorrow for another washout and wound vac change. Patient was given 2mg of dilaudid PO and a dose of dilaudid 0.5mg IV. Patient now resting comfortably,  now sleeping.  Will continue to monitor

## 2021-12-26 NOTE — PROGRESS NOTE ADULT - SUBJECTIVE AND OBJECTIVE BOX
INTERVAL HPI/OVERNIGHT EVENTS:  Patient was seen and examined at bedside this AM.  No acute events overnight. Pain medication adjusted, tylenol added, encouraged IS        MEDICATIONS  (STANDING):  acetaminophen     Tablet .. 975 milliGRAM(s) Oral every 6 hours  baclofen 10 milliGRAM(s) Oral three times a day  enoxaparin Injectable 30 milliGRAM(s) SubCutaneous two times a day  gabapentin 300 milliGRAM(s) Oral three times a day  lisinopril 5 milliGRAM(s) Oral daily  meropenem  IVPB 1000 milliGRAM(s) IV Intermittent every 8 hours  pantoprazole    Tablet 40 milliGRAM(s) Oral two times a day  polyethylene glycol 3350 17 Gram(s) Oral daily  sodium chloride 1 Gram(s) Oral every 12 hours  vancomycin  IVPB 1000 milliGRAM(s) IV Intermittent every 12 hours    MEDICATIONS  (PRN):  HYDROmorphone   Tablet 2 milliGRAM(s) Oral every 4 hours PRN Severe Pain (7 - 10)      Vital Signs Last 24 Hrs  T(C): 36.9 (25 Dec 2021 21:18), Max: 37.3 (25 Dec 2021 07:16)  T(F): 98.4 (25 Dec 2021 21:18), Max: 99.2 (25 Dec 2021 07:16)  HR: 81 (25 Dec 2021 21:18) (81 - 87)  BP: 96/63 (25 Dec 2021 21:18) (90/52 - 109/72)  BP(mean): --  RR: 18 (25 Dec 2021 21:18) (16 - 18)  SpO2: 96% (25 Dec 2021 21:18) (94% - 96%)    Physical Exam:  Constitutional: in good spirits   Respiratory: no respiratory distress, no dyspnea, no supplemental o2 needed   Gastrointestinal: abdomen soft, non-tender, atraumatic   Genitourinary: voiding spontaneously   Extremities: LLE warm to touch, edema improving, wound vac x2 well seated, able to move toes , sensation intact  Neurological: A&OX3      I&O's Detail    24 Dec 2021 07:01  -  25 Dec 2021 07:00  --------------------------------------------------------  IN:    Oral Fluid: 350 mL  Total IN: 350 mL    OUT:    Voided (mL): 1125 mL  Total OUT: 1125 mL    Total NET: -775 mL      25 Dec 2021 07:01  -  26 Dec 2021 05:23  --------------------------------------------------------  IN:  Total IN: 0 mL    OUT:    Voided (mL): 750 mL  Total OUT: 750 mL    Total NET: -750 mL          LABS:                        8.9    13.24 )-----------( 581      ( 25 Dec 2021 07:44 )             29.0     12-25    129<L>  |  96<L>  |  12.6  ----------------------------<  86  5.0   |  20.0<L>  |  0.83    Ca    9.5      25 Dec 2021 07:44  Phos  4.0     12-25  Mg     1.8     12-25            RADIOLOGY & ADDITIONAL STUDIES:

## 2021-12-27 ENCOUNTER — TRANSCRIPTION ENCOUNTER (OUTPATIENT)
Age: 51
End: 2021-12-27

## 2021-12-27 LAB
ANION GAP SERPL CALC-SCNC: 11 MMOL/L — SIGNIFICANT CHANGE UP (ref 5–17)
ANISOCYTOSIS BLD QL: SLIGHT — SIGNIFICANT CHANGE UP
ANISOCYTOSIS BLD QL: SLIGHT — SIGNIFICANT CHANGE UP
BASOPHILS # BLD AUTO: 0.04 K/UL — SIGNIFICANT CHANGE UP (ref 0–0.2)
BASOPHILS # BLD AUTO: 0.05 K/UL — SIGNIFICANT CHANGE UP (ref 0–0.2)
BASOPHILS # BLD AUTO: 0.05 K/UL — SIGNIFICANT CHANGE UP (ref 0–0.2)
BASOPHILS NFR BLD AUTO: 0.4 % — SIGNIFICANT CHANGE UP (ref 0–2)
BLASTS # FLD: 0.9 % — HIGH (ref 0–0)
BUN SERPL-MCNC: 15.2 MG/DL — SIGNIFICANT CHANGE UP (ref 8–20)
CALCIUM SERPL-MCNC: 9.6 MG/DL — SIGNIFICANT CHANGE UP (ref 8.6–10.2)
CHLORIDE SERPL-SCNC: 98 MMOL/L — SIGNIFICANT CHANGE UP (ref 98–107)
CO2 SERPL-SCNC: 23 MMOL/L — SIGNIFICANT CHANGE UP (ref 22–29)
CREAT SERPL-MCNC: 0.69 MG/DL — SIGNIFICANT CHANGE UP (ref 0.5–1.3)
DACRYOCYTES BLD QL SMEAR: SLIGHT — SIGNIFICANT CHANGE UP
ELLIPTOCYTES BLD QL SMEAR: SLIGHT — SIGNIFICANT CHANGE UP
EOSINOPHIL # BLD AUTO: 0.44 K/UL — SIGNIFICANT CHANGE UP (ref 0–0.5)
EOSINOPHIL # BLD AUTO: 0.6 K/UL — HIGH (ref 0–0.5)
EOSINOPHIL # BLD AUTO: 0.94 K/UL — HIGH (ref 0–0.5)
EOSINOPHIL NFR BLD AUTO: 4 % — SIGNIFICANT CHANGE UP (ref 0–6)
EOSINOPHIL NFR BLD AUTO: 5.2 % — SIGNIFICANT CHANGE UP (ref 0–6)
EOSINOPHIL NFR BLD AUTO: 7.1 % — HIGH (ref 0–6)
GIANT PLATELETS BLD QL SMEAR: PRESENT — SIGNIFICANT CHANGE UP
GIANT PLATELETS BLD QL SMEAR: PRESENT — SIGNIFICANT CHANGE UP
GLUCOSE SERPL-MCNC: 86 MG/DL — SIGNIFICANT CHANGE UP (ref 70–99)
HCT VFR BLD CALC: 29.1 % — LOW (ref 34.5–45)
HGB BLD-MCNC: 8.7 G/DL — LOW (ref 11.5–15.5)
HYPOCHROMIA BLD QL: SLIGHT — SIGNIFICANT CHANGE UP
HYPOCHROMIA BLD QL: SLIGHT — SIGNIFICANT CHANGE UP
IMM GRANULOCYTES NFR BLD AUTO: 0.3 % — SIGNIFICANT CHANGE UP (ref 0–1.5)
LYMPHOCYTES # BLD AUTO: 3.78 K/UL — HIGH (ref 1–3.3)
LYMPHOCYTES # BLD AUTO: 3.86 K/UL — HIGH (ref 1–3.3)
LYMPHOCYTES # BLD AUTO: 32.6 % — SIGNIFICANT CHANGE UP (ref 13–44)
LYMPHOCYTES # BLD AUTO: 34.7 % — SIGNIFICANT CHANGE UP (ref 13–44)
LYMPHOCYTES # BLD AUTO: 35.3 % — SIGNIFICANT CHANGE UP (ref 13–44)
LYMPHOCYTES # BLD AUTO: 4.67 K/UL — HIGH (ref 1–3.3)
LYMPHOCYTES # SPEC AUTO: 0.4 % — HIGH (ref 0–0)
MACROCYTES BLD QL: SLIGHT — SIGNIFICANT CHANGE UP
MAGNESIUM SERPL-MCNC: 1.7 MG/DL — LOW (ref 1.8–2.6)
MANUAL SMEAR VERIFICATION: SIGNIFICANT CHANGE UP
MANUAL SMEAR VERIFICATION: SIGNIFICANT CHANGE UP
MCHC RBC-ENTMCNC: 27.4 PG — SIGNIFICANT CHANGE UP (ref 27–34)
MCHC RBC-ENTMCNC: 29.9 GM/DL — LOW (ref 32–36)
MCV RBC AUTO: 91.8 FL — SIGNIFICANT CHANGE UP (ref 80–100)
MICROCYTES BLD QL: SLIGHT — SIGNIFICANT CHANGE UP
MICROCYTES BLD QL: SLIGHT — SIGNIFICANT CHANGE UP
MONOCYTES # BLD AUTO: 0.58 K/UL — SIGNIFICANT CHANGE UP (ref 0–0.9)
MONOCYTES # BLD AUTO: 1.11 K/UL — HIGH (ref 0–0.9)
MONOCYTES # BLD AUTO: 1.24 K/UL — HIGH (ref 0–0.9)
MONOCYTES NFR BLD AUTO: 11.1 % — SIGNIFICANT CHANGE UP (ref 2–14)
MONOCYTES NFR BLD AUTO: 4.4 % — SIGNIFICANT CHANGE UP (ref 2–14)
MONOCYTES NFR BLD AUTO: 9.6 % — SIGNIFICANT CHANGE UP (ref 2–14)
MYELOCYTES NFR BLD: 0.4 % — HIGH (ref 0–0)
NEUTROPHILS # BLD AUTO: 5.35 K/UL — SIGNIFICANT CHANGE UP (ref 1.8–7.4)
NEUTROPHILS # BLD AUTO: 5.52 K/UL — SIGNIFICANT CHANGE UP (ref 1.8–7.4)
NEUTROPHILS # BLD AUTO: 5.94 K/UL — SIGNIFICANT CHANGE UP (ref 1.8–7.4)
NEUTROPHILS NFR BLD AUTO: 44.9 % — SIGNIFICANT CHANGE UP (ref 43–77)
NEUTROPHILS NFR BLD AUTO: 45.7 % — SIGNIFICANT CHANGE UP (ref 43–77)
NEUTROPHILS NFR BLD AUTO: 49.5 % — SIGNIFICANT CHANGE UP (ref 43–77)
NEUTS BAND # BLD: 0.4 % — SIGNIFICANT CHANGE UP (ref 0–8)
OVALOCYTES BLD QL SMEAR: SLIGHT — SIGNIFICANT CHANGE UP
PLAT MORPH BLD: NORMAL — SIGNIFICANT CHANGE UP
PLAT MORPH BLD: NORMAL — SIGNIFICANT CHANGE UP
PLATELET # BLD AUTO: 540 K/UL — HIGH (ref 150–400)
POIKILOCYTOSIS BLD QL AUTO: SLIGHT — SIGNIFICANT CHANGE UP
POLYCHROMASIA BLD QL SMEAR: SIGNIFICANT CHANGE UP
POLYCHROMASIA BLD QL SMEAR: SLIGHT — SIGNIFICANT CHANGE UP
POTASSIUM SERPL-MCNC: 4.9 MMOL/L — SIGNIFICANT CHANGE UP (ref 3.5–5.3)
POTASSIUM SERPL-SCNC: 4.9 MMOL/L — SIGNIFICANT CHANGE UP (ref 3.5–5.3)
RBC # BLD: 3.17 M/UL — LOW (ref 3.8–5.2)
RBC # FLD: 18.4 % — HIGH (ref 10.3–14.5)
RBC BLD AUTO: ABNORMAL
RBC BLD AUTO: ABNORMAL
SMUDGE CELLS # BLD: PRESENT — SIGNIFICANT CHANGE UP
SMUDGE CELLS # BLD: PRESENT — SIGNIFICANT CHANGE UP
SODIUM SERPL-SCNC: 132 MMOL/L — LOW (ref 135–145)
STOMATOCYTES BLD QL SMEAR: SLIGHT — SIGNIFICANT CHANGE UP
TOXIC GRANULES BLD QL SMEAR: PRESENT — SIGNIFICANT CHANGE UP
VARIANT LYMPHS # BLD: 4.8 % — SIGNIFICANT CHANGE UP (ref 0–6)
VARIANT LYMPHS # BLD: 7.5 % — HIGH (ref 0–6)
WBC # BLD: 11.13 K/UL — HIGH (ref 3.8–10.5)
WBC # FLD AUTO: 11.13 K/UL — HIGH (ref 3.8–10.5)

## 2021-12-27 PROCEDURE — 76937 US GUIDE VASCULAR ACCESS: CPT | Mod: 26,59

## 2021-12-27 PROCEDURE — 99231 SBSQ HOSP IP/OBS SF/LOW 25: CPT | Mod: 24

## 2021-12-27 PROCEDURE — 73701 CT LOWER EXTREMITY W/DYE: CPT | Mod: 26,LT

## 2021-12-27 PROCEDURE — 36569 INSJ PICC 5 YR+ W/O IMAGING: CPT

## 2021-12-27 PROCEDURE — 99232 SBSQ HOSP IP/OBS MODERATE 35: CPT

## 2021-12-27 RX ORDER — MAGNESIUM SULFATE 500 MG/ML
2 VIAL (ML) INJECTION ONCE
Refills: 0 | Status: COMPLETED | OUTPATIENT
Start: 2021-12-27 | End: 2021-12-27

## 2021-12-27 RX ORDER — CYCLOBENZAPRINE HYDROCHLORIDE 10 MG/1
10 TABLET, FILM COATED ORAL ONCE
Refills: 0 | Status: COMPLETED | OUTPATIENT
Start: 2021-12-27 | End: 2021-12-27

## 2021-12-27 RX ADMIN — HYDROMORPHONE HYDROCHLORIDE 2 MILLIGRAM(S): 2 INJECTION INTRAMUSCULAR; INTRAVENOUS; SUBCUTANEOUS at 05:42

## 2021-12-27 RX ADMIN — HYDROMORPHONE HYDROCHLORIDE 2 MILLIGRAM(S): 2 INJECTION INTRAMUSCULAR; INTRAVENOUS; SUBCUTANEOUS at 21:00

## 2021-12-27 RX ADMIN — PANTOPRAZOLE SODIUM 40 MILLIGRAM(S): 20 TABLET, DELAYED RELEASE ORAL at 17:33

## 2021-12-27 RX ADMIN — Medication 250 MILLIGRAM(S): at 08:24

## 2021-12-27 RX ADMIN — Medication 975 MILLIGRAM(S): at 17:32

## 2021-12-27 RX ADMIN — Medication 975 MILLIGRAM(S): at 05:06

## 2021-12-27 RX ADMIN — GABAPENTIN 300 MILLIGRAM(S): 400 CAPSULE ORAL at 05:06

## 2021-12-27 RX ADMIN — GABAPENTIN 300 MILLIGRAM(S): 400 CAPSULE ORAL at 22:20

## 2021-12-27 RX ADMIN — Medication 975 MILLIGRAM(S): at 13:00

## 2021-12-27 RX ADMIN — Medication 975 MILLIGRAM(S): at 18:30

## 2021-12-27 RX ADMIN — MEROPENEM 100 MILLIGRAM(S): 1 INJECTION INTRAVENOUS at 05:07

## 2021-12-27 RX ADMIN — ENOXAPARIN SODIUM 30 MILLIGRAM(S): 100 INJECTION SUBCUTANEOUS at 10:10

## 2021-12-27 RX ADMIN — ENOXAPARIN SODIUM 30 MILLIGRAM(S): 100 INJECTION SUBCUTANEOUS at 17:32

## 2021-12-27 RX ADMIN — Medication 975 MILLIGRAM(S): at 06:06

## 2021-12-27 RX ADMIN — MAGNESIUM OXIDE 400 MG ORAL TABLET 400 MILLIGRAM(S): 241.3 TABLET ORAL at 13:00

## 2021-12-27 RX ADMIN — Medication 975 MILLIGRAM(S): at 23:25

## 2021-12-27 RX ADMIN — Medication 975 MILLIGRAM(S): at 23:52

## 2021-12-27 RX ADMIN — Medication 10 MILLIGRAM(S): at 22:20

## 2021-12-27 RX ADMIN — HYDROMORPHONE HYDROCHLORIDE 2 MILLIGRAM(S): 2 INJECTION INTRAMUSCULAR; INTRAVENOUS; SUBCUTANEOUS at 20:21

## 2021-12-27 RX ADMIN — Medication 250 MILLIGRAM(S): at 20:21

## 2021-12-27 RX ADMIN — HYDROMORPHONE HYDROCHLORIDE 2 MILLIGRAM(S): 2 INJECTION INTRAMUSCULAR; INTRAVENOUS; SUBCUTANEOUS at 04:42

## 2021-12-27 RX ADMIN — CYCLOBENZAPRINE HYDROCHLORIDE 10 MILLIGRAM(S): 10 TABLET, FILM COATED ORAL at 07:48

## 2021-12-27 RX ADMIN — MEROPENEM 100 MILLIGRAM(S): 1 INJECTION INTRAVENOUS at 22:20

## 2021-12-27 RX ADMIN — MAGNESIUM OXIDE 400 MG ORAL TABLET 400 MILLIGRAM(S): 241.3 TABLET ORAL at 17:32

## 2021-12-27 RX ADMIN — Medication 25 GRAM(S): at 06:39

## 2021-12-27 RX ADMIN — Medication 10 MILLIGRAM(S): at 13:05

## 2021-12-27 RX ADMIN — Medication 975 MILLIGRAM(S): at 13:59

## 2021-12-27 RX ADMIN — SODIUM CHLORIDE 1 GRAM(S): 9 INJECTION INTRAMUSCULAR; INTRAVENOUS; SUBCUTANEOUS at 17:33

## 2021-12-27 RX ADMIN — MEROPENEM 100 MILLIGRAM(S): 1 INJECTION INTRAVENOUS at 13:06

## 2021-12-27 RX ADMIN — PANTOPRAZOLE SODIUM 40 MILLIGRAM(S): 20 TABLET, DELAYED RELEASE ORAL at 05:05

## 2021-12-27 RX ADMIN — GABAPENTIN 300 MILLIGRAM(S): 400 CAPSULE ORAL at 13:05

## 2021-12-27 RX ADMIN — Medication 10 MILLIGRAM(S): at 05:07

## 2021-12-27 RX ADMIN — MAGNESIUM OXIDE 400 MG ORAL TABLET 400 MILLIGRAM(S): 241.3 TABLET ORAL at 08:23

## 2021-12-27 RX ADMIN — SODIUM CHLORIDE 1 GRAM(S): 9 INJECTION INTRAMUSCULAR; INTRAVENOUS; SUBCUTANEOUS at 05:05

## 2021-12-27 NOTE — PROGRESS NOTE ADULT - SUBJECTIVE AND OBJECTIVE BOX
Pt Name: LUIS FERNANDO DAVIS  MRN: [id number]      Patient is a 51y Female being followed for bilateral septic knee, with MASON from left ankle (11/17). s/p multiple I&Ds of knee most recently of left knee on 12/15. Patient seen and examined at bedside. Patient is doing much better, notes significant improvement to right knee. Patient is able to bend knee much further and reports minimal pain. Patient reports recent onset of burning type pain to lower left leg that radiates up to the knee but states the knee itself has been okay. Pain is controlled with the prescribed medication. Denies CP, SOB, N/V, tingling. No other orthopedic complaints.          Vital Signs Last 24 Hrs  T(C): 36.6 (27 Dec 2021 04:52), Max: 37.3 (26 Dec 2021 16:42)  T(F): 97.9 (27 Dec 2021 04:52), Max: 99.2 (26 Dec 2021 16:42)  HR: 94 (27 Dec 2021 04:52) (80 - 94)  BP: 107/67 (27 Dec 2021 04:52) (103/65 - 111/70)  BP(mean): --  RR: 18 (27 Dec 2021 04:52) (18 - 18)  SpO2: 94% (27 Dec 2021 04:52) (94% - 97%)  Daily     Daily                           8.7    11.13 )-----------( 540      ( 27 Dec 2021 05:39 )             29.1     12-27    132<L>  |  98  |  15.2  ----------------------------<  86  4.9   |  23.0  |  0.69    Ca    9.6      27 Dec 2021 05:39  Phos  4.5     12-26  Mg     1.7     12-27        PHYSICAL EXAM:    Appearance: Alert, responsive, in no acute distress.    Musculoskeletal:       Left Lower Extremity: +left knee incision healing well, sutures intact. no drainage or discharge.+ wound vacs to lower left leg (as per ACS). Left knee larger than right but no effusion, firm to touch. Nontender. No abrasions, ecchymosis, or erythema. No bleeding. Sensation is grossly intact to light touch. + EHL/FHL. DP pulses 2+. Cap refill < 2 seconds. No cyanosis. No signs of venous insufficiency or stasis.        Right Lower Extremity: right knee incision is clean, dry, and intact. well-healed. No abrasions, ecchymosis, or erythema. No bleeding. Sensation is grossly intact to light touch. AROM knee to 45 degrees. + dorsi/plantarflexion/EHL/FHL. DP pulses 2+. Cap refill < 2 seconds. No cyanosis. No signs of venous insufficiency or stasis.         A/P:  Pt is a  51y Female with bilateral septic knee, with MASON from left ankle (11/17). s/p multiple I&Ds of knee most recently of left knee on 12/15. Improving. s/p multiple clean washouts with ACS. Planned for OR with ACS again today.    PLAN:   * IV ABX as per ID  * Pain control  * DVTp  * Weight Bearing Status: WBAT RLE, WBAT LLE in CAM boot  * Continue care as per primary team

## 2021-12-27 NOTE — PROGRESS NOTE ADULT - ATTENDING COMMENTS
pt seen and examined. continues to have LLE pain, although improved from prior exams. swelling in distal thigh as well as popliteal fossa improved. no appreciable effusion on exam. Pt is s/w 2 washouts of L knee and washout of R knee. R knee continues to improve /w ROM 0-75 at this time. no micromotion tenderness. Pt continues to have pain /w >20-30 degrees of ROM of L knee. WBC normal, afebrile. On Cx specific abx. Recommend repeat CT of LLE to eval for persistent collections. Plan for possible ASHOK for arthrofibrosis vs repeat washout of LLE knee pending CT results. D/w pt. Continue care as per acs. Continue Iv abx as per ID. pt seen and examined. continues to have LLE pain, although improved from prior exams. swelling in distal thigh as well as popliteal fossa improved. no appreciable effusion on exam. Pt is s/w 2 washouts of L knee and washout of R knee. R knee continues to improve /w ROM 0-75 at this time. no micromotion tenderness. Pt continues to have pain /w >20-30 degrees of ROM of L knee. WBC normal, afebrile. On Cx specific abx. Recommend repeat CT of LLE to eval for persistent collections. Plan for possible ASHOK for arthrofibrosis vs repeat washout of LLE knee pending CT results. D/w pt. Continue care as per acs. Continue Iv abx as per ID.    12/28  Pt seen and examined this am. CT reviewed showing new abscesses in thigh, posterior femur, as well erosions in the femur and possible osteo. Repeat collection also behind the tibia distally. D/w pt that despite multiple washouts and debridements /w organism specific abx she still continues to have increasing collections. Will plan for repeat I&D of L knee. ACS planning for repeat washout of LLE and possible exploration of pop fossa vs IR drainage of abscesses. continue IV abx. trend labs and fever curve. ID recs.

## 2021-12-27 NOTE — PROGRESS NOTE ADULT - ASSESSMENT
51y Female present to ED with left leg swelling, erythema, pain. Patient endorses she had left knee pain for 1 week presented 3 days ago to ED  where she states was given an steroid shot, and ibuprofen. however pain and edema worsened. Patient states she wasnt feeling herself today reason why she came. Endorses chills initially with pain 3 days ago but none since, denies history of trauma, insect bites, recent interventions, or injections to the area, contact with ticks, history of diabetes.  PT AS ABOVE WITH LEFT LEG SWELLING PT WITH INCREASED WBC PLACED ON ABX FOR PRESUMED INFECTION  PT WITH CT SCAN WITH FASCIAL EDAM PT BROUGHT TO THE OR EMERGENTLY AND  UNDERWENT FASCIOTOMY   PT BROUGHT TO THE OR  BOTH TRAUMA AND ORTHO INVOLVED  PURULENT FLUID FOUND  NECROTIZING SOFT TISSUE INFECTION    BLOOD CX WITH GROUP A STREP AND  OPERATIVE FLUID WITH STREP   PT WAS  ON PCN AND CLINDA for POSSIBLE ANTITOXIN EFFECT IN GROUP A STREP INFECTION     PT  S/P OR WASHOUT 12.2  AND  OR AGAIN 12/7  and late on 12/10 121/15 12/17 and 12/20 12/23     REPEAT   OPERATIVE CX NEG       ON MERREM VANCO   AND   ORAL BACTRIM    AS ABOVE  REPEAT  OR  12/20 12/23      NO PURULENCE   OVERALL NON TOXIC  AFEBRILE        WILL ADJUST VANCO  BASED ON LEVEL     WILL FOLLOWUP  WITH FURTHER RECOMMENDATIONS

## 2021-12-27 NOTE — PROGRESS NOTE ADULT - SUBJECTIVE AND OBJECTIVE BOX
Anesthesia Chart Review    51y Female scheduled for STSG left LE    No Known Allergies      Cellulitis of left lower extremity    No pertinent family history in first degree relatives    FH: HTN (hypertension) (Mother)    Handoff    MEWS Score    No pertinent past medical history    Necrotizing fasciitis    Arthritis, septic, knee    Necrotizing soft tissue infection    Septic arthritis    S/P excisional debridement    Abscess of left lower extremity    H/O drainage of abscess    Status post aspiration of abscess    Necrotizing fasciitis    Necrotizing soft tissue infection    Septic arthritis of knee, right    H/O fasciotomy    Septic arthritis of knee, left    Abscess of left lower extremity    Cellulitis of left lower extremity    Melena    Drop in hemoglobin    Irrigation, knee    Removal of deeply implanted hardware    Incision and drainage, abscess, deep, lower leg    Wound VAC dressing change for area 50 sq cm or less    Arthrocentesis of right knee    Knee arthrotomy    Debridement of muscle    Knee arthrotomy    Drainage of abscess of left lower extremity    Selective debridement    Pulsed irrigation of wound    No significant past surgical history    History of ankle surgery    LEFT LEG SWOLLEN    7    Severe sepsis    Acute kidney failure    Bandemia    [email or web address]        HPI:  HPI: 51y Female present to ED with left leg swelling, erythema, pain. Patient endorses she had left knee pain for 1 week presented 3 days ago to ED  where she states was given an steroid shot, and ibuprofen. however pain and edema worsened. Patient states she wasnt feeling herself today reason why she came. Endorses chills initially with pain 3 days ago but none since, denies history of trauma, insect bites, recent interventions, or injections to the area, contact with ticks, history of diabetes.   (17 Nov 2021 20:50)      PAST MEDICAL & SURGICAL HISTORY:  No pertinent past medical history    History of ankle surgery        MEDICATIONS  (STANDING):  acetaminophen     Tablet .. 975 milliGRAM(s) Oral every 6 hours  baclofen 10 milliGRAM(s) Oral three times a day  enoxaparin Injectable 30 milliGRAM(s) SubCutaneous two times a day  gabapentin 300 milliGRAM(s) Oral three times a day  lisinopril 5 milliGRAM(s) Oral daily  magnesium oxide 400 milliGRAM(s) Oral three times a day with meals  meropenem  IVPB 1000 milliGRAM(s) IV Intermittent every 8 hours  pantoprazole    Tablet 40 milliGRAM(s) Oral two times a day  polyethylene glycol 3350 17 Gram(s) Oral daily  sodium chloride 1 Gram(s) Oral every 12 hours  vancomycin  IVPB 750 milliGRAM(s) IV Intermittent every 12 hours    MEDICATIONS  (PRN):  HYDROmorphone   Tablet 2 milliGRAM(s) Oral every 4 hours PRN Severe Pain (7 - 10)      Allergies    No Known Allergies    Intolerances        SOCIAL HISTORY:    FAMILY HISTORY:  FH: HTN (hypertension) (Mother)        Vital Signs Last 24 Hrs  T(C): 36.6 (27 Dec 2021 04:52), Max: 37.3 (26 Dec 2021 16:42)  T(F): 97.9 (27 Dec 2021 04:52), Max: 99.2 (26 Dec 2021 16:42)  HR: 94 (27 Dec 2021 04:52) (80 - 94)  BP: 107/67 (27 Dec 2021 04:52) (103/65 - 111/70)  BP(mean): --  RR: 18 (27 Dec 2021 04:52) (18 - 18)  SpO2: 94% (27 Dec 2021 04:52) (94% - 96%)          LABS:                        8.7    11.13 )-----------( 540      ( 27 Dec 2021 05:39 )             29.1     12-27    132<L>  |  98  |  15.2  ----------------------------<  86  4.9   |  23.0  |  0.69    Ca    9.6      27 Dec 2021 05:39  Phos  4.5     12-26  Mg     1.7     12-27            RADIOLOGY & ADDITIONAL STUDIES:    Patient is an ASA class 3       GA      no further testing from anesthesia standpoint needed prior to OR pending any change in clinical status

## 2021-12-27 NOTE — PROGRESS NOTE ADULT - ATTENDING COMMENTS
Patient seen this am on rounds. Discussed with her that dermatome not available for OR today, will move case to tomorrow. CT scan of LLE per ortho today.

## 2021-12-27 NOTE — PROGRESS NOTE ADULT - SUBJECTIVE AND OBJECTIVE BOX
INFECTIOUS DISEASES AND INTERNAL MEDICINE at [place]  =======================================================  Theo Morrow MD  Diplomates American Board of Internal Medicine and Infectious Diseases  Telephone [phone]  Fax            [phone]  =======================================================    LUIS FERNANDO DAVIS [id number]  Follow up: group A STREP NECROTIZING SOFT TISSUE INFECTION     repeat CT scan of lower ext with collections.   S/P OR WASHOUT 12/2  AND   OR AGAIN  12/7   late  on 12/10 12/15 12/17  12/20 12/23     AFEBRILE THIS MORNING in nad      Allergies:  No Known Allergies       FAMILY   FAMILY HISTORY:  FH: HTN (hypertension) (Mother)      REVIEW OF SYSTEMS:  CONSTITUTIONAL:  No Fever or chills  HEENT:   No diplopia or blurred vision.  No earache, sore throat or runny nose.  CARDIOVASCULAR:  No pressure, squeezing, strangling, tightness, heaviness or aching about the chest, neck, axilla or epigastrium.  RESPIRATORY:  No cough, shortness of breath, PND or orthopnea.  GASTROINTESTINAL:  No nausea, vomiting or diarrhea.  GENITOURINARY:  No dysuria, frequency or urgency. No Blood in urine  MUSCULOSKELETAL:   AS PER HPI   SKIN:  No change in skin, hair or nails.  NEUROLOGIC:  No paresthesias, fasciculations, seizures or weakness.  PSYCHIATRIC:  No disorder of thought or mood.  ENDOCRINE:  No heat or cold intolerance, polyuria or polydipsia.  HEMATOLOGICAL:  No easy bruising or bleeding.            Physical Exam:     GEN: NAD,    HEENT: normocephalic and atraumatic. EOMI. ASHISH.    NECK: Supple. No carotid bruits.  No lymphadenopathy or thyromegaly.  LUNGS: Clear to auscultation.  HEART: Regular rate and rhythm without murmur.  ABDOMEN: Soft, nontender, and nondistended.  Positive bowel sounds.    : No CVA tenderness  EXTREMITIES: LEFT LEG WRAPPED; left thigh vac in place  right knee with dressing in place       NEUROLOGIC:  C AWAKE ALERT Appropriate affect .  SKIN: No ulceration or induration present.           Vitals:  ====  Vital Signs Last 24 Hrs  T(C): 36.6 (27 Dec 2021 04:52), Max: 37.3 (26 Dec 2021 16:42)  T(F): 97.9 (27 Dec 2021 04:52), Max: 99.2 (26 Dec 2021 16:42)  HR: 94 (27 Dec 2021 04:52) (80 - 94)  BP: 107/67 (27 Dec 2021 04:52) (103/65 - 111/70)  BP(mean): --  RR: 18 (27 Dec 2021 04:52) (18 - 18)  SpO2: 94% (27 Dec 2021 04:52) (94% - 96%)  =======================================================  Current Antibiotics:  bactrim  piperacillin/tazobactam IVPB.. 4.5 Gram(s) IV Intermittent every 8 hours    Other medications:  baclofen 10 milliGRAM(s) Oral three times a day  collagenase Ointment 1 Application(s) Topical two times a day  gabapentin 200 milliGRAM(s) Oral three times a day  glucagon  Injectable 1 milliGRAM(s) IntraMuscular once  heparin   Injectable 5000 Unit(s) SubCutaneous every 8 hours  lisinopril 5 milliGRAM(s) Oral daily  methocarbamol 750 milliGRAM(s) Oral three times a day  pantoprazole    Tablet 40 milliGRAM(s) Oral two times a day  polyethylene glycol 3350 17 Gram(s) Oral daily  senna 2 Tablet(s) Oral at bedtime  sodium chloride 1 Gram(s) Oral every 12 hours  sodium chloride 0.9%. 1000 milliLiter(s) IV Continuous <Continuous>      =======================================================  Labs:                                              8.7    11.13 )-----------( 540      ( 27 Dec 2021 05:39 )             29.1   12-27    132<L>  |  98  |  15.2  ----------------------------<  86  4.9   |  23.0  |  0.69    Ca    9.6      27 Dec 2021 05:39  Phos  4.5     12-26  Mg     1.7     12-27                    Culture - Blood (collected 11-28-21 @ 09:14)  Source: .Blood Blood-Peripheral    Culture - Blood (collected 11-28-21 @ 09:14)  Source: .Blood Blood-Peripheral    Culture - Fungal, Body Fluid (collected 11-24-21 @ 13:35)  Source: .Body Fluid Right Knee Fluid    Culture - Acid Fast - Body Fluid w/Smear (collected 11-24-21 @ 13:35)  Source: .Body Fluid Right Knee Fluid    Culture - Body Fluid with Gram Stain (collected 11-24-21 @ 13:35)  Source: .Body Fluid Right Knee Fluid  Gram Stain (11-25-21 @ 01:27):    polymorphonuclear leukocytes seen per low power field    No organisms seen per oil power field    Culture - Surgical Swab (collected 11-24-21 @ 12:30)  Source: .Surgical Swab Swab Right Knee #2  Final Report (11-29-21 @ 07:55):    No growth at 5 days    Culture - Surgical Swab (collected 11-24-21 @ 12:30)  Source: .Surgical Swab Swab Right Knee #1  Final Report (11-29-21 @ 07:56):    No growth at 5 days    Culture - Surgical Swab (collected 11-24-21 @ 12:28)  Source: .Surgical Swab Swab Right Knee #3  Final Report (11-29-21 @ 08:01):    No growth at 5 days    Culture - Body Fluid with Gram Stain (collected 11-23-21 @ 12:50)  Source: .Body Fluid Knee Fluid  Gram Stain (11-23-21 @ 23:34):    polymorphonuclear leukocytes seen    No organisms seen    by cytocentrifuge    Culture - Blood (collected 11-22-21 @ 08:44)  Source: .Blood Blood  Final Report (11-27-21 @ 09:00):    No growth at 5 days.    Culture - Blood (collected 11-20-21 @ 12:57)  Source: .Blood Blood  Final Report (11-25-21 @ 13:00):    No growth at 5 days.    Culture - Body Fluid with Gram Stain (collected 11-18-21 @ 16:20)  Source: .Body Fluid OR Posterior Calf Left Lower Extremity Fluid  Gram Stain (11-18-21 @ 21:39):    polymorphonuclear leukocytes seen    Gram Positive Cocci in Pairs and Chains seen    by cytocentrifuge  Final Report (11-23-21 @ 09:40):    Moderate Streptococcus pyogenes (Group A) Penicillin and ampicillin are    drugs of choice for    treatment of beta-hemolytic streptococcal infections.    Susceptibility testing is not performed routinely because    S. pyogenes (GAS) is universally susceptible to penicillin    and resistance in other strains is extremely rare.    Culture - Body Fluid with Gram Stain (collected 11-18-21 @ 16:20)  Source: .Body Fluid OR Swab Left Knee Lower Extremity Fluid  Gram Stain (11-18-21 @ 19:36):    polymorphonuclear leukocytes seen    Gram Positive Cocci in Pairs and Chains seen    by cytocentrifuge  Final Report (11-23-21 @ 09:41):    Numerous Streptococcus pyogenes (Group A) Penicillin and ampicillin are    drugs of choice for    treatment of beta-hemolytic streptococcal infections.    Susceptibility testing is not performed routinely because    S. pyogenes (GAS) is universally susceptible to penicillin    and resistance in other strains is extremely rare.    Culture - Body Fluid with Gram Stain (collected 11-18-21 @ 16:20)  Source: .Body Fluid OR - Left Lower Extremity Fluid  Gram Stain (11-18-21 @ 20:10):    No polymorphonuclear leukocytes seen    No organisms seen    by cytocentrifuge  Final Report (11-23-21 @ 09:40):    Rare Streptococcus pyogenes (Group A)    Penicillin and ampicillin are drugs of choice for treatment of    beta-hemolytic streptococcal infections.    Susceptibility testing is not performed routinely because S. pyogenes    (GAS) is universally susceptibleto penicillin    and resistance in other strains is extremely rare.    Culture - Urine (collected 11-18-21 @ 10:07)  Source: Catheterized Catheterized  Final Report (11-19-21 @ 07:03):    No growth    Culture - Urine (collected 11-18-21 @ 00:25)  Source: Catheterized Catheterized  Final Report (11-18-21 @ 22:09):    <10,000 CFU/mL Normal Urogenital Aimee    Culture - Blood (collected 11-17-21 @ 14:45)  Source: .Blood Blood  Gram Stain (11-18-21 @ 09:44):    Growth in aerobic and anaerobic bottles: Gram Positive Cocci in Pairs and    Chains    Gram Stain performed by:    [facility]    [address] [place] [id number]    .    TYPE: (C=Critical, N=Notification, A=Abnormal) C    TESTS:  _ GS    DATE/TIME CALLED: _ 11/18/2021 09:42:19    CALLED TO: Chris Hernandez RN    READ BACK (2 Patient Identifiers)(Y/N): _ Y    READ BACK VALUES (Y/N): _ Y    CALLED BY: Chris Quiñones  Final Report (11-21-21 @ 12:28):    Growth in aerobic and anaerobic bottles: Streptococcus pyogenes (Group A)    See previous culture [id number]    Culture - Blood (collected 11-17-21 @ 14:45)  Source: .Blood Blood  Gram Stain (11-18-21 @ 09:40):    Growth in aerobic and anaerobic bottles: Gram Positive Cocci in Pairs and    Chains    ***Blood Panel PCR results on this specimen are available    approximately 3 hours after the Gram stain result.***    Gram stain, PCR, and/or culture results may not always    correspond due to difference in methodologies.    ************************************************************    This PCR assay was performed using [employer].    The following targets are tested for: Enterococcus,    vancomycin resistant enterococci, Listeria monocytogenes,    coagulase negative staphylococci, S. aureus,    methicillin resistant S. aureus, Streptococcus agalactiae    (Group B), S. pneumoniae, S. pyogenes (Group A),    Acinetobacter baumannii, Enterobacter cloacae, E. coli,    Klebsiella oxytoca, K. pneumoniae, Proteus sp.,    Serratia marcescens, Haemophilus influenzae,    Neisseria meningitidis, Pseudomonas aeruginosa, Candida    albicans, C. glabrata, C krusei, C parapsilosis,    C. tropicalis and the KPC resistance gene.    Gram Stain and BCID performed by:    [facility]    [address] [place] [id number]    .    TYPE: (C=Critical, N=Notification, A=Abnormal) C    TESTS:  _ GS    DATE/TIME CALLED: _ 11/18/2021 09:40:08    CALLED TO: Chris Hernandez RN    READ BACK (2 Patient Identifiers)(Y/N): _ Y    READ BACK VALUES (Y/N): _ Y    CALLED BY: Chris Quiñones  Final Report (11-21-21 @ 12:28):    Growth in aerobic and anaerobic bottles: Streptococcus pyogenes (Group A)  Organism: Blood Culture PCR  Streptococcus pyogenes (Group A) (11-21-21 @ 12:28)  Organism: Streptococcus pyogenes (Group A) (11-21-21 @ 12:28)    Sensitivities:      -  Ceftriaxone: S 0.016      -  Penicillin: S 0.016 Predicts results for ampicillin, amoxicillin, amoxicillin/clavulanate, ampicillin/sulbactam, 1st, 2nd and 3rd generation cephalosporins and carbapenems.      Method Type: ETEST  Organism: Streptococcus pyogenes (Group A) (11-21-21 @ 12:28)    Sensitivities:      -  Vancomycin: S      Method Type: KB  Organism: Blood Culture PCR (11-21-21 @ 12:28)    Sensitivities:      -  Streptococcus pyogenes (Group A): Detec      Method Type: PCR      Creatinine, Serum: 0.75 mg/dL (11-30-21 @ 07:18)  Creatinine, Serum: 0.68 mg/dL (11-29-21 @ 09:55)  Creatinine, Serum: 0.64 mg/dL (11-28-21 @ 09:14)  Creatinine, Serum: 0.65 mg/dL (11-27-21 @ 11:51)  Creatinine, Serum: 0.57 mg/dL (11-27-21 @ 05:35)  Creatinine, Serum: 0.61 mg/dL (11-26-21 @ 09:01)        Ferritin, Serum: 388 ng/mL (11-17-21 @ 13:21)    C-Reactive Protein, Serum: 197 mg/L (11-26-21 @ 21:52)  C-Reactive Protein, Serum: 211 mg/L (11-26-21 @ 09:01)  C-Reactive Protein, Serum: 53 mg/L (11-23-21 @ 07:42)  C-Reactive Protein, Serum: >422 mg/L (11-17-21 @ 13:21)    Sedimentation Rate, Erythrocyte: 65 mm/hr (11-26-21 @ 21:52)  Sedimentation Rate, Erythrocyte: 63 mm/hr (11-26-21 @ 09:01)  Sedimentation Rate, Erythrocyte: 55 mm/hr (11-17-21 @ 13:21)    WBC Count: 14.55 K/uL (11-30-21 @ 07:18)  WBC Count: 16.71 K/uL (11-29-21 @ 09:54)  WBC Count: 17.02 K/uL (11-28-21 @ 09:14)  WBC Count: 15.99 K/uL (11-27-21 @ 05:33)  WBC Count: 18.73 K/uL (11-26-21 @ 09:01)      COVID-19 PCR: NotDetec (11-30-21 @ 01:59)  COVID-19 PCR: NotDetec (11-23-21 @ 18:50)  COVID-19 PCR: NotDetec (11-17-21 @ 13:18)    Lactate Dehydrogenase, Serum: 769 U/L (11-20-21 @ 03:56)    Alkaline Phosphatase, Serum: 88 U/L (11-27-21 @ 05:35)  Alanine Aminotransferase (ALT/SGPT): 11 U/L (11-27-21 @ 05:35)  Aspartate Aminotransferase (AST/SGOT): 25 U/L (11-27-21 @ 05:35)  Bilirubin Total, Serum: 0.3 mg/dL (11-27-21 @ 05:35)        < from: CT Lower Extremity w/ IV Cont, Bilateral (11.30.21 @ 09:55) >     EXAM:  CT LWR EXT IC BI                          PROCEDURE DATE:  11/30/2021          INTERPRETATION:  CT LOWER EXTREMITY WITH IV CONTRAST BILATERAL dated 11/30/2021 9:55 AM    INDICATION: Fever. Status post incision and drainage bilaterally. Persistent fevers to 104.    COMPARISON: CT of the right knee dated 11/23/2021. CT of the left lower extremity dated 11/17/2021    VICKI -

## 2021-12-27 NOTE — PROGRESS NOTE ADULT - SUBJECTIVE AND OBJECTIVE BOX
SUBJECTIVE/24 hour events:  Patient is a 51yFemale had issues of acute onset of pain to lle, neurovascular exam intact, would vac to lle lateral calf lost suction, pain medications adjusted with resolution. Going to the OR today for washout and wound change to lle    Vital Signs Last 24 Hrs  T(C): 36.6 (27 Dec 2021 04:52), Max: 37.6 (26 Dec 2021 05:51)  T(F): 97.9 (27 Dec 2021 04:52), Max: 99.7 (26 Dec 2021 05:51)  HR: 94 (27 Dec 2021 04:52) (80 - 94)  BP: 107/67 (27 Dec 2021 04:52) (103/65 - 111/70)  BP(mean): --  RR: 18 (27 Dec 2021 04:52) (18 - 18)  SpO2: 94% (27 Dec 2021 04:52) (94% - 97%)  Drug Dosing Weight  Height (cm): 168.9 (23 Dec 2021 15:05)  Weight (kg): 88.5 (23 Dec 2021 15:05)  BMI (kg/m2): 31 (23 Dec 2021 15:05)  BSA (m2): 1.99 (23 Dec 2021 15:05)  I&O's Detail    25 Dec 2021 07:01  -  26 Dec 2021 07:00  --------------------------------------------------------  IN:  Total IN: 0 mL    OUT:    Voided (mL): 1550 mL  Total OUT: 1550 mL    Total NET: -1550 mL        Allergies    No Known Allergies    Intolerances                              7.9    11.60 )-----------( 554      ( 26 Dec 2021 06:32 )             25.5   12-26    133<L>  |  99  |  15.6  ----------------------------<  98  5.2   |  22.0  |  0.86    Ca    9.5      26 Dec 2021 06:32  Phos  4.5     12-26  Mg     1.8     12-26        ROS:    PHYSICAL EXAM:  Constitutional: " I have so much pain"  Respiratory: no respiratory distress, no dyspnea, no supplemental o2 needed   Gastrointestinal: abdomen soft, non-tender, atraumatic   Genitourinary: voiding spontaneously   Extremities: LLE warm to touch, edema improving, wound vac to medial calf to lle with good suction, wound vac to lateral left calf with loss of suction,  able to move toes , light sensation intact  sensation intact  Neurological: A&OX3        MEDICATIONS  (STANDING):  acetaminophen     Tablet .. 975 milliGRAM(s) Oral every 6 hours  baclofen 10 milliGRAM(s) Oral three times a day  enoxaparin Injectable 30 milliGRAM(s) SubCutaneous two times a day  gabapentin 300 milliGRAM(s) Oral three times a day  lisinopril 5 milliGRAM(s) Oral daily  magnesium oxide 400 milliGRAM(s) Oral three times a day with meals  meropenem  IVPB 1000 milliGRAM(s) IV Intermittent every 8 hours  pantoprazole    Tablet 40 milliGRAM(s) Oral two times a day  polyethylene glycol 3350 17 Gram(s) Oral daily  sodium chloride 1 Gram(s) Oral every 12 hours  vancomycin  IVPB 750 milliGRAM(s) IV Intermittent every 12 hours    MEDICATIONS  (PRN):  HYDROmorphone   Tablet 2 milliGRAM(s) Oral every 4 hours PRN Severe Pain (7 - 10)      RADIOLOGY STUDIES:    CULTURES:

## 2021-12-27 NOTE — PROGRESS NOTE ADULT - ASSESSMENT
51F no PMHx s/p multiple LLE procedures now POD 3 for most recent clean washout, WBC normalized afebrile, no systemic signs of infection, likely wound coverage on 12/27    - Reg  - DVT PPX  - PT/OT  - Pain control  - monitor fevers  - Booked for LLE STSG today12/27

## 2021-12-28 LAB
ANION GAP SERPL CALC-SCNC: 15 MMOL/L — SIGNIFICANT CHANGE UP (ref 5–17)
BASOPHILS # BLD AUTO: 0.08 K/UL — SIGNIFICANT CHANGE UP (ref 0–0.2)
BASOPHILS NFR BLD AUTO: 0.7 % — SIGNIFICANT CHANGE UP (ref 0–2)
BUN SERPL-MCNC: 11.3 MG/DL — SIGNIFICANT CHANGE UP (ref 8–20)
CALCIUM SERPL-MCNC: 9.6 MG/DL — SIGNIFICANT CHANGE UP (ref 8.6–10.2)
CHLORIDE SERPL-SCNC: 100 MMOL/L — SIGNIFICANT CHANGE UP (ref 98–107)
CK SERPL-CCNC: 12 U/L — LOW (ref 25–170)
CO2 SERPL-SCNC: 21 MMOL/L — LOW (ref 22–29)
CREAT SERPL-MCNC: 0.59 MG/DL — SIGNIFICANT CHANGE UP (ref 0.5–1.3)
CULTURE RESULTS: SIGNIFICANT CHANGE UP
EOSINOPHIL # BLD AUTO: 0.45 K/UL — SIGNIFICANT CHANGE UP (ref 0–0.5)
EOSINOPHIL NFR BLD AUTO: 4 % — SIGNIFICANT CHANGE UP (ref 0–6)
GLUCOSE SERPL-MCNC: 86 MG/DL — SIGNIFICANT CHANGE UP (ref 70–99)
HCG SERPL-ACNC: <4 MIU/ML — SIGNIFICANT CHANGE UP
HCT VFR BLD CALC: 26.9 % — LOW (ref 34.5–45)
HGB BLD-MCNC: 8.3 G/DL — LOW (ref 11.5–15.5)
IMM GRANULOCYTES NFR BLD AUTO: 0.4 % — SIGNIFICANT CHANGE UP (ref 0–1.5)
LYMPHOCYTES # BLD AUTO: 36 % — SIGNIFICANT CHANGE UP (ref 13–44)
LYMPHOCYTES # BLD AUTO: 4.06 K/UL — HIGH (ref 1–3.3)
MAGNESIUM SERPL-MCNC: 1.7 MG/DL — SIGNIFICANT CHANGE UP (ref 1.6–2.6)
MCHC RBC-ENTMCNC: 27.9 PG — SIGNIFICANT CHANGE UP (ref 27–34)
MCHC RBC-ENTMCNC: 30.9 GM/DL — LOW (ref 32–36)
MCV RBC AUTO: 90.6 FL — SIGNIFICANT CHANGE UP (ref 80–100)
MONOCYTES # BLD AUTO: 1.13 K/UL — HIGH (ref 0–0.9)
MONOCYTES NFR BLD AUTO: 10 % — SIGNIFICANT CHANGE UP (ref 2–14)
NEUTROPHILS # BLD AUTO: 5.52 K/UL — SIGNIFICANT CHANGE UP (ref 1.8–7.4)
NEUTROPHILS NFR BLD AUTO: 48.9 % — SIGNIFICANT CHANGE UP (ref 43–77)
PLATELET # BLD AUTO: 519 K/UL — HIGH (ref 150–400)
POTASSIUM SERPL-MCNC: 4.7 MMOL/L — SIGNIFICANT CHANGE UP (ref 3.5–5.3)
POTASSIUM SERPL-SCNC: 4.7 MMOL/L — SIGNIFICANT CHANGE UP (ref 3.5–5.3)
RBC # BLD: 2.97 M/UL — LOW (ref 3.8–5.2)
RBC # FLD: 18.2 % — HIGH (ref 10.3–14.5)
SODIUM SERPL-SCNC: 136 MMOL/L — SIGNIFICANT CHANGE UP (ref 135–145)
VANCOMYCIN TROUGH SERPL-MCNC: 14 UG/ML — SIGNIFICANT CHANGE UP (ref 10–20)
WBC # BLD: 11.29 K/UL — HIGH (ref 3.8–10.5)
WBC # FLD AUTO: 11.29 K/UL — HIGH (ref 3.8–10.5)

## 2021-12-28 PROCEDURE — 27603 DRAIN LOWER LEG LESION: CPT | Mod: 78

## 2021-12-28 PROCEDURE — 99233 SBSQ HOSP IP/OBS HIGH 50: CPT

## 2021-12-28 PROCEDURE — 27310 EXPLORATION OF KNEE JOINT: CPT | Mod: 78,LT

## 2021-12-28 RX ORDER — HYDROMORPHONE HYDROCHLORIDE 2 MG/ML
0.5 INJECTION INTRAMUSCULAR; INTRAVENOUS; SUBCUTANEOUS
Refills: 0 | Status: DISCONTINUED | OUTPATIENT
Start: 2021-12-28 | End: 2021-12-28

## 2021-12-28 RX ORDER — MEROPENEM 1 G/30ML
1000 INJECTION INTRAVENOUS EVERY 8 HOURS
Refills: 0 | Status: COMPLETED | OUTPATIENT
Start: 2021-12-28 | End: 2022-01-24

## 2021-12-28 RX ORDER — MAGNESIUM SULFATE 500 MG/ML
2 VIAL (ML) INJECTION ONCE
Refills: 0 | Status: COMPLETED | OUTPATIENT
Start: 2021-12-28 | End: 2021-12-28

## 2021-12-28 RX ORDER — ONDANSETRON 8 MG/1
4 TABLET, FILM COATED ORAL EVERY 8 HOURS
Refills: 0 | Status: DISCONTINUED | OUTPATIENT
Start: 2021-12-28 | End: 2022-01-28

## 2021-12-28 RX ORDER — DAPTOMYCIN 500 MG/10ML
500 INJECTION, POWDER, LYOPHILIZED, FOR SOLUTION INTRAVENOUS EVERY 24 HOURS
Refills: 0 | Status: DISCONTINUED | OUTPATIENT
Start: 2021-12-28 | End: 2021-12-28

## 2021-12-28 RX ORDER — DAPTOMYCIN 500 MG/10ML
500 INJECTION, POWDER, LYOPHILIZED, FOR SOLUTION INTRAVENOUS EVERY 24 HOURS
Refills: 0 | Status: DISCONTINUED | OUTPATIENT
Start: 2021-12-28 | End: 2022-01-23

## 2021-12-28 RX ORDER — ENOXAPARIN SODIUM 100 MG/ML
40 INJECTION SUBCUTANEOUS DAILY
Refills: 0 | Status: DISCONTINUED | OUTPATIENT
Start: 2021-12-28 | End: 2022-01-28

## 2021-12-28 RX ORDER — SODIUM CHLORIDE 9 MG/ML
1000 INJECTION, SOLUTION INTRAVENOUS
Refills: 0 | Status: DISCONTINUED | OUTPATIENT
Start: 2021-12-28 | End: 2022-01-03

## 2021-12-28 RX ORDER — LANOLIN ALCOHOL/MO/W.PET/CERES
3 CREAM (GRAM) TOPICAL AT BEDTIME
Refills: 0 | Status: DISCONTINUED | OUTPATIENT
Start: 2021-12-28 | End: 2022-01-14

## 2021-12-28 RX ORDER — HYDROMORPHONE HYDROCHLORIDE 2 MG/ML
1 INJECTION INTRAMUSCULAR; INTRAVENOUS; SUBCUTANEOUS
Refills: 0 | Status: DISCONTINUED | OUTPATIENT
Start: 2021-12-28 | End: 2021-12-28

## 2021-12-28 RX ORDER — PANTOPRAZOLE SODIUM 20 MG/1
40 TABLET, DELAYED RELEASE ORAL
Refills: 0 | Status: DISCONTINUED | OUTPATIENT
Start: 2021-12-28 | End: 2022-01-28

## 2021-12-28 RX ORDER — LISINOPRIL 2.5 MG/1
5 TABLET ORAL DAILY
Refills: 0 | Status: DISCONTINUED | OUTPATIENT
Start: 2021-12-28 | End: 2022-01-28

## 2021-12-28 RX ORDER — OXYCODONE HYDROCHLORIDE 5 MG/1
5 TABLET ORAL ONCE
Refills: 0 | Status: DISCONTINUED | OUTPATIENT
Start: 2021-12-28 | End: 2021-12-28

## 2021-12-28 RX ORDER — GABAPENTIN 400 MG/1
300 CAPSULE ORAL THREE TIMES A DAY
Refills: 0 | Status: DISCONTINUED | OUTPATIENT
Start: 2021-12-28 | End: 2022-01-14

## 2021-12-28 RX ORDER — ACETAMINOPHEN 500 MG
650 TABLET ORAL EVERY 6 HOURS
Refills: 0 | Status: DISCONTINUED | OUTPATIENT
Start: 2021-12-28 | End: 2022-01-14

## 2021-12-28 RX ORDER — HYDROMORPHONE HYDROCHLORIDE 2 MG/ML
2 INJECTION INTRAMUSCULAR; INTRAVENOUS; SUBCUTANEOUS EVERY 4 HOURS
Refills: 0 | Status: DISCONTINUED | OUTPATIENT
Start: 2021-12-28 | End: 2022-01-01

## 2021-12-28 RX ORDER — CYCLOBENZAPRINE HYDROCHLORIDE 10 MG/1
10 TABLET, FILM COATED ORAL ONCE
Refills: 0 | Status: COMPLETED | OUTPATIENT
Start: 2021-12-28 | End: 2021-12-28

## 2021-12-28 RX ADMIN — ONDANSETRON 4 MILLIGRAM(S): 8 TABLET, FILM COATED ORAL at 21:47

## 2021-12-28 RX ADMIN — Medication 975 MILLIGRAM(S): at 06:12

## 2021-12-28 RX ADMIN — DAPTOMYCIN 120 MILLIGRAM(S): 500 INJECTION, POWDER, LYOPHILIZED, FOR SOLUTION INTRAVENOUS at 19:55

## 2021-12-28 RX ADMIN — HYDROMORPHONE HYDROCHLORIDE 0.5 MILLIGRAM(S): 2 INJECTION INTRAMUSCULAR; INTRAVENOUS; SUBCUTANEOUS at 18:20

## 2021-12-28 RX ADMIN — MEROPENEM 100 MILLIGRAM(S): 1 INJECTION INTRAVENOUS at 21:43

## 2021-12-28 RX ADMIN — CYCLOBENZAPRINE HYDROCHLORIDE 10 MILLIGRAM(S): 10 TABLET, FILM COATED ORAL at 03:02

## 2021-12-28 RX ADMIN — SODIUM CHLORIDE 1 GRAM(S): 9 INJECTION INTRAMUSCULAR; INTRAVENOUS; SUBCUTANEOUS at 06:12

## 2021-12-28 RX ADMIN — PANTOPRAZOLE SODIUM 40 MILLIGRAM(S): 20 TABLET, DELAYED RELEASE ORAL at 06:12

## 2021-12-28 RX ADMIN — ENOXAPARIN SODIUM 30 MILLIGRAM(S): 100 INJECTION SUBCUTANEOUS at 06:12

## 2021-12-28 RX ADMIN — Medication 10 MILLIGRAM(S): at 06:13

## 2021-12-28 RX ADMIN — SODIUM CHLORIDE 50 MILLILITER(S): 9 INJECTION, SOLUTION INTRAVENOUS at 19:55

## 2021-12-28 RX ADMIN — PANTOPRAZOLE SODIUM 40 MILLIGRAM(S): 20 TABLET, DELAYED RELEASE ORAL at 21:44

## 2021-12-28 RX ADMIN — Medication 25 GRAM(S): at 10:25

## 2021-12-28 RX ADMIN — Medication 250 MILLIGRAM(S): at 08:24

## 2021-12-28 RX ADMIN — Medication 975 MILLIGRAM(S): at 06:23

## 2021-12-28 RX ADMIN — GABAPENTIN 300 MILLIGRAM(S): 400 CAPSULE ORAL at 06:13

## 2021-12-28 RX ADMIN — HYDROMORPHONE HYDROCHLORIDE 0.5 MILLIGRAM(S): 2 INJECTION INTRAMUSCULAR; INTRAVENOUS; SUBCUTANEOUS at 18:35

## 2021-12-28 RX ADMIN — MEROPENEM 100 MILLIGRAM(S): 1 INJECTION INTRAVENOUS at 06:11

## 2021-12-28 RX ADMIN — Medication 3 MILLIGRAM(S): at 21:44

## 2021-12-28 RX ADMIN — GABAPENTIN 300 MILLIGRAM(S): 400 CAPSULE ORAL at 21:44

## 2021-12-28 NOTE — PROGRESS NOTE ADULT - SUBJECTIVE AND OBJECTIVE BOX
SUBJECTIVE/24 hour events:  Patient is a 51yFemale had issues of acute onset of pain to lle, neurovascular exam intact, would vac to lle lateral calf lost suction, had repeat of CT of lle results need to be reviewed by trauma and ortho . Going to the OR today for washout and wound change to lle          Vital Signs Last 24 Hrs  T(C): 36.9 (27 Dec 2021 22:01), Max: 37.4 (27 Dec 2021 17:52)  T(F): 98.5 (27 Dec 2021 22:01), Max: 99.3 (27 Dec 2021 17:52)  HR: 86 (27 Dec 2021 22:01) (86 - 94)  BP: 109/68 (27 Dec 2021 22:01) (107/67 - 111/67)  BP(mean): --  RR: 18 (27 Dec 2021 22:01) (18 - 18)  SpO2: 100% (27 Dec 2021 22:01) (94% - 100%)  Drug Dosing Weight  Height (cm): 168.9 (23 Dec 2021 15:05)  Weight (kg): 88.5 (23 Dec 2021 15:05)  BMI (kg/m2): 31 (23 Dec 2021 15:05)  BSA (m2): 1.99 (23 Dec 2021 15:05)  I&O's Detail    26 Dec 2021 07:01  -  27 Dec 2021 07:00  --------------------------------------------------------  IN:  Total IN: 0 mL    OUT:    Voided (mL): 780 mL  Total OUT: 780 mL    Total NET: -780 mL      27 Dec 2021 07:01  -  28 Dec 2021 00:38  --------------------------------------------------------  IN:  Total IN: 0 mL    OUT:    Voided (mL): 1000 mL  Total OUT: 1000 mL    Total NET: -1000 mL        Allergies    No Known Allergies    Intolerances                              8.7    11.13 )-----------( 540      ( 27 Dec 2021 05:39 )             29.1   12-27    132<L>  |  98  |  15.2  ----------------------------<  86  4.9   |  23.0  |  0.69    Ca    9.6      27 Dec 2021 05:39  Phos  4.5     12-26  Mg     1.7     12-27        ROS:    PHYSICAL EXAM:  Constitutional: resting comfortably   Respiratory: no respiratory distress, no dyspnea, no supplemental o2 needed   Gastrointestinal: abdomen soft, non-tender, atraumatic   Genitourinary: voiding spontaneously   Extremities: LLE warm to touch, edema improving, wound vac to medial calf to lle with good suction, wound vac to lateral left calf with loss of suction,  able to move toes , light sensation intact  sensation intact  Neurological: A&OX3        MEDICATIONS  (STANDING):  acetaminophen     Tablet .. 975 milliGRAM(s) Oral every 6 hours  baclofen 10 milliGRAM(s) Oral three times a day  enoxaparin Injectable 30 milliGRAM(s) SubCutaneous two times a day  gabapentin 300 milliGRAM(s) Oral three times a day  lisinopril 5 milliGRAM(s) Oral daily  magnesium oxide 400 milliGRAM(s) Oral three times a day with meals  meropenem  IVPB 1000 milliGRAM(s) IV Intermittent every 8 hours  pantoprazole    Tablet 40 milliGRAM(s) Oral two times a day  polyethylene glycol 3350 17 Gram(s) Oral daily  sodium chloride 1 Gram(s) Oral every 12 hours  vancomycin  IVPB 750 milliGRAM(s) IV Intermittent every 12 hours    MEDICATIONS  (PRN):  HYDROmorphone   Tablet 2 milliGRAM(s) Oral every 4 hours PRN Severe Pain (7 - 10)      RADIOLOGY STUDIES:    CULTURES:

## 2021-12-28 NOTE — PROGRESS NOTE ADULT - ASSESSMENT
51y Female present to ED with left leg swelling, erythema, pain. Patient endorses she had left knee pain for 1 week presented 3 days ago to ED  where she states was given an steroid shot, and ibuprofen. however pain and edema worsened. Patient states she wasnt feeling herself today reason why she came. Endorses chills initially with pain 3 days ago but none since, denies history of trauma, insect bites, recent interventions, or injections to the area, contact with ticks, history of diabetes.  PT AS ABOVE WITH LEFT LEG SWELLING PT WITH INCREASED WBC PLACED ON ABX FOR PRESUMED INFECTION  PT WITH CT SCAN WITH FASCIAL EDAM PT BROUGHT TO THE OR EMERGENTLY AND  UNDERWENT FASCIOTOMY   PT BROUGHT TO THE OR  BOTH TRAUMA AND ORTHO INVOLVED  PURULENT FLUID FOUND  NECROTIZING SOFT TISSUE INFECTION    BLOOD CX WITH GROUP A STREP AND  OPERATIVE FLUID WITH STREP   PT WAS  ON PCN AND CLINDA for POSSIBLE ANTITOXIN EFFECT IN GROUP A STREP INFECTION     PT  S/P OR WASHOUT 12.2  AND  OR AGAIN 12/7  and late on 12/10 121/15 12/17 and 12/20 12/23     REPEAT   OPERATIVE CX NEG       ON MERREM VANCO      AS ABOVE  REPEAT  OR  12/20 12/23      NO PURULENCE   OVERALL NON TOXIC  AFEBRILE       CT SCAN DONE WITH POSSIBLE ABSCESS   PT TO RETURN TO THE OR TODAY  WILL FOLLOW UP NEW OPERATIVE CX  CONTINUE CURRENT REGIMEN  WILL FOLLOW UP        WITH FURTHER RECOMMENDATIONS  SPOKE TO ORTHO

## 2021-12-28 NOTE — PRE-OP CHECKLIST - PATIENT SENT TO
operating room

## 2021-12-28 NOTE — PRE-OP CHECKLIST - AS BP NONINV SITE
right upper arm
right lower arm
right upper arm

## 2021-12-28 NOTE — PROGRESS NOTE ADULT - ASSESSMENT
51F no PMHx s/p multiple LLE procedures now POD#48 for most recent clean washout, WBC normalized afebrile, no systemic signs of infection,   -continue following ID recommendations  - new ct scan results need to be reviewed by trauma and ortho   - Reg diet  - DVT PPX  - PT/OT  - Pain control  - monitor fevers  - Booked for LLE STSG today12/28

## 2021-12-28 NOTE — CHART NOTE - NSCHARTNOTEFT_GEN_A_CORE
Source: Patient [x ]  Family [ ]   other [ ]  51F no PMHx s/p multiple LLE procedures now POD#48 for most recent clean washout, WBC normalized afebrile, no systemic signs of infection,    for LLE STSG today12/28    Current Diet: Diet, NPO after Midnight:      NPO Start Date: 27-Dec-2021,   NPO Start Time: 23:59 (12-27-21 @ 17:50)  Diet, Regular:   Supplement Feeding Modality:  Oral  Ensure Enlive Cans or Servings Per Day:  3       Frequency:  Three Times a day (12-27-21 @ 09:43)      Patient reports [ ] nausea  [ ] vomiting [ ] diarrhea [ ] constipation  [ ]chewing problems [ ] swallowing issues  [ ] other:     PO intake:  < 50% [ ]   50-75%  [ ]   %  [x ]  other :    Source for PO intake [x ] Patient [ ] family [ ] chart [ ] staff [ ] other      Current Weight:   (12/23)194.7 kg    (12/8) 251.9 lbs  (12/7) 205.9 lbs  (12/1) 234.7 lbs  (11/24) 221.3 lbs  (11/22) 245.1 lbs  (11/21) 242.9 lbs  (11/20) 228.1 lbs  % Weight Change   Edema : 1+ L foot-   weight trending down- may be related to fluid loss    Pertinent Medications: MEDICATIONS  (STANDING):  acetaminophen     Tablet .. 975 milliGRAM(s) Oral every 6 hours  baclofen 10 milliGRAM(s) Oral three times a day  enoxaparin Injectable 30 milliGRAM(s) SubCutaneous two times a day  gabapentin 300 milliGRAM(s) Oral three times a day  lisinopril 5 milliGRAM(s) Oral daily  magnesium oxide 400 milliGRAM(s) Oral three times a day with meals  magnesium sulfate  IVPB 2 Gram(s) IV Intermittent once  meropenem  IVPB 1000 milliGRAM(s) IV Intermittent every 8 hours  pantoprazole    Tablet 40 milliGRAM(s) Oral two times a day  polyethylene glycol 3350 17 Gram(s) Oral daily  sodium chloride 1 Gram(s) Oral every 12 hours  vancomycin  IVPB 750 milliGRAM(s) IV Intermittent every 12 hours    MEDICATIONS  (PRN):  HYDROmorphone   Tablet 2 milliGRAM(s) Oral every 4 hours PRN Severe Pain (7 - 10)    Pertinent Labs: CBC Full  -  ( 28 Dec 2021 06:56 )  WBC Count : 11.29 K/uL  RBC Count : 2.97 M/uL  Hemoglobin : 8.3 g/dL  Hematocrit : 26.9 %  Platelet Count - Automated : 519 K/uL  Mean Cell Volume : 90.6 fl  Mean Cell Hemoglobin : 27.9 pg  Mean Cell Hemoglobin Concentration : 30.9 gm/dL  Auto Neutrophil # : 5.52 K/uL  Auto Lymphocyte # : 4.06 K/uL  Auto Monocyte # : 1.13 K/uL  Auto Eosinophil # : 0.45 K/uL  Auto Basophil # : 0.08 K/uL  Auto Neutrophil % : 48.9 %  Auto Lymphocyte % : 36.0 %  Auto Monocyte % : 10.0 %  Auto Eosinophil % : 4.0 %  Auto Basophil % : 0.7 %  12-28 Na136 mmol/L Glu 86 mg/dL K+ 4.7 mmol/L Cr  0.59 mg/dL BUN 11.3 mg/dL Phos n/a   Alb n/a   PAB n/a               Skin: sx incision to L upper and lower extremity's and knee       Nutrition focused physical exam conducted - found signs of malnutrition [ ]absent [x ]present    Subcutaneous fat loss: [ x] Orbital fat pads region, [ ]Buccal fat region, [ ]Triceps region,  [ ]Ribs region    Muscle wasting: [x ]Temples region, [x ]Clavicle region, [ ]Shoulder region, [ ]Scapula region, [ ]Interosseous region,  [ ]thigh region, [ ]Calf region    Estimated Needs:   [x ] no change since previous assessment  [ ] recalculated:     Current Nutrition Diagnosis: Pt remains at high nutrition risk secondary to malnutrition (moderate, acute) related to prolonged hospitalization and inability to meet increased nutrient needs (s/p LLE exploration and debridement, c/b hemorrhagic + septic shock, found to have GI bleed,  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 PT NPO for procedure.    Recommendations:   1) Continue diet as tolerated. Add Ensure Enlive TID to optimize po intake and provide an additional 350 kcal, 20g protein per serving; also suggest Swapnil BID to aid in healing (90 kcal, 14g amino acids per packet).  2) Rx: MVI and vitamin C 500mg daily.  3) Obtain daily weights to monitor trends.     Monitoring and Evaluation:   [ x] PO intake [x ] Tolerance to diet prescription [X] Weights  [X] Follow up per protocol [X] Labs:

## 2021-12-28 NOTE — CHART NOTE - NSCHARTNOTEFT_GEN_A_CORE
POST-OPERATIVE NOTE    Subjective:  Patient is s/p LLE washout. Patient reports feeling well without any pain at the moment. States mild nausea without vomiting. 2 VACs in place and 2 hemovacs are okay. Denies fevers, chills, nausea, emesis.  Denies chest pain, dyspnea.  Denies constipation, diarrhea. Denies headaches, dizziness, changes in vision.     Vital Signs Last 24 Hrs  T(C): 36.8 (28 Dec 2021 18:15), Max: 37.2 (28 Dec 2021 05:09)  T(F): 98.2 (28 Dec 2021 18:15), Max: 98.9 (28 Dec 2021 05:09)  HR: 85 (28 Dec 2021 18:15) (81 - 86)  BP: 104/58 (28 Dec 2021 18:15) (94/56 - 113/64)  BP(mean): 68 (28 Dec 2021 18:15) (63 - 68)  RR: 15 (28 Dec 2021 18:15) (12 - 18)  SpO2: 98% (28 Dec 2021 18:15) (96% - 100%)  I&O's Detail    27 Dec 2021 07:01  -  28 Dec 2021 07:00  --------------------------------------------------------  IN:  Total IN: 0 mL    OUT:    Voided (mL): 1000 mL  Total OUT: 1000 mL    Total NET: -1000 mL        DAPTOmycin IVPB 500  meropenem  IVPB 1000  DAPTOmycin IVPB 500  enoxaparin Injectable 40  lisinopril 5  meropenem  IVPB 1000    PAST MEDICAL & SURGICAL HISTORY:  No pertinent past medical history    History of ankle surgery          Physical Exam:  General: NAD, resting comfortably in bed  Pulmonary: Nonlabored breathing, no respiratory distress  Cardiovascular: NSR  Abdominal: soft, NT/ND, no reboudn or guarding, Incision is clean, dry, intact  Extremities: LLE with multiple incisions in thigh and leg. Thigh with 2 hemovacs with serosanguineous fluid, compartments soft. Left leg fasciotomies with 2 VACs in place at -125 with good suction, soft. Normal NV distally.     LABS:                        8.3    11.29 )-----------( 519      ( 28 Dec 2021 06:56 )             26.9     12-28    136  |  100  |  11.3  ----------------------------<  86  4.7   |  21.0<L>  |  0.59    Ca    9.6      28 Dec 2021 06:56  Mg     1.7     12-28        CAPILLARY BLOOD GLUCOSE          Radiology and Additional Studies:    Assessment:  The patient is a 51y Female who is now several hours post-op from a LLE washout. Patient reports feeling well, remains HDN stable and afebrile.     -continue following ID recommendations  - Possibe IR evaluation in AM due to poss deep collection  - Reg diet  - DVT PPX  - PT/OT  - Pain control  - monitor fevers

## 2021-12-28 NOTE — PROGRESS NOTE ADULT - SUBJECTIVE AND OBJECTIVE BOX
INFECTIOUS DISEASES AND INTERNAL MEDICINE at [place]  =======================================================  Theo Morrow MD  Diplomates American Board of Internal Medicine and Infectious Diseases  Telephone [phone]  Fax            [phone]  =======================================================    LUIS [facility]ERNAND[facility] DAVIS [id number]  Follow up: group A STREP NECROTIZING SOFT TISSUE INFECTION     repeat CT scan of lower ext with collections.   S/P OR WASHOUT 12/2  AND   OR AGAIN  12/7   late  on 12/10 12/15 12/17  12/20 12/23     AFEBRILE THIS MORNING in nad  REPEAT CX SCAN DONE WITH PLAN FOR OR  AGAIN  TODAY      Allergies:  No Known Allergies       FAMILY   FAMILY HISTORY:  FH: HTN (hypertension) (Mother)      REVIEW OF SYSTEMS:  CONSTITUTIONAL:  No Fever or chills  HEENT:   No diplopia or blurred vision.  No earache, sore throat or runny nose.  CARDIOVASCULAR:  No pressure, squeezing, strangling, tightness, heaviness or aching about the chest, neck, axilla or epigastrium.  RESPIRATORY:  No cough, shortness of breath, PND or orthopnea.  GASTROINTESTINAL:  No nausea, vomiting or diarrhea.  GENITOURINARY:  No dysuria, frequency or urgency. No Blood in urine  MUSCULOSKELETAL:   AS PER HPI   SKIN:  No change in skin, hair or nails.  NEUROLOGIC:  No paresthesias, fasciculations, seizures or weakness.  PSYCHIATRIC:  No disorder of thought or mood.  ENDOCRINE:  No heat or cold intolerance, polyuria or polydipsia.  HEMATOLOGICAL:  No easy bruising or bleeding.            Physical Exam:     GEN: NAD,    HEENT: normocephalic and atraumatic. EOMI. ASHISH.    NECK: Supple. No carotid bruits.  No lymphadenopathy or thyromegaly.  LUNGS: Clear to auscultation.  HEART: Regular rate and rhythm without murmur.  ABDOMEN: Soft, nontender, and nondistended.  Positive bowel sounds.    : No CVA tenderness  EXTREMITIES: LEFT LEG WRAPPED; left thigh vac in place  right knee with dressing in place       NEUROLOGIC:  C AWAKE ALERT Appropriate affect .  SKIN: No ulceration or induration present.           Vitals:  ==== Vital Signs Last 24 Hrs  T(C): 37.2 (28 Dec 2021 05:09), Max: 37.4 (27 Dec 2021 17:52)  T(F): 98.9 (28 Dec 2021 05:09), Max: 99.3 (27 Dec 2021 17:52)  HR: 82 (28 Dec 2021 05:09) (82 - 90)  BP: 99/65 (28 Dec 2021 05:09) (99/65 - 111/67)  BP(mean): --  RR: 16 (28 Dec 2021 05:09) (16 - 18)  SpO2: 97% (28 Dec 2021 05:09) (97% - 100%)  ========================  Current Antibiotics:  bactrim  piperacillin/tazobactam IVPB.. 4.5 Gram(s) IV Intermittent every 8 hours    Other medications:  baclofen 10 milliGRAM(s) Oral three times a day  collagenase Ointment 1 Application(s) Topical two times a day  gabapentin 200 milliGRAM(s) Oral three times a day  glucagon  Injectable 1 milliGRAM(s) IntraMuscular once  heparin   Injectable 5000 Unit(s) SubCutaneous every 8 hours  lisinopril 5 milliGRAM(s) Oral daily  methocarbamol 750 milliGRAM(s) Oral three times a day  pantoprazole    Tablet 40 milliGRAM(s) Oral two times a day  polyethylene glycol 3350 17 Gram(s) Oral daily  senna 2 Tablet(s) Oral at bedtime  sodium chloride 1 Gram(s) Oral every 12 hours  sodium chloride 0.9%. 1000 milliLiter(s) IV Continuous <Continuous>      =======================================================  Labs:                                            8.3    11.29 )-----------( 519      ( 28 Dec 2021 06:56 )             26.9   12-28    136  |  100  |  11.3  ----------------------------<  86  4.7   |  21.0<L>  |  0.59    Ca    9.6      28 Dec 2021 06:56  Mg     1.7     12-28                e E: (C=Critical, N=Notification, A=Abnormal) C    TESTS:  _ GS    DATE/TIME CALLED: _ 11/18/2021 09:40:08    CALLED TO: Chris Hernandez RN    READ BACK (2 Patient Identifiers)(Y/N): _ Y    READ BACK VALUES (Y/N): _ Y    CALLED BY: Chris Quiñones  Final Report (11-21-21 @ 12:28):    Growth in aerobic and anaerobic bottles: Streptococcus pyogenes (Group A)  Organism: Blood Culture PCR  Streptococcus pyogenes (Group A) (11-21-21 @ 12:28)  Organism: Streptococcus pyogenes (Group A) (11-21-21 @ 12:28)    Sensitivities:      -  Ceftriaxone: S 0.016      -  Penicillin: S 0.016 Predicts results for ampicillin, amoxicillin, amoxicillin/clavulanate, ampicillin/sulbactam, 1st, 2nd and 3rd generation cephalosporins and carbapenems.      Method Type: ETEST  Organism: Streptococcus pyogenes (Group A) (11-21-21 @ 12:28)    Sensitivities:      -  Vancomycin: S      Method Type: KB  Organism: Blood Culture PCR (11-21-21 @ 12:28)    Sensitivities:      -  Streptococcus pyogenes (Group A): Detec      Method Type: PCR      Creatinine, Serum: 0.75 mg/dL (11-30-21 @ 07:18)  Creatinine, Serum: 0.68 mg/dL (11-29-21 @ 09:55)  Creatinine, Serum: 0.64 mg/dL (11-28-21 @ 09:14)  Creatinine, Serum: 0.65 mg/dL (11-27-21 @ 11:51)  Creatinine, Serum: 0.57 mg/dL (11-27-21 @ 05:35)  Creatinine, Serum: 0.61 mg/dL (11-26-21 @ 09:01)        Ferritin, Serum: 388 ng/mL (11-17-21 @ 13:21)    C-Reactive Protein, Serum: 197 mg/L (11-26-21 @ 21:52)  C-Reactive Protein, Serum: 211 mg/L (11-26-21 @ 09:01)  C-Reactive Protein, Serum: 53 mg/L (11-23-21 @ 07:42)  C-Reactive Protein, Serum: >422 mg/L (11-17-21 @ 13:21)    Sedimentation Rate, Erythrocyte: 65 mm/hr (11-26-21 @ 21:52)  Sedimentation Rate, Erythrocyte: 63 mm/hr (11-26-21 @ 09:01)  Sedimentation Rate, Erythrocyte: 55 mm/hr (11-17-21 @ 13:21)    WBC Count: 14.55 K/uL (11-30-21 @ 07:18)  WBC Count: 16.71 K/uL (11-29-21 @ 09:54)  WBC Count: 17.02 K/uL (11-28-21 @ 09:14)  WBC Count: 15.99 K/uL (11-27-21 @ 05:33)  WBC Count: 18.73 K/uL (11-26-21 @ 09:01)      COVID-19 PCR: NotDetec (11-30-21 @ 01:59)  COVID-19 PCR: NotDetec (11-23-21 @ 18:50)  COVID-19 PCR: NotDetec (11-17-21 @ 13:18)    Lactate Dehydrogenase, Serum: 769 U/L (11-20-21 @ 03:56)    Alkaline Phosphatase, Serum: 88 U/L (11-27-21 @ 05:35)  Alanine Aminotransferase (ALT/SGPT): 11 U/L (11-27-21 @ 05:35)  Aspartate Aminotransferase (AST/SGOT): 25 U/L (11-27-21 @ 05:35)  Bilirubin Total, Serum: 0.3 mg/dL (11-27-21 @ 05:35)        < from: CT Lower Extremity w/ IV Cont, Bilateral (11.30.21 @ 09:55) >     EXAM:  CT LWR EXT IC BI                          PROCEDURE DATE:  11/30/2021          INTERPRETATION:  CT LOWER EXTREMITY WITH IV CONTRAST BILATERAL dated 11/30/2021 9:55 AM    INDICATION: Fever. Status post incision and drainage bilaterally. Persistent fevers to 104.    COMPARISON: CT of the right knee dated 11/23/2021. CT of the left lower extremity dated 11/17/2021    VICKI -

## 2021-12-28 NOTE — PROGRESS NOTE ADULT - ATTENDING COMMENTS
Patient seen on am rounds. Discussed operative plan with Dr. Ham at bedside from [facility]. Patient expressed understanding and had all questions answered. Discussed with her that will do new incision and drainage of left popliteal fossa, but concern is present for need for AKA due to recurrent infection. Will continue to try for limb salvage.

## 2021-12-29 LAB
ALBUMIN SERPL ELPH-MCNC: 2.4 G/DL — LOW (ref 3.3–5.2)
ALP SERPL-CCNC: 67 U/L — SIGNIFICANT CHANGE UP (ref 40–120)
ALT FLD-CCNC: <5 U/L — SIGNIFICANT CHANGE UP
ANION GAP SERPL CALC-SCNC: 11 MMOL/L — SIGNIFICANT CHANGE UP (ref 5–17)
AST SERPL-CCNC: 12 U/L — SIGNIFICANT CHANGE UP
BASOPHILS # BLD AUTO: 0.05 K/UL — SIGNIFICANT CHANGE UP (ref 0–0.2)
BASOPHILS NFR BLD AUTO: 0.4 % — SIGNIFICANT CHANGE UP (ref 0–2)
BILIRUB SERPL-MCNC: 0.3 MG/DL — LOW (ref 0.4–2)
BUN SERPL-MCNC: 13.3 MG/DL — SIGNIFICANT CHANGE UP (ref 8–20)
CALCIUM SERPL-MCNC: 8.4 MG/DL — LOW (ref 8.6–10.2)
CHLORIDE SERPL-SCNC: 100 MMOL/L — SIGNIFICANT CHANGE UP (ref 98–107)
CO2 SERPL-SCNC: 20 MMOL/L — LOW (ref 22–29)
CREAT SERPL-MCNC: 0.65 MG/DL — SIGNIFICANT CHANGE UP (ref 0.5–1.3)
EOSINOPHIL # BLD AUTO: 0.18 K/UL — SIGNIFICANT CHANGE UP (ref 0–0.5)
EOSINOPHIL NFR BLD AUTO: 1.5 % — SIGNIFICANT CHANGE UP (ref 0–6)
GLUCOSE SERPL-MCNC: 100 MG/DL — HIGH (ref 70–99)
GRAM STN FLD: SIGNIFICANT CHANGE UP
HCT VFR BLD CALC: 23 % — LOW (ref 34.5–45)
HGB BLD-MCNC: 7.3 G/DL — LOW (ref 11.5–15.5)
IMM GRANULOCYTES NFR BLD AUTO: 0.5 % — SIGNIFICANT CHANGE UP (ref 0–1.5)
INR BLD: 1.45 RATIO — HIGH (ref 0.88–1.16)
LYMPHOCYTES # BLD AUTO: 28.1 % — SIGNIFICANT CHANGE UP (ref 13–44)
LYMPHOCYTES # BLD AUTO: 3.42 K/UL — HIGH (ref 1–3.3)
MAGNESIUM SERPL-MCNC: 1.7 MG/DL — LOW (ref 1.8–2.6)
MCHC RBC-ENTMCNC: 27.8 PG — SIGNIFICANT CHANGE UP (ref 27–34)
MCHC RBC-ENTMCNC: 31.7 GM/DL — LOW (ref 32–36)
MCV RBC AUTO: 87.5 FL — SIGNIFICANT CHANGE UP (ref 80–100)
MONOCYTES # BLD AUTO: 0.89 K/UL — SIGNIFICANT CHANGE UP (ref 0–0.9)
MONOCYTES NFR BLD AUTO: 7.3 % — SIGNIFICANT CHANGE UP (ref 2–14)
NEUTROPHILS # BLD AUTO: 7.57 K/UL — HIGH (ref 1.8–7.4)
NEUTROPHILS NFR BLD AUTO: 62.2 % — SIGNIFICANT CHANGE UP (ref 43–77)
PHOSPHATE SERPL-MCNC: 5.2 MG/DL — HIGH (ref 2.4–4.7)
PLATELET # BLD AUTO: 469 K/UL — HIGH (ref 150–400)
POTASSIUM SERPL-MCNC: 4.9 MMOL/L — SIGNIFICANT CHANGE UP (ref 3.5–5.3)
POTASSIUM SERPL-SCNC: 4.9 MMOL/L — SIGNIFICANT CHANGE UP (ref 3.5–5.3)
PROT SERPL-MCNC: 6.7 G/DL — SIGNIFICANT CHANGE UP (ref 6.6–8.7)
PROTHROM AB SERPL-ACNC: 16.5 SEC — HIGH (ref 10.6–13.6)
RBC # BLD: 2.63 M/UL — LOW (ref 3.8–5.2)
RBC # FLD: 17.7 % — HIGH (ref 10.3–14.5)
SODIUM SERPL-SCNC: 131 MMOL/L — LOW (ref 135–145)
SPECIMEN SOURCE: SIGNIFICANT CHANGE UP
WBC # BLD: 12.17 K/UL — HIGH (ref 3.8–10.5)
WBC # FLD AUTO: 12.17 K/UL — HIGH (ref 3.8–10.5)

## 2021-12-29 PROCEDURE — 99232 SBSQ HOSP IP/OBS MODERATE 35: CPT

## 2021-12-29 PROCEDURE — 76882 US LMTD JT/FCL EVL NVASC XTR: CPT | Mod: 26,LT

## 2021-12-29 RX ORDER — MAGNESIUM SULFATE 500 MG/ML
2 VIAL (ML) INJECTION ONCE
Refills: 0 | Status: COMPLETED | OUTPATIENT
Start: 2021-12-29 | End: 2021-12-29

## 2021-12-29 RX ADMIN — HYDROMORPHONE HYDROCHLORIDE 2 MILLIGRAM(S): 2 INJECTION INTRAMUSCULAR; INTRAVENOUS; SUBCUTANEOUS at 12:49

## 2021-12-29 RX ADMIN — Medication 25 GRAM(S): at 11:12

## 2021-12-29 RX ADMIN — DAPTOMYCIN 120 MILLIGRAM(S): 500 INJECTION, POWDER, LYOPHILIZED, FOR SOLUTION INTRAVENOUS at 18:12

## 2021-12-29 RX ADMIN — SODIUM CHLORIDE 50 MILLILITER(S): 9 INJECTION, SOLUTION INTRAVENOUS at 21:55

## 2021-12-29 RX ADMIN — SODIUM CHLORIDE 50 MILLILITER(S): 9 INJECTION, SOLUTION INTRAVENOUS at 14:04

## 2021-12-29 RX ADMIN — MEROPENEM 100 MILLIGRAM(S): 1 INJECTION INTRAVENOUS at 21:54

## 2021-12-29 RX ADMIN — GABAPENTIN 300 MILLIGRAM(S): 400 CAPSULE ORAL at 21:55

## 2021-12-29 RX ADMIN — GABAPENTIN 300 MILLIGRAM(S): 400 CAPSULE ORAL at 14:03

## 2021-12-29 RX ADMIN — Medication 650 MILLIGRAM(S): at 03:13

## 2021-12-29 RX ADMIN — SODIUM CHLORIDE 50 MILLILITER(S): 9 INJECTION, SOLUTION INTRAVENOUS at 05:02

## 2021-12-29 RX ADMIN — PANTOPRAZOLE SODIUM 40 MILLIGRAM(S): 20 TABLET, DELAYED RELEASE ORAL at 05:01

## 2021-12-29 RX ADMIN — Medication 650 MILLIGRAM(S): at 15:00

## 2021-12-29 RX ADMIN — ENOXAPARIN SODIUM 40 MILLIGRAM(S): 100 INJECTION SUBCUTANEOUS at 11:12

## 2021-12-29 RX ADMIN — GABAPENTIN 300 MILLIGRAM(S): 400 CAPSULE ORAL at 05:02

## 2021-12-29 RX ADMIN — PANTOPRAZOLE SODIUM 40 MILLIGRAM(S): 20 TABLET, DELAYED RELEASE ORAL at 18:12

## 2021-12-29 RX ADMIN — MEROPENEM 100 MILLIGRAM(S): 1 INJECTION INTRAVENOUS at 05:02

## 2021-12-29 RX ADMIN — Medication 650 MILLIGRAM(S): at 04:00

## 2021-12-29 RX ADMIN — Medication 650 MILLIGRAM(S): at 14:09

## 2021-12-29 RX ADMIN — MEROPENEM 100 MILLIGRAM(S): 1 INJECTION INTRAVENOUS at 14:03

## 2021-12-29 RX ADMIN — HYDROMORPHONE HYDROCHLORIDE 2 MILLIGRAM(S): 2 INJECTION INTRAMUSCULAR; INTRAVENOUS; SUBCUTANEOUS at 11:12

## 2021-12-29 NOTE — PROGRESS NOTE ADULT - SUBJECTIVE AND OBJECTIVE BOX
SUBJECTIVE/24 hour events:  Patient is a 51yFemale with necrotizing soft tissue infection went back on 12/28 for another washout combined surgery for Incision and drainage of left knee, Incision and drainage of popliteal fossa and Irrigation / wound washout of lower extremities with wound vac x2 and hemo vac x2 placed intra-op. Patient acute events overnight,  reports feeling well without any pain at the moment, tolerating a diet, wound vac and hemo vac drainage wnl. Infectious disease following awaiting culture result from specimen from OR 12/28, to adjust current abx regimen.      Vital Signs Last 24 Hrs  T(C): 36.4 (28 Dec 2021 18:45), Max: 37.2 (28 Dec 2021 05:09)  T(F): 97.6 (28 Dec 2021 18:45), Max: 98.9 (28 Dec 2021 05:09)  HR: 83 (28 Dec 2021 18:45) (81 - 85)  BP: 104/68 (28 Dec 2021 18:45) (94/56 - 113/64)  BP(mean): 68 (28 Dec 2021 18:15) (63 - 68)  RR: 18 (28 Dec 2021 18:45) (12 - 18)  SpO2: 100% (28 Dec 2021 18:45) (96% - 100%)  Drug Dosing Weight  Height (cm): 167.6 (28 Dec 2021 12:36)  Weight (kg): 93 (28 Dec 2021 12:36)  BMI (kg/m2): 33.1 (28 Dec 2021 12:36)  BSA (m2): 2.02 (28 Dec 2021 12:36)  I&O's Detail    27 Dec 2021 07:01  -  28 Dec 2021 07:00  --------------------------------------------------------  IN:  Total IN: 0 mL    OUT:    Voided (mL): 1000 mL  Total OUT: 1000 mL    Total NET: -1000 mL        Allergies    No Known Allergies    Intolerances                              8.3    11.29 )-----------( 519      ( 28 Dec 2021 06:56 )             26.9   12-28    136  |  100  |  11.3  ----------------------------<  86  4.7   |  21.0<L>  |  0.59    Ca    9.6      28 Dec 2021 06:56  Mg     1.7     12-28        ROS:    PHYSICAL EXAM:  General: NAD, resting comfortably in bed  Pulmonary: Nonlabored breathing, no respiratory distress  Cardiovascular: NSR  Abdominal: soft, NT/ND, no reboudn or guarding, Incision is clean, dry, intact  Extremities: LLE with multiple incisions in thigh and leg. Thigh with 2 hemovacs with serosanguineous fluid, compartments soft. Left leg fasciotomies with 2 VACs in place at -125 with good suction, soft. Normal NV distally.         MEDICATIONS  (STANDING):  DAPTOmycin IVPB 500 milliGRAM(s) IV Intermittent every 24 hours  enoxaparin Injectable 40 milliGRAM(s) SubCutaneous daily  gabapentin 300 milliGRAM(s) Oral three times a day  lactated ringers. 1000 milliLiter(s) (50 mL/Hr) IV Continuous <Continuous>  lisinopril 5 milliGRAM(s) Oral daily  meropenem  IVPB 1000 milliGRAM(s) IV Intermittent every 8 hours  pantoprazole    Tablet 40 milliGRAM(s) Oral two times a day    MEDICATIONS  (PRN):  acetaminophen     Tablet .. 650 milliGRAM(s) Oral every 6 hours PRN Temp greater or equal to 38C (100.4F), Mild Pain (1 - 3)  aluminum hydroxide/magnesium hydroxide/simethicone Suspension 30 milliLiter(s) Oral every 4 hours PRN Dyspepsia  HYDROmorphone   Tablet 2 milliGRAM(s) Oral every 4 hours PRN Severe Pain (7 - 10)  melatonin 3 milliGRAM(s) Oral at bedtime PRN Insomnia  ondansetron Injectable 4 milliGRAM(s) IV Push every 8 hours PRN Nausea and/or Vomiting  oxyCODONE    IR 5 milliGRAM(s) Oral once PRN Moderate Pain (4 - 6)      RADIOLOGY STUDIES:    CULTURES:

## 2021-12-29 NOTE — PROGRESS NOTE ADULT - ATTENDING COMMENTS
Patient seen on am rounds. Discussed operative findings from yesterday. Plan to consult IR today for possible perc drainage of abscesses. Pt continues to work with PT. ID following. Next washout Friday/Saturday with wound vac change. Repeat CT scan

## 2021-12-29 NOTE — PROGRESS NOTE ADULT - ASSESSMENT
51y Female present to ED with left leg swelling, erythema, pain. Patient endorses she had left knee pain for 1 week presented 3 days ago to ED  where she states was given an steroid shot, and ibuprofen. however pain and edema worsened. Patient states she wasnt feeling herself today reason why she came. Endorses chills initially with pain 3 days ago but none since, denies history of trauma, insect bites, recent interventions, or injections to the area, contact with ticks, history of diabetes.  PT AS ABOVE WITH LEFT LEG SWELLING PT WITH INCREASED WBC PLACED ON ABX FOR PRESUMED INFECTION  PT WITH CT SCAN WITH FASCIAL EDAM PT BROUGHT TO THE OR EMERGENTLY AND  UNDERWENT FASCIOTOMY   PT BROUGHT TO THE OR  BOTH TRAUMA AND ORTHO INVOLVED  PURULENT FLUID FOUND  NECROTIZING SOFT TISSUE INFECTION    BLOOD CX WITH GROUP A STREP AND  OPERATIVE FLUID WITH STREP   PT WAS  ON PCN AND CLINDA for POSSIBLE ANTITOXIN EFFECT IN GROUP A STREP INFECTION     PT  S/P OR WASHOUT 12.2  AND  OR AGAIN 12/7  and late on 12/10 121/15 12/17 and 12/20 12/23     REPEAT   OPERATIVE CX NEG       ON MERREM VANCO      AS ABOVE  REPEAT  OR  12/20 12/23      NO PURULENCE   OVERALL NON TOXIC  AFEBRILE       CT SCAN DONE WITH POSSIBLE ABSCESS   S/ RETURN TO THE OR 12/28  WILL FOLLOW UP NEW OPERATIVE CX  WILL CHANGE  VANCO TO DAPTO TO COVER POTENTIAL  STAP STREP MRSA VRE  WILL FOLLOW UP .     WITH FURTHER RECOMMENDATIONS  SPOKE TO SURGERY

## 2021-12-29 NOTE — PROGRESS NOTE ADULT - SUBJECTIVE AND OBJECTIVE BOX
Patient seen and eval at bedside. Patient has no complaints. Pain well controlled, denies SOB, dizziness, CP. Patient requesting apparatus to help her flex of left foot.    Vital Signs Last 24 Hrs  T(C): 37.8 (29 Dec 2021 05:00), Max: 37.8 (29 Dec 2021 05:00)  T(F): 100.1 (29 Dec 2021 05:00), Max: 100.1 (29 Dec 2021 05:00)  HR: 113 (29 Dec 2021 05:00) (81 - 113)  BP: 92/53 (29 Dec 2021 05:00) (92/53 - 113/64)  BP(mean): 68 (28 Dec 2021 18:15) (63 - 68)  RR: 17 (29 Dec 2021 05:00) (12 - 18)  SpO2: 97% (29 Dec 2021 05:00) (96% - 100%)    PE: NAD, alert awake  Left LE: Knee dressings C/D/I, proximal lateral drain #1 minimal output  Wound VAC dressings intact lower leg  SILT distally, DP pulse 2+  Right knee incision site well healed. 2 steristrips still intact                          7.3    12.17 )-----------( 469      ( 29 Dec 2021 07:06 )             23.0     A/P: s/p left knee I&D x 2 POD#1  ·	Pain control  ·	DVT propx: loenox  ·	Monitor HV drain  ·	Monitor HH - tranfusion prbc defer to primary team  ·	f/u OR cultures  ·	Will contact orthotist for passive motion foot device while in bed  ·	Cont care as per primary team   Patient seen and eval at bedside. Patient has no complaints. Pain well controlled, denies SOB, dizziness, CP. Patient requesting apparatus to help her flex of left foot.    Vital Signs Last 24 Hrs  T(C): 37.8 (29 Dec 2021 05:00), Max: 37.8 (29 Dec 2021 05:00)  T(F): 100.1 (29 Dec 2021 05:00), Max: 100.1 (29 Dec 2021 05:00)    PE: NAD, alert awake  Left LE: Knee dressings C/D/I, proximal lateral drain #1 minimal output  Wound VAC dressings intact lower leg, good suction  SILT distally, DP pulse 2+, + drop foot  Right knee incision site well healed. 2 steristrips still intact                          7.3    12.17 )-----------( 469      ( 29 Dec 2021 07:06 )             23.0     A/P: s/p left knee I&D x 2 POD#1  ·	Pain control  ·	DVT propx: loenox  ·	Monitor HV drain  ·	Monitor HH - tranfusion prbc defer to primary team  ·	f/u OR cultures  ·	Abx as per ID  ·	Will contact orthotist for passive motion foot device while in bed  ·	Cont care as per primary team

## 2021-12-29 NOTE — PROGRESS NOTE ADULT - SUBJECTIVE AND OBJECTIVE BOX
I refit Bryce's LLE with a new PRAFO, replacing the previous one that was misplaced during surgery.  [facility]

## 2021-12-29 NOTE — PROGRESS NOTE ADULT - ATTENDING COMMENTS
pt seen and examined. pain improved in LLE. s/p repeat washout of L knee /w no pus encountered. Continue IV abx. Monitor drain output. PT for B/L knee ROM

## 2021-12-29 NOTE — PROGRESS NOTE ADULT - SUBJECTIVE AND OBJECTIVE BOX
INFECTIOUS DISEASES AND INTERNAL MEDICINE at [place]  =======================================================  Theo Morrow MD  Diplomates [employer] Board of Internal Medicine and Infectious Diseases  Telephone [phone]  Fax            [phone]  =======================================================    LUIS FERNANDO DAVIS [id number]  Follow up: group A STREP NECROTIZING SOFT TISSUE INFECTION     repeat CT scan of lower ext with collections.   S/P OR WASHOUT 12/2  AND   OR AGAIN  12/7   late  on 12/10 12/15 12/17  12/20 12/23     AFEBRILE THIS MORNING in nad  REPEAT CX SCAN DONE  S/P OR  AGAIN  12/28      Allergies:  No Known Allergies       FAMILY   FAMILY HISTORY:  FH: HTN (hypertension) (Mother)      REVIEW OF SYSTEMS:  CONSTITUTIONAL:  No Fever or chills  HEENT:   No diplopia or blurred vision.  No earache, sore throat or runny nose.  CARDIOVASCULAR:  No pressure, squeezing, strangling, tightness, heaviness or aching about the chest, neck, axilla or epigastrium.  RESPIRATORY:  No cough, shortness of breath, PND or orthopnea.  GASTROINTESTINAL:  No nausea, vomiting or diarrhea.  GENITOURINARY:  No dysuria, frequency or urgency. No Blood in urine  MUSCULOSKELETAL:   AS PER HPI   SKIN:  No change in skin, hair or nails.  NEUROLOGIC:  No paresthesias, fasciculations, seizures or weakness.  PSYCHIATRIC:  No disorder of thought or mood.  ENDOCRINE:  No heat or cold intolerance, polyuria or polydipsia.  HEMATOLOGICAL:  No easy bruising or bleeding.            Physical Exam:     GEN: NAD,    HEENT: normocephalic and atraumatic. EOMI. ASHISH.    NECK: Supple. No carotid bruits.  No lymphadenopathy or thyromegaly.  LUNGS: Clear to auscultation.  HEART: Regular rate and rhythm without murmur.  ABDOMEN: Soft, nontender, and nondistended.  Positive bowel sounds.    : No CVA tenderness  EXTREMITIES: LEFT LEG WRAPPED; left thigh vac in place  right knee with dressing in place       NEUROLOGIC:  C AWAKE ALERT Appropriate affect .  SKIN: No ulceration or induration present.           Vitals:  === Vital Signs Last 24 Hrs  T(C): 37.8 (29 Dec 2021 05:00), Max: 37.8 (29 Dec 2021 05:00)  T(F): 100.1 (29 Dec 2021 05:00), Max: 100.1 (29 Dec 2021 05:00)  HR: 113 (29 Dec 2021 05:00) (81 - 113)  BP: 92/53 (29 Dec 2021 05:00) (92/53 - 113/64)  BP(mean): 68 (28 Dec 2021 18:15) (63 - 68)  RR: 17 (29 Dec 2021 05:00) (12 - 18)  SpO2: 97% (29 Dec 2021 05:00) (96% - 100%)  Current Antibiotics:  bactrim  piperacillin/tazobactam IVPB.. 4.5 Gram(s) IV Intermittent every 8 hours    Other medications:  baclofen 10 milliGRAM(s) Oral three times a day  collagenase Ointment 1 Application(s) Topical two times a day  gabapentin 200 milliGRAM(s) Oral three times a day  glucagon  Injectable 1 milliGRAM(s) IntraMuscular once  heparin   Injectable 5000 Unit(s) SubCutaneous every 8 hours  lisinopril 5 milliGRAM(s) Oral daily  methocarbamol 750 milliGRAM(s) Oral three times a day  pantoprazole    Tablet 40 milliGRAM(s) Oral two times a day  polyethylene glycol 3350 17 Gram(s) Oral daily  senna 2 Tablet(s) Oral at bedtime  sodium chloride 1 Gram(s) Oral every 12 hours  sodium chloride 0.9%. 1000 milliLiter(s) IV Continuous <Continuous>      =======================================================  Labs:                                             7.3    12.17 )-----------( 469      ( 29 Dec 2021 07:06 )             23.0   12-29    131<L>  |  100  |  13.3  ----------------------------<  100<H>  4.9   |  20.0<L>  |  0.65    Ca    8.4<L>      29 Dec 2021 07:06  Phos  5.2     12-29  Mg     1.7     12-29    TPro  6.7  /  Alb  2.4<L>  /  TBili  0.3<L>  /  DBili  x   /  AST  12  /  ALT  <5  /  AlkPhos  67  12-29                e E: (C=Critical, N=Notification, A=Abnormal) C    TESTS:  _ GS    DATE/TIME CALLED: _ 11/18/2021 09:40:08    CALLED TO: Chris Hernandez RN    READ BACK (2 Patient Identifiers)(Y/N): _ Y    READ BACK VALUES (Y/N): _ Y    CALLED BY: Chris Quiñones  Final Report (11-21-21 @ 12:28):    Growth in aerobic and anaerobic bottles: Streptococcus pyogenes (Group A)  Organism: Blood Culture PCR  Streptococcus pyogenes (Group A) (11-21-21 @ 12:28)  Organism: Streptococcus pyogenes (Group A) (11-21-21 @ 12:28)    Sensitivities:      -  Ceftriaxone: S 0.016      -  Penicillin: S 0.016 Predicts results for ampicillin, amoxicillin, amoxicillin/clavulanate, ampicillin/sulbactam, 1st, 2nd and 3rd generation cephalosporins and carbapenems.      Method Type: ETEST  Organism: Streptococcus pyogenes (Group A) (11-21-21 @ 12:28)    Sensitivities:      -  Vancomycin: S      Method Type: KB  Organism: Blood Culture PCR (11-21-21 @ 12:28)    Sensitivities:      -  Streptococcus pyogenes (Group A): Detec      Method Type: PCR      Creatinine, Serum: 0.75 mg/dL (11-30-21 @ 07:18)  Creatinine, Serum: 0.68 mg/dL (11-29-21 @ 09:55)  Creatinine, Serum: 0.64 mg/dL (11-28-21 @ 09:14)  Creatinine, Serum: 0.65 mg/dL (11-27-21 @ 11:51)  Creatinine, Serum: 0.57 mg/dL (11-27-21 @ 05:35)  Creatinine, Serum: 0.61 mg/dL (11-26-21 @ 09:01)        Ferritin, Serum: 388 ng/mL (11-17-21 @ 13:21)    C-Reactive Protein, Serum: 197 mg/L (11-26-21 @ 21:52)  C-Reactive Protein, Serum: 211 mg/L (11-26-21 @ 09:01)  C-Reactive Protein, Serum: 53 mg/L (11-23-21 @ 07:42)  C-Reactive Protein, Serum: >422 mg/L (11-17-21 @ 13:21)    Sedimentation Rate, Erythrocyte: 65 mm/hr (11-26-21 @ 21:52)  Sedimentation Rate, Erythrocyte: 63 mm/hr (11-26-21 @ 09:01)  Sedimentation Rate, Erythrocyte: 55 mm/hr (11-17-21 @ 13:21)    WBC Count: 14.55 K/uL (11-30-21 @ 07:18)  WBC Count: 16.71 K/uL (11-29-21 @ 09:54)  WBC Count: 17.02 K/uL (11-28-21 @ 09:14)  WBC Count: 15.99 K/uL (11-27-21 @ 05:33)  WBC Count: 18.73 K/uL (11-26-21 @ 09:01)      COVID-19 PCR: NotDetec (11-30-21 @ 01:59)  COVID-19 PCR: NotDetec (11-23-21 @ 18:50)  COVID-19 PCR: NotDetec (11-17-21 @ 13:18)    Lactate Dehydrogenase, Serum: 769 U/L (11-20-21 @ 03:56)    Alkaline Phosphatase, Serum: 88 U/L (11-27-21 @ 05:35)  Alanine Aminotransferase (ALT/SGPT): 11 U/L (11-27-21 @ 05:35)  Aspartate Aminotransferase (AST/SGOT): 25 U/L (11-27-21 @ 05:35)  Bilirubin Total, Serum: 0.3 mg/dL (11-27-21 @ 05:35)        < from: CT Lower Extremity w/ IV Cont, Bilateral (11.30.21 @ 09:55) >     EXAM:  CT LWR EXT IC BI                          PROCEDURE DATE:  11/30/2021          INTERPRETATION:  CT LOWER EXTREMITY WITH IV CONTRAST BILATERAL dated 11/30/2021 9:55 AM    INDICATION: Fever. Status post incision and drainage bilaterally. Persistent fevers to 104.    COMPARISON: CT of the right knee dated 11/23/2021. CT of the left lower extremity dated 11/17/2021    VICKI -

## 2021-12-29 NOTE — PROGRESS NOTE ADULT - ASSESSMENT
The patient is a 51y Female who is now pod#1  from most recent  LLE washout combo surgery with ortho. Patient reports feeling well, remains HDN stable and afebrile.     -continue following ID recommendations  - Possibe IR evaluation in AM due to poss deep collection  - Reg diet  - DVT PPX  - PT/OT  - Pain control  - monitor fevers.

## 2021-12-30 ENCOUNTER — TRANSCRIPTION ENCOUNTER (OUTPATIENT)
Age: 51
End: 2021-12-30

## 2021-12-30 LAB
ANION GAP SERPL CALC-SCNC: 9 MMOL/L — SIGNIFICANT CHANGE UP (ref 5–17)
BASOPHILS # BLD AUTO: 0.05 K/UL — SIGNIFICANT CHANGE UP (ref 0–0.2)
BASOPHILS NFR BLD AUTO: 0.4 % — SIGNIFICANT CHANGE UP (ref 0–2)
BLD GP AB SCN SERPL QL: SIGNIFICANT CHANGE UP
BUN SERPL-MCNC: 8.6 MG/DL — SIGNIFICANT CHANGE UP (ref 8–20)
CALCIUM SERPL-MCNC: 8.3 MG/DL — LOW (ref 8.6–10.2)
CHLORIDE SERPL-SCNC: 101 MMOL/L — SIGNIFICANT CHANGE UP (ref 98–107)
CO2 SERPL-SCNC: 22 MMOL/L — SIGNIFICANT CHANGE UP (ref 22–29)
CREAT SERPL-MCNC: 0.53 MG/DL — SIGNIFICANT CHANGE UP (ref 0.5–1.3)
EOSINOPHIL # BLD AUTO: 0.27 K/UL — SIGNIFICANT CHANGE UP (ref 0–0.5)
EOSINOPHIL NFR BLD AUTO: 2.4 % — SIGNIFICANT CHANGE UP (ref 0–6)
GLUCOSE SERPL-MCNC: 92 MG/DL — SIGNIFICANT CHANGE UP (ref 70–99)
HCT VFR BLD CALC: 19.7 % — CRITICAL LOW (ref 34.5–45)
HGB BLD-MCNC: 6.4 G/DL — CRITICAL LOW (ref 11.5–15.5)
IMM GRANULOCYTES NFR BLD AUTO: 0.7 % — SIGNIFICANT CHANGE UP (ref 0–1.5)
LYMPHOCYTES # BLD AUTO: 3.75 K/UL — HIGH (ref 1–3.3)
LYMPHOCYTES # BLD AUTO: 33.6 % — SIGNIFICANT CHANGE UP (ref 13–44)
MAGNESIUM SERPL-MCNC: 1.7 MG/DL — SIGNIFICANT CHANGE UP (ref 1.6–2.6)
MCHC RBC-ENTMCNC: 27.7 PG — SIGNIFICANT CHANGE UP (ref 27–34)
MCHC RBC-ENTMCNC: 32.5 GM/DL — SIGNIFICANT CHANGE UP (ref 32–36)
MCV RBC AUTO: 85.3 FL — SIGNIFICANT CHANGE UP (ref 80–100)
MONOCYTES # BLD AUTO: 1.29 K/UL — HIGH (ref 0–0.9)
MONOCYTES NFR BLD AUTO: 11.5 % — SIGNIFICANT CHANGE UP (ref 2–14)
NEUTROPHILS # BLD AUTO: 5.73 K/UL — SIGNIFICANT CHANGE UP (ref 1.8–7.4)
NEUTROPHILS NFR BLD AUTO: 51.4 % — SIGNIFICANT CHANGE UP (ref 43–77)
PHOSPHATE SERPL-MCNC: 4.1 MG/DL — SIGNIFICANT CHANGE UP (ref 2.4–4.7)
PLATELET # BLD AUTO: 374 K/UL — SIGNIFICANT CHANGE UP (ref 150–400)
POTASSIUM SERPL-MCNC: 4.6 MMOL/L — SIGNIFICANT CHANGE UP (ref 3.5–5.3)
POTASSIUM SERPL-SCNC: 4.6 MMOL/L — SIGNIFICANT CHANGE UP (ref 3.5–5.3)
RBC # BLD: 2.31 M/UL — LOW (ref 3.8–5.2)
RBC # FLD: 17 % — HIGH (ref 10.3–14.5)
SARS-COV-2 RNA SPEC QL NAA+PROBE: SIGNIFICANT CHANGE UP
SODIUM SERPL-SCNC: 132 MMOL/L — LOW (ref 135–145)
WBC # BLD: 11.17 K/UL — HIGH (ref 3.8–10.5)
WBC # FLD AUTO: 11.17 K/UL — HIGH (ref 3.8–10.5)

## 2021-12-30 PROCEDURE — 99232 SBSQ HOSP IP/OBS MODERATE 35: CPT

## 2021-12-30 PROCEDURE — 99024 POSTOP FOLLOW-UP VISIT: CPT

## 2021-12-30 RX ORDER — BACLOFEN 100 %
10 POWDER (GRAM) MISCELLANEOUS THREE TIMES A DAY
Refills: 0 | Status: DISCONTINUED | OUTPATIENT
Start: 2021-12-30 | End: 2022-01-01

## 2021-12-30 RX ORDER — MAGNESIUM SULFATE 500 MG/ML
2 VIAL (ML) INJECTION ONCE
Refills: 0 | Status: COMPLETED | OUTPATIENT
Start: 2021-12-30 | End: 2021-12-30

## 2021-12-30 RX ORDER — BACLOFEN 100 %
10 POWDER (GRAM) MISCELLANEOUS
Refills: 0 | Status: DISCONTINUED | OUTPATIENT
Start: 2021-12-30 | End: 2021-12-30

## 2021-12-30 RX ADMIN — GABAPENTIN 300 MILLIGRAM(S): 400 CAPSULE ORAL at 05:04

## 2021-12-30 RX ADMIN — Medication 25 GRAM(S): at 10:27

## 2021-12-30 RX ADMIN — Medication 10 MILLIGRAM(S): at 14:39

## 2021-12-30 RX ADMIN — MEROPENEM 100 MILLIGRAM(S): 1 INJECTION INTRAVENOUS at 22:43

## 2021-12-30 RX ADMIN — PANTOPRAZOLE SODIUM 40 MILLIGRAM(S): 20 TABLET, DELAYED RELEASE ORAL at 17:33

## 2021-12-30 RX ADMIN — Medication 10 MILLIGRAM(S): at 05:04

## 2021-12-30 RX ADMIN — Medication 10 MILLIGRAM(S): at 22:43

## 2021-12-30 RX ADMIN — Medication 650 MILLIGRAM(S): at 16:17

## 2021-12-30 RX ADMIN — GABAPENTIN 300 MILLIGRAM(S): 400 CAPSULE ORAL at 14:10

## 2021-12-30 RX ADMIN — MEROPENEM 100 MILLIGRAM(S): 1 INJECTION INTRAVENOUS at 05:05

## 2021-12-30 RX ADMIN — PANTOPRAZOLE SODIUM 40 MILLIGRAM(S): 20 TABLET, DELAYED RELEASE ORAL at 05:04

## 2021-12-30 RX ADMIN — HYDROMORPHONE HYDROCHLORIDE 2 MILLIGRAM(S): 2 INJECTION INTRAMUSCULAR; INTRAVENOUS; SUBCUTANEOUS at 11:30

## 2021-12-30 RX ADMIN — Medication 650 MILLIGRAM(S): at 17:49

## 2021-12-30 RX ADMIN — HYDROMORPHONE HYDROCHLORIDE 2 MILLIGRAM(S): 2 INJECTION INTRAMUSCULAR; INTRAVENOUS; SUBCUTANEOUS at 00:24

## 2021-12-30 RX ADMIN — ENOXAPARIN SODIUM 40 MILLIGRAM(S): 100 INJECTION SUBCUTANEOUS at 12:24

## 2021-12-30 RX ADMIN — HYDROMORPHONE HYDROCHLORIDE 2 MILLIGRAM(S): 2 INJECTION INTRAMUSCULAR; INTRAVENOUS; SUBCUTANEOUS at 01:25

## 2021-12-30 RX ADMIN — GABAPENTIN 300 MILLIGRAM(S): 400 CAPSULE ORAL at 22:43

## 2021-12-30 RX ADMIN — HYDROMORPHONE HYDROCHLORIDE 2 MILLIGRAM(S): 2 INJECTION INTRAMUSCULAR; INTRAVENOUS; SUBCUTANEOUS at 10:34

## 2021-12-30 RX ADMIN — MEROPENEM 100 MILLIGRAM(S): 1 INJECTION INTRAVENOUS at 14:10

## 2021-12-30 NOTE — PROGRESS NOTE ADULT - ASSESSMENT
51y Female present to ED with left leg swelling, erythema, pain. Patient endorses she had left knee pain for 1 week presented 3 days ago to ED  where she states was given an steroid shot, and ibuprofen. however pain and edema worsened. Patient states she wasnt feeling herself today reason why she came. Endorses chills initially with pain 3 days ago but none since, denies history of trauma, insect bites, recent interventions, or injections to the area, contact with ticks, history of diabetes.  PT AS ABOVE WITH LEFT LEG SWELLING PT WITH INCREASED WBC PLACED ON ABX FOR PRESUMED INFECTION  PT WITH CT SCAN WITH FASCIAL EDAM PT BROUGHT TO THE OR EMERGENTLY AND  UNDERWENT FASCIOTOMY   PT BROUGHT TO THE OR  BOTH TRAUMA AND ORTHO INVOLVED  PURULENT FLUID FOUND  NECROTIZING SOFT TISSUE INFECTION    BLOOD CX WITH GROUP A STREP AND  OPERATIVE FLUID WITH STREP   PT WAS  ON PCN AND CLINDA for POSSIBLE ANTITOXIN EFFECT IN GROUP A STREP INFECTION     PT  S/P OR WASHOUT 12.2  AND  OR AGAIN 12/7  and late on 12/10 121/15 12/17 and 12/20 12/23     REPEAT   OPERATIVE CX NEG             AS ABOVE  REPEAT  OR  12/20 12/23      NO PURULENCE   OVERALL NON TOXIC  AFEBRILE       CT SCAN DONE WITH POSSIBLE ABSCESS   S/ RETURN TO THE OR 12/28  WILL FOLLOW UP NEW OPERATIVE CX   CONTINUE FOR MERREM/DAPTOMYCIN  WILL FOLLOW UP .     WITH FURTHER RECOMMENDATIONS

## 2021-12-30 NOTE — PROGRESS NOTE ADULT - SUBJECTIVE AND OBJECTIVE BOX
ORTHO-TRAUMA SERVICE      Pt Name: LUIS FERNANDO DAVIS    MRN: [id number]      Patient is a 51y.o female being followed for Left knee washout POD2. Patient seen and examined in bed. Patient doing well. Pain controlled. Denies any acute sensory motor changes. Reports sensation to foot improving. Denies new orthopedic complaints. Denies SOB, CP , N/V.      PAST MEDICAL & SURGICAL HISTORY:  PAST MEDICAL & SURGICAL HISTORY:  No pertinent past medical history    History of ankle surgery        Allergies: No Known Allergies      Medications: acetaminophen     Tablet .. 650 milliGRAM(s) Oral every 6 hours PRN  aluminum hydroxide/magnesium hydroxide/simethicone Suspension 30 milliLiter(s) Oral every 4 hours PRN  baclofen 10 milliGRAM(s) Oral two times a day  DAPTOmycin IVPB 500 milliGRAM(s) IV Intermittent every 24 hours  enoxaparin Injectable 40 milliGRAM(s) SubCutaneous daily  gabapentin 300 milliGRAM(s) Oral three times a day  HYDROmorphone   Tablet 2 milliGRAM(s) Oral every 4 hours PRN  lactated ringers. 1000 milliLiter(s) IV Continuous <Continuous>  lisinopril 5 milliGRAM(s) Oral daily  magnesium sulfate  IVPB 2 Gram(s) IV Intermittent once  melatonin 3 milliGRAM(s) Oral at bedtime PRN  meropenem  IVPB 1000 milliGRAM(s) IV Intermittent every 8 hours  ondansetron Injectable 4 milliGRAM(s) IV Push every 8 hours PRN  oxyCODONE    IR 5 milliGRAM(s) Oral once PRN  pantoprazole    Tablet 40 milliGRAM(s) Oral two times a day        Ambulation: Walking independently [ ] With Cane [ ] With Walker [ ]  Bedbound [ ]                           6.4    11.17 )-----------( 374      ( 30 Dec 2021 07:24 )             19.7     12-30    132<L>  |  101  |  8.6  ----------------------------<  92  4.6   |  22.0  |  0.53    Ca    8.3<L>      30 Dec 2021 07:24  Phos  4.1     12-30  Mg     1.7     12-30    TPro  6.7  /  Alb  2.4<L>  /  TBili  0.3<L>  /  DBili  x   /  AST  12  /  ALT  <5  /  AlkPhos  67  12-29      PHYSICAL EXAM:    Vital Signs Last 24 Hrs  T(C): 37.6 (30 Dec 2021 10:14), Max: 38.5 (29 Dec 2021 14:02)  T(F): 99.7 (30 Dec 2021 10:14), Max: 101.3 (29 Dec 2021 14:02)  HR: 104 (30 Dec 2021 10:14) (100 - 104)  BP: 99/61 (30 Dec 2021 10:14) (99/61 - 112/58)  BP(mean): --  RR: 18 (30 Dec 2021 10:14) (15 - 18)  SpO2: 97% (30 Dec 2021 10:14) (93% - 97%)  Daily     Daily     Appearance: Alert, responsive, in no acute distress.    Neurological: Sensation is grossly intact to light touch.     Vascular: 2+ distal pulses. Cap refill < 2 sec. No signs of venous insufficiency or stasis. No extremity ulcerations. No cyanosis.    Musculoskeletal:         Left Lower Extremity: HV most proximal and lateral intact, good suction and minimal output. FHL intact. + drop foot.        Right Lower Extremity: Incision site healing well      A/P:  Pt is a  51y Female with  s/p left knee I&D x 2 POD#2    PLAN:   Pain control  DVT propx: lovenox  Monitor HV drain  Monitor HH - tranfusion prbc defer to primary team  f/u OR cultures  Abx as per ID  Cont care as per primary team ORTHO-TRAUMA SERVICE      Pt Name: LUIS FERNANDO DAVIS    MRN: [id number]      Patient is a 51y.o female being followed for Left knee washout POD2. Patient seen and examined in bed. Patient doing well. Pain controlled. Denies any acute sensory motor changes. Reports sensation to foot improving. Denies new orthopedic complaints. Denies SOB, CP , N/V.      PAST MEDICAL & SURGICAL HISTORY:  PAST MEDICAL & SURGICAL HISTORY:  No pertinent past medical history    History of ankle surgery        Allergies: No Known Allergies      Medications: acetaminophen     Tablet .. 650 milliGRAM(s) Oral every 6 hours PRN  aluminum hydroxide/magnesium hydroxide/simethicone Suspension 30 milliLiter(s) Oral every 4 hours PRN  baclofen 10 milliGRAM(s) Oral two times a day  DAPTOmycin IVPB 500 milliGRAM(s) IV Intermittent every 24 hours  enoxaparin Injectable 40 milliGRAM(s) SubCutaneous daily  gabapentin 300 milliGRAM(s) Oral three times a day  HYDROmorphone   Tablet 2 milliGRAM(s) Oral every 4 hours PRN  lactated ringers. 1000 milliLiter(s) IV Continuous <Continuous>  lisinopril 5 milliGRAM(s) Oral daily  magnesium sulfate  IVPB 2 Gram(s) IV Intermittent once  melatonin 3 milliGRAM(s) Oral at bedtime PRN  meropenem  IVPB 1000 milliGRAM(s) IV Intermittent every 8 hours  ondansetron Injectable 4 milliGRAM(s) IV Push every 8 hours PRN  oxyCODONE    IR 5 milliGRAM(s) Oral once PRN  pantoprazole    Tablet 40 milliGRAM(s) Oral two times a day        Ambulation: Walking independently [ ] With Cane [ ] With Walker [ ]  Bedbound [ ]                           6.4    11.17 )-----------( 374      ( 30 Dec 2021 07:24 )             19.7     12-30    132<L>  |  101  |  8.6  ----------------------------<  92  4.6   |  22.0  |  0.53    Ca    8.3<L>      30 Dec 2021 07:24  Phos  4.1     12-30  Mg     1.7     12-30    TPro  6.7  /  Alb  2.4<L>  /  TBili  0.3<L>  /  DBili  x   /  AST  12  /  ALT  <5  /  AlkPhos  67  12-29      PHYSICAL EXAM:    Vital Signs Last 24 Hrs  T(C): 37.6 (30 Dec 2021 10:14), Max: 38.5 (29 Dec 2021 14:02)  T(F): 99.7 (30 Dec 2021 10:14), Max: 101.3 (29 Dec 2021 14:02)  HR: 104 (30 Dec 2021 10:14) (100 - 104)  BP: 99/61 (30 Dec 2021 10:14) (99/61 - 112/58)  BP(mean): --  RR: 18 (30 Dec 2021 10:14) (15 - 18)  SpO2: 97% (30 Dec 2021 10:14) (93% - 97%)  Daily     Daily     Appearance: Alert, responsive, in no acute distress.    Neurological: Sensation is grossly intact to light touch.     Vascular: 2+ distal pulses. Cap refill < 2 sec. No signs of venous insufficiency or stasis. No extremity ulcerations. No cyanosis.    Musculoskeletal:         Left Lower Extremity: Anterior knee dressing changed. C/D/I, sutures intact. no erythema, draining or discharge. HV most proximal and lateral intact, good suction and minimal output. FHL intact. + drop foot.        Right Lower Extremity: Incision site healing well      A/P:  Pt is a  51y Female with  s/p left knee I&D x 2 POD#2    PLAN:   Pain control  DVT propx: lovenox  Monitor HV drain  Monitor HH - tranfusion prbc defer to primary team  f/u OR cultures  Abx as per ID  Cont care as per primary team

## 2021-12-30 NOTE — PROGRESS NOTE ADULT - SUBJECTIVE AND OBJECTIVE BOX
Acute Care Surgery/Trauma Surgery Progress Note:    No acute overnight events. Patient course afebrile but spike a episode of fever 101.3F (38.5 C) early PM 12/29. No new feverevents. Noticeable temperature trending in the upper range of normality. Hemodynamically competent. Pain well controlled. Tolerating regular diet. Denies n/v/f/c/cp/sob.     Diet, Regular (12-28-21 @ 17:50)    Scheduled Medications:   baclofen 10 milliGRAM(s) Oral two times a day  DAPTOmycin IVPB 500 milliGRAM(s) IV Intermittent every 24 hours  enoxaparin Injectable 40 milliGRAM(s) SubCutaneous daily  gabapentin 300 milliGRAM(s) Oral three times a day  lactated ringers. 1000 milliLiter(s) (50 mL/Hr) IV Continuous <Continuous>  lisinopril 5 milliGRAM(s) Oral daily  meropenem  IVPB 1000 milliGRAM(s) IV Intermittent every 8 hours  pantoprazole    Tablet 40 milliGRAM(s) Oral two times a day    PRN Medications:  acetaminophen     Tablet .. 650 milliGRAM(s) Oral every 6 hours PRN Temp greater or equal to 38C (100.4F), Mild Pain (1 - 3)  aluminum hydroxide/magnesium hydroxide/simethicone Suspension 30 milliLiter(s) Oral every 4 hours PRN Dyspepsia  HYDROmorphone   Tablet 2 milliGRAM(s) Oral every 4 hours PRN Severe Pain (7 - 10)  melatonin 3 milliGRAM(s) Oral at bedtime PRN Insomnia  ondansetron Injectable 4 milliGRAM(s) IV Push every 8 hours PRN Nausea and/or Vomiting  oxyCODONE    IR 5 milliGRAM(s) Oral once PRN Moderate Pain (4 - 6)      Objective:   T(F): 99.6 (12-29 @ 21:57), Max: 101.3 (12-29 @ 14:02)  HR: 100 (12-29 @ 21:57) (100 - 113)  BP: 106/65 (12-29 @ 21:57) (92/53 - 112/58)  BP(mean): --  ABP: --  ABP(mean): --  RR: 16 (12-29 @ 21:57) (15 - 18)  SpO2: 95% (12-29 @ 21:57) (95% - 97%)      Physical Exam:   GEN: patient resting in bed and looks in no acute distress  RESP: respirations are unlabored, no accessory muscle use, no conversational dyspnea  CVS: RRR  GI: Abdomen soft, non-tender, non-distended, no rebound tenderness / guarding.  Extremities: Wound vac working properly. Pt is able to contract the left calf and do lateral movement of the leg. Left foot drop unable ; unable to dorsiflex the foot.  Left knee swollen but improving.   Neuro: oriented in time, place and person.      I&O's    12-28 @ 07:01  -  12-29 @ 07:00  --------------------------------------------------------  IN:    Oral Fluid: 350 mL  Total IN: 350 mL    OUT:    Accordian (mL): 28 mL    Accordian (mL): 0 mL    Voided (mL): 900 mL  Total OUT: 928 mL    Total NET: -578 mL      12-29 @ 07:01  -  12-30 @ 03:27  --------------------------------------------------------  IN:    Lactated Ringers: 1000 mL  Total IN: 1000 mL    OUT:    Voided (mL): 400 mL  Total OUT: 400 mL    Total NET: 600 mL          LABS:                        7.3    12.17 )-----------( 469      ( 29 Dec 2021 07:06 )             23.0     12-29    131<L>  |  100  |  13.3  ----------------------------<  100<H>  4.9   |  20.0<L>  |  0.65    Ca    8.4<L>      29 Dec 2021 07:06  Phos  5.2     12-29  Mg     1.7     12-29    TPro  6.7  /  Alb  2.4<L>  /  TBili  0.3<L>  /  DBili  x   /  AST  12  /  ALT  <5  /  AlkPhos  67  12-29    PT/INR - ( 29 Dec 2021 08:45 )   PT: 16.5 sec;   INR: 1.45 ratio        MICROBIOLOGY:     Culture - Body Fluid with Gram Stain (collected 12-29 @ 13:50)  Source: .Body Fluid Aspirate for culture left knee fluid  Gram Stain (12-29 @ 19:29):    Rare polymorphonuclear leukocytes per low power field    No organisms seen per oil power field    Culture - Body Fluid with Gram Stain (collected 12-29 @ 13:46)  Source: .Body Fluid Left knee culture swab  Gram Stain (12-29 @ 20:06):    No polymorphonuclear cells seen    No organisms seen    by cytocentrifuge    Culture - Body Fluid with Gram Stain (collected 12-29 @ 13:38)  Source: .Body Fluid Left lower extremity wound culture (swab)  Gram Stain (12-29 @ 19:28):    polymorphonuclear leukocytes seen    No organisms seen    by cytocentrifuge    PATHOLOGY:    A: Patient with ascending soft tissue infection, receiving wound care and treatment as osteomyelitis due to chronic formation of collections in the left leg. Pt is expected to go back to Or Friday 12/31 for new wash and debridement of the left leg. US of the left leg done 12/29--revealed no new collection; this was done due to a Ct of leg leg  reporting  a collection of the posterior part of the leg for wish she underwent another wash-debridement procedure more recently.    Plan:  OR 12/31 for wash and debridement of LLE.  Monitor temperature.  Monitor pain control.  PT and IS.  Continue Abx.  Monitor LLE motor progress.

## 2021-12-30 NOTE — PROGRESS NOTE ADULT - ATTENDING COMMENTS
I have seen and examined the patient during rounds form 730-9a  no new issues  for OR washout tomorrow.  appreciate Ortho, ID input.  DVT chemoprophylaxes  PT

## 2021-12-30 NOTE — PROGRESS NOTE ADULT - SUBJECTIVE AND OBJECTIVE BOX
INFECTIOUS DISEASES AND INTERNAL MEDICINE at [place]  =======================================================  Theo Morrow MD  Diplomates American Board of Internal Medicine and Infectious Diseases  Telephone [phone]  Fax            [phone]  =======================================================    RICK DAVISNE [id number]  Follow up: group A STREP NECROTIZING SOFT TISSUE INFECTION     repeat CT scan of lower ext with collections.   S/P OR WASHOUT 12/2  AND   OR AGAIN  12/7   late  on 12/10 12/15 12/17  12/20 12/23     AFEBRILE THIS MORNING in nad  REPEAT CX SCAN DONE  S/P OR  AGAIN  12/28  CX SO FAR NEG      Allergies:  No Known Allergies       FAMILY   FAMILY HISTORY:  FH: HTN (hypertension) (Mother)      REVIEW OF SYSTEMS:  CONSTITUTIONAL:  No Fever or chills  HEENT:   No diplopia or blurred vision.  No earache, sore throat or runny nose.  CARDIOVASCULAR:  No pressure, squeezing, strangling, tightness, heaviness or aching about the chest, neck, axilla or epigastrium.  RESPIRATORY:  No cough, shortness of breath, PND or orthopnea.  GASTROINTESTINAL:  No nausea, vomiting or diarrhea.  GENITOURINARY:  No dysuria, frequency or urgency. No Blood in urine  MUSCULOSKELETAL:   AS PER HPI   SKIN:  No change in skin, hair or nails.  NEUROLOGIC:  No paresthesias, fasciculations, seizures or weakness.  PSYCHIATRIC:  No disorder of thought or mood.  ENDOCRINE:  No heat or cold intolerance, polyuria or polydipsia.  HEMATOLOGICAL:  No easy bruising or bleeding.            Physical Exam:     GEN: NAD,    HEENT: normocephalic and atraumatic. EOMI. ASHISH.    NECK: Supple. No carotid bruits.  No lymphadenopathy or thyromegaly.  LUNGS: Clear to auscultation.  HEART: Regular rate and rhythm without murmur.  ABDOMEN: Soft, nontender, and nondistended.  Positive bowel sounds.    : No CVA tenderness  EXTREMITIES: LEFT LEG WRAPPED; left thigh vac in place  right knee with dressing in place       NEUROLOGIC:  C AWAKE ALERT Appropriate affect .  SKIN: No ulceration or induration present.           Vitals:  === Vit               1Vital Signs Last 24 Hrs  T(C): 37.6 (30 Dec 2021 10:14), Max: 38.2 (29 Dec 2021 17:55)  T(F): 99.7 (30 Dec 2021 10:14), Max: 100.8 (29 Dec 2021 17:55)  HR: 104 (30 Dec 2021 10:14) (100 - 104)  BP: 99/61 (30 Dec 2021 10:14) (99/61 - 106/65)  BP(mean): --  RR: 18 (30 Dec 2021 10:14) (16 - 18)  SpO2: 97% (30 Dec 2021 10:14) (93% - 97%)      Other medications:  baclofen 10 milliGRAM(s) Oral three times a day  collagenase Ointment 1 Application(s) Topical two times a day  gabapentin 200 milliGRAM(s) Oral three times a day  glucagon  Injectable 1 milliGRAM(s) IntraMuscular once  heparin   Injectable 5000 Unit(s) SubCutaneous every 8 hours  lisinopril 5 milliGRAM(s) Oral daily  methocarbamol 750 milliGRAM(s) Oral three times a day  pantoprazole    Tablet 40 milliGRAM(s) Oral two times a day  polyethylene glycol 3350 17 Gram(s) Oral daily  senna 2 Tablet(s) Oral at bedtime  sodium chloride 1 Gram(s) Oral every 12 hours  sodium chloride 0.9%. 1000 milliLiter(s) IV Continuous <Continuous>      =======================================================  Labs:                                             6.4    11.17 )-----------( 374      ( 30 Dec 2021 07:24 )             19.7   12-30    132<L>  |  101  |  8.6  ----------------------------<  92  4.6   |  22.0  |  0.53    Ca    8.3<L>      30 Dec 2021 07:24  Phos  4.1     12-30  Mg     1.7     12-30    TPro  6.7  /  Alb  2.4<L>  /  TBili  0.3<L>  /  DBili  x   /  AST  12  /  ALT  <5  /  AlkPhos  67  12-29              e E: (C=Critical, N=Notification, A=Abnormal) C    TESTS:  _ GS    DATE/TIME CALLED: _ 11/18/2021 09:40:08    CALLED TO: Chris Hernandez RN    READ BACK (2 Patient Identifiers)(Y/N): _ Y    READ BACK VALUES (Y/N): _ Y    CALLED BY: Chris Quiñones  Final Report (11-21-21 @ 12:28):    Growth in aerobic and anaerobic bottles: Streptococcus pyogenes (Group A)  Organism: Blood Culture PCR  Streptococcus pyogenes (Group A) (11-21-21 @ 12:28)  Organism: Streptococcus pyogenes (Group A) (11-21-21 @ 12:28)    Sensitivities:      -  Ceftriaxone: S 0.016      -  Penicillin: S 0.016 Predicts results for ampicillin, amoxicillin, amoxicillin/clavulanate, ampicillin/sulbactam, 1st, 2nd and 3rd generation cephalosporins and carbapenems.      Method Type: ETEST  Organism: Streptococcus pyogenes (Group A) (11-21-21 @ 12:28)    Sensitivities:      -  Vancomycin: S      Method Type: KB  Organism: Blood Culture PCR (11-21-21 @ 12:28)    Sensitivities:      -  Streptococcus pyogenes (Group A): Detec      Method Type: PCR      Creatinine, Serum: 0.75 mg/dL (11-30-21 @ 07:18)  Creatinine, Serum: 0.68 mg/dL (11-29-21 @ 09:55)  Creatinine, Serum: 0.64 mg/dL (11-28-21 @ 09:14)  Creatinine, Serum: 0.65 mg/dL (11-27-21 @ 11:51)  Creatinine, Serum: 0.57 mg/dL (11-27-21 @ 05:35)  Creatinine, Serum: 0.61 mg/dL (11-26-21 @ 09:01)        Ferritin, Serum: 388 ng/mL (11-17-21 @ 13:21)    C-Reactive Protein, Serum: 197 mg/L (11-26-21 @ 21:52)  C-Reactive Protein, Serum: 211 mg/L (11-26-21 @ 09:01)  C-Reactive Protein, Serum: 53 mg/L (11-23-21 @ 07:42)  C-Reactive Protein, Serum: >422 mg/L (11-17-21 @ 13:21)    Sedimentation Rate, Erythrocyte: 65 mm/hr (11-26-21 @ 21:52)  Sedimentation Rate, Erythrocyte: 63 mm/hr (11-26-21 @ 09:01)  Sedimentation Rate, Erythrocyte: 55 mm/hr (11-17-21 @ 13:21)    WBC Count: 14.55 K/uL (11-30-21 @ 07:18)  WBC Count: 16.71 K/uL (11-29-21 @ 09:54)  WBC Count: 17.02 K/uL (11-28-21 @ 09:14)  WBC Count: 15.99 K/uL (11-27-21 @ 05:33)  WBC Count: 18.73 K/uL (11-26-21 @ 09:01)      COVID-19 PCR: NotDetec (11-30-21 @ 01:59)  COVID-19 PCR: NotDetec (11-23-21 @ 18:50)  COVID-19 PCR: NotDetec (11-17-21 @ 13:18)    Lactate Dehydrogenase, Serum: 769 U/L (11-20-21 @ 03:56)    Alkaline Phosphatase, Serum: 88 U/L (11-27-21 @ 05:35)  Alanine Aminotransferase (ALT/SGPT): 11 U/L (11-27-21 @ 05:35)  Aspartate Aminotransferase (AST/SGOT): 25 U/L (11-27-21 @ 05:35)  Bilirubin Total, Serum: 0.3 mg/dL (11-27-21 @ 05:35)        < from: CT Lower Extremity w/ IV Cont, Bilateral (11.30.21 @ 09:55) >     EXAM:  CT LWR EXT IC BI                          PROCEDURE DATE:  11/30/2021          INTERPRETATION:  CT LOWER EXTREMITY WITH IV CONTRAST BILATERAL dated 11/30/2021 9:55 AM    INDICATION: Fever. Status post incision and drainage bilaterally. Persistent fevers to 104.    COMPARISON: CT of the right knee dated 11/23/2021. CT of the left lower extremity dated 11/17/2021    VICKI -

## 2021-12-31 LAB
ANION GAP SERPL CALC-SCNC: 13 MMOL/L — SIGNIFICANT CHANGE UP (ref 5–17)
APTT BLD: 35.4 SEC — SIGNIFICANT CHANGE UP (ref 27.5–35.5)
BASOPHILS # BLD AUTO: 0.05 K/UL — SIGNIFICANT CHANGE UP (ref 0–0.2)
BASOPHILS NFR BLD AUTO: 0.5 % — SIGNIFICANT CHANGE UP (ref 0–2)
BUN SERPL-MCNC: 10.2 MG/DL — SIGNIFICANT CHANGE UP (ref 8–20)
CALCIUM SERPL-MCNC: 9 MG/DL — SIGNIFICANT CHANGE UP (ref 8.6–10.2)
CHLORIDE SERPL-SCNC: 101 MMOL/L — SIGNIFICANT CHANGE UP (ref 98–107)
CO2 SERPL-SCNC: 22 MMOL/L — SIGNIFICANT CHANGE UP (ref 22–29)
CREAT SERPL-MCNC: 0.55 MG/DL — SIGNIFICANT CHANGE UP (ref 0.5–1.3)
EOSINOPHIL # BLD AUTO: 0.31 K/UL — SIGNIFICANT CHANGE UP (ref 0–0.5)
EOSINOPHIL NFR BLD AUTO: 3.2 % — SIGNIFICANT CHANGE UP (ref 0–6)
GLUCOSE SERPL-MCNC: 104 MG/DL — HIGH (ref 70–99)
HCT VFR BLD CALC: 22.2 % — LOW (ref 34.5–45)
HGB BLD-MCNC: 7.1 G/DL — LOW (ref 11.5–15.5)
IMM GRANULOCYTES NFR BLD AUTO: 0.4 % — SIGNIFICANT CHANGE UP (ref 0–1.5)
INR BLD: 1.29 RATIO — HIGH (ref 0.88–1.16)
LYMPHOCYTES # BLD AUTO: 2.96 K/UL — SIGNIFICANT CHANGE UP (ref 1–3.3)
LYMPHOCYTES # BLD AUTO: 30.1 % — SIGNIFICANT CHANGE UP (ref 13–44)
MAGNESIUM SERPL-MCNC: 1.7 MG/DL — SIGNIFICANT CHANGE UP (ref 1.6–2.6)
MCHC RBC-ENTMCNC: 28.1 PG — SIGNIFICANT CHANGE UP (ref 27–34)
MCHC RBC-ENTMCNC: 32 GM/DL — SIGNIFICANT CHANGE UP (ref 32–36)
MCV RBC AUTO: 87.7 FL — SIGNIFICANT CHANGE UP (ref 80–100)
MONOCYTES # BLD AUTO: 0.94 K/UL — HIGH (ref 0–0.9)
MONOCYTES NFR BLD AUTO: 9.6 % — SIGNIFICANT CHANGE UP (ref 2–14)
NEUTROPHILS # BLD AUTO: 5.52 K/UL — SIGNIFICANT CHANGE UP (ref 1.8–7.4)
NEUTROPHILS NFR BLD AUTO: 56.2 % — SIGNIFICANT CHANGE UP (ref 43–77)
PHOSPHATE SERPL-MCNC: 3.9 MG/DL — SIGNIFICANT CHANGE UP (ref 2.4–4.7)
PLATELET # BLD AUTO: 356 K/UL — SIGNIFICANT CHANGE UP (ref 150–400)
POTASSIUM SERPL-MCNC: 4.4 MMOL/L — SIGNIFICANT CHANGE UP (ref 3.5–5.3)
POTASSIUM SERPL-SCNC: 4.4 MMOL/L — SIGNIFICANT CHANGE UP (ref 3.5–5.3)
PROTHROM AB SERPL-ACNC: 14.8 SEC — HIGH (ref 10.6–13.6)
RBC # BLD: 2.53 M/UL — LOW (ref 3.8–5.2)
RBC # FLD: 16.3 % — HIGH (ref 10.3–14.5)
SODIUM SERPL-SCNC: 136 MMOL/L — SIGNIFICANT CHANGE UP (ref 135–145)
WBC # BLD: 9.82 K/UL — SIGNIFICANT CHANGE UP (ref 3.8–10.5)
WBC # FLD AUTO: 9.82 K/UL — SIGNIFICANT CHANGE UP (ref 3.8–10.5)

## 2021-12-31 PROCEDURE — 99232 SBSQ HOSP IP/OBS MODERATE 35: CPT

## 2021-12-31 PROCEDURE — 97597 DBRDMT OPN WND 1ST 20 CM/<: CPT | Mod: 58

## 2021-12-31 RX ORDER — ONDANSETRON 8 MG/1
4 TABLET, FILM COATED ORAL ONCE
Refills: 0 | Status: DISCONTINUED | OUTPATIENT
Start: 2021-12-31 | End: 2021-12-31

## 2021-12-31 RX ORDER — FENTANYL CITRATE 50 UG/ML
25 INJECTION INTRAVENOUS
Refills: 0 | Status: DISCONTINUED | OUTPATIENT
Start: 2021-12-31 | End: 2021-12-31

## 2021-12-31 RX ORDER — HYDROMORPHONE HYDROCHLORIDE 2 MG/ML
0.5 INJECTION INTRAMUSCULAR; INTRAVENOUS; SUBCUTANEOUS
Refills: 0 | Status: DISCONTINUED | OUTPATIENT
Start: 2021-12-31 | End: 2021-12-31

## 2021-12-31 RX ORDER — SODIUM CHLORIDE 9 MG/ML
1000 INJECTION, SOLUTION INTRAVENOUS
Refills: 0 | Status: DISCONTINUED | OUTPATIENT
Start: 2021-12-31 | End: 2021-12-31

## 2021-12-31 RX ORDER — MAGNESIUM SULFATE 500 MG/ML
2 VIAL (ML) INJECTION ONCE
Refills: 0 | Status: COMPLETED | OUTPATIENT
Start: 2021-12-31 | End: 2021-12-31

## 2021-12-31 RX ADMIN — Medication 10 MILLIGRAM(S): at 05:25

## 2021-12-31 RX ADMIN — HYDROMORPHONE HYDROCHLORIDE 0.5 MILLIGRAM(S): 2 INJECTION INTRAMUSCULAR; INTRAVENOUS; SUBCUTANEOUS at 10:40

## 2021-12-31 RX ADMIN — ENOXAPARIN SODIUM 40 MILLIGRAM(S): 100 INJECTION SUBCUTANEOUS at 12:58

## 2021-12-31 RX ADMIN — HYDROMORPHONE HYDROCHLORIDE 0.5 MILLIGRAM(S): 2 INJECTION INTRAMUSCULAR; INTRAVENOUS; SUBCUTANEOUS at 10:50

## 2021-12-31 RX ADMIN — PANTOPRAZOLE SODIUM 40 MILLIGRAM(S): 20 TABLET, DELAYED RELEASE ORAL at 17:14

## 2021-12-31 RX ADMIN — Medication 10 MILLIGRAM(S): at 14:26

## 2021-12-31 RX ADMIN — MEROPENEM 100 MILLIGRAM(S): 1 INJECTION INTRAVENOUS at 05:25

## 2021-12-31 RX ADMIN — DAPTOMYCIN 120 MILLIGRAM(S): 500 INJECTION, POWDER, LYOPHILIZED, FOR SOLUTION INTRAVENOUS at 00:19

## 2021-12-31 RX ADMIN — MEROPENEM 100 MILLIGRAM(S): 1 INJECTION INTRAVENOUS at 14:27

## 2021-12-31 RX ADMIN — GABAPENTIN 300 MILLIGRAM(S): 400 CAPSULE ORAL at 05:25

## 2021-12-31 RX ADMIN — GABAPENTIN 300 MILLIGRAM(S): 400 CAPSULE ORAL at 21:08

## 2021-12-31 RX ADMIN — HYDROMORPHONE HYDROCHLORIDE 0.5 MILLIGRAM(S): 2 INJECTION INTRAMUSCULAR; INTRAVENOUS; SUBCUTANEOUS at 12:21

## 2021-12-31 RX ADMIN — DAPTOMYCIN 120 MILLIGRAM(S): 500 INJECTION, POWDER, LYOPHILIZED, FOR SOLUTION INTRAVENOUS at 18:29

## 2021-12-31 RX ADMIN — MEROPENEM 100 MILLIGRAM(S): 1 INJECTION INTRAVENOUS at 21:08

## 2021-12-31 RX ADMIN — Medication 25 GRAM(S): at 12:57

## 2021-12-31 RX ADMIN — Medication 10 MILLIGRAM(S): at 21:08

## 2021-12-31 RX ADMIN — GABAPENTIN 300 MILLIGRAM(S): 400 CAPSULE ORAL at 14:26

## 2021-12-31 RX ADMIN — HYDROMORPHONE HYDROCHLORIDE 0.5 MILLIGRAM(S): 2 INJECTION INTRAMUSCULAR; INTRAVENOUS; SUBCUTANEOUS at 10:10

## 2021-12-31 RX ADMIN — HYDROMORPHONE HYDROCHLORIDE 0.5 MILLIGRAM(S): 2 INJECTION INTRAMUSCULAR; INTRAVENOUS; SUBCUTANEOUS at 11:51

## 2021-12-31 RX ADMIN — PANTOPRAZOLE SODIUM 40 MILLIGRAM(S): 20 TABLET, DELAYED RELEASE ORAL at 05:25

## 2021-12-31 RX ADMIN — HYDROMORPHONE HYDROCHLORIDE 0.5 MILLIGRAM(S): 2 INJECTION INTRAMUSCULAR; INTRAVENOUS; SUBCUTANEOUS at 10:20

## 2021-12-31 NOTE — BRIEF OPERATIVE NOTE - NSEVIDNCEINFORABSCESSFT_GEN_ALL_CORE
Between muscle planes from old wounds and new anterior lower leg incision.
pus expressed from knee
infected appearing synovial fluid
purulent fluid from left knee/left ankle
Abscess consolidated and resolved. Fibrotic tissue capsule at the lower anterior area of the lower leg. No pus visualized. Capsule was excised.
Upon compression of the upper, adjacent are of the lateral fasciotomy pus drained from the lateral fasciotomy of the right leg. Culture was take.
leg abscesses present on ct and in posterior leg, open wounds of lle
nec fasc
Described above.
Left lower extremity osteomyelitis

## 2021-12-31 NOTE — PROGRESS NOTE ADULT - SUBJECTIVE AND OBJECTIVE BOX
INFECTIOUS DISEASES AND INTERNAL MEDICINE at [place]  =======================================================  Theo Morrow MD  Diplomates American Board of Internal Medicine and Infectious Diseases  Telephone [phone]  Fax            [phone]  =======================================================    RICK DAVISNE [id number]  Follow up: group A STREP NECROTIZING SOFT TISSUE INFECTION     repeat CT scan of lower ext with collections.   S/P OR WASHOUT 12/2  AND   OR AGAIN  12/7   late  on 12/10 12/15 12/17  12/20 12/23     AFEBRILE THIS MORNING in nad  REPEAT CX SCAN DONE  S/P OR  AGAIN  12/28  CX SO FAR NEG  SEEN IN RECOVERY ROOM TODAY      Allergies:  No Known Allergies       FAMILY   FAMILY HISTORY:  FH: HTN (hypertension) (Mother)      REVIEW OF SYSTEMS:  CONSTITUTIONAL:  No Fever or chills  HEENT:   No diplopia or blurred vision.  No earache, sore throat or runny nose.  CARDIOVASCULAR:  No pressure, squeezing, strangling, tightness, heaviness or aching about the chest, neck, axilla or epigastrium.  RESPIRATORY:  No cough, shortness of breath, PND or orthopnea.  GASTROINTESTINAL:  No nausea, vomiting or diarrhea.  GENITOURINARY:  No dysuria, frequency or urgency. No Blood in urine  MUSCULOSKELETAL:   AS PER HPI   SKIN:  No change in skin, hair or nails.  NEUROLOGIC:  No paresthesias, fasciculations, seizures or weakness.  PSYCHIATRIC:  No disorder of thought or mood.  ENDOCRINE:  No heat or cold intolerance, polyuria or polydipsia.  HEMATOLOGICAL:  No easy bruising or bleeding.            Physical Exam:     GEN: NAD,    HEENT: normocephalic and atraumatic. EOMI. ASHISH.    NECK: Supple. No carotid bruits.  No lymphadenopathy or thyromegaly.  LUNGS: Clear to auscultation.  HEART: Regular rate and rhythm without murmur.  ABDOMEN: Soft, nontender, and nondistended.  Positive bowel sounds.    : No CVA tenderness  EXTREMITIES: LEFT LEG WRAPPED; left thigh vac in place  right knee with dressing in place       NEUROLOGIC:  C AWAKE ALERT Appropriate affect .  SKIN: No ulceration or induration present.           Vitals:  === Vit              Vital Signs Last 24 Hrs  T(C): 37.1 (31 Dec 2021 05:00), Max: 37.6 (30 Dec 2021 16:20)  T(F): 98.8 (31 Dec 2021 05:00), Max: 99.6 (30 Dec 2021 16:20)  HR: 90 (31 Dec 2021 05:00) (90 - 103)  BP: 114/73 (31 Dec 2021 05:00) (98/63 - 114/73)  BP(mean): --  RR: 18 (31 Dec 2021 05:00) (18 - 18)  SpO2: 96% (31 Dec 2021 05:00) (95% - 98%)    Other medications:  baclofen 10 milliGRAM(s) Oral three times a day  collagenase Ointment 1 Application(s) Topical two times a day  gabapentin 200 milliGRAM(s) Oral three times a day  glucagon  Injectable 1 milliGRAM(s) IntraMuscular once  heparin   Injectable 5000 Unit(s) SubCutaneous every 8 hours  lisinopril 5 milliGRAM(s) Oral daily  methocarbamol 750 milliGRAM(s) Oral three times a day  pantoprazole    Tablet 40 milliGRAM(s) Oral two times a day  polyethylene glycol 3350 17 Gram(s) Oral daily  senna 2 Tablet(s) Oral at bedtime  sodium chloride 1 Gram(s) Oral every 12 hours  sodium chloride 0.9%. 1000 milliLiter(s) IV Continuous <Continuous>      =======================================================  Labs:                                                                 7.1    9.82  )-----------( 356      ( 31 Dec 2021 06:02 )             22.2   12-31    136  |  101  |  10.2  ----------------------------<  104<H>  4.4   |  22.0  |  0.55    Ca    9.0      31 Dec 2021 06:02  Phos  3.9     12-31  Mg     1.7     12-31                  e E: (C=Critical, N=Notification, A=Abnormal) C    TESTS:  _ GS    DATE/TIME CALLED: _ 11/18/2021 09:40:08    CALLED TO: Chris Hernandez RN    READ BACK (2 Patient Identifiers)(Y/N): _ Y    READ BACK VALUES (Y/N): _ Y    CALLED BY: Chris Quiñones  Final Report (11-21-21 @ 12:28):    Growth in aerobic and anaerobic bottles: Streptococcus pyogenes (Group A)  Organism: Blood Culture PCR  Streptococcus pyogenes (Group A) (11-21-21 @ 12:28)  Organism: Streptococcus pyogenes (Group A) (11-21-21 @ 12:28)    Sensitivities:      -  Ceftriaxone: S 0.016      -  Penicillin: S 0.016 Predicts results for ampicillin, amoxicillin, amoxicillin/clavulanate, ampicillin/sulbactam, 1st, 2nd and 3rd generation cephalosporins and carbapenems.      Method Type: ETEST  Organism: Streptococcus pyogenes (Group A) (11-21-21 @ 12:28)    Sensitivities:      -  Vancomycin: S      Method Type: KB  Organism: Blood Culture PCR (11-21-21 @ 12:28)    Sensitivities:      -  Streptococcus pyogenes (Group A): Detec      Method Type: PCR      Creatinine, Serum: 0.75 mg/dL (11-30-21 @ 07:18)  Creatinine, Serum: 0.68 mg/dL (11-29-21 @ 09:55)  Creatinine, Serum: 0.64 mg/dL (11-28-21 @ 09:14)  Creatinine, Serum: 0.65 mg/dL (11-27-21 @ 11:51)  Creatinine, Serum: 0.57 mg/dL (11-27-21 @ 05:35)  Creatinine, Serum: 0.61 mg/dL (11-26-21 @ 09:01)        Ferritin, Serum: 388 ng/mL (11-17-21 @ 13:21)    C-Reactive Protein, Serum: 197 mg/L (11-26-21 @ 21:52)  C-Reactive Protein, Serum: 211 mg/L (11-26-21 @ 09:01)  C-Reactive Protein, Serum: 53 mg/L (11-23-21 @ 07:42)  C-Reactive Protein, Serum: >422 mg/L (11-17-21 @ 13:21)    Sedimentation Rate, Erythrocyte: 65 mm/hr (11-26-21 @ 21:52)  Sedimentation Rate, Erythrocyte: 63 mm/hr (11-26-21 @ 09:01)  Sedimentation Rate, Erythrocyte: 55 mm/hr (11-17-21 @ 13:21)    WBC Count: 14.55 K/uL (11-30-21 @ 07:18)  WBC Count: 16.71 K/uL (11-29-21 @ 09:54)  WBC Count: 17.02 K/uL (11-28-21 @ 09:14)  WBC Count: 15.99 K/uL (11-27-21 @ 05:33)  WBC Count: 18.73 K/uL (11-26-21 @ 09:01)      COVID-19 PCR: NotDetec (11-30-21 @ 01:59)  COVID-19 PCR: NotDetec (11-23-21 @ 18:50)  COVID-19 PCR: NotDetec (11-17-21 @ 13:18)    Lactate Dehydrogenase, Serum: 769 U/L (11-20-21 @ 03:56)    Alkaline Phosphatase, Serum: 88 U/L (11-27-21 @ 05:35)  Alanine Aminotransferase (ALT/SGPT): 11 U/L (11-27-21 @ 05:35)  Aspartate Aminotransferase (AST/SGOT): 25 U/L (11-27-21 @ 05:35)  Bilirubin Total, Serum: 0.3 mg/dL (11-27-21 @ 05:35)        < from: CT Lower Extremity w/ IV Cont, Bilateral (11.30.21 @ 09:55) >     EXAM:  CT LWR EXT IC BI                          PROCEDURE DATE:  11/30/2021          INTERPRETATION:  CT LOWER EXTREMITY WITH IV CONTRAST BILATERAL dated 11/30/2021 9:55 AM    INDICATION: Fever. Status post incision and drainage bilaterally. Persistent fevers to 104.    COMPARISON: CT of the right knee dated 11/23/2021. CT of the left lower extremity dated 11/17/2021    VICKI -

## 2021-12-31 NOTE — BRIEF OPERATIVE NOTE - ANTIBIOTIC PROTOCOL
Followed protocol
abx held for cx/Exception to protocol
Followed protocol
Receiving antibiotic regimen inpatient./Exception to protocol
Followed protocol
Followed protocol

## 2021-12-31 NOTE — BRIEF OPERATIVE NOTE - NSEVIDNCEORABSCESS_GEN_ALL_CORE
abscess/purulence
purulence/pus
abscess
feculent peritonitis/sepsis
pus
abscess/pus
purulence/pus
pus
other
abscess/purulence

## 2021-12-31 NOTE — PROGRESS NOTE ADULT - ASSESSMENT
51y Female present to ED with left leg swelling, erythema, pain. Patient endorses she had left knee pain for 1 week presented 3 days ago to ED  where she states was given an steroid shot, and ibuprofen. however pain and edema worsened. Patient states she wasnt feeling herself today reason why she came. Endorses chills initially with pain 3 days ago but none since, denies history of trauma, insect bites, recent interventions, or injections to the area, contact with ticks, history of diabetes.  PT AS ABOVE WITH LEFT LEG SWELLING PT WITH INCREASED WBC PLACED ON ABX FOR PRESUMED INFECTION  PT WITH CT SCAN WITH FASCIAL EDAM PT BROUGHT TO THE OR EMERGENTLY AND  UNDERWENT FASCIOTOMY   PT BROUGHT TO THE OR  BOTH TRAUMA AND ORTHO INVOLVED  PURULENT FLUID FOUND  NECROTIZING SOFT TISSUE INFECTION    BLOOD CX WITH GROUP A STREP AND  OPERATIVE FLUID WITH STREP   PT WAS  ON PCN AND CLINDA for POSSIBLE ANTITOXIN EFFECT IN GROUP A STREP INFECTION     PT  S/P OR WASHOUT 12.2  AND  OR AGAIN 12/7  and late on 12/10 121/15 12/17 and 12/20 12/23     REPEAT   OPERATIVE CX NEG             AS ABOVE  REPEAT  OR  12/20 12/23      NO PURULENCE   OVERALL NON TOXIC  AFEBRILE       CT SCAN DONE WITH POSSIBLE ABSCESS   S/ RETURN TO THE OR 12/28 AND TODAY 12/31  WILL FOLLOW UP NEW OPERATIVE CX   CONTINUE FOR MERREM/DAPTOMYCIN  WILL FOLLOW UP .     WITH FURTHER RECOMMENDATIONS

## 2021-12-31 NOTE — PROGRESS NOTE ADULT - SUBJECTIVE AND OBJECTIVE BOX
ACUTE CARE SURGERY PROGRESS NOTE    Subjective: Patient examined at bedside this AM. Reports she is feeling well. Pain is controlled. Tolerating PO without N/V. Afebrile overnight. No acute events.     Objective:  Vital Signs  T(C): 37.1 (12-30 @ 20:01), Max: 37.8 (12-30 @ 04:52)  HR: 92 (12-30 @ 20:01) (92 - 104)  BP: 105/64 (12-30 @ 20:01) (98/63 - 105/64)  RR: 18 (12-30 @ 20:01) (18 - 18)  SpO2: 98% (12-30 @ 20:01) (93% - 98%)  12-29-21 @ 07:01  -  12-30-21 @ 07:00  --------------------------------------------------------  IN:  Total IN: 0 mL    OUT:    Accordian (mL): 0 mL    Accordian (mL): 30 mL    Voided (mL): 700 mL  Total OUT: 730 mL    Total NET: -730 mL      12-30-21 @ 07:01  -  12-31-21 @ 03:11  --------------------------------------------------------  IN:  Total IN: 0 mL    OUT:    Voided (mL): 1450 mL  Total OUT: 1450 mL    Total NET: -1450 mL          Physical Exam:  General: alert and oriented, NAD  Resp: airway patent, respirations unlabored  CVS: regular rate and rhythm  Abdomen: soft, nontender, nondistended  Extremities: Wound vac working properly. Limited mobility in LLE, Left knee swollen but improving.   Skin: warm, dry, appropriate color    Labs:                        6.4    11.17 )-----------( 374      ( 30 Dec 2021 07:24 )             19.7   12-30    132<L>  |  101  |  8.6  ----------------------------<  92  4.6   |  22.0  |  0.53    Ca    8.3<L>      30 Dec 2021 07:24  Phos  4.1     12-30  Mg     1.7     12-30    TPro  6.7  /  Alb  2.4<L>  /  TBili  0.3<L>  /  DBili  x   /  AST  12  /  ALT  <5  /  AlkPhos  67  12-29    CAPILLARY BLOOD GLUCOSE          Medications:   MEDICATIONS  (STANDING):  baclofen 10 milliGRAM(s) Oral three times a day  DAPTOmycin IVPB 500 milliGRAM(s) IV Intermittent every 24 hours  enoxaparin Injectable 40 milliGRAM(s) SubCutaneous daily  gabapentin 300 milliGRAM(s) Oral three times a day  lactated ringers. 1000 milliLiter(s) (50 mL/Hr) IV Continuous <Continuous>  lisinopril 5 milliGRAM(s) Oral daily  meropenem  IVPB 1000 milliGRAM(s) IV Intermittent every 8 hours  pantoprazole    Tablet 40 milliGRAM(s) Oral two times a day    MEDICATIONS  (PRN):  acetaminophen     Tablet .. 650 milliGRAM(s) Oral every 6 hours PRN Temp greater or equal to 38C (100.4F), Mild Pain (1 - 3)  aluminum hydroxide/magnesium hydroxide/simethicone Suspension 30 milliLiter(s) Oral every 4 hours PRN Dyspepsia  HYDROmorphone   Tablet 2 milliGRAM(s) Oral every 4 hours PRN Severe Pain (7 - 10)  melatonin 3 milliGRAM(s) Oral at bedtime PRN Insomnia  ondansetron Injectable 4 milliGRAM(s) IV Push every 8 hours PRN Nausea and/or Vomiting  oxyCODONE    IR 5 milliGRAM(s) Oral once PRN Moderate Pain (4 - 6)

## 2021-12-31 NOTE — PROGRESS NOTE ADULT - ASSESSMENT
51F admitted for ascending soft tissue infection, has undergone multiple surgical debridements. Now with wound vac on LLE.    Plan:  - OR today for wash and debridement of LLE.  - Monitor fever curve  - Pain control PRN  - PT for strengthening/OOB, IS  - ABX

## 2021-12-31 NOTE — PROGRESS NOTE ADULT - ATTENDING COMMENTS
Agree with above assessment.  The patient was seen and examined by me.  The patient has been made aware of the risks, benefits, and the alternatives including the option of doing nothing to a left lower extremity washout, possible wound debridement, and placement of a wound VAC.  The potential complications including but not limited to infection, bleeding, post operative pain, and malfunction of the wound VAC were discussed.  The patient admitted understanding and agrees to proceed as planned.

## 2022-01-01 LAB
ANION GAP SERPL CALC-SCNC: 12 MMOL/L — SIGNIFICANT CHANGE UP (ref 5–17)
APTT BLD: 36.3 SEC — HIGH (ref 27.5–35.5)
BASOPHILS # BLD AUTO: 0.06 K/UL — SIGNIFICANT CHANGE UP (ref 0–0.2)
BASOPHILS NFR BLD AUTO: 0.7 % — SIGNIFICANT CHANGE UP (ref 0–2)
BUN SERPL-MCNC: 9.5 MG/DL — SIGNIFICANT CHANGE UP (ref 8–20)
CALCIUM SERPL-MCNC: 8.5 MG/DL — LOW (ref 8.6–10.2)
CHLORIDE SERPL-SCNC: 99 MMOL/L — SIGNIFICANT CHANGE UP (ref 98–107)
CO2 SERPL-SCNC: 23 MMOL/L — SIGNIFICANT CHANGE UP (ref 22–29)
CREAT SERPL-MCNC: 0.63 MG/DL — SIGNIFICANT CHANGE UP (ref 0.5–1.3)
CULTURE RESULTS: SIGNIFICANT CHANGE UP
CULTURE RESULTS: SIGNIFICANT CHANGE UP
EOSINOPHIL # BLD AUTO: 0.19 K/UL — SIGNIFICANT CHANGE UP (ref 0–0.5)
EOSINOPHIL NFR BLD AUTO: 2.1 % — SIGNIFICANT CHANGE UP (ref 0–6)
GLUCOSE SERPL-MCNC: 101 MG/DL — HIGH (ref 70–99)
HCT VFR BLD CALC: 21.1 % — LOW (ref 34.5–45)
HGB BLD-MCNC: 6.7 G/DL — CRITICAL LOW (ref 11.5–15.5)
IMM GRANULOCYTES NFR BLD AUTO: 0.4 % — SIGNIFICANT CHANGE UP (ref 0–1.5)
INR BLD: 1.34 RATIO — HIGH (ref 0.88–1.16)
LYMPHOCYTES # BLD AUTO: 3.13 K/UL — SIGNIFICANT CHANGE UP (ref 1–3.3)
LYMPHOCYTES # BLD AUTO: 34.4 % — SIGNIFICANT CHANGE UP (ref 13–44)
MAGNESIUM SERPL-MCNC: 1.7 MG/DL — LOW (ref 1.8–2.6)
MCHC RBC-ENTMCNC: 27.9 PG — SIGNIFICANT CHANGE UP (ref 27–34)
MCHC RBC-ENTMCNC: 31.8 GM/DL — LOW (ref 32–36)
MCV RBC AUTO: 87.9 FL — SIGNIFICANT CHANGE UP (ref 80–100)
MONOCYTES # BLD AUTO: 1.05 K/UL — HIGH (ref 0–0.9)
MONOCYTES NFR BLD AUTO: 11.6 % — SIGNIFICANT CHANGE UP (ref 2–14)
NEUTROPHILS # BLD AUTO: 4.62 K/UL — SIGNIFICANT CHANGE UP (ref 1.8–7.4)
NEUTROPHILS NFR BLD AUTO: 50.8 % — SIGNIFICANT CHANGE UP (ref 43–77)
PHOSPHATE SERPL-MCNC: 4.2 MG/DL — SIGNIFICANT CHANGE UP (ref 2.4–4.7)
PLATELET # BLD AUTO: 384 K/UL — SIGNIFICANT CHANGE UP (ref 150–400)
POTASSIUM SERPL-MCNC: 4.3 MMOL/L — SIGNIFICANT CHANGE UP (ref 3.5–5.3)
POTASSIUM SERPL-SCNC: 4.3 MMOL/L — SIGNIFICANT CHANGE UP (ref 3.5–5.3)
PROTHROM AB SERPL-ACNC: 15.3 SEC — HIGH (ref 10.6–13.6)
RBC # BLD: 2.4 M/UL — LOW (ref 3.8–5.2)
RBC # FLD: 16 % — HIGH (ref 10.3–14.5)
SODIUM SERPL-SCNC: 134 MMOL/L — LOW (ref 135–145)
SPECIMEN SOURCE: SIGNIFICANT CHANGE UP
SPECIMEN SOURCE: SIGNIFICANT CHANGE UP
WBC # BLD: 9.09 K/UL — SIGNIFICANT CHANGE UP (ref 3.8–10.5)
WBC # FLD AUTO: 9.09 K/UL — SIGNIFICANT CHANGE UP (ref 3.8–10.5)

## 2022-01-01 RX ORDER — CYCLOBENZAPRINE HYDROCHLORIDE 10 MG/1
10 TABLET, FILM COATED ORAL THREE TIMES A DAY
Refills: 0 | Status: DISCONTINUED | OUTPATIENT
Start: 2022-01-01 | End: 2022-01-01

## 2022-01-01 RX ORDER — HYDROMORPHONE HYDROCHLORIDE 2 MG/ML
2 INJECTION INTRAMUSCULAR; INTRAVENOUS; SUBCUTANEOUS EVERY 4 HOURS
Refills: 0 | Status: DISCONTINUED | OUTPATIENT
Start: 2022-01-01 | End: 2022-01-01

## 2022-01-01 RX ORDER — ACETAMINOPHEN 500 MG
975 TABLET ORAL EVERY 8 HOURS
Refills: 0 | Status: DISCONTINUED | OUTPATIENT
Start: 2022-01-01 | End: 2022-01-14

## 2022-01-01 RX ORDER — BACLOFEN 100 %
10 POWDER (GRAM) MISCELLANEOUS THREE TIMES A DAY
Refills: 0 | Status: DISCONTINUED | OUTPATIENT
Start: 2022-01-01 | End: 2022-01-14

## 2022-01-01 RX ORDER — CYCLOBENZAPRINE HYDROCHLORIDE 10 MG/1
10 TABLET, FILM COATED ORAL ONCE
Refills: 0 | Status: COMPLETED | OUTPATIENT
Start: 2022-01-01 | End: 2022-01-01

## 2022-01-01 RX ORDER — HYDROMORPHONE HYDROCHLORIDE 2 MG/ML
4 INJECTION INTRAMUSCULAR; INTRAVENOUS; SUBCUTANEOUS EVERY 4 HOURS
Refills: 0 | Status: DISCONTINUED | OUTPATIENT
Start: 2022-01-01 | End: 2022-01-08

## 2022-01-01 RX ORDER — MAGNESIUM SULFATE 500 MG/ML
2 VIAL (ML) INJECTION
Refills: 0 | Status: COMPLETED | OUTPATIENT
Start: 2022-01-01 | End: 2022-01-01

## 2022-01-01 RX ORDER — HYDROMORPHONE HYDROCHLORIDE 2 MG/ML
0.5 INJECTION INTRAMUSCULAR; INTRAVENOUS; SUBCUTANEOUS EVERY 4 HOURS
Refills: 0 | Status: DISCONTINUED | OUTPATIENT
Start: 2022-01-01 | End: 2022-01-08

## 2022-01-01 RX ADMIN — GABAPENTIN 300 MILLIGRAM(S): 400 CAPSULE ORAL at 13:31

## 2022-01-01 RX ADMIN — HYDROMORPHONE HYDROCHLORIDE 2 MILLIGRAM(S): 2 INJECTION INTRAMUSCULAR; INTRAVENOUS; SUBCUTANEOUS at 02:59

## 2022-01-01 RX ADMIN — Medication 10 MILLIGRAM(S): at 13:31

## 2022-01-01 RX ADMIN — ENOXAPARIN SODIUM 40 MILLIGRAM(S): 100 INJECTION SUBCUTANEOUS at 13:31

## 2022-01-01 RX ADMIN — MEROPENEM 100 MILLIGRAM(S): 1 INJECTION INTRAVENOUS at 23:20

## 2022-01-01 RX ADMIN — HYDROMORPHONE HYDROCHLORIDE 2 MILLIGRAM(S): 2 INJECTION INTRAMUSCULAR; INTRAVENOUS; SUBCUTANEOUS at 03:30

## 2022-01-01 RX ADMIN — CYCLOBENZAPRINE HYDROCHLORIDE 10 MILLIGRAM(S): 10 TABLET, FILM COATED ORAL at 15:41

## 2022-01-01 RX ADMIN — PANTOPRAZOLE SODIUM 40 MILLIGRAM(S): 20 TABLET, DELAYED RELEASE ORAL at 18:24

## 2022-01-01 RX ADMIN — Medication 25 GRAM(S): at 15:41

## 2022-01-01 RX ADMIN — GABAPENTIN 300 MILLIGRAM(S): 400 CAPSULE ORAL at 22:06

## 2022-01-01 RX ADMIN — HYDROMORPHONE HYDROCHLORIDE 0.5 MILLIGRAM(S): 2 INJECTION INTRAMUSCULAR; INTRAVENOUS; SUBCUTANEOUS at 22:07

## 2022-01-01 RX ADMIN — Medication 10 MILLIGRAM(S): at 22:06

## 2022-01-01 RX ADMIN — HYDROMORPHONE HYDROCHLORIDE 0.5 MILLIGRAM(S): 2 INJECTION INTRAMUSCULAR; INTRAVENOUS; SUBCUTANEOUS at 22:44

## 2022-01-01 RX ADMIN — Medication 10 MILLIGRAM(S): at 06:16

## 2022-01-01 RX ADMIN — Medication 25 GRAM(S): at 18:24

## 2022-01-01 RX ADMIN — MEROPENEM 100 MILLIGRAM(S): 1 INJECTION INTRAVENOUS at 06:15

## 2022-01-01 RX ADMIN — MEROPENEM 100 MILLIGRAM(S): 1 INJECTION INTRAVENOUS at 13:31

## 2022-01-01 RX ADMIN — GABAPENTIN 300 MILLIGRAM(S): 400 CAPSULE ORAL at 06:16

## 2022-01-01 RX ADMIN — DAPTOMYCIN 120 MILLIGRAM(S): 500 INJECTION, POWDER, LYOPHILIZED, FOR SOLUTION INTRAVENOUS at 22:06

## 2022-01-01 RX ADMIN — PANTOPRAZOLE SODIUM 40 MILLIGRAM(S): 20 TABLET, DELAYED RELEASE ORAL at 06:16

## 2022-01-01 NOTE — PROVIDER CONTACT NOTE (CRITICAL VALUE NOTIFICATION) - NS PROVIDER READ BACK
yes
SEEN PROVIDER IN PERSON/no
yes
yes
SEEN PROVIDER IN PERSON/no
yes
yes

## 2022-01-01 NOTE — PROGRESS NOTE ADULT - SUBJECTIVE AND OBJECTIVE BOX
Patient has no complaints. Pain well controlled, denies SOB, dizziness, CP. Patient requesting apparatus to help her flex of left foot. pt pod #4 s/p left knee washout.  Call from [facility] last pm that drain #1 was dislodged   Vital Signs Last 24 Hrs  T(C): 37.4 (01 Jan 2022 09:34), Max: 37.8 (31 Dec 2021 21:15)  T(F): 99.4 (01 Jan 2022 09:34), Max: 100.1 (31 Dec 2021 21:15)  HR: 90 (01 Jan 2022 09:34) (81 - 103)  BP: 117/71 (01 Jan 2022 09:34) (99/62 - 135/80)  BP(mean): --  RR: 18 (01 Jan 2022 09:34) (18 - 18)  SpO2: 95% (01 Jan 2022 09:34) (95% - 99%)    PE: NAD, alert awake  Left LE: Knee dressings over anterior knee C/D/I, Drain site dressing saturated, dressings removed 1 drain present labeled #2 with minimal outpt in canister (being followed by [facility]) punture wound proximally where drain #1 previously located without active drainage or discharge.  New dressing placed over drain site  Wound VAC dressings intact lower leg, good suction  SILT distally, DP pulse 2+, + drop foot  Right knee incision site well healed. 2 steristrips still intact                                      6.7    9.09  )-----------( 384      ( 01 Jan 2022 05:27 )             21.1     01-01    134<L>  |  99  |  9.5  ----------------------------<  101<H>  4.3   |  23.0  |  0.63    Ca    8.5<L>      01 Jan 2022 05:27  Phos  4.2     01-01  Mg     1.7     01-01        A/P: s/p left knee I&D x 2 POD#4  Pain control  DVT propx: loenox  Monitor HH - tranfusion prbc defer to primary team  f/u OR cultures  Abx as per ID  Cont care as per primary team

## 2022-01-01 NOTE — PHYSICAL THERAPY INITIAL EVALUATION ADULT - LEVEL OF INDEPENDENCE, REHAB EVAL
N/A deferred-going for sx again later and does not want to be in pain
moderate assist (50% patients effort)
maximum assist (25% patients effort)

## 2022-01-01 NOTE — PROVIDER CONTACT NOTE (CRITICAL VALUE NOTIFICATION) - TEST AND RESULT REPORTED:
Hemoglobin 6.5, Hematocrit 19
H 6.9 H 22.4
No polymorphonuclear leukocytes seen per low power field gram variable rods seen per oil power field
h&H 6.5 18.7
H/H- 5.3/ 15.5
H/H- 6.6/19
BODY FLUID CULTURE- POSITIVE FOR NUMEROUS STREPTOCCOCUS PYOGENES GROUP A
Hematocrit 21
Hgb 6.4 hct 19.7
WBC 46.68
fibrinogen 863
hgb 6.7/ hct 19.5
hgb is 6.7
Calcium 6.4
Co2 10.0
HB 7
podtive BC from 11/17 2 sets- gram(+) cocci in pairs and chains, positive for strep pinagonase (group A strep)
Hemoglobin 6.5, Hematocrit 19.2
hemoglobin 6.5  hematocrit 20.6

## 2022-01-01 NOTE — PROVIDER CONTACT NOTE (CRITICAL VALUE NOTIFICATION) - ACTION/TREATMENT ORDERED:
PROVIDER MADE AWARE. WILL ORDER 2 UNITS OF PRBCS.
SICU RESIDENT SREEDHAR MADE AWARE. WILL ORDER MORE LABS AND POSSIBLE ANOTHER UNIT OF BLOOD.
Plan for transfusion
SICU RESIDENT SREEDHAR SETH. RESULTS ARE ALREADY KNOWN, PT IS ALREADY BEING TREATED WITH PROPER ANTIBIOTICS.
orders to follow
Plan for transfusion
given 2 U prbc will give 3rd
no new orders
most likely pt will be transfused
Continue to monitor.

## 2022-01-01 NOTE — PROVIDER CONTACT NOTE (CRITICAL VALUE NOTIFICATION) - PERSON GIVING RESULT:
LAB
LAB FROM OUTSIDE- TEENA GRACE
LAB- [facility] FORD
Bravo Pfeiffer
Zoe Mccracken From Lab
lab
lab/ Philly Noble
micro ajaz
newton coon
xiomara dorsey
Eze Carlisle
lab
[facility] cogpi
Denver Ford from lab
LAB- T[facility]LOR ANDERS
Marimar Saeed from lab
Lab

## 2022-01-01 NOTE — PROGRESS NOTE ADULT - SUBJECTIVE AND OBJECTIVE BOX
HPI/OVERNIGHT EVENTS: Patient seen and examined at bedside this AM. Underwent washout of LLE with woundvac change, afebrile but temp of 100 in evening, feeling good other VSS, in good spirits    Vital Signs Last 24 Hrs  T(C): 37.8 (31 Dec 2021 21:15), Max: 37.8 (31 Dec 2021 21:15)  T(F): 100.1 (31 Dec 2021 21:15), Max: 100.1 (31 Dec 2021 21:15)  HR: 103 (31 Dec 2021 21:15) (75 - 103)  BP: 109/61 (31 Dec 2021 21:15) (109/61 - 135/80)  BP(mean): --  RR: 18 (31 Dec 2021 21:15) (14 - 18)  SpO2: 98% (31 Dec 2021 21:15) (95% - 100%)    I&O's Detail    30 Dec 2021 07:01  -  31 Dec 2021 07:00  --------------------------------------------------------  IN:    Oral Fluid: 360 mL  Total IN: 360 mL    OUT:    Accordian (mL): 0 mL    Accordian (mL): 10 mL    Voided (mL): 2150 mL  Total OUT: 2160 mL    Total NET: -1800 mL      31 Dec 2021 07:01  -  01 Jan 2022 01:43  --------------------------------------------------------  IN:  Total IN: 0 mL    OUT:    Voided (mL): 1000 mL  Total OUT: 1000 mL    Total NET: -1000 mL        LABS:                        7.1    9.82  )-----------( 356      ( 31 Dec 2021 06:02 )             22.2     12-31    136  |  101  |  10.2  ----------------------------<  104<H>  4.4   |  22.0  |  0.55    Ca    9.0      31 Dec 2021 06:02  Phos  3.9     12-31  Mg     1.7     12-31      PT/INR - ( 31 Dec 2021 06:02 )   PT: 14.8 sec;   INR: 1.29 ratio         PTT - ( 31 Dec 2021 06:02 )  PTT:35.4 sec      MEDICATIONS  (STANDING):  baclofen 10 milliGRAM(s) Oral three times a day  DAPTOmycin IVPB 500 milliGRAM(s) IV Intermittent every 24 hours  enoxaparin Injectable 40 milliGRAM(s) SubCutaneous daily  gabapentin 300 milliGRAM(s) Oral three times a day  lactated ringers. 1000 milliLiter(s) (50 mL/Hr) IV Continuous <Continuous>  lisinopril 5 milliGRAM(s) Oral daily  meropenem  IVPB 1000 milliGRAM(s) IV Intermittent every 8 hours  pantoprazole    Tablet 40 milliGRAM(s) Oral two times a day    MEDICATIONS  (PRN):  acetaminophen     Tablet .. 650 milliGRAM(s) Oral every 6 hours PRN Temp greater or equal to 38C (100.4F), Mild Pain (1 - 3)  aluminum hydroxide/magnesium hydroxide/simethicone Suspension 30 milliLiter(s) Oral every 4 hours PRN Dyspepsia  HYDROmorphone   Tablet 2 milliGRAM(s) Oral every 4 hours PRN Severe Pain (7 - 10)  melatonin 3 milliGRAM(s) Oral at bedtime PRN Insomnia  ondansetron Injectable 4 milliGRAM(s) IV Push every 8 hours PRN Nausea and/or Vomiting  oxyCODONE    IR 5 milliGRAM(s) Oral once PRN Moderate Pain (4 - 6)      MICRO:   Cultures     STUDIES:   EKG, CXR, U/S, CT, MRI

## 2022-01-01 NOTE — PROVIDER CONTACT NOTE (CRITICAL VALUE NOTIFICATION) - NAME OF MD/NP/PA/DO NOTIFIED:
CURT Reyes
winston jain
ACS team rounding made aware - BORIS Chiu
CURT Franks
Trauma team
Zion HAWKINS
SICU RESIDENT SREEDHAR
susy rosado
ACS
CURT Franks
Dr. Gill
Dr. Gill
Trauma team Dr. Perkins
susy mccabe
SICU RESIDENT SREEDHAR
rebeca
SICU RESIDENT SREEDHAR
Jaskaran Trauma PA

## 2022-01-01 NOTE — PHYSICAL THERAPY INITIAL EVALUATION ADULT - MUSCLE TONE ASSESSMENT, REHAB EVAL
bilateral upper extremities/bilateral lower extremities/normal
normal
bilateral upper extremities/normal

## 2022-01-01 NOTE — PROGRESS NOTE ADULT - ATTENDING COMMENTS
pt seen and examined. agree /w above. s/p multiple washouts of LLE and RLE. drain pulled in OR, current drain in posterior tissues being managed by ACS. will obtain XR of L knee. Pt continues to be stiff despite manipulation and multiple washouts. ROM 0-30 limited by pain. Pain overall improved from prior exams. No erythema, effusion, or evidence of knee infection at this time. Continue IV abx as per ID. PT for b/l Knee ROM, R knee ROM 0-70 at this time, no effusion, erythema, or evidence of infection. f/u Cxs. pt seen and examined. agree /w above. s/p multiple washouts of LLE and RLE. drain accidentally pulled in OR during lower extremity washout, current drain in posterior tissues being managed by ACS. will obtain XR of L knee. Pt continues to be stiff despite manipulation and multiple washouts. ROM 0-30 limited by pain. Pain overall improved from prior exams. No erythema, effusion, or evidence of knee infection at this time. Continue IV abx as per ID. PT for b/l Knee ROM, R knee ROM 0-70 at this time, no effusion, erythema, or evidence of infection. f/u Cxs.

## 2022-01-01 NOTE — PROGRESS NOTE ADULT - ASSESSMENT
51F admitted for ascending soft tissue infection, has undergone multiple surgical debridements. Now with wound vac on LLE.    Plan:  - Reg diet  - Monitor fever curve  - Pain control PRN  - PT for strengthening/OOB, IS  - ABx continue

## 2022-01-01 NOTE — PHYSICAL THERAPY INITIAL EVALUATION ADULT - ADDITIONAL COMMENTS
Patient lives in a house with her mother, father, and 2 sisters. her mother and one sister who is visiting will be able to assist as needed. Pt. has 3 KANIKA without a rail and 12 inside with a rail. Patient independent PTA, no DME. Sister is visually impaired and the first floor is set up for living.

## 2022-01-01 NOTE — PHYSICAL THERAPY INITIAL EVALUATION ADULT - GENERAL OBSERVATIONS, REHAB EVAL
Patient received semi-yanes in bed, + 2 left wound vacs, hemovac, PRAFO on left LE, in NAD Patient received semi-yanes in bed, + 2 left wound vacs, hemovac, PRAFO on left LE, IV & blood transfusion, in NAD, MONIQUE Stokes and Ferny ok'd pt to be seen

## 2022-01-01 NOTE — PROVIDER CONTACT NOTE (CRITICAL VALUE NOTIFICATION) - SITUATION
No polymorphonuclear leukocytes seen per low power field gram variable rods seen per oil power field
hemoglobin 6.5  hematocrit 20.6
Pt will receive One unit of PRBC.
Pt hbg 6.8
Calcium 6.4

## 2022-01-02 LAB
ANION GAP SERPL CALC-SCNC: 12 MMOL/L — SIGNIFICANT CHANGE UP (ref 5–17)
BASOPHILS # BLD AUTO: 0.07 K/UL — SIGNIFICANT CHANGE UP (ref 0–0.2)
BASOPHILS NFR BLD AUTO: 0.8 % — SIGNIFICANT CHANGE UP (ref 0–2)
BUN SERPL-MCNC: 8.4 MG/DL — SIGNIFICANT CHANGE UP (ref 8–20)
CALCIUM SERPL-MCNC: 8.4 MG/DL — LOW (ref 8.6–10.2)
CHLORIDE SERPL-SCNC: 97 MMOL/L — LOW (ref 98–107)
CO2 SERPL-SCNC: 23 MMOL/L — SIGNIFICANT CHANGE UP (ref 22–29)
CREAT SERPL-MCNC: 0.53 MG/DL — SIGNIFICANT CHANGE UP (ref 0.5–1.3)
EOSINOPHIL # BLD AUTO: 0.32 K/UL — SIGNIFICANT CHANGE UP (ref 0–0.5)
EOSINOPHIL NFR BLD AUTO: 3.6 % — SIGNIFICANT CHANGE UP (ref 0–6)
GLUCOSE SERPL-MCNC: 94 MG/DL — SIGNIFICANT CHANGE UP (ref 70–99)
HCT VFR BLD CALC: 24.8 % — LOW (ref 34.5–45)
HGB BLD-MCNC: 7.9 G/DL — LOW (ref 11.5–15.5)
IMM GRANULOCYTES NFR BLD AUTO: 0.4 % — SIGNIFICANT CHANGE UP (ref 0–1.5)
LYMPHOCYTES # BLD AUTO: 2.96 K/UL — SIGNIFICANT CHANGE UP (ref 1–3.3)
LYMPHOCYTES # BLD AUTO: 33 % — SIGNIFICANT CHANGE UP (ref 13–44)
MAGNESIUM SERPL-MCNC: 1.9 MG/DL — SIGNIFICANT CHANGE UP (ref 1.6–2.6)
MCHC RBC-ENTMCNC: 28.3 PG — SIGNIFICANT CHANGE UP (ref 27–34)
MCHC RBC-ENTMCNC: 31.9 GM/DL — LOW (ref 32–36)
MCV RBC AUTO: 88.9 FL — SIGNIFICANT CHANGE UP (ref 80–100)
MONOCYTES # BLD AUTO: 1.08 K/UL — HIGH (ref 0–0.9)
MONOCYTES NFR BLD AUTO: 12.1 % — SIGNIFICANT CHANGE UP (ref 2–14)
NEUTROPHILS # BLD AUTO: 4.49 K/UL — SIGNIFICANT CHANGE UP (ref 1.8–7.4)
NEUTROPHILS NFR BLD AUTO: 50.1 % — SIGNIFICANT CHANGE UP (ref 43–77)
PHOSPHATE SERPL-MCNC: 4.4 MG/DL — SIGNIFICANT CHANGE UP (ref 2.4–4.7)
PLATELET # BLD AUTO: 432 K/UL — HIGH (ref 150–400)
POTASSIUM SERPL-MCNC: 4.3 MMOL/L — SIGNIFICANT CHANGE UP (ref 3.5–5.3)
POTASSIUM SERPL-SCNC: 4.3 MMOL/L — SIGNIFICANT CHANGE UP (ref 3.5–5.3)
RBC # BLD: 2.79 M/UL — LOW (ref 3.8–5.2)
RBC # FLD: 15.7 % — HIGH (ref 10.3–14.5)
SODIUM SERPL-SCNC: 132 MMOL/L — LOW (ref 135–145)
WBC # BLD: 8.96 K/UL — SIGNIFICANT CHANGE UP (ref 3.8–10.5)
WBC # FLD AUTO: 8.96 K/UL — SIGNIFICANT CHANGE UP (ref 3.8–10.5)

## 2022-01-02 PROCEDURE — 99024 POSTOP FOLLOW-UP VISIT: CPT | Mod: GC

## 2022-01-02 RX ORDER — MAGNESIUM OXIDE 400 MG ORAL TABLET 241.3 MG
400 TABLET ORAL
Refills: 0 | Status: DISCONTINUED | OUTPATIENT
Start: 2022-01-02 | End: 2022-01-08

## 2022-01-02 RX ADMIN — HYDROMORPHONE HYDROCHLORIDE 0.5 MILLIGRAM(S): 2 INJECTION INTRAMUSCULAR; INTRAVENOUS; SUBCUTANEOUS at 18:00

## 2022-01-02 RX ADMIN — PANTOPRAZOLE SODIUM 40 MILLIGRAM(S): 20 TABLET, DELAYED RELEASE ORAL at 05:45

## 2022-01-02 RX ADMIN — GABAPENTIN 300 MILLIGRAM(S): 400 CAPSULE ORAL at 05:45

## 2022-01-02 RX ADMIN — HYDROMORPHONE HYDROCHLORIDE 0.5 MILLIGRAM(S): 2 INJECTION INTRAMUSCULAR; INTRAVENOUS; SUBCUTANEOUS at 05:45

## 2022-01-02 RX ADMIN — HYDROMORPHONE HYDROCHLORIDE 0.5 MILLIGRAM(S): 2 INJECTION INTRAMUSCULAR; INTRAVENOUS; SUBCUTANEOUS at 23:00

## 2022-01-02 RX ADMIN — HYDROMORPHONE HYDROCHLORIDE 0.5 MILLIGRAM(S): 2 INJECTION INTRAMUSCULAR; INTRAVENOUS; SUBCUTANEOUS at 10:19

## 2022-01-02 RX ADMIN — HYDROMORPHONE HYDROCHLORIDE 0.5 MILLIGRAM(S): 2 INJECTION INTRAMUSCULAR; INTRAVENOUS; SUBCUTANEOUS at 01:34

## 2022-01-02 RX ADMIN — HYDROMORPHONE HYDROCHLORIDE 0.5 MILLIGRAM(S): 2 INJECTION INTRAMUSCULAR; INTRAVENOUS; SUBCUTANEOUS at 10:32

## 2022-01-02 RX ADMIN — MAGNESIUM OXIDE 400 MG ORAL TABLET 400 MILLIGRAM(S): 241.3 TABLET ORAL at 17:31

## 2022-01-02 RX ADMIN — DAPTOMYCIN 120 MILLIGRAM(S): 500 INJECTION, POWDER, LYOPHILIZED, FOR SOLUTION INTRAVENOUS at 20:35

## 2022-01-02 RX ADMIN — MEROPENEM 100 MILLIGRAM(S): 1 INJECTION INTRAVENOUS at 22:05

## 2022-01-02 RX ADMIN — HYDROMORPHONE HYDROCHLORIDE 0.5 MILLIGRAM(S): 2 INJECTION INTRAMUSCULAR; INTRAVENOUS; SUBCUTANEOUS at 14:24

## 2022-01-02 RX ADMIN — MAGNESIUM OXIDE 400 MG ORAL TABLET 400 MILLIGRAM(S): 241.3 TABLET ORAL at 10:19

## 2022-01-02 RX ADMIN — PANTOPRAZOLE SODIUM 40 MILLIGRAM(S): 20 TABLET, DELAYED RELEASE ORAL at 17:31

## 2022-01-02 RX ADMIN — MEROPENEM 100 MILLIGRAM(S): 1 INJECTION INTRAVENOUS at 05:45

## 2022-01-02 RX ADMIN — Medication 10 MILLIGRAM(S): at 05:45

## 2022-01-02 RX ADMIN — HYDROMORPHONE HYDROCHLORIDE 0.5 MILLIGRAM(S): 2 INJECTION INTRAMUSCULAR; INTRAVENOUS; SUBCUTANEOUS at 03:13

## 2022-01-02 RX ADMIN — HYDROMORPHONE HYDROCHLORIDE 0.5 MILLIGRAM(S): 2 INJECTION INTRAMUSCULAR; INTRAVENOUS; SUBCUTANEOUS at 06:32

## 2022-01-02 RX ADMIN — GABAPENTIN 300 MILLIGRAM(S): 400 CAPSULE ORAL at 14:24

## 2022-01-02 RX ADMIN — Medication 10 MILLIGRAM(S): at 22:06

## 2022-01-02 RX ADMIN — HYDROMORPHONE HYDROCHLORIDE 0.5 MILLIGRAM(S): 2 INJECTION INTRAMUSCULAR; INTRAVENOUS; SUBCUTANEOUS at 21:59

## 2022-01-02 RX ADMIN — Medication 10 MILLIGRAM(S): at 14:25

## 2022-01-02 RX ADMIN — MAGNESIUM OXIDE 400 MG ORAL TABLET 400 MILLIGRAM(S): 241.3 TABLET ORAL at 14:23

## 2022-01-02 RX ADMIN — ENOXAPARIN SODIUM 40 MILLIGRAM(S): 100 INJECTION SUBCUTANEOUS at 10:20

## 2022-01-02 RX ADMIN — HYDROMORPHONE HYDROCHLORIDE 0.5 MILLIGRAM(S): 2 INJECTION INTRAMUSCULAR; INTRAVENOUS; SUBCUTANEOUS at 14:35

## 2022-01-02 RX ADMIN — HYDROMORPHONE HYDROCHLORIDE 0.5 MILLIGRAM(S): 2 INJECTION INTRAMUSCULAR; INTRAVENOUS; SUBCUTANEOUS at 17:30

## 2022-01-02 RX ADMIN — GABAPENTIN 300 MILLIGRAM(S): 400 CAPSULE ORAL at 22:06

## 2022-01-02 RX ADMIN — MEROPENEM 100 MILLIGRAM(S): 1 INJECTION INTRAVENOUS at 14:24

## 2022-01-02 NOTE — PROGRESS NOTE ADULT - ATTENDING COMMENTS
Agree with above assessment.  The patient was seen and examined by me.  The patient is without complaints.  The left leg wound VAC is in place without leak, drain is without gross pus.  Wound VAC to be changed tomorrow.

## 2022-01-02 NOTE — OCCUPATIONAL THERAPY INITIAL EVALUATION ADULT - RANGE OF MOTION EXAMINATION
Bilateral UE PROM all joints WFL except for bilateral abduction ~90degrees, limited due to pain. Bilateral AROM shoulder shrug in tact. Pt with Bilateral UE AROM all joints WFL except for bilateral shoulder flexion ~70 degrees, limited by pain, pt exhibits compensatory motions (hiking) to achieve range. Pt with limited abduction due to pain, less than half range. Right UE shoulder ROM worse than Left UE overall

## 2022-01-02 NOTE — OCCUPATIONAL THERAPY INITIAL EVALUATION ADULT - DIAGNOSIS, OT EVAL
51 year old Female admitted with soft tissue infection, underwent multiple washout of Left Leg fasciotomy. Pt s/p most recent pulsed irrigation of left LE with replacement of wound vac (12/31)

## 2022-01-02 NOTE — OCCUPATIONAL THERAPY INITIAL EVALUATION ADULT - PHYSICAL ASSIST/NONPHYSICAL ASSIST: GROOMING, OT EVAL
additional assist for all overhead needs/set-up required/verbal cues/nonverbal cues (demo/gestures)/1 person assist

## 2022-01-02 NOTE — OCCUPATIONAL THERAPY INITIAL EVALUATION ADULT - GENERAL OBSERVATIONS, REHAB EVAL
Received pt semifowler in bed, NAD, +IV, +wound vac, +hemovac,, +Primafit, +Right VCB, +Left Prafo, +on RA. Pain at rest 4/10, pain with mvmt/post session 10/10-Left LE; RN aware. Pt received pain meds prior. Patient agreeable to OT evaluation

## 2022-01-02 NOTE — OCCUPATIONAL THERAPY INITIAL EVALUATION ADULT - LIVES WITH, PROFILE
Pt lives in a house with her mother, father, and 2 sisters, her mother and one sister who is visiting will be able to assist as needed. Pt has 3 KANIKA with hand rail and 12 inside with a hand rail up to bed/bath./children/other relative/parents

## 2022-01-02 NOTE — OCCUPATIONAL THERAPY INITIAL EVALUATION ADULT - LEVEL OF INDEPENDENCE: SUPINE/SIT, REHAB EVAL
pt declining 2*pain. Attempted multiple trials with assist for Left LE, pt unable to move./unable to perform

## 2022-01-02 NOTE — OCCUPATIONAL THERAPY INITIAL EVALUATION ADULT - LEVEL OF INDEPENDENCE: GROOMING, OT EVAL
after assist with item retrieval and set up of items/moderate assist (50% patients effort) in sitting after assist with item retrieval and set up of items/moderate assist (50% patients effort)

## 2022-01-02 NOTE — OCCUPATIONAL THERAPY INITIAL EVALUATION ADULT - ADDITIONAL COMMENTS
Tub with doors and no grab bars. Patient independent PTA, no DME. Pt is right handed and drives. Sister is visually impaired and the first floor is set up for living

## 2022-01-03 LAB
ANION GAP SERPL CALC-SCNC: 10 MMOL/L — SIGNIFICANT CHANGE UP (ref 5–17)
BUN SERPL-MCNC: 10.8 MG/DL — SIGNIFICANT CHANGE UP (ref 8–20)
CALCIUM SERPL-MCNC: 8.7 MG/DL — SIGNIFICANT CHANGE UP (ref 8.6–10.2)
CHLORIDE SERPL-SCNC: 98 MMOL/L — SIGNIFICANT CHANGE UP (ref 98–107)
CO2 SERPL-SCNC: 25 MMOL/L — SIGNIFICANT CHANGE UP (ref 22–29)
CREAT SERPL-MCNC: 0.52 MG/DL — SIGNIFICANT CHANGE UP (ref 0.5–1.3)
CULTURE RESULTS: SIGNIFICANT CHANGE UP
CULTURE RESULTS: SIGNIFICANT CHANGE UP
GLUCOSE SERPL-MCNC: 103 MG/DL — HIGH (ref 70–99)
HCT VFR BLD CALC: 23.8 % — LOW (ref 34.5–45)
HGB BLD-MCNC: 7.7 G/DL — LOW (ref 11.5–15.5)
MAGNESIUM SERPL-MCNC: 1.6 MG/DL — SIGNIFICANT CHANGE UP (ref 1.6–2.6)
MCHC RBC-ENTMCNC: 28 PG — SIGNIFICANT CHANGE UP (ref 27–34)
MCHC RBC-ENTMCNC: 32.4 GM/DL — SIGNIFICANT CHANGE UP (ref 32–36)
MCV RBC AUTO: 86.5 FL — SIGNIFICANT CHANGE UP (ref 80–100)
PHOSPHATE SERPL-MCNC: 4.5 MG/DL — SIGNIFICANT CHANGE UP (ref 2.4–4.7)
PLATELET # BLD AUTO: 453 K/UL — HIGH (ref 150–400)
POTASSIUM SERPL-MCNC: 4.3 MMOL/L — SIGNIFICANT CHANGE UP (ref 3.5–5.3)
POTASSIUM SERPL-SCNC: 4.3 MMOL/L — SIGNIFICANT CHANGE UP (ref 3.5–5.3)
RBC # BLD: 2.75 M/UL — LOW (ref 3.8–5.2)
RBC # FLD: 15.9 % — HIGH (ref 10.3–14.5)
SODIUM SERPL-SCNC: 133 MMOL/L — LOW (ref 135–145)
SPECIMEN SOURCE: SIGNIFICANT CHANGE UP
SPECIMEN SOURCE: SIGNIFICANT CHANGE UP
WBC # BLD: 10.16 K/UL — SIGNIFICANT CHANGE UP (ref 3.8–10.5)
WBC # FLD AUTO: 10.16 K/UL — SIGNIFICANT CHANGE UP (ref 3.8–10.5)

## 2022-01-03 PROCEDURE — 99024 POSTOP FOLLOW-UP VISIT: CPT

## 2022-01-03 PROCEDURE — 99232 SBSQ HOSP IP/OBS MODERATE 35: CPT

## 2022-01-03 PROCEDURE — 73562 X-RAY EXAM OF KNEE 3: CPT | Mod: 26,LT

## 2022-01-03 RX ORDER — MAGNESIUM SULFATE 500 MG/ML
2 VIAL (ML) INJECTION ONCE
Refills: 0 | Status: COMPLETED | OUTPATIENT
Start: 2022-01-03 | End: 2022-01-03

## 2022-01-03 RX ADMIN — PANTOPRAZOLE SODIUM 40 MILLIGRAM(S): 20 TABLET, DELAYED RELEASE ORAL at 05:23

## 2022-01-03 RX ADMIN — HYDROMORPHONE HYDROCHLORIDE 0.5 MILLIGRAM(S): 2 INJECTION INTRAMUSCULAR; INTRAVENOUS; SUBCUTANEOUS at 09:47

## 2022-01-03 RX ADMIN — HYDROMORPHONE HYDROCHLORIDE 0.5 MILLIGRAM(S): 2 INJECTION INTRAMUSCULAR; INTRAVENOUS; SUBCUTANEOUS at 15:42

## 2022-01-03 RX ADMIN — HYDROMORPHONE HYDROCHLORIDE 0.5 MILLIGRAM(S): 2 INJECTION INTRAMUSCULAR; INTRAVENOUS; SUBCUTANEOUS at 10:17

## 2022-01-03 RX ADMIN — Medication 10 MILLIGRAM(S): at 21:13

## 2022-01-03 RX ADMIN — MEROPENEM 100 MILLIGRAM(S): 1 INJECTION INTRAVENOUS at 05:23

## 2022-01-03 RX ADMIN — GABAPENTIN 300 MILLIGRAM(S): 400 CAPSULE ORAL at 05:24

## 2022-01-03 RX ADMIN — MAGNESIUM OXIDE 400 MG ORAL TABLET 400 MILLIGRAM(S): 241.3 TABLET ORAL at 17:55

## 2022-01-03 RX ADMIN — HYDROMORPHONE HYDROCHLORIDE 0.5 MILLIGRAM(S): 2 INJECTION INTRAMUSCULAR; INTRAVENOUS; SUBCUTANEOUS at 15:12

## 2022-01-03 RX ADMIN — GABAPENTIN 300 MILLIGRAM(S): 400 CAPSULE ORAL at 21:13

## 2022-01-03 RX ADMIN — Medication 10 MILLIGRAM(S): at 05:24

## 2022-01-03 RX ADMIN — HYDROMORPHONE HYDROCHLORIDE 0.5 MILLIGRAM(S): 2 INJECTION INTRAMUSCULAR; INTRAVENOUS; SUBCUTANEOUS at 06:20

## 2022-01-03 RX ADMIN — DAPTOMYCIN 120 MILLIGRAM(S): 500 INJECTION, POWDER, LYOPHILIZED, FOR SOLUTION INTRAVENOUS at 20:45

## 2022-01-03 RX ADMIN — Medication 25 GRAM(S): at 09:22

## 2022-01-03 RX ADMIN — MEROPENEM 100 MILLIGRAM(S): 1 INJECTION INTRAVENOUS at 21:14

## 2022-01-03 RX ADMIN — Medication 10 MILLIGRAM(S): at 15:11

## 2022-01-03 RX ADMIN — ENOXAPARIN SODIUM 40 MILLIGRAM(S): 100 INJECTION SUBCUTANEOUS at 15:13

## 2022-01-03 RX ADMIN — PANTOPRAZOLE SODIUM 40 MILLIGRAM(S): 20 TABLET, DELAYED RELEASE ORAL at 17:55

## 2022-01-03 RX ADMIN — MAGNESIUM OXIDE 400 MG ORAL TABLET 400 MILLIGRAM(S): 241.3 TABLET ORAL at 15:11

## 2022-01-03 RX ADMIN — HYDROMORPHONE HYDROCHLORIDE 0.5 MILLIGRAM(S): 2 INJECTION INTRAMUSCULAR; INTRAVENOUS; SUBCUTANEOUS at 05:19

## 2022-01-03 RX ADMIN — GABAPENTIN 300 MILLIGRAM(S): 400 CAPSULE ORAL at 15:11

## 2022-01-03 RX ADMIN — MAGNESIUM OXIDE 400 MG ORAL TABLET 400 MILLIGRAM(S): 241.3 TABLET ORAL at 09:22

## 2022-01-03 RX ADMIN — HYDROMORPHONE HYDROCHLORIDE 0.5 MILLIGRAM(S): 2 INJECTION INTRAMUSCULAR; INTRAVENOUS; SUBCUTANEOUS at 21:30

## 2022-01-03 RX ADMIN — MEROPENEM 100 MILLIGRAM(S): 1 INJECTION INTRAVENOUS at 15:13

## 2022-01-03 RX ADMIN — HYDROMORPHONE HYDROCHLORIDE 0.5 MILLIGRAM(S): 2 INJECTION INTRAMUSCULAR; INTRAVENOUS; SUBCUTANEOUS at 21:14

## 2022-01-03 NOTE — PROGRESS NOTE ADULT - SUBJECTIVE AND OBJECTIVE BOX
HPI/OVERNIGHT EVENTS:  No acute overnight events. Vital signs stable. Pain controlled when not moving. No reports of nausea, vomiting, fever, chills, chest pain, shortness of breath, or any new or concerning symptoms. Plan for VAC change today    MEDICATIONS  (STANDING):  baclofen 10 milliGRAM(s) Oral three times a day  DAPTOmycin IVPB 500 milliGRAM(s) IV Intermittent every 24 hours  enoxaparin Injectable 40 milliGRAM(s) SubCutaneous daily  gabapentin 300 milliGRAM(s) Oral three times a day  HYDROmorphone  Injectable 0.5 milliGRAM(s) IV Push every 4 hours  lisinopril 5 milliGRAM(s) Oral daily  magnesium oxide 400 milliGRAM(s) Oral three times a day with meals  magnesium sulfate  IVPB 2 Gram(s) IV Intermittent once  meropenem  IVPB 1000 milliGRAM(s) IV Intermittent every 8 hours  pantoprazole    Tablet 40 milliGRAM(s) Oral two times a day    MEDICATIONS  (PRN):  acetaminophen     Tablet .. 650 milliGRAM(s) Oral every 6 hours PRN Temp greater or equal to 38C (100.4F), Mild Pain (1 - 3)  acetaminophen     Tablet .. 975 milliGRAM(s) Oral every 8 hours PRN Temp greater or equal to 38C (100.4F), Mild Pain (1 - 3), Moderate Pain (4 - 6), Severe Pain (7 - 10)  aluminum hydroxide/magnesium hydroxide/simethicone Suspension 30 milliLiter(s) Oral every 4 hours PRN Dyspepsia  HYDROmorphone   Tablet 2 milliGRAM(s) Oral every 4 hours PRN Moderate Pain (4 - 6)  HYDROmorphone   Tablet 4 milliGRAM(s) Oral every 4 hours PRN Severe Pain (7 - 10)  melatonin 3 milliGRAM(s) Oral at bedtime PRN Insomnia  ondansetron Injectable 4 milliGRAM(s) IV Push every 8 hours PRN Nausea and/or Vomiting  oxyCODONE    IR 5 milliGRAM(s) Oral once PRN Moderate Pain (4 - 6)      Vital Signs Last 24 Hrs  T(C): 37.3 (03 Jan 2022 05:36), Max: 37.6 (02 Jan 2022 22:07)  T(F): 99.2 (03 Jan 2022 05:36), Max: 99.7 (02 Jan 2022 22:07)  HR: 88 (03 Jan 2022 05:36) (88 - 92)  BP: 110/70 (03 Jan 2022 05:36) (101/66 - 110/70)  BP(mean): --  RR: 18 (03 Jan 2022 05:36) (18 - 18)  SpO2: 100% (03 Jan 2022 05:36) (94% - 100%)    Constitutional: patient resting comfortably in bed, in no acute distress  HEENT: EOMI, no active drainage or redness  Neck: Full ROM without pain  Respiratory: respirations are unlabored, no accessory muscle use, no conversational dyspnea  Cardiovascular: regular rate & rhythm  Gastrointestinal: Abdomen soft, non-tender, non-distended  Neurological: A&O x 3; unchanged motor/sensory exam  Extremities: wound vac working appropriately. WWP, limit ROM of LLE due to pain       I&O's Detail    02 Jan 2022 07:01  -  03 Jan 2022 07:00  --------------------------------------------------------  IN:  Total IN: 0 mL    OUT:    Accordian (mL): 0 mL    Voided (mL): 2200 mL  Total OUT: 2200 mL    Total NET: -2200 mL          LABS:                        7.7    10.16 )-----------( 453      ( 03 Jan 2022 04:41 )             23.8     01-03    133<L>  |  98  |  10.8  ----------------------------<  103<H>  4.3   |  25.0  |  0.52    Ca    8.7      03 Jan 2022 04:41  Phos  4.5     01-03  Mg     1.6     01-03

## 2022-01-03 NOTE — PROGRESS NOTE ADULT - SUBJECTIVE AND OBJECTIVE BOX
Pt Name: LUIS FERNANDO DAVIS    MRN: [id number]      Patient is a 51 year old female being followed for bilateral septic Knee.  patient with multiple I&D by Ortho and ACS.  patient currently POD 6 from left knee I&D.  patient afebrile, doing well.  patient having wound vac change today by ACS       PAST MEDICAL & SURGICAL HISTORY:  PAST MEDICAL & SURGICAL HISTORY:  No pertinent past medical history    History of ankle surgery        Allergies: No Known Allergies      Medications: acetaminophen     Tablet .. 650 milliGRAM(s) Oral every 6 hours PRN  acetaminophen     Tablet .. 975 milliGRAM(s) Oral every 8 hours PRN  aluminum hydroxide/magnesium hydroxide/simethicone Suspension 30 milliLiter(s) Oral every 4 hours PRN  baclofen 10 milliGRAM(s) Oral three times a day  DAPTOmycin IVPB 500 milliGRAM(s) IV Intermittent every 24 hours  enoxaparin Injectable 40 milliGRAM(s) SubCutaneous daily  gabapentin 300 milliGRAM(s) Oral three times a day  HYDROmorphone   Tablet 2 milliGRAM(s) Oral every 4 hours PRN  HYDROmorphone   Tablet 4 milliGRAM(s) Oral every 4 hours PRN  HYDROmorphone  Injectable 0.5 milliGRAM(s) IV Push every 4 hours  lisinopril 5 milliGRAM(s) Oral daily  magnesium oxide 400 milliGRAM(s) Oral three times a day with meals  melatonin 3 milliGRAM(s) Oral at bedtime PRN  meropenem  IVPB 1000 milliGRAM(s) IV Intermittent every 8 hours  ondansetron Injectable 4 milliGRAM(s) IV Push every 8 hours PRN  oxyCODONE    IR 5 milliGRAM(s) Oral once PRN  pantoprazole    Tablet 40 milliGRAM(s) Oral two times a day                              7.7    10.16 )-----------( 453      ( 03 Jan 2022 04:41 )             23.8     01-03    133<L>  |  98  |  10.8  ----------------------------<  103<H>  4.3   |  25.0  |  0.52    Ca    8.7      03 Jan 2022 04:41  Phos  4.5     01-03  Mg     1.6     01-03        PHYSICAL EXAM:    Vital Signs Last 24 Hrs  T(C): 37.3 (03 Jan 2022 05:36), Max: 37.6 (02 Jan 2022 22:07)  T(F): 99.2 (03 Jan 2022 05:36), Max: 99.7 (02 Jan 2022 22:07)  HR: 88 (03 Jan 2022 05:36) (88 - 92)  BP: 110/70 (03 Jan 2022 05:36) (101/66 - 110/70)  BP(mean): --  RR: 18 (03 Jan 2022 05:36) (18 - 18)  SpO2: 100% (03 Jan 2022 05:36) (94% - 100%)  Daily     Daily     Appearance: Alert, responsive, in no acute distress.    Neurological: Sensation is grossly intact to light touch. 5/5 motor function of all extremities. No focal deficits or weaknesses found.    Skin: no rash on visible skin. Skin is clean, dry and intact. No bleeding. No abrasions. No ulcerations.    Vascular: 2+ distal pulses. Cap refill < 2 sec. No signs of venous   insufficiency   or stasis. No extremity ulcerations. No cyanosis.    Musculoskeletal:         Left Lower Extremity: Dressing C/D/I to left knee.  Wound vac to lower leg, AFO in place       Right Lower Extremity:  healed incision to knee, no swelling, no erythema, no warmth     A/P:  Pt is a 51y Female with bilateral knee I&D    PLAN:   * continue abx per ID and ACS  will continue to monitor   wound vac change today by ACS

## 2022-01-03 NOTE — PROGRESS NOTE ADULT - ATTENDING COMMENTS
Patient seen and examined on am rounds. Discussed plan for the day- vac change at bedside. Needs repeat CT LLE tomorrow or Wednesday, can start plan for skin grafting if clean.

## 2022-01-03 NOTE — PROGRESS NOTE ADULT - SUBJECTIVE AND OBJECTIVE BOX
INFECTIOUS DISEASES AND INTERNAL MEDICINE at [place]  =======================================================  Theo Morrow MD  Diplomates American Board of Internal Medicine and Infectious Diseases  Telephone [phone]  Fax            [phone]  =======================================================    RICK DAVISNE [id number]  Follow up: group A STREP NECROTIZING SOFT TISSUE INFECTION     repeat CT scan of lower ext with collections.   S/P OR WASHOUT 12/2  AND   OR AGAIN  12/7   late  on 12/10 12/15 12/17  12/20 12/23     AFEBRILE THIS MORNING in nad  REPEAT CX SCAN DONE  S/P OR  AGAIN  12/28  CX SO FAR NEG  REMAINS AFEBRILE    Allergies:  No Known Allergies       FAMILY   FAMILY HISTORY:  FH: HTN (hypertension) (Mother)      REVIEW OF SYSTEMS:  CONSTITUTIONAL:  No Fever or chills  HEENT:   No diplopia or blurred vision.  No earache, sore throat or runny nose.  CARDIOVASCULAR:  No pressure, squeezing, strangling, tightness, heaviness or aching about the chest, neck, axilla or epigastrium.  RESPIRATORY:  No cough, shortness of breath, PND or orthopnea.  GASTROINTESTINAL:  No nausea, vomiting or diarrhea.  GENITOURINARY:  No dysuria, frequency or urgency. No Blood in urine  MUSCULOSKELETAL:   AS PER HPI   SKIN:  No change in skin, hair or nails.  NEUROLOGIC:  No paresthesias, fasciculations, seizures or weakness.  PSYCHIATRIC:  No disorder of thought or mood.  ENDOCRINE:  No heat or cold intolerance, polyuria or polydipsia.  HEMATOLOGICAL:  No easy bruising or bleeding.            Physical Exam:     GEN: NAD,    HEENT: normocephalic and atraumatic. EOMI. ASHISH.    NECK: Supple. No carotid bruits.  No lymphadenopathy or thyromegaly.  LUNGS: Clear to auscultation.  HEART: Regular rate and rhythm without murmur.  ABDOMEN: Soft, nontender, and nondistended.  Positive bowel sounds.    : No CVA tenderness  EXTREMITIES: LEFT LEG WRAPPED; left thigh vac in place  right knee with dressing in place       NEUROLOGIC:  C AWAKE ALERT Appropriate affect .  SKIN: No ulceration or induration present.           Vitals:  == Vital Signs Last 24 Hrs  T(C): 37.3 (03 Jan 2022 05:36), Max: 37.6 (02 Jan 2022 22:07)  T(F): 99.2 (03 Jan 2022 05:36), Max: 99.7 (02 Jan 2022 22:07)  HR: 88 (03 Jan 2022 05:36) (88 - 89)  BP: 110/70 (03 Jan 2022 05:36) (102/66 - 110/70)  BP(mean): --  RR: 18 (03 Jan 2022 05:36) (18 - 18)  SpO2: 100% (03 Jan 2022 05:36) (94% - 100%)    Other medications:  baclofen 10 milliGRAM(s) Oral three times a day  collagenase Ointment 1 Application(s) Topical two times a day  gabapentin 200 milliGRAM(s) Oral three times a day  glucagon  Injectable 1 milliGRAM(s) IntraMuscular once  heparin   Injectable 5000 Unit(s) SubCutaneous every 8 hours  lisinopril 5 milliGRAM(s) Oral daily  methocarbamol 750 milliGRAM(s) Oral three times a day  pantoprazole    Tablet 40 milliGRAM(s) Oral two times a day  polyethylene glycol 3350 17 Gram(s) Oral daily  senna 2 Tablet(s) Oral at bedtime  sodium chloride 1 Gram(s) Oral every 12 hours  sodium chloride 0.9%. 1000 milliLiter(s) IV Continuous <Continuous>      =======================================================  Labs:                                                                     7.7    10.16 )-----------( 453      ( 03 Jan 2022 04:41 )             23.8   01-03    133<L>  |  98  |  10.8  ----------------------------<  103<H>  4.3   |  25.0  |  0.52    Ca    8.7      03 Jan 2022 04:41  Phos  4.5     01-03  Mg     1.6     01-03                e E: (C=Critical, N=Notification, A=Abnormal) C    TESTS:  _ GS    DATE/TIME CALLED: _ 11/18/2021 09:40:08    CALLED TO: Chris Hernandez RN    READ BACK (2 Patient Identifiers)(Y/N): _ Y    READ BACK VALUES (Y/N): _ Y    CALLED BY: Chris Quiñones  Final Report (11-21-21 @ 12:28):    Growth in aerobic and anaerobic bottles: Streptococcus pyogenes (Group A)  Organism: Blood Culture PCR  Streptococcus pyogenes (Group A) (11-21-21 @ 12:28)  Organism: Streptococcus pyogenes (Group A) (11-21-21 @ 12:28)    Sensitivities:      -  Ceftriaxone: S 0.016      -  Penicillin: S 0.016 Predicts results for ampicillin, amoxicillin, amoxicillin/clavulanate, ampicillin/sulbactam, 1st, 2nd and 3rd generation cephalosporins and carbapenems.      Method Type: ETEST  Organism: Streptococcus pyogenes (Group A) (11-21-21 @ 12:28)    Sensitivities:      -  Vancomycin: S      Method Type: KB  Organism: Blood Culture PCR (11-21-21 @ 12:28)    Sensitivities:      -  Streptococcus pyogenes (Group A): Detec      Method Type: PCR      Creatinine, Serum: 0.75 mg/dL (11-30-21 @ 07:18)  Creatinine, Serum: 0.68 mg/dL (11-29-21 @ 09:55)  Creatinine, Serum: 0.64 mg/dL (11-28-21 @ 09:14)  Creatinine, Serum: 0.65 mg/dL (11-27-21 @ 11:51)  Creatinine, Serum: 0.57 mg/dL (11-27-21 @ 05:35)  Creatinine, Serum: 0.61 mg/dL (11-26-21 @ 09:01)        Ferritin, Serum: 388 ng/mL (11-17-21 @ 13:21)    C-Reactive Protein, Serum: 197 mg/L (11-26-21 @ 21:52)  C-Reactive Protein, Serum: 211 mg/L (11-26-21 @ 09:01)  C-Reactive Protein, Serum: 53 mg/L (11-23-21 @ 07:42)  C-Reactive Protein, Serum: >422 mg/L (11-17-21 @ 13:21)    Sedimentation Rate, Erythrocyte: 65 mm/hr (11-26-21 @ 21:52)  Sedimentation Rate, Erythrocyte: 63 mm/hr (11-26-21 @ 09:01)  Sedimentation Rate, Erythrocyte: 55 mm/hr (11-17-21 @ 13:21)    WBC Count: 14.55 K/uL (11-30-21 @ 07:18)  WBC Count: 16.71 K/uL (11-29-21 @ 09:54)  WBC Count: 17.02 K/uL (11-28-21 @ 09:14)  WBC Count: 15.99 K/uL (11-27-21 @ 05:33)  WBC Count: 18.73 K/uL (11-26-21 @ 09:01)      COVID-19 PCR: NotDetec (11-30-21 @ 01:59)  COVID-19 PCR: NotDetec (11-23-21 @ 18:50)  COVID-19 PCR: NotDetec (11-17-21 @ 13:18)    Lactate Dehydrogenase, Serum: 769 U/L (11-20-21 @ 03:56)    Alkaline Phosphatase, Serum: 88 U/L (11-27-21 @ 05:35)  Alanine Aminotransferase (ALT/SGPT): 11 U/L (11-27-21 @ 05:35)  Aspartate Aminotransferase (AST/SGOT): 25 U/L (11-27-21 @ 05:35)  Bilirubin Total, Serum: 0.3 mg/dL (11-27-21 @ 05:35)        < from: CT Lower Extremity w/ IV Cont, Bilateral (11.30.21 @ 09:55) >     EXAM:  CT LWR EXT IC BI                          PROCEDURE DATE:  11/30/2021          INTERPRETATION:  CT LOWER EXTREMITY WITH IV CONTRAST BILATERAL dated 11/30/2021 9:55 AM    INDICATION: Fever. Status post incision and drainage bilaterally. Persistent fevers to 104.    COMPARISON: CT of the right knee dated 11/23/2021. CT of the left lower extremity dated 11/17/2021    VICKI -

## 2022-01-03 NOTE — PROGRESS NOTE ADULT - ASSESSMENT
50 y/o female admitted for LLE NSTI and abscess s/p multiple I&D's, debridements, and washouts with ortho for knee joint involvement Etiology remains unclear. HD and Hgb stable    - To change wound VAC, plan for bedside with appropriate premedication  - Regular diet  - Lovenox ppx  - ID following, on merrem and daptomycin  - PRN pain management   - PT   - Discharge planning.

## 2022-01-03 NOTE — PROGRESS NOTE ADULT - ASSESSMENT
51y Female present to ED with left leg swelling, erythema, pain. Patient endorses she had left knee pain for 1 week presented 3 days ago to ED  where she states was given an steroid shot, and ibuprofen. however pain and edema worsened. Patient states she wasnt feeling herself today reason why she came. Endorses chills initially with pain 3 days ago but none since, denies history of trauma, insect bites, recent interventions, or injections to the area, contact with ticks, history of diabetes.  PT AS ABOVE WITH LEFT LEG SWELLING PT WITH INCREASED WBC PLACED ON ABX FOR PRESUMED INFECTION  PT WITH CT SCAN WITH FASCIAL EDAM PT BROUGHT TO THE OR EMERGENTLY AND  UNDERWENT FASCIOTOMY   PT BROUGHT TO THE OR  BOTH TRAUMA AND ORTHO INVOLVED  PURULENT FLUID FOUND  NECROTIZING SOFT TISSUE INFECTION    BLOOD CX WITH GROUP A STREP AND  OPERATIVE FLUID WITH STREP   PT WAS  ON PCN AND CLINDA for POSSIBLE ANTITOXIN EFFECT IN GROUP A STREP INFECTION     PT  S/P OR WASHOUT 12.2  AND  OR AGAIN 12/7  and late on 12/10 121/15 12/17 and 12/20 12/23     REPEAT   OPERATIVE CX NEG             AS ABOVE  REPEAT  OR  12/20 12/23      NO PURULENCE   OVERALL NON TOXIC  AFEBRILE       CT SCAN DONE WITH POSSIBLE ABSCESS   S/ RETURN TO THE OR 12/28 AND TODAY 12/31  WILL FOLLOW UP  OPERATIVE CX NEGATIVE   CONTINUE FOR MERREM/DAPTOMYCIN  WILL FOLLOW UP .     WITH FURTHER RECOMMENDATIONS

## 2022-01-04 LAB
ANION GAP SERPL CALC-SCNC: 13 MMOL/L — SIGNIFICANT CHANGE UP (ref 5–17)
BASOPHILS # BLD AUTO: 0.08 K/UL — SIGNIFICANT CHANGE UP (ref 0–0.2)
BASOPHILS NFR BLD AUTO: 0.7 % — SIGNIFICANT CHANGE UP (ref 0–2)
BUN SERPL-MCNC: 13.7 MG/DL — SIGNIFICANT CHANGE UP (ref 8–20)
CALCIUM SERPL-MCNC: 9.2 MG/DL — SIGNIFICANT CHANGE UP (ref 8.6–10.2)
CHLORIDE SERPL-SCNC: 100 MMOL/L — SIGNIFICANT CHANGE UP (ref 98–107)
CK SERPL-CCNC: 15 U/L — LOW (ref 25–170)
CO2 SERPL-SCNC: 24 MMOL/L — SIGNIFICANT CHANGE UP (ref 22–29)
CREAT SERPL-MCNC: 0.44 MG/DL — LOW (ref 0.5–1.3)
EOSINOPHIL # BLD AUTO: 0.42 K/UL — SIGNIFICANT CHANGE UP (ref 0–0.5)
EOSINOPHIL NFR BLD AUTO: 3.9 % — SIGNIFICANT CHANGE UP (ref 0–6)
GLUCOSE SERPL-MCNC: 95 MG/DL — SIGNIFICANT CHANGE UP (ref 70–99)
HCT VFR BLD CALC: 24.4 % — LOW (ref 34.5–45)
HGB BLD-MCNC: 7.7 G/DL — LOW (ref 11.5–15.5)
IMM GRANULOCYTES NFR BLD AUTO: 0.4 % — SIGNIFICANT CHANGE UP (ref 0–1.5)
LYMPHOCYTES # BLD AUTO: 3.33 K/UL — HIGH (ref 1–3.3)
LYMPHOCYTES # BLD AUTO: 30.7 % — SIGNIFICANT CHANGE UP (ref 13–44)
MAGNESIUM SERPL-MCNC: 1.8 MG/DL — SIGNIFICANT CHANGE UP (ref 1.6–2.6)
MCHC RBC-ENTMCNC: 27.8 PG — SIGNIFICANT CHANGE UP (ref 27–34)
MCHC RBC-ENTMCNC: 31.6 GM/DL — LOW (ref 32–36)
MCV RBC AUTO: 88.1 FL — SIGNIFICANT CHANGE UP (ref 80–100)
MONOCYTES # BLD AUTO: 1.25 K/UL — HIGH (ref 0–0.9)
MONOCYTES NFR BLD AUTO: 11.5 % — SIGNIFICANT CHANGE UP (ref 2–14)
NEUTROPHILS # BLD AUTO: 5.73 K/UL — SIGNIFICANT CHANGE UP (ref 1.8–7.4)
NEUTROPHILS NFR BLD AUTO: 52.8 % — SIGNIFICANT CHANGE UP (ref 43–77)
PHOSPHATE SERPL-MCNC: 4.8 MG/DL — HIGH (ref 2.4–4.7)
PLATELET # BLD AUTO: 546 K/UL — HIGH (ref 150–400)
POTASSIUM SERPL-MCNC: 4.3 MMOL/L — SIGNIFICANT CHANGE UP (ref 3.5–5.3)
POTASSIUM SERPL-SCNC: 4.3 MMOL/L — SIGNIFICANT CHANGE UP (ref 3.5–5.3)
RBC # BLD: 2.77 M/UL — LOW (ref 3.8–5.2)
RBC # FLD: 16 % — HIGH (ref 10.3–14.5)
SODIUM SERPL-SCNC: 137 MMOL/L — SIGNIFICANT CHANGE UP (ref 135–145)
WBC # BLD: 10.85 K/UL — HIGH (ref 3.8–10.5)
WBC # FLD AUTO: 10.85 K/UL — HIGH (ref 3.8–10.5)

## 2022-01-04 PROCEDURE — 99232 SBSQ HOSP IP/OBS MODERATE 35: CPT

## 2022-01-04 PROCEDURE — 99024 POSTOP FOLLOW-UP VISIT: CPT

## 2022-01-04 RX ORDER — MAGNESIUM SULFATE 500 MG/ML
2 VIAL (ML) INJECTION
Refills: 0 | Status: COMPLETED | OUTPATIENT
Start: 2022-01-04 | End: 2022-01-04

## 2022-01-04 RX ORDER — HYDROMORPHONE HYDROCHLORIDE 2 MG/ML
1 INJECTION INTRAMUSCULAR; INTRAVENOUS; SUBCUTANEOUS ONCE
Refills: 0 | Status: DISCONTINUED | OUTPATIENT
Start: 2022-01-04 | End: 2022-01-04

## 2022-01-04 RX ADMIN — GABAPENTIN 300 MILLIGRAM(S): 400 CAPSULE ORAL at 05:23

## 2022-01-04 RX ADMIN — HYDROMORPHONE HYDROCHLORIDE 0.5 MILLIGRAM(S): 2 INJECTION INTRAMUSCULAR; INTRAVENOUS; SUBCUTANEOUS at 15:36

## 2022-01-04 RX ADMIN — MAGNESIUM OXIDE 400 MG ORAL TABLET 400 MILLIGRAM(S): 241.3 TABLET ORAL at 09:18

## 2022-01-04 RX ADMIN — HYDROMORPHONE HYDROCHLORIDE 4 MILLIGRAM(S): 2 INJECTION INTRAMUSCULAR; INTRAVENOUS; SUBCUTANEOUS at 18:11

## 2022-01-04 RX ADMIN — Medication 25 GRAM(S): at 11:14

## 2022-01-04 RX ADMIN — HYDROMORPHONE HYDROCHLORIDE 4 MILLIGRAM(S): 2 INJECTION INTRAMUSCULAR; INTRAVENOUS; SUBCUTANEOUS at 22:39

## 2022-01-04 RX ADMIN — HYDROMORPHONE HYDROCHLORIDE 1 MILLIGRAM(S): 2 INJECTION INTRAMUSCULAR; INTRAVENOUS; SUBCUTANEOUS at 13:26

## 2022-01-04 RX ADMIN — Medication 10 MILLIGRAM(S): at 22:39

## 2022-01-04 RX ADMIN — ENOXAPARIN SODIUM 40 MILLIGRAM(S): 100 INJECTION SUBCUTANEOUS at 13:25

## 2022-01-04 RX ADMIN — HYDROMORPHONE HYDROCHLORIDE 1 MILLIGRAM(S): 2 INJECTION INTRAMUSCULAR; INTRAVENOUS; SUBCUTANEOUS at 13:45

## 2022-01-04 RX ADMIN — PANTOPRAZOLE SODIUM 40 MILLIGRAM(S): 20 TABLET, DELAYED RELEASE ORAL at 18:09

## 2022-01-04 RX ADMIN — HYDROMORPHONE HYDROCHLORIDE 4 MILLIGRAM(S): 2 INJECTION INTRAMUSCULAR; INTRAVENOUS; SUBCUTANEOUS at 11:00

## 2022-01-04 RX ADMIN — Medication 25 GRAM(S): at 09:15

## 2022-01-04 RX ADMIN — Medication 0.5 MILLIGRAM(S): at 13:24

## 2022-01-04 RX ADMIN — HYDROMORPHONE HYDROCHLORIDE 0.5 MILLIGRAM(S): 2 INJECTION INTRAMUSCULAR; INTRAVENOUS; SUBCUTANEOUS at 16:00

## 2022-01-04 RX ADMIN — Medication 10 MILLIGRAM(S): at 13:26

## 2022-01-04 RX ADMIN — MEROPENEM 100 MILLIGRAM(S): 1 INJECTION INTRAVENOUS at 22:40

## 2022-01-04 RX ADMIN — MEROPENEM 100 MILLIGRAM(S): 1 INJECTION INTRAVENOUS at 15:36

## 2022-01-04 RX ADMIN — PANTOPRAZOLE SODIUM 40 MILLIGRAM(S): 20 TABLET, DELAYED RELEASE ORAL at 05:22

## 2022-01-04 RX ADMIN — DAPTOMYCIN 120 MILLIGRAM(S): 500 INJECTION, POWDER, LYOPHILIZED, FOR SOLUTION INTRAVENOUS at 20:58

## 2022-01-04 RX ADMIN — Medication 10 MILLIGRAM(S): at 05:23

## 2022-01-04 RX ADMIN — MAGNESIUM OXIDE 400 MG ORAL TABLET 400 MILLIGRAM(S): 241.3 TABLET ORAL at 18:08

## 2022-01-04 RX ADMIN — MEROPENEM 100 MILLIGRAM(S): 1 INJECTION INTRAVENOUS at 05:23

## 2022-01-04 RX ADMIN — HYDROMORPHONE HYDROCHLORIDE 4 MILLIGRAM(S): 2 INJECTION INTRAMUSCULAR; INTRAVENOUS; SUBCUTANEOUS at 03:38

## 2022-01-04 RX ADMIN — HYDROMORPHONE HYDROCHLORIDE 4 MILLIGRAM(S): 2 INJECTION INTRAMUSCULAR; INTRAVENOUS; SUBCUTANEOUS at 18:37

## 2022-01-04 RX ADMIN — HYDROMORPHONE HYDROCHLORIDE 4 MILLIGRAM(S): 2 INJECTION INTRAMUSCULAR; INTRAVENOUS; SUBCUTANEOUS at 23:15

## 2022-01-04 RX ADMIN — HYDROMORPHONE HYDROCHLORIDE 4 MILLIGRAM(S): 2 INJECTION INTRAMUSCULAR; INTRAVENOUS; SUBCUTANEOUS at 10:15

## 2022-01-04 RX ADMIN — HYDROMORPHONE HYDROCHLORIDE 4 MILLIGRAM(S): 2 INJECTION INTRAMUSCULAR; INTRAVENOUS; SUBCUTANEOUS at 03:08

## 2022-01-04 RX ADMIN — GABAPENTIN 300 MILLIGRAM(S): 400 CAPSULE ORAL at 22:39

## 2022-01-04 RX ADMIN — MAGNESIUM OXIDE 400 MG ORAL TABLET 400 MILLIGRAM(S): 241.3 TABLET ORAL at 13:25

## 2022-01-04 RX ADMIN — GABAPENTIN 300 MILLIGRAM(S): 400 CAPSULE ORAL at 13:25

## 2022-01-04 NOTE — PROGRESS NOTE ADULT - ASSESSMENT
51y Female present to ED with left leg swelling, erythema, pain. Patient endorses she had left knee pain for 1 week presented 3 days ago to ED  where she states was given an steroid shot, and ibuprofen. however pain and edema worsened. Patient states she wasnt feeling herself today reason why she came. Endorses chills initially with pain 3 days ago but none since, denies history of trauma, insect bites, recent interventions, or injections to the area, contact with ticks, history of diabetes.  PT AS ABOVE WITH LEFT LEG SWELLING PT WITH INCREASED WBC PLACED ON ABX FOR PRESUMED INFECTION  PT WITH CT SCAN WITH FASCIAL EDAM PT BROUGHT TO THE OR EMERGENTLY AND  UNDERWENT FASCIOTOMY   PT BROUGHT TO THE OR  BOTH TRAUMA AND ORTHO INVOLVED  PURULENT FLUID FOUND  NECROTIZING SOFT TISSUE INFECTION    BLOOD CX WITH GROUP A STREP AND  OPERATIVE FLUID WITH STREP   PT WAS  ON PCN AND CLINDA for POSSIBLE ANTITOXIN EFFECT IN GROUP A STREP INFECTION     PT  S/P OR WASHOUT 12.2  AND  OR AGAIN 12/7  and late on 12/10 121/15 12/17 and 12/20 12/23     REPEAT   OPERATIVE CX NEG             AS ABOVE  REPEAT  OR  12/20 12/23      NO PURULENCE   OVERALL NON TOXIC  AFEBRILE       CT SCAN DONE WITH POSSIBLE ABSCESS   S/ RETURN TO THE OR 12/28 AND TODAY 12/31  WILL FOLLOW UP  OPERATIVE CX NEGATIVE   CONTINUE FOR MERREM/DAPTOMYCIN  WILL FOLLOW UP .  OVERALL NON TOXIC  WILL FOLLOWUP

## 2022-01-04 NOTE — PROGRESS NOTE ADULT - SUBJECTIVE AND OBJECTIVE BOX
SUBJECTIVE:    Patient evaluated at bedside. No acute distress. No acute events overnight.     Vital Signs Last 24 Hrs  T(C): 37.2 (04 Jan 2022 04:49), Max: 37.3 (03 Jan 2022 17:42)  T(F): 98.9 (04 Jan 2022 04:49), Max: 99.1 (03 Jan 2022 17:42)  HR: 91 (04 Jan 2022 04:49) (87 - 96)  BP: 107/68 (04 Jan 2022 04:49) (107/68 - 119/74)  BP(mean): --  RR: 18 (04 Jan 2022 04:49) (18 - 18)  SpO2: 96% (04 Jan 2022 04:49) (93% - 97%)    PHYSICAL EXAM:  Constitutional: patient resting comfortably in bed, in no acute distress  HEENT: EOMI, no active drainage or redness  Neck: Full ROM without pain  Respiratory: respirations are unlabored, no accessory muscle use, no conversational dyspnea  Cardiovascular: regular rate & rhythm  Gastrointestinal: Abdomen soft, non-tender, non-distended  Neurological: A&O x 3; unchanged motor/sensory exam  Extremities: wound vac working appropriately. WWP, limit ROM of LLE due to pain     I&O's Summary    02 Jan 2022 07:01  -  03 Jan 2022 07:00  --------------------------------------------------------  IN: 0 mL / OUT: 2200 mL / NET: -2200 mL    03 Jan 2022 07:01  -  04 Jan 2022 05:49  --------------------------------------------------------  IN: 0 mL / OUT: 1300 mL / NET: -1300 mL      I&O's Detail    02 Jan 2022 07:01  -  03 Jan 2022 07:00  --------------------------------------------------------  IN:  Total IN: 0 mL    OUT:    Accordian (mL): 0 mL    Voided (mL): 2200 mL  Total OUT: 2200 mL    Total NET: -2200 mL      03 Jan 2022 07:01  -  04 Jan 2022 05:49  --------------------------------------------------------  IN:  Total IN: 0 mL    OUT:    Voided (mL): 1300 mL  Total OUT: 1300 mL    Total NET: -1300 mL          MEDICATIONS  (STANDING):  baclofen 10 milliGRAM(s) Oral three times a day  DAPTOmycin IVPB 500 milliGRAM(s) IV Intermittent every 24 hours  enoxaparin Injectable 40 milliGRAM(s) SubCutaneous daily  gabapentin 300 milliGRAM(s) Oral three times a day  HYDROmorphone  Injectable 0.5 milliGRAM(s) IV Push every 4 hours  lisinopril 5 milliGRAM(s) Oral daily  magnesium oxide 400 milliGRAM(s) Oral three times a day with meals  meropenem  IVPB 1000 milliGRAM(s) IV Intermittent every 8 hours  pantoprazole    Tablet 40 milliGRAM(s) Oral two times a day    MEDICATIONS  (PRN):  acetaminophen     Tablet .. 650 milliGRAM(s) Oral every 6 hours PRN Temp greater or equal to 38C (100.4F), Mild Pain (1 - 3)  acetaminophen     Tablet .. 975 milliGRAM(s) Oral every 8 hours PRN Temp greater or equal to 38C (100.4F), Mild Pain (1 - 3), Moderate Pain (4 - 6), Severe Pain (7 - 10)  aluminum hydroxide/magnesium hydroxide/simethicone Suspension 30 milliLiter(s) Oral every 4 hours PRN Dyspepsia  HYDROmorphone   Tablet 2 milliGRAM(s) Oral every 4 hours PRN Moderate Pain (4 - 6)  HYDROmorphone   Tablet 4 milliGRAM(s) Oral every 4 hours PRN Severe Pain (7 - 10)  melatonin 3 milliGRAM(s) Oral at bedtime PRN Insomnia  ondansetron Injectable 4 milliGRAM(s) IV Push every 8 hours PRN Nausea and/or Vomiting  oxyCODONE    IR 5 milliGRAM(s) Oral once PRN Moderate Pain (4 - 6)      LABS:                        7.7    10.16 )-----------( 453      ( 03 Jan 2022 04:41 )             23.8     01-03    133<L>  |  98  |  10.8  ----------------------------<  103<H>  4.3   |  25.0  |  0.52    Ca    8.7      03 Jan 2022 04:41  Phos  4.5     01-03  Mg     1.6     01-03            RADIOLOGY & ADDITIONAL STUDIES:

## 2022-01-04 NOTE — PROGRESS NOTE ADULT - SUBJECTIVE AND OBJECTIVE BOX
ORTHO-TRAUMA SERVICE      Pt Name: LUIS FERNANDO DAVIS    MRN: [id number]      Patient is a 51 year old female being followed Patient currently POD7 from left knee I&D. Bilateral septic Knee. Patient with multiple I&D by Ortho and ACS. Patient denies N/V, fever, chills.      PAST MEDICAL & SURGICAL HISTORY:  PAST MEDICAL & SURGICAL HISTORY:  No pertinent past medical history    History of ankle surgery      Allergies: No Known Allergies    Medications: acetaminophen     Tablet .. 650 milliGRAM(s) Oral every 6 hours PRN  acetaminophen     Tablet .. 975 milliGRAM(s) Oral every 8 hours PRN  aluminum hydroxide/magnesium hydroxide/simethicone Suspension 30 milliLiter(s) Oral every 4 hours PRN  baclofen 10 milliGRAM(s) Oral three times a day  DAPTOmycin IVPB 500 milliGRAM(s) IV Intermittent every 24 hours  enoxaparin Injectable 40 milliGRAM(s) SubCutaneous daily  gabapentin 300 milliGRAM(s) Oral three times a day  HYDROmorphone   Tablet 2 milliGRAM(s) Oral every 4 hours PRN  HYDROmorphone   Tablet 4 milliGRAM(s) Oral every 4 hours PRN  HYDROmorphone  Injectable 0.5 milliGRAM(s) IV Push every 4 hours  lisinopril 5 milliGRAM(s) Oral daily  magnesium oxide 400 milliGRAM(s) Oral three times a day with meals  magnesium sulfate  IVPB 2 Gram(s) IV Intermittent every 1 hour  melatonin 3 milliGRAM(s) Oral at bedtime PRN  meropenem  IVPB 1000 milliGRAM(s) IV Intermittent every 8 hours  ondansetron Injectable 4 milliGRAM(s) IV Push every 8 hours PRN  oxyCODONE    IR 5 milliGRAM(s) Oral once PRN  pantoprazole    Tablet 40 milliGRAM(s) Oral two times a day                          7.7    10.16 )-----------( 453      ( 03 Jan 2022 04:41 )             23.8     01-03    133<L>  |  98  |  10.8  ----------------------------<  103<H>  4.3   |  25.0  |  0.52    Ca    8.7      03 Jan 2022 04:41  Phos  4.5     01-03  Mg     1.6     01-03        PHYSICAL EXAM:    Vital Signs Last 24 Hrs  T(C): 37.2 (04 Jan 2022 04:49), Max: 37.3 (03 Jan 2022 17:42)  T(F): 98.9 (04 Jan 2022 04:49), Max: 99.1 (03 Jan 2022 17:42)  HR: 91 (04 Jan 2022 04:49) (87 - 96)  BP: 107/68 (04 Jan 2022 04:49) (107/68 - 119/74)  BP(mean): --  RR: 18 (04 Jan 2022 04:49) (18 - 18)  SpO2: 96% (04 Jan 2022 04:49) (93% - 97%)  Daily     Daily     Appearance: Alert, responsive, in no acute distress.    Neurological: Sensation is grossly intact to light touch.      Skin: no rash on visible skin. Skin is clean, dry and intact. No bleeding. No abrasions. No ulcerations.    Vascular: 2+ distal pulses. Cap refill < 2 sec. No signs of venous insufficiency   or stasis. No extremity ulcerations. No cyanosis.    Musculoskeletal:         Left Lower Extremity: Dressing C/D/I to left knee. FHL intact. Wound vac to lower leg, AFO in place       Right Lower Extremity: Knee incision healed, no swelling, no erythema, no warmth     A/P:  Pt is a 51y Female with POD 7 knee I&D    PLAN:   * continue abx per ID and ACS  * will continue to monitor   * Continue care as per primary team

## 2022-01-04 NOTE — PROGRESS NOTE ADULT - ASSESSMENT
50 y/o female admitted for LLE NSTI and abscess s/p multiple I&D's, debridements, and washouts with ortho for knee joint involvement Etiology remains unclear. HD and Hgb stable    - Wound vac to be changed today  - Regular diet  - Lovenox ppx  - ID following, on merrem and daptomycin  - PRN pain management   - PT   - Discharge planning.

## 2022-01-04 NOTE — CHART NOTE - NSCHARTNOTEFT_GEN_A_CORE
Source: Patient [ ]  Family [ ]   other [ ]  52 y/o female admitted for LLE NSTI and abscess s/p multiple I&D's, debridements, and washouts with ortho for knee joint involvement Etiology remains unclear. HD and Hgb stable  Wound vac to be changed today    Current Diet: Diet, Regular (12-28-21 @ 17:50)        PO intake:  < 50% [ ]   50-75%  [ ]   %  [ x]  other :    Source for PO intake [x ] Patient [ ] family [ ] chart [ ] staff [ ] other        Current Weight:   (1/4) 198.2 lbs- per RD bedscale  (12/29) 181 lbs  (12/21) 194.7 lbs  (12/8) 251.9 lbs  (12/7) 205.9 lbs  (12/1) 234.7 lbs  (11/24) 221.3 lbs  (11/22) 245.1 lbs  (11/21) 242.9 lbs  (11/20) 228.1 lbs  % Weight Change   ? accuracy - pt with non pitting edema to L leg and knee    Pertinent Medications: MEDICATIONS  (STANDING):  baclofen 10 milliGRAM(s) Oral three times a day  DAPTOmycin IVPB 500 milliGRAM(s) IV Intermittent every 24 hours  enoxaparin Injectable 40 milliGRAM(s) SubCutaneous daily  gabapentin 300 milliGRAM(s) Oral three times a day  HYDROmorphone  Injectable 0.5 milliGRAM(s) IV Push every 4 hours  lisinopril 5 milliGRAM(s) Oral daily  magnesium oxide 400 milliGRAM(s) Oral three times a day with meals  magnesium sulfate  IVPB 2 Gram(s) IV Intermittent every 1 hour  meropenem  IVPB 1000 milliGRAM(s) IV Intermittent every 8 hours  pantoprazole    Tablet 40 milliGRAM(s) Oral two times a day    MEDICATIONS  (PRN):  acetaminophen     Tablet .. 975 milliGRAM(s) Oral every 8 hours PRN Temp greater or equal to 38C (100.4F), Mild Pain (1 - 3), Moderate Pain (4 - 6), Severe Pain (7 - 10)  acetaminophen     Tablet .. 650 milliGRAM(s) Oral every 6 hours PRN Temp greater or equal to 38C (100.4F), Mild Pain (1 - 3)  aluminum hydroxide/magnesium hydroxide/simethicone Suspension 30 milliLiter(s) Oral every 4 hours PRN Dyspepsia  HYDROmorphone   Tablet 2 milliGRAM(s) Oral every 4 hours PRN Moderate Pain (4 - 6)  HYDROmorphone   Tablet 4 milliGRAM(s) Oral every 4 hours PRN Severe Pain (7 - 10)  melatonin 3 milliGRAM(s) Oral at bedtime PRN Insomnia  ondansetron Injectable 4 milliGRAM(s) IV Push every 8 hours PRN Nausea and/or Vomiting  oxyCODONE    IR 5 milliGRAM(s) Oral once PRN Moderate Pain (4 - 6)    Pertinent Labs: CBC Full  -  ( 04 Jan 2022 08:26 )  WBC Count : 10.85 K/uL  RBC Count : 2.77 M/uL  Hemoglobin : 7.7 g/dL  Hematocrit : 24.4 %  Platelet Count - Automated : 546 K/uL  Mean Cell Volume : 88.1 fl  Mean Cell Hemoglobin : 27.8 pg  Mean Cell Hemoglobin Concentration : 31.6 gm/dL  Auto Neutrophil # : 5.73 K/uL  Auto Lymphocyte # : 3.33 K/uL  Auto Monocyte # : 1.25 K/uL  Auto Eosinophil # : 0.42 K/uL  Auto Basophil # : 0.08 K/uL  Auto Neutrophil % : 52.8 %  Auto Lymphocyte % : 30.7 %  Auto Monocyte % : 11.5 %  Auto Eosinophil % : 3.9 %  Auto Basophil % : 0.7 %  01-04 Na137 mmol/L Glu 95 mg/dL K+ 4.3 mmol/L Cr  0.44 mg/dL<L> BUN 13.7 mg/dL Phos 4.8 mg/dL<H> Alb n/a   PAB n/a               Skin: multiple sx incision wounds to L leg and knee    Nutrition focused physical exam conducted - found signs of malnutrition [ x]absent [ ]present    Subcutaneous fat loss: [ ] Orbital fat pads region, [ ]Buccal fat region, [ ]Triceps region,  [ ]Ribs region    Muscle wasting: [ ]Temples region, [ ]Clavicle region, [ ]Shoulder region, [ ]Scapula region, [ ]Interosseous region,  [ ]thigh region, [ ]Calf region    Estimated Needs:   [x ] no change since previous assessment  [ ] recalculated:     Current Nutrition Diagnosis: Pt presents at risk secondary to increased protein calorie needs, related to increased  physiologic demand of stress factor, as evidenced by s/p multiple I&D's, debridements, and washouts for infected LLE. PO intake is good, HBV protein foods encouraged.     Recommendations:   1) Continue Reg diet  2) Daily weight  3) RX MVI    Monitoring and Evaluation:   [x ] PO intake [x ] Tolerance to diet prescription [X] Weights  [X] Follow up per protocol [X] Labs:

## 2022-01-04 NOTE — PROGRESS NOTE ADULT - ATTENDING COMMENTS
Patient seen and examined during rounds  agree with note and exam  dressing taken down  wounds with good granulation tissue, no periwound inflammation  no drainage noted  continue local wound care  will d/w  social work d/c planning

## 2022-01-04 NOTE — PROGRESS NOTE ADULT - SUBJECTIVE AND OBJECTIVE BOX
INFECTIOUS DISEASES AND INTERNAL MEDICINE at [facility]  =======================================================  Theo Morrow MD  Diplomates [employer] Board of Internal Medicine and Infectious Diseases  Telephone [phone]  Fax            [phone]  =======================================================    RICK DAVISNE [id number]  Follow up: group A STREP NECROTIZING SOFT TISSUE INFECTION     repeat CT scan of lower ext with collections.   S/P OR WASHOUT 12/2  AND   OR AGAIN  12/7   late  on 12/10 12/15 12/17  12/20 12/23     AFEBRILE THIS MORNING in nad  REPEAT CX SCAN DONE  S/P OR  AGAIN  12/28  CX SO FAR NEG  REMAINS AFEBRILE  NON TOXIC    Allergies:  No Known Allergies       FAMILY   FAMILY HISTORY:  FH: HTN (hypertension) (Mother)      REVIEW OF SYSTEMS:  CONSTITUTIONAL:  No Fever or chills  HEENT:   No diplopia or blurred vision.  No earache, sore throat or runny nose.  CARDIOVASCULAR:  No pressure, squeezing, strangling, tightness, heaviness or aching about the chest, neck, axilla or epigastrium.  RESPIRATORY:  No cough, shortness of breath, PND or orthopnea.  GASTROINTESTINAL:  No nausea, vomiting or diarrhea.  GENITOURINARY:  No dysuria, frequency or urgency. No Blood in urine  MUSCULOSKELETAL:   AS PER HPI      NEUROLOGIC:  No paresthesias, fasciculations, seizures or weakness.  PSYCHIATRIC:  No disorder of thought or mood.  ENDOCRINE:  No heat or cold intolerance, polyuria or polydipsia.  HEMATOLOGICAL:  No easy bruising or bleeding.            Physical Exam:     GEN: NAD,    HEENT: normocephalic and atraumatic. EOMI. ASHISH.    NECK: Supple. No carotid bruits.  No lymphadenopathy or thyromegaly.  LUNGS: Clear to auscultation.  HEART: Regular rate and rhythm without murmur.  ABDOMEN: Soft, nontender, and nondistended.  Positive bowel sounds.    : No CVA tenderness  EXTREMITIES: LEFT LEG WRAPPED; left thigh vac in place  right knee with dressing in place       NEUROLOGIC:  C AWAKE ALERT Appropriate affect .  SKIN: No ulceration or induration present.           Vitals:  == Vital Signs Last 24 Hrs  T(C): 37.3 (03 Jan 2022 05:36), Max: 37.6 (02 Jan 2022 22:07)  T(F): 99.2 (03 Jan 2022 05:36), Max: 99.7 (02 Jan 2022 22:07)  HR: 88 (03 Jan 2022 05:36) (88 - 89)  BP: 110/70 (03 Jan 2022 05:36) (102/66 - 110/70)  BP(mean): --  RR: 18 (03 Jan 2022 05:36) (18 - 18)  SpO2: 100% (03 Jan 2022 05:36) (94% - 100%)    Other medications:  baclofen 10 milliGRAM(s) Oral three times a day  collagenase Ointment 1 Application(s) Topical two times a day  gabapentin 200 milliGRAM(s) Oral three times a day  glucagon  Injectable 1 milliGRAM(s) IntraMuscular once  heparin   Injectable 5000 Unit(s) SubCutaneous every 8 hours  lisinopril 5 milliGRAM(s) Oral daily  methocarbamol 750 milliGRAM(s) Oral three times a day  pantoprazole    Tablet 40 milliGRAM(s) Oral two times a day  polyethylene glycol 3350 17 Gram(s) Oral daily  senna 2 Tablet(s) Oral at bedtime  sodium chloride 1 Gram(s) Oral every 12 hours  sodium chloride 0.9%. 1000 milliLiter(s) IV Continuous <Continuous>      =======================================================  Labs:                                                                      7.7    10.85 )-----------( 546      ( 04 Jan 2022 08:26 )             24.4   01-04    137  |  100  |  13.7  ----------------------------<  95  4.3   |  24.0  |  0.44<L>    Ca    9.2      04 Jan 2022 08:26  Phos  4.8     01-04  Mg     1.8     01-04                e E: (C=Critical, N=Notification, A=Abnormal) C    TESTS:  _ GS    DATE/TIME CALLED: _ 11/18/2021 09:40:08    CALLED TO: Chris Hernandez RN    READ BACK (2 Patient Identifiers)(Y/N): _ Y    READ BACK VALUES (Y/N): _ Y    CALLED BY: Chris Quiñones  Final Report (11-21-21 @ 12:28):    Growth in aerobic and anaerobic bottles: Streptococcus pyogenes (Group A)  Organism: Blood Culture PCR  Streptococcus pyogenes (Group A) (11-21-21 @ 12:28)  Organism: Streptococcus pyogenes (Group A) (11-21-21 @ 12:28)    Sensitivities:      -  Ceftriaxone: S 0.016      -  Penicillin: S 0.016 Predicts results for ampicillin, amoxicillin, amoxicillin/clavulanate, ampicillin/sulbactam, 1st, 2nd and 3rd generation cephalosporins and carbapenems.      Method Type: ETEST  Organism: Streptococcus pyogenes (Group A) (11-21-21 @ 12:28)    Sensitivities:      -  Vancomycin: S      Method Type: KB  Organism: Blood Culture PCR (11-21-21 @ 12:28)    Sensitivities:      -  Streptococcus pyogenes (Group A): Detec      Method Type: PCR      Creatinine, Serum: 0.75 mg/dL (11-30-21 @ 07:18)  Creatinine, Serum: 0.68 mg/dL (11-29-21 @ 09:55)  Creatinine, Serum: 0.64 mg/dL (11-28-21 @ 09:14)  Creatinine, Serum: 0.65 mg/dL (11-27-21 @ 11:51)  Creatinine, Serum: 0.57 mg/dL (11-27-21 @ 05:35)  Creatinine, Serum: 0.61 mg/dL (11-26-21 @ 09:01)        Ferritin, Serum: 388 ng/mL (11-17-21 @ 13:21)    C-Reactive Protein, Serum: 197 mg/L (11-26-21 @ 21:52)  C-Reactive Protein, Serum: 211 mg/L (11-26-21 @ 09:01)  C-Reactive Protein, Serum: 53 mg/L (11-23-21 @ 07:42)  C-Reactive Protein, Serum: >422 mg/L (11-17-21 @ 13:21)    Sedimentation Rate, Erythrocyte: 65 mm/hr (11-26-21 @ 21:52)  Sedimentation Rate, Erythrocyte: 63 mm/hr (11-26-21 @ 09:01)  Sedimentation Rate, Erythrocyte: 55 mm/hr (11-17-21 @ 13:21)    WBC Count: 14.55 K/uL (11-30-21 @ 07:18)  WBC Count: 16.71 K/uL (11-29-21 @ 09:54)  WBC Count: 17.02 K/uL (11-28-21 @ 09:14)  WBC Count: 15.99 K/uL (11-27-21 @ 05:33)  WBC Count: 18.73 K/uL (11-26-21 @ 09:01)      COVID-19 PCR: NotDetec (11-30-21 @ 01:59)  COVID-19 PCR: NotDetec (11-23-21 @ 18:50)  COVID-19 PCR: NotDetec (11-17-21 @ 13:18)    Lactate Dehydrogenase, Serum: 769 U/L (11-20-21 @ 03:56)    Alkaline Phosphatase, Serum: 88 U/L (11-27-21 @ 05:35)  Alanine Aminotransferase (ALT/SGPT): 11 U/L (11-27-21 @ 05:35)  Aspartate Aminotransferase (AST/SGOT): 25 U/L (11-27-21 @ 05:35)  Bilirubin Total, Serum: 0.3 mg/dL (11-27-21 @ 05:35)        < from: CT Lower Extremity w/ IV Cont, Bilateral (11.30.21 @ 09:55) >     EXAM:  CT LWR EXT IC BI                          PROCEDURE DATE:  11/30/2021          INTERPRETATION:  CT LOWER EXTREMITY WITH IV CONTRAST BILATERAL dated 11/30/2021 9:55 AM    INDICATION: Fever. Status post incision and drainage bilaterally. Persistent fevers to 104.    COMPARISON: CT of the right knee dated 11/23/2021. CT of the left lower extremity dated 11/17/2021    VICKI -

## 2022-01-05 LAB
ANION GAP SERPL CALC-SCNC: 11 MMOL/L — SIGNIFICANT CHANGE UP (ref 5–17)
BUN SERPL-MCNC: 15 MG/DL — SIGNIFICANT CHANGE UP (ref 8–20)
CALCIUM SERPL-MCNC: 8.5 MG/DL — LOW (ref 8.6–10.2)
CHLORIDE SERPL-SCNC: 96 MMOL/L — LOW (ref 98–107)
CO2 SERPL-SCNC: 24 MMOL/L — SIGNIFICANT CHANGE UP (ref 22–29)
CREAT SERPL-MCNC: 0.52 MG/DL — SIGNIFICANT CHANGE UP (ref 0.5–1.3)
GLUCOSE SERPL-MCNC: 107 MG/DL — HIGH (ref 70–99)
HCT VFR BLD CALC: 24.4 % — LOW (ref 34.5–45)
HGB BLD-MCNC: 7.7 G/DL — LOW (ref 11.5–15.5)
MAGNESIUM SERPL-MCNC: 1.8 MG/DL — SIGNIFICANT CHANGE UP (ref 1.6–2.6)
MCHC RBC-ENTMCNC: 27.7 PG — SIGNIFICANT CHANGE UP (ref 27–34)
MCHC RBC-ENTMCNC: 31.6 GM/DL — LOW (ref 32–36)
MCV RBC AUTO: 87.8 FL — SIGNIFICANT CHANGE UP (ref 80–100)
PHOSPHATE SERPL-MCNC: 4.4 MG/DL — SIGNIFICANT CHANGE UP (ref 2.4–4.7)
PLATELET # BLD AUTO: 533 K/UL — HIGH (ref 150–400)
POTASSIUM SERPL-MCNC: 4.5 MMOL/L — SIGNIFICANT CHANGE UP (ref 3.5–5.3)
POTASSIUM SERPL-SCNC: 4.5 MMOL/L — SIGNIFICANT CHANGE UP (ref 3.5–5.3)
RBC # BLD: 2.78 M/UL — LOW (ref 3.8–5.2)
RBC # FLD: 16.1 % — HIGH (ref 10.3–14.5)
SARS-COV-2 RNA SPEC QL NAA+PROBE: SIGNIFICANT CHANGE UP
SODIUM SERPL-SCNC: 131 MMOL/L — LOW (ref 135–145)
WBC # BLD: 10.41 K/UL — SIGNIFICANT CHANGE UP (ref 3.8–10.5)
WBC # FLD AUTO: 10.41 K/UL — SIGNIFICANT CHANGE UP (ref 3.8–10.5)

## 2022-01-05 PROCEDURE — 99232 SBSQ HOSP IP/OBS MODERATE 35: CPT

## 2022-01-05 PROCEDURE — 99024 POSTOP FOLLOW-UP VISIT: CPT

## 2022-01-05 RX ORDER — MAGNESIUM SULFATE 500 MG/ML
2 VIAL (ML) INJECTION ONCE
Refills: 0 | Status: COMPLETED | OUTPATIENT
Start: 2022-01-05 | End: 2022-01-05

## 2022-01-05 RX ADMIN — MAGNESIUM OXIDE 400 MG ORAL TABLET 400 MILLIGRAM(S): 241.3 TABLET ORAL at 17:15

## 2022-01-05 RX ADMIN — DAPTOMYCIN 120 MILLIGRAM(S): 500 INJECTION, POWDER, LYOPHILIZED, FOR SOLUTION INTRAVENOUS at 20:40

## 2022-01-05 RX ADMIN — HYDROMORPHONE HYDROCHLORIDE 0.5 MILLIGRAM(S): 2 INJECTION INTRAMUSCULAR; INTRAVENOUS; SUBCUTANEOUS at 23:00

## 2022-01-05 RX ADMIN — HYDROMORPHONE HYDROCHLORIDE 0.5 MILLIGRAM(S): 2 INJECTION INTRAMUSCULAR; INTRAVENOUS; SUBCUTANEOUS at 06:43

## 2022-01-05 RX ADMIN — MAGNESIUM OXIDE 400 MG ORAL TABLET 400 MILLIGRAM(S): 241.3 TABLET ORAL at 13:05

## 2022-01-05 RX ADMIN — HYDROMORPHONE HYDROCHLORIDE 0.5 MILLIGRAM(S): 2 INJECTION INTRAMUSCULAR; INTRAVENOUS; SUBCUTANEOUS at 10:48

## 2022-01-05 RX ADMIN — ENOXAPARIN SODIUM 40 MILLIGRAM(S): 100 INJECTION SUBCUTANEOUS at 10:18

## 2022-01-05 RX ADMIN — PANTOPRAZOLE SODIUM 40 MILLIGRAM(S): 20 TABLET, DELAYED RELEASE ORAL at 17:15

## 2022-01-05 RX ADMIN — HYDROMORPHONE HYDROCHLORIDE 0.5 MILLIGRAM(S): 2 INJECTION INTRAMUSCULAR; INTRAVENOUS; SUBCUTANEOUS at 10:18

## 2022-01-05 RX ADMIN — Medication 10 MILLIGRAM(S): at 06:03

## 2022-01-05 RX ADMIN — MAGNESIUM OXIDE 400 MG ORAL TABLET 400 MILLIGRAM(S): 241.3 TABLET ORAL at 08:07

## 2022-01-05 RX ADMIN — GABAPENTIN 300 MILLIGRAM(S): 400 CAPSULE ORAL at 06:03

## 2022-01-05 RX ADMIN — HYDROMORPHONE HYDROCHLORIDE 0.5 MILLIGRAM(S): 2 INJECTION INTRAMUSCULAR; INTRAVENOUS; SUBCUTANEOUS at 13:35

## 2022-01-05 RX ADMIN — MEROPENEM 100 MILLIGRAM(S): 1 INJECTION INTRAVENOUS at 22:02

## 2022-01-05 RX ADMIN — PANTOPRAZOLE SODIUM 40 MILLIGRAM(S): 20 TABLET, DELAYED RELEASE ORAL at 06:03

## 2022-01-05 RX ADMIN — MEROPENEM 100 MILLIGRAM(S): 1 INJECTION INTRAVENOUS at 06:02

## 2022-01-05 RX ADMIN — HYDROMORPHONE HYDROCHLORIDE 0.5 MILLIGRAM(S): 2 INJECTION INTRAMUSCULAR; INTRAVENOUS; SUBCUTANEOUS at 17:45

## 2022-01-05 RX ADMIN — GABAPENTIN 300 MILLIGRAM(S): 400 CAPSULE ORAL at 22:03

## 2022-01-05 RX ADMIN — HYDROMORPHONE HYDROCHLORIDE 0.5 MILLIGRAM(S): 2 INJECTION INTRAMUSCULAR; INTRAVENOUS; SUBCUTANEOUS at 06:03

## 2022-01-05 RX ADMIN — MEROPENEM 100 MILLIGRAM(S): 1 INJECTION INTRAVENOUS at 13:59

## 2022-01-05 RX ADMIN — HYDROMORPHONE HYDROCHLORIDE 0.5 MILLIGRAM(S): 2 INJECTION INTRAMUSCULAR; INTRAVENOUS; SUBCUTANEOUS at 22:02

## 2022-01-05 RX ADMIN — HYDROMORPHONE HYDROCHLORIDE 0.5 MILLIGRAM(S): 2 INJECTION INTRAMUSCULAR; INTRAVENOUS; SUBCUTANEOUS at 17:15

## 2022-01-05 RX ADMIN — Medication 10 MILLIGRAM(S): at 13:06

## 2022-01-05 RX ADMIN — HYDROMORPHONE HYDROCHLORIDE 0.5 MILLIGRAM(S): 2 INJECTION INTRAMUSCULAR; INTRAVENOUS; SUBCUTANEOUS at 13:05

## 2022-01-05 RX ADMIN — Medication 25 GRAM(S): at 08:07

## 2022-01-05 RX ADMIN — GABAPENTIN 300 MILLIGRAM(S): 400 CAPSULE ORAL at 13:06

## 2022-01-05 RX ADMIN — HYDROMORPHONE HYDROCHLORIDE 4 MILLIGRAM(S): 2 INJECTION INTRAMUSCULAR; INTRAVENOUS; SUBCUTANEOUS at 19:06

## 2022-01-05 RX ADMIN — HYDROMORPHONE HYDROCHLORIDE 4 MILLIGRAM(S): 2 INJECTION INTRAMUSCULAR; INTRAVENOUS; SUBCUTANEOUS at 20:05

## 2022-01-05 RX ADMIN — Medication 10 MILLIGRAM(S): at 22:03

## 2022-01-05 NOTE — PROGRESS NOTE ADULT - SUBJECTIVE AND OBJECTIVE BOX
INFECTIOUS DISEASES AND INTERNAL MEDICINE at [place]  =======================================================  Theo Morrow MD  Diplomates [employer] Board of Internal Medicine and Infectious Diseases  Telephone [phone]  Fax            [phone]  =======================================================    RICK DAVISNE [id number]  Follow up: group A STREP NECROTIZING SOFT TISSUE INFECTION     repeat CT scan of lower ext with collections.   S/P OR WASHOUT 12/2  AND   OR AGAIN  12/7   late  on 12/10 12/15 12/17  12/20 12/23     AFEBRILE THIS MORNING in nad  REPEAT CX SCAN DONE  S/P OR  AGAIN  12/28  CX SO FAR NEG  REMAINS AFEBRILE  NON TOXIC    Allergies:  No Known Allergies       FAMILY   FAMILY HISTORY:  FH: HTN (hypertension) (Mother)      REVIEW OF SYSTEMS:  CONSTITUTIONAL:  No Fever or chills  HEENT:   No diplopia or blurred vision.  No earache, sore throat or runny nose.  CARDIOVASCULAR:  No pressure, squeezing, strangling, tightness, heaviness or aching about the chest, neck, axilla or epigastrium.  RESPIRATORY:  No cough, shortness of breath, PND or orthopnea.  GASTROINTESTINAL:  No nausea, vomiting or diarrhea.  GENITOURINARY:  No dysuria, frequency or urgency. No Blood in urine  MUSCULOSKELETAL:   AS PER HPI      NEUROLOGIC:  No paresthesias, fasciculations, seizures or weakness.  PSYCHIATRIC:  No disorder of thought or mood.  ENDOCRINE:  No heat or cold intolerance, polyuria or polydipsia.  HEMATOLOGICAL:  No easy bruising or bleeding.            Physical Exam:     GEN: NAD,    HEENT: normocephalic and atraumatic. EOMI. ASHISH.    NECK: Supple. No carotid bruits.  No lymphadenopathy or thyromegaly.  LUNGS: Clear to auscultation.  HEART: Regular rate and rhythm without murmur.  ABDOMEN: Soft, nontender, and nondistended.  Positive bowel sounds.    : No CVA tenderness  EXTREMITIES: LEFT LEG WRAPPED; left thigh vac in place  right knee with dressing in place       NEUROLOGIC:  C AWAKE ALERT Appropriate affect .  SKIN: No ulceration or induration present.           Vitals:  == Vit Vital Signs Last 24 Hrs  T(C): 36.1 (05 Jan 2022 03:50), Max: 37.2 (04 Jan 2022 21:15)  T(F): 97 (05 Jan 2022 03:50), Max: 99 (04 Jan 2022 21:15)  HR: 76 (05 Jan 2022 03:50) (72 - 98)  BP: 102/65 (05 Jan 2022 03:50) (97/62 - 115/75)  BP(mean): --  RR: 18 (05 Jan 2022 03:50) (18 - 18)  SpO2: 97% (05 Jan 2022 03:50) (94% - 98%)    Other medications:  baclofen 10 milliGRAM(s) Oral three times a day  collagenase Ointment 1 Application(s) Topical two times a day  gabapentin 200 milliGRAM(s) Oral three times a day  glucagon  Injectable 1 milliGRAM(s) IntraMuscular once  heparin   Injectable 5000 Unit(s) SubCutaneous every 8 hours  lisinopril 5 milliGRAM(s) Oral daily  methocarbamol 750 milliGRAM(s) Oral three times a day  pantoprazole    Tablet 40 milliGRAM(s) Oral two times a day  polyethylene glycol 3350 17 Gram(s) Oral daily  senna 2 Tablet(s) Oral at bedtime  sodium chloride 1 Gram(s) Oral every 12 hours  sodium chloride 0.9%. 1000 milliLiter(s) IV Continuous <Continuous>      =======================================================  Labs:                                                                   7.7    10.41 )-----------( 533      ( 05 Jan 2022 05:23 )             24.4   01-05    131<L>  |  96<L>  |  15.0  ----------------------------<  107<H>  4.5   |  24.0  |  0.52    Ca    8.5<L>      05 Jan 2022 05:23  Phos  4.4     01-05  Mg     1.8     01-05                    e E: (C=Critical, N=Notification, A=Abnormal) C    TESTS:  _ GS    DATE/TIME CALLED: _ 11/18/2021 09:40:08    CALLED TO: Chris Hernandez RN    READ BACK (2 Patient Identifiers)(Y/N): _ Y    READ BACK VALUES (Y/N): _ Y    CALLED BY: Chris Quiñones  Final Report (11-21-21 @ 12:28):    Growth in aerobic and anaerobic bottles: Streptococcus pyogenes (Group A)  Organism: Blood Culture PCR  Streptococcus pyogenes (Group A) (11-21-21 @ 12:28)  Organism: Streptococcus pyogenes (Group A) (11-21-21 @ 12:28)    Sensitivities:      -  Ceftriaxone: S 0.016      -  Penicillin: S 0.016 Predicts results for ampicillin, amoxicillin, amoxicillin/clavulanate, ampicillin/sulbactam, 1st, 2nd and 3rd generation cephalosporins and carbapenems.      Method Type: ETEST  Organism: Streptococcus pyogenes (Group A) (11-21-21 @ 12:28)    Sensitivities:      -  Vancomycin: S      Method Type: KB  Organism: Blood Culture PCR (11-21-21 @ 12:28)    Sensitivities:      -  Streptococcus pyogenes (Group A): Detec      Method Type: PCR      Creatinine, Serum: 0.75 mg/dL (11-30-21 @ 07:18)  Creatinine, Serum: 0.68 mg/dL (11-29-21 @ 09:55)  Creatinine, Serum: 0.64 mg/dL (11-28-21 @ 09:14)  Creatinine, Serum: 0.65 mg/dL (11-27-21 @ 11:51)  Creatinine, Serum: 0.57 mg/dL (11-27-21 @ 05:35)  Creatinine, Serum: 0.61 mg/dL (11-26-21 @ 09:01)        Ferritin, Serum: 388 ng/mL (11-17-21 @ 13:21)    C-Reactive Protein, Serum: 197 mg/L (11-26-21 @ 21:52)  C-Reactive Protein, Serum: 211 mg/L (11-26-21 @ 09:01)  C-Reactive Protein, Serum: 53 mg/L (11-23-21 @ 07:42)  C-Reactive Protein, Serum: >422 mg/L (11-17-21 @ 13:21)    Sedimentation Rate, Erythrocyte: 65 mm/hr (11-26-21 @ 21:52)  Sedimentation Rate, Erythrocyte: 63 mm/hr (11-26-21 @ 09:01)  Sedimentation Rate, Erythrocyte: 55 mm/hr (11-17-21 @ 13:21)    WBC Count: 14.55 K/uL (11-30-21 @ 07:18)  WBC Count: 16.71 K/uL (11-29-21 @ 09:54)  WBC Count: 17.02 K/uL (11-28-21 @ 09:14)  WBC Count: 15.99 K/uL (11-27-21 @ 05:33)  WBC Count: 18.73 K/uL (11-26-21 @ 09:01)      COVID-19 PCR: NotDetec (11-30-21 @ 01:59)  COVID-19 PCR: NotDetec (11-23-21 @ 18:50)  COVID-19 PCR: NotDetec (11-17-21 @ 13:18)    Lactate Dehydrogenase, Serum: 769 U/L (11-20-21 @ 03:56)    Alkaline Phosphatase, Serum: 88 U/L (11-27-21 @ 05:35)  Alanine Aminotransferase (ALT/SGPT): 11 U/L (11-27-21 @ 05:35)  Aspartate Aminotransferase (AST/SGOT): 25 U/L (11-27-21 @ 05:35)  Bilirubin Total, Serum: 0.3 mg/dL (11-27-21 @ 05:35)        < from: CT Lower Extremity w/ IV Cont, Bilateral (11.30.21 @ 09:55) >     EXAM:  CT LWR EXT IC BI                          PROCEDURE DATE:  11/30/2021          INTERPRETATION:  CT LOWER EXTREMITY WITH IV CONTRAST BILATERAL dated 11/30/2021 9:55 AM    INDICATION: Fever. Status post incision and drainage bilaterally. Persistent fevers to 104.    COMPARISON: CT of the right knee dated 11/23/2021. CT of the left lower extremity dated 11/17/2021    VICKI -

## 2022-01-05 NOTE — PROGRESS NOTE ADULT - SUBJECTIVE AND OBJECTIVE BOX
INTERVAL HPI/OVERNIGHT EVENTS:    No acute overnight events reported, patient seen at bedside with no major complaints nor issues. Yesterday she tolerated bedside Wound VAC.       MEDICATIONS  (STANDING):  baclofen 10 milliGRAM(s) Oral three times a day  DAPTOmycin IVPB 500 milliGRAM(s) IV Intermittent every 24 hours  enoxaparin Injectable 40 milliGRAM(s) SubCutaneous daily  gabapentin 300 milliGRAM(s) Oral three times a day  HYDROmorphone  Injectable 0.5 milliGRAM(s) IV Push every 4 hours  lisinopril 5 milliGRAM(s) Oral daily  magnesium oxide 400 milliGRAM(s) Oral three times a day with meals  meropenem  IVPB 1000 milliGRAM(s) IV Intermittent every 8 hours  pantoprazole    Tablet 40 milliGRAM(s) Oral two times a day    MEDICATIONS  (PRN):  acetaminophen     Tablet .. 650 milliGRAM(s) Oral every 6 hours PRN Temp greater or equal to 38C (100.4F), Mild Pain (1 - 3)  acetaminophen     Tablet .. 975 milliGRAM(s) Oral every 8 hours PRN Temp greater or equal to 38C (100.4F), Mild Pain (1 - 3), Moderate Pain (4 - 6), Severe Pain (7 - 10)  aluminum hydroxide/magnesium hydroxide/simethicone Suspension 30 milliLiter(s) Oral every 4 hours PRN Dyspepsia  HYDROmorphone   Tablet 2 milliGRAM(s) Oral every 4 hours PRN Moderate Pain (4 - 6)  HYDROmorphone   Tablet 4 milliGRAM(s) Oral every 4 hours PRN Severe Pain (7 - 10)  melatonin 3 milliGRAM(s) Oral at bedtime PRN Insomnia  ondansetron Injectable 4 milliGRAM(s) IV Push every 8 hours PRN Nausea and/or Vomiting  oxyCODONE    IR 5 milliGRAM(s) Oral once PRN Moderate Pain (4 - 6)      Vital Signs Last 24 Hrs  T(C): 37.2 (04 Jan 2022 21:15), Max: 37.2 (04 Jan 2022 04:49)  T(F): 99 (04 Jan 2022 21:15), Max: 99 (04 Jan 2022 21:15)  HR: 98 (04 Jan 2022 21:15) (72 - 98)  BP: 107/67 (04 Jan 2022 21:15) (97/62 - 115/75)  BP(mean): --  RR: 18 (04 Jan 2022 21:15) (18 - 18)  SpO2: 95% (04 Jan 2022 21:15) (94% - 98%)    PE  Constitutional: patient resting comfortably in bed, in no acute distress  HEENT: EOMI, no active drainage or redness  Neck: Full ROM without pain  Respiratory: respirations are unlabored, no accessory muscle use, no conversational dyspnea  Cardiovascular: regular rate & rhythm  Gastrointestinal: Abdomen soft, non-tender, non-distended  Neurological: A&O x 3; unchanged motor/sensory exam  Extremities: wound vac working appropriately. WWP, limit ROM of LLE due to pain      I&O's Detail    03 Jan 2022 07:01  -  04 Jan 2022 07:00  --------------------------------------------------------  IN:  Total IN: 0 mL    OUT:    Voided (mL): 1300 mL  Total OUT: 1300 mL    Total NET: -1300 mL      04 Jan 2022 07:01  -  05 Jan 2022 00:43  --------------------------------------------------------  IN:  Total IN: 0 mL    OUT:    Voided (mL): 1200 mL  Total OUT: 1200 mL    Total NET: -1200 mL          LABS:                        7.7    10.85 )-----------( 546      ( 04 Jan 2022 08:26 )             24.4     01-04    137  |  100  |  13.7  ----------------------------<  95  4.3   |  24.0  |  0.44<L>    Ca    9.2      04 Jan 2022 08:26  Phos  4.8     01-04  Mg     1.8     01-04            RADIOLOGY & ADDITIONAL STUDIES:

## 2022-01-05 NOTE — PROGRESS NOTE ADULT - ASSESSMENT
50 y/o female admitted for LLE NSTI and abscess s/p multiple I&D's, debridements, and washouts with ortho for knee joint involvement Etiology remains unclear. HD and Hgb stable    - Wound vac to be changed 1/4 Wounds appear clean with no pus nor surrounding erythema, good granulation tissue noted, next Wound VAC change for Friday Jan 7. Will consider STSG after next VAC change. Knee drain removed 1/4.   - Regular diet  - Lovenox ppx  - ID following, on merrem and daptomycin  - PRN pain management   - PT   - Discharge planning.

## 2022-01-05 NOTE — PROGRESS NOTE ADULT - SUBJECTIVE AND OBJECTIVE BOX
ORTHO-TRAUMA SERVICE  Pt Name: LUIS FERNANDO DAVIS    MRN: [id number]    Patient is a 51 year old female being followed Patient currently POD8 from left knee I&D. Bilateral septic Knee. Patient with multiple I&D by Ortho and ACS. Patient denies N/V, fever, chills.    PAST MEDICAL & SURGICAL HISTORY:  PAST MEDICAL & SURGICAL HISTORY:  No pertinent past medical history    History of ankle surgery    Allergies: No Known Allergies                          7.7    10.41 )-----------( 533      ( 05 Jan 2022 05:23 )             24.4     01-05    131<L>  |  96<L>  |  15.0  ----------------------------<  107<H>  4.5   |  24.0  |  0.52    Ca    8.5<L>      05 Jan 2022 05:23  Phos  4.4     01-05  Mg     1.8     01-05      PHYSICAL EXAM:    Vital Signs Last 24 Hrs  T(C): 37.1 (05 Jan 2022 09:09), Max: 37.2 (04 Jan 2022 21:15)  T(F): 98.7 (05 Jan 2022 09:09), Max: 99 (04 Jan 2022 21:15)  HR: 95 (05 Jan 2022 09:09) (72 - 98)  BP: 100/60 (05 Jan 2022 09:09) (97/62 - 115/75)  BP(mean): --  RR: 18 (05 Jan 2022 03:50) (18 - 18)  SpO2: 98% (05 Jan 2022 09:09) (94% - 98%)    Appearance: Alert, responsive, in no acute distress.  Neurological: Sensation is grossly intact to light touch.    Vascular: 2+ distal pulses. Cap refill < 2 sec. No signs of venous insufficiency or stasis.     Musculoskeletal:       Left Lower Extremity: No dressing. No draining. FHL intact. EHL/DF diminished. Wound vac to lower leg.     Right Lower Extremity: Knee incision healed. 2 steri stripped intact. +DF/PF/EHL/FHL.    A/P:  Pt is a 51y Female with POD #8 knee I&D    PLAN:   * continue abx per ID and ACS  * will continue to monitor   * Continue care as per primary team

## 2022-01-05 NOTE — PROGRESS NOTE ADULT - ATTENDING COMMENTS
pt seen and examined. drain accidentally pulled by acs in OR, XR shows no residual foreign body. Drain due for removal due to low output anyway. Pt /w continued b/l knee stiffness despite katherin and multiple washouts. R knee ROM 0-70 and L knee 0-30 /w pain. no micromotion tenderness. no effusion. XR does show evidence of infection c/w previous CT scan. WBC normal and afebrile now. s/p drainage of posterior knee abscess by acs. 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 Pt /w need aggressive PT for the b;l knees to work on ROM. ROM was 0-80 in the OR of the L knee. We also discussed the possibility of more surgeries in the future for arthrofibrosis including a possible abx spacer, lysis of adhesions, and eventual tka when the infection is cleared from her LLE. pt is in agreement /w this. Continue IV abx and medical management for the L femur osteo at this time. Aggressive PT for ROM of b/l knees. f/u ID recs. care as per ACS. ortho to continue to follow closely.

## 2022-01-05 NOTE — PROGRESS NOTE ADULT - ATTENDING SUPERVISION STATEMENT
----- Message from Srinivasa Huffman MD sent at 12/20/2019  8:34 AM CST -----  Her folate level is a little low, so I would like her to start taking folic acid 1mg po daily and also start B12 1000mcg monthly   ACP

## 2022-01-05 NOTE — PROGRESS NOTE ADULT - ASSESSMENT
51y Female present to ED with left leg swelling, erythema, pain. Patient endorses she had left knee pain for 1 week presented 3 days ago to ED  where she states was given an steroid shot, and ibuprofen. however pain and edema worsened. Patient states she wasnt feeling herself today reason why she came. Endorses chills initially with pain 3 days ago but none since, denies history of trauma, insect bites, recent interventions, or injections to the area, contact with ticks, history of diabetes.  PT AS ABOVE WITH LEFT LEG SWELLING PT WITH INCREASED WBC PLACED ON ABX FOR PRESUMED INFECTION  PT WITH CT SCAN WITH FASCIAL EDAM PT BROUGHT TO THE OR EMERGENTLY AND  UNDERWENT FASCIOTOMY   PT BROUGHT TO THE OR  BOTH TRAUMA AND ORTHO INVOLVED  PURULENT FLUID FOUND  NECROTIZING SOFT TISSUE INFECTION    BLOOD CX WITH GROUP A STREP AND  OPERATIVE FLUID WITH STREP   PT WAS  ON PCN AND CLINDA for POSSIBLE ANTITOXIN EFFECT IN GROUP A STREP INFECTION     PT  S/P OR WASHOUT 12.2  AND  OR AGAIN 12/7  and late on 12/10 121/15 12/17 and 12/20 12/23     REPEAT   OPERATIVE CX NEG             AS ABOVE  REPEAT  OR  12/20 12/23    S/ RETURN TO THE OR 12/28 AND TODAY 12/31    RECENT VAC DRESSING CHANGE NO PUS   NO PURULENCE   OVERALL NON TOXIC  AFEBRILE            CONTINUE FOR MERREM/DAPTOMYCIN  WILL FOLLOW UP .  OVERALL NON TOXIC

## 2022-01-06 LAB
ANION GAP SERPL CALC-SCNC: 13 MMOL/L — SIGNIFICANT CHANGE UP (ref 5–17)
BUN SERPL-MCNC: 17.3 MG/DL — SIGNIFICANT CHANGE UP (ref 8–20)
CALCIUM SERPL-MCNC: 9.3 MG/DL — SIGNIFICANT CHANGE UP (ref 8.6–10.2)
CHLORIDE SERPL-SCNC: 97 MMOL/L — LOW (ref 98–107)
CO2 SERPL-SCNC: 25 MMOL/L — SIGNIFICANT CHANGE UP (ref 22–29)
CREAT SERPL-MCNC: 0.48 MG/DL — LOW (ref 0.5–1.3)
GLUCOSE SERPL-MCNC: 102 MG/DL — HIGH (ref 70–99)
HCT VFR BLD CALC: 24.5 % — LOW (ref 34.5–45)
HGB BLD-MCNC: 7.7 G/DL — LOW (ref 11.5–15.5)
MAGNESIUM SERPL-MCNC: 1.7 MG/DL — SIGNIFICANT CHANGE UP (ref 1.6–2.6)
MCHC RBC-ENTMCNC: 28 PG — SIGNIFICANT CHANGE UP (ref 27–34)
MCHC RBC-ENTMCNC: 31.4 GM/DL — LOW (ref 32–36)
MCV RBC AUTO: 89.1 FL — SIGNIFICANT CHANGE UP (ref 80–100)
PHOSPHATE SERPL-MCNC: 4.2 MG/DL — SIGNIFICANT CHANGE UP (ref 2.4–4.7)
PLATELET # BLD AUTO: 587 K/UL — HIGH (ref 150–400)
POTASSIUM SERPL-MCNC: 4.4 MMOL/L — SIGNIFICANT CHANGE UP (ref 3.5–5.3)
POTASSIUM SERPL-SCNC: 4.4 MMOL/L — SIGNIFICANT CHANGE UP (ref 3.5–5.3)
RBC # BLD: 2.75 M/UL — LOW (ref 3.8–5.2)
RBC # FLD: 16.2 % — HIGH (ref 10.3–14.5)
SODIUM SERPL-SCNC: 135 MMOL/L — SIGNIFICANT CHANGE UP (ref 135–145)
WBC # BLD: 10.73 K/UL — HIGH (ref 3.8–10.5)
WBC # FLD AUTO: 10.73 K/UL — HIGH (ref 3.8–10.5)

## 2022-01-06 RX ADMIN — HYDROMORPHONE HYDROCHLORIDE 0.5 MILLIGRAM(S): 2 INJECTION INTRAMUSCULAR; INTRAVENOUS; SUBCUTANEOUS at 18:36

## 2022-01-06 RX ADMIN — HYDROMORPHONE HYDROCHLORIDE 0.5 MILLIGRAM(S): 2 INJECTION INTRAMUSCULAR; INTRAVENOUS; SUBCUTANEOUS at 18:06

## 2022-01-06 RX ADMIN — Medication 10 MILLIGRAM(S): at 13:34

## 2022-01-06 RX ADMIN — MAGNESIUM OXIDE 400 MG ORAL TABLET 400 MILLIGRAM(S): 241.3 TABLET ORAL at 09:15

## 2022-01-06 RX ADMIN — ENOXAPARIN SODIUM 40 MILLIGRAM(S): 100 INJECTION SUBCUTANEOUS at 13:34

## 2022-01-06 RX ADMIN — HYDROMORPHONE HYDROCHLORIDE 0.5 MILLIGRAM(S): 2 INJECTION INTRAMUSCULAR; INTRAVENOUS; SUBCUTANEOUS at 22:06

## 2022-01-06 RX ADMIN — GABAPENTIN 300 MILLIGRAM(S): 400 CAPSULE ORAL at 13:34

## 2022-01-06 RX ADMIN — DAPTOMYCIN 120 MILLIGRAM(S): 500 INJECTION, POWDER, LYOPHILIZED, FOR SOLUTION INTRAVENOUS at 18:08

## 2022-01-06 RX ADMIN — HYDROMORPHONE HYDROCHLORIDE 4 MILLIGRAM(S): 2 INJECTION INTRAMUSCULAR; INTRAVENOUS; SUBCUTANEOUS at 04:15

## 2022-01-06 RX ADMIN — Medication 10 MILLIGRAM(S): at 21:20

## 2022-01-06 RX ADMIN — HYDROMORPHONE HYDROCHLORIDE 0.5 MILLIGRAM(S): 2 INJECTION INTRAMUSCULAR; INTRAVENOUS; SUBCUTANEOUS at 09:28

## 2022-01-06 RX ADMIN — MEROPENEM 100 MILLIGRAM(S): 1 INJECTION INTRAVENOUS at 05:37

## 2022-01-06 RX ADMIN — MEROPENEM 100 MILLIGRAM(S): 1 INJECTION INTRAVENOUS at 21:20

## 2022-01-06 RX ADMIN — HYDROMORPHONE HYDROCHLORIDE 0.5 MILLIGRAM(S): 2 INJECTION INTRAMUSCULAR; INTRAVENOUS; SUBCUTANEOUS at 14:28

## 2022-01-06 RX ADMIN — HYDROMORPHONE HYDROCHLORIDE 0.5 MILLIGRAM(S): 2 INJECTION INTRAMUSCULAR; INTRAVENOUS; SUBCUTANEOUS at 05:37

## 2022-01-06 RX ADMIN — MEROPENEM 100 MILLIGRAM(S): 1 INJECTION INTRAVENOUS at 13:34

## 2022-01-06 RX ADMIN — PANTOPRAZOLE SODIUM 40 MILLIGRAM(S): 20 TABLET, DELAYED RELEASE ORAL at 18:06

## 2022-01-06 RX ADMIN — HYDROMORPHONE HYDROCHLORIDE 0.5 MILLIGRAM(S): 2 INJECTION INTRAMUSCULAR; INTRAVENOUS; SUBCUTANEOUS at 13:34

## 2022-01-06 RX ADMIN — HYDROMORPHONE HYDROCHLORIDE 0.5 MILLIGRAM(S): 2 INJECTION INTRAMUSCULAR; INTRAVENOUS; SUBCUTANEOUS at 21:19

## 2022-01-06 RX ADMIN — Medication 10 MILLIGRAM(S): at 05:39

## 2022-01-06 RX ADMIN — HYDROMORPHONE HYDROCHLORIDE 4 MILLIGRAM(S): 2 INJECTION INTRAMUSCULAR; INTRAVENOUS; SUBCUTANEOUS at 03:16

## 2022-01-06 RX ADMIN — GABAPENTIN 300 MILLIGRAM(S): 400 CAPSULE ORAL at 21:20

## 2022-01-06 RX ADMIN — HYDROMORPHONE HYDROCHLORIDE 0.5 MILLIGRAM(S): 2 INJECTION INTRAMUSCULAR; INTRAVENOUS; SUBCUTANEOUS at 06:30

## 2022-01-06 RX ADMIN — HYDROMORPHONE HYDROCHLORIDE 0.5 MILLIGRAM(S): 2 INJECTION INTRAMUSCULAR; INTRAVENOUS; SUBCUTANEOUS at 09:15

## 2022-01-06 RX ADMIN — MAGNESIUM OXIDE 400 MG ORAL TABLET 400 MILLIGRAM(S): 241.3 TABLET ORAL at 13:34

## 2022-01-06 RX ADMIN — GABAPENTIN 300 MILLIGRAM(S): 400 CAPSULE ORAL at 05:38

## 2022-01-06 RX ADMIN — PANTOPRAZOLE SODIUM 40 MILLIGRAM(S): 20 TABLET, DELAYED RELEASE ORAL at 05:38

## 2022-01-06 RX ADMIN — MAGNESIUM OXIDE 400 MG ORAL TABLET 400 MILLIGRAM(S): 241.3 TABLET ORAL at 18:06

## 2022-01-06 NOTE — PROGRESS NOTE ADULT - SUBJECTIVE AND OBJECTIVE BOX
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 Most recently patient is POD #9 s/p L knee irrigation and debridement. Patient was seen and evaluated at bedside. Patient complains of bilateral knee pain and stiffness. Patient offers no additional orthopedic complaints at this time. Patient is participating in bedside PT. Patient denies fever, chills, CP/SOB.        PAST MEDICAL & SURGICAL HISTORY:  No pertinent past medical history    History of ankle surgery        Allergies: No Known Allergies      Medications: acetaminophen     Tablet .. 650 milliGRAM(s) Oral every 6 hours PRN  acetaminophen     Tablet .. 975 milliGRAM(s) Oral every 8 hours PRN  aluminum hydroxide/magnesium hydroxide/simethicone Suspension 30 milliLiter(s) Oral every 4 hours PRN  baclofen 10 milliGRAM(s) Oral three times a day  DAPTOmycin IVPB 500 milliGRAM(s) IV Intermittent every 24 hours  enoxaparin Injectable 40 milliGRAM(s) SubCutaneous daily  gabapentin 300 milliGRAM(s) Oral three times a day  HYDROmorphone   Tablet 2 milliGRAM(s) Oral every 4 hours PRN  HYDROmorphone   Tablet 4 milliGRAM(s) Oral every 4 hours PRN  HYDROmorphone  Injectable 0.5 milliGRAM(s) IV Push every 4 hours  lisinopril 5 milliGRAM(s) Oral daily  magnesium oxide 400 milliGRAM(s) Oral three times a day with meals  melatonin 3 milliGRAM(s) Oral at bedtime PRN  meropenem  IVPB 1000 milliGRAM(s) IV Intermittent every 8 hours  ondansetron Injectable 4 milliGRAM(s) IV Push every 8 hours PRN  pantoprazole    Tablet 40 milliGRAM(s) Oral two times a day                          7.7    10.73 )-----------( 587      ( 06 Jan 2022 05:38 )             24.5     01-06    135  |  97<L>  |  17.3  ----------------------------<  102<H>  4.4   |  25.0  |  0.48<L>    Ca    9.3      06 Jan 2022 05:38  Phos  4.2     01-06  Mg     1.7     01-06        PHYSICAL EXAM:    Vital Signs Last 24 Hrs  T(C): 37.3 (06 Jan 2022 05:45), Max: 37.4 (05 Jan 2022 22:10)  T(F): 99.1 (06 Jan 2022 05:45), Max: 99.4 (05 Jan 2022 22:10)  HR: 90 (06 Jan 2022 05:45) (90 - 97)  BP: 105/67 (06 Jan 2022 05:45) (100/60 - 115/70)  BP(mean): --  RR: 18 (06 Jan 2022 05:45) (18 - 18)  SpO2: 96% (06 Jan 2022 05:45) (93% - 100%)  Daily       Appearance: Alert, responsive, in no acute distress.    Musculoskeletal:       Left Lower Extremity: No dressing. Incision appears benign. No draining. FHL intact. EHL/DF diminished. Wound vac to lower leg and functioning appropriately.     Right Lower Extremity: Knee incision healed. +DF/PF/EHL/FHL. BCR. No knee effusion. Calf supple/nontender.         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 Most recently patient is POD #9 s/p L knee irrigation and debridement.     PLAN:   1. Pain control.  2. DVT PPx.  3. WBAT.   4. PT, aggressive ROM for BL knees.  5. Incentive jose.  6. ABx per ID.

## 2022-01-06 NOTE — PROGRESS NOTE ADULT - SUBJECTIVE AND OBJECTIVE BOX
INTERVAL HPI/OVERNIGHT EVENTS:    No acute overnight events reported, patient seen at bedside with no major complaints nor issues.       MEDICATIONS  (STANDING):  baclofen 10 milliGRAM(s) Oral three times a day  DAPTOmycin IVPB 500 milliGRAM(s) IV Intermittent every 24 hours  enoxaparin Injectable 40 milliGRAM(s) SubCutaneous daily  gabapentin 300 milliGRAM(s) Oral three times a day  HYDROmorphone  Injectable 0.5 milliGRAM(s) IV Push every 4 hours  lisinopril 5 milliGRAM(s) Oral daily  magnesium oxide 400 milliGRAM(s) Oral three times a day with meals  meropenem  IVPB 1000 milliGRAM(s) IV Intermittent every 8 hours  pantoprazole    Tablet 40 milliGRAM(s) Oral two times a day    MEDICATIONS  (PRN):  acetaminophen     Tablet .. 650 milliGRAM(s) Oral every 6 hours PRN Temp greater or equal to 38C (100.4F), Mild Pain (1 - 3)  acetaminophen     Tablet .. 975 milliGRAM(s) Oral every 8 hours PRN Temp greater or equal to 38C (100.4F), Mild Pain (1 - 3), Moderate Pain (4 - 6), Severe Pain (7 - 10)  aluminum hydroxide/magnesium hydroxide/simethicone Suspension 30 milliLiter(s) Oral every 4 hours PRN Dyspepsia  HYDROmorphone   Tablet 2 milliGRAM(s) Oral every 4 hours PRN Moderate Pain (4 - 6)  HYDROmorphone   Tablet 4 milliGRAM(s) Oral every 4 hours PRN Severe Pain (7 - 10)  melatonin 3 milliGRAM(s) Oral at bedtime PRN Insomnia  ondansetron Injectable 4 milliGRAM(s) IV Push every 8 hours PRN Nausea and/or Vomiting  oxyCODONE    IR 5 milliGRAM(s) Oral once PRN Moderate Pain (4 - 6)    Vital Signs Last 24 Hrs  T(C): 37.4 (05 Jan 2022 22:10), Max: 37.4 (05 Jan 2022 22:10)  T(F): 99.4 (05 Jan 2022 22:10), Max: 99.4 (05 Jan 2022 22:10)  HR: 96 (05 Jan 2022 22:10) (76 - 97)  BP: 107/70 (05 Jan 2022 22:10) (100/60 - 115/70)  BP(mean): --  ABP: --  ABP(mean): --  RR: 18 (05 Jan 2022 22:10) (18 - 18)  SpO2: 93% (05 Jan 2022 22:10) (93% - 100%)      PE  Constitutional: patient resting comfortably in bed, in no acute distress  HEENT: EOMI, no active drainage or redness  Neck: Full ROM without pain  Respiratory: respirations are unlabored, no accessory muscle use, no conversational dyspnea  Cardiovascular: regular rate & rhythm  Gastrointestinal: Abdomen soft, non-tender, non-distended  Neurological: A&O x 3; unchanged motor/sensory exam  Extremities: wound vac working appropriately. WWP, limit ROM of LLE due to pain    I&O's Detail    04 Jan 2022 07:01  -  05 Jan 2022 07:00  --------------------------------------------------------  IN:  Total IN: 0 mL    OUT:    Voided (mL): 1200 mL  Total OUT: 1200 mL    Total NET: -1200 mL      05 Jan 2022 07:01  -  06 Jan 2022 01:02  --------------------------------------------------------  IN:  Total IN: 0 mL    OUT:    Voided (mL): 650 mL  Total OUT: 650 mL    Total NET: -650 mL      .  LABS:                         7.7    10.41 )-----------( 533      ( 05 Jan 2022 05:23 )             24.4     01-05    131<L>  |  96<L>  |  15.0  ----------------------------<  107<H>  4.5   |  24.0  |  0.52    Ca    8.5<L>      05 Jan 2022 05:23  Phos  4.4     01-05  Mg     1.8     01-05                RADIOLOGY, EKG & ADDITIONAL TESTS: Reviewed.

## 2022-01-06 NOTE — PROGRESS NOTE ADULT - ASSESSMENT
52 y/o female admitted for LLE NSTI and abscess s/p multiple I&D's, debridements, and washouts with ortho for knee joint involvement Etiology remains unclear. HD and Hgb stable    -  Wounds appear clean with no pus nor surrounding erythema, good granulation tissue noted, next Wound VAC change for Friday Jan 7. Will consider STSG after next VAC change. Knee drain removed 1/4.   - Regular diet  - Lovenox ppx  - ID following, on merrem and daptomycin  - PRN pain management   - PT   - Discharge planning.

## 2022-01-07 LAB
ANION GAP SERPL CALC-SCNC: 13 MMOL/L — SIGNIFICANT CHANGE UP (ref 5–17)
BASOPHILS # BLD AUTO: 0.06 K/UL — SIGNIFICANT CHANGE UP (ref 0–0.2)
BASOPHILS NFR BLD AUTO: 0.6 % — SIGNIFICANT CHANGE UP (ref 0–2)
BUN SERPL-MCNC: 17.8 MG/DL — SIGNIFICANT CHANGE UP (ref 8–20)
CALCIUM SERPL-MCNC: 8.9 MG/DL — SIGNIFICANT CHANGE UP (ref 8.6–10.2)
CHLORIDE SERPL-SCNC: 97 MMOL/L — LOW (ref 98–107)
CO2 SERPL-SCNC: 25 MMOL/L — SIGNIFICANT CHANGE UP (ref 22–29)
CREAT SERPL-MCNC: 0.47 MG/DL — LOW (ref 0.5–1.3)
EOSINOPHIL # BLD AUTO: 0.48 K/UL — SIGNIFICANT CHANGE UP (ref 0–0.5)
EOSINOPHIL NFR BLD AUTO: 4.9 % — SIGNIFICANT CHANGE UP (ref 0–6)
GLUCOSE SERPL-MCNC: 104 MG/DL — HIGH (ref 70–99)
HCT VFR BLD CALC: 25.1 % — LOW (ref 34.5–45)
HGB BLD-MCNC: 8 G/DL — LOW (ref 11.5–15.5)
IMM GRANULOCYTES NFR BLD AUTO: 0.4 % — SIGNIFICANT CHANGE UP (ref 0–1.5)
LYMPHOCYTES # BLD AUTO: 3.2 K/UL — SIGNIFICANT CHANGE UP (ref 1–3.3)
LYMPHOCYTES # BLD AUTO: 32.9 % — SIGNIFICANT CHANGE UP (ref 13–44)
MCHC RBC-ENTMCNC: 28.2 PG — SIGNIFICANT CHANGE UP (ref 27–34)
MCHC RBC-ENTMCNC: 31.9 GM/DL — LOW (ref 32–36)
MCV RBC AUTO: 88.4 FL — SIGNIFICANT CHANGE UP (ref 80–100)
MONOCYTES # BLD AUTO: 1.16 K/UL — HIGH (ref 0–0.9)
MONOCYTES NFR BLD AUTO: 11.9 % — SIGNIFICANT CHANGE UP (ref 2–14)
NEUTROPHILS # BLD AUTO: 4.78 K/UL — SIGNIFICANT CHANGE UP (ref 1.8–7.4)
NEUTROPHILS NFR BLD AUTO: 49.3 % — SIGNIFICANT CHANGE UP (ref 43–77)
PLATELET # BLD AUTO: 574 K/UL — HIGH (ref 150–400)
POTASSIUM SERPL-MCNC: 4.4 MMOL/L — SIGNIFICANT CHANGE UP (ref 3.5–5.3)
POTASSIUM SERPL-SCNC: 4.4 MMOL/L — SIGNIFICANT CHANGE UP (ref 3.5–5.3)
RBC # BLD: 2.84 M/UL — LOW (ref 3.8–5.2)
RBC # FLD: 16 % — HIGH (ref 10.3–14.5)
SODIUM SERPL-SCNC: 135 MMOL/L — SIGNIFICANT CHANGE UP (ref 135–145)
WBC # BLD: 9.72 K/UL — SIGNIFICANT CHANGE UP (ref 3.8–10.5)
WBC # FLD AUTO: 9.72 K/UL — SIGNIFICANT CHANGE UP (ref 3.8–10.5)

## 2022-01-07 PROCEDURE — 99232 SBSQ HOSP IP/OBS MODERATE 35: CPT

## 2022-01-07 RX ORDER — LIDOCAINE HCL 20 MG/ML
20 VIAL (ML) INJECTION ONCE
Refills: 0 | Status: COMPLETED | OUTPATIENT
Start: 2022-01-07 | End: 2022-01-07

## 2022-01-07 RX ORDER — HYDROMORPHONE HYDROCHLORIDE 2 MG/ML
1 INJECTION INTRAMUSCULAR; INTRAVENOUS; SUBCUTANEOUS ONCE
Refills: 0 | Status: DISCONTINUED | OUTPATIENT
Start: 2022-01-07 | End: 2022-01-07

## 2022-01-07 RX ADMIN — HYDROMORPHONE HYDROCHLORIDE 0.5 MILLIGRAM(S): 2 INJECTION INTRAMUSCULAR; INTRAVENOUS; SUBCUTANEOUS at 22:48

## 2022-01-07 RX ADMIN — PANTOPRAZOLE SODIUM 40 MILLIGRAM(S): 20 TABLET, DELAYED RELEASE ORAL at 05:15

## 2022-01-07 RX ADMIN — HYDROMORPHONE HYDROCHLORIDE 1 MILLIGRAM(S): 2 INJECTION INTRAMUSCULAR; INTRAVENOUS; SUBCUTANEOUS at 13:11

## 2022-01-07 RX ADMIN — GABAPENTIN 300 MILLIGRAM(S): 400 CAPSULE ORAL at 05:15

## 2022-01-07 RX ADMIN — HYDROMORPHONE HYDROCHLORIDE 0.5 MILLIGRAM(S): 2 INJECTION INTRAMUSCULAR; INTRAVENOUS; SUBCUTANEOUS at 14:58

## 2022-01-07 RX ADMIN — HYDROMORPHONE HYDROCHLORIDE 1 MILLIGRAM(S): 2 INJECTION INTRAMUSCULAR; INTRAVENOUS; SUBCUTANEOUS at 13:41

## 2022-01-07 RX ADMIN — MEROPENEM 100 MILLIGRAM(S): 1 INJECTION INTRAVENOUS at 14:57

## 2022-01-07 RX ADMIN — Medication 20 MILLILITER(S): at 13:12

## 2022-01-07 RX ADMIN — HYDROMORPHONE HYDROCHLORIDE 4 MILLIGRAM(S): 2 INJECTION INTRAMUSCULAR; INTRAVENOUS; SUBCUTANEOUS at 19:59

## 2022-01-07 RX ADMIN — GABAPENTIN 300 MILLIGRAM(S): 400 CAPSULE ORAL at 22:11

## 2022-01-07 RX ADMIN — HYDROMORPHONE HYDROCHLORIDE 0.5 MILLIGRAM(S): 2 INJECTION INTRAMUSCULAR; INTRAVENOUS; SUBCUTANEOUS at 18:17

## 2022-01-07 RX ADMIN — DAPTOMYCIN 120 MILLIGRAM(S): 500 INJECTION, POWDER, LYOPHILIZED, FOR SOLUTION INTRAVENOUS at 18:58

## 2022-01-07 RX ADMIN — MEROPENEM 100 MILLIGRAM(S): 1 INJECTION INTRAVENOUS at 05:14

## 2022-01-07 RX ADMIN — HYDROMORPHONE HYDROCHLORIDE 0.5 MILLIGRAM(S): 2 INJECTION INTRAMUSCULAR; INTRAVENOUS; SUBCUTANEOUS at 22:20

## 2022-01-07 RX ADMIN — MAGNESIUM OXIDE 400 MG ORAL TABLET 400 MILLIGRAM(S): 241.3 TABLET ORAL at 09:46

## 2022-01-07 RX ADMIN — Medication 10 MILLIGRAM(S): at 14:58

## 2022-01-07 RX ADMIN — HYDROMORPHONE HYDROCHLORIDE 4 MILLIGRAM(S): 2 INJECTION INTRAMUSCULAR; INTRAVENOUS; SUBCUTANEOUS at 18:59

## 2022-01-07 RX ADMIN — Medication 0.5 MILLIGRAM(S): at 13:07

## 2022-01-07 RX ADMIN — ENOXAPARIN SODIUM 40 MILLIGRAM(S): 100 INJECTION SUBCUTANEOUS at 14:58

## 2022-01-07 RX ADMIN — HYDROMORPHONE HYDROCHLORIDE 0.5 MILLIGRAM(S): 2 INJECTION INTRAMUSCULAR; INTRAVENOUS; SUBCUTANEOUS at 10:00

## 2022-01-07 RX ADMIN — MAGNESIUM OXIDE 400 MG ORAL TABLET 400 MILLIGRAM(S): 241.3 TABLET ORAL at 14:58

## 2022-01-07 RX ADMIN — GABAPENTIN 300 MILLIGRAM(S): 400 CAPSULE ORAL at 14:58

## 2022-01-07 RX ADMIN — MEROPENEM 100 MILLIGRAM(S): 1 INJECTION INTRAVENOUS at 22:11

## 2022-01-07 RX ADMIN — HYDROMORPHONE HYDROCHLORIDE 0.5 MILLIGRAM(S): 2 INJECTION INTRAMUSCULAR; INTRAVENOUS; SUBCUTANEOUS at 03:03

## 2022-01-07 RX ADMIN — HYDROMORPHONE HYDROCHLORIDE 0.5 MILLIGRAM(S): 2 INJECTION INTRAMUSCULAR; INTRAVENOUS; SUBCUTANEOUS at 15:12

## 2022-01-07 RX ADMIN — Medication 10 MILLIGRAM(S): at 05:15

## 2022-01-07 RX ADMIN — MAGNESIUM OXIDE 400 MG ORAL TABLET 400 MILLIGRAM(S): 241.3 TABLET ORAL at 19:03

## 2022-01-07 RX ADMIN — HYDROMORPHONE HYDROCHLORIDE 0.5 MILLIGRAM(S): 2 INJECTION INTRAMUSCULAR; INTRAVENOUS; SUBCUTANEOUS at 02:04

## 2022-01-07 RX ADMIN — HYDROMORPHONE HYDROCHLORIDE 0.5 MILLIGRAM(S): 2 INJECTION INTRAMUSCULAR; INTRAVENOUS; SUBCUTANEOUS at 18:03

## 2022-01-07 RX ADMIN — HYDROMORPHONE HYDROCHLORIDE 0.5 MILLIGRAM(S): 2 INJECTION INTRAMUSCULAR; INTRAVENOUS; SUBCUTANEOUS at 09:46

## 2022-01-07 RX ADMIN — HYDROMORPHONE HYDROCHLORIDE 0.5 MILLIGRAM(S): 2 INJECTION INTRAMUSCULAR; INTRAVENOUS; SUBCUTANEOUS at 05:45

## 2022-01-07 RX ADMIN — PANTOPRAZOLE SODIUM 40 MILLIGRAM(S): 20 TABLET, DELAYED RELEASE ORAL at 19:04

## 2022-01-07 RX ADMIN — HYDROMORPHONE HYDROCHLORIDE 0.5 MILLIGRAM(S): 2 INJECTION INTRAMUSCULAR; INTRAVENOUS; SUBCUTANEOUS at 05:15

## 2022-01-07 RX ADMIN — Medication 10 MILLIGRAM(S): at 22:11

## 2022-01-07 NOTE — PROGRESS NOTE ADULT - SUBJECTIVE AND OBJECTIVE BOX
Patient was seen and examined at approximately    [facility]      Pt Name: LUIS FERNANDO DAVIS    MRN: [id number]      Patient is a 51 year old female being followed Patient currently POD9 from left knee I&D. Bilateral septic Knee. Patient with multiple I&D by Ortho and ACS. Patient denies N/V, fever, chills.      PAST MEDICAL & SURGICAL HISTORY:  PAST MEDICAL & SURGICAL HISTORY:  No pertinent past medical history    History of ankle surgery      Allergies: No Known Allergies    Medications: acetaminophen     Tablet .. 650 milliGRAM(s) Oral every 6 hours PRN  acetaminophen     Tablet .. 975 milliGRAM(s) Oral every 8 hours PRN  aluminum hydroxide/magnesium hydroxide/simethicone Suspension 30 milliLiter(s) Oral every 4 hours PRN  baclofen 10 milliGRAM(s) Oral three times a day  DAPTOmycin IVPB 500 milliGRAM(s) IV Intermittent every 24 hours  enoxaparin Injectable 40 milliGRAM(s) SubCutaneous daily  gabapentin 300 milliGRAM(s) Oral three times a day  HYDROmorphone   Tablet 2 milliGRAM(s) Oral every 4 hours PRN  HYDROmorphone   Tablet 4 milliGRAM(s) Oral every 4 hours PRN  HYDROmorphone  Injectable 0.5 milliGRAM(s) IV Push every 4 hours  lisinopril 5 milliGRAM(s) Oral daily  magnesium oxide 400 milliGRAM(s) Oral three times a day with meals  melatonin 3 milliGRAM(s) Oral at bedtime PRN  meropenem  IVPB 1000 milliGRAM(s) IV Intermittent every 8 hours  ondansetron Injectable 4 milliGRAM(s) IV Push every 8 hours PRN  pantoprazole    Tablet 40 milliGRAM(s) Oral two times a day                          8.0    9.72  )-----------( 574      ( 07 Jan 2022 05:23 )             25.1     01-07    135  |  97<L>  |  17.8  ----------------------------<  104<H>  4.4   |  25.0  |  0.47<L>    Ca    8.9      07 Jan 2022 05:23  Phos  4.2     01-06  Mg     1.7     01-06        PHYSICAL EXAM:    Vital Signs Last 24 Hrs  T(C): 37 (07 Jan 2022 05:10), Max: 37.1 (06 Jan 2022 13:32)  T(F): 98.6 (07 Jan 2022 05:10), Max: 98.8 (06 Jan 2022 21:58)  HR: 87 (07 Jan 2022 05:10) (87 - 92)  BP: 95/63 (07 Jan 2022 05:10) (95/63 - 115/72)  BP(mean): --  RR: 18 (07 Jan 2022 05:10) (18 - 18)  SpO2: 96% (07 Jan 2022 05:10) (94% - 98%)  Daily     Daily     Appearance: Alert, responsive, in no acute distress.    Neurological: Sensation is grossly intact to light touch. No focal deficits or weaknesses found.    Skin: no rash on visible skin. Skin is clean, dry and intact. No bleeding. No abrasions. No ulcerations.    Vascular: 2+ distal pulses. Cap refill < 2 sec. No signs of venous insufficiency or stasis. No extremity ulcerations. No cyanosis.    Musculoskeletal:         Left Lower Extremity: Sutures intact. Healing well. No erythema or discharge. + dorsi plantar flexion. EHL FHL intact. No erythema, effusion. Calf supple non-tender.         Right Lower Extremity: Incision healing well. Active ROM of knee. + dorsi plantar flexion. EHL FHL intact. No erythema, effusion. Calf supple non-tender.      A/P:  Pt is a 51 year old female being followed Patient currently POD9,    PLAN:   * Pain control  * WBAT  * Physical Therapy encouraged daily  * DVTp  * continue abx per ID and ACS  * continue care as per primary team  * Discharge planning

## 2022-01-07 NOTE — CHART NOTE - NSCHARTNOTEFT_GEN_A_CORE
Applied sedation to patient. Local anesthesia infused to the wound. Proceeded to measure the leg surgical wounds: Medial fasciotomy wound dimensions: 22 lengthx6.5cm [id number] deep. Lateral fasciotomy wound dimensions:  24 cm lengthx 1 cm widthx 0.5 cm deep; anterior surgical wound 13 cm length x 4cm width x 0.5 cm deep. Krishna-inferior wound: 10 cm legth x 9 cm width x 0.5 cm deep.   Blank foam applied to wounds, sealed with Tegaderm and vacuum system working properly. Confirmation of vacuum at 125 mmHg power.  Patient tolerated the procedure well.     Plan:    Monitor wound vac functioning.  Wound care in 3 days or as need it.   Monitor clinical development.

## 2022-01-07 NOTE — PROGRESS NOTE ADULT - SUBJECTIVE AND OBJECTIVE BOX
INFECTIOUS DISEASES AND INTERNAL MEDICINE at [place]  =======================================================  Theo Morrow MD  Diplomates American Board of Internal Medicine and Infectious Diseases  Telephone [phone]  Fax            [phone]  =======================================================    RICK DAVISNE [id number]  Follow up: group A STREP NECROTIZING SOFT TISSUE INFECTION     repeat CT scan of lower ext with collections.   S/P OR WASHOUT 12/2  AND   OR AGAIN  12/7   late  on 12/10 12/15 12/17  12/20 12/23     AFEBRILE THIS MORNING in nad  REPEAT CX SCAN DONE  S/P OR  AGAIN  12/28  CX SO FAR NEG  REMAINS AFEBRILE  NON TOXIC    Allergies:  No Known Allergies       FAMILY   FAMILY HISTORY:  FH: HTN (hypertension) (Mother)      REVIEW OF SYSTEMS:  CONSTITUTIONAL:  No Fever or chills  HEENT:   No diplopia or blurred vision.  No earache, sore throat or runny nose.  CARDIOVASCULAR:  No pressure, squeezing, strangling, tightness, heaviness or aching about the chest, neck, axilla or epigastrium.  RESPIRATORY:  No cough, shortness of breath, PND or orthopnea.  GASTROINTESTINAL:  No nausea, vomiting or diarrhea.  GENITOURINARY:  No dysuria, frequency or urgency. No Blood in urine  MUSCULOSKELETAL:   AS PER HPI      NEUROLOGIC:  No paresthesias, fasciculations, seizures or weakness.  PSYCHIATRIC:  No disorder of thought or mood.  ENDOCRINE:  No heat or cold intolerance, polyuria or polydipsia.  HEMATOLOGICAL:  No easy bruising or bleeding.            Physical Exam:     GEN: NAD,    HEENT: normocephalic and atraumatic. EOMI. ASHISH.    NECK: Supple. No carotid bruits.  No lymphadenopathy or thyromegaly.  LUNGS: Clear to auscultation.  HEART: Regular rate and rhythm without murmur.  ABDOMEN: Soft, nontender, and nondistended.  Positive bowel sounds.    : No CVA tenderness  EXTREMITIES: LEFT LEG WRAPPED; left thigh vac in place  right knee with dressing in place       NEUROLOGIC:  C AWAKE ALERT Appropriate affect .  SKIN: No ulceration or induration present.           Vitals: Vital Signs Last 24 Hrs  T(C): 37 (07 Jan 2022 05:10), Max: 37.1 (06 Jan 2022 21:58)  T(F): 98.6 (07 Jan 2022 05:10), Max: 98.8 (06 Jan 2022 21:58)  HR: 87 (07 Jan 2022 05:10) (87 - 92)  BP: 95/63 (07 Jan 2022 05:10) (95/63 - 109/68)  BP(mean): --  RR: 18 (07 Jan 2022 05:10) (18 - 18)  SpO2: 96% (07 Jan 2022 05:10) (95% - 98%)  Other medications:  baclofen 10 milliGRAM(s) Oral three times a day  collagenase Ointment 1 Application(s) Topical two times a day  gabapentin 200 milliGRAM(s) Oral three times a day  glucagon  Injectable 1 milliGRAM(s) IntraMuscular once  heparin   Injectable 5000 Unit(s) SubCutaneous every 8 hours  lisinopril 5 milliGRAM(s) Oral daily  methocarbamol 750 milliGRAM(s) Oral three times a day  pantoprazole    Tablet 40 milliGRAM(s) Oral two times a day  polyethylene glycol 3350 17 Gram(s) Oral daily  senna 2 Tablet(s) Oral at bedtime  sodium chloride 1 Gram(s) Oral every 12 hours  sodium chloride 0.9%. 1000 milliLiter(s) IV Continuous <Continuous>      =======================================================  Labs:                                                8.0    9.72  )-----------( 574      ( 07 Jan 2022 05:23 )             25.1   01-07    135  |  97<L>  |  17.8  ----------------------------<  104<H>  4.4   |  25.0  |  0.47<L>    Ca    8.9      07 Jan 2022 05:23  Phos  4.2     01-06  Mg     1.7     01-06                      e E: (C=Critical, N=Notification, A=Abnormal) C    TESTS:  _ GS    DATE/TIME CALLED: _ 11/18/2021 09:40:08    CALLED TO: Chris Hernandez RN    READ BACK (2 Patient Identifiers)(Y/N): _ Y    READ BACK VALUES (Y/N): _ Y    CALLED BY: Chris Quiñones  Final Report (11-21-21 @ 12:28):    Growth in aerobic and anaerobic bottles: Streptococcus pyogenes (Group A)  Organism: Blood Culture PCR  Streptococcus pyogenes (Group A) (11-21-21 @ 12:28)  Organism: Streptococcus pyogenes (Group A) (11-21-21 @ 12:28)    Sensitivities:      -  Ceftriaxone: S 0.016      -  Penicillin: S 0.016 Predicts results for ampicillin, amoxicillin, amoxicillin/clavulanate, ampicillin/sulbactam, 1st, 2nd and 3rd generation cephalosporins and carbapenems.      Method Type: ETEST  Organism: Streptococcus pyogenes (Group A) (11-21-21 @ 12:28)    Sensitivities:      -  Vancomycin: S      Method Type: KB  Organism: Blood Culture PCR (11-21-21 @ 12:28)    Sensitivities:      -  Streptococcus pyogenes (Group A): Detec      Method Type: PCR      Creatinine, Serum: 0.75 mg/dL (11-30-21 @ 07:18)  Creatinine, Serum: 0.68 mg/dL (11-29-21 @ 09:55)  Creatinine, Serum: 0.64 mg/dL (11-28-21 @ 09:14)  Creatinine, Serum: 0.65 mg/dL (11-27-21 @ 11:51)  Creatinine, Serum: 0.57 mg/dL (11-27-21 @ 05:35)  Creatinine, Serum: 0.61 mg/dL (11-26-21 @ 09:01)        Ferritin, Serum: 388 ng/mL (11-17-21 @ 13:21)    C-Reactive Protein, Serum: 197 mg/L (11-26-21 @ 21:52)  C-Reactive Protein, Serum: 211 mg/L (11-26-21 @ 09:01)  C-Reactive Protein, Serum: 53 mg/L (11-23-21 @ 07:42)  C-Reactive Protein, Serum: >422 mg/L (11-17-21 @ 13:21)    Sedimentation Rate, Erythrocyte: 65 mm/hr (11-26-21 @ 21:52)  Sedimentation Rate, Erythrocyte: 63 mm/hr (11-26-21 @ 09:01)  Sedimentation Rate, Erythrocyte: 55 mm/hr (11-17-21 @ 13:21)    WBC Count: 14.55 K/uL (11-30-21 @ 07:18)  WBC Count: 16.71 K/uL (11-29-21 @ 09:54)  WBC Count: 17.02 K/uL (11-28-21 @ 09:14)  WBC Count: 15.99 K/uL (11-27-21 @ 05:33)  WBC Count: 18.73 K/uL (11-26-21 @ 09:01)      COVID-19 PCR: NotDetec (11-30-21 @ 01:59)  COVID-19 PCR: NotDetec (11-23-21 @ 18:50)  COVID-19 PCR: NotDetec (11-17-21 @ 13:18)    Lactate Dehydrogenase, Serum: 769 U/L (11-20-21 @ 03:56)    Alkaline Phosphatase, Serum: 88 U/L (11-27-21 @ 05:35)  Alanine Aminotransferase (ALT/SGPT): 11 U/L (11-27-21 @ 05:35)  Aspartate Aminotransferase (AST/SGOT): 25 U/L (11-27-21 @ 05:35)  Bilirubin Total, Serum: 0.3 mg/dL (11-27-21 @ 05:35)        < from: CT Lower Extremity w/ IV Cont, Bilateral (11.30.21 @ 09:55) >     EXAM:  CT LWR EXT IC BI                          PROCEDURE DATE:  11/30/2021          INTERPRETATION:  CT LOWER EXTREMITY WITH IV CONTRAST BILATERAL dated 11/30/2021 9:55 AM    INDICATION: Fever. Status post incision and drainage bilaterally. Persistent fevers to 104.    COMPARISON: CT of the right knee dated 11/23/2021. CT of the left lower extremity dated 11/17/2021    VICKI -

## 2022-01-07 NOTE — PROGRESS NOTE ADULT - ASSESSMENT
52 y/o female admitted for LLE NSTI and abscess s/p multiple I&D's, debridements, and washouts with ortho for knee joint involvement Etiology remains unclear. HD and Hgb stable    -  Wounds appear clean with no pus nor surrounding erythema, good granulation tissue noted, Wound VAC change for today, Friday Jan 7.   - STSG plans pending  - Regular diet  - Lovenox ppx  - ID following, on merrem and daptomycin  - PRN pain management   - PT   - Discharge planning.

## 2022-01-07 NOTE — PROGRESS NOTE ADULT - ASSESSMENT
51y Female present to ED with left leg swelling, erythema, pain. Patient endorses she had left knee pain for 1 week presented 3 days ago to ED  where she states was given an steroid shot, and ibuprofen. however pain and edema worsened. Patient states she wasnt feeling herself today reason why she came. Endorses chills initially with pain 3 days ago but none since, denies history of trauma, insect bites, recent interventions, or injections to the area, contact with ticks, history of diabetes.  PT AS ABOVE WITH LEFT LEG SWELLING PT WITH INCREASED WBC PLACED ON ABX FOR PRESUMED INFECTION  PT WITH CT SCAN WITH FASCIAL EDAM PT BROUGHT TO THE OR EMERGENTLY AND  UNDERWENT FASCIOTOMY   PT BROUGHT TO THE OR  BOTH TRAUMA AND ORTHO INVOLVED  PURULENT FLUID FOUND  NECROTIZING SOFT TISSUE INFECTION    BLOOD CX WITH GROUP A STREP AND  OPERATIVE FLUID WITH STREP   PT WAS  ON PCN AND CLINDA for POSSIBLE ANTITOXIN EFFECT IN GROUP A STREP INFECTION     PT  S/P OR WASHOUT 12.2  AND  OR AGAIN 12/7  and late on 12/10 121/15 12/17 and 12/20 12/23     REPEAT   OPERATIVE CX NEG             AS ABOVE  REPEAT  OR  12/20 12/23    S/ RETURN TO THE OR 12/28 AND TODAY 12/31    RECENT VAC DRESSING CHANGE NO PUS   NO PURULENCE   OVERALL NON TOXIC  AFEBRILE            CONTINUE FOR MERREM/DAPTOMYCIN  WILL FOLLOW UP .  OVERALL NON TOXIC  VAC CHANGES AS PER SURGERY

## 2022-01-07 NOTE — PROGRESS NOTE ADULT - SUBJECTIVE AND OBJECTIVE BOX
HPI/OVERNIGHT EVENTS:  No acute overnight events. Vital signs stable. No reports of nausea, vomiting, fever, chills, chest pain, shortness of breath, or any new or concerning symptoms. Pain controlled at rest, tolerating diet. Wound VAC due for change today.     MEDICATIONS  (STANDING):  baclofen 10 milliGRAM(s) Oral three times a day  DAPTOmycin IVPB 500 milliGRAM(s) IV Intermittent every 24 hours  enoxaparin Injectable 40 milliGRAM(s) SubCutaneous daily  gabapentin 300 milliGRAM(s) Oral three times a day  HYDROmorphone  Injectable 0.5 milliGRAM(s) IV Push every 4 hours  lisinopril 5 milliGRAM(s) Oral daily  magnesium oxide 400 milliGRAM(s) Oral three times a day with meals  meropenem  IVPB 1000 milliGRAM(s) IV Intermittent every 8 hours  pantoprazole    Tablet 40 milliGRAM(s) Oral two times a day    MEDICATIONS  (PRN):  acetaminophen     Tablet .. 650 milliGRAM(s) Oral every 6 hours PRN Temp greater or equal to 38C (100.4F), Mild Pain (1 - 3)  acetaminophen     Tablet .. 975 milliGRAM(s) Oral every 8 hours PRN Temp greater or equal to 38C (100.4F), Mild Pain (1 - 3), Moderate Pain (4 - 6), Severe Pain (7 - 10)  aluminum hydroxide/magnesium hydroxide/simethicone Suspension 30 milliLiter(s) Oral every 4 hours PRN Dyspepsia  HYDROmorphone   Tablet 2 milliGRAM(s) Oral every 4 hours PRN Moderate Pain (4 - 6)  HYDROmorphone   Tablet 4 milliGRAM(s) Oral every 4 hours PRN Severe Pain (7 - 10)  melatonin 3 milliGRAM(s) Oral at bedtime PRN Insomnia  ondansetron Injectable 4 milliGRAM(s) IV Push every 8 hours PRN Nausea and/or Vomiting      Vital Signs Last 24 Hrs  T(C): 37 (07 Jan 2022 05:10), Max: 37.1 (06 Jan 2022 13:32)  T(F): 98.6 (07 Jan 2022 05:10), Max: 98.8 (06 Jan 2022 21:58)  HR: 87 (07 Jan 2022 05:10) (87 - 92)  BP: 95/63 (07 Jan 2022 05:10) (95/63 - 115/72)  BP(mean): --  RR: 18 (07 Jan 2022 05:10) (18 - 18)  SpO2: 96% (07 Jan 2022 05:10) (95% - 98%)    Constitutional: patient laying bed, in no acute distress  HEENT: EOMI, no active drainage or redness  Neck: Full ROM without pain  Respiratory: respirations are unlabored, no accessory muscle use, no conversational dyspnea  Cardiovascular: regular rate & rhythm  Gastrointestinal: Abdomen soft, non-tender, non-distended  Neurological: A&O x 3; no gross sensory / motor / coordination deficits  Extremities: wound vac working appropriately at [facility], limit ROM of LLE due to pain        I&O's Detail    06 Jan 2022 07:01  -  07 Jan 2022 07:00  --------------------------------------------------------  IN:  Total IN: 0 mL    OUT:    Voided (mL): 1200 mL  Total OUT: 1200 mL    Total NET: -1200 mL          LABS:                        8.0    9.72  )-----------( 574      ( 07 Jan 2022 05:23 )             25.1     01-07    135  |  97<L>  |  17.8  ----------------------------<  104<H>  4.4   |  25.0  |  0.47<L>    Ca    8.9      07 Jan 2022 05:23  Phos  4.2     01-06  Mg     1.7     01-06

## 2022-01-07 NOTE — PROGRESS NOTE ADULT - ATTENDING COMMENTS
hemodynamically stable  L leg motion is actually improved and patient working with PT/OT very motivated  No new abscesses and vac change q other day  All areas are now very clean and granulating

## 2022-01-08 RX ADMIN — Medication 10 MILLIGRAM(S): at 22:32

## 2022-01-08 RX ADMIN — GABAPENTIN 300 MILLIGRAM(S): 400 CAPSULE ORAL at 22:32

## 2022-01-08 RX ADMIN — MAGNESIUM OXIDE 400 MG ORAL TABLET 400 MILLIGRAM(S): 241.3 TABLET ORAL at 09:45

## 2022-01-08 RX ADMIN — HYDROMORPHONE HYDROCHLORIDE 0.5 MILLIGRAM(S): 2 INJECTION INTRAMUSCULAR; INTRAVENOUS; SUBCUTANEOUS at 09:45

## 2022-01-08 RX ADMIN — Medication 10 MILLIGRAM(S): at 06:04

## 2022-01-08 RX ADMIN — DAPTOMYCIN 120 MILLIGRAM(S): 500 INJECTION, POWDER, LYOPHILIZED, FOR SOLUTION INTRAVENOUS at 20:06

## 2022-01-08 RX ADMIN — GABAPENTIN 300 MILLIGRAM(S): 400 CAPSULE ORAL at 06:04

## 2022-01-08 RX ADMIN — GABAPENTIN 300 MILLIGRAM(S): 400 CAPSULE ORAL at 13:53

## 2022-01-08 RX ADMIN — HYDROMORPHONE HYDROCHLORIDE 0.5 MILLIGRAM(S): 2 INJECTION INTRAMUSCULAR; INTRAVENOUS; SUBCUTANEOUS at 14:07

## 2022-01-08 RX ADMIN — PANTOPRAZOLE SODIUM 40 MILLIGRAM(S): 20 TABLET, DELAYED RELEASE ORAL at 06:04

## 2022-01-08 RX ADMIN — HYDROMORPHONE HYDROCHLORIDE 0.5 MILLIGRAM(S): 2 INJECTION INTRAMUSCULAR; INTRAVENOUS; SUBCUTANEOUS at 17:49

## 2022-01-08 RX ADMIN — HYDROMORPHONE HYDROCHLORIDE 0.5 MILLIGRAM(S): 2 INJECTION INTRAMUSCULAR; INTRAVENOUS; SUBCUTANEOUS at 13:52

## 2022-01-08 RX ADMIN — HYDROMORPHONE HYDROCHLORIDE 0.5 MILLIGRAM(S): 2 INJECTION INTRAMUSCULAR; INTRAVENOUS; SUBCUTANEOUS at 03:41

## 2022-01-08 RX ADMIN — HYDROMORPHONE HYDROCHLORIDE 0.5 MILLIGRAM(S): 2 INJECTION INTRAMUSCULAR; INTRAVENOUS; SUBCUTANEOUS at 10:00

## 2022-01-08 RX ADMIN — MEROPENEM 100 MILLIGRAM(S): 1 INJECTION INTRAVENOUS at 13:53

## 2022-01-08 RX ADMIN — MAGNESIUM OXIDE 400 MG ORAL TABLET 400 MILLIGRAM(S): 241.3 TABLET ORAL at 13:53

## 2022-01-08 RX ADMIN — Medication 10 MILLIGRAM(S): at 13:53

## 2022-01-08 RX ADMIN — HYDROMORPHONE HYDROCHLORIDE 0.5 MILLIGRAM(S): 2 INJECTION INTRAMUSCULAR; INTRAVENOUS; SUBCUTANEOUS at 04:22

## 2022-01-08 RX ADMIN — HYDROMORPHONE HYDROCHLORIDE 0.5 MILLIGRAM(S): 2 INJECTION INTRAMUSCULAR; INTRAVENOUS; SUBCUTANEOUS at 22:33

## 2022-01-08 RX ADMIN — HYDROMORPHONE HYDROCHLORIDE 4 MILLIGRAM(S): 2 INJECTION INTRAMUSCULAR; INTRAVENOUS; SUBCUTANEOUS at 21:00

## 2022-01-08 RX ADMIN — HYDROMORPHONE HYDROCHLORIDE 0.5 MILLIGRAM(S): 2 INJECTION INTRAMUSCULAR; INTRAVENOUS; SUBCUTANEOUS at 23:30

## 2022-01-08 RX ADMIN — MEROPENEM 100 MILLIGRAM(S): 1 INJECTION INTRAVENOUS at 06:04

## 2022-01-08 RX ADMIN — HYDROMORPHONE HYDROCHLORIDE 4 MILLIGRAM(S): 2 INJECTION INTRAMUSCULAR; INTRAVENOUS; SUBCUTANEOUS at 20:11

## 2022-01-08 RX ADMIN — MEROPENEM 100 MILLIGRAM(S): 1 INJECTION INTRAVENOUS at 22:32

## 2022-01-08 RX ADMIN — PANTOPRAZOLE SODIUM 40 MILLIGRAM(S): 20 TABLET, DELAYED RELEASE ORAL at 17:48

## 2022-01-08 RX ADMIN — ENOXAPARIN SODIUM 40 MILLIGRAM(S): 100 INJECTION SUBCUTANEOUS at 09:45

## 2022-01-08 RX ADMIN — HYDROMORPHONE HYDROCHLORIDE 0.5 MILLIGRAM(S): 2 INJECTION INTRAMUSCULAR; INTRAVENOUS; SUBCUTANEOUS at 18:04

## 2022-01-08 NOTE — PROGRESS NOTE ADULT - ATTENDING COMMENTS
Wound vac changed yesterday  All open areas are granulating nicely  No new infections  Needs aggressive PTOT  Daptomycin  DC when ok with ID

## 2022-01-08 NOTE — PROGRESS NOTE ADULT - SUBJECTIVE AND OBJECTIVE BOX
Acute Care Surgery/Trauma Surgery Progress Note:  No acute overnight events. Patient afebrile, hemodynamically competent. Pain well controlled. Tolerating diet. Denies n/v/f/c/cp/sob.     Diet, Regular (12-28-21 @ 17:50)    Scheduled Medications:   baclofen 10 milliGRAM(s) Oral three times a day  DAPTOmycin IVPB 500 milliGRAM(s) IV Intermittent every 24 hours  enoxaparin Injectable 40 milliGRAM(s) SubCutaneous daily  gabapentin 300 milliGRAM(s) Oral three times a day  HYDROmorphone  Injectable 0.5 milliGRAM(s) IV Push every 4 hours  lisinopril 5 milliGRAM(s) Oral daily  magnesium oxide 400 milliGRAM(s) Oral three times a day with meals  meropenem  IVPB 1000 milliGRAM(s) IV Intermittent every 8 hours  pantoprazole    Tablet 40 milliGRAM(s) Oral two times a day    PRN Medications:  acetaminophen     Tablet .. 650 milliGRAM(s) Oral every 6 hours PRN Temp greater or equal to 38C (100.4F), Mild Pain (1 - 3)  acetaminophen     Tablet .. 975 milliGRAM(s) Oral every 8 hours PRN Temp greater or equal to 38C (100.4F), Mild Pain (1 - 3), Moderate Pain (4 - 6), Severe Pain (7 - 10)  aluminum hydroxide/magnesium hydroxide/simethicone Suspension 30 milliLiter(s) Oral every 4 hours PRN Dyspepsia  HYDROmorphone   Tablet 2 milliGRAM(s) Oral every 4 hours PRN Moderate Pain (4 - 6)  HYDROmorphone   Tablet 4 milliGRAM(s) Oral every 4 hours PRN Severe Pain (7 - 10)  melatonin 3 milliGRAM(s) Oral at bedtime PRN Insomnia  ondansetron Injectable 4 milliGRAM(s) IV Push every 8 hours PRN Nausea and/or Vomiting      Objective:   T(F): 99.3 (01-07 @ 22:21), Max: 99.3 (01-07 @ 22:21)  HR: 103 (01-07 @ 22:21) (87 - 103)  BP: 109/66 (01-07 @ 22:21) (95/63 - 109/66)  BP(mean): --  ABP: --  ABP(mean): --  RR: 17 (01-07 @ 22:21) (17 - 18)  SpO2: 92% (01-07 @ 22:21) (92% - 96%)      Physical Exam:   GEN: patient resting comfortably in bed, in no acute distress  RESP: respirations are unlabored, no accessory muscle use, no conversational dyspnea  CVS: RRR  GI: Abdomen soft, non-tender, non-distended, no rebound tenderness / guarding    I&O's    01-06 @ 07:01  -  01-07 @ 07:00  --------------------------------------------------------  IN:  Total IN: 0 mL    OUT:    Voided (mL): 1200 mL  Total OUT: 1200 mL    Total NET: -1200 mL      LABS:                        8.0    9.72  )-----------( 574      ( 07 Jan 2022 05:23 )             25.1     01-07    135  |  97<L>  |  17.8  ----------------------------<  104<H>  4.4   |  25.0  |  0.47<L>    Ca    8.9      07 Jan 2022 05:23  Phos  4.2     01-06  Mg     1.7     01-06    MICROBIOLOGY:na  PATHOLOGY: na    A; This is a 52 y/o F with soft tissue infection that underwent I& d with hardware taken down from her ankle on mid November due to septic shock. Several washout and debridement of the LLE and fasciotomy as well as Wound vac placement. Now working with PT/OT.    Plan:  Monitor body temperature.  Wound vac change Tuesday Friday.  Reconsult Gi for FU of gastric ulcer with endoscopy on week 8 from initial endoscopy.  Fu progression with PT/OT.  Monitor WBC.  OBG-GYN and Hematology as outpatient., to be discussed. 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  Disposition planning.

## 2022-01-08 NOTE — PROGRESS NOTE ADULT - SUBJECTIVE AND OBJECTIVE BOX
LUIS FERNANDO DAVIS  [id number]    Patient is a 51F being followed for BLE soft tissue infections/septic knee requiring multiple surgical procedures by orthopedics and ACS. Most recent was repeat left knee irrigation and debridement on 12/28/21, POD 11. Patient was seen and evaluated at bedside. Patient complains of ongoing bilateral knee pain and stiffness. No acute changes. Patient is participating in bedside PT. Denies CP,SOB, increase in pain, or constitutional symptoms. Reports no new complaints.     Vital Signs Last 24 Hrs  T(C): 37.5 (08 Jan 2022 04:05), Max: 37.5 (08 Jan 2022 04:05)  T(F): 99.5 (08 Jan 2022 04:05), Max: 99.5 (08 Jan 2022 04:05)  HR: 95 (08 Jan 2022 04:05) (95 - 103)  BP: 94/60 (08 Jan 2022 04:05) (94/60 - 109/66)  BP(mean): --  RR: 18 (08 Jan 2022 04:05) (17 - 18)  SpO2: 91% (08 Jan 2022 04:05) (91% - 93%)                          8.0    9.72  )-----------( 574      ( 07 Jan 2022 05:23 )             25.1     01-07    135  |  97<L>  |  17.8  ----------------------------<  104<H>  4.4   |  25.0  |  0.47<L>    Ca    8.9      07 Jan 2022 05:23        MEDICATIONS  (STANDING):  baclofen 10 milliGRAM(s) Oral three times a day  DAPTOmycin IVPB 500 milliGRAM(s) IV Intermittent every 24 hours  enoxaparin Injectable 40 milliGRAM(s) SubCutaneous daily  gabapentin 300 milliGRAM(s) Oral three times a day  HYDROmorphone  Injectable 0.5 milliGRAM(s) IV Push every 4 hours  lisinopril 5 milliGRAM(s) Oral daily  magnesium oxide 400 milliGRAM(s) Oral three times a day with meals  meropenem  IVPB 1000 milliGRAM(s) IV Intermittent every 8 hours  pantoprazole    Tablet 40 milliGRAM(s) Oral two times a day    MEDICATIONS  (PRN):  acetaminophen     Tablet .. 650 milliGRAM(s) Oral every 6 hours PRN Temp greater or equal to 38C (100.4F), Mild Pain (1 - 3)  acetaminophen     Tablet .. 975 milliGRAM(s) Oral every 8 hours PRN Temp greater or equal to 38C (100.4F), Mild Pain (1 - 3), Moderate Pain (4 - 6), Severe Pain (7 - 10)  aluminum hydroxide/magnesium hydroxide/simethicone Suspension 30 milliLiter(s) Oral every 4 hours PRN Dyspepsia  HYDROmorphone   Tablet 2 milliGRAM(s) Oral every 4 hours PRN Moderate Pain (4 - 6)  HYDROmorphone   Tablet 4 milliGRAM(s) Oral every 4 hours PRN Severe Pain (7 - 10)  melatonin 3 milliGRAM(s) Oral at bedtime PRN Insomnia  ondansetron Injectable 4 milliGRAM(s) IV Push every 8 hours PRN Nausea and/or Vomiting      Physical exam: NAD, AAO  Lower extremities:  Left Lower Extremity:  Incision C/D/I, sutures intact, no drainage, no erythema, no significant swelling.  FHL intact. EHL/DF diminished. Wound vac to lower leg and functioning appropriately.  Right Lower Extremity: Knee incision well healed. +DF/PF/EHL/FHL. BCR. No knee effusion. Calf supple/nontender.       Primary Orthopedic Assessment:  > patient is a 51F being followed for BLE soft tissue infections/septic knee requiring multiple surgical procedures by orthopedics and ACS. Most recent was repeat left knee irrigation and debridement on 12/28/21, POD 11.   	    Plan:   . Pain control as clinically indicated.  . DVT prophylaxsis.  . WBAT b/l LE. -LLE in cam boot  . PT, aggressive ROM for BL knees.  . Incentive spirometry.  . Continue ABX per ID.  . Continue care with primary team

## 2022-01-09 LAB
ANION GAP SERPL CALC-SCNC: 12 MMOL/L — SIGNIFICANT CHANGE UP (ref 5–17)
BASOPHILS # BLD AUTO: 0.06 K/UL — SIGNIFICANT CHANGE UP (ref 0–0.2)
BASOPHILS NFR BLD AUTO: 0.6 % — SIGNIFICANT CHANGE UP (ref 0–2)
BUN SERPL-MCNC: 13.7 MG/DL — SIGNIFICANT CHANGE UP (ref 8–20)
CALCIUM SERPL-MCNC: 9.3 MG/DL — SIGNIFICANT CHANGE UP (ref 8.6–10.2)
CHLORIDE SERPL-SCNC: 96 MMOL/L — LOW (ref 98–107)
CO2 SERPL-SCNC: 25 MMOL/L — SIGNIFICANT CHANGE UP (ref 22–29)
CREAT SERPL-MCNC: 0.49 MG/DL — LOW (ref 0.5–1.3)
EOSINOPHIL # BLD AUTO: 0.51 K/UL — HIGH (ref 0–0.5)
EOSINOPHIL NFR BLD AUTO: 5.1 % — SIGNIFICANT CHANGE UP (ref 0–6)
GLUCOSE SERPL-MCNC: 99 MG/DL — SIGNIFICANT CHANGE UP (ref 70–99)
HCT VFR BLD CALC: 25.6 % — LOW (ref 34.5–45)
HGB BLD-MCNC: 7.9 G/DL — LOW (ref 11.5–15.5)
IMM GRANULOCYTES NFR BLD AUTO: 0.4 % — SIGNIFICANT CHANGE UP (ref 0–1.5)
LYMPHOCYTES # BLD AUTO: 3.07 K/UL — SIGNIFICANT CHANGE UP (ref 1–3.3)
LYMPHOCYTES # BLD AUTO: 30.5 % — SIGNIFICANT CHANGE UP (ref 13–44)
MAGNESIUM SERPL-MCNC: 1.7 MG/DL — SIGNIFICANT CHANGE UP (ref 1.6–2.6)
MCHC RBC-ENTMCNC: 27.3 PG — SIGNIFICANT CHANGE UP (ref 27–34)
MCHC RBC-ENTMCNC: 30.9 GM/DL — LOW (ref 32–36)
MCV RBC AUTO: 88.6 FL — SIGNIFICANT CHANGE UP (ref 80–100)
MONOCYTES # BLD AUTO: 1.19 K/UL — HIGH (ref 0–0.9)
MONOCYTES NFR BLD AUTO: 11.8 % — SIGNIFICANT CHANGE UP (ref 2–14)
NEUTROPHILS # BLD AUTO: 5.21 K/UL — SIGNIFICANT CHANGE UP (ref 1.8–7.4)
NEUTROPHILS NFR BLD AUTO: 51.6 % — SIGNIFICANT CHANGE UP (ref 43–77)
PHOSPHATE SERPL-MCNC: 5 MG/DL — HIGH (ref 2.4–4.7)
PLATELET # BLD AUTO: 562 K/UL — HIGH (ref 150–400)
POTASSIUM SERPL-MCNC: 4 MMOL/L — SIGNIFICANT CHANGE UP (ref 3.5–5.3)
POTASSIUM SERPL-SCNC: 4 MMOL/L — SIGNIFICANT CHANGE UP (ref 3.5–5.3)
RBC # BLD: 2.89 M/UL — LOW (ref 3.8–5.2)
RBC # FLD: 15.9 % — HIGH (ref 10.3–14.5)
SODIUM SERPL-SCNC: 133 MMOL/L — LOW (ref 135–145)
WBC # BLD: 10.08 K/UL — SIGNIFICANT CHANGE UP (ref 3.8–10.5)
WBC # FLD AUTO: 10.08 K/UL — SIGNIFICANT CHANGE UP (ref 3.8–10.5)

## 2022-01-09 RX ORDER — HYDROMORPHONE HYDROCHLORIDE 2 MG/ML
0.5 INJECTION INTRAMUSCULAR; INTRAVENOUS; SUBCUTANEOUS EVERY 4 HOURS
Refills: 0 | Status: DISCONTINUED | OUTPATIENT
Start: 2022-01-09 | End: 2022-01-14

## 2022-01-09 RX ORDER — HYDROMORPHONE HYDROCHLORIDE 2 MG/ML
2 INJECTION INTRAMUSCULAR; INTRAVENOUS; SUBCUTANEOUS
Refills: 0 | Status: DISCONTINUED | OUTPATIENT
Start: 2022-01-09 | End: 2022-01-14

## 2022-01-09 RX ADMIN — Medication 10 MILLIGRAM(S): at 13:44

## 2022-01-09 RX ADMIN — HYDROMORPHONE HYDROCHLORIDE 0.5 MILLIGRAM(S): 2 INJECTION INTRAMUSCULAR; INTRAVENOUS; SUBCUTANEOUS at 02:19

## 2022-01-09 RX ADMIN — ENOXAPARIN SODIUM 40 MILLIGRAM(S): 100 INJECTION SUBCUTANEOUS at 10:29

## 2022-01-09 RX ADMIN — DAPTOMYCIN 120 MILLIGRAM(S): 500 INJECTION, POWDER, LYOPHILIZED, FOR SOLUTION INTRAVENOUS at 20:02

## 2022-01-09 RX ADMIN — HYDROMORPHONE HYDROCHLORIDE 2 MILLIGRAM(S): 2 INJECTION INTRAMUSCULAR; INTRAVENOUS; SUBCUTANEOUS at 09:10

## 2022-01-09 RX ADMIN — MEROPENEM 100 MILLIGRAM(S): 1 INJECTION INTRAVENOUS at 13:44

## 2022-01-09 RX ADMIN — MEROPENEM 100 MILLIGRAM(S): 1 INJECTION INTRAVENOUS at 21:11

## 2022-01-09 RX ADMIN — Medication 3 MILLIGRAM(S): at 21:12

## 2022-01-09 RX ADMIN — HYDROMORPHONE HYDROCHLORIDE 2 MILLIGRAM(S): 2 INJECTION INTRAMUSCULAR; INTRAVENOUS; SUBCUTANEOUS at 16:49

## 2022-01-09 RX ADMIN — PANTOPRAZOLE SODIUM 40 MILLIGRAM(S): 20 TABLET, DELAYED RELEASE ORAL at 06:41

## 2022-01-09 RX ADMIN — HYDROMORPHONE HYDROCHLORIDE 0.5 MILLIGRAM(S): 2 INJECTION INTRAMUSCULAR; INTRAVENOUS; SUBCUTANEOUS at 21:36

## 2022-01-09 RX ADMIN — HYDROMORPHONE HYDROCHLORIDE 2 MILLIGRAM(S): 2 INJECTION INTRAMUSCULAR; INTRAVENOUS; SUBCUTANEOUS at 20:25

## 2022-01-09 RX ADMIN — MEROPENEM 100 MILLIGRAM(S): 1 INJECTION INTRAVENOUS at 06:41

## 2022-01-09 RX ADMIN — HYDROMORPHONE HYDROCHLORIDE 2 MILLIGRAM(S): 2 INJECTION INTRAMUSCULAR; INTRAVENOUS; SUBCUTANEOUS at 21:00

## 2022-01-09 RX ADMIN — HYDROMORPHONE HYDROCHLORIDE 0.5 MILLIGRAM(S): 2 INJECTION INTRAMUSCULAR; INTRAVENOUS; SUBCUTANEOUS at 13:43

## 2022-01-09 RX ADMIN — HYDROMORPHONE HYDROCHLORIDE 2 MILLIGRAM(S): 2 INJECTION INTRAMUSCULAR; INTRAVENOUS; SUBCUTANEOUS at 05:04

## 2022-01-09 RX ADMIN — HYDROMORPHONE HYDROCHLORIDE 0.5 MILLIGRAM(S): 2 INJECTION INTRAMUSCULAR; INTRAVENOUS; SUBCUTANEOUS at 06:41

## 2022-01-09 RX ADMIN — HYDROMORPHONE HYDROCHLORIDE 0.5 MILLIGRAM(S): 2 INJECTION INTRAMUSCULAR; INTRAVENOUS; SUBCUTANEOUS at 22:21

## 2022-01-09 RX ADMIN — GABAPENTIN 300 MILLIGRAM(S): 400 CAPSULE ORAL at 06:41

## 2022-01-09 RX ADMIN — Medication 10 MILLIGRAM(S): at 21:12

## 2022-01-09 RX ADMIN — HYDROMORPHONE HYDROCHLORIDE 0.5 MILLIGRAM(S): 2 INJECTION INTRAMUSCULAR; INTRAVENOUS; SUBCUTANEOUS at 10:44

## 2022-01-09 RX ADMIN — Medication 10 MILLIGRAM(S): at 06:41

## 2022-01-09 RX ADMIN — HYDROMORPHONE HYDROCHLORIDE 0.5 MILLIGRAM(S): 2 INJECTION INTRAMUSCULAR; INTRAVENOUS; SUBCUTANEOUS at 07:00

## 2022-01-09 RX ADMIN — HYDROMORPHONE HYDROCHLORIDE 0.5 MILLIGRAM(S): 2 INJECTION INTRAMUSCULAR; INTRAVENOUS; SUBCUTANEOUS at 18:09

## 2022-01-09 RX ADMIN — HYDROMORPHONE HYDROCHLORIDE 0.5 MILLIGRAM(S): 2 INJECTION INTRAMUSCULAR; INTRAVENOUS; SUBCUTANEOUS at 10:29

## 2022-01-09 RX ADMIN — HYDROMORPHONE HYDROCHLORIDE 0.5 MILLIGRAM(S): 2 INJECTION INTRAMUSCULAR; INTRAVENOUS; SUBCUTANEOUS at 18:24

## 2022-01-09 RX ADMIN — HYDROMORPHONE HYDROCHLORIDE 2 MILLIGRAM(S): 2 INJECTION INTRAMUSCULAR; INTRAVENOUS; SUBCUTANEOUS at 07:01

## 2022-01-09 RX ADMIN — HYDROMORPHONE HYDROCHLORIDE 2 MILLIGRAM(S): 2 INJECTION INTRAMUSCULAR; INTRAVENOUS; SUBCUTANEOUS at 10:05

## 2022-01-09 RX ADMIN — HYDROMORPHONE HYDROCHLORIDE 2 MILLIGRAM(S): 2 INJECTION INTRAMUSCULAR; INTRAVENOUS; SUBCUTANEOUS at 17:40

## 2022-01-09 RX ADMIN — GABAPENTIN 300 MILLIGRAM(S): 400 CAPSULE ORAL at 13:43

## 2022-01-09 RX ADMIN — PANTOPRAZOLE SODIUM 40 MILLIGRAM(S): 20 TABLET, DELAYED RELEASE ORAL at 16:49

## 2022-01-09 RX ADMIN — HYDROMORPHONE HYDROCHLORIDE 0.5 MILLIGRAM(S): 2 INJECTION INTRAMUSCULAR; INTRAVENOUS; SUBCUTANEOUS at 13:58

## 2022-01-09 RX ADMIN — GABAPENTIN 300 MILLIGRAM(S): 400 CAPSULE ORAL at 21:12

## 2022-01-09 RX ADMIN — HYDROMORPHONE HYDROCHLORIDE 0.5 MILLIGRAM(S): 2 INJECTION INTRAMUSCULAR; INTRAVENOUS; SUBCUTANEOUS at 03:00

## 2022-01-09 NOTE — PROGRESS NOTE ADULT - SUBJECTIVE AND OBJECTIVE BOX
Ortho Post Op Check    Name: LUIS FERNANDO DAVIS    MR #: [id number]    Procedure: Left knee I&D 12/28/21 POD #12  Surgeon: Dr Tobias    Patient states she is having pain in her left knee upon attempted movement and points to the anteromedial aspect of her left knee. States this is unchanged and if anything mild improvement   Denies CP, SOB, N/V, numbness/tingling           PAST MEDICAL & SURGICAL HISTORY:  No pertinent past medical history    History of ankle surgery          General Exam:  Vital Signs Last 24 Hrs  T(C): 36.9 (01-09-22 @ 05:00), Max: 36.9 (01-09-22 @ 05:00)  T(F): 98.4 (01-09-22 @ 05:00), Max: 98.4 (01-09-22 @ 05:00)  HR: 91 (01-09-22 @ 05:00) (91 - 91)  BP: 102/65 (01-09-22 @ 05:00) (102/65 - 102/65)  BP(mean): --  RR: 18 (01-09-22 @ 05:00) (18 - 18)  SpO2: 93% (01-09-22 @ 05:00) (93% - 93%)    General: Pt Alert and oriented, NAD, controlled pain.  Left knee sutures intact, Mild swelling noted. No bleeding or drainage noted from incision.   Patient has rolled sheet under left knee with flexion approx 20 degrees.   Wound vac appears to be functioning well EXCEPT for the very distal sponge is not under suction.   Unable to dorsi flex. No EHL  Pulses: 2+ dorsalis pedis pulse. Cap refill < 2 sec.  Sensation: Grossly intact to light touch without deficit.    Right knee well healed surgical incision. No effusion, no erythema  Calf soft, supple and nontender                               7.9    10.08 )-----------( 562      ( 09 Jan 2022 08:37 )             25.6         01-09    133<L>  |  96<L>  |  13.7  ----------------------------<  99  4.0   |  25.0  |  0.49<L>    Ca    9.3      09 Jan 2022 08:37  Phos  5.0     01-09  Mg     1.7     01-09    MEDICATIONS  (STANDING):  baclofen 10 milliGRAM(s) Oral three times a day  DAPTOmycin IVPB 500 milliGRAM(s) IV Intermittent every 24 hours  enoxaparin Injectable 40 milliGRAM(s) SubCutaneous daily  gabapentin 300 milliGRAM(s) Oral three times a day  HYDROmorphone  Injectable 0.5 milliGRAM(s) IV Push every 4 hours  lisinopril 5 milliGRAM(s) Oral daily  meropenem  IVPB 1000 milliGRAM(s) IV Intermittent every 8 hours  pantoprazole    Tablet 40 milliGRAM(s) Oral two times a day    MEDICATIONS  (PRN):  acetaminophen     Tablet .. 650 milliGRAM(s) Oral every 6 hours PRN Temp greater or equal to 38C (100.4F), Mild Pain (1 - 3)  acetaminophen     Tablet .. 975 milliGRAM(s) Oral every 8 hours PRN Temp greater or equal to 38C (100.4F), Mild Pain (1 - 3), Moderate Pain (4 - 6), Severe Pain (7 - 10)  aluminum hydroxide/magnesium hydroxide/simethicone Suspension 30 milliLiter(s) Oral every 4 hours PRN Dyspepsia  HYDROmorphone   Tablet 2 milliGRAM(s) Oral four times a day PRN Severe Pain (7 - 10)  melatonin 3 milliGRAM(s) Oral at bedtime PRN Insomnia  ondansetron Injectable 4 milliGRAM(s) IV Push every 8 hours PRN Nausea and/or Vomiting            A/P: 51yFemale POD#12 s/p Left knee repeat I&D  - Stable  - Pain Control  - DVT ppx: Lovenox  - abx: as per ID team   - Weight bearing status: LLE in CAM boot may WBAT; RLE WBAT  - Ortho again recs aggressive PT with tight pain control.

## 2022-01-09 NOTE — PROGRESS NOTE ADULT - ATTENDING COMMENTS
No acute events overnight. Pt working with PT. No complaints this am. Wound vac change tomorrow with topical lido, etc. Repeat CT scan to evaluate for any additional undrained collections, if normal, possible skin grafting next week and dispo planning.

## 2022-01-09 NOTE — PROGRESS NOTE ADULT - SUBJECTIVE AND OBJECTIVE BOX
midline stopped functioning overnight. US guided peripheral iv placed  pain controlled with regimen  -no issues with wound vac       MEDICATIONS  (STANDING):  baclofen 10 milliGRAM(s) Oral three times a day  DAPTOmycin IVPB 500 milliGRAM(s) IV Intermittent every 24 hours  enoxaparin Injectable 40 milliGRAM(s) SubCutaneous daily  gabapentin 300 milliGRAM(s) Oral three times a day  lisinopril 5 milliGRAM(s) Oral daily  meropenem  IVPB 1000 milliGRAM(s) IV Intermittent every 8 hours  pantoprazole    Tablet 40 milliGRAM(s) Oral two times a day    MEDICATIONS  (PRN):  acetaminophen     Tablet .. 650 milliGRAM(s) Oral every 6 hours PRN Temp greater or equal to 38C (100.4F), Mild Pain (1 - 3)  acetaminophen     Tablet .. 975 milliGRAM(s) Oral every 8 hours PRN Temp greater or equal to 38C (100.4F), Mild Pain (1 - 3), Moderate Pain (4 - 6), Severe Pain (7 - 10)  aluminum hydroxide/magnesium hydroxide/simethicone Suspension 30 milliLiter(s) Oral every 4 hours PRN Dyspepsia  HYDROmorphone   Tablet 2 milliGRAM(s) Oral every 4 hours PRN Moderate Pain (4 - 6)  melatonin 3 milliGRAM(s) Oral at bedtime PRN Insomnia  ondansetron Injectable 4 milliGRAM(s) IV Push every 8 hours PRN Nausea and/or Vomiting      Vital Signs Last 24 Hrs  T(C): 37.3 (08 Jan 2022 21:06), Max: 37.5 (08 Jan 2022 04:05)  T(F): 99.1 (08 Jan 2022 21:06), Max: 99.5 (08 Jan 2022 04:05)  HR: 94 (08 Jan 2022 21:06) (92 - 96)  BP: 106/67 (08 Jan 2022 21:06) (94/60 - 117/71)  BP(mean): --  RR: 18 (08 Jan 2022 21:06) (18 - 18)  SpO2: 95% (08 Jan 2022 21:06) (91% - 98%)    Physical Exam:  general: no acute distress, aox3     Respiratory: Breath Sounds equal & clear to auscultation, no accessory muscle use    Cardiovascular: Regular rate & rhythm, normal S1, S2; no murmurs, gallops or rubs    Gastrointestinal: Soft, non-tender, normal bowel sounds    Extremities: bilateral lower extremity edema. wound vac in palce to left lower extremity. healing incisions of bilateral knees        I&O's Detail    07 Jan 2022 07:01  -  08 Jan 2022 07:00  --------------------------------------------------------  IN:  Total IN: 0 mL    OUT:    VAC (Vacuum Assisted Closure) System (mL): 100 mL  Total OUT: 100 mL    Total NET: -100 mL      08 Jan 2022 07:01  -  09 Jan 2022 02:03  --------------------------------------------------------  IN:  Total IN: 0 mL    OUT:    Voided (mL): 850 mL  Total OUT: 850 mL    Total NET: -850 mL          LABS:                        8.0    9.72  )-----------( 574      ( 07 Jan 2022 05:23 )             25.1     01-07    135  |  97<L>  |  17.8  ----------------------------<  104<H>  4.4   |  25.0  |  0.47<L>    Ca    8.9      07 Jan 2022 05:23            RADIOLOGY & ADDITIONAL STUDIES:

## 2022-01-09 NOTE — PROGRESS NOTE ADULT - ASSESSMENT
51 year old female s/p multiple OR for debridement of left lower extremity foi soft tissue infection     -continue wound vac; change 1/10  -continue dapto/merrem per ID recs  -possible reinsert midline/picc line week of 1/10  -PT/OT  -dishcarge planning   -dvt ppx

## 2022-01-09 NOTE — PROGRESS NOTE ADULT - ATTENDING COMMENTS
pt seen and examined. agree /w above. making progress /w LLE. swelling improved. ROM/pain improving. Continue medical management for osteo. ACS care for LLE vacs.

## 2022-01-10 LAB
ANION GAP SERPL CALC-SCNC: 12 MMOL/L — SIGNIFICANT CHANGE UP (ref 5–17)
BUN SERPL-MCNC: 13.9 MG/DL — SIGNIFICANT CHANGE UP (ref 8–20)
CALCIUM SERPL-MCNC: 9.5 MG/DL — SIGNIFICANT CHANGE UP (ref 8.6–10.2)
CHLORIDE SERPL-SCNC: 99 MMOL/L — SIGNIFICANT CHANGE UP (ref 98–107)
CO2 SERPL-SCNC: 25 MMOL/L — SIGNIFICANT CHANGE UP (ref 22–29)
CREAT SERPL-MCNC: 0.48 MG/DL — LOW (ref 0.5–1.3)
GLUCOSE SERPL-MCNC: 102 MG/DL — HIGH (ref 70–99)
HCT VFR BLD CALC: 26.3 % — LOW (ref 34.5–45)
HGB BLD-MCNC: 8.3 G/DL — LOW (ref 11.5–15.5)
MAGNESIUM SERPL-MCNC: 1.7 MG/DL — SIGNIFICANT CHANGE UP (ref 1.6–2.6)
MCHC RBC-ENTMCNC: 28 PG — SIGNIFICANT CHANGE UP (ref 27–34)
MCHC RBC-ENTMCNC: 31.6 GM/DL — LOW (ref 32–36)
MCV RBC AUTO: 88.9 FL — SIGNIFICANT CHANGE UP (ref 80–100)
PHOSPHATE SERPL-MCNC: 5.1 MG/DL — HIGH (ref 2.4–4.7)
PLATELET # BLD AUTO: 573 K/UL — HIGH (ref 150–400)
POTASSIUM SERPL-MCNC: 4 MMOL/L — SIGNIFICANT CHANGE UP (ref 3.5–5.3)
POTASSIUM SERPL-SCNC: 4 MMOL/L — SIGNIFICANT CHANGE UP (ref 3.5–5.3)
RBC # BLD: 2.96 M/UL — LOW (ref 3.8–5.2)
RBC # FLD: 15.8 % — HIGH (ref 10.3–14.5)
SODIUM SERPL-SCNC: 136 MMOL/L — SIGNIFICANT CHANGE UP (ref 135–145)
WBC # BLD: 11 K/UL — HIGH (ref 3.8–10.5)
WBC # FLD AUTO: 11 K/UL — HIGH (ref 3.8–10.5)

## 2022-01-10 PROCEDURE — 76937 US GUIDE VASCULAR ACCESS: CPT | Mod: 26

## 2022-01-10 PROCEDURE — 73701 CT LOWER EXTREMITY W/DYE: CPT | Mod: 26,LT

## 2022-01-10 PROCEDURE — 36556 INSERT NON-TUNNEL CV CATH: CPT

## 2022-01-10 RX ORDER — LIDOCAINE HCL 20 MG/ML
20 VIAL (ML) INJECTION ONCE
Refills: 0 | Status: DISCONTINUED | OUTPATIENT
Start: 2022-01-10 | End: 2022-01-10

## 2022-01-10 RX ORDER — HYDROMORPHONE HYDROCHLORIDE 2 MG/ML
1 INJECTION INTRAMUSCULAR; INTRAVENOUS; SUBCUTANEOUS ONCE
Refills: 0 | Status: DISCONTINUED | OUTPATIENT
Start: 2022-01-10 | End: 2022-01-10

## 2022-01-10 RX ORDER — MAGNESIUM SULFATE 500 MG/ML
2 VIAL (ML) INJECTION ONCE
Refills: 0 | Status: COMPLETED | OUTPATIENT
Start: 2022-01-10 | End: 2022-01-10

## 2022-01-10 RX ORDER — LIDOCAINE HCL 20 MG/ML
20 VIAL (ML) INJECTION ONCE
Refills: 0 | Status: DISCONTINUED | OUTPATIENT
Start: 2022-01-10 | End: 2022-01-18

## 2022-01-10 RX ADMIN — HYDROMORPHONE HYDROCHLORIDE 2 MILLIGRAM(S): 2 INJECTION INTRAMUSCULAR; INTRAVENOUS; SUBCUTANEOUS at 21:00

## 2022-01-10 RX ADMIN — LISINOPRIL 5 MILLIGRAM(S): 2.5 TABLET ORAL at 05:02

## 2022-01-10 RX ADMIN — HYDROMORPHONE HYDROCHLORIDE 2 MILLIGRAM(S): 2 INJECTION INTRAMUSCULAR; INTRAVENOUS; SUBCUTANEOUS at 09:40

## 2022-01-10 RX ADMIN — HYDROMORPHONE HYDROCHLORIDE 0.5 MILLIGRAM(S): 2 INJECTION INTRAMUSCULAR; INTRAVENOUS; SUBCUTANEOUS at 06:18

## 2022-01-10 RX ADMIN — HYDROMORPHONE HYDROCHLORIDE 0.5 MILLIGRAM(S): 2 INJECTION INTRAMUSCULAR; INTRAVENOUS; SUBCUTANEOUS at 06:33

## 2022-01-10 RX ADMIN — HYDROMORPHONE HYDROCHLORIDE 2 MILLIGRAM(S): 2 INJECTION INTRAMUSCULAR; INTRAVENOUS; SUBCUTANEOUS at 20:04

## 2022-01-10 RX ADMIN — HYDROMORPHONE HYDROCHLORIDE 0.5 MILLIGRAM(S): 2 INJECTION INTRAMUSCULAR; INTRAVENOUS; SUBCUTANEOUS at 22:18

## 2022-01-10 RX ADMIN — HYDROMORPHONE HYDROCHLORIDE 0.5 MILLIGRAM(S): 2 INJECTION INTRAMUSCULAR; INTRAVENOUS; SUBCUTANEOUS at 10:25

## 2022-01-10 RX ADMIN — GABAPENTIN 300 MILLIGRAM(S): 400 CAPSULE ORAL at 22:20

## 2022-01-10 RX ADMIN — Medication 10 MILLIGRAM(S): at 13:33

## 2022-01-10 RX ADMIN — PANTOPRAZOLE SODIUM 40 MILLIGRAM(S): 20 TABLET, DELAYED RELEASE ORAL at 18:01

## 2022-01-10 RX ADMIN — PANTOPRAZOLE SODIUM 40 MILLIGRAM(S): 20 TABLET, DELAYED RELEASE ORAL at 05:02

## 2022-01-10 RX ADMIN — MEROPENEM 100 MILLIGRAM(S): 1 INJECTION INTRAVENOUS at 05:03

## 2022-01-10 RX ADMIN — Medication 25 GRAM(S): at 08:46

## 2022-01-10 RX ADMIN — HYDROMORPHONE HYDROCHLORIDE 1 MILLIGRAM(S): 2 INJECTION INTRAMUSCULAR; INTRAVENOUS; SUBCUTANEOUS at 15:34

## 2022-01-10 RX ADMIN — MEROPENEM 100 MILLIGRAM(S): 1 INJECTION INTRAVENOUS at 22:17

## 2022-01-10 RX ADMIN — HYDROMORPHONE HYDROCHLORIDE 0.5 MILLIGRAM(S): 2 INJECTION INTRAMUSCULAR; INTRAVENOUS; SUBCUTANEOUS at 02:22

## 2022-01-10 RX ADMIN — HYDROMORPHONE HYDROCHLORIDE 0.5 MILLIGRAM(S): 2 INJECTION INTRAMUSCULAR; INTRAVENOUS; SUBCUTANEOUS at 10:40

## 2022-01-10 RX ADMIN — HYDROMORPHONE HYDROCHLORIDE 0.5 MILLIGRAM(S): 2 INJECTION INTRAMUSCULAR; INTRAVENOUS; SUBCUTANEOUS at 18:01

## 2022-01-10 RX ADMIN — DAPTOMYCIN 120 MILLIGRAM(S): 500 INJECTION, POWDER, LYOPHILIZED, FOR SOLUTION INTRAVENOUS at 20:35

## 2022-01-10 RX ADMIN — ENOXAPARIN SODIUM 40 MILLIGRAM(S): 100 INJECTION SUBCUTANEOUS at 13:33

## 2022-01-10 RX ADMIN — HYDROMORPHONE HYDROCHLORIDE 0.5 MILLIGRAM(S): 2 INJECTION INTRAMUSCULAR; INTRAVENOUS; SUBCUTANEOUS at 13:33

## 2022-01-10 RX ADMIN — GABAPENTIN 300 MILLIGRAM(S): 400 CAPSULE ORAL at 05:03

## 2022-01-10 RX ADMIN — HYDROMORPHONE HYDROCHLORIDE 0.5 MILLIGRAM(S): 2 INJECTION INTRAMUSCULAR; INTRAVENOUS; SUBCUTANEOUS at 02:07

## 2022-01-10 RX ADMIN — HYDROMORPHONE HYDROCHLORIDE 0.5 MILLIGRAM(S): 2 INJECTION INTRAMUSCULAR; INTRAVENOUS; SUBCUTANEOUS at 18:16

## 2022-01-10 RX ADMIN — HYDROMORPHONE HYDROCHLORIDE 0.5 MILLIGRAM(S): 2 INJECTION INTRAMUSCULAR; INTRAVENOUS; SUBCUTANEOUS at 13:48

## 2022-01-10 RX ADMIN — Medication 10 MILLIGRAM(S): at 05:02

## 2022-01-10 RX ADMIN — HYDROMORPHONE HYDROCHLORIDE 1 MILLIGRAM(S): 2 INJECTION INTRAMUSCULAR; INTRAVENOUS; SUBCUTANEOUS at 15:49

## 2022-01-10 RX ADMIN — HYDROMORPHONE HYDROCHLORIDE 0.5 MILLIGRAM(S): 2 INJECTION INTRAMUSCULAR; INTRAVENOUS; SUBCUTANEOUS at 23:15

## 2022-01-10 RX ADMIN — Medication 1 MILLIGRAM(S): at 14:56

## 2022-01-10 RX ADMIN — HYDROMORPHONE HYDROCHLORIDE 2 MILLIGRAM(S): 2 INJECTION INTRAMUSCULAR; INTRAVENOUS; SUBCUTANEOUS at 08:46

## 2022-01-10 RX ADMIN — MEROPENEM 100 MILLIGRAM(S): 1 INJECTION INTRAVENOUS at 14:29

## 2022-01-10 RX ADMIN — Medication 10 MILLIGRAM(S): at 22:21

## 2022-01-10 RX ADMIN — GABAPENTIN 300 MILLIGRAM(S): 400 CAPSULE ORAL at 13:33

## 2022-01-10 NOTE — PROGRESS NOTE ADULT - ASSESSMENT
51 year old female s/p multiple OR for debridement of left lower extremity for soft tissue infection.       -continue wound vac; change 1/10  -continue dapto/merrem per ID recs  -possible reinsert midline/picc line week of 1/10  -PT/OT  -dishcarge planning   -dvt ppx

## 2022-01-10 NOTE — PROCEDURE NOTE - NSPROCDETAILS_GEN_ALL_CORE
location identified, draped/prepped, sterile technique used/sterile dressing applied/sterile technique, catheter placed/ultrasound guidance
a needle was inserted into (see location above)/aspiration...
nasogastric
location identified, draped/prepped, sterile technique used, needle inserted/introduced/positive blood return obtained via catheter/connected to a pressurized flush line/sutured in place/Seldinger technique/all materials/supplies accounted for at end of procedure
patient pre-oxygenated, tube inserted, placement confirmed
guidewire recovered/lumen(s) aspirated and flushed/sterile dressing applied/sterile technique, catheter placed/ultrasound guidance with use of sterile gel and probe cove
location identified, draped/prepped, sterile technique used/blood seen on insertion/dressing applied/flushes easily/secured in place/sterile technique, catheter placed
location identified, draped/prepped, sterile technique used/sterile dressing applied/sterile technique, catheter placed/supine position/ultrasound guidance

## 2022-01-10 NOTE — PROCEDURE NOTE - NSSECUREBY_VASC_A_CORE
stabilization device/sterile occulsive dressing/sterile pressure dressing/tape
stabilization device/sterile pressure dressing

## 2022-01-10 NOTE — PROGRESS NOTE ADULT - SUBJECTIVE AND OBJECTIVE BOX
SUBJECTIVE:    Patient evaluated at bedside. No acute distress. No acute events overnight.     Vital Signs Last 24 Hrs  T(C): 37.5 (10 Rah 2022 04:08), Max: 37.5 (10 Rah 2022 04:08)  T(F): 99.5 (10 Rah 2022 04:08), Max: 99.5 (10 Rah 2022 04:08)  HR: 97 (10 Rah 2022 04:08) (90 - 107)  BP: 114/81 (10 Rah 2022 05:02) (100/64 - 123/74)  BP(mean): --  RR: 17 (10 Rah 2022 04:08) (17 - 18)  SpO2: 97% (10 Rah 2022 04:08) (97% - 100%)    PHYSICAL EXAM:  General: no acute distress, aox3   Respiratory: Breath Sounds equal & clear to auscultation, no accessory muscle use  Cardiovascular: Regular rate & rhythm, normal S1, S2; no murmurs, gallops or rubs  Gastrointestinal: Soft, non-tender, normal bowel sounds  Extremities: bilateral lower extremity edema. wound vac in place to left lower extremity. healing incisions of bilateral knees        I&O's Summary    08 Jan 2022 07:01  -  09 Jan 2022 07:00  --------------------------------------------------------  IN: 0 mL / OUT: 1500 mL / NET: -1500 mL    09 Jan 2022 07:01  -  10 Rah 2022 05:10  --------------------------------------------------------  IN: 0 mL / OUT: 650 mL / NET: -650 mL      I&O's Detail    08 Jan 2022 07:01  -  09 Jan 2022 07:00  --------------------------------------------------------  IN:  Total IN: 0 mL    OUT:    VAC (Vacuum Assisted Closure) System (mL): 150 mL    Voided (mL): 1350 mL  Total OUT: 1500 mL    Total NET: -1500 mL      09 Jan 2022 07:01  -  10 Rah 2022 05:10  --------------------------------------------------------  IN:  Total IN: 0 mL    OUT:    Voided (mL): 650 mL  Total OUT: 650 mL    Total NET: -650 mL          MEDICATIONS  (STANDING):  baclofen 10 milliGRAM(s) Oral three times a day  DAPTOmycin IVPB 500 milliGRAM(s) IV Intermittent every 24 hours  enoxaparin Injectable 40 milliGRAM(s) SubCutaneous daily  gabapentin 300 milliGRAM(s) Oral three times a day  HYDROmorphone  Injectable 0.5 milliGRAM(s) IV Push every 4 hours  lisinopril 5 milliGRAM(s) Oral daily  meropenem  IVPB 1000 milliGRAM(s) IV Intermittent every 8 hours  pantoprazole    Tablet 40 milliGRAM(s) Oral two times a day    MEDICATIONS  (PRN):  acetaminophen     Tablet .. 650 milliGRAM(s) Oral every 6 hours PRN Temp greater or equal to 38C (100.4F), Mild Pain (1 - 3)  acetaminophen     Tablet .. 975 milliGRAM(s) Oral every 8 hours PRN Temp greater or equal to 38C (100.4F), Mild Pain (1 - 3), Moderate Pain (4 - 6), Severe Pain (7 - 10)  aluminum hydroxide/magnesium hydroxide/simethicone Suspension 30 milliLiter(s) Oral every 4 hours PRN Dyspepsia  HYDROmorphone   Tablet 2 milliGRAM(s) Oral four times a day PRN Severe Pain (7 - 10)  melatonin 3 milliGRAM(s) Oral at bedtime PRN Insomnia  ondansetron Injectable 4 milliGRAM(s) IV Push every 8 hours PRN Nausea and/or Vomiting      LABS:                        7.9    10.08 )-----------( 562      ( 09 Jan 2022 08:37 )             25.6     01-09    133<L>  |  96<L>  |  13.7  ----------------------------<  99  4.0   |  25.0  |  0.49<L>    Ca    9.3      09 Jan 2022 08:37  Phos  5.0     01-09  Mg     1.7     01-09            RADIOLOGY & ADDITIONAL STUDIES:

## 2022-01-10 NOTE — PROCEDURE NOTE - ADDITIONAL PROCEDURE DETAILS
DX: Upper GI bleed  Acute blood loss anemia
Wound at lateral lower medial thigh, approximately 8cm x 4cm, deepest in center at about 3cm and tapering out. Base clean and healthy appearing, no purulence or necrotic tissue. Serous drainage.
#20G IV sono guided good flash ns flush left cephalic vein
Intubated for GI Bleed airway protection for EGD    DX: Acute blood loss anemia  Upper GI Bleed
18G 10CM 30CIRC [place] POWER GLIDE MIDLINE good heme return ns flush right basilic vein
BARD POWER INJECTABLE MIDLINE    INSERTED 10CM

## 2022-01-10 NOTE — PHARMACOTHERAPY INTERVENTION NOTE - NSPHARMCOMMASP
ASP - Dose optimization/Non-Renal dose adjustment
ASP - Dose optimization/Non-Renal dose adjustment
ASP - Lab/ test recommended

## 2022-01-10 NOTE — PROGRESS NOTE ADULT - ASSESSMENT
51y Female present to ED with left leg swelling, erythema, pain. Patient endorses she had left knee pain for 1 week presented 3 days ago to ED  where she states was given an steroid shot, and ibuprofen. however pain and edema worsened. Patient states she wasnt feeling herself today reason why she came. Endorses chills initially with pain 3 days ago but none since, denies history of trauma, insect bites, recent interventions, or injections to the area, contact with ticks, history of diabetes.  PT AS ABOVE WITH LEFT LEG SWELLING PT WITH INCREASED WBC PLACED ON ABX FOR PRESUMED INFECTION  PT WITH CT SCAN WITH FASCIAL EDAM PT BROUGHT TO THE OR EMERGENTLY AND  UNDERWENT FASCIOTOMY   PT BROUGHT TO THE OR  BOTH TRAUMA AND ORTHO INVOLVED  PURULENT FLUID FOUND  NECROTIZING SOFT TISSUE INFECTION    BLOOD CX WITH GROUP A STREP AND  OPERATIVE FLUID WITH STREP   PT WAS  ON PCN AND CLINDA for POSSIBLE ANTITOXIN EFFECT IN GROUP A STREP INFECTION     PT  S/P OR WASHOUT 12.2  AND  OR AGAIN 12/7  and late on 12/10 121/15 12/17 and 12/20 12/23     REPEAT   OPERATIVE CX NEG             AS ABOVE  REPEAT  OR  12/20 12/23    S/ RETURN TO THE OR 12/28 AND TODAY 12/31    RECENT VAC DRESSING CHANGE NO PUS   NO PURULENCE   OVERALL NON TOXIC  AFEBRILE            ON MERREM/DAPTOMYCIN   SPOKE TO  SURGERY TODAY  PLAN ON REPEAT IMAGING AS PER ORTHO AND PROBABLE D/C TO REHAB  PT HAS BEEN ON PROLONGED IV ABX  AND WITH PROBABLE OSTEOMYELITIS    WOULD TREAT ANOTHER 2 WEEKS WITH DAPTO.MERREM THROUGH 1/24

## 2022-01-10 NOTE — PROGRESS NOTE ADULT - SUBJECTIVE AND OBJECTIVE BOX
Ortho Progress note    Name: LUIS FERNANDO DAVIS    MR #: [id number]    Procedure: Left knee I&D 12/28/21 POD #13  Surgeon: Dr Tobias    Patient states she is doing well, reports improvement of ROM  Has been participating in PT   Denies CP, SOB, N/V, numbness/tingling     Vital Signs Last 24 Hrs  T(C): 37.5 (10 Rah 2022 04:08), Max: 37.5 (10 Rah 2022 04:08)  T(F): 99.5 (10 Rah 2022 04:08), Max: 99.5 (10 Rah 2022 04:08)  HR: 97 (10 Rah 2022 04:08) (90 - 107)  BP: 114/81 (10 Rah 2022 05:02) (100/64 - 123/74)  BP(mean): --  RR: 17 (10 Rah 2022 04:08) (17 - 18)  SpO2: 97% (10 Rah 2022 04:08) (97% - 100%)    General: Pt Alert and oriented, NAD, controlled pain.  Left knee sutures intact, Mild swelling noted. No bleeding or drainage noted from incision.   Patient has rolled sheet under left knee with flexion to 20 degrees.   Wound vac appears to be functioning well EXCEPT for the very distal sponge is not under suction.   Unable to dorsi flex. No EHL  Pulses: 2+ dorsalis pedis pulse. Cap refill < 2 sec.  Sensation: Grossly intact to light touch without deficit.    Right knee well healed surgical incision. No effusion, no erythema  Calf soft, supple and nontender     A/P: 51y Female POD#13 s/p Left knee repeat I&D    - Suture removal left knee incision tomorrow 1/11/22  - Pain Control  - DVT ppx: Lovenox  - Abx: as per ID team   - Weight bearing status: LLE in CAM boot may WBAT; RLE WBAT  - Continue aggressive PT with tight pain control.   - DC planning

## 2022-01-10 NOTE — PROGRESS NOTE ADULT - SUBJECTIVE AND OBJECTIVE BOX
INFECTIOUS DISEASES AND INTERNAL MEDICINE at [facility]  =======================================================  Theo Morrow MD  Diplomates [employer] Board of Internal Medicine and Infectious Diseases  Telephone [phone]  Fax            [phone]  =======================================================    LUIS FERNANDO DAVIS [id number]  Follow up: group A STREP NECROTIZING SOFT TISSUE INFECTION     repeat CT scan of lower ext with collections.   S/P OR WASHOUT 12/2  AND   OR AGAIN  12/7   late  on 12/10 12/15 12/17  12/20 12/23     AFEBRILE THIS MORNING in nad  REPEAT CX SCAN DONE  S/P OR  AGAIN  12/28  CX SO FAR NEG  REMAINS AFEBRILE       Allergies:  No Known Allergies       FAMILY   FAMILY HISTORY:  FH: HTN (hypertension) (Mother)      REVIEW OF SYSTEMS:  CO .            Physical Exam:        Other medications:  baclofen 10 milliGRAM(s) Oral three times a day  collagenase Ointment 1 Application(s) Topical two times a day  gabapentin 200 milliGRAM(s) Oral three times a day  glucagon  Injectable 1 milliGRAM(s) IntraMuscular once  heparin   Injectable 5000 Unit(s) SubCutaneous every 8 hours  lisinopril 5 milliGRAM(s) Oral daily  methocarbamol 750 milliGRAM(s) Oral three times a day  pantoprazole    Tablet 40 milliGRAM(s) Oral two times a day  polyethylene glycol 3350 17 Gram(s) Oral daily  senna 2 Tablet(s) Oral at bedtime  sodium chloride 1 Gram(s) Oral every 12 hours  sodium chloride 0.9%. 1000 milliLiter(s) IV Continuous <Continuous>      =======================================================  Labs:                               8.3    11.00 )-----------( 573      ( 10 Rah 2022 06:44 )             26.3   01-10    136  |  99  |  13.9  ----------------------------<  102<H>  4.0   |  25.0  |  0.48<L>    Ca    9.5      10 Rah 2022 06:44  Phos  5.1     01-10  Mg     1.7     01-10                        e E: (C=Critical, N=Notification, A=Abnormal) C    TESTS:  _ GS    DATE/TIME CALLED: _ 11/18/2021 09:40:08    CALLED TO: Chris Hernandez RN    READ BACK (2 Patient Identifiers)(Y/N): _ Y    READ BACK VALUES (Y/N): _ Y    CALLED BY: Chris Quiñones  Final Report (11-21-21 @ 12:28):    Growth in aerobic and anaerobic bottles: Streptococcus pyogenes (Group A)  Organism: Blood Culture PCR  Streptococcus pyogenes (Group A) (11-21-21 @ 12:28)  Organism: Streptococcus pyogenes (Group A) (11-21-21 @ 12:28)    Sensitivities:      -  Ceftriaxone: S 0.016      -  Penicillin: S 0.016 Predicts results for ampicillin, amoxicillin, amoxicillin/clavulanate, ampicillin/sulbactam, 1st, 2nd and 3rd generation cephalosporins and carbapenems.      Method Type: ETEST  Organism: Streptococcus pyogenes (Group A) (11-21-21 @ 12:28)    Sensitivities:      -  Vancomycin: S      Method Type: KB  Organism: Blood Culture PCR (11-21-21 @ 12:28)    Sensitivities:      -  Streptococcus pyogenes (Group A): Detec      Method Type: PCR      Creatinine, Serum: 0.75 mg/dL (11-30-21 @ 07:18)  Creatinine, Serum: 0.68 mg/dL (11-29-21 @ 09:55)  Creatinine, Serum: 0.64 mg/dL (11-28-21 @ 09:14)  Creatinine, Serum: 0.65 mg/dL (11-27-21 @ 11:51)  Creatinine, Serum: 0.57 mg/dL (11-27-21 @ 05:35)  Creatinine, Serum: 0.61 mg/dL (11-26-21 @ 09:01)        Ferritin, Serum: 388 ng/mL (11-17-21 @ 13:21)    C-Reactive Protein, Serum: 197 mg/L (11-26-21 @ 21:52)  C-Reactive Protein, Serum: 211 mg/L (11-26-21 @ 09:01)  C-Reactive Protein, Serum: 53 mg/L (11-23-21 @ 07:42)  C-Reactive Protein, Serum: >422 mg/L (11-17-21 @ 13:21)    Sedimentation Rate, Erythrocyte: 65 mm/hr (11-26-21 @ 21:52)  Sedimentation Rate, Erythrocyte: 63 mm/hr (11-26-21 @ 09:01)  Sedimentation Rate, Erythrocyte: 55 mm/hr (11-17-21 @ 13:21)    WBC Count: 14.55 K/uL (11-30-21 @ 07:18)  WBC Count: 16.71 K/uL (11-29-21 @ 09:54)  WBC Count: 17.02 K/uL (11-28-21 @ 09:14)  WBC Count: 15.99 K/uL (11-27-21 @ 05:33)  WBC Count: 18.73 K/uL (11-26-21 @ 09:01)      COVID-19 PCR: NotDetec (11-30-21 @ 01:59)  COVID-19 PCR: NotDetec (11-23-21 @ 18:50)  COVID-19 PCR: NotDetec (11-17-21 @ 13:18)    Lactate Dehydrogenase, Serum: 769 U/L (11-20-21 @ 03:56)    Alkaline Phosphatase, Serum: 88 U/L (11-27-21 @ 05:35)  Alanine Aminotransferase (ALT/SGPT): 11 U/L (11-27-21 @ 05:35)  Aspartate Aminotransferase (AST/SGOT): 25 U/L (11-27-21 @ 05:35)  Bilirubin Total, Serum: 0.3 mg/dL (11-27-21 @ 05:35)        < from: CT Lower Extremity w/ IV Cont, Bilateral (11.30.21 @ 09:55) >     EXAM:  CT LWR EXT IC BI                          PROCEDURE DATE:  11/30/2021          INTERPRETATION:  CT LOWER EXTREMITY WITH IV CONTRAST BILATERAL dated 11/30/2021 9:55 AM    INDICATION: Fever. Status post incision and drainage bilaterally. Persistent fevers to 104.    COMPARISON: CT of the right knee dated 11/23/2021. CT of the left lower extremity dated 11/17/2021    VICKI -

## 2022-01-11 LAB
APTT BLD: 36 SEC — HIGH (ref 27.5–35.5)
CK SERPL-CCNC: 22 U/L — LOW (ref 25–170)
INR BLD: 1.36 RATIO — HIGH (ref 0.88–1.16)
PROTHROM AB SERPL-ACNC: 15.6 SEC — HIGH (ref 10.6–13.6)
SARS-COV-2 RNA SPEC QL NAA+PROBE: SIGNIFICANT CHANGE UP
SARS-COV-2 RNA SPEC QL NAA+PROBE: SIGNIFICANT CHANGE UP

## 2022-01-11 PROCEDURE — 10030 IMG GID FLU COLL DRG SFT TIS: CPT

## 2022-01-11 RX ADMIN — GABAPENTIN 300 MILLIGRAM(S): 400 CAPSULE ORAL at 21:08

## 2022-01-11 RX ADMIN — HYDROMORPHONE HYDROCHLORIDE 0.5 MILLIGRAM(S): 2 INJECTION INTRAMUSCULAR; INTRAVENOUS; SUBCUTANEOUS at 06:23

## 2022-01-11 RX ADMIN — GABAPENTIN 300 MILLIGRAM(S): 400 CAPSULE ORAL at 14:56

## 2022-01-11 RX ADMIN — ENOXAPARIN SODIUM 40 MILLIGRAM(S): 100 INJECTION SUBCUTANEOUS at 11:04

## 2022-01-11 RX ADMIN — PANTOPRAZOLE SODIUM 40 MILLIGRAM(S): 20 TABLET, DELAYED RELEASE ORAL at 06:26

## 2022-01-11 RX ADMIN — HYDROMORPHONE HYDROCHLORIDE 0.5 MILLIGRAM(S): 2 INJECTION INTRAMUSCULAR; INTRAVENOUS; SUBCUTANEOUS at 07:30

## 2022-01-11 RX ADMIN — HYDROMORPHONE HYDROCHLORIDE 0.5 MILLIGRAM(S): 2 INJECTION INTRAMUSCULAR; INTRAVENOUS; SUBCUTANEOUS at 17:55

## 2022-01-11 RX ADMIN — GABAPENTIN 300 MILLIGRAM(S): 400 CAPSULE ORAL at 06:26

## 2022-01-11 RX ADMIN — HYDROMORPHONE HYDROCHLORIDE 0.5 MILLIGRAM(S): 2 INJECTION INTRAMUSCULAR; INTRAVENOUS; SUBCUTANEOUS at 14:56

## 2022-01-11 RX ADMIN — HYDROMORPHONE HYDROCHLORIDE 0.5 MILLIGRAM(S): 2 INJECTION INTRAMUSCULAR; INTRAVENOUS; SUBCUTANEOUS at 10:25

## 2022-01-11 RX ADMIN — MEROPENEM 100 MILLIGRAM(S): 1 INJECTION INTRAVENOUS at 21:08

## 2022-01-11 RX ADMIN — HYDROMORPHONE HYDROCHLORIDE 0.5 MILLIGRAM(S): 2 INJECTION INTRAMUSCULAR; INTRAVENOUS; SUBCUTANEOUS at 11:00

## 2022-01-11 RX ADMIN — HYDROMORPHONE HYDROCHLORIDE 0.5 MILLIGRAM(S): 2 INJECTION INTRAMUSCULAR; INTRAVENOUS; SUBCUTANEOUS at 16:25

## 2022-01-11 RX ADMIN — MEROPENEM 100 MILLIGRAM(S): 1 INJECTION INTRAVENOUS at 06:24

## 2022-01-11 RX ADMIN — Medication 10 MILLIGRAM(S): at 14:56

## 2022-01-11 RX ADMIN — HYDROMORPHONE HYDROCHLORIDE 0.5 MILLIGRAM(S): 2 INJECTION INTRAMUSCULAR; INTRAVENOUS; SUBCUTANEOUS at 21:08

## 2022-01-11 RX ADMIN — DAPTOMYCIN 120 MILLIGRAM(S): 500 INJECTION, POWDER, LYOPHILIZED, FOR SOLUTION INTRAVENOUS at 18:49

## 2022-01-11 RX ADMIN — HYDROMORPHONE HYDROCHLORIDE 0.5 MILLIGRAM(S): 2 INJECTION INTRAMUSCULAR; INTRAVENOUS; SUBCUTANEOUS at 21:38

## 2022-01-11 RX ADMIN — HYDROMORPHONE HYDROCHLORIDE 0.5 MILLIGRAM(S): 2 INJECTION INTRAMUSCULAR; INTRAVENOUS; SUBCUTANEOUS at 18:25

## 2022-01-11 RX ADMIN — Medication 975 MILLIGRAM(S): at 22:04

## 2022-01-11 RX ADMIN — MEROPENEM 100 MILLIGRAM(S): 1 INJECTION INTRAVENOUS at 14:56

## 2022-01-11 RX ADMIN — HYDROMORPHONE HYDROCHLORIDE 0.5 MILLIGRAM(S): 2 INJECTION INTRAMUSCULAR; INTRAVENOUS; SUBCUTANEOUS at 02:35

## 2022-01-11 RX ADMIN — PANTOPRAZOLE SODIUM 40 MILLIGRAM(S): 20 TABLET, DELAYED RELEASE ORAL at 17:55

## 2022-01-11 RX ADMIN — Medication 10 MILLIGRAM(S): at 21:08

## 2022-01-11 RX ADMIN — Medication 975 MILLIGRAM(S): at 22:35

## 2022-01-11 RX ADMIN — Medication 10 MILLIGRAM(S): at 06:26

## 2022-01-11 NOTE — PROGRESS NOTE ADULT - ASSESSMENT
51 year old female s/p multiple OR debridement of left lower extremity for soft tissue infection and abscesses of unclear origin. HD stable and without new complaints.      -continue wound vac;, last changed 1/10, next due 1/13  -continue dapto/merrem per ID recs  -possible reinsert midline/picc line this week  -PT/OT  discharge planning   -dvt ppx

## 2022-01-11 NOTE — CHART NOTE - NSCHARTNOTEFT_GEN_A_CORE
Source: Patient [x ]  Family [ ]   other [ ]  51 year old female s/p multiple OR debridement of left lower extremity for soft tissue infection and abscesses of unclear origin. HD stable and without new complaints.    continue wound vac;, last changed 1/10, next due 1/13    Current Diet: Diet, Regular (12-28-21 @ 17:50)      Patient reports [ ] nausea  [ ] vomiting [ ] diarrhea [ ] constipation  [ ]chewing problems [ ] swallowing issues  [ ] other:     PO intake:  < 50% [ ]   50-75%  [ x]   %  [ ]  other :    Source for PO intake [ x] Patient [ ] family [ ] chart [ ] staff [ ] other        Current Weight:   (1/4) 198.2 lbs- per RD bedscale  (12/29) 181 lbs  (12/21) 194.7 lbs  (12/8) 251.9 lbs  (12/7) 205.9 lbs  (12/1) 234.7 lbs  (11/24) 221.3 lbs  (11/22) 245.1 lbs  (11/21) 242.9 lbs  (11/20) 228.1 lbs  % Weight Change     Pertinent Medications: MEDICATIONS  (STANDING):  baclofen 10 milliGRAM(s) Oral three times a day  DAPTOmycin IVPB 500 milliGRAM(s) IV Intermittent every 24 hours  enoxaparin Injectable 40 milliGRAM(s) SubCutaneous daily  gabapentin 300 milliGRAM(s) Oral three times a day  HYDROmorphone  Injectable 0.5 milliGRAM(s) IV Push every 4 hours  lidocaine 1% Injectable 20 milliLiter(s) Local Injection once  lisinopril 5 milliGRAM(s) Oral daily  meropenem  IVPB 1000 milliGRAM(s) IV Intermittent every 8 hours  pantoprazole    Tablet 40 milliGRAM(s) Oral two times a day    MEDICATIONS  (PRN):  acetaminophen     Tablet .. 650 milliGRAM(s) Oral every 6 hours PRN Temp greater or equal to 38C (100.4F), Mild Pain (1 - 3)  acetaminophen     Tablet .. 975 milliGRAM(s) Oral every 8 hours PRN Temp greater or equal to 38C (100.4F), Mild Pain (1 - 3), Moderate Pain (4 - 6), Severe Pain (7 - 10)  aluminum hydroxide/magnesium hydroxide/simethicone Suspension 30 milliLiter(s) Oral every 4 hours PRN Dyspepsia  HYDROmorphone   Tablet 2 milliGRAM(s) Oral four times a day PRN Severe Pain (7 - 10)  melatonin 3 milliGRAM(s) Oral at bedtime PRN Insomnia  ondansetron Injectable 4 milliGRAM(s) IV Push every 8 hours PRN Nausea and/or Vomiting    Pertinent Labs: CBC Full  -  ( 10 Rah 2022 06:44 )  WBC Count : 11.00 K/uL  RBC Count : 2.96 M/uL  Hemoglobin : 8.3 g/dL  Hematocrit : 26.3 %  Platelet Count - Automated : 573 K/uL  Mean Cell Volume : 88.9 fl  Mean Cell Hemoglobin : 28.0 pg  Mean Cell Hemoglobin Concentration : 31.6 gm/dL  Auto Neutrophil # : x  Auto Lymphocyte # : x  Auto Monocyte # : x  Auto Eosinophil # : x  Auto Basophil # : x  Auto Neutrophil % : x  Auto Lymphocyte % : x  Auto Monocyte % : x  Auto Eosinophil % : x  Auto Basophil % : x    01-10 Na136 mmol/L Glu 102 mg/dL<H> K+ 4.0 mmol/L Cr  0.48 mg/dL<L> BUN 13.9 mg/dL Phos 5.1 mg/dL<H> Alb n/a   PAB n/a             Skin: L UL wound with wound vac    Nutrition focused physical exam conducted - found signs of malnutrition [ ]absent [x ]present    Subcutaneous fat loss: [x ] Orbital fat pads region, [x ]Buccal fat region, [ ]Triceps region,  [ ]Ribs region    Muscle wasting: [ x]Temples region, [ x]Clavicle region, [ ]Shoulder region, [ ]Scapula region, [ ]Interosseous region,  [ ]thigh region, [ ]Calf region      Current Nutrition Diagnosis: Pt presents at risk secondary to acute moderate protein calorie malnutrition, related to inadequate protein calorie intake in the setting of increased needs,  as evidenced by PO<75% est needs, possible 15% weight loss, edema,  and physical findings.     Recommendations:   1) Add ensure enlive TID  2) Daily weight  3) Swapnil BID  4) RX MVI, Vit C, Zinc    Monitoring and Evaluation:   [ x] PO intake [x ] Tolerance to diet prescription [X] Weights  [X] Follow up per protocol [X] Labs:

## 2022-01-11 NOTE — PROGRESS NOTE ADULT - SUBJECTIVE AND OBJECTIVE BOX
HPI/OVERNIGHT EVENTS:  No acute overnight events. Vital signs stable. Yesterday underwent CT scan of LLE. Findings notable for increased size of distal anterior thigh abscess and subchondral lucency at distal femur concerning for avascular necrosis vs infection Denies nausea, vomiting, fever, chills, chest pain, shortness of breath, or any new or concerning symptoms. In good spirits otherwise. Tolerating diet, voiding. Some motor function and sensation distally on LLE    MEDICATIONS  (STANDING):  baclofen 10 milliGRAM(s) Oral three times a day  DAPTOmycin IVPB 500 milliGRAM(s) IV Intermittent every 24 hours  enoxaparin Injectable 40 milliGRAM(s) SubCutaneous daily  gabapentin 300 milliGRAM(s) Oral three times a day  HYDROmorphone  Injectable 0.5 milliGRAM(s) IV Push every 4 hours  lidocaine 1% Injectable 20 milliLiter(s) Local Injection once  lisinopril 5 milliGRAM(s) Oral daily  meropenem  IVPB 1000 milliGRAM(s) IV Intermittent every 8 hours  pantoprazole    Tablet 40 milliGRAM(s) Oral two times a day    MEDICATIONS  (PRN):  acetaminophen     Tablet .. 650 milliGRAM(s) Oral every 6 hours PRN Temp greater or equal to 38C (100.4F), Mild Pain (1 - 3)  acetaminophen     Tablet .. 975 milliGRAM(s) Oral every 8 hours PRN Temp greater or equal to 38C (100.4F), Mild Pain (1 - 3), Moderate Pain (4 - 6), Severe Pain (7 - 10)  aluminum hydroxide/magnesium hydroxide/simethicone Suspension 30 milliLiter(s) Oral every 4 hours PRN Dyspepsia  HYDROmorphone   Tablet 2 milliGRAM(s) Oral four times a day PRN Severe Pain (7 - 10)  melatonin 3 milliGRAM(s) Oral at bedtime PRN Insomnia  ondansetron Injectable 4 milliGRAM(s) IV Push every 8 hours PRN Nausea and/or Vomiting      Vital Signs Last 24 Hrs  T(C): 37.6 (11 Jan 2022 04:56), Max: 37.6 (11 Jan 2022 04:56)  T(F): 99.6 (11 Jan 2022 04:56), Max: 99.6 (11 Jan 2022 04:56)  HR: 100 (11 Jan 2022 09:29) (95 - 106)  BP: 114/66 (11 Jan 2022 09:29) (98/60 - 156/66)  BP(mean): --  RR: 18 (11 Jan 2022 04:56) (16 - 18)  SpO2: 96% (11 Jan 2022 09:29) (93% - 96%)    Constitutional: patient laying comfortably in bed, in no acute distress  HEENT: EOMI, no active drainage or redness  Neck: Full ROM without pain  Respiratory: respirations are unlabored, no accessory muscle use, no conversational dyspnea  Cardiovascular: regular rate & rhythm  Gastrointestinal: Abdomen soft, non-tender, non-distended  Neurological: A&O x 3; LLE with some motor and sensory deficiency, possibly related to pain as well as surgical procedures.  Musculoskeletal: LLE with wound VAC and in knee immobilizer.       I&O's Detail    10 Rah 2022 07:01  -  11 Jan 2022 07:00  --------------------------------------------------------  IN:  Total IN: 0 mL    OUT:    Voided (mL): 600 mL  Total OUT: 600 mL    Total NET: -600 mL          LABS:                        8.3    11.00 )-----------( 573      ( 10 Rah 2022 06:44 )             26.3     01-10    136  |  99  |  13.9  ----------------------------<  102<H>  4.0   |  25.0  |  0.48<L>    Ca    9.5      10 Rah 2022 06:44  Phos  5.1     01-10  Mg     1.7     01-10

## 2022-01-11 NOTE — PRE PROCEDURE NOTE - PRE PROCEDURE EVALUATION
IR PRE-PROCEDURE NOTE  DIAGNOSIS: left thigh collection  PROCEDURE: drainage  RECENT CHANGES: None  PERTINENT LABS: Within acceptable limits.  IMAGING: Reviewed  CONSENT: On chart

## 2022-01-11 NOTE — PROGRESS NOTE ADULT - SUBJECTIVE AND OBJECTIVE BOX
Patient was seen and examined at approximately    ORTHO-TRAUMA SERVICE      Pt Name: LUIS FERNANDO DAVIS    MRN: [id number]    Patient is a 51 year old female being followed left knee I&D POD14, s/p Bilateral septic Knee. Patient with multiple I&D by Ortho and ACS. Participating with PT. Patient reports improvement of LLE with PT as well as R LE. Patient denies N/V, fever, chills. No new orthopedic complaints.      PAST MEDICAL & SURGICAL HISTORY:  PAST MEDICAL & SURGICAL HISTORY:  No pertinent past medical history    History of ankle surgery      Allergies: No Known Allergies      Medications: acetaminophen     Tablet .. 650 milliGRAM(s) Oral every 6 hours PRN  acetaminophen     Tablet .. 975 milliGRAM(s) Oral every 8 hours PRN  aluminum hydroxide/magnesium hydroxide/simethicone Suspension 30 milliLiter(s) Oral every 4 hours PRN  baclofen 10 milliGRAM(s) Oral three times a day  DAPTOmycin IVPB 500 milliGRAM(s) IV Intermittent every 24 hours  enoxaparin Injectable 40 milliGRAM(s) SubCutaneous daily  gabapentin 300 milliGRAM(s) Oral three times a day  HYDROmorphone   Tablet 2 milliGRAM(s) Oral four times a day PRN  HYDROmorphone  Injectable 0.5 milliGRAM(s) IV Push every 4 hours  lidocaine 1% Injectable 20 milliLiter(s) Local Injection once  lisinopril 5 milliGRAM(s) Oral daily  melatonin 3 milliGRAM(s) Oral at bedtime PRN  meropenem  IVPB 1000 milliGRAM(s) IV Intermittent every 8 hours  ondansetron Injectable 4 milliGRAM(s) IV Push every 8 hours PRN  pantoprazole    Tablet 40 milliGRAM(s) Oral two times a day                            8.3    11.00 )-----------( 573      ( 10 Rah 2022 06:44 )             26.3     01-10    136  |  99  |  13.9  ----------------------------<  102<H>  4.0   |  25.0  |  0.48<L>    Ca    9.5      10 Rah 2022 06:44  Phos  5.1     01-10  Mg     1.7     01-10        PHYSICAL EXAM:    Vital Signs Last 24 Hrs  T(C): 37.6 (11 Jan 2022 04:56), Max: 37.6 (11 Jan 2022 04:56)  T(F): 99.6 (11 Jan 2022 04:56), Max: 99.6 (11 Jan 2022 04:56)  HR: 95 (11 Jan 2022 04:56) (92 - 106)  BP: 98/60 (11 Jan 2022 04:56) (98/60 - 156/66)  BP(mean): --  RR: 18 (11 Jan 2022 04:56) (16 - 18)  SpO2: 96% (11 Jan 2022 04:56) (93% - 96%)  Daily     Daily     Appearance: Alert, responsive, in no acute distress.    Neurological: Sensation is grossly intact to light touch. No focal deficits or weaknesses found.    Skin: no rash on visible skin. Skin is clean, dry and intact. No bleeding. No abrasions. No ulcerations.    Vascular: 2+ distal pulses. Cap refill < 2 sec. No signs of venous insufficiency or stasis. No extremity ulcerations. No cyanosis.    Left LE: Knee well healed surgical incision. Mild effusion, no erythema. Wound vac in place distally. Sutures removed, No dehiscence. Steri strips and clean dressing applied. Calf soft, supple and non-tender. Diminished Plantar Flexion.     Right LE: Knee well healed, surgical incision intact, no erythema, or wound dehiscence. + Plantar dorsi flexion. EHL, FHL intact. Calf supple, non-tender    A/P: 51y Female POD#14 s/p Left knee repeat I&D  - Pain Control  - DVT ppx: Lovenox  - Abx: as per ID team   - Weight bearing status: LLE in CAM boot may WBAT; RLE WBAT  - Continue aggressive PT with tight pain control.   - DC planning

## 2022-01-11 NOTE — DIETITIAN NUTRITION RISK NOTIFICATION - MALNUTRITION EVALUATION AS DEMONSTRATED BY (ADULTS > 20 YEARS OF AGE)
Inadequate energy intake.../Loss of subcutaneous fat.../Loss of muscle.../Fluid accumulation...
Weight loss.../Inadequate energy intake.../Loss of subcutaneous fat.../Loss of muscle...

## 2022-01-11 NOTE — PROGRESS NOTE ADULT - ATTENDING COMMENTS
pt seen and examined. agree /w above. Knee ROM slowly improving. s/p multiple washouts, drain was placed w/o output. CT reviewed, continue medial management for distal femur osteo. Recommend IR drainage for abscesses appreciated on CT, all extra articular, continue care as per acs. IV abx. ID f/u. pt seen and examined. agree /w above. Knee ROM slowly improving. s/p multiple washouts, drain was placed w/o output. CT reviewed, continue medial management for distal femur osteo. Recommend IR drainage for abscesses appreciated on CT, would especially consider for posterolateral abscess besides the femur, all extra articular, continue care as per acs. IV abx. ID f/u.

## 2022-01-11 NOTE — DIETITIAN NUTRITION RISK NOTIFICATION - TREATMENT: THE FOLLOWING DIET HAS BEEN RECOMMENDED
Diet, Regular (12-28-21 @ 17:50) [Active]      
Diet, NPO:   Except Medications (12-10-21 @ 07:31) [Active]  Diet, NPO after Midnight:      NPO Start Date: 09-Dec-2021,   NPO Start Time: 23:59 (12-09-21 @ 07:47) [Active]

## 2022-01-11 NOTE — PROGRESS NOTE ADULT - ATTENDING COMMENTS
Awake alert  Limited function L leg, pain in the knee more than yesterday  CT leg reveals new collection in the anterior thigh and no other collections anywhere. Lucency in the bone is likely osteomyelitis requiring long term antibiotics  Will have IR drain this collection

## 2022-01-12 LAB
ANION GAP SERPL CALC-SCNC: 12 MMOL/L — SIGNIFICANT CHANGE UP (ref 5–17)
BASOPHILS # BLD AUTO: 0.02 K/UL — SIGNIFICANT CHANGE UP (ref 0–0.2)
BASOPHILS NFR BLD AUTO: 0.3 % — SIGNIFICANT CHANGE UP (ref 0–2)
BUN SERPL-MCNC: 11.2 MG/DL — SIGNIFICANT CHANGE UP (ref 8–20)
CALCIUM SERPL-MCNC: 9.2 MG/DL — SIGNIFICANT CHANGE UP (ref 8.6–10.2)
CHLORIDE SERPL-SCNC: 100 MMOL/L — SIGNIFICANT CHANGE UP (ref 98–107)
CO2 SERPL-SCNC: 26 MMOL/L — SIGNIFICANT CHANGE UP (ref 22–29)
CREAT SERPL-MCNC: 0.57 MG/DL — SIGNIFICANT CHANGE UP (ref 0.5–1.3)
CULTURE RESULTS: SIGNIFICANT CHANGE UP
CULTURE RESULTS: SIGNIFICANT CHANGE UP
EOSINOPHIL # BLD AUTO: 0.24 K/UL — SIGNIFICANT CHANGE UP (ref 0–0.5)
EOSINOPHIL NFR BLD AUTO: 3.3 % — SIGNIFICANT CHANGE UP (ref 0–6)
GLUCOSE SERPL-MCNC: 96 MG/DL — SIGNIFICANT CHANGE UP (ref 70–99)
HCT VFR BLD CALC: 26.6 % — LOW (ref 34.5–45)
HGB BLD-MCNC: 8.2 G/DL — LOW (ref 11.5–15.5)
IMM GRANULOCYTES NFR BLD AUTO: 0.4 % — SIGNIFICANT CHANGE UP (ref 0–1.5)
LYMPHOCYTES # BLD AUTO: 2.27 K/UL — SIGNIFICANT CHANGE UP (ref 1–3.3)
LYMPHOCYTES # BLD AUTO: 31.2 % — SIGNIFICANT CHANGE UP (ref 13–44)
MAGNESIUM SERPL-MCNC: 1.7 MG/DL — SIGNIFICANT CHANGE UP (ref 1.6–2.6)
MCHC RBC-ENTMCNC: 27.2 PG — SIGNIFICANT CHANGE UP (ref 27–34)
MCHC RBC-ENTMCNC: 30.8 GM/DL — LOW (ref 32–36)
MCV RBC AUTO: 88.4 FL — SIGNIFICANT CHANGE UP (ref 80–100)
MONOCYTES # BLD AUTO: 1.18 K/UL — HIGH (ref 0–0.9)
MONOCYTES NFR BLD AUTO: 16.2 % — HIGH (ref 2–14)
NEUTROPHILS # BLD AUTO: 3.54 K/UL — SIGNIFICANT CHANGE UP (ref 1.8–7.4)
NEUTROPHILS NFR BLD AUTO: 48.6 % — SIGNIFICANT CHANGE UP (ref 43–77)
PHOSPHATE SERPL-MCNC: 4.9 MG/DL — HIGH (ref 2.4–4.7)
PLATELET # BLD AUTO: 482 K/UL — HIGH (ref 150–400)
POTASSIUM SERPL-MCNC: 3.5 MMOL/L — SIGNIFICANT CHANGE UP (ref 3.5–5.3)
POTASSIUM SERPL-SCNC: 3.5 MMOL/L — SIGNIFICANT CHANGE UP (ref 3.5–5.3)
RBC # BLD: 3.01 M/UL — LOW (ref 3.8–5.2)
RBC # FLD: 15.5 % — HIGH (ref 10.3–14.5)
SODIUM SERPL-SCNC: 138 MMOL/L — SIGNIFICANT CHANGE UP (ref 135–145)
SPECIMEN SOURCE: SIGNIFICANT CHANGE UP
SPECIMEN SOURCE: SIGNIFICANT CHANGE UP
WBC # BLD: 7.28 K/UL — SIGNIFICANT CHANGE UP (ref 3.8–10.5)
WBC # FLD AUTO: 7.28 K/UL — SIGNIFICANT CHANGE UP (ref 3.8–10.5)

## 2022-01-12 PROCEDURE — 99233 SBSQ HOSP IP/OBS HIGH 50: CPT

## 2022-01-12 RX ORDER — MAGNESIUM SULFATE 500 MG/ML
2 VIAL (ML) INJECTION ONCE
Refills: 0 | Status: COMPLETED | OUTPATIENT
Start: 2022-01-12 | End: 2022-01-12

## 2022-01-12 RX ORDER — POTASSIUM CHLORIDE 20 MEQ
40 PACKET (EA) ORAL
Refills: 0 | Status: COMPLETED | OUTPATIENT
Start: 2022-01-12 | End: 2022-01-12

## 2022-01-12 RX ADMIN — HYDROMORPHONE HYDROCHLORIDE 0.5 MILLIGRAM(S): 2 INJECTION INTRAMUSCULAR; INTRAVENOUS; SUBCUTANEOUS at 02:05

## 2022-01-12 RX ADMIN — HYDROMORPHONE HYDROCHLORIDE 0.5 MILLIGRAM(S): 2 INJECTION INTRAMUSCULAR; INTRAVENOUS; SUBCUTANEOUS at 22:38

## 2022-01-12 RX ADMIN — MEROPENEM 100 MILLIGRAM(S): 1 INJECTION INTRAVENOUS at 17:01

## 2022-01-12 RX ADMIN — PANTOPRAZOLE SODIUM 40 MILLIGRAM(S): 20 TABLET, DELAYED RELEASE ORAL at 05:15

## 2022-01-12 RX ADMIN — GABAPENTIN 300 MILLIGRAM(S): 400 CAPSULE ORAL at 05:14

## 2022-01-12 RX ADMIN — Medication 975 MILLIGRAM(S): at 22:39

## 2022-01-12 RX ADMIN — Medication 10 MILLIGRAM(S): at 05:15

## 2022-01-12 RX ADMIN — HYDROMORPHONE HYDROCHLORIDE 2 MILLIGRAM(S): 2 INJECTION INTRAMUSCULAR; INTRAVENOUS; SUBCUTANEOUS at 12:12

## 2022-01-12 RX ADMIN — HYDROMORPHONE HYDROCHLORIDE 0.5 MILLIGRAM(S): 2 INJECTION INTRAMUSCULAR; INTRAVENOUS; SUBCUTANEOUS at 17:14

## 2022-01-12 RX ADMIN — HYDROMORPHONE HYDROCHLORIDE 0.5 MILLIGRAM(S): 2 INJECTION INTRAMUSCULAR; INTRAVENOUS; SUBCUTANEOUS at 17:02

## 2022-01-12 RX ADMIN — HYDROMORPHONE HYDROCHLORIDE 0.5 MILLIGRAM(S): 2 INJECTION INTRAMUSCULAR; INTRAVENOUS; SUBCUTANEOUS at 09:36

## 2022-01-12 RX ADMIN — ENOXAPARIN SODIUM 40 MILLIGRAM(S): 100 INJECTION SUBCUTANEOUS at 09:41

## 2022-01-12 RX ADMIN — HYDROMORPHONE HYDROCHLORIDE 2 MILLIGRAM(S): 2 INJECTION INTRAMUSCULAR; INTRAVENOUS; SUBCUTANEOUS at 13:00

## 2022-01-12 RX ADMIN — Medication 975 MILLIGRAM(S): at 10:13

## 2022-01-12 RX ADMIN — HYDROMORPHONE HYDROCHLORIDE 0.5 MILLIGRAM(S): 2 INJECTION INTRAMUSCULAR; INTRAVENOUS; SUBCUTANEOUS at 01:35

## 2022-01-12 RX ADMIN — MEROPENEM 100 MILLIGRAM(S): 1 INJECTION INTRAVENOUS at 21:53

## 2022-01-12 RX ADMIN — Medication 40 MILLIEQUIVALENT(S): at 09:40

## 2022-01-12 RX ADMIN — DAPTOMYCIN 120 MILLIGRAM(S): 500 INJECTION, POWDER, LYOPHILIZED, FOR SOLUTION INTRAVENOUS at 20:27

## 2022-01-12 RX ADMIN — GABAPENTIN 300 MILLIGRAM(S): 400 CAPSULE ORAL at 21:56

## 2022-01-12 RX ADMIN — HYDROMORPHONE HYDROCHLORIDE 0.5 MILLIGRAM(S): 2 INJECTION INTRAMUSCULAR; INTRAVENOUS; SUBCUTANEOUS at 05:15

## 2022-01-12 RX ADMIN — Medication 975 MILLIGRAM(S): at 21:53

## 2022-01-12 RX ADMIN — HYDROMORPHONE HYDROCHLORIDE 0.5 MILLIGRAM(S): 2 INJECTION INTRAMUSCULAR; INTRAVENOUS; SUBCUTANEOUS at 13:52

## 2022-01-12 RX ADMIN — Medication 40 MILLIEQUIVALENT(S): at 17:13

## 2022-01-12 RX ADMIN — PANTOPRAZOLE SODIUM 40 MILLIGRAM(S): 20 TABLET, DELAYED RELEASE ORAL at 17:03

## 2022-01-12 RX ADMIN — HYDROMORPHONE HYDROCHLORIDE 0.5 MILLIGRAM(S): 2 INJECTION INTRAMUSCULAR; INTRAVENOUS; SUBCUTANEOUS at 13:38

## 2022-01-12 RX ADMIN — Medication 25 GRAM(S): at 09:42

## 2022-01-12 RX ADMIN — HYDROMORPHONE HYDROCHLORIDE 0.5 MILLIGRAM(S): 2 INJECTION INTRAMUSCULAR; INTRAVENOUS; SUBCUTANEOUS at 05:45

## 2022-01-12 RX ADMIN — Medication 975 MILLIGRAM(S): at 09:43

## 2022-01-12 RX ADMIN — HYDROMORPHONE HYDROCHLORIDE 0.5 MILLIGRAM(S): 2 INJECTION INTRAMUSCULAR; INTRAVENOUS; SUBCUTANEOUS at 21:53

## 2022-01-12 RX ADMIN — LISINOPRIL 5 MILLIGRAM(S): 2.5 TABLET ORAL at 05:14

## 2022-01-12 RX ADMIN — MEROPENEM 100 MILLIGRAM(S): 1 INJECTION INTRAVENOUS at 05:14

## 2022-01-12 RX ADMIN — HYDROMORPHONE HYDROCHLORIDE 2 MILLIGRAM(S): 2 INJECTION INTRAMUSCULAR; INTRAVENOUS; SUBCUTANEOUS at 20:47

## 2022-01-12 RX ADMIN — HYDROMORPHONE HYDROCHLORIDE 0.5 MILLIGRAM(S): 2 INJECTION INTRAMUSCULAR; INTRAVENOUS; SUBCUTANEOUS at 09:45

## 2022-01-12 RX ADMIN — Medication 10 MILLIGRAM(S): at 21:53

## 2022-01-12 RX ADMIN — HYDROMORPHONE HYDROCHLORIDE 2 MILLIGRAM(S): 2 INJECTION INTRAMUSCULAR; INTRAVENOUS; SUBCUTANEOUS at 20:27

## 2022-01-12 RX ADMIN — GABAPENTIN 300 MILLIGRAM(S): 400 CAPSULE ORAL at 13:37

## 2022-01-12 RX ADMIN — Medication 10 MILLIGRAM(S): at 13:39

## 2022-01-12 NOTE — PROGRESS NOTE ADULT - ASSESSMENT
51y Female present to ED with left leg swelling, erythema, pain. Patient endorses she had left knee pain for 1 week presented 3 days ago to ED  where she states was given an steroid shot, and ibuprofen. however pain and edema worsened. Patient states she wasnt feeling herself today reason why she came. Endorses chills initially with pain 3 days ago but none since, denies history of trauma, insect bites, recent interventions, or injections to the area, contact with ticks, history of diabetes.  PT AS ABOVE WITH LEFT LEG SWELLING PT WITH INCREASED WBC PLACED ON ABX FOR PRESUMED INFECTION  PT WITH CT SCAN WITH FASCIAL EDAM PT BROUGHT TO THE OR EMERGENTLY AND  UNDERWENT FASCIOTOMY   PT BROUGHT TO THE OR  BOTH TRAUMA AND ORTHO INVOLVED  PURULENT FLUID FOUND  NECROTIZING SOFT TISSUE INFECTION    BLOOD CX WITH GROUP A STREP AND  OPERATIVE FLUID WITH STREP   PT WAS  ON PCN AND CLINDA for POSSIBLE ANTITOXIN EFFECT IN GROUP A STREP INFECTION     PT  S/P OR WASHOUT 12.2  AND  OR AGAIN 12/7  and late on 12/10 121/15 12/17 and 12/20 12/23     REPEAT   OPERATIVE CX NEG             AS ABOVE  REPEAT  OR  12/20 12/23    S/ RETURN TO THE OR 12/28 AND 12/31    RECENT VAC DRESSING CHANGE NO PUS   NO PURULENCE   OVERALL NON TOXIC  AFEBRILE            ON MERREM/DAPTOMYCIN  repeat Ct 1/10 as above with increase in soft tissue abscesses and likely OM. s/p Ir drainage on 1/11, with dark old blood aspirated.  f/u IR CX and BCX  tentatively plan for ANOTHER 2 WEEKS WITH DAPTO 500 mg IV daily and .MERREM  1 g iv q8h THROUGH 1/24/22  . Will need weekly CBc CMP ESR CRp and CK. Last CK 1/11 is 22    will f/u

## 2022-01-12 NOTE — PROGRESS NOTE ADULT - SUBJECTIVE AND OBJECTIVE BOX
ACUTE CARE SURGERY PROGRESS NOTE    Subjective: Patient examined at bedside this AM. Reports her pain is very well controlled. Has been tolerating her diet without N/V. Underwent IR guided drainage of distal L femur collection with aspiration of 20 cc of dark old appearing blood. Patient tolerated procedure well. Remains afebrile. No acute events overnight.     Objective:  Vital Signs  T(C): 39.3 (01-11 @ 21:20), Max: 39.3 (01-11 @ 21:20)  HR: 114 (01-11 @ 21:20) (95 - 114)  BP: 130/75 (01-11 @ 21:20) (98/60 - 130/75)  RR: 18 (01-11 @ 21:20) (18 - 18)  SpO2: 98% (01-11 @ 21:20) (95% - 98%)  01-10-22 @ 07:01  -  01-11-22 @ 07:00  --------------------------------------------------------  IN:  Total IN: 0 mL    OUT:    Voided (mL): 600 mL  Total OUT: 600 mL    Total NET: -600 mL      01-11-22 @ 07:01  -  01-12-22 @ 02:42  --------------------------------------------------------  IN:  Total IN: 0 mL    OUT:    Voided (mL): 1000 mL  Total OUT: 1000 mL    Total NET: -1000 mL          Physical Exam:  General: alert and oriented, NAD  Resp: airway patent, respirations unlabored  CVS: regular rate and rhythm  Abdomen: soft, nontender, nondistended  Neuro: LLE with some motor and sensory deficiency, possibly related to pain as well as surgical procedures.  Musculoskeletal: LLE with wound VAC and in knee immobilizer.       Labs:                        8.3    11.00 )-----------( 573      ( 10 Rah 2022 06:44 )             26.3   01-10    136  |  99  |  13.9  ----------------------------<  102<H>  4.0   |  25.0  |  0.48<L>    Ca    9.5      10 Rah 2022 06:44  Phos  5.1     01-10  Mg     1.7     01-10      CAPILLARY BLOOD GLUCOSE          Medications:   MEDICATIONS  (STANDING):  baclofen 10 milliGRAM(s) Oral three times a day  DAPTOmycin IVPB 500 milliGRAM(s) IV Intermittent every 24 hours  enoxaparin Injectable 40 milliGRAM(s) SubCutaneous daily  gabapentin 300 milliGRAM(s) Oral three times a day  HYDROmorphone  Injectable 0.5 milliGRAM(s) IV Push every 4 hours  lidocaine 1% Injectable 20 milliLiter(s) Local Injection once  lisinopril 5 milliGRAM(s) Oral daily  meropenem  IVPB 1000 milliGRAM(s) IV Intermittent every 8 hours  pantoprazole    Tablet 40 milliGRAM(s) Oral two times a day    MEDICATIONS  (PRN):  acetaminophen     Tablet .. 650 milliGRAM(s) Oral every 6 hours PRN Temp greater or equal to 38C (100.4F), Mild Pain (1 - 3)  acetaminophen     Tablet .. 975 milliGRAM(s) Oral every 8 hours PRN Temp greater or equal to 38C (100.4F), Mild Pain (1 - 3), Moderate Pain (4 - 6), Severe Pain (7 - 10)  aluminum hydroxide/magnesium hydroxide/simethicone Suspension 30 milliLiter(s) Oral every 4 hours PRN Dyspepsia  HYDROmorphone   Tablet 2 milliGRAM(s) Oral four times a day PRN Severe Pain (7 - 10)  melatonin 3 milliGRAM(s) Oral at bedtime PRN Insomnia  ondansetron Injectable 4 milliGRAM(s) IV Push every 8 hours PRN Nausea and/or Vomiting

## 2022-01-12 NOTE — PROGRESS NOTE ADULT - SUBJECTIVE AND OBJECTIVE BOX
INFECTIOUS DISEASES AND INTERNAL MEDICINE at [place]  =======================================================  Theo Morrow MD  Diplomates [employer] Board of Internal Medicine and Infectious Diseases  Telephone [phone]  Fax            [phone]  =======================================================    NAJMATIMMYLUIS FERNANDO [id number]  Follow up: group A STREP NECROTIZING SOFT TISSUE INFECTION     repeat CT scan of lower ext with collections.   S/P OR WASHOUT 12/2  AND   OR AGAIN  12/7   late  on 12/10 12/15 12/17  12/20 12/23     REPEAT CX SCAN DONE  S/P OR  AGAIN  12/28   had repeat CT 1/10-showed increase in size of anterior soft tissue abscesses, s/p IR drainage-appeared to be old blood  febrile last night and this AM  no new symptoms  feels well         Allergies:  No Known Allergies           Physical Exam:   GEN: NAD,    HEENT: normocephalic and atraumatic. EOMI. ASHISH.    NECK: Supple. No carotid bruits.  No lymphadenopathy or thyromegaly.  LUNGS: Clear to auscultation.  HEART: Regular rate and rhythm without murmur.  ABDOMEN: Soft, nontender, and nondistended.  Positive bowel sounds.    : No CVA tenderness  EXTREMITIES: LEFT LEG WRAPPED; left thigh vac in place, left upper thigh with Willie bulb bloody fluid  right knee with dressing in place      NEUROLOGIC:  AWAKE ALERT Appropriate affect .  SKIN: No ulceration or induration present.         Other medications:  baclofen 10 milliGRAM(s) Oral three times a day  collagenase Ointment 1 Application(s) Topical two times a day  gabapentin 200 milliGRAM(s) Oral three times a day  glucagon  Injectable 1 milliGRAM(s) IntraMuscular once  heparin   Injectable 5000 Unit(s) SubCutaneous every 8 hours  lisinopril 5 milliGRAM(s) Oral daily  methocarbamol 750 milliGRAM(s) Oral three times a day  pantoprazole    Tablet 40 milliGRAM(s) Oral two times a day  polyethylene glycol 3350 17 Gram(s) Oral daily  senna 2 Tablet(s) Oral at bedtime  sodium chloride 1 Gram(s) Oral every 12 hours  sodium chloride 0.9%. 1000 milliLiter(s) IV Continuous <Continuous>      =======================================================  Labs:                               8.3    11.00 )-----------( 573      ( 10 Rah 2022 06:44 )             26.3   01-10    136  |  99  |  13.9  ----------------------------<  102<H>  4.0   |  25.0  |  0.48<L>    Ca    9.5      10 Rah 2022 06:44  Phos  5.1     01-10  Mg     1.7     01-10                        e E: (C=Critical, N=Notification, A=Abnormal) C    TESTS:  _ GS    DATE/TIME CALLED: _ 11/18/2021 09:40:08    CALLED TO: Chris Hernandez RN    READ BACK (2 Patient Identifiers)(Y/N): _ Y    READ BACK VALUES (Y/N): _ Y    CALLED BY: Chris Quiñones  Final Report (11-21-21 @ 12:28):    Growth in aerobic and anaerobic bottles: Streptococcus pyogenes (Group A)  Organism: Blood Culture PCR  Streptococcus pyogenes (Group A) (11-21-21 @ 12:28)  Organism: Streptococcus pyogenes (Group A) (11-21-21 @ 12:28)    Sensitivities:      -  Ceftriaxone: S 0.016      -  Penicillin: S 0.016 Predicts results for ampicillin, amoxicillin, amoxicillin/clavulanate, ampicillin/sulbactam, 1st, 2nd and 3rd generation cephalosporins and carbapenems.      Method Type: ETEST  Organism: Streptococcus pyogenes (Group A) (11-21-21 @ 12:28)    Sensitivities:      -  Vancomycin: S      Method Type: KB  Organism: Blood Culture PCR (11-21-21 @ 12:28)    Sensitivities:      -  Streptococcus pyogenes (Group A): Detec      Method Type: PCR      Creatinine, Serum: 0.75 mg/dL (11-30-21 @ 07:18)  Creatinine, Serum: 0.68 mg/dL (11-29-21 @ 09:55)  Creatinine, Serum: 0.64 mg/dL (11-28-21 @ 09:14)  Creatinine, Serum: 0.65 mg/dL (11-27-21 @ 11:51)  Creatinine, Serum: 0.57 mg/dL (11-27-21 @ 05:35)  Creatinine, Serum: 0.61 mg/dL (11-26-21 @ 09:01)        Ferritin, Serum: 388 ng/mL (11-17-21 @ 13:21)    C-Reactive Protein, Serum: 197 mg/L (11-26-21 @ 21:52)  C-Reactive Protein, Serum: 211 mg/L (11-26-21 @ 09:01)  C-Reactive Protein, Serum: 53 mg/L (11-23-21 @ 07:42)  C-Reactive Protein, Serum: >422 mg/L (11-17-21 @ 13:21)    Sedimentation Rate, Erythrocyte: 65 mm/hr (11-26-21 @ 21:52)  Sedimentation Rate, Erythrocyte: 63 mm/hr (11-26-21 @ 09:01)  Sedimentation Rate, Erythrocyte: 55 mm/hr (11-17-21 @ 13:21)    WBC Count: 14.55 K/uL (11-30-21 @ 07:18)  WBC Count: 16.71 K/uL (11-29-21 @ 09:54)  WBC Count: 17.02 K/uL (11-28-21 @ 09:14)  WBC Count: 15.99 K/uL (11-27-21 @ 05:33)  WBC Count: 18.73 K/uL (11-26-21 @ 09:01)      COVID-19 PCR: NotDetec (11-30-21 @ 01:59)  COVID-19 PCR: NotDetec (11-23-21 @ 18:50)  COVID-19 PCR: NotDetec (11-17-21 @ 13:18)    Lactate Dehydrogenase, Serum: 769 U/L (11-20-21 @ 03:56)    Alkaline Phosphatase, Serum: 88 U/L (11-27-21 @ 05:35)  Alanine Aminotransferase (ALT/SGPT): 11 U/L (11-27-21 @ 05:35)  Aspartate Aminotransferase (AST/SGOT): 25 U/L (11-27-21 @ 05:35)  Bilirubin Total, Serum: 0.3 mg/dL (11-27-21 @ 05:35)      < from: CT Lower Extremity w/ IV Cont, Left (01.10.22 @ 19:15) >    IMPRESSION:  1.  Interval increase in size of distal anterior thigh soft tissue   abscess.    2.  No significant interval change in the extent of multiple extensive   abscesses in the lower leg detailed above.    3.  Large focal subchondral lucencies in the distal femur consistent with   fragmentation versus avascular necrosis secondary to infection. Posterior   translation of the tibia relative to the distal femur.    < end of copied text >

## 2022-01-12 NOTE — PROGRESS NOTE ADULT - ASSESSMENT
Assessment: 51 year old female s/p multiple OR debridement of left lower extremity for soft tissue infection and abscesses of unclear origin. PPD 1 s/p IR guided drainage of distal femur collection.      Plan:   - Reg diet with ensure and enliven  - Cont wound vac change, change today vs tomorrow  - ID: Dapto/merrem at least until 1/24  - F/U cx  - DVT ppx  - Dispo: PAWEL, will discuss with CM to start process      Acute Care Surgery

## 2022-01-12 NOTE — PROGRESS NOTE ADULT - ATTENDING COMMENTS
Awake alert  In good spirits  hemodynamic intact  L leg IR drainage of collection which is a small liquified hematoma  No more collections or apparent infection  Discussed bone lucency with ID and will treat as presumed osteomyelitis

## 2022-01-13 LAB
ANION GAP SERPL CALC-SCNC: 14 MMOL/L — SIGNIFICANT CHANGE UP (ref 5–17)
BASOPHILS # BLD AUTO: 0.03 K/UL — SIGNIFICANT CHANGE UP (ref 0–0.2)
BASOPHILS NFR BLD AUTO: 0.4 % — SIGNIFICANT CHANGE UP (ref 0–2)
BUN SERPL-MCNC: 15 MG/DL — SIGNIFICANT CHANGE UP (ref 8–20)
CALCIUM SERPL-MCNC: 9.1 MG/DL — SIGNIFICANT CHANGE UP (ref 8.6–10.2)
CHLORIDE SERPL-SCNC: 101 MMOL/L — SIGNIFICANT CHANGE UP (ref 98–107)
CO2 SERPL-SCNC: 22 MMOL/L — SIGNIFICANT CHANGE UP (ref 22–29)
CREAT SERPL-MCNC: 0.56 MG/DL — SIGNIFICANT CHANGE UP (ref 0.5–1.3)
EOSINOPHIL # BLD AUTO: 0.2 K/UL — SIGNIFICANT CHANGE UP (ref 0–0.5)
EOSINOPHIL NFR BLD AUTO: 2.5 % — SIGNIFICANT CHANGE UP (ref 0–6)
GLUCOSE SERPL-MCNC: 86 MG/DL — SIGNIFICANT CHANGE UP (ref 70–99)
HCT VFR BLD CALC: 29.3 % — LOW (ref 34.5–45)
HGB BLD-MCNC: 8.8 G/DL — LOW (ref 11.5–15.5)
IMM GRANULOCYTES NFR BLD AUTO: 0.6 % — SIGNIFICANT CHANGE UP (ref 0–1.5)
LYMPHOCYTES # BLD AUTO: 3.41 K/UL — HIGH (ref 1–3.3)
LYMPHOCYTES # BLD AUTO: 42.3 % — SIGNIFICANT CHANGE UP (ref 13–44)
MAGNESIUM SERPL-MCNC: 2 MG/DL — SIGNIFICANT CHANGE UP (ref 1.6–2.6)
MCHC RBC-ENTMCNC: 27.3 PG — SIGNIFICANT CHANGE UP (ref 27–34)
MCHC RBC-ENTMCNC: 30 GM/DL — LOW (ref 32–36)
MCV RBC AUTO: 91 FL — SIGNIFICANT CHANGE UP (ref 80–100)
MONOCYTES # BLD AUTO: 1.24 K/UL — HIGH (ref 0–0.9)
MONOCYTES NFR BLD AUTO: 15.4 % — HIGH (ref 2–14)
NEUTROPHILS # BLD AUTO: 3.13 K/UL — SIGNIFICANT CHANGE UP (ref 1.8–7.4)
NEUTROPHILS NFR BLD AUTO: 38.8 % — LOW (ref 43–77)
PHOSPHATE SERPL-MCNC: 4.7 MG/DL — SIGNIFICANT CHANGE UP (ref 2.4–4.7)
PLATELET # BLD AUTO: 499 K/UL — HIGH (ref 150–400)
POTASSIUM SERPL-MCNC: 4.6 MMOL/L — SIGNIFICANT CHANGE UP (ref 3.5–5.3)
POTASSIUM SERPL-SCNC: 4.6 MMOL/L — SIGNIFICANT CHANGE UP (ref 3.5–5.3)
RBC # BLD: 3.22 M/UL — LOW (ref 3.8–5.2)
RBC # FLD: 15.7 % — HIGH (ref 10.3–14.5)
SODIUM SERPL-SCNC: 137 MMOL/L — SIGNIFICANT CHANGE UP (ref 135–145)
WBC # BLD: 8.06 K/UL — SIGNIFICANT CHANGE UP (ref 3.8–10.5)
WBC # FLD AUTO: 8.06 K/UL — SIGNIFICANT CHANGE UP (ref 3.8–10.5)

## 2022-01-13 RX ORDER — HYDROMORPHONE HYDROCHLORIDE 2 MG/ML
1 INJECTION INTRAMUSCULAR; INTRAVENOUS; SUBCUTANEOUS ONCE
Refills: 0 | Status: DISCONTINUED | OUTPATIENT
Start: 2022-01-13 | End: 2022-01-13

## 2022-01-13 RX ORDER — LIDOCAINE HCL 20 MG/ML
20 VIAL (ML) INJECTION ONCE
Refills: 0 | Status: COMPLETED | OUTPATIENT
Start: 2022-01-13 | End: 2022-01-13

## 2022-01-13 RX ADMIN — MEROPENEM 100 MILLIGRAM(S): 1 INJECTION INTRAVENOUS at 05:45

## 2022-01-13 RX ADMIN — Medication 975 MILLIGRAM(S): at 23:41

## 2022-01-13 RX ADMIN — GABAPENTIN 300 MILLIGRAM(S): 400 CAPSULE ORAL at 05:44

## 2022-01-13 RX ADMIN — Medication 10 MILLIGRAM(S): at 21:30

## 2022-01-13 RX ADMIN — HYDROMORPHONE HYDROCHLORIDE 0.5 MILLIGRAM(S): 2 INJECTION INTRAMUSCULAR; INTRAVENOUS; SUBCUTANEOUS at 05:45

## 2022-01-13 RX ADMIN — GABAPENTIN 300 MILLIGRAM(S): 400 CAPSULE ORAL at 21:29

## 2022-01-13 RX ADMIN — Medication 10 MILLIGRAM(S): at 13:56

## 2022-01-13 RX ADMIN — Medication 10 MILLIGRAM(S): at 05:44

## 2022-01-13 RX ADMIN — MEROPENEM 100 MILLIGRAM(S): 1 INJECTION INTRAVENOUS at 21:28

## 2022-01-13 RX ADMIN — HYDROMORPHONE HYDROCHLORIDE 2 MILLIGRAM(S): 2 INJECTION INTRAMUSCULAR; INTRAVENOUS; SUBCUTANEOUS at 14:30

## 2022-01-13 RX ADMIN — PANTOPRAZOLE SODIUM 40 MILLIGRAM(S): 20 TABLET, DELAYED RELEASE ORAL at 17:05

## 2022-01-13 RX ADMIN — HYDROMORPHONE HYDROCHLORIDE 0.5 MILLIGRAM(S): 2 INJECTION INTRAMUSCULAR; INTRAVENOUS; SUBCUTANEOUS at 21:28

## 2022-01-13 RX ADMIN — Medication 1 MILLIGRAM(S): at 10:53

## 2022-01-13 RX ADMIN — DAPTOMYCIN 120 MILLIGRAM(S): 500 INJECTION, POWDER, LYOPHILIZED, FOR SOLUTION INTRAVENOUS at 20:32

## 2022-01-13 RX ADMIN — HYDROMORPHONE HYDROCHLORIDE 0.5 MILLIGRAM(S): 2 INJECTION INTRAMUSCULAR; INTRAVENOUS; SUBCUTANEOUS at 10:19

## 2022-01-13 RX ADMIN — ENOXAPARIN SODIUM 40 MILLIGRAM(S): 100 INJECTION SUBCUTANEOUS at 10:20

## 2022-01-13 RX ADMIN — MEROPENEM 100 MILLIGRAM(S): 1 INJECTION INTRAVENOUS at 13:55

## 2022-01-13 RX ADMIN — PANTOPRAZOLE SODIUM 40 MILLIGRAM(S): 20 TABLET, DELAYED RELEASE ORAL at 05:44

## 2022-01-13 RX ADMIN — HYDROMORPHONE HYDROCHLORIDE 0.5 MILLIGRAM(S): 2 INJECTION INTRAMUSCULAR; INTRAVENOUS; SUBCUTANEOUS at 17:04

## 2022-01-13 RX ADMIN — HYDROMORPHONE HYDROCHLORIDE 1 MILLIGRAM(S): 2 INJECTION INTRAMUSCULAR; INTRAVENOUS; SUBCUTANEOUS at 11:17

## 2022-01-13 RX ADMIN — HYDROMORPHONE HYDROCHLORIDE 0.5 MILLIGRAM(S): 2 INJECTION INTRAMUSCULAR; INTRAVENOUS; SUBCUTANEOUS at 06:04

## 2022-01-13 RX ADMIN — Medication 20 MILLILITER(S): at 11:00

## 2022-01-13 RX ADMIN — HYDROMORPHONE HYDROCHLORIDE 2 MILLIGRAM(S): 2 INJECTION INTRAMUSCULAR; INTRAVENOUS; SUBCUTANEOUS at 13:56

## 2022-01-13 RX ADMIN — GABAPENTIN 300 MILLIGRAM(S): 400 CAPSULE ORAL at 13:55

## 2022-01-13 RX ADMIN — HYDROMORPHONE HYDROCHLORIDE 0.5 MILLIGRAM(S): 2 INJECTION INTRAMUSCULAR; INTRAVENOUS; SUBCUTANEOUS at 10:35

## 2022-01-13 NOTE — PROGRESS NOTE ADULT - ATTENDING COMMENTS
Awake alert oriented  Spiked fever last night  Bilateral BS  Hemodynamic intact  DC small drain from the thigh, for liquified blood only/cultures neg  Dressing change today  Patient will need long term rehab and antibiotics to cover persumed osteomyelitis

## 2022-01-13 NOTE — PROGRESS NOTE ADULT - SUBJECTIVE AND OBJECTIVE BOX
INTERVAL HPI/OVERNIGHT EVENTS:    SUBJECTIVE: No acute overnight events reported. Patient seen and evaluated at bedside with no major issues nor complaints, states that her pain is well controlled with the current regimen.       MEDICATIONS  (STANDING):  baclofen 10 milliGRAM(s) Oral three times a day  DAPTOmycin IVPB 500 milliGRAM(s) IV Intermittent every 24 hours  enoxaparin Injectable 40 milliGRAM(s) SubCutaneous daily  gabapentin 300 milliGRAM(s) Oral three times a day  HYDROmorphone  Injectable 0.5 milliGRAM(s) IV Push every 4 hours  lidocaine 1% Injectable 20 milliLiter(s) Local Injection once  lisinopril 5 milliGRAM(s) Oral daily  meropenem  IVPB 1000 milliGRAM(s) IV Intermittent every 8 hours  pantoprazole    Tablet 40 milliGRAM(s) Oral two times a day    MEDICATIONS  (PRN):  acetaminophen     Tablet .. 650 milliGRAM(s) Oral every 6 hours PRN Temp greater or equal to 38C (100.4F), Mild Pain (1 - 3)  acetaminophen     Tablet .. 975 milliGRAM(s) Oral every 8 hours PRN Temp greater or equal to 38C (100.4F), Mild Pain (1 - 3), Moderate Pain (4 - 6), Severe Pain (7 - 10)  aluminum hydroxide/magnesium hydroxide/simethicone Suspension 30 milliLiter(s) Oral every 4 hours PRN Dyspepsia  HYDROmorphone   Tablet 2 milliGRAM(s) Oral four times a day PRN Severe Pain (7 - 10)  melatonin 3 milliGRAM(s) Oral at bedtime PRN Insomnia  ondansetron Injectable 4 milliGRAM(s) IV Push every 8 hours PRN Nausea and/or Vomiting      Vital Signs Last 24 Hrs  T(C): 39.3 (12 Jan 2022 22:38), Max: 39.3 (12 Jan 2022 22:38)  T(F): 102.7 (12 Jan 2022 22:38), Max: 102.7 (12 Jan 2022 22:38)  HR: 117 (12 Jan 2022 22:38) (84 - 117)  BP: 135/77 (12 Jan 2022 22:38) (95/60 - 135/77)  BP(mean): --  RR: 17 (12 Jan 2022 22:38) (17 - 18)  SpO2: 98% (12 Jan 2022 22:38) (90% - 98%)    PE  General: alert and oriented, NAD  Resp: airway patent, respirations unlabored  CVS: regular rate and rhythm  Abdomen: soft, nontender, nondistended  Neuro: LLE with some motor and sensory deficiency, possibly related to pain as well as surgical procedures.  Musculoskeletal: LLE with wound VAC and in knee immobilizer. left thigh drain with SS output, minimal       I&O's Detail    11 Jan 2022 07:01  -  12 Jan 2022 07:00  --------------------------------------------------------  IN:  Total IN: 0 mL    OUT:    Bulb (mL): 5 mL    Voided (mL): 1300 mL  Total OUT: 1305 mL    Total NET: -1305 mL      12 Jan 2022 07:01  -  13 Jan 2022 00:22  --------------------------------------------------------  IN:  Total IN: 0 mL    OUT:    Voided (mL): 450 mL  Total OUT: 450 mL    Total NET: -450 mL          LABS:                        8.2    7.28  )-----------( 482      ( 12 Jan 2022 05:52 )             26.6     01-12    138  |  100  |  11.2  ----------------------------<  96  3.5   |  26.0  |  0.57    Ca    9.2      12 Jan 2022 05:52  Phos  4.9     01-12  Mg     1.7     01-12      PT/INR - ( 11 Jan 2022 12:29 )   PT: 15.6 sec;   INR: 1.36 ratio         PTT - ( 11 Jan 2022 12:29 )  PTT:36.0 sec      RADIOLOGY & ADDITIONAL STUDIES:

## 2022-01-13 NOTE — PROGRESS NOTE ADULT - SUBJECTIVE AND OBJECTIVE BOX
LUIS FERNANDO DAVIS    [id number]    History: 51y Female is status post R knee I&D 11/24/21, multiple L knee I&Ds POD #16. Patient is doing well and is comfortable. The patient's pain is controlled using the prescribed pain medications. The patient is participating in physical therapy. The patient is also experiencing a L lower leg soft tissue infection being managed by Trauma. Denies nausea, vomiting, chest pain, shortness of breath, abdominal pain or fever. No new complaints.                            8.2    7.28  )-----------( 482      ( 12 Jan 2022 05:52 )             26.6     01-12    138  |  100  |  11.2  ----------------------------<  96  3.5   |  26.0  |  0.57    Ca    9.2      12 Jan 2022 05:52  Phos  4.9     01-12  Mg     1.7     01-12        MEDICATIONS  (STANDING):  baclofen 10 milliGRAM(s) Oral three times a day  DAPTOmycin IVPB 500 milliGRAM(s) IV Intermittent every 24 hours  enoxaparin Injectable 40 milliGRAM(s) SubCutaneous daily  gabapentin 300 milliGRAM(s) Oral three times a day  HYDROmorphone  Injectable 0.5 milliGRAM(s) IV Push every 4 hours  lidocaine 1% Injectable 20 milliLiter(s) Local Injection once  lisinopril 5 milliGRAM(s) Oral daily  meropenem  IVPB 1000 milliGRAM(s) IV Intermittent every 8 hours  pantoprazole    Tablet 40 milliGRAM(s) Oral two times a day    MEDICATIONS  (PRN):  acetaminophen     Tablet .. 650 milliGRAM(s) Oral every 6 hours PRN Temp greater or equal to 38C (100.4F), Mild Pain (1 - 3)  acetaminophen     Tablet .. 975 milliGRAM(s) Oral every 8 hours PRN Temp greater or equal to 38C (100.4F), Mild Pain (1 - 3), Moderate Pain (4 - 6), Severe Pain (7 - 10)  aluminum hydroxide/magnesium hydroxide/simethicone Suspension 30 milliLiter(s) Oral every 4 hours PRN Dyspepsia  HYDROmorphone   Tablet 2 milliGRAM(s) Oral four times a day PRN Severe Pain (7 - 10)  melatonin 3 milliGRAM(s) Oral at bedtime PRN Insomnia  ondansetron Injectable 4 milliGRAM(s) IV Push every 8 hours PRN Nausea and/or Vomiting      Physical exam:  SANGEETA drain in place. The L knee dressing is clean, dry and intact. No drainage or discharge. No erythema is noted. No blistering. No ecchymosis. The calf is supple nontender. Wound vac present at L lower leg being managed by Trauma. Sensation to light touch is grossly intact distally. The patient is unable to dorsiflex L foot or toes which is baseline, in PRAFO. 2+ dorsalis pedis pulse. Capillary refill is less than 2 seconds.     Primary Orthopedic Assessment:  • s/p xx    Secondary  Orthopedic Assessment(s):   •     Secondary  Medical Assessment(s):   •     Plan:   • Pain control as clinically indicated  • DVT prophylaxis as prescribed, including use of compression devices and ankle pumps  • Continue physical therapy  • Weightbearing as tolerated of the left lower extremity in CAM boot with assistance of a walker  • Incentive spirometry encouraged  • Discharge planning – anticipated discharge is subacute rehabilitation as per primary team  LUIS FERNANDO DAVIS    [id number]    History: 51y Female is status post R knee I&D 11/24/21, multiple L knee I&Ds POD #16. The patient also is s/p IR aspiration of L distal femur fluid collection 1/11/22. Patient is doing well and is comfortable. The patient's pain is controlled using the prescribed pain medications. The patient is participating in physical therapy. The patient is also experiencing a L lower leg soft tissue infection being managed by Trauma. Denies nausea, vomiting, chest pain, shortness of breath, abdominal pain or fever. No new complaints.                            8.2    7.28  )-----------( 482      ( 12 Jan 2022 05:52 )             26.6     01-12    138  |  100  |  11.2  ----------------------------<  96  3.5   |  26.0  |  0.57    Ca    9.2      12 Jan 2022 05:52  Phos  4.9     01-12  Mg     1.7     01-12        MEDICATIONS  (STANDING):  baclofen 10 milliGRAM(s) Oral three times a day  DAPTOmycin IVPB 500 milliGRAM(s) IV Intermittent every 24 hours  enoxaparin Injectable 40 milliGRAM(s) SubCutaneous daily  gabapentin 300 milliGRAM(s) Oral three times a day  HYDROmorphone  Injectable 0.5 milliGRAM(s) IV Push every 4 hours  lidocaine 1% Injectable 20 milliLiter(s) Local Injection once  lisinopril 5 milliGRAM(s) Oral daily  meropenem  IVPB 1000 milliGRAM(s) IV Intermittent every 8 hours  pantoprazole    Tablet 40 milliGRAM(s) Oral two times a day    MEDICATIONS  (PRN):  acetaminophen     Tablet .. 650 milliGRAM(s) Oral every 6 hours PRN Temp greater or equal to 38C (100.4F), Mild Pain (1 - 3)  acetaminophen     Tablet .. 975 milliGRAM(s) Oral every 8 hours PRN Temp greater or equal to 38C (100.4F), Mild Pain (1 - 3), Moderate Pain (4 - 6), Severe Pain (7 - 10)  aluminum hydroxide/magnesium hydroxide/simethicone Suspension 30 milliLiter(s) Oral every 4 hours PRN Dyspepsia  HYDROmorphone   Tablet 2 milliGRAM(s) Oral four times a day PRN Severe Pain (7 - 10)  melatonin 3 milliGRAM(s) Oral at bedtime PRN Insomnia  ondansetron Injectable 4 milliGRAM(s) IV Push every 8 hours PRN Nausea and/or Vomiting      Physical exam:  The L knee dressing is clean, dry and intact. IR SANGEETA drain in place at L thigh. No drainage or discharge. No erythema is noted. No blistering. No ecchymosis. The calf is supple nontender. Wound vac present at L lower leg being managed by Trauma. Sensation to light touch is grossly intact distally. The patient is unable to dorsiflex L foot or toes which is baseline, in PRAFO. 2+ dorsalis pedis pulse. Capillary refill is less than 2 seconds.     Primary Orthopedic Assessment:  • s/p xx    Secondary  Orthopedic Assessment(s):   •     Secondary  Medical Assessment(s):   •     Plan:   • Pain control as clinically indicated  • DVT prophylaxis as prescribed, including use of compression devices and ankle pumps  • Continue physical therapy  • Weightbearing as tolerated of the left lower extremity in CAM boot with assistance of a walker  • Continue L lower leg wound care, vac management as per Trauma  • L thigh SANGEETA drain management as per IR  • abx as per ID  • Incentive spirometry encouraged  • Discharge planning – anticipated discharge is subacute rehabilitation as per primary team

## 2022-01-13 NOTE — PROGRESS NOTE ADULT - ASSESSMENT
Assessment: 51 year old female s/p multiple OR debridement of left lower extremity for soft tissue infection and abscesses of unclear origin. PPD 1 s/p IR guided drainage of distal femur collection.      Plan:   - Reg diet with ensure and enliven  - Cont wound vac change, change today   - ID: Dapto/merrem at least until 1/24  - F/U cx  - DVT ppx  - Dispo: PAWEL, will discuss with CM to start process

## 2022-01-14 LAB
ANION GAP SERPL CALC-SCNC: 11 MMOL/L — SIGNIFICANT CHANGE UP (ref 5–17)
BUN SERPL-MCNC: 12.8 MG/DL — SIGNIFICANT CHANGE UP (ref 8–20)
CALCIUM SERPL-MCNC: 9.1 MG/DL — SIGNIFICANT CHANGE UP (ref 8.6–10.2)
CHLORIDE SERPL-SCNC: 101 MMOL/L — SIGNIFICANT CHANGE UP (ref 98–107)
CO2 SERPL-SCNC: 26 MMOL/L — SIGNIFICANT CHANGE UP (ref 22–29)
CREAT SERPL-MCNC: 0.62 MG/DL — SIGNIFICANT CHANGE UP (ref 0.5–1.3)
GLUCOSE SERPL-MCNC: 103 MG/DL — HIGH (ref 70–99)
HCT VFR BLD CALC: 26.7 % — LOW (ref 34.5–45)
HGB BLD-MCNC: 8.1 G/DL — LOW (ref 11.5–15.5)
MAGNESIUM SERPL-MCNC: 1.6 MG/DL — SIGNIFICANT CHANGE UP (ref 1.6–2.6)
MCHC RBC-ENTMCNC: 27 PG — SIGNIFICANT CHANGE UP (ref 27–34)
MCHC RBC-ENTMCNC: 30.3 GM/DL — LOW (ref 32–36)
MCV RBC AUTO: 89 FL — SIGNIFICANT CHANGE UP (ref 80–100)
PHOSPHATE SERPL-MCNC: 4.7 MG/DL — SIGNIFICANT CHANGE UP (ref 2.4–4.7)
PLATELET # BLD AUTO: 521 K/UL — HIGH (ref 150–400)
POTASSIUM SERPL-MCNC: 4 MMOL/L — SIGNIFICANT CHANGE UP (ref 3.5–5.3)
POTASSIUM SERPL-SCNC: 4 MMOL/L — SIGNIFICANT CHANGE UP (ref 3.5–5.3)
RBC # BLD: 3 M/UL — LOW (ref 3.8–5.2)
RBC # FLD: 15.8 % — HIGH (ref 10.3–14.5)
SODIUM SERPL-SCNC: 138 MMOL/L — SIGNIFICANT CHANGE UP (ref 135–145)
WBC # BLD: 6.58 K/UL — SIGNIFICANT CHANGE UP (ref 3.8–10.5)
WBC # FLD AUTO: 6.58 K/UL — SIGNIFICANT CHANGE UP (ref 3.8–10.5)

## 2022-01-14 PROCEDURE — 99232 SBSQ HOSP IP/OBS MODERATE 35: CPT

## 2022-01-14 RX ORDER — MAGNESIUM SULFATE 500 MG/ML
1 VIAL (ML) INJECTION ONCE
Refills: 0 | Status: COMPLETED | OUTPATIENT
Start: 2022-01-14 | End: 2022-01-14

## 2022-01-14 RX ORDER — LANOLIN ALCOHOL/MO/W.PET/CERES
5 CREAM (GRAM) TOPICAL AT BEDTIME
Refills: 0 | Status: DISCONTINUED | OUTPATIENT
Start: 2022-01-14 | End: 2022-01-28

## 2022-01-14 RX ORDER — GABAPENTIN 400 MG/1
300 CAPSULE ORAL EVERY 8 HOURS
Refills: 0 | Status: DISCONTINUED | OUTPATIENT
Start: 2022-01-14 | End: 2022-01-23

## 2022-01-14 RX ORDER — MAGNESIUM SULFATE 500 MG/ML
2 VIAL (ML) INJECTION ONCE
Refills: 0 | Status: COMPLETED | OUTPATIENT
Start: 2022-01-14 | End: 2022-01-14

## 2022-01-14 RX ORDER — HYDROMORPHONE HYDROCHLORIDE 2 MG/ML
0.5 INJECTION INTRAMUSCULAR; INTRAVENOUS; SUBCUTANEOUS EVERY 4 HOURS
Refills: 0 | Status: DISCONTINUED | OUTPATIENT
Start: 2022-01-14 | End: 2022-01-20

## 2022-01-14 RX ORDER — HYDROMORPHONE HYDROCHLORIDE 2 MG/ML
4 INJECTION INTRAMUSCULAR; INTRAVENOUS; SUBCUTANEOUS EVERY 4 HOURS
Refills: 0 | Status: DISCONTINUED | OUTPATIENT
Start: 2022-01-14 | End: 2022-01-21

## 2022-01-14 RX ORDER — ACETAMINOPHEN 500 MG
975 TABLET ORAL EVERY 6 HOURS
Refills: 0 | Status: DISCONTINUED | OUTPATIENT
Start: 2022-01-14 | End: 2022-01-28

## 2022-01-14 RX ORDER — BACLOFEN 100 %
10 POWDER (GRAM) MISCELLANEOUS EVERY 6 HOURS
Refills: 0 | Status: DISCONTINUED | OUTPATIENT
Start: 2022-01-14 | End: 2022-01-28

## 2022-01-14 RX ORDER — HYDROMORPHONE HYDROCHLORIDE 2 MG/ML
2 INJECTION INTRAMUSCULAR; INTRAVENOUS; SUBCUTANEOUS EVERY 4 HOURS
Refills: 0 | Status: DISCONTINUED | OUTPATIENT
Start: 2022-01-14 | End: 2022-01-21

## 2022-01-14 RX ORDER — KETOROLAC TROMETHAMINE 30 MG/ML
15 SYRINGE (ML) INJECTION EVERY 6 HOURS
Refills: 0 | Status: DISCONTINUED | OUTPATIENT
Start: 2022-01-14 | End: 2022-01-15

## 2022-01-14 RX ADMIN — HYDROMORPHONE HYDROCHLORIDE 2 MILLIGRAM(S): 2 INJECTION INTRAMUSCULAR; INTRAVENOUS; SUBCUTANEOUS at 21:55

## 2022-01-14 RX ADMIN — Medication 100 GRAM(S): at 13:06

## 2022-01-14 RX ADMIN — HYDROMORPHONE HYDROCHLORIDE 0.5 MILLIGRAM(S): 2 INJECTION INTRAMUSCULAR; INTRAVENOUS; SUBCUTANEOUS at 18:06

## 2022-01-14 RX ADMIN — HYDROMORPHONE HYDROCHLORIDE 2 MILLIGRAM(S): 2 INJECTION INTRAMUSCULAR; INTRAVENOUS; SUBCUTANEOUS at 17:17

## 2022-01-14 RX ADMIN — MEROPENEM 100 MILLIGRAM(S): 1 INJECTION INTRAVENOUS at 21:51

## 2022-01-14 RX ADMIN — MEROPENEM 100 MILLIGRAM(S): 1 INJECTION INTRAVENOUS at 05:10

## 2022-01-14 RX ADMIN — GABAPENTIN 300 MILLIGRAM(S): 400 CAPSULE ORAL at 13:03

## 2022-01-14 RX ADMIN — HYDROMORPHONE HYDROCHLORIDE 2 MILLIGRAM(S): 2 INJECTION INTRAMUSCULAR; INTRAVENOUS; SUBCUTANEOUS at 22:55

## 2022-01-14 RX ADMIN — HYDROMORPHONE HYDROCHLORIDE 0.5 MILLIGRAM(S): 2 INJECTION INTRAMUSCULAR; INTRAVENOUS; SUBCUTANEOUS at 10:44

## 2022-01-14 RX ADMIN — HYDROMORPHONE HYDROCHLORIDE 2 MILLIGRAM(S): 2 INJECTION INTRAMUSCULAR; INTRAVENOUS; SUBCUTANEOUS at 08:46

## 2022-01-14 RX ADMIN — HYDROMORPHONE HYDROCHLORIDE 0.5 MILLIGRAM(S): 2 INJECTION INTRAMUSCULAR; INTRAVENOUS; SUBCUTANEOUS at 05:14

## 2022-01-14 RX ADMIN — Medication 25 GRAM(S): at 10:31

## 2022-01-14 RX ADMIN — MEROPENEM 100 MILLIGRAM(S): 1 INJECTION INTRAVENOUS at 13:05

## 2022-01-14 RX ADMIN — Medication 15 MILLIGRAM(S): at 20:57

## 2022-01-14 RX ADMIN — Medication 10 MILLIGRAM(S): at 05:09

## 2022-01-14 RX ADMIN — ENOXAPARIN SODIUM 40 MILLIGRAM(S): 100 INJECTION SUBCUTANEOUS at 13:03

## 2022-01-14 RX ADMIN — HYDROMORPHONE HYDROCHLORIDE 0.5 MILLIGRAM(S): 2 INJECTION INTRAMUSCULAR; INTRAVENOUS; SUBCUTANEOUS at 18:21

## 2022-01-14 RX ADMIN — Medication 10 MILLIGRAM(S): at 23:53

## 2022-01-14 RX ADMIN — GABAPENTIN 300 MILLIGRAM(S): 400 CAPSULE ORAL at 21:55

## 2022-01-14 RX ADMIN — HYDROMORPHONE HYDROCHLORIDE 2 MILLIGRAM(S): 2 INJECTION INTRAMUSCULAR; INTRAVENOUS; SUBCUTANEOUS at 09:14

## 2022-01-14 RX ADMIN — HYDROMORPHONE HYDROCHLORIDE 0.5 MILLIGRAM(S): 2 INJECTION INTRAMUSCULAR; INTRAVENOUS; SUBCUTANEOUS at 13:34

## 2022-01-14 RX ADMIN — Medication 10 MILLIGRAM(S): at 13:04

## 2022-01-14 RX ADMIN — GABAPENTIN 300 MILLIGRAM(S): 400 CAPSULE ORAL at 05:09

## 2022-01-14 RX ADMIN — Medication 975 MILLIGRAM(S): at 23:53

## 2022-01-14 RX ADMIN — HYDROMORPHONE HYDROCHLORIDE 0.5 MILLIGRAM(S): 2 INJECTION INTRAMUSCULAR; INTRAVENOUS; SUBCUTANEOUS at 13:04

## 2022-01-14 RX ADMIN — HYDROMORPHONE HYDROCHLORIDE 0.5 MILLIGRAM(S): 2 INJECTION INTRAMUSCULAR; INTRAVENOUS; SUBCUTANEOUS at 10:29

## 2022-01-14 RX ADMIN — HYDROMORPHONE HYDROCHLORIDE 2 MILLIGRAM(S): 2 INJECTION INTRAMUSCULAR; INTRAVENOUS; SUBCUTANEOUS at 17:47

## 2022-01-14 RX ADMIN — HYDROMORPHONE HYDROCHLORIDE 0.5 MILLIGRAM(S): 2 INJECTION INTRAMUSCULAR; INTRAVENOUS; SUBCUTANEOUS at 02:24

## 2022-01-14 RX ADMIN — PANTOPRAZOLE SODIUM 40 MILLIGRAM(S): 20 TABLET, DELAYED RELEASE ORAL at 05:09

## 2022-01-14 RX ADMIN — Medication 15 MILLIGRAM(S): at 21:12

## 2022-01-14 RX ADMIN — PANTOPRAZOLE SODIUM 40 MILLIGRAM(S): 20 TABLET, DELAYED RELEASE ORAL at 17:17

## 2022-01-14 RX ADMIN — DAPTOMYCIN 120 MILLIGRAM(S): 500 INJECTION, POWDER, LYOPHILIZED, FOR SOLUTION INTRAVENOUS at 19:33

## 2022-01-14 NOTE — PROGRESS NOTE ADULT - ATTENDING COMMENTS
Awake alert  hemodynamic intact  Drain removed  All wounds are clean and granulating nicely  No obvious infection source  Function L leg is impaired and there is present tactile sensory perception but very weak motor.  Low grade fever, perhaps osteomyelitis but no other obvious source  ID care and expertise very much appreciated

## 2022-01-14 NOTE — PROGRESS NOTE ADULT - SUBJECTIVE AND OBJECTIVE BOX
Acute Care Surgery Progress Note:    No acute overnight events. Patient afebrile in the last 24 h, and hemodynamically competent. Pain well controlled. Tolerating diet. Denies n/v/f/c/cp/sob.     Diet, Regular (01-11-22 @ 13:59)      Scheduled Medications:   baclofen 10 milliGRAM(s) Oral three times a day  DAPTOmycin IVPB 500 milliGRAM(s) IV Intermittent every 24 hours  enoxaparin Injectable 40 milliGRAM(s) SubCutaneous daily  gabapentin 300 milliGRAM(s) Oral three times a day  HYDROmorphone  Injectable 0.5 milliGRAM(s) IV Push every 4 hours  lidocaine 1% Injectable 20 milliLiter(s) Local Injection once  lisinopril 5 milliGRAM(s) Oral daily  meropenem  IVPB 1000 milliGRAM(s) IV Intermittent every 8 hours  pantoprazole    Tablet 40 milliGRAM(s) Oral two times a day    PRN Medications:  acetaminophen     Tablet .. 650 milliGRAM(s) Oral every 6 hours PRN Temp greater or equal to 38C (100.4F), Mild Pain (1 - 3)  acetaminophen     Tablet .. 975 milliGRAM(s) Oral every 8 hours PRN Temp greater or equal to 38C (100.4F), Mild Pain (1 - 3), Moderate Pain (4 - 6), Severe Pain (7 - 10)  aluminum hydroxide/magnesium hydroxide/simethicone Suspension 30 milliLiter(s) Oral every 4 hours PRN Dyspepsia  HYDROmorphone   Tablet 2 milliGRAM(s) Oral four times a day PRN Severe Pain (7 - 10)  melatonin 3 milliGRAM(s) Oral at bedtime PRN Insomnia  ondansetron Injectable 4 milliGRAM(s) IV Push every 8 hours PRN Nausea and/or Vomiting      Objective:   T(F): 99.3 (01-13 @ 22:09), Max: 99.5 (01-13 @ 09:08)  HR: 102 (01-13 @ 22:09) (89 - 102)  BP: 111/73 (01-13 @ 22:09) (106/69 - 115/80)  BP(mean): --  ABP: --  ABP(mean): --  RR: 17 (01-13 @ 22:09) (16 - 18)  SpO2: 98% (01-13 @ 22:09) (90% - 98%)      Physical Exam:   GEN: patient resting comfortably in bed, in no acute distress  RESP: respirations are unlabored, no accessory muscle use, no conversational dyspnea  CVS: RRR  GI: Abdomen soft, non-tender, non-distended, no rebound tenderness / guarding    I&O's    01-12 @ 07:01  -  01-13 @ 07:00  --------------------------------------------------------  IN:  Total IN: 0 mL    OUT:    Bulb (mL): 20 mL    Voided (mL): 1150 mL  Total OUT: 1170 mL    Total NET: -1170 mL      01-13 @ 07:01  -  01-14 @ 01:59  --------------------------------------------------------  IN:    Oral Fluid: 480 mL  Total IN: 480 mL    OUT:    Voided (mL): 700 mL  Total OUT: 700 mL    Total NET: -220 mL          LABS:                        8.8    8.06  )-----------( 499      ( 13 Jan 2022 07:54 )             29.3     01-13    137  |  101  |  15.0  ----------------------------<  86  4.6   |  22.0  |  0.56    Ca    9.1      13 Jan 2022 07:54  Phos  4.7     01-13  Mg     2.0     01-13    MICROBIOLOGY:     Culture - Abscess with Gram Stain (collected 01-11 @ 21:43)  Source: .Abscess left thigh  Preliminary Report (01-12 @ 16:03):    No growth    PATHOLOGY:    A 51 year old female s/p multiple OR debridement of left lower extremity for soft tissue infection and abscesses of unclear origin. PPD 1 s/p IR guided drainage of distal femur collection.    Plan:   - Reg diet with ensure and enliven  - Cont wound vac changes  - F/U cx  - DVT ppx  - Dispo: PAWEL, will discuss with CM to start process  -Order folic acid and B12 in serum as support for chronic anemia ( verbal recs from hematology--no formal consult)  -FU out pt fu: GYN--for leiomyomatosis  and GI--multiple gastric ulcers on ubaldo nix twice daily (Verbal Fu call to GI 1/13/22)  -ID: 1- tentatively plan for ANOTHER 2 WEEKS WITH DAPTO 500 mg IV daily and, MERREM  1 g iv q8h THROUGH 1/24/22.        2-Will need weekly CBC, CMP, ESR, CRP and CK. Last CK 1/11 is 22.         Acute Care Surgery Progress Note:    No acute overnight events. Patient afebrile in the last 24 h, and hemodynamically competent. Pain well controlled. Tolerating diet. Denies n/v/f/c/cp/sob.     Diet, Regular (01-11-22 @ 13:59)      Scheduled Medications:   baclofen 10 milliGRAM(s) Oral three times a day  DAPTOmycin IVPB 500 milliGRAM(s) IV Intermittent every 24 hours  enoxaparin Injectable 40 milliGRAM(s) SubCutaneous daily  gabapentin 300 milliGRAM(s) Oral three times a day  HYDROmorphone  Injectable 0.5 milliGRAM(s) IV Push every 4 hours  lidocaine 1% Injectable 20 milliLiter(s) Local Injection once  lisinopril 5 milliGRAM(s) Oral daily  meropenem  IVPB 1000 milliGRAM(s) IV Intermittent every 8 hours  pantoprazole    Tablet 40 milliGRAM(s) Oral two times a day    PRN Medications:  acetaminophen     Tablet .. 650 milliGRAM(s) Oral every 6 hours PRN Temp greater or equal to 38C (100.4F), Mild Pain (1 - 3)  acetaminophen     Tablet .. 975 milliGRAM(s) Oral every 8 hours PRN Temp greater or equal to 38C (100.4F), Mild Pain (1 - 3), Moderate Pain (4 - 6), Severe Pain (7 - 10)  aluminum hydroxide/magnesium hydroxide/simethicone Suspension 30 milliLiter(s) Oral every 4 hours PRN Dyspepsia  HYDROmorphone   Tablet 2 milliGRAM(s) Oral four times a day PRN Severe Pain (7 - 10)  melatonin 3 milliGRAM(s) Oral at bedtime PRN Insomnia  ondansetron Injectable 4 milliGRAM(s) IV Push every 8 hours PRN Nausea and/or Vomiting      Objective:   T(F): 99.3 (01-13 @ 22:09), Max: 99.5 (01-13 @ 09:08)  HR: 102 (01-13 @ 22:09) (89 - 102)  BP: 111/73 (01-13 @ 22:09) (106/69 - 115/80)  BP(mean): --  ABP: --  ABP(mean): --  RR: 17 (01-13 @ 22:09) (16 - 18)  SpO2: 98% (01-13 @ 22:09) (90% - 98%)      Physical Exam:   GEN: patient resting comfortably in bed, in no acute distress  RESP: respirations are unlabored, no accessory muscle use, no conversational dyspnea  CVS: RRR  GI: Abdomen soft, non-tender, non-distended, no rebound tenderness / guarding    I&O's    01-12 @ 07:01  -  01-13 @ 07:00  --------------------------------------------------------  IN:  Total IN: 0 mL    OUT:    Bulb (mL): 20 mL    Voided (mL): 1150 mL  Total OUT: 1170 mL    Total NET: -1170 mL      01-13 @ 07:01  -  01-14 @ 01:59  --------------------------------------------------------  IN:    Oral Fluid: 480 mL  Total IN: 480 mL    OUT:    Voided (mL): 700 mL  Total OUT: 700 mL    Total NET: -220 mL          LABS:                        8.8    8.06  )-----------( 499      ( 13 Jan 2022 07:54 )             29.3     01-13    137  |  101  |  15.0  ----------------------------<  86  4.6   |  22.0  |  0.56    Ca    9.1      13 Jan 2022 07:54  Phos  4.7     01-13  Mg     2.0     01-13    MICROBIOLOGY:     Culture - Abscess with Gram Stain (collected 01-11 @ 21:43)  Source: .Abscess left thigh  Preliminary Report (01-12 @ 16:03):    No growth    PATHOLOGY:    A 51 year old female s/p multiple OR debridement of left lower extremity for soft tissue infection and abscesses of unclear origin. PPD 1 s/p IR guided drainage of distal femur collection.    Plan:   - Reg diet with ensure and enliven  - Cont wound vac changes  - F/U cx  - DVT ppx  - Dispo: PAWEL, will discuss with CM to start process  -Order folic acid and B12 in serum as support for chronic anemia; no necessary iron panel at this time due to load of iron due transfusions ( verbal recs from hematology--no formal consult)  -FU out pt fu: GYN--for leiomyomatosis  and GI--multiple gastric ulcers on ubaldo nix twice daily (Verbal Fu call to GI 1/13/22)  -ID: 1- tentatively plan for ANOTHER 2 WEEKS WITH DAPTO 500 mg IV daily and, MERREM  1 g iv q8h THROUGH 1/24/22.        2-Will need weekly CBC, CMP, ESR, CRP and CK. Last CK 1/11 is 22.         Acute Care Surgery Progress Note:    No acute overnight events. Patient afebrile in the last 24 h, and hemodynamically competent. Pain well controlled. Tolerating diet. Denies n/v/f/c/cp/sob.     Diet, Regular (01-11-22 @ 13:59)      Scheduled Medications:   baclofen 10 milliGRAM(s) Oral three times a day  DAPTOmycin IVPB 500 milliGRAM(s) IV Intermittent every 24 hours  enoxaparin Injectable 40 milliGRAM(s) SubCutaneous daily  gabapentin 300 milliGRAM(s) Oral three times a day  HYDROmorphone  Injectable 0.5 milliGRAM(s) IV Push every 4 hours  lidocaine 1% Injectable 20 milliLiter(s) Local Injection once  lisinopril 5 milliGRAM(s) Oral daily  meropenem  IVPB 1000 milliGRAM(s) IV Intermittent every 8 hours  pantoprazole    Tablet 40 milliGRAM(s) Oral two times a day    PRN Medications:  acetaminophen     Tablet .. 650 milliGRAM(s) Oral every 6 hours PRN Temp greater or equal to 38C (100.4F), Mild Pain (1 - 3)  acetaminophen     Tablet .. 975 milliGRAM(s) Oral every 8 hours PRN Temp greater or equal to 38C (100.4F), Mild Pain (1 - 3), Moderate Pain (4 - 6), Severe Pain (7 - 10)  aluminum hydroxide/magnesium hydroxide/simethicone Suspension 30 milliLiter(s) Oral every 4 hours PRN Dyspepsia  HYDROmorphone   Tablet 2 milliGRAM(s) Oral four times a day PRN Severe Pain (7 - 10)  melatonin 3 milliGRAM(s) Oral at bedtime PRN Insomnia  ondansetron Injectable 4 milliGRAM(s) IV Push every 8 hours PRN Nausea and/or Vomiting      Objective:   T(F): 99.3 (01-13 @ 22:09), Max: 99.5 (01-13 @ 09:08)  HR: 102 (01-13 @ 22:09) (89 - 102)  BP: 111/73 (01-13 @ 22:09) (106/69 - 115/80)  BP(mean): --  ABP: --  ABP(mean): --  RR: 17 (01-13 @ 22:09) (16 - 18)  SpO2: 98% (01-13 @ 22:09) (90% - 98%)      Physical Exam:   GEN: patient resting comfortably in bed, in no acute distress  RESP: respirations are unlabored, no accessory muscle use, no conversational dyspnea  CVS: RRR  GI: Abdomen soft, non-tender, non-distended, no rebound tenderness / guarding    I&O's    01-12 @ 07:01  -  01-13 @ 07:00  --------------------------------------------------------  IN:  Total IN: 0 mL    OUT:    Bulb (mL): 20 mL    Voided (mL): 1150 mL  Total OUT: 1170 mL    Total NET: -1170 mL      01-13 @ 07:01  -  01-14 @ 01:59  --------------------------------------------------------  IN:    Oral Fluid: 480 mL  Total IN: 480 mL    OUT:    Voided (mL): 700 mL  Total OUT: 700 mL    Total NET: -220 mL          LABS:                        8.8    8.06  )-----------( 499      ( 13 Jan 2022 07:54 )             29.3     01-13    137  |  101  |  15.0  ----------------------------<  86  4.6   |  22.0  |  0.56    Ca    9.1      13 Jan 2022 07:54  Phos  4.7     01-13  Mg     2.0     01-13    MICROBIOLOGY:     Culture - Abscess with Gram Stain (collected 01-11 @ 21:43)  Source: .Abscess left thigh  Preliminary Report (01-12 @ 16:03):    No growth    PATHOLOGY:    A 51 year old female s/p multiple OR debridement of left lower extremity for soft tissue infection and abscesses of unclear origin. PPD 1 s/p IR guided drainage of distal femur collection. Currently, moderate protein caloric malnutrition.    Plan:   - Reg diet with ensure and enliven  - Cont wound vac changes  - F/U cx  - DVT ppx  - Dispo: PAWEL, will discuss with CM to start process  -Order folic acid and B12 in serum as support for chronic anemia; no necessary iron panel at this time due to load of iron due transfusions ( verbal recs from hematology--no formal consult).  -IR drain removed 1/13/22  -FU out pt fu: GYN--for leiomyomatosis  and GI--multiple gastric ulcers on ubaldo nix twice daily (Verbal Fu call to GI 1/13/22)  -ID: 1- tentatively plan for ANOTHER 2 WEEKS WITH DAPTO 500 mg IV daily and, MERREM  1 g iv q8h THROUGH 1/24/22.        2-Will need weekly CBC, CMP, ESR, CRP and CK. Last CK 1/11 is 22.

## 2022-01-14 NOTE — PROGRESS NOTE ADULT - ASSESSMENT
51y Female present to ED with left leg swelling, erythema, pain. Patient endorses she had left knee pain for 1 week presented 3 days ago to ED  where she states was given an steroid shot, and ibuprofen. however pain and edema worsened. Patient states she wasnt feeling herself today reason why she came. Endorses chills initially with pain 3 days ago but none since, denies history of trauma, insect bites, recent interventions, or injections to the area, contact with ticks, history of diabetes.  PT AS ABOVE WITH LEFT LEG SWELLING PT WITH INCREASED WBC PLACED ON ABX FOR PRESUMED INFECTION  PT WITH CT SCAN WITH FASCIAL EDAM PT BROUGHT TO THE OR EMERGENTLY AND  UNDERWENT FASCIOTOMY   PT BROUGHT TO THE OR  BOTH TRAUMA AND ORTHO INVOLVED  PURULENT FLUID FOUND  NECROTIZING SOFT TISSUE INFECTION    BLOOD CX WITH GROUP A STREP AND  OPERATIVE FLUID WITH STREP   PT WAS  ON PCN AND CLINDA for POSSIBLE ANTITOXIN EFFECT IN GROUP A STREP INFECTION     PT  S/P OR WASHOUT 12.2  AND  OR AGAIN 12/7  and late on 12/10 121/15 12/17 and 12/20 12/23     REPEAT   OPERATIVE CX NEG             AS ABOVE  REPEAT  OR  12/20 12/23    S/ RETURN TO THE OR 12/28 AND 12/31    RECENT VAC DRESSING CHANGE NO PUS   NO PURULENCE   OVERALL NON TOXIC  AFEBRILE           ON MERREM/DAPTOMYCIN  repeat Ct 1/10 as above with increase in soft tissue abscesses and likely OM. s/p Ir drainage on 1/11, with dark old blood aspirated.  IR cx ngtd  f/u BCX ngtd  tentatively plan for ANOTHER 2 WEEKS WITH DAPTO 500 mg IV daily and .MERREM  1 g iv q8h THROUGH 1/24/22  . Will need weekly CBc CMP ESR CRp and CK. Last CK 1/11 is 22  plan for skin graft next week      will f/u

## 2022-01-14 NOTE — PROGRESS NOTE ADULT - SUBJECTIVE AND OBJECTIVE BOX
[facility]  INFECTIOUS DISEASES AND INTERNAL MEDICINE at [place]  =======================================================  Theo Morrow MD  Diplomates American Board of Internal Medicine and Infectious Diseases  Tel: [phone]      Fax: [phone]  =======================================================    LUIS FERNANDO DAVIS [id number]    Follow up: necrotizing soft tissue infection  afebrile  feels well  Willie removed      Allergies:  No Known Allergies           REVIEW OF SYSTEMS:  CONSTITUTIONAL:  No Fever or chills  HEENT:   No diplopia or blurred vision.  No earache, sore throat or runny nose.  CARDIOVASCULAR:  No pressure, squeezing, strangling, tightness, heaviness or aching about the chest, neck, axilla or epigastrium.  RESPIRATORY:  No cough, shortness of breath  GASTROINTESTINAL:  No nausea, vomiting or diarrhea.  GENITOURINARY:  No dysuria, frequency or urgency. No Blood in urine  MUSCULOSKELETAL:  no joint aches, no muscle pain  SKIN:  No change in skin, hair or nails.  NEUROLOGIC:  No Headaches, seizures or weakness.  PSYCHIATRIC:  No disorder of thought or mood.  ENDOCRINE:  No heat or cold intolerance  HEMATOLOGICAL:  No easy bruising or bleeding.       Physical Exam:  GEN: NAD, pleasant  HEENT: normocephalic and atraumatic. EOMI. PERRL.  Anicteric   NECK: Supple.   LUNGS: Clear to auscultation.  HEART: Regular rate and rhythm without murmur.  ABDOMEN: Soft, nontender, and nondistended.  Positive bowel sounds.    : No CVA tenderness  EXTREMITIES: Without any edema. LEFT LEG WRAPPED; left thigh vac in place, upper thigh dressing in place  right knee with dressing in place   MSK: no joint swelling  NEUROLOGIC: Cranial nerves II through XII are grossly intact. No focal deficits  PSYCHIATRIC: Appropriate affect .  SKIN: No Rash      Vitals:    T(F): 97.8 (14 Jan 2022 11:11), Max: 99.3 (13 Jan 2022 22:09)  HR: 65 (14 Jan 2022 11:11)  BP: 105/72 (14 Jan 2022 11:11)  RR: 16 (14 Jan 2022 11:11)  SpO2: 99% (14 Jan 2022 11:11) (95% - 99%)  temp max in last 48H T(F): , Max: 102.7 (01-12-22 @ 22:38)    Current Antibiotics:  DAPTOmycin IVPB 500 milliGRAM(s) IV Intermittent every 24 hours  meropenem  IVPB 1000 milliGRAM(s) IV Intermittent every 8 hours    Other medications:  baclofen 10 milliGRAM(s) Oral three times a day  enoxaparin Injectable 40 milliGRAM(s) SubCutaneous daily  gabapentin 300 milliGRAM(s) Oral three times a day  HYDROmorphone  Injectable 0.5 milliGRAM(s) IV Push every 4 hours  lidocaine 1% Injectable 20 milliLiter(s) Local Injection once  lisinopril 5 milliGRAM(s) Oral daily  pantoprazole    Tablet 40 milliGRAM(s) Oral two times a day                            8.1    6.58  )-----------( 521      ( 14 Jan 2022 05:54 )             26.7     01-14    138  |  101  |  12.8  ----------------------------<  103<H>  4.0   |  26.0  |  0.62    Ca    9.1      14 Jan 2022 05:54  Phos  4.7     01-14  Mg     1.6     01-14      RECENT CULTURES:  01-11 @ 21:43 .Abscess left thigh     No growth            WBC Count: 6.58 K/uL (01-14-22 @ 05:54)  WBC Count: 8.06 K/uL (01-13-22 @ 07:54)  WBC Count: 7.28 K/uL (01-12-22 @ 05:52)  WBC Count: 11.00 K/uL (01-10-22 @ 06:44)    Creatinine, Serum: 0.62 mg/dL (01-14-22 @ 05:54)  Creatinine, Serum: 0.56 mg/dL (01-13-22 @ 07:54)  Creatinine, Serum: 0.57 mg/dL (01-12-22 @ 05:52)  Creatinine, Serum: 0.48 mg/dL (01-10-22 @ 06:44)               COVID-19 PCR: NotDetec (01-11-22 @ 12:29)  COVID-19 PCR: NotDetec (01-11-22 @ 05:17)  COVID-19 PCR: NotDetec (01-05-22 @ 08:09)  COVID-19 PCR: NotDetec (12-30-21 @ 22:14)  COVID-19 PCR: NotDetec (12-26-21 @ 11:56)  COVID-19 PCR: NotDetec (12-22-21 @ 05:30)  COVID-19 PCR: NotDetec (12-20-21 @ 08:49)  COVID-19 PCR: NotDetec (12-16-21 @ 15:18)

## 2022-01-15 LAB
CULTURE RESULTS: SIGNIFICANT CHANGE UP
SPECIMEN SOURCE: SIGNIFICANT CHANGE UP

## 2022-01-15 PROCEDURE — 99232 SBSQ HOSP IP/OBS MODERATE 35: CPT

## 2022-01-15 RX ORDER — KETOROLAC TROMETHAMINE 30 MG/ML
15 SYRINGE (ML) INJECTION ONCE
Refills: 0 | Status: DISCONTINUED | OUTPATIENT
Start: 2022-01-15 | End: 2022-01-15

## 2022-01-15 RX ADMIN — Medication 10 MILLIGRAM(S): at 05:34

## 2022-01-15 RX ADMIN — Medication 10 MILLIGRAM(S): at 17:23

## 2022-01-15 RX ADMIN — HYDROMORPHONE HYDROCHLORIDE 4 MILLIGRAM(S): 2 INJECTION INTRAMUSCULAR; INTRAVENOUS; SUBCUTANEOUS at 22:03

## 2022-01-15 RX ADMIN — HYDROMORPHONE HYDROCHLORIDE 2 MILLIGRAM(S): 2 INJECTION INTRAMUSCULAR; INTRAVENOUS; SUBCUTANEOUS at 06:06

## 2022-01-15 RX ADMIN — MEROPENEM 100 MILLIGRAM(S): 1 INJECTION INTRAVENOUS at 22:03

## 2022-01-15 RX ADMIN — HYDROMORPHONE HYDROCHLORIDE 2 MILLIGRAM(S): 2 INJECTION INTRAMUSCULAR; INTRAVENOUS; SUBCUTANEOUS at 07:06

## 2022-01-15 RX ADMIN — GABAPENTIN 300 MILLIGRAM(S): 400 CAPSULE ORAL at 22:02

## 2022-01-15 RX ADMIN — MEROPENEM 100 MILLIGRAM(S): 1 INJECTION INTRAVENOUS at 05:33

## 2022-01-15 RX ADMIN — GABAPENTIN 300 MILLIGRAM(S): 400 CAPSULE ORAL at 14:21

## 2022-01-15 RX ADMIN — ENOXAPARIN SODIUM 40 MILLIGRAM(S): 100 INJECTION SUBCUTANEOUS at 12:05

## 2022-01-15 RX ADMIN — HYDROMORPHONE HYDROCHLORIDE 0.5 MILLIGRAM(S): 2 INJECTION INTRAMUSCULAR; INTRAVENOUS; SUBCUTANEOUS at 18:12

## 2022-01-15 RX ADMIN — HYDROMORPHONE HYDROCHLORIDE 4 MILLIGRAM(S): 2 INJECTION INTRAMUSCULAR; INTRAVENOUS; SUBCUTANEOUS at 22:30

## 2022-01-15 RX ADMIN — MEROPENEM 100 MILLIGRAM(S): 1 INJECTION INTRAVENOUS at 15:02

## 2022-01-15 RX ADMIN — Medication 15 MILLIGRAM(S): at 05:49

## 2022-01-15 RX ADMIN — GABAPENTIN 300 MILLIGRAM(S): 400 CAPSULE ORAL at 05:34

## 2022-01-15 RX ADMIN — PANTOPRAZOLE SODIUM 40 MILLIGRAM(S): 20 TABLET, DELAYED RELEASE ORAL at 05:33

## 2022-01-15 RX ADMIN — PANTOPRAZOLE SODIUM 40 MILLIGRAM(S): 20 TABLET, DELAYED RELEASE ORAL at 17:23

## 2022-01-15 RX ADMIN — Medication 975 MILLIGRAM(S): at 00:53

## 2022-01-15 RX ADMIN — HYDROMORPHONE HYDROCHLORIDE 0.5 MILLIGRAM(S): 2 INJECTION INTRAMUSCULAR; INTRAVENOUS; SUBCUTANEOUS at 17:23

## 2022-01-15 RX ADMIN — Medication 15 MILLIGRAM(S): at 05:34

## 2022-01-15 RX ADMIN — Medication 10 MILLIGRAM(S): at 12:05

## 2022-01-15 RX ADMIN — DAPTOMYCIN 120 MILLIGRAM(S): 500 INJECTION, POWDER, LYOPHILIZED, FOR SOLUTION INTRAVENOUS at 18:17

## 2022-01-15 NOTE — PROGRESS NOTE ADULT - ASSESSMENT
A 51 year old female s/p multiple OR debridement of left lower extremity for soft tissue infection and abscesses of unclear origin. PPD 1 s/p IR guided drainage of distal femur collection. Currently, moderate protein caloric malnutrition.    Plan:   - Reg diet with ensure and enliven  - Cont wound vac changes  - F/U cx  - DVT ppx  - Dispo: PAWEL, will discuss with CM to start process  -Order folic acid and B12 in serum as support for chronic anemia; no necessary iron panel at this time due to load of iron due transfusions ( verbal recs from hematology--no formal consult).  -IR drain removed 1/13/22  -FU out pt fu: GYN--for leiomyomatosis  and GI--multiple gastric ulcers on ubaldo nix twice daily (Verbal Fu call to GI 1/13/22)  -ID: 1- tentatively plan for ANOTHER 2 WEEKS WITH DAPTO 500 mg IV daily and, MERREM  1 g iv q8h THROUGH 1/24/22.        2-Will need weekly CBC, CMP, ESR, CRP and CK. Last CK 1/11 is 22.

## 2022-01-15 NOTE — PROGRESS NOTE ADULT - SUBJECTIVE AND OBJECTIVE BOX
[facility]  INFECTIOUS DISEASES AND INTERNAL MEDICINE at [place]  =======================================================  Theo Morrow MD  Diplomates American Board of Internal Medicine and Infectious Diseases  Tel: [phone]      Fax: [phone]  =======================================================    LUIS FERNANDO DAVIS [id number]    Follow up: necrotizing soft tissue infection  afebrile  feels well  Willie removed      Allergies:  No Known Allergies           REVIEW OF SYSTEMS:  CONSTITUTIONAL:  No Fever or chills  HEENT:   No diplopia or blurred vision.  No earache, sore throat or runny nose.  CARDIOVASCULAR:  No pressure, squeezing, strangling, tightness, heaviness or aching about the chest, neck, axilla or epigastrium.  RESPIRATORY:  No cough, shortness of breath  GASTROINTESTINAL:  No nausea, vomiting or diarrhea.  GENITOURINARY:  No dysuria, frequency or urgency. No Blood in urine  MUSCULOSKELETAL: as PER HPI   SKIN:  AS PER HPI  NEUROLOGIC:  No Headaches, seizures or weakness.  PSYCHIATRIC:  No disorder of thought or mood.  ENDOCRINE:  No heat or cold intolerance  HEMATOLOGICAL:  No easy bruising or bleeding.       Physical Exam:               Vital Signs Last 24 Hrs  T(C): 36.7 (15 Rah 2022 04:22), Max: 37.2 (14 Jan 2022 21:31)  T(F): 98.1 (15 Rah 2022 04:22), Max: 99 (14 Jan 2022 21:31)  HR: 80 (15 Rah 2022 04:22) (64 - 92)  BP: 111/63 (15 Rah 2022 04:22) (111/63 - 143/85)  BP(mean): --  RR: 18 (15 Rah 2022 04:22) (17 - 18)  SpO2: 94% (15 Rah 2022 04:22) (91% - 94%)          GEN: NAD, pleasant  HEENT: normocephalic and atraumatic. EOMI. PERRL.  Anicteric   NECK: Supple.   LUNGS: Clear to auscultation.  HEART: Regular rate and rhythm without murmur.  ABDOMEN: Soft, nontender, and nondistended.  Positive bowel sounds.    : No CVA tenderness  EXTREMITIES: Without any edema. LEFT LEG WRAPPED; left thigh vac in place, upper thigh dressing in place  right knee with dressing in place   MSK: no joint swelling  NEUROLOGIC: Cranial nerves II through XII are grossly intact. No focal deficits  PSYCHIATRIC: Appropriate affect .  SKIN: No Rash                                   Current Antibiotics:  DAPTOmycin IVPB 500 milliGRAM(s) IV Intermittent every 24 hours  meropenem  IVPB 1000 milliGRAM(s) IV Intermittent every 8 hours    Other medications:  baclofen 10 milliGRAM(s) Oral three times a day  enoxaparin Injectable 40 milliGRAM(s) SubCutaneous daily  gabapentin 300 milliGRAM(s) Oral three times a day  HYDROmorphone  Injectable 0.5 milliGRAM(s) IV Push every 4 hours  lidocaine 1% Injectable 20 milliLiter(s) Local Injection once  lisinopril 5 milliGRAM(s) Oral daily  pantoprazole    Tablet 40 milliGRAM(s) Oral two times a day                           8.1    6.58  )-----------( 521      ( 14 Jan 2022 05:54 )             26.7   01-14    138  |  101  |  12.8  ----------------------------<  103<H>  4.0   |  26.0  |  0.62    Ca    9.1      14 Jan 2022 05:54  Phos  4.7     01-14  Mg     1.6     01-14                 COVID-19 PCR: NotDetec (01-11-22 @ 12:29)  COVID-19 PCR: NotDetec (01-11-22 @ 05:17)  COVID-19 PCR: NotDetec (01-05-22 @ 08:09)  COVID-19 PCR: NotDetec (12-30-21 @ 22:14)  COVID-19 PCR: NotDetec (12-26-21 @ 11:56)  COVID-19 PCR: NotDetec (12-22-21 @ 05:30)  COVID-19 PCR: NotDetec (12-20-21 @ 08:49)  COVID-19 PCR: NotDetec (12-16-21 @ 15:18)

## 2022-01-15 NOTE — PROGRESS NOTE ADULT - ASSESSMENT
51y Female present to ED with left leg swelling, erythema, pain. Patient endorses she had left knee pain for 1 week presented 3 days ago to ED  where she states was given an steroid shot, and ibuprofen. however pain and edema worsened. Patient states she wasnt feeling herself today reason why she came. Endorses chills initially with pain 3 days ago but none since, denies history of trauma, insect bites, recent interventions, or injections to the area, contact with ticks, history of diabetes.  PT AS ABOVE WITH LEFT LEG SWELLING PT WITH INCREASED WBC PLACED ON ABX FOR PRESUMED INFECTION  PT WITH CT SCAN WITH FASCIAL EDAM PT BROUGHT TO THE OR EMERGENTLY AND  UNDERWENT FASCIOTOMY   PT BROUGHT TO THE OR  BOTH TRAUMA AND ORTHO INVOLVED  PURULENT FLUID FOUND  NECROTIZING SOFT TISSUE INFECTION    BLOOD CX WITH GROUP A STREP AND  OPERATIVE FLUID WITH STREP   PT WAS  ON PCN AND CLINDA for POSSIBLE ANTITOXIN EFFECT IN GROUP A STREP INFECTION     PT  S/P OR WASHOUT 12.2  AND  OR AGAIN 12/7  and late on 12/10 121/15 12/17 and 12/20 12/23     REPEAT   OPERATIVE CX NEG             AS ABOVE  REPEAT  OR  12/20 12/23    S/ RETURN TO THE OR 12/28 AND 12/31    RECENT VAC DRESSING CHANGE NO PUS   NO PURULENCE   OVERALL NON TOXIC  AFEBRILE           ON MERREM/DAPTOMYCIN  repeat Ct 1/10 as above with increase in soft tissue abscesses and likely OM. s/p Ir drainage on 1/11, with dark old blood aspirated.  IR cx ngtd  f/u BCX ngtd  tentatively plan for ANOTHER 2 WEEKS WITH DAPTO 500 mg IV daily and .MERREM  1 g iv q8h THROUGH 1/24/22    . Will need weekly CBc CMP ESR CRp and CK. Last CK 1/11 is 22  plan for skin graft  ON 1/18      WILL FOLLOW UP

## 2022-01-15 NOTE — PROGRESS NOTE ADULT - SUBJECTIVE AND OBJECTIVE BOX
SUBJECTIVE/24 hour events:  Patient is a 51yFemale with no acute events overnight, has remained afebrile, had complaints of pain but with adjustment of regimen now resolved. Patient going for split thickness skin graft on 1/18      Vital Signs Last 24 Hrs  T(C): 37.2 (14 Jan 2022 21:31), Max: 37.2 (14 Jan 2022 21:31)  T(F): 99 (14 Jan 2022 21:31), Max: 99 (14 Jan 2022 21:31)  HR: 92 (14 Jan 2022 21:31) (64 - 93)  BP: 112/70 (14 Jan 2022 21:31) (100/64 - 143/85)  BP(mean): --  RR: 17 (14 Jan 2022 21:31) (16 - 18)  SpO2: 91% (14 Jan 2022 21:31) (91% - 99%)  Drug Dosing Weight  Height (cm): 167.6 (28 Dec 2021 12:36)  Weight (kg): 93 (28 Dec 2021 12:36)  BMI (kg/m2): 33.1 (28 Dec 2021 12:36)  BSA (m2): 2.02 (28 Dec 2021 12:36)  I&O's Detail    13 Jan 2022 07:01  -  14 Jan 2022 07:00  --------------------------------------------------------  IN:    Oral Fluid: 480 mL  Total IN: 480 mL    OUT:    Voided (mL): 1750 mL  Total OUT: 1750 mL    Total NET: -1270 mL      14 Jan 2022 07:01  -  15 Jan 2022 01:23  --------------------------------------------------------  IN:  Total IN: 0 mL    OUT:    VAC (Vacuum Assisted Closure) System (mL): 75 mL    Voided (mL): 1300 mL  Total OUT: 1375 mL    Total NET: -1375 mL        Allergies    No Known Allergies    Intolerances                              8.1    6.58  )-----------( 521      ( 14 Jan 2022 05:54 )             26.7   01-14    138  |  101  |  12.8  ----------------------------<  103<H>  4.0   |  26.0  |  0.62    Ca    9.1      14 Jan 2022 05:54  Phos  4.7     01-14  Mg     1.6     01-14        ROS:    PHYSICAL EXAM:  GEN: patient resting comfortably in bed, in no acute distress  RESP: respirations are unlabored, no accessory muscle use, no conversational dyspnea  CVS: RRR  GI: Abdomen soft, non-tender, non-distended, no rebound tenderness / guarding        MEDICATIONS  (STANDING):  acetaminophen     Tablet .. 975 milliGRAM(s) Oral every 6 hours  baclofen 10 milliGRAM(s) Oral every 6 hours  DAPTOmycin IVPB 500 milliGRAM(s) IV Intermittent every 24 hours  enoxaparin Injectable 40 milliGRAM(s) SubCutaneous daily  gabapentin 300 milliGRAM(s) Oral every 8 hours  ketorolac   Injectable 15 milliGRAM(s) IV Push every 6 hours  lidocaine 1% Injectable 20 milliLiter(s) Local Injection once  lisinopril 5 milliGRAM(s) Oral daily  meropenem  IVPB 1000 milliGRAM(s) IV Intermittent every 8 hours  pantoprazole    Tablet 40 milliGRAM(s) Oral two times a day    MEDICATIONS  (PRN):  HYDROmorphone   Tablet 2 milliGRAM(s) Oral every 4 hours PRN Moderate Pain (4 - 6)  HYDROmorphone   Tablet 4 milliGRAM(s) Oral every 4 hours PRN Severe Pain (7 - 10)  HYDROmorphone  Injectable 0.5 milliGRAM(s) IV Push every 4 hours PRN break thru pain  melatonin 5 milliGRAM(s) Oral at bedtime PRN Sleep  ondansetron Injectable 4 milliGRAM(s) IV Push every 8 hours PRN Nausea and/or Vomiting      RADIOLOGY STUDIES:    CULTURES:

## 2022-01-15 NOTE — PROGRESS NOTE ADULT - ATTENDING COMMENTS
Doing well and afebrile for last 24h  Bilateral BS  hemodynamic intact  Abdomen soft  All sites L leg clean / for grafting Tuesday  Very limited function L leg with very questionable functional recovery of the same  Awaiting transfer to rehab after that

## 2022-01-16 LAB
CULTURE RESULTS: SIGNIFICANT CHANGE UP
SPECIMEN SOURCE: SIGNIFICANT CHANGE UP

## 2022-01-16 RX ORDER — VITAMIN E 100 UNIT
400 CAPSULE ORAL DAILY
Refills: 0 | Status: COMPLETED | OUTPATIENT
Start: 2022-01-16 | End: 2022-01-26

## 2022-01-16 RX ORDER — ASCORBIC ACID 60 MG
1000 TABLET,CHEWABLE ORAL
Refills: 0 | Status: DISCONTINUED | OUTPATIENT
Start: 2022-01-16 | End: 2022-01-28

## 2022-01-16 RX ORDER — ZINC SULFATE TAB 220 MG (50 MG ZINC EQUIVALENT) 220 (50 ZN) MG
220 TAB ORAL DAILY
Refills: 0 | Status: DISCONTINUED | OUTPATIENT
Start: 2022-01-16 | End: 2022-01-28

## 2022-01-16 RX ADMIN — Medication 975 MILLIGRAM(S): at 11:45

## 2022-01-16 RX ADMIN — Medication 975 MILLIGRAM(S): at 00:23

## 2022-01-16 RX ADMIN — Medication 975 MILLIGRAM(S): at 01:00

## 2022-01-16 RX ADMIN — Medication 10 MILLIGRAM(S): at 23:57

## 2022-01-16 RX ADMIN — Medication 975 MILLIGRAM(S): at 17:09

## 2022-01-16 RX ADMIN — MEROPENEM 100 MILLIGRAM(S): 1 INJECTION INTRAVENOUS at 13:31

## 2022-01-16 RX ADMIN — Medication 975 MILLIGRAM(S): at 18:13

## 2022-01-16 RX ADMIN — GABAPENTIN 300 MILLIGRAM(S): 400 CAPSULE ORAL at 06:02

## 2022-01-16 RX ADMIN — HYDROMORPHONE HYDROCHLORIDE 0.5 MILLIGRAM(S): 2 INJECTION INTRAMUSCULAR; INTRAVENOUS; SUBCUTANEOUS at 14:17

## 2022-01-16 RX ADMIN — Medication 975 MILLIGRAM(S): at 23:57

## 2022-01-16 RX ADMIN — HYDROMORPHONE HYDROCHLORIDE 0.5 MILLIGRAM(S): 2 INJECTION INTRAMUSCULAR; INTRAVENOUS; SUBCUTANEOUS at 14:41

## 2022-01-16 RX ADMIN — HYDROMORPHONE HYDROCHLORIDE 4 MILLIGRAM(S): 2 INJECTION INTRAMUSCULAR; INTRAVENOUS; SUBCUTANEOUS at 18:35

## 2022-01-16 RX ADMIN — MEROPENEM 100 MILLIGRAM(S): 1 INJECTION INTRAVENOUS at 21:39

## 2022-01-16 RX ADMIN — HYDROMORPHONE HYDROCHLORIDE 0.5 MILLIGRAM(S): 2 INJECTION INTRAMUSCULAR; INTRAVENOUS; SUBCUTANEOUS at 21:40

## 2022-01-16 RX ADMIN — GABAPENTIN 300 MILLIGRAM(S): 400 CAPSULE ORAL at 21:40

## 2022-01-16 RX ADMIN — MEROPENEM 100 MILLIGRAM(S): 1 INJECTION INTRAVENOUS at 06:03

## 2022-01-16 RX ADMIN — Medication 10 MILLIGRAM(S): at 00:23

## 2022-01-16 RX ADMIN — Medication 975 MILLIGRAM(S): at 06:03

## 2022-01-16 RX ADMIN — Medication 975 MILLIGRAM(S): at 12:44

## 2022-01-16 RX ADMIN — HYDROMORPHONE HYDROCHLORIDE 0.5 MILLIGRAM(S): 2 INJECTION INTRAMUSCULAR; INTRAVENOUS; SUBCUTANEOUS at 00:35

## 2022-01-16 RX ADMIN — PANTOPRAZOLE SODIUM 40 MILLIGRAM(S): 20 TABLET, DELAYED RELEASE ORAL at 06:03

## 2022-01-16 RX ADMIN — Medication 10 MILLIGRAM(S): at 06:03

## 2022-01-16 RX ADMIN — Medication 10 MILLIGRAM(S): at 17:09

## 2022-01-16 RX ADMIN — Medication 975 MILLIGRAM(S): at 06:36

## 2022-01-16 RX ADMIN — DAPTOMYCIN 120 MILLIGRAM(S): 500 INJECTION, POWDER, LYOPHILIZED, FOR SOLUTION INTRAVENOUS at 18:36

## 2022-01-16 RX ADMIN — HYDROMORPHONE HYDROCHLORIDE 0.5 MILLIGRAM(S): 2 INJECTION INTRAMUSCULAR; INTRAVENOUS; SUBCUTANEOUS at 22:00

## 2022-01-16 RX ADMIN — PANTOPRAZOLE SODIUM 40 MILLIGRAM(S): 20 TABLET, DELAYED RELEASE ORAL at 17:09

## 2022-01-16 RX ADMIN — ENOXAPARIN SODIUM 40 MILLIGRAM(S): 100 INJECTION SUBCUTANEOUS at 11:46

## 2022-01-16 RX ADMIN — HYDROMORPHONE HYDROCHLORIDE 4 MILLIGRAM(S): 2 INJECTION INTRAMUSCULAR; INTRAVENOUS; SUBCUTANEOUS at 23:57

## 2022-01-16 RX ADMIN — HYDROMORPHONE HYDROCHLORIDE 2 MILLIGRAM(S): 2 INJECTION INTRAMUSCULAR; INTRAVENOUS; SUBCUTANEOUS at 10:41

## 2022-01-16 RX ADMIN — Medication 10 MILLIGRAM(S): at 11:45

## 2022-01-16 RX ADMIN — HYDROMORPHONE HYDROCHLORIDE 0.5 MILLIGRAM(S): 2 INJECTION INTRAMUSCULAR; INTRAVENOUS; SUBCUTANEOUS at 00:23

## 2022-01-16 RX ADMIN — HYDROMORPHONE HYDROCHLORIDE 2 MILLIGRAM(S): 2 INJECTION INTRAMUSCULAR; INTRAVENOUS; SUBCUTANEOUS at 09:32

## 2022-01-16 RX ADMIN — GABAPENTIN 300 MILLIGRAM(S): 400 CAPSULE ORAL at 13:31

## 2022-01-16 NOTE — PROGRESS NOTE ADULT - SUBJECTIVE AND OBJECTIVE BOX
Patient seen and examined at bedside. In very good spirits, feels well today. Denies fever/chills, SOB/chest pain, abdominal pain, numbness/tingling. no complaints. extra-articular collection seen on CT drained by IR on 1/11    Vital Signs Last 24 Hrs  T(C): 37 (16 Jan 2022 05:00), Max: 37.2 (15 Rah 2022 17:58)  T(F): 98.6 (16 Jan 2022 05:00), Max: 99 (15 Rah 2022 17:58)  HR: 86 (16 Jan 2022 05:00) (82 - 86)  BP: 99/59 (16 Jan 2022 05:00) (99/59 - 112/71)  BP(mean): --  RR: 18 (16 Jan 2022 05:00) (18 - 18)  SpO2: 94% (16 Jan 2022 05:00) (94% - 97%)    RLE: No erythema. + AROM without pain. SILT. + dorsi/plantarflexion. ext warm cap refill brisk  LLE: Steri- strips C/D/I. No erythema, no TTP knee. SILT. ext warm, cap refill brisk. wound vacs noted, functioning. management per ACS team. no active dorsiflexion/EHL    A/P: 51 y.o F s/p multiple I&D knees  - WBAT LLE in cam boot, WBAT RLE  - DVTP  - wound vac management as per primary team  - IV abx as per ID  - cont care primary team

## 2022-01-16 NOTE — PROGRESS NOTE ADULT - SUBJECTIVE AND OBJECTIVE BOX
ACUTE CARE SURGERY PROGRESS NOTE    Subjective: Patient examined at bedside this AM. Reports she is feeling well. Pain has been well controlled on current regimen. She has been tolerating PO without N/V. No acute events overnight.    Objective:  Vital Signs  T(C): 37 (01-16 @ 05:00), Max: 37.2 (01-15 @ 17:58)  HR: 86 (01-16 @ 05:00) (82 - 86)  BP: 99/59 (01-16 @ 05:00) (99/59 - 112/71)  RR: 18 (01-16 @ 05:00) (18 - 18)  SpO2: 94% (01-16 @ 05:00) (94% - 97%)  01-15-22 @ 07:01  -  01-16-22 @ 07:00  --------------------------------------------------------  IN:  Total IN: 0 mL    OUT:    Voided (mL): 500 mL  Total OUT: 500 mL    Total NET: -500 mL          Physical Exam:  General: alert and oriented, NAD  Resp: airway patent, respirations unlabored  Abdomen: soft, nontender, nondistended  Extremities: LLE with wound vac in place with good suction, splinted. Minimal movement in leg without assistance. RLE moving freely.            Medications:   MEDICATIONS  (STANDING):  acetaminophen     Tablet .. 975 milliGRAM(s) Oral every 6 hours  baclofen 10 milliGRAM(s) Oral every 6 hours  DAPTOmycin IVPB 500 milliGRAM(s) IV Intermittent every 24 hours  enoxaparin Injectable 40 milliGRAM(s) SubCutaneous daily  gabapentin 300 milliGRAM(s) Oral every 8 hours  lidocaine 1% Injectable 20 milliLiter(s) Local Injection once  lisinopril 5 milliGRAM(s) Oral daily  meropenem  IVPB 1000 milliGRAM(s) IV Intermittent every 8 hours  pantoprazole    Tablet 40 milliGRAM(s) Oral two times a day    MEDICATIONS  (PRN):  HYDROmorphone   Tablet 2 milliGRAM(s) Oral every 4 hours PRN Moderate Pain (4 - 6)  HYDROmorphone   Tablet 4 milliGRAM(s) Oral every 4 hours PRN Severe Pain (7 - 10)  HYDROmorphone  Injectable 0.5 milliGRAM(s) IV Push every 4 hours PRN break thru pain  melatonin 5 milliGRAM(s) Oral at bedtime PRN Sleep  ondansetron Injectable 4 milliGRAM(s) IV Push every 8 hours PRN Nausea and/or Vomiting

## 2022-01-16 NOTE — PROGRESS NOTE ADULT - ASSESSMENT
A 51 year old female s/p multiple OR debridement of left lower extremity for soft tissue infection and abscesses of unclear origin. PPD 1 s/p IR guided drainage of distal femur collection. Currently, moderate protein caloric malnutrition.    Plan:   - Reg diet with ensure and enliven  - Planned for OR 1/18 for STSG, will remove vac at that time  - F/U cx: NGTD   - DVT ppx  - Dispo: PAWEL, will discuss with CM to start process  -Order folic acid and B12 in serum as support for chronic anemia; no necessary iron panel at this time due to load of iron due transfusions ( verbal recs from hematology--no formal consult).  -FU out pt fu: GYN--for leiomyomatosis  and GI--multiple gastric ulcers on ubaldo nix twice daily (Verbal Fu call to GI 1/13/22)  -ID: 1- tentatively plan for ANOTHER 2 WEEKS WITH DAPTO 500 mg IV daily and, MERREM  1 g iv q8h THROUGH 1/24/22.        2-Will need weekly CBC, CMP, ESR, CRP and CK. Next draw with pre-op labs on 1/18

## 2022-01-17 LAB
24R-OH-CALCIDIOL SERPL-MCNC: 22.3 NG/ML — LOW (ref 30–80)
ANION GAP SERPL CALC-SCNC: 13 MMOL/L — SIGNIFICANT CHANGE UP (ref 5–17)
ANISOCYTOSIS BLD QL: SLIGHT — SIGNIFICANT CHANGE UP
APTT BLD: 36.5 SEC — HIGH (ref 27.5–35.5)
BASOPHILS # BLD AUTO: 0 K/UL — SIGNIFICANT CHANGE UP (ref 0–0.2)
BASOPHILS NFR BLD AUTO: 0 % — SIGNIFICANT CHANGE UP (ref 0–2)
BLD GP AB SCN SERPL QL: SIGNIFICANT CHANGE UP
BUN SERPL-MCNC: 11.6 MG/DL — SIGNIFICANT CHANGE UP (ref 8–20)
CALCIUM SERPL-MCNC: 8.2 MG/DL — LOW (ref 8.6–10.2)
CHLORIDE SERPL-SCNC: 104 MMOL/L — SIGNIFICANT CHANGE UP (ref 98–107)
CO2 SERPL-SCNC: 23 MMOL/L — SIGNIFICANT CHANGE UP (ref 22–29)
CREAT SERPL-MCNC: 0.46 MG/DL — LOW (ref 0.5–1.3)
DACRYOCYTES BLD QL SMEAR: SLIGHT — SIGNIFICANT CHANGE UP
EOSINOPHIL # BLD AUTO: 0.06 K/UL — SIGNIFICANT CHANGE UP (ref 0–0.5)
EOSINOPHIL NFR BLD AUTO: 0.9 % — SIGNIFICANT CHANGE UP (ref 0–6)
FLUAV AG NPH QL: SIGNIFICANT CHANGE UP
FLUBV AG NPH QL: SIGNIFICANT CHANGE UP
GIANT PLATELETS BLD QL SMEAR: PRESENT — SIGNIFICANT CHANGE UP
GLUCOSE SERPL-MCNC: 86 MG/DL — SIGNIFICANT CHANGE UP (ref 70–99)
HCT VFR BLD CALC: 25.2 % — LOW (ref 34.5–45)
HGB BLD-MCNC: 7.8 G/DL — LOW (ref 11.5–15.5)
HYPOCHROMIA BLD QL: SLIGHT — SIGNIFICANT CHANGE UP
INR BLD: 1.23 RATIO — HIGH (ref 0.88–1.16)
LYMPHOCYTES # BLD AUTO: 3.26 K/UL — SIGNIFICANT CHANGE UP (ref 1–3.3)
LYMPHOCYTES # BLD AUTO: 50 % — HIGH (ref 13–44)
MAGNESIUM SERPL-MCNC: 1.6 MG/DL — LOW (ref 1.8–2.6)
MANUAL SMEAR VERIFICATION: SIGNIFICANT CHANGE UP
MCHC RBC-ENTMCNC: 27.2 PG — SIGNIFICANT CHANGE UP (ref 27–34)
MCHC RBC-ENTMCNC: 31 GM/DL — LOW (ref 32–36)
MCV RBC AUTO: 87.8 FL — SIGNIFICANT CHANGE UP (ref 80–100)
MICROCYTES BLD QL: SLIGHT — SIGNIFICANT CHANGE UP
MONOCYTES # BLD AUTO: 0.35 K/UL — SIGNIFICANT CHANGE UP (ref 0–0.9)
MONOCYTES NFR BLD AUTO: 5.3 % — SIGNIFICANT CHANGE UP (ref 2–14)
NEUTROPHILS # BLD AUTO: 2.86 K/UL — SIGNIFICANT CHANGE UP (ref 1.8–7.4)
NEUTROPHILS NFR BLD AUTO: 43.8 % — SIGNIFICANT CHANGE UP (ref 43–77)
OVALOCYTES BLD QL SMEAR: SLIGHT — SIGNIFICANT CHANGE UP
PHOSPHATE SERPL-MCNC: 4.2 MG/DL — SIGNIFICANT CHANGE UP (ref 2.4–4.7)
PLAT MORPH BLD: NORMAL — SIGNIFICANT CHANGE UP
PLATELET # BLD AUTO: 425 K/UL — HIGH (ref 150–400)
POIKILOCYTOSIS BLD QL AUTO: SLIGHT — SIGNIFICANT CHANGE UP
POLYCHROMASIA BLD QL SMEAR: SLIGHT — SIGNIFICANT CHANGE UP
POTASSIUM SERPL-MCNC: 3.7 MMOL/L — SIGNIFICANT CHANGE UP (ref 3.5–5.3)
POTASSIUM SERPL-SCNC: 3.7 MMOL/L — SIGNIFICANT CHANGE UP (ref 3.5–5.3)
PROTHROM AB SERPL-ACNC: 14.1 SEC — HIGH (ref 10.6–13.6)
RBC # BLD: 2.87 M/UL — LOW (ref 3.8–5.2)
RBC # FLD: 15.9 % — HIGH (ref 10.3–14.5)
RBC BLD AUTO: ABNORMAL
RSV RNA NPH QL NAA+NON-PROBE: SIGNIFICANT CHANGE UP
SARS-COV-2 RNA SPEC QL NAA+PROBE: DETECTED
SARS-COV-2 RNA SPEC QL NAA+PROBE: DETECTED
SODIUM SERPL-SCNC: 140 MMOL/L — SIGNIFICANT CHANGE UP (ref 135–145)
VIT D25+D1,25 OH+D1,25 PNL SERPL-MCNC: 13.1 PG/ML — LOW (ref 19.9–79.3)
WBC # BLD: 6.52 K/UL — SIGNIFICANT CHANGE UP (ref 3.8–10.5)
WBC # FLD AUTO: 6.52 K/UL — SIGNIFICANT CHANGE UP (ref 3.8–10.5)

## 2022-01-17 PROCEDURE — 99024 POSTOP FOLLOW-UP VISIT: CPT

## 2022-01-17 PROCEDURE — 99232 SBSQ HOSP IP/OBS MODERATE 35: CPT

## 2022-01-17 RX ORDER — POTASSIUM CHLORIDE 20 MEQ
40 PACKET (EA) ORAL ONCE
Refills: 0 | Status: COMPLETED | OUTPATIENT
Start: 2022-01-17 | End: 2022-01-17

## 2022-01-17 RX ORDER — MAGNESIUM SULFATE 500 MG/ML
2 VIAL (ML) INJECTION ONCE
Refills: 0 | Status: COMPLETED | OUTPATIENT
Start: 2022-01-17 | End: 2022-01-17

## 2022-01-17 RX ORDER — SODIUM CHLORIDE 9 MG/ML
1000 INJECTION INTRAMUSCULAR; INTRAVENOUS; SUBCUTANEOUS
Refills: 0 | Status: DISCONTINUED | OUTPATIENT
Start: 2022-01-18 | End: 2022-01-18

## 2022-01-17 RX ADMIN — HYDROMORPHONE HYDROCHLORIDE 4 MILLIGRAM(S): 2 INJECTION INTRAMUSCULAR; INTRAVENOUS; SUBCUTANEOUS at 12:37

## 2022-01-17 RX ADMIN — HYDROMORPHONE HYDROCHLORIDE 4 MILLIGRAM(S): 2 INJECTION INTRAMUSCULAR; INTRAVENOUS; SUBCUTANEOUS at 00:30

## 2022-01-17 RX ADMIN — Medication 400 INTERNATIONAL UNIT(S): at 12:37

## 2022-01-17 RX ADMIN — Medication 10 MILLIGRAM(S): at 18:17

## 2022-01-17 RX ADMIN — Medication 10 MILLIGRAM(S): at 23:02

## 2022-01-17 RX ADMIN — PANTOPRAZOLE SODIUM 40 MILLIGRAM(S): 20 TABLET, DELAYED RELEASE ORAL at 05:27

## 2022-01-17 RX ADMIN — MEROPENEM 100 MILLIGRAM(S): 1 INJECTION INTRAVENOUS at 23:02

## 2022-01-17 RX ADMIN — PANTOPRAZOLE SODIUM 40 MILLIGRAM(S): 20 TABLET, DELAYED RELEASE ORAL at 18:17

## 2022-01-17 RX ADMIN — ENOXAPARIN SODIUM 40 MILLIGRAM(S): 100 INJECTION SUBCUTANEOUS at 11:53

## 2022-01-17 RX ADMIN — MEROPENEM 100 MILLIGRAM(S): 1 INJECTION INTRAVENOUS at 05:26

## 2022-01-17 RX ADMIN — Medication 10 MILLIGRAM(S): at 05:27

## 2022-01-17 RX ADMIN — ZINC SULFATE TAB 220 MG (50 MG ZINC EQUIVALENT) 220 MILLIGRAM(S): 220 (50 ZN) TAB at 11:53

## 2022-01-17 RX ADMIN — Medication 975 MILLIGRAM(S): at 18:17

## 2022-01-17 RX ADMIN — Medication 975 MILLIGRAM(S): at 05:56

## 2022-01-17 RX ADMIN — HYDROMORPHONE HYDROCHLORIDE 4 MILLIGRAM(S): 2 INJECTION INTRAMUSCULAR; INTRAVENOUS; SUBCUTANEOUS at 06:30

## 2022-01-17 RX ADMIN — Medication 25 GRAM(S): at 13:07

## 2022-01-17 RX ADMIN — Medication 975 MILLIGRAM(S): at 01:30

## 2022-01-17 RX ADMIN — HYDROMORPHONE HYDROCHLORIDE 4 MILLIGRAM(S): 2 INJECTION INTRAMUSCULAR; INTRAVENOUS; SUBCUTANEOUS at 10:47

## 2022-01-17 RX ADMIN — Medication 975 MILLIGRAM(S): at 23:02

## 2022-01-17 RX ADMIN — Medication 975 MILLIGRAM(S): at 12:37

## 2022-01-17 RX ADMIN — Medication 10 MILLIGRAM(S): at 11:53

## 2022-01-17 RX ADMIN — GABAPENTIN 300 MILLIGRAM(S): 400 CAPSULE ORAL at 13:07

## 2022-01-17 RX ADMIN — Medication 975 MILLIGRAM(S): at 11:53

## 2022-01-17 RX ADMIN — Medication 40 MILLIEQUIVALENT(S): at 13:07

## 2022-01-17 RX ADMIN — DAPTOMYCIN 120 MILLIGRAM(S): 500 INJECTION, POWDER, LYOPHILIZED, FOR SOLUTION INTRAVENOUS at 20:30

## 2022-01-17 RX ADMIN — HYDROMORPHONE HYDROCHLORIDE 4 MILLIGRAM(S): 2 INJECTION INTRAMUSCULAR; INTRAVENOUS; SUBCUTANEOUS at 18:17

## 2022-01-17 RX ADMIN — HYDROMORPHONE HYDROCHLORIDE 0.5 MILLIGRAM(S): 2 INJECTION INTRAMUSCULAR; INTRAVENOUS; SUBCUTANEOUS at 09:08

## 2022-01-17 RX ADMIN — Medication 1000 MILLIGRAM(S): at 18:18

## 2022-01-17 RX ADMIN — Medication 1000 MILLIGRAM(S): at 05:27

## 2022-01-17 RX ADMIN — HYDROMORPHONE HYDROCHLORIDE 4 MILLIGRAM(S): 2 INJECTION INTRAMUSCULAR; INTRAVENOUS; SUBCUTANEOUS at 06:01

## 2022-01-17 RX ADMIN — HYDROMORPHONE HYDROCHLORIDE 0.5 MILLIGRAM(S): 2 INJECTION INTRAMUSCULAR; INTRAVENOUS; SUBCUTANEOUS at 09:45

## 2022-01-17 RX ADMIN — GABAPENTIN 300 MILLIGRAM(S): 400 CAPSULE ORAL at 23:02

## 2022-01-17 RX ADMIN — MEROPENEM 100 MILLIGRAM(S): 1 INJECTION INTRAVENOUS at 13:08

## 2022-01-17 RX ADMIN — GABAPENTIN 300 MILLIGRAM(S): 400 CAPSULE ORAL at 05:26

## 2022-01-17 RX ADMIN — Medication 975 MILLIGRAM(S): at 05:27

## 2022-01-17 NOTE — PROGRESS NOTE ADULT - SUBJECTIVE AND OBJECTIVE BOX
HPI/OVERNIGHT EVENTS:  NAEON. Patient pain is well controlled. Tolerating diet. She denies any fever/chills, N&V. Wound vac with good seal. HDS.     MEDICATIONS  (STANDING):  acetaminophen     Tablet .. 975 milliGRAM(s) Oral every 6 hours  ascorbic acid 1000 milliGRAM(s) Oral two times a day  baclofen 10 milliGRAM(s) Oral every 6 hours  DAPTOmycin IVPB 500 milliGRAM(s) IV Intermittent every 24 hours  enoxaparin Injectable 40 milliGRAM(s) SubCutaneous daily  gabapentin 300 milliGRAM(s) Oral every 8 hours  lidocaine 1% Injectable 20 milliLiter(s) Local Injection once  lisinopril 5 milliGRAM(s) Oral daily  meropenem  IVPB 1000 milliGRAM(s) IV Intermittent every 8 hours  pantoprazole    Tablet 40 milliGRAM(s) Oral two times a day  vitamin E 400 International Unit(s) Oral daily  zinc sulfate 220 milliGRAM(s) Oral daily    MEDICATIONS  (PRN):  HYDROmorphone   Tablet 2 milliGRAM(s) Oral every 4 hours PRN Moderate Pain (4 - 6)  HYDROmorphone   Tablet 4 milliGRAM(s) Oral every 4 hours PRN Severe Pain (7 - 10)  HYDROmorphone  Injectable 0.5 milliGRAM(s) IV Push every 4 hours PRN break thru pain  melatonin 5 milliGRAM(s) Oral at bedtime PRN Sleep  ondansetron Injectable 4 milliGRAM(s) IV Push every 8 hours PRN Nausea and/or Vomiting      Vital Signs Last 24 Hrs  T(C): 36.9 (16 Jan 2022 17:23), Max: 37 (16 Jan 2022 05:00)  T(F): 98.5 (16 Jan 2022 17:23), Max: 98.6 (16 Jan 2022 05:00)  HR: 80 (16 Jan 2022 17:23) (78 - 86)  BP: 107/72 (16 Jan 2022 17:23) (99/59 - 108/49)  BP(mean): --  RR: 18 (16 Jan 2022 17:23) (18 - 18)  SpO2: 95% (16 Jan 2022 17:23) (94% - 97%)    Constitutional: patient resting comfortably in bed, in no acute distress  HEENT: EOMI / PERRL b/l, no active drainage or redness  Respiratory: respirations are unlabored, no accessory muscle use, no conversational dyspnea, RA   Cardiovascular: regular rate & rhythm  Gastrointestinal: Abdomen soft, non-tender, non-distended, no rebound tenderness / guarding  Neurological: A&O x 3; no gross sensory / motor / coordination deficits  Musculoskeletal: No joint pain, swelling or deformity; no limitation of movement. wound vac on LLE to suction, good seal       I&O's Detail    15 Rah 2022 07:01  -  16 Jan 2022 07:00  --------------------------------------------------------  IN:  Total IN: 0 mL    OUT:    Voided (mL): 500 mL  Total OUT: 500 mL    Total NET: -500 mL          LABS:

## 2022-01-17 NOTE — PROGRESS NOTE ADULT - SUBJECTIVE AND OBJECTIVE BOX
[facility]  INFECTIOUS DISEASES AND INTERNAL MEDICINE at [place]  =======================================================  Theo Morrow MD  Diplomates American Board of Internal Medicine and Infectious Diseases  Tel: [phone]      Fax: [phone]  =======================================================    LUIS FERNANDO DAVIS [id number]    Follow up: necrotizing soft tissue infection  afebrile  feels well  FOR OR TOMMOROW       Allergies:  No Known Allergies           REVIEW OF SYSTEMS:  CONSTITUTIONAL:  No Fever or chills  HEENT:   No diplopia or blurred vision.  No earache, sore throat or runny nose.  CARDIOVASCULAR:  No pressure, squeezing, strangling, tightness, heaviness or aching about the chest, neck, axilla or epigastrium.  RESPIRATORY:  No cough, shortness of breath  GASTROINTESTINAL:  No nausea, vomiting or diarrhea.  GENITOURINARY:  No dysuria, frequency or urgency. No Blood in urine  MUSCULOSKELETAL: as PER HPI   SKIN:  AS PER HPI  NEUROLOGIC:  No Headaches, seizures or weakness.  PSYCHIATRIC:  No disorder of thought or mood.  ENDOCRINE:  No heat or cold intolerance  HEMATOLOGICAL:  No easy bruising or bleeding.       Physical Exam:              Vital Signs Last 24 Hrs  T(C): 37.2 (17 Jan 2022 08:58), Max: 37.2 (17 Jan 2022 05:00)  T(F): 99 (17 Jan 2022 08:58), Max: 99 (17 Jan 2022 08:58)  HR: 82 (17 Jan 2022 08:58) (78 - 88)  BP: 126/77 (17 Jan 2022 08:58) (107/70 - 126/77)  BP(mean): --  RR: 18 (17 Jan 2022 05:00) (18 - 18)  SpO2: 94% (17 Jan 2022 08:58) (92% - 97%)          GEN: NAD, pleasant  HEENT: normocephalic and atraumatic. EOMI. PERRL.  Anicteric   NECK: Supple.   LUNGS: Clear to auscultation.  HEART: Regular rate and rhythm without murmur.  ABDOMEN: Soft, nontender, and nondistended.  Positive bowel sounds.    : No CVA tenderness  EXTREMITIES: Without any edema. LEFT LEG WRAPPED; left thigh vac in place, upper thigh dressing in place  right knee with dressing in place   MSK: no joint swelling  NEUROLOGIC: Cranial nerves II through XII are grossly intact. No focal deficits  PSYCHIATRIC: Appropriate affect .  SKIN: No Rash                                   Current Antibiotics:  DAPTOmycin IVPB 500 milliGRAM(s) IV Intermittent every 24 hours  meropenem  IVPB 1000 milliGRAM(s) IV Intermittent every 8 hours    Other medications:  baclofen 10 milliGRAM(s) Oral three times a day  enoxaparin Injectable 40 milliGRAM(s) SubCutaneous daily  gabapentin 300 milliGRAM(s) Oral three times a day  HYDROmorphone  Injectable 0.5 milliGRAM(s) IV Push every 4 hours  lidocaine 1% Injectable 20 milliLiter(s) Local Injection once  lisinopril 5 milliGRAM(s) Oral daily  pantoprazole    Tablet 40 milliGRAM(s) Oral two times a day                                                  7.8    6.52  )-----------( 425      ( 17 Jan 2022 08:36 )             25.2   01-17    140  |  104  |  11.6  ----------------------------<  86  3.7   |  23.0  |  0.46<L>    Ca    8.2<L>      17 Jan 2022 08:36  Phos  4.2     01-17  Mg     1.6     01-17               COVID-19 PCR: NotDetec (01-11-22 @ 12:29)  COVID-19 PCR: NotDetec (01-11-22 @ 05:17)  COVID-19 PCR: NotDetec (01-05-22 @ 08:09)  COVID-19 PCR: NotDetec (12-30-21 @ 22:14)  COVID-19 PCR: NotDetec (12-26-21 @ 11:56)  COVID-19 PCR: NotDetec (12-22-21 @ 05:30)  COVID-19 PCR: NotDetec (12-20-21 @ 08:49)  COVID-19 PCR: NotDetec (12-16-21 @ 15:18)

## 2022-01-17 NOTE — PROGRESS NOTE ADULT - ASSESSMENT
51y Female present to ED with left leg swelling, erythema, pain. Patient endorses she had left knee pain for 1 week presented 3 days ago to ED  where she states was given an steroid shot, and ibuprofen. however pain and edema worsened. Patient states she wasnt feeling herself today reason why she came. Endorses chills initially with pain 3 days ago but none since, denies history of trauma, insect bites, recent interventions, or injections to the area, contact with ticks, history of diabetes.  PT AS ABOVE WITH LEFT LEG SWELLING PT WITH INCREASED WBC PLACED ON ABX FOR PRESUMED INFECTION  PT WITH CT SCAN WITH FASCIAL EDAM PT BROUGHT TO THE OR EMERGENTLY AND  UNDERWENT FASCIOTOMY   PT BROUGHT TO THE OR  BOTH TRAUMA AND ORTHO INVOLVED  PURULENT FLUID FOUND  NECROTIZING SOFT TISSUE INFECTION    BLOOD CX WITH GROUP A STREP AND  OPERATIVE FLUID WITH STREP   PT WAS  ON PCN AND CLINDA for POSSIBLE ANTITOXIN EFFECT IN GROUP A STREP INFECTION     PT  S/P OR WASHOUT 12.2  AND  OR AGAIN 12/7  and late on 12/10 121/15 12/17 and 12/20 12/23     REPEAT   OPERATIVE CX NEG             AS ABOVE  REPEAT  OR  12/20 12/23    S/ RETURN TO THE OR 12/28 AND 12/31    RECENT VAC DRESSING CHANGE NO PUS   NO PURULENCE   OVERALL NON TOXIC  AFEBRILE           ON MERREM/DAPTOMYCIN  repeat Ct 1/10 as above with increase in soft tissue abscesses and likely OM. s/p Ir drainage on 1/11, with dark old blood aspirated.  IR cx ngtd  f/u BCX ngtd  tentatively plan for ANOTHER 2 WEEKS WITH DAPTO 500 mg IV daily and .MERREM  1 g iv q8h THROUGH 1/24/22    . Will need weekly CBc CMP ESR CRp and CK. Last CK 1/11 is 22  plan for skin graft   TOMMOROW  1/18      WILL FOLLOW UP

## 2022-01-17 NOTE — PROGRESS NOTE ADULT - ATTENDING COMMENTS
I have seen and examined the patient during rounds for 730-11a  COVID +, asymptomatic form resp stand point , tested for OR surveillance.  STSG tomorrow.  DVT chemrophayxlsy   ABX per ID

## 2022-01-17 NOTE — PROGRESS NOTE ADULT - ASSESSMENT
· Assessment	  A 51 year old female s/p multiple OR debridement of left lower extremity for soft tissue infection and abscesses of unclear origin. PPD 1 s/p IR guided drainage of distal femur collection. Currently, moderate protein caloric malnutrition.    Plan:   - Reg diet with ensure and enliven  - Planned for OR 1/18 for STSG, will remove vac at that time  - Pre-op 1/17   - F/U cx: NGTD   - DVT ppx  - Dispo: PAWEL, will discuss with CM to start process  -Order folic acid and B12 in serum as support for chronic anemia; no necessary iron panel at this time due to load of iron due transfusions ( verbal recs from hematology--no formal consult).  -FU out pt fu: GYN--for leiomyomatosis  and GI--multiple gastric ulcers on ubaldo nix twice daily (Verbal Fu call to GI 1/13/22)  -ID: 1- tentatively plan for ANOTHER 2 WEEKS WITH DAPTO 500 mg IV daily and, MERREM  1 g iv q8h THROUGH 1/24/22.        2-Will need weekly CBC, CMP, ESR, CRP and CK. Next draw with pre-op labs on 1/18 , ordered

## 2022-01-17 NOTE — PROGRESS NOTE ADULT - SUBJECTIVE AND OBJECTIVE BOX
Pt Name: LUIS FERNANDO DAIVS  MRN: [id number]      Patient is a 51y Female  seen and examined at bedside. Patient is doing well, notes a lot of improvement. Pending OR with ACS for skin graft tomorrow. Pain is controlled with the prescribed medication. Denies CP, SOB, N/V, numbness/tingling. No other orthopedic complaints.        Vital Signs Last 24 Hrs  T(C): 37.2 (17 Jan 2022 08:58), Max: 37.2 (17 Jan 2022 05:00)  T(F): 99 (17 Jan 2022 08:58), Max: 99 (17 Jan 2022 08:58)  HR: 82 (17 Jan 2022 08:58) (78 - 88)  BP: 126/77 (17 Jan 2022 08:58) (107/70 - 126/77)  BP(mean): --  RR: 18 (17 Jan 2022 05:00) (18 - 18)  SpO2: 94% (17 Jan 2022 08:58) (92% - 97%)  Daily     Daily                           7.8    6.52  )-----------( 425      ( 17 Jan 2022 08:36 )             25.2             PHYSICAL EXAM:    Appearance: Alert, responsive, in no acute distress.    Musculoskeletal:    RLE: No erythema. + AROM to 80 degrees without pain. SILT. + dorsi/plantarflexion. ext warm cap refill brisk  LLE: Steri- strips C/D/I. No erythema, mild TTP over lateral knee. SILT. ext warm, cap refill brisk. wound vacs noted, functioning. management per ACS team. no active dorsiflexion/EHL +plantarflexion    A/P: 51 y.o F s/p multiple I&D knees    - WBAT LLE in cam boot, WBAT RLE  - DVTP  - wound vac management as per primary team  - IV abx as per ID  - cont care primary team

## 2022-01-18 LAB
ANION GAP SERPL CALC-SCNC: 12 MMOL/L — SIGNIFICANT CHANGE UP (ref 5–17)
BASOPHILS # BLD AUTO: 0.01 K/UL — SIGNIFICANT CHANGE UP (ref 0–0.2)
BASOPHILS NFR BLD AUTO: 0.1 % — SIGNIFICANT CHANGE UP (ref 0–2)
BUN SERPL-MCNC: 7.5 MG/DL — LOW (ref 8–20)
CALCIUM SERPL-MCNC: 8.4 MG/DL — LOW (ref 8.6–10.2)
CHLORIDE SERPL-SCNC: 105 MMOL/L — SIGNIFICANT CHANGE UP (ref 98–107)
CO2 SERPL-SCNC: 23 MMOL/L — SIGNIFICANT CHANGE UP (ref 22–29)
CREAT SERPL-MCNC: 0.35 MG/DL — LOW (ref 0.5–1.3)
CULTURE RESULTS: SIGNIFICANT CHANGE UP
EOSINOPHIL # BLD AUTO: 0.25 K/UL — SIGNIFICANT CHANGE UP (ref 0–0.5)
EOSINOPHIL NFR BLD AUTO: 3.6 % — SIGNIFICANT CHANGE UP (ref 0–6)
GLUCOSE SERPL-MCNC: 86 MG/DL — SIGNIFICANT CHANGE UP (ref 70–99)
HCT VFR BLD CALC: 25.9 % — LOW (ref 34.5–45)
HGB BLD-MCNC: 8.1 G/DL — LOW (ref 11.5–15.5)
IMM GRANULOCYTES NFR BLD AUTO: 0.4 % — SIGNIFICANT CHANGE UP (ref 0–1.5)
LYMPHOCYTES # BLD AUTO: 3.1 K/UL — SIGNIFICANT CHANGE UP (ref 1–3.3)
LYMPHOCYTES # BLD AUTO: 45.1 % — HIGH (ref 13–44)
MAGNESIUM SERPL-MCNC: 1.7 MG/DL — SIGNIFICANT CHANGE UP (ref 1.6–2.6)
MCHC RBC-ENTMCNC: 27.4 PG — SIGNIFICANT CHANGE UP (ref 27–34)
MCHC RBC-ENTMCNC: 31.3 GM/DL — LOW (ref 32–36)
MCV RBC AUTO: 87.5 FL — SIGNIFICANT CHANGE UP (ref 80–100)
MONOCYTES # BLD AUTO: 0.61 K/UL — SIGNIFICANT CHANGE UP (ref 0–0.9)
MONOCYTES NFR BLD AUTO: 8.9 % — SIGNIFICANT CHANGE UP (ref 2–14)
NEUTROPHILS # BLD AUTO: 2.87 K/UL — SIGNIFICANT CHANGE UP (ref 1.8–7.4)
NEUTROPHILS NFR BLD AUTO: 41.9 % — LOW (ref 43–77)
PHOSPHATE SERPL-MCNC: 4 MG/DL — SIGNIFICANT CHANGE UP (ref 2.4–4.7)
PLATELET # BLD AUTO: 420 K/UL — HIGH (ref 150–400)
POTASSIUM SERPL-MCNC: 3.8 MMOL/L — SIGNIFICANT CHANGE UP (ref 3.5–5.3)
POTASSIUM SERPL-SCNC: 3.8 MMOL/L — SIGNIFICANT CHANGE UP (ref 3.5–5.3)
RBC # BLD: 2.96 M/UL — LOW (ref 3.8–5.2)
RBC # FLD: 16 % — HIGH (ref 10.3–14.5)
SODIUM SERPL-SCNC: 139 MMOL/L — SIGNIFICANT CHANGE UP (ref 135–145)
SPECIMEN SOURCE: SIGNIFICANT CHANGE UP
WBC # BLD: 6.87 K/UL — SIGNIFICANT CHANGE UP (ref 3.8–10.5)
WBC # FLD AUTO: 6.87 K/UL — SIGNIFICANT CHANGE UP (ref 3.8–10.5)

## 2022-01-18 PROCEDURE — 15275 SKIN SUB GRAFT FACE/NK/HF/G: CPT | Mod: 58

## 2022-01-18 PROCEDURE — 99232 SBSQ HOSP IP/OBS MODERATE 35: CPT

## 2022-01-18 RX ORDER — SODIUM CHLORIDE 9 MG/ML
1000 INJECTION, SOLUTION INTRAVENOUS
Refills: 0 | Status: DISCONTINUED | OUTPATIENT
Start: 2022-01-18 | End: 2022-01-18

## 2022-01-18 RX ORDER — FENTANYL CITRATE 50 UG/ML
25 INJECTION INTRAVENOUS
Refills: 0 | Status: DISCONTINUED | OUTPATIENT
Start: 2022-01-18 | End: 2022-01-18

## 2022-01-18 RX ORDER — ONDANSETRON 8 MG/1
4 TABLET, FILM COATED ORAL ONCE
Refills: 0 | Status: DISCONTINUED | OUTPATIENT
Start: 2022-01-18 | End: 2022-01-18

## 2022-01-18 RX ORDER — HYDROMORPHONE HYDROCHLORIDE 2 MG/ML
1 INJECTION INTRAMUSCULAR; INTRAVENOUS; SUBCUTANEOUS
Refills: 0 | Status: DISCONTINUED | OUTPATIENT
Start: 2022-01-18 | End: 2022-01-18

## 2022-01-18 RX ADMIN — Medication 975 MILLIGRAM(S): at 23:03

## 2022-01-18 RX ADMIN — ENOXAPARIN SODIUM 40 MILLIGRAM(S): 100 INJECTION SUBCUTANEOUS at 15:03

## 2022-01-18 RX ADMIN — HYDROMORPHONE HYDROCHLORIDE 0.5 MILLIGRAM(S): 2 INJECTION INTRAMUSCULAR; INTRAVENOUS; SUBCUTANEOUS at 15:47

## 2022-01-18 RX ADMIN — Medication 10 MILLIGRAM(S): at 18:19

## 2022-01-18 RX ADMIN — MEROPENEM 100 MILLIGRAM(S): 1 INJECTION INTRAVENOUS at 05:54

## 2022-01-18 RX ADMIN — FENTANYL CITRATE 25 MICROGRAM(S): 50 INJECTION INTRAVENOUS at 13:00

## 2022-01-18 RX ADMIN — HYDROMORPHONE HYDROCHLORIDE 0.5 MILLIGRAM(S): 2 INJECTION INTRAMUSCULAR; INTRAVENOUS; SUBCUTANEOUS at 15:17

## 2022-01-18 RX ADMIN — Medication 975 MILLIGRAM(S): at 00:09

## 2022-01-18 RX ADMIN — Medication 10 MILLIGRAM(S): at 05:55

## 2022-01-18 RX ADMIN — Medication 975 MILLIGRAM(S): at 12:49

## 2022-01-18 RX ADMIN — GABAPENTIN 300 MILLIGRAM(S): 400 CAPSULE ORAL at 12:49

## 2022-01-18 RX ADMIN — SODIUM CHLORIDE 100 MILLILITER(S): 9 INJECTION INTRAMUSCULAR; INTRAVENOUS; SUBCUTANEOUS at 00:02

## 2022-01-18 RX ADMIN — MEROPENEM 100 MILLIGRAM(S): 1 INJECTION INTRAVENOUS at 13:10

## 2022-01-18 RX ADMIN — ZINC SULFATE TAB 220 MG (50 MG ZINC EQUIVALENT) 220 MILLIGRAM(S): 220 (50 ZN) TAB at 15:04

## 2022-01-18 RX ADMIN — Medication 975 MILLIGRAM(S): at 18:18

## 2022-01-18 RX ADMIN — PANTOPRAZOLE SODIUM 40 MILLIGRAM(S): 20 TABLET, DELAYED RELEASE ORAL at 18:20

## 2022-01-18 RX ADMIN — Medication 1000 MILLIGRAM(S): at 18:21

## 2022-01-18 RX ADMIN — Medication 1000 MILLIGRAM(S): at 05:55

## 2022-01-18 RX ADMIN — FENTANYL CITRATE 25 MICROGRAM(S): 50 INJECTION INTRAVENOUS at 13:15

## 2022-01-18 RX ADMIN — FENTANYL CITRATE 25 MICROGRAM(S): 50 INJECTION INTRAVENOUS at 12:40

## 2022-01-18 RX ADMIN — Medication 975 MILLIGRAM(S): at 06:20

## 2022-01-18 RX ADMIN — HYDROMORPHONE HYDROCHLORIDE 2 MILLIGRAM(S): 2 INJECTION INTRAMUSCULAR; INTRAVENOUS; SUBCUTANEOUS at 13:55

## 2022-01-18 RX ADMIN — HYDROMORPHONE HYDROCHLORIDE 1 MILLIGRAM(S): 2 INJECTION INTRAMUSCULAR; INTRAVENOUS; SUBCUTANEOUS at 12:30

## 2022-01-18 RX ADMIN — MEROPENEM 100 MILLIGRAM(S): 1 INJECTION INTRAVENOUS at 23:03

## 2022-01-18 RX ADMIN — HYDROMORPHONE HYDROCHLORIDE 1 MILLIGRAM(S): 2 INJECTION INTRAMUSCULAR; INTRAVENOUS; SUBCUTANEOUS at 12:40

## 2022-01-18 RX ADMIN — Medication 400 INTERNATIONAL UNIT(S): at 15:08

## 2022-01-18 RX ADMIN — GABAPENTIN 300 MILLIGRAM(S): 400 CAPSULE ORAL at 05:55

## 2022-01-18 RX ADMIN — DAPTOMYCIN 120 MILLIGRAM(S): 500 INJECTION, POWDER, LYOPHILIZED, FOR SOLUTION INTRAVENOUS at 18:21

## 2022-01-18 RX ADMIN — Medication 10 MILLIGRAM(S): at 23:03

## 2022-01-18 RX ADMIN — Medication 975 MILLIGRAM(S): at 05:55

## 2022-01-18 RX ADMIN — Medication 10 MILLIGRAM(S): at 13:10

## 2022-01-18 RX ADMIN — PANTOPRAZOLE SODIUM 40 MILLIGRAM(S): 20 TABLET, DELAYED RELEASE ORAL at 05:56

## 2022-01-18 RX ADMIN — GABAPENTIN 300 MILLIGRAM(S): 400 CAPSULE ORAL at 23:03

## 2022-01-18 NOTE — BRIEF OPERATIVE NOTE - DISPOSITION
SICU
pacu
Floor after PACU
PACU
PACU/Floor
sicu
PACU
PACU -> Floor
PACU to floors
PACU-Floor
PACU, floor
pacu
Back to floor

## 2022-01-18 NOTE — PROGRESS NOTE ADULT - SUBJECTIVE AND OBJECTIVE BOX
[facility]  INFECTIOUS DISEASES AND INTERNAL MEDICINE at [place]  =======================================================  Theo Morrow MD  Diplomates American Board of Internal Medicine and Infectious Diseases  Tel: [phone]      Fax: [phone]  =======================================================    LUIS FERNANDO DAVIS [id number]    Follow up: necrotizing soft tissue infection  afebrile  feels well   S/P  OR  SEEN IN PACU    Allergies:  No Known Allergies           REVIEW OF SYSTEMS:  CONSTITUTIONAL:  No Fever or chills  HEENT:   No diplopia or blurred vision.  No earache, sore throat or runny nose.  CARDIOVASCULAR:  No pressure, squeezing, strangling, tightness, heaviness or aching about the chest, neck, axilla or epigastrium.  RESPIRATORY:  No cough, shortness of breath  GASTROINTESTINAL:  No nausea, vomiting or diarrhea.  GENITOURINARY:  No dysuria, frequency or urgency. No Blood in urine  MUSCULOSKELETAL: as PER HPI   SKIN:  AS PER HPI  NEUROLOGIC:  No Headaches, seizures or weakness.  PSYCHIATRIC:  No disorder of thought or mood.  ENDOCRINE:  No heat or cold intolerance  HEMATOLOGICAL:  No easy bruising or bleeding.       Physical Exam:              Vital Signs Last 24 Hrs  T(C): 36.6 (18 Jan 2022 13:30), Max: 37.3 (17 Jan 2022 20:02)  T(F): 97.8 (18 Jan 2022 13:30), Max: 99.1 (17 Jan 2022 20:02)  HR: 74 (18 Jan 2022 13:30) (74 - 95)  BP: 137/63 (18 Jan 2022 13:30) (115/75 - 152/88)  BP(mean): --  RR: 14 (18 Jan 2022 13:30) (13 - 18)  SpO2: 95% (18 Jan 2022 13:30) (95% - 100%)          GEN: NAD, pleasant  HEENT: normocephalic and atraumatic. EOMI. PERRL.  Anicteric   NECK: Supple.   LUNGS: Clear to auscultation.  HEART: Regular rate and rhythm without murmur.  ABDOMEN: Soft, nontender, and nondistended.  Positive bowel sounds.    : No CVA tenderness  EXTREMITIES: Without any edema. LEFT LEG WRAPPED; left thigh vac in place, upper thigh dressing in place  right knee with dressing in place   MSK: no joint swelling  NEUROLOGIC: Cranial nerves II through XII are grossly intact. No focal deficits  PSYCHIATRIC: Appropriate affect .  SKIN: No Rash                                   Current Antibiotics:  DAPTOmycin IVPB 500 milliGRAM(s) IV Intermittent every 24 hours  meropenem  IVPB 1000 milliGRAM(s) IV Intermittent every 8 hours    Other medications:  baclofen 10 milliGRAM(s) Oral three times a day  enoxaparin Injectable 40 milliGRAM(s) SubCutaneous daily  gabapentin 300 milliGRAM(s) Oral three times a day  HYDROmorphone  Injectable 0.5 milliGRAM(s) IV Push every 4 hours  lidocaine 1% Injectable 20 milliLiter(s) Local Injection once  lisinopril 5 milliGRAM(s) Oral daily  pantoprazole    Tablet 40 milliGRAM(s) Oral two times a day                                           8.1    6.87  )-----------( 420      ( 18 Jan 2022 09:34 )             25.9   01-18    139  |  105  |  7.5<L>  ----------------------------<  86  3.8   |  23.0  |  0.35<L>    Ca    8.4<L>      18 Jan 2022 09:34  Phos  4.0     01-18  Mg     1.7     01-18               COVID-19 PCR: NotDetec (01-11-22 @ 12:29)  COVID-19 PCR: NotDetec (01-11-22 @ 05:17)  COVID-19 PCR: NotDetec (01-05-22 @ 08:09)  COVID-19 PCR: NotDetec (12-30-21 @ 22:14)  COVID-19 PCR: NotDetec (12-26-21 @ 11:56)  COVID-19 PCR: NotDetec (12-22-21 @ 05:30)  COVID-19 PCR: NotDetec (12-20-21 @ 08:49)  COVID-19 PCR: NotDetec (12-16-21 @ 15:18)

## 2022-01-18 NOTE — BRIEF OPERATIVE NOTE - NSICDXBRIEFPOSTOP_GEN_ALL_CORE_FT
POST-OP DIAGNOSIS:  Necrotizing fasciitis 17-Nov-2021 21:53:40  Gallo Tobias  
POST-OP DIAGNOSIS:  Abscess of left lower extremity 15-Dec-2021 18:54:49 Lower leg and around knee Zion Hillman  
POST-OP DIAGNOSIS:  Necrotizing soft tissue infection 17-Nov-2021 23:05:20  Suresh Scott  Abscess of left lower extremity 15-Dec-2021 18:54:49 Lower leg and around knee Zion Hillman  
POST-OP DIAGNOSIS:  H/O necrotizing fasciitis 18-Jan-2022 12:31:47  Henrik Mckeon  
POST-OP DIAGNOSIS:  Necrotizing soft tissue infection 17-Nov-2021 23:05:20  Suresh Scott  
POST-OP DIAGNOSIS:  Abscess of left lower extremity 15-Dec-2021 18:54:49 Lower leg and around knee Zion Hillman  
POST-OP DIAGNOSIS:  S/P excisional debridement 02-Dec-2021 19:48:06  Alona Araya  
POST-OP DIAGNOSIS:  Necrotizing soft tissue infection 17-Nov-2021 23:05:20  Suresh Scott  
POST-OP DIAGNOSIS:  S/P excisional debridement 02-Dec-2021 19:48:06  Alona Araya  H/O drainage of abscess 17-Dec-2021 15:48:00  Alona rAaya  Status post aspiration of abscess 17-Dec-2021 15:49:09 Drainage and aspiration of an abscess in the upper area of the lateral fasciotomy. Washout of the middle and lateral fasciotomy. Wound vac placement. Alona Araya  
POST-OP DIAGNOSIS:  Abscess of left lower extremity 15-Dec-2021 18:54:49 Lower leg and around knee Zion Hillman

## 2022-01-18 NOTE — BRIEF OPERATIVE NOTE - NSICDXBRIEFOPLAUNCH_GEN_ALL_CORE
<--- Click to Launch ICDx for PreOp, PostOp and Procedure

## 2022-01-18 NOTE — BRIEF OPERATIVE NOTE - TYPE OF ANESTHESIA
General

## 2022-01-18 NOTE — CHART NOTE - NSCHARTNOTEFT_GEN_A_CORE
Post-op Check    Subjective:  Pt offers no acute complaints at this time. Her  pain is well controlled on current regiment. Supportive boot to LLE was placed on this evaluation, back in her LLE after the surgery. Denies chest pain, SOB, or palpitations.     STATUS POST:   Split-thickness skin graft of lower limb and wound vac.    POST OPERATIVE DAY #: 0    MEDICATIONS  (STANDING):  acetaminophen     Tablet .. 975 milliGRAM(s) Oral every 6 hours  ascorbic acid 1000 milliGRAM(s) Oral two times a day  baclofen 10 milliGRAM(s) Oral every 6 hours  DAPTOmycin IVPB 500 milliGRAM(s) IV Intermittent every 24 hours  enoxaparin Injectable 40 milliGRAM(s) SubCutaneous daily  gabapentin 300 milliGRAM(s) Oral every 8 hours  lisinopril 5 milliGRAM(s) Oral daily  meropenem  IVPB 1000 milliGRAM(s) IV Intermittent every 8 hours  pantoprazole    Tablet 40 milliGRAM(s) Oral two times a day  vitamin E 400 International Unit(s) Oral daily  zinc sulfate 220 milliGRAM(s) Oral daily    MEDICATIONS  (PRN):  HYDROmorphone   Tablet 2 milliGRAM(s) Oral every 4 hours PRN Moderate Pain (4 - 6)  HYDROmorphone   Tablet 4 milliGRAM(s) Oral every 4 hours PRN Severe Pain (7 - 10)  HYDROmorphone  Injectable 0.5 milliGRAM(s) IV Push every 4 hours PRN break thru pain  melatonin 5 milliGRAM(s) Oral at bedtime PRN Sleep  ondansetron Injectable 4 milliGRAM(s) IV Push every 8 hours PRN Nausea and/or Vomiting      Vital Signs Last 24 Hrs  T(C): 36.6 (18 Jan 2022 15:28), Max: 37.3 (17 Jan 2022 20:02)  T(F): 97.9 (18 Jan 2022 15:28), Max: 99.1 (17 Jan 2022 20:02)  HR: 84 (18 Jan 2022 15:28) (74 - 95)  BP: 130/87 (18 Jan 2022 15:28) (115/75 - 152/88)  BP(mean): --  RR: 18 (18 Jan 2022 15:28) (13 - 18)  SpO2: 96% (18 Jan 2022 15:28) (95% - 100%)    Physical Exam:    Constitutional: NAD  HEENT: PERRL, EOMI  Neck: No JVD, FROM without pain  Respiratory: no accessory muscle use, respirations non-labored, no conversational dyspnea. No wheezes.  GI: No distended. BS+.  Extremities: LLE with a wound vac. RLE with an ace band aid that is clean and dry.  Neurological: A&O x 3; without gross deficit.    A; Patient in the immediate post op of skin graft placement to the LLE and wound vac, and donor graft taken from RLE which course is uneventful.     P:   STRICT BED REST FOR 5 DAYS.  Wound vac must be taken off in 5 days from 1/18/22.  Donor site wound down in 3 days from 1/18/22, and trim xeroform progressively.   Continue current care  Pain control  OOB as tolerated.  Encourage IS  DVT ppx

## 2022-01-18 NOTE — BRIEF OPERATIVE NOTE - CONDITION POST OP
intubated, critical
Stable
extubated, stable
stable
stable / guarded
Stable

## 2022-01-18 NOTE — BRIEF OPERATIVE NOTE - ASSISTANT(S)
Alona Araya
Kim Moreno, PGY1
Vilma Scott, PGY5
[place]
Dr. Sreedhar Estrella
resident
Federico HAWKINS, Mike ESCALANTE, Rolando HAMMOND
truong valencia, abhishek jain
Vilma Scott, PGY5
Zion Hillman, PGY2
abhishek jain
Dr. Sreedhar Estrella
Dr. Sreedhar Estrella PGY3
Dr. Reyes (PGY3)
Alona Soria

## 2022-01-18 NOTE — PROGRESS NOTE ADULT - ASSESSMENT
A 51 year old female s/p multiple OR debridement of left lower extremity for soft tissue infection and abscesses of unclear origin. PPD 1 s/p IR guided drainage of distal femur collection. Currently, moderate protein caloric malnutrition.    Plan:   - Reg diet with ensure and enliven  - Planned for OR today for for STSG, will remove vac at that time  - Pre-op  - F/U cx: NGTD   - DVT ppx  - per ID continue merropenum and dapt for an additional 2 weeks

## 2022-01-18 NOTE — BRIEF OPERATIVE NOTE - OPERATION/FINDINGS
Incision and drainage of left knee.  Incision and drainage of popliteal fossa.   Irrigation / wound washout of lower extremities.
Pus in the upper, adjacent are of the lateral fasciotomy of th right leg.
Wound clean with no purulence, muscle granulating with active bleeding tissue, controlled, copiously irrigated, Wound VAC placed, good seal, anterior ankle wounds dressed with xeroform
left knee arthrofibrosis, no pus in the knee, no evidence of residual infection in the knee  knee rom 0-30 st start, 0-80 after katherin
Split thickness skin graft from right thigh to left distal LE. Donor site dressed with xeroform, gauze, abd, kerlix, and ace. Graft sites dressed with wound VAC.
wound vacs taken down, LLE wounds explored and expressed no purulence found, minimal reaction with electrocautery, good granulation tissue, copiously irrigated with saline, no evidence of purulence, small 1x2cm necrotic skin edge debrided until healthy viable tissue, wound vac reapplied
necrotic tissue surrounding the fasciotomy site; lateral and middle site. Lower, anterior abscess capsule removed. Irrigation of surgical wound and fasciotomy.
pus expressed after palpating lateral gutter
Pulse lavage used, good granulation tissue, minimal serous fluid, washed to deep compartments, Wound vac bridged and replaced
viable muscle. no purulent drainage in leg compartments  anterior ankle with pocket of purulence; sent for culture  copious irrigation/hemostasis achieved  xeroform / black sponge placed to wound vac at 125mmhg
left septic appearing knee, dishwater fluid in fascia
Three wounds to LLE.  Medial LLE with purulent fluid within muscle bed; debridement of tissue with removal of necrotic and infected tissue.  Fluid cultures taken of purulent material in addition to wound culture specimen from medial side.  L lateral ankle with debridement.  Evidence of bleeding from all three wounds- vital.  Hemostasis achieved to medial wound after debridement with cautery device and one stitch.  Irrigated with saline, pulse lavage.  Wound vac x3 placed and connected to suction device with adequate seal.
Medial and lateral lower leg wounds clean appearing, good granulation tissue. Planes bluntly dissected with some purulent drainage. Significant pus also expressed from proximal shin down towards lower part of lateral incision. Incision made along tract at anterior lower leg, between two old wounds. Bluntly dissected with notable purulence. Each wound pulse irrigated. Wound VAC to medial and lateral old wounds. New incision packed with wet to dry Kerlix. Medial thigh incision clean appearing, closed with interrupted sutures.
medial incision of left leg with culture taken of grayish murky fluid. fascia intact and viable. lateral incision made with expression of murky fluid as well. fascia intact and viable  copious irrigation  packed with betadyne soaked kerlix covered with ABD pad  entire leg wrapped with kerlix/ace wrap    please refer to ortho op note for knee washout and ankle hardware removal
right knee effusion, septic arthritis, mini open I&D

## 2022-01-18 NOTE — BRIEF OPERATIVE NOTE - COMMENTS
Stable WC IV
Pulse washout of the middle and lateral fasciotomy.
WC IV
Wound class I
wound class 4
WCIV
Wound Class 4
wound class 4  left radial arterial line  right IJ TLC  valle  on pressors throughout case

## 2022-01-18 NOTE — BRIEF OPERATIVE NOTE - NSICDXBRIEFPREOP_GEN_ALL_CORE_FT
PRE-OP DIAGNOSIS:  Necrotizing fasciitis 17-Nov-2021 21:53:30  Gallo Tobias  
PRE-OP DIAGNOSIS:  Abscess of left lower extremity 15-Dec-2021 18:54:31 Lower leg and around knee Zion Hillman  
PRE-OP DIAGNOSIS:  Abscess of left lower extremity 15-Dec-2021 18:54:31 Lower leg and around knee Zion Hillman  
PRE-OP DIAGNOSIS:  H/O fasciotomy 02-Dec-2021 19:46:05  Alona Araya  
PRE-OP DIAGNOSIS:  H/O necrotizing fasciitis 18-Jan-2022 12:31:33  Henrik Mckeon  
PRE-OP DIAGNOSIS:  H/O fasciotomy 02-Dec-2021 19:46:05  Alona Araya  
PRE-OP DIAGNOSIS:  Necrotizing soft tissue infection 17-Nov-2021 23:05:08  Suresh Scott  
PRE-OP DIAGNOSIS:  Abscess of left lower extremity 15-Dec-2021 18:54:31 Lower leg and around knee Zion Hillman  
PRE-OP DIAGNOSIS:  Necrotizing soft tissue infection 17-Nov-2021 23:05:08  Suresh Scott  
PRE-OP DIAGNOSIS:  Necrotizing soft tissue infection 17-Nov-2021 23:05:08  Suresh Scott  Abscess of left lower extremity 15-Dec-2021 18:54:31 Lower leg and around knee Zion Hillman

## 2022-01-18 NOTE — PROGRESS NOTE ADULT - ASSESSMENT
51y Female present to ED with left leg swelling, erythema, pain. Patient endorses she had left knee pain for 1 week presented 3 days ago to ED  where she states was given an steroid shot, and ibuprofen. however pain and edema worsened. Patient states she wasnt feeling herself today reason why she came. Endorses chills initially with pain 3 days ago but none since, denies history of trauma, insect bites, recent interventions, or injections to the area, contact with ticks, history of diabetes.  PT AS ABOVE WITH LEFT LEG SWELLING PT WITH INCREASED WBC PLACED ON ABX FOR PRESUMED INFECTION  PT WITH CT SCAN WITH FASCIAL EDAM PT BROUGHT TO THE OR EMERGENTLY AND  UNDERWENT FASCIOTOMY   PT BROUGHT TO THE OR  BOTH TRAUMA AND ORTHO INVOLVED  PURULENT FLUID FOUND  NECROTIZING SOFT TISSUE INFECTION    BLOOD CX WITH GROUP A STREP AND  OPERATIVE FLUID WITH STREP   PT WAS  ON PCN AND CLINDA for POSSIBLE ANTITOXIN EFFECT IN GROUP A STREP INFECTION     PT  S/P OR WASHOUT 12.2  AND  OR AGAIN 12/7  and late on 12/10 121/15 12/17 and 12/20 12/23     REPEAT   OPERATIVE CX NEG             AS ABOVE  REPEAT  OR  12/20 12/23    S/ RETURN TO THE OR 12/28 AND 12/31    RECENT VAC DRESSING CHANGE NO PUS   NO PURULENCE   OVERALL NON TOXIC  AFEBRILE           ON MERREM/DAPTOMYCIN  repeat Ct 1/10 as above with increase in soft tissue abscesses and likely OM. s/p Ir drainage on 1/11, with dark old blood aspirated.  IR cx ngtd  f/u BCX ngtd  tentatively plan for ANOTHER 2 WEEKS WITH DAPTO 500 mg IV daily and .MERREM  1 g iv q8h THROUGH 1/24/22    . Will need weekly CBc CMP ESR CRp and CK. Last CK 1/11 is 22  s/p  skin graft    SEEN IN PACU    COVID POS WITH NO RESP SX   PT HAD  RECEIVED ONE DOSE IN OCTOBER NEVER HAD SECOND DOSE    WILL D/W PT MAB THERAPY

## 2022-01-18 NOTE — BRIEF OPERATIVE NOTE - SPECIMENS
wound culture
Culture
None
culture
cultures as per ortho
culture
None
Medial LLE muscle, gastrocnemius. & Fluid cultures from medial LLE purulence.
culture
Wound Cx x 3: LLE Medial, LLE anterior 1, LLE anterior 2. Debrided anterior shin muscle
cx, fluid
Tissue for culture.

## 2022-01-18 NOTE — BRIEF OPERATIVE NOTE - NSICDXBRIEFPROCEDURE_GEN_ALL_CORE_FT
PROCEDURES:  Wound VAC dressing change for area 50 sq cm or less 19-Nov-2021 14:16:05 Medial and lateral LLE. Zion Hillman  Pulsed irrigation of wound 15-Dec-2021 18:54:12 Wound wash out Zion Hillman  
PROCEDURES:  Wound VAC dressing change for area 50 sq cm or less 19-Nov-2021 14:16:05 Medial and lateral LLE. Zion Hillman  Drainage of abscess of left lower extremity 15-Dec-2021 18:52:12  Zion Hillman  Selective debridement 15-Dec-2021 18:52:58  Zion Hillman  Pulsed irrigation of wound 15-Dec-2021 18:54:12  Zion Hillman  
PROCEDURES:  Irrigation, knee 17-Nov-2021 21:53:11 Irrigation and debridement of the left leg: medial and lateral fasciotomies irrigation and debridement. Tissue culture performed. Gallo Tobias  
PROCEDURES:  Split-thickness skin graft of lower limb 18-Jan-2022 12:31:13  Henrik Mckeon  
PROCEDURES:  Irrigation, knee 17-Nov-2021 21:53:11 Irrigation of the left leg: medial and lateral fasciotomies irrigation and drainage of an abscess. Tissue culture performed. Gallo Tobias  Pulsed irrigation of wound 15-Dec-2021 18:54:12 12/17 Washout with pulsed irrigation of the midial and lateral fasciotomies, and ankle. Wound vac placemento to the midle and lateral fasciotomy of the right leg. Zion Hillman  
PROCEDURES:  Wound VAC dressing change for area 50 sq cm or less 19-Nov-2021 14:16:05 Medial and lateral KIZZY. Zion Hillman  
PROCEDURES:  Wound VAC dressing change for area 50 sq cm or less 19-Nov-2021 14:16:05 Medial and lateral LLE. Zion Hillman  Debridement of muscle 07-Dec-2021 20:19:00 Distal left lower extremity. Irrigation and debridement of the left leg: medial and lateral fasciotomies irrigation and debridement. Tissue culture performed. Kim Moreno  
PROCEDURES:  Wound VAC dressing change for area 50 sq cm or less 19-Nov-2021 14:16:05 Medial and lateral LLE. Zion Hillman  Selective debridement 15-Dec-2021 18:52:58 [id number] leg skin Zion Hillman  
PROCEDURES:  Wound VAC dressing change for area 50 sq cm or less 19-Nov-2021 14:16:05 Medial and lateral LLE. Zion Hillman  Pulsed irrigation of wound 15-Dec-2021 18:54:12 Wound wash out Zion Hillman  
PROCEDURES:  Irrigation, knee 17-Nov-2021 21:53:11 Irrigation of the left leg: medial and lateral fasciotomies irrigation and drainage of an abscess. Tissue culture performed. Gallo Tobias  Incision and drainage, abscess, deep, lower leg 17-Nov-2021 23:04:34  Suresh Scott  Wound VAC dressing change for area 50 sq cm or less 19-Nov-2021 14:16:05 Medial and lateral LLE. Zion Hillman  Drainage of abscess of left lower extremity 15-Dec-2021 18:52:12  Zion Hillman  
PROCEDURES:  Incision and drainage, abscess, deep, lower leg 17-Nov-2021 23:04:34  Suresh Scott

## 2022-01-18 NOTE — PROGRESS NOTE ADULT - SUBJECTIVE AND OBJECTIVE BOX
SUBJECTIVE/24 hour events:  Patient is a 51yFemale complicated necrotizing wound infection to lle, course now complicated as patient is COVID + (asymptomatic). Patient with no acute events overnight, moved to isolation room, tolerating a diet, pain controlled. Patient going for STSG today       Vital Signs Last 24 Hrs  T(C): 37.3 (17 Jan 2022 20:02), Max: 37.3 (17 Jan 2022 20:02)  T(F): 99.1 (17 Jan 2022 20:02), Max: 99.1 (17 Jan 2022 20:02)  HR: 94 (17 Jan 2022 20:02) (82 - 94)  BP: 130/77 (17 Jan 2022 20:02) (107/70 - 130/77)  BP(mean): --  RR: 18 (17 Jan 2022 20:02) (18 - 18)  SpO2: 96% (17 Jan 2022 20:02) (92% - 96%)  Drug Dosing Weight  Height (cm): 167.6 (28 Dec 2021 12:36)  Weight (kg): 93 (28 Dec 2021 12:36)  BMI (kg/m2): 33.1 (28 Dec 2021 12:36)  BSA (m2): 2.02 (28 Dec 2021 12:36)  I&O's Detail    16 Jan 2022 07:01  -  17 Jan 2022 07:00  --------------------------------------------------------  IN:  Total IN: 0 mL    OUT:    Voided (mL): 200 mL  Total OUT: 200 mL    Total NET: -200 mL        Allergies    No Known Allergies    Intolerances                              7.8    6.52  )-----------( 425      ( 17 Jan 2022 08:36 )             25.2   01-17    140  |  104  |  11.6  ----------------------------<  86  3.7   |  23.0  |  0.46<L>    Ca    8.2<L>      17 Jan 2022 08:36  Phos  4.2     01-17  Mg     1.6     01-17    PT/INR - ( 17 Jan 2022 08:36 )   PT: 14.1 sec;   INR: 1.23 ratio         PTT - ( 17 Jan 2022 08:36 )  PTT:36.5 sec    ROS:    PHYSICAL EXAM:  Constitutional: resting comfortably   Respiratory: respirations are unlabored, no accessory muscle use, no conversational dyspnea, RA   Cardiovascular: regular rate & rhythm  Gastrointestinal: Abdomen soft, non-tender, non-distended, no rebound tenderness / guarding  Neurological: A&O x 3; no gross sensory / motor / coordination deficits  Musculoskeletal: No joint pain, swelling or deformity; no limitation of movement. wound vac on LLE to suction, good seal, wbat to lle with cam boot     MEDICATIONS  (STANDING):  acetaminophen     Tablet .. 975 milliGRAM(s) Oral every 6 hours  ascorbic acid 1000 milliGRAM(s) Oral two times a day  baclofen 10 milliGRAM(s) Oral every 6 hours  DAPTOmycin IVPB 500 milliGRAM(s) IV Intermittent every 24 hours  enoxaparin Injectable 40 milliGRAM(s) SubCutaneous daily  gabapentin 300 milliGRAM(s) Oral every 8 hours  lidocaine 1% Injectable 20 milliLiter(s) Local Injection once  lisinopril 5 milliGRAM(s) Oral daily  meropenem  IVPB 1000 milliGRAM(s) IV Intermittent every 8 hours  pantoprazole    Tablet 40 milliGRAM(s) Oral two times a day  sodium chloride 0.9%. 1000 milliLiter(s) (100 mL/Hr) IV Continuous <Continuous>  vitamin E 400 International Unit(s) Oral daily  zinc sulfate 220 milliGRAM(s) Oral daily    MEDICATIONS  (PRN):  HYDROmorphone   Tablet 2 milliGRAM(s) Oral every 4 hours PRN Moderate Pain (4 - 6)  HYDROmorphone   Tablet 4 milliGRAM(s) Oral every 4 hours PRN Severe Pain (7 - 10)  HYDROmorphone  Injectable 0.5 milliGRAM(s) IV Push every 4 hours PRN break thru pain  melatonin 5 milliGRAM(s) Oral at bedtime PRN Sleep  ondansetron Injectable 4 milliGRAM(s) IV Push every 8 hours PRN Nausea and/or Vomiting      RADIOLOGY STUDIES:    CULTURES:

## 2022-01-19 LAB
ANION GAP SERPL CALC-SCNC: 13 MMOL/L — SIGNIFICANT CHANGE UP (ref 5–17)
BASOPHILS # BLD AUTO: 0.01 K/UL — SIGNIFICANT CHANGE UP (ref 0–0.2)
BASOPHILS NFR BLD AUTO: 0.1 % — SIGNIFICANT CHANGE UP (ref 0–2)
BUN SERPL-MCNC: 11.4 MG/DL — SIGNIFICANT CHANGE UP (ref 8–20)
CALCIUM SERPL-MCNC: 8.5 MG/DL — LOW (ref 8.6–10.2)
CHLORIDE SERPL-SCNC: 102 MMOL/L — SIGNIFICANT CHANGE UP (ref 98–107)
CK SERPL-CCNC: 28 U/L — SIGNIFICANT CHANGE UP (ref 25–170)
CO2 SERPL-SCNC: 22 MMOL/L — SIGNIFICANT CHANGE UP (ref 22–29)
CREAT SERPL-MCNC: 0.46 MG/DL — LOW (ref 0.5–1.3)
EOSINOPHIL # BLD AUTO: 0.07 K/UL — SIGNIFICANT CHANGE UP (ref 0–0.5)
EOSINOPHIL NFR BLD AUTO: 1 % — SIGNIFICANT CHANGE UP (ref 0–6)
GLUCOSE SERPL-MCNC: 90 MG/DL — SIGNIFICANT CHANGE UP (ref 70–99)
HCT VFR BLD CALC: 25.4 % — LOW (ref 34.5–45)
HGB BLD-MCNC: 8 G/DL — LOW (ref 11.5–15.5)
IMM GRANULOCYTES NFR BLD AUTO: 0.3 % — SIGNIFICANT CHANGE UP (ref 0–1.5)
LYMPHOCYTES # BLD AUTO: 3.16 K/UL — SIGNIFICANT CHANGE UP (ref 1–3.3)
LYMPHOCYTES # BLD AUTO: 43.9 % — SIGNIFICANT CHANGE UP (ref 13–44)
MAGNESIUM SERPL-MCNC: 1.5 MG/DL — LOW (ref 1.6–2.6)
MCHC RBC-ENTMCNC: 27.1 PG — SIGNIFICANT CHANGE UP (ref 27–34)
MCHC RBC-ENTMCNC: 31.5 GM/DL — LOW (ref 32–36)
MCV RBC AUTO: 86.1 FL — SIGNIFICANT CHANGE UP (ref 80–100)
MONOCYTES # BLD AUTO: 0.68 K/UL — SIGNIFICANT CHANGE UP (ref 0–0.9)
MONOCYTES NFR BLD AUTO: 9.5 % — SIGNIFICANT CHANGE UP (ref 2–14)
NEUTROPHILS # BLD AUTO: 3.25 K/UL — SIGNIFICANT CHANGE UP (ref 1.8–7.4)
NEUTROPHILS NFR BLD AUTO: 45.2 % — SIGNIFICANT CHANGE UP (ref 43–77)
PHOSPHATE SERPL-MCNC: 4.2 MG/DL — SIGNIFICANT CHANGE UP (ref 2.4–4.7)
PLATELET # BLD AUTO: 401 K/UL — HIGH (ref 150–400)
POTASSIUM SERPL-MCNC: 3.9 MMOL/L — SIGNIFICANT CHANGE UP (ref 3.5–5.3)
POTASSIUM SERPL-SCNC: 3.9 MMOL/L — SIGNIFICANT CHANGE UP (ref 3.5–5.3)
RBC # BLD: 2.95 M/UL — LOW (ref 3.8–5.2)
RBC # FLD: 15.9 % — HIGH (ref 10.3–14.5)
SODIUM SERPL-SCNC: 137 MMOL/L — SIGNIFICANT CHANGE UP (ref 135–145)
WBC # BLD: 7.19 K/UL — SIGNIFICANT CHANGE UP (ref 3.8–10.5)
WBC # FLD AUTO: 7.19 K/UL — SIGNIFICANT CHANGE UP (ref 3.8–10.5)

## 2022-01-19 PROCEDURE — 99232 SBSQ HOSP IP/OBS MODERATE 35: CPT

## 2022-01-19 PROCEDURE — 99024 POSTOP FOLLOW-UP VISIT: CPT

## 2022-01-19 RX ORDER — SENNA PLUS 8.6 MG/1
2 TABLET ORAL ONCE
Refills: 0 | Status: COMPLETED | OUTPATIENT
Start: 2022-01-19 | End: 2022-01-19

## 2022-01-19 RX ORDER — SENNA PLUS 8.6 MG/1
2 TABLET ORAL AT BEDTIME
Refills: 0 | Status: DISCONTINUED | OUTPATIENT
Start: 2022-01-19 | End: 2022-01-28

## 2022-01-19 RX ORDER — SODIUM CHLORIDE 9 MG/ML
250 INJECTION INTRAMUSCULAR; INTRAVENOUS; SUBCUTANEOUS
Refills: 0 | Status: COMPLETED | OUTPATIENT
Start: 2022-01-19 | End: 2022-01-19

## 2022-01-19 RX ORDER — MAGNESIUM SULFATE 500 MG/ML
2 VIAL (ML) INJECTION
Refills: 0 | Status: COMPLETED | OUTPATIENT
Start: 2022-01-19 | End: 2022-01-19

## 2022-01-19 RX ORDER — SOTROVIMAB 62.5 MG/ML
500 INJECTION, SOLUTION, CONCENTRATE INTRAVENOUS ONCE
Refills: 0 | Status: COMPLETED | OUTPATIENT
Start: 2022-01-19 | End: 2022-01-19

## 2022-01-19 RX ORDER — POTASSIUM CHLORIDE 20 MEQ
20 PACKET (EA) ORAL ONCE
Refills: 0 | Status: COMPLETED | OUTPATIENT
Start: 2022-01-19 | End: 2022-01-19

## 2022-01-19 RX ADMIN — Medication 25 GRAM(S): at 12:08

## 2022-01-19 RX ADMIN — GABAPENTIN 300 MILLIGRAM(S): 400 CAPSULE ORAL at 23:02

## 2022-01-19 RX ADMIN — GABAPENTIN 300 MILLIGRAM(S): 400 CAPSULE ORAL at 06:13

## 2022-01-19 RX ADMIN — HYDROMORPHONE HYDROCHLORIDE 4 MILLIGRAM(S): 2 INJECTION INTRAMUSCULAR; INTRAVENOUS; SUBCUTANEOUS at 16:59

## 2022-01-19 RX ADMIN — Medication 10 MILLIGRAM(S): at 06:13

## 2022-01-19 RX ADMIN — PANTOPRAZOLE SODIUM 40 MILLIGRAM(S): 20 TABLET, DELAYED RELEASE ORAL at 06:13

## 2022-01-19 RX ADMIN — MEROPENEM 100 MILLIGRAM(S): 1 INJECTION INTRAVENOUS at 13:52

## 2022-01-19 RX ADMIN — HYDROMORPHONE HYDROCHLORIDE 4 MILLIGRAM(S): 2 INJECTION INTRAMUSCULAR; INTRAVENOUS; SUBCUTANEOUS at 18:00

## 2022-01-19 RX ADMIN — HYDROMORPHONE HYDROCHLORIDE 0.5 MILLIGRAM(S): 2 INJECTION INTRAMUSCULAR; INTRAVENOUS; SUBCUTANEOUS at 13:00

## 2022-01-19 RX ADMIN — Medication 975 MILLIGRAM(S): at 23:34

## 2022-01-19 RX ADMIN — Medication 400 INTERNATIONAL UNIT(S): at 13:50

## 2022-01-19 RX ADMIN — Medication 975 MILLIGRAM(S): at 12:05

## 2022-01-19 RX ADMIN — Medication 10 MILLIGRAM(S): at 18:05

## 2022-01-19 RX ADMIN — HYDROMORPHONE HYDROCHLORIDE 4 MILLIGRAM(S): 2 INJECTION INTRAMUSCULAR; INTRAVENOUS; SUBCUTANEOUS at 07:17

## 2022-01-19 RX ADMIN — MEROPENEM 100 MILLIGRAM(S): 1 INJECTION INTRAVENOUS at 06:14

## 2022-01-19 RX ADMIN — Medication 1000 MILLIGRAM(S): at 18:04

## 2022-01-19 RX ADMIN — Medication 975 MILLIGRAM(S): at 07:17

## 2022-01-19 RX ADMIN — SODIUM CHLORIDE 100 MILLILITER(S): 9 INJECTION INTRAMUSCULAR; INTRAVENOUS; SUBCUTANEOUS at 10:23

## 2022-01-19 RX ADMIN — HYDROMORPHONE HYDROCHLORIDE 2 MILLIGRAM(S): 2 INJECTION INTRAMUSCULAR; INTRAVENOUS; SUBCUTANEOUS at 03:59

## 2022-01-19 RX ADMIN — Medication 975 MILLIGRAM(S): at 06:13

## 2022-01-19 RX ADMIN — SENNA PLUS 2 TABLET(S): 8.6 TABLET ORAL at 12:05

## 2022-01-19 RX ADMIN — Medication 1000 MILLIGRAM(S): at 06:13

## 2022-01-19 RX ADMIN — GABAPENTIN 300 MILLIGRAM(S): 400 CAPSULE ORAL at 13:53

## 2022-01-19 RX ADMIN — Medication 975 MILLIGRAM(S): at 23:04

## 2022-01-19 RX ADMIN — ZINC SULFATE TAB 220 MG (50 MG ZINC EQUIVALENT) 220 MILLIGRAM(S): 220 (50 ZN) TAB at 12:06

## 2022-01-19 RX ADMIN — Medication 975 MILLIGRAM(S): at 13:00

## 2022-01-19 RX ADMIN — Medication 10 MILLIGRAM(S): at 23:04

## 2022-01-19 RX ADMIN — DAPTOMYCIN 120 MILLIGRAM(S): 500 INJECTION, POWDER, LYOPHILIZED, FOR SOLUTION INTRAVENOUS at 18:05

## 2022-01-19 RX ADMIN — Medication 20 MILLIEQUIVALENT(S): at 12:06

## 2022-01-19 RX ADMIN — MEROPENEM 100 MILLIGRAM(S): 1 INJECTION INTRAVENOUS at 23:03

## 2022-01-19 RX ADMIN — Medication 975 MILLIGRAM(S): at 18:00

## 2022-01-19 RX ADMIN — SOTROVIMAB 116 MILLIGRAM(S): 62.5 INJECTION, SOLUTION, CONCENTRATE INTRAVENOUS at 09:21

## 2022-01-19 RX ADMIN — ENOXAPARIN SODIUM 40 MILLIGRAM(S): 100 INJECTION SUBCUTANEOUS at 12:10

## 2022-01-19 RX ADMIN — HYDROMORPHONE HYDROCHLORIDE 4 MILLIGRAM(S): 2 INJECTION INTRAMUSCULAR; INTRAVENOUS; SUBCUTANEOUS at 06:13

## 2022-01-19 RX ADMIN — PANTOPRAZOLE SODIUM 40 MILLIGRAM(S): 20 TABLET, DELAYED RELEASE ORAL at 17:00

## 2022-01-19 RX ADMIN — HYDROMORPHONE HYDROCHLORIDE 0.5 MILLIGRAM(S): 2 INJECTION INTRAMUSCULAR; INTRAVENOUS; SUBCUTANEOUS at 12:06

## 2022-01-19 RX ADMIN — Medication 25 GRAM(S): at 08:46

## 2022-01-19 RX ADMIN — Medication 10 MILLIGRAM(S): at 12:05

## 2022-01-19 RX ADMIN — Medication 975 MILLIGRAM(S): at 00:00

## 2022-01-19 RX ADMIN — Medication 975 MILLIGRAM(S): at 17:00

## 2022-01-19 RX ADMIN — HYDROMORPHONE HYDROCHLORIDE 2 MILLIGRAM(S): 2 INJECTION INTRAMUSCULAR; INTRAVENOUS; SUBCUTANEOUS at 02:49

## 2022-01-19 NOTE — PROGRESS NOTE ADULT - ASSESSMENT
51y Female present to ED with left leg swelling, erythema, pain. Patient endorses she had left knee pain for 1 week presented 3 days ago to ED  where she states was given an steroid shot, and ibuprofen. however pain and edema worsened. Patient states she wasnt feeling herself today reason why she came. Endorses chills initially with pain 3 days ago but none since, denies history of trauma, insect bites, recent interventions, or injections to the area, contact with ticks, history of diabetes.  PT AS ABOVE WITH LEFT LEG SWELLING PT WITH INCREASED WBC PLACED ON ABX FOR PRESUMED INFECTION  PT WITH CT SCAN WITH FASCIAL EDAM PT BROUGHT TO THE OR EMERGENTLY AND  UNDERWENT FASCIOTOMY   PT BROUGHT TO THE OR  BOTH TRAUMA AND ORTHO INVOLVED  PURULENT FLUID FOUND  NECROTIZING SOFT TISSUE INFECTION    BLOOD CX WITH GROUP A STREP AND  OPERATIVE FLUID WITH STREP   PT WAS  ON PCN AND CLINDA for POSSIBLE ANTITOXIN EFFECT IN GROUP A STREP INFECTION     PT  S/P OR WASHOUT 12.2  AND  OR AGAIN 12/7  and late on 12/10 121/15 12/17 and 12/20 12/23     REPEAT   OPERATIVE CX NEG             AS ABOVE  REPEAT  OR  12/20 12/23    S/ RETURN TO THE OR 12/28 AND 12/31    RECENT VAC DRESSING CHANGE NO PUS   NO PURULENCE   OVERALL NON TOXIC  AFEBRILE           ON MERREM/DAPTOMYCIN  repeat Ct 1/10 as above with increase in soft tissue abscesses and likely OM. s/p Ir drainage on 1/11, with dark old blood aspirated.  IR cx ngtd  f/u BCX ngtd  tentatively plan for ANOTHER 2 WEEKS WITH DAPTO 500 mg IV daily and .MERREM  1 g iv q8h THROUGH 1/24/22    . Will need weekly CBc CMP ESR CRp and CK. Last CK 1/11 is 22  s/p  skin graft    SEEN IN PACU    COVID POS WITH NO RESP SX  NOT ON 02   PT HAD  RECEIVED ONE DOSE IN OCTOBER NEVER HAD SECOND DOSE      D/W PT  SOTROVIMAB  MAB THERAPY  RISKS BENEFIT DISCUSSED  PT HAS LONG HOSPITALIZATION AND IS IN WEAKENED STATE  AND WOULD BE  CANDIDATE FOR MAB THERAPY  CONSENT GIVEN  SPOKE TO MED DIRECTOR  SPOKE TO PHARMACY  WILL FOLLOW UP

## 2022-01-19 NOTE — PROGRESS NOTE ADULT - SUBJECTIVE AND OBJECTIVE BOX
[facility]  INFECTIOUS DISEASES AND INTERNAL MEDICINE at [place]  =======================================================  Theo Morrow MD  Diplomates American Board of Internal Medicine and Infectious Diseases  Tel: [phone]      Fax: [phone]  =======================================================    LUIS FERNANDO DAVIS [id number]    Follow up: necrotizing soft tissue infection  afebrile  feels well   S/P  OR   REMAINS AFEBRILE    Allergies:  No Known Allergies           REVIEW OF SYSTEMS:  CONSTITUTIONAL:  No Fever or chills  HEENT:   No diplopia or blurred vision.  No earache, sore throat or runny nose.  CARDIOVASCULAR:  No pressure, squeezing, strangling, tightness, heaviness or aching about the chest, neck, axilla or epigastrium.  RESPIRATORY:  No cough, shortness of breath  GASTROINTESTINAL:  No nausea, vomiting or diarrhea.  GENITOURINARY:  No dysuria, frequency or urgency. No Blood in urine  MUSCULOSKELETAL: as PER HPI   SKIN:  AS PER HPI  NEUROLOGIC:  No Headaches, seizures or weakness.  PSYCHIATRIC:  No disorder of thought or mood.  ENDOCRINE:  No heat or cold intolerance  HEMATOLOGICAL:  No easy bruising or bleeding.       Physical Exam:   Vital Signs Last 24 Hrs  T(C): 36.9 (19 Jan 2022 05:22), Max: 37.1 (18 Jan 2022 20:21)  T(F): 98.4 (19 Jan 2022 05:22), Max: 98.7 (18 Jan 2022 20:21)  HR: 89 (19 Jan 2022 05:22) (74 - 95)  BP: 112/70 (19 Jan 2022 05:22) (112/70 - 152/88)  BP(mean): --  RR: 18 (19 Jan 2022 05:22) (13 - 18)  SpO2: 98% (19 Jan 2022 05:22) (95% - 100%)          GEN: NAD, pleasant  HEENT: normocephalic and atraumatic. EOMI. PERRL.  Anicteric   NECK: Supple.   LUNGS: Clear to auscultation.  HEART: Regular rate and rhythm without murmur.  ABDOMEN: Soft, nontender, and nondistended.  Positive bowel sounds.    : No CVA tenderness  EXTREMITIES: Without any edema. LEFT LEG WRAPPED; left thigh vac in place, upper thigh dressing in place  right knee with dressing in place   MSK: no joint swelling  NEUROLOGIC: Cranial nerves II through XII are grossly intact. No focal deficits  PSYCHIATRIC: Appropriate affect .  SKIN: No Rash                                   Current Antibiotics:  DAPTOmycin IVPB 500 milliGRAM(s) IV Intermittent every 24 hours  meropenem  IVPB 1000 milliGRAM(s) IV Intermittent every 8 hours    Other medications:  baclofen 10 milliGRAM(s) Oral three times a day  enoxaparin Injectable 40 milliGRAM(s) SubCutaneous daily  gabapentin 300 milliGRAM(s) Oral three times a day  HYDROmorphone  Injectable 0.5 milliGRAM(s) IV Push every 4 hours  lidocaine 1% Injectable 20 milliLiter(s) Local Injection once  lisinopril 5 milliGRAM(s) Oral daily  pantoprazole    Tablet 40 milliGRAM(s) Oral two times a day                                                           8.0    7.19  )-----------( 401      ( 19 Jan 2022 06:13 )             25.4   01-19    137  |  102  |  11.4  ----------------------------<  90  3.9   |  22.0  |  0.46<L>    Ca    8.5<L>      19 Jan 2022 06:13  Phos  4.2     01-19  Mg     1.5     01-19                 COVID-19 PCR: NotDetec (01-11-22 @ 12:29)  COVID-19 PCR: NotDetec (01-11-22 @ 05:17)  COVID-19 PCR: NotDetec (01-05-22 @ 08:09)  COVID-19 PCR: NotDetec (12-30-21 @ 22:14)  COVID-19 PCR: NotDetec (12-26-21 @ 11:56)  COVID-19 PCR: NotDetec (12-22-21 @ 05:30)  COVID-19 PCR: NotDetec (12-20-21 @ 08:49)  COVID-19 PCR: NotDetec (12-16-21 @ 15:18)

## 2022-01-19 NOTE — PROGRESS NOTE ADULT - SUBJECTIVE AND OBJECTIVE BOX
Acute Care Surgery/Trauma Surgery Progress Note:    No acute overnight events. Patient afebrile, and hemodynamically competent. Pain well controlled. Tolerating diet. Denies n/v/f/c/cp/sob.   Patient states that the dose of opiods pain meds are no effective. Explained the tolerance effect to patient, patient understood.     Diet, Regular (01-11-22 @ 13:59)      Scheduled Medications:   acetaminophen     Tablet .. 975 milliGRAM(s) Oral every 6 hours  ascorbic acid 1000 milliGRAM(s) Oral two times a day  baclofen 10 milliGRAM(s) Oral every 6 hours  DAPTOmycin IVPB 500 milliGRAM(s) IV Intermittent every 24 hours  enoxaparin Injectable 40 milliGRAM(s) SubCutaneous daily  gabapentin 300 milliGRAM(s) Oral every 8 hours  lisinopril 5 milliGRAM(s) Oral daily  meropenem  IVPB 1000 milliGRAM(s) IV Intermittent every 8 hours  pantoprazole    Tablet 40 milliGRAM(s) Oral two times a day  vitamin E 400 International Unit(s) Oral daily  zinc sulfate 220 milliGRAM(s) Oral daily    PRN Medications:  HYDROmorphone   Tablet 2 milliGRAM(s) Oral every 4 hours PRN Moderate Pain (4 - 6)  HYDROmorphone   Tablet 4 milliGRAM(s) Oral every 4 hours PRN Severe Pain (7 - 10)  HYDROmorphone  Injectable 0.5 milliGRAM(s) IV Push every 4 hours PRN break thru pain  melatonin 5 milliGRAM(s) Oral at bedtime PRN Sleep  ondansetron Injectable 4 milliGRAM(s) IV Push every 8 hours PRN Nausea and/or Vomiting      Objective:   T(F): 98.4 (01-19 @ 05:22), Max: 98.7 (01-18 @ 20:21)  HR: 89 (01-19 @ 05:22) (74 - 95)  BP: 112/70 (01-19 @ 05:22) (112/70 - 152/88)  BP(mean): --  ABP: --  ABP(mean): --  RR: 18 (01-19 @ 05:22) (13 - 18)  SpO2: 98% (01-19 @ 05:22) (95% - 100%)      Physical Exam:   GEN: patient resting comfortably in bed, in no acute distress  RESP: respirations are unlabored, no accessory muscle use, no conversational dyspnea  CVS: RRR  GI: Abdomen soft, non-tender, non-distended, no rebound tenderness / guarding. BS+.   Extremities: Right tight--donor graft site-- ACE band aid clean and dry to be removed on 1/1/21/22.  LLE wound vac working appropriately; This is the recipient site. Patient with drop foot on a supportive boot.   Neuro: Oriented in time, place and person.    I&O's    01-17 @ 07:01  -  01-18 @ 07:00  --------------------------------------------------------  IN:  Total IN: 0 mL    OUT:    Voided (mL): 900 mL  Total OUT: 900 mL    Total NET: -900 mL          LABS:                        8.1    6.87  )-----------( 420      ( 18 Jan 2022 09:34 )             25.9     01-18    139  |  105  |  7.5<L>  ----------------------------<  86  3.8   |  23.0  |  0.35<L>    Ca    8.4<L>      18 Jan 2022 09:34  Phos  4.0     01-18  Mg     1.7     01-18      PT/INR - ( 17 Jan 2022 08:36 )   PT: 14.1 sec;   INR: 1.23 ratio         PTT - ( 17 Jan 2022 08:36 )  PTT:36.5 sec      MICROBIOLOGY:     Culture - Blood (collected 01-13 @ 07:54)  Source: .Blood Blood-Venous  Final Report (01-18 @ 09:00):    No growth at 5 days.    PATHOLOGY:       A 51 year old female admitted due to sepsis from soft tissue infection origen. Pt underwent multiple OR debridement of left lower extremity for soft tissue infection and removal or hardware from the ankle and abscesses of unclear origin. PPD 1 s/p IR guided drainage of distal femur collection. Currently, moderate protein caloric malnutrition under nutritional support.     Plan:   - Reg diet with ensure and enliven.  - DVT ppx.  -IS: above 8.  - per ID continue meropenum and daptomycin for an additional 2 weeks.  -Developing tolerance to opiods.  -Discharge planing.

## 2022-01-19 NOTE — PROGRESS NOTE ADULT - SUBJECTIVE AND OBJECTIVE BOX
Ortho Post Op Check    Name: LUIS FERNANDO DAVIS    MR #: [id number]    Procedure: Left knee I&D POD #22   Surgeon: Dr Tobias    Pt states she continues to have pain medial aspect left knee. This is unchanged and has been like this for weeks   Denies CP, SOB, N/V    PAST MEDICAL & SURGICAL HISTORY:  No pertinent past medical history    History of ankle surgery         General Exam:  Vital Signs Last 24 Hrs  T(C): 36.6 (01-19-22 @ 10:00), Max: 36.6 (01-19-22 @ 10:00)  T(F): 97.8 (01-19-22 @ 10:00), Max: 97.8 (01-19-22 @ 10:00)  HR: 78 (01-19-22 @ 10:00) (78 - 78)  BP: 124/80 (01-19-22 @ 10:00) (124/80 - 124/80)  BP(mean): --  RR: 18 (01-19-22 @ 10:00) (18 - 18)  SpO2: 97% (01-19-22 @ 10:00) (97% - 97%)    General: Pt Alert and oriented, NAD, controlled pain.  Right knee well healed surgical scar.  Left knee well healed surgical scar. Mild tenderness to palpation medial left knee.   Pulses: 2+ dorsalis pedis pulse. Cap refill < 2 sec.  Sensation: Grossly intact to light touch without deficit.  + drop foot currently in AFO with wound vac on.    MEDICATIONS  (STANDING):  acetaminophen     Tablet .. 975 milliGRAM(s) Oral every 6 hours  ascorbic acid 1000 milliGRAM(s) Oral two times a day  baclofen 10 milliGRAM(s) Oral every 6 hours  DAPTOmycin IVPB 500 milliGRAM(s) IV Intermittent every 24 hours  enoxaparin Injectable 40 milliGRAM(s) SubCutaneous daily  gabapentin 300 milliGRAM(s) Oral every 8 hours  lisinopril 5 milliGRAM(s) Oral daily  meropenem  IVPB 1000 milliGRAM(s) IV Intermittent every 8 hours  pantoprazole    Tablet 40 milliGRAM(s) Oral two times a day  senna 2 Tablet(s) Oral at bedtime  vitamin E 400 International Unit(s) Oral daily  zinc sulfate 220 milliGRAM(s) Oral daily    MEDICATIONS  (PRN):  HYDROmorphone   Tablet 2 milliGRAM(s) Oral every 4 hours PRN Moderate Pain (4 - 6)  HYDROmorphone   Tablet 4 milliGRAM(s) Oral every 4 hours PRN Severe Pain (7 - 10)  HYDROmorphone  Injectable 0.5 milliGRAM(s) IV Push every 4 hours PRN break thru pain  melatonin 5 milliGRAM(s) Oral at bedtime PRN Sleep  ondansetron Injectable 4 milliGRAM(s) IV Push every 8 hours PRN Nausea and/or Vomiting          A/P: 51yFemale POD#22 s/p Left knee I&D   - Stable  - Pain Control  - DVT ppx: Lovenox  - abx: as per ID team   - PT eval pending  - Weight bearing status: Ortho recs WBAT B/L LE yet differ  to ACS team for WB status

## 2022-01-19 NOTE — PROGRESS NOTE ADULT - ATTENDING COMMENTS
pt seen and examined. no acute events. s/p skin graft to lle. now covid +. L knee rom has improved to 0-45 /w pain, no micromotion tenderness. R knee ROM 0-90. swelling improved. no erythema or wound issues. Continue IV abx for osteo. Continue PT. Will need likely staged TKA /w spacer in future. d/w pt, in agreement. continue medical management for now

## 2022-01-19 NOTE — PROGRESS NOTE ADULT - ATTENDING COMMENTS
I have seen and examined the patient during rounds form 9-10  events noted  For MCA today  STSG yesterday.  Appreciate ID input

## 2022-01-19 NOTE — CHART NOTE - NSCHARTNOTEFT_GEN_A_CORE
Source: Patient [ ]  Family [ ]   other [ x]EMR    Current Diet: regular with Ensure TID    PO intake:  < 50% [ ]   50-75%  [x ]   %  [ ]  other :    Source for PO intake [ ] Patient [ ] family [x ] chart [ ] staff [ ] other    Enteral /Parenteral Nutrition:     Current Weight: (1/4) 198.2 lbs- per RD bedscale  (12/29) 181 lbs  (12/21) 194.7 lbs  (12/8) 251.9 lbs  (12/7) 205.9 lbs  (12/1) 234.7 lbs  (11/24) 221.3 lbs  (11/22) 245.1 lbs  (11/21) 242.9 lbs  (11/20) 228.1 lbs       184# 1/12, 205# 12/28  left leg left foot non pitting edema    % Weight Change     Pertinent Medications: MEDICATIONS  (STANDING):  acetaminophen     Tablet .. 975 milliGRAM(s) Oral every 6 hours  ascorbic acid 1000 milliGRAM(s) Oral two times a day  baclofen 10 milliGRAM(s) Oral every 6 hours  DAPTOmycin IVPB 500 milliGRAM(s) IV Intermittent every 24 hours  enoxaparin Injectable 40 milliGRAM(s) SubCutaneous daily  gabapentin 300 milliGRAM(s) Oral every 8 hours  lisinopril 5 milliGRAM(s) Oral daily  magnesium sulfate  IVPB 2 Gram(s) IV Intermittent every 2 hours  meropenem  IVPB 1000 milliGRAM(s) IV Intermittent every 8 hours  pantoprazole    Tablet 40 milliGRAM(s) Oral two times a day  potassium chloride    Tablet ER 20 milliEquivalent(s) Oral once  senna 2 Tablet(s) Oral at bedtime  senna 2 Tablet(s) Oral once  sodium chloride 0.9%. 250 milliLiter(s) (100 mL/Hr) IV Continuous <Continuous>  vitamin E 400 International Unit(s) Oral daily  zinc sulfate 220 milliGRAM(s) Oral daily    MEDICATIONS  (PRN):  HYDROmorphone   Tablet 2 milliGRAM(s) Oral every 4 hours PRN Moderate Pain (4 - 6)  HYDROmorphone   Tablet 4 milliGRAM(s) Oral every 4 hours PRN Severe Pain (7 - 10)  HYDROmorphone  Injectable 0.5 milliGRAM(s) IV Push every 4 hours PRN break thru pain  melatonin 5 milliGRAM(s) Oral at bedtime PRN Sleep  ondansetron Injectable 4 milliGRAM(s) IV Push every 8 hours PRN Nausea and/or Vomiting    Pertinent Labs: CBC Full  -  ( 19 Jan 2022 06:13 )  WBC Count : 7.19 K/uL  RBC Count : 2.95 M/uL  Hemoglobin : 8.0 g/dL  Hematocrit : 25.4 %  Platelet Count - Automated : 401 K/uL  Mean Cell Volume : 86.1 fl  Mean Cell Hemoglobin : 27.1 pg  Mean Cell Hemoglobin Concentration : 31.5 gm/dL  Auto Neutrophil # : 3.25 K/uL  Auto Lymphocyte # : 3.16 K/uL  Auto Monocyte # : 0.68 K/uL  Auto Eosinophil # : 0.07 K/uL  Auto Basophil # : 0.01 K/uL  Auto Neutrophil % : 45.2 %  Auto Lymphocyte % : 43.9 %  Auto Monocyte % : 9.5 %  Auto Eosinophil % : 1.0 %  Auto Basophil % : 0.1 %      01-19 Na137 mmol/L Glu 90 mg/dL K+ 3.9 mmol/L Cr  0.46 mg/dL<L> BUN 11.4 mg/dL Phos 4.2 mg/dL Alb n/a   PAB n/a           Skin: L UL wound with wound vac    Nutrition focused physical exam conducted - found signs of malnutrition [ ]absent [x ]present    Subcutaneous fat loss: [x ] Orbital fat pads region, [ x]Buccal fat region, [ ]Triceps region,  [ ]Ribs region    Muscle wasting: [x ]Temples region, [x ]Clavicle region, [ ]Shoulder region, [ ]Scapula region, [ ]Interosseous region,  [ ]thigh region, [ ]Calf region    Estimated Needs:   [ x] no change since previous assessment  [ ] recalculated:      Nutrition diagnosis: Pt presents at risk secondary to acute moderate protein calorie malnutrition, related to inadequate protein calorie intake in the setting of increased needs,  as evidenced by PO<75% est needs, possible 15% weight loss, edema,  and physical findings.     Recommendations:   1) Continue ensure enlive TID  2) Daily weight  3) Swapnil BID  4) RX MVI, Vit C, Zinc      Monitoring and Evaluation:   [x ] PO intake [ x] Tolerance to diet prescription [X] Weights  [X] Follow up per protocol [X] Labs:

## 2022-01-20 LAB
ANION GAP SERPL CALC-SCNC: 10 MMOL/L — SIGNIFICANT CHANGE UP (ref 5–17)
BASOPHILS # BLD AUTO: 0.01 K/UL — SIGNIFICANT CHANGE UP (ref 0–0.2)
BASOPHILS NFR BLD AUTO: 0.1 % — SIGNIFICANT CHANGE UP (ref 0–2)
BUN SERPL-MCNC: 8.7 MG/DL — SIGNIFICANT CHANGE UP (ref 8–20)
CALCIUM SERPL-MCNC: 8.7 MG/DL — SIGNIFICANT CHANGE UP (ref 8.6–10.2)
CHLORIDE SERPL-SCNC: 103 MMOL/L — SIGNIFICANT CHANGE UP (ref 98–107)
CO2 SERPL-SCNC: 23 MMOL/L — SIGNIFICANT CHANGE UP (ref 22–29)
CREAT SERPL-MCNC: 0.39 MG/DL — LOW (ref 0.5–1.3)
EOSINOPHIL # BLD AUTO: 0.13 K/UL — SIGNIFICANT CHANGE UP (ref 0–0.5)
EOSINOPHIL NFR BLD AUTO: 1.6 % — SIGNIFICANT CHANGE UP (ref 0–6)
GLUCOSE SERPL-MCNC: 86 MG/DL — SIGNIFICANT CHANGE UP (ref 70–99)
HCT VFR BLD CALC: 25.2 % — LOW (ref 34.5–45)
HGB BLD-MCNC: 7.9 G/DL — LOW (ref 11.5–15.5)
IMM GRANULOCYTES NFR BLD AUTO: 0.5 % — SIGNIFICANT CHANGE UP (ref 0–1.5)
LYMPHOCYTES # BLD AUTO: 2.91 K/UL — SIGNIFICANT CHANGE UP (ref 1–3.3)
LYMPHOCYTES # BLD AUTO: 35.1 % — SIGNIFICANT CHANGE UP (ref 13–44)
MAGNESIUM SERPL-MCNC: 1.7 MG/DL — SIGNIFICANT CHANGE UP (ref 1.6–2.6)
MCHC RBC-ENTMCNC: 27.4 PG — SIGNIFICANT CHANGE UP (ref 27–34)
MCHC RBC-ENTMCNC: 31.3 GM/DL — LOW (ref 32–36)
MCV RBC AUTO: 87.5 FL — SIGNIFICANT CHANGE UP (ref 80–100)
MONOCYTES # BLD AUTO: 0.83 K/UL — SIGNIFICANT CHANGE UP (ref 0–0.9)
MONOCYTES NFR BLD AUTO: 10 % — SIGNIFICANT CHANGE UP (ref 2–14)
NEUTROPHILS # BLD AUTO: 4.38 K/UL — SIGNIFICANT CHANGE UP (ref 1.8–7.4)
NEUTROPHILS NFR BLD AUTO: 52.7 % — SIGNIFICANT CHANGE UP (ref 43–77)
PHOSPHATE SERPL-MCNC: 3.8 MG/DL — SIGNIFICANT CHANGE UP (ref 2.4–4.7)
PLATELET # BLD AUTO: 404 K/UL — HIGH (ref 150–400)
POTASSIUM SERPL-MCNC: 3.8 MMOL/L — SIGNIFICANT CHANGE UP (ref 3.5–5.3)
POTASSIUM SERPL-SCNC: 3.8 MMOL/L — SIGNIFICANT CHANGE UP (ref 3.5–5.3)
RBC # BLD: 2.88 M/UL — LOW (ref 3.8–5.2)
RBC # FLD: 16.4 % — HIGH (ref 10.3–14.5)
SODIUM SERPL-SCNC: 136 MMOL/L — SIGNIFICANT CHANGE UP (ref 135–145)
WBC # BLD: 8.3 K/UL — SIGNIFICANT CHANGE UP (ref 3.8–10.5)
WBC # FLD AUTO: 8.3 K/UL — SIGNIFICANT CHANGE UP (ref 3.8–10.5)

## 2022-01-20 PROCEDURE — 99024 POSTOP FOLLOW-UP VISIT: CPT

## 2022-01-20 PROCEDURE — 99232 SBSQ HOSP IP/OBS MODERATE 35: CPT

## 2022-01-20 RX ORDER — POTASSIUM CHLORIDE 20 MEQ
40 PACKET (EA) ORAL ONCE
Refills: 0 | Status: COMPLETED | OUTPATIENT
Start: 2022-01-20 | End: 2022-01-20

## 2022-01-20 RX ORDER — MAGNESIUM SULFATE 500 MG/ML
2 VIAL (ML) INJECTION ONCE
Refills: 0 | Status: COMPLETED | OUTPATIENT
Start: 2022-01-20 | End: 2022-01-20

## 2022-01-20 RX ADMIN — MEROPENEM 100 MILLIGRAM(S): 1 INJECTION INTRAVENOUS at 06:19

## 2022-01-20 RX ADMIN — HYDROMORPHONE HYDROCHLORIDE 4 MILLIGRAM(S): 2 INJECTION INTRAMUSCULAR; INTRAVENOUS; SUBCUTANEOUS at 17:57

## 2022-01-20 RX ADMIN — GABAPENTIN 300 MILLIGRAM(S): 400 CAPSULE ORAL at 06:18

## 2022-01-20 RX ADMIN — Medication 10 MILLIGRAM(S): at 11:54

## 2022-01-20 RX ADMIN — GABAPENTIN 300 MILLIGRAM(S): 400 CAPSULE ORAL at 22:59

## 2022-01-20 RX ADMIN — Medication 975 MILLIGRAM(S): at 23:04

## 2022-01-20 RX ADMIN — Medication 1000 MILLIGRAM(S): at 17:58

## 2022-01-20 RX ADMIN — HYDROMORPHONE HYDROCHLORIDE 4 MILLIGRAM(S): 2 INJECTION INTRAMUSCULAR; INTRAVENOUS; SUBCUTANEOUS at 19:00

## 2022-01-20 RX ADMIN — Medication 975 MILLIGRAM(S): at 17:58

## 2022-01-20 RX ADMIN — HYDROMORPHONE HYDROCHLORIDE 2 MILLIGRAM(S): 2 INJECTION INTRAMUSCULAR; INTRAVENOUS; SUBCUTANEOUS at 09:00

## 2022-01-20 RX ADMIN — Medication 25 GRAM(S): at 13:54

## 2022-01-20 RX ADMIN — HYDROMORPHONE HYDROCHLORIDE 0.5 MILLIGRAM(S): 2 INJECTION INTRAMUSCULAR; INTRAVENOUS; SUBCUTANEOUS at 11:50

## 2022-01-20 RX ADMIN — Medication 10 MILLIGRAM(S): at 06:18

## 2022-01-20 RX ADMIN — HYDROMORPHONE HYDROCHLORIDE 0.5 MILLIGRAM(S): 2 INJECTION INTRAMUSCULAR; INTRAVENOUS; SUBCUTANEOUS at 12:05

## 2022-01-20 RX ADMIN — Medication 975 MILLIGRAM(S): at 00:00

## 2022-01-20 RX ADMIN — Medication 1000 MILLIGRAM(S): at 06:26

## 2022-01-20 RX ADMIN — Medication 975 MILLIGRAM(S): at 06:17

## 2022-01-20 RX ADMIN — Medication 975 MILLIGRAM(S): at 11:53

## 2022-01-20 RX ADMIN — Medication 975 MILLIGRAM(S): at 06:47

## 2022-01-20 RX ADMIN — DAPTOMYCIN 120 MILLIGRAM(S): 500 INJECTION, POWDER, LYOPHILIZED, FOR SOLUTION INTRAVENOUS at 17:59

## 2022-01-20 RX ADMIN — Medication 40 MILLIEQUIVALENT(S): at 13:53

## 2022-01-20 RX ADMIN — ENOXAPARIN SODIUM 40 MILLIGRAM(S): 100 INJECTION SUBCUTANEOUS at 11:55

## 2022-01-20 RX ADMIN — PANTOPRAZOLE SODIUM 40 MILLIGRAM(S): 20 TABLET, DELAYED RELEASE ORAL at 06:18

## 2022-01-20 RX ADMIN — Medication 400 INTERNATIONAL UNIT(S): at 12:44

## 2022-01-20 RX ADMIN — Medication 975 MILLIGRAM(S): at 12:45

## 2022-01-20 RX ADMIN — GABAPENTIN 300 MILLIGRAM(S): 400 CAPSULE ORAL at 13:54

## 2022-01-20 RX ADMIN — PANTOPRAZOLE SODIUM 40 MILLIGRAM(S): 20 TABLET, DELAYED RELEASE ORAL at 17:57

## 2022-01-20 RX ADMIN — LISINOPRIL 5 MILLIGRAM(S): 2.5 TABLET ORAL at 06:18

## 2022-01-20 RX ADMIN — MEROPENEM 100 MILLIGRAM(S): 1 INJECTION INTRAVENOUS at 13:53

## 2022-01-20 RX ADMIN — Medication 10 MILLIGRAM(S): at 22:55

## 2022-01-20 RX ADMIN — HYDROMORPHONE HYDROCHLORIDE 2 MILLIGRAM(S): 2 INJECTION INTRAMUSCULAR; INTRAVENOUS; SUBCUTANEOUS at 08:06

## 2022-01-20 RX ADMIN — ZINC SULFATE TAB 220 MG (50 MG ZINC EQUIVALENT) 220 MILLIGRAM(S): 220 (50 ZN) TAB at 13:54

## 2022-01-20 RX ADMIN — MEROPENEM 100 MILLIGRAM(S): 1 INJECTION INTRAVENOUS at 23:03

## 2022-01-20 RX ADMIN — Medication 10 MILLIGRAM(S): at 17:59

## 2022-01-20 RX ADMIN — SENNA PLUS 2 TABLET(S): 8.6 TABLET ORAL at 22:57

## 2022-01-20 NOTE — PROGRESS NOTE ADULT - ASSESSMENT
51y Female present to ED with left leg swelling, erythema, pain. Patient endorses she had left knee pain for 1 week presented 3 days ago to ED  where she states was given an steroid shot, and ibuprofen. however pain and edema worsened. Patient states she wasnt feeling herself today reason why she came. Endorses chills initially with pain 3 days ago but none since, denies history of trauma, insect bites, recent interventions, or injections to the area, contact with ticks, history of diabetes.  PT AS ABOVE WITH LEFT LEG SWELLING PT WITH INCREASED WBC PLACED ON ABX FOR PRESUMED INFECTION  PT WITH CT SCAN WITH FASCIAL EDAM PT BROUGHT TO THE OR EMERGENTLY AND  UNDERWENT FASCIOTOMY   PT BROUGHT TO THE OR  BOTH TRAUMA AND ORTHO INVOLVED  PURULENT FLUID FOUND  NECROTIZING SOFT TISSUE INFECTION    BLOOD CX WITH GROUP A STREP AND  OPERATIVE FLUID WITH STREP   PT WAS  ON PCN AND CLINDA for POSSIBLE ANTITOXIN EFFECT IN GROUP A STREP INFECTION     PT  S/P OR WASHOUT 12.2  AND  OR AGAIN 12/7  and late on 12/10 121/15 12/17 and 12/20 12/23     REPEAT   OPERATIVE CX NEG             AS ABOVE  REPEAT  OR  12/20 12/23    S/ RETURN TO THE OR 12/28 AND 12/31              ON MERREM/DAPTOMYCIN  repeat Ct 1/10 as above with increase in soft tissue abscesses and likely OM. s/p Ir drainage on 1/11, with dark old blood aspirated.  IR cx ngtd  f/u BCX ngtd    plan for  DAPTO 500 mg IV daily and .MERREM  1 g iv q8h THROUGH 1/24/22    AND THEN ORAL TX  WITH BACTRIM DS BID   FOR ADDITIONAL MONTH   WILL HAVE TO DECIDE LENGTH OF TREATMENT   . Will need weekly CBc CMP ESR CRp and CK. Last CK 1/11 is 22  s/p  skin graft    SEEN IN PACU    COVID POS WITH NO RESP SX  NOT ON 02   PT HAD  RECEIVED ONE DOSE IN OCTOBER NEVER HAD SECOND DOSE     S/P    SOTROVIMAB  MAB THERAPY

## 2022-01-20 NOTE — PROGRESS NOTE ADULT - ATTENDING COMMENTS
I have seen and examined the patient during rounds form 8-9a  events noted  no ne events  Abd per id  bed rest  dvt chemoprophylaxys

## 2022-01-20 NOTE — PROGRESS NOTE ADULT - SUBJECTIVE AND OBJECTIVE BOX
SUBJECTIVE/24 hour events: santosh is a 51yFemale complicated necrotizing wound infection to lle, course now complicated as patient is COVID + (asymptomatic). Patient with no acute events overnight, in a  isolation room, tolerating a diet, pain controlled. Patient pod#2 rle stsg. Patient being followed by ID for COVID and started monoclonal antibodies    Vital Signs Last 24 Hrs  T(C): 36.7 (19 Jan 2022 17:00), Max: 36.9 (19 Jan 2022 05:22)  T(F): 98 (19 Jan 2022 17:00), Max: 98.4 (19 Jan 2022 05:22)  HR: 86 (19 Jan 2022 17:00) (78 - 89)  BP: 116/75 (19 Jan 2022 17:00) (112/70 - 124/80)  BP(mean): --  RR: 18 (19 Jan 2022 17:00) (18 - 18)  SpO2: 98% (19 Jan 2022 17:00) (97% - 98%)  Drug Dosing Weight  Height (cm): 167.6 (28 Dec 2021 12:36)  Weight (kg): 93 (28 Dec 2021 12:36)  BMI (kg/m2): 33.1 (28 Dec 2021 12:36)  BSA (m2): 2.02 (28 Dec 2021 12:36)  I&O's Detail    Allergies    No Known Allergies    Intolerances                              8.0    7.19  )-----------( 401      ( 19 Jan 2022 06:13 )             25.4   01-19    137  |  102  |  11.4  ----------------------------<  90  3.9   |  22.0  |  0.46<L>    Ca    8.5<L>      19 Jan 2022 06:13  Phos  4.2     01-19  Mg     1.5     01-19        ROS:    PHYSICAL EXAM:  Constitutional: resting comfortably   Respiratory: respirations are unlabored, no accessory muscle use, no conversational dyspnea, RA   Cardiovascular: regular rate & rhythm  Gastrointestinal: Abdomen soft, non-tender, non-distended, no rebound tenderness / guarding  Neurological: A&O x 3; no gross sensory / motor / coordination deficits  Musculoskeletal: No joint pain, swelling or deformity; no limitation of movement. wound vac on LLE to suction, good seal, wbat to lle with cam boot           MEDICATIONS  (STANDING):  acetaminophen     Tablet .. 975 milliGRAM(s) Oral every 6 hours  ascorbic acid 1000 milliGRAM(s) Oral two times a day  baclofen 10 milliGRAM(s) Oral every 6 hours  DAPTOmycin IVPB 500 milliGRAM(s) IV Intermittent every 24 hours  enoxaparin Injectable 40 milliGRAM(s) SubCutaneous daily  gabapentin 300 milliGRAM(s) Oral every 8 hours  lisinopril 5 milliGRAM(s) Oral daily  meropenem  IVPB 1000 milliGRAM(s) IV Intermittent every 8 hours  pantoprazole    Tablet 40 milliGRAM(s) Oral two times a day  senna 2 Tablet(s) Oral at bedtime  vitamin E 400 International Unit(s) Oral daily  zinc sulfate 220 milliGRAM(s) Oral daily    MEDICATIONS  (PRN):  HYDROmorphone   Tablet 2 milliGRAM(s) Oral every 4 hours PRN Moderate Pain (4 - 6)  HYDROmorphone   Tablet 4 milliGRAM(s) Oral every 4 hours PRN Severe Pain (7 - 10)  HYDROmorphone  Injectable 0.5 milliGRAM(s) IV Push every 4 hours PRN break thru pain  melatonin 5 milliGRAM(s) Oral at bedtime PRN Sleep  ondansetron Injectable 4 milliGRAM(s) IV Push every 8 hours PRN Nausea and/or Vomiting      RADIOLOGY STUDIES:    CULTURES:

## 2022-01-20 NOTE — PROGRESS NOTE ADULT - ASSESSMENT
A 51 year old female s/p multiple OR debridement of left lower extremity for soft tissue infection and abscesses of unclear origin. POD#2 of stsg. Currently, moderate protein caloric malnutrition.    Plan:   - Reg diet with ensure and enliven  - bedrest day 2/5  - pain control   - DVT ppx  - per ID continue merropenum and dapt for an additional 2   - start discharge planning

## 2022-01-20 NOTE — PROGRESS NOTE ADULT - SUBJECTIVE AND OBJECTIVE BOX
[facility]  INFECTIOUS DISEASES AND INTERNAL MEDICINE at [place]  =======================================================  Theo Morrow MD  Diplomates American Board of Internal Medicine and Infectious Diseases  Tel: [phone]      Fax: [phone]  =======================================================    LUIS FERNANDO DAVIS [id number]    Follow up: necrotizing soft tissue infection  afebrile  feels well   S/P  OR   REMAINS AFEBRILE  COVID POS S/P SOTROVIMAB 1/19    Allergies:  No Known Allergies           REVIEW OF SYSTEMS:  CONSTITUTIONAL:  No Fever or chills  HEENT:   No diplopia or blurred vision.  No earache, sore throat or runny nose.  CARDIOVASCULAR:  No pressure, squeezing, strangling, tightness, heaviness or aching about the chest, neck, axilla or epigastrium.  RESPIRATORY:  No cough, shortness of breath  GASTROINTESTINAL:  No nausea, vomiting or diarrhea.  GENITOURINARY:  No dysuria, frequency or urgency. No Blood in urine  MUSCULOSKELETAL: as PER HPI   SKIN:  AS PER HPI  NEUROLOGIC:  No Headaches, seizures or weakness.  PSYCHIATRIC:  No disorder of thought or mood.  ENDOCRINE:  No heat or cold intolerance  HEMATOLOGICAL:  No easy bruising or bleeding.       Physical Exam:   Vit Vital Signs Last 24 Hrs  T(C): 37.1 (01-20-22 @ 10:12), Max: 37.6 (01-20-22 @ 05:27)  T(F): 98.7 (01-20-22 @ 10:12), Max: 99.7 (01-20-22 @ 05:27)  HR: 84 (01-20-22 @ 10:12) (84 - 92)  BP: 136/80 (01-20-22 @ 10:12) (116/75 - 136/80)  BP(mean): --  RR: 18 (01-20-22 @ 10:12) (16 - 18)  SpO2: 98% (01-20-22 @ 10:12) (97% - 98%)              GEN: NAD, pleasant  HEENT: normocephalic and atraumatic. EOMI. PERRL.  Anicteric   NECK: Supple.   LUNGS: Clear to auscultation.  HEART: Regular rate and rhythm without murmur.  ABDOMEN: Soft, nontender, and nondistended.  Positive bowel sounds.    : No CVA tenderness  EXTREMITIES: Without any edema. LEFT LEG WRAPPED; left thigh vac in place, upper thigh dressing in place  right knee with dressing in place   MSK: no joint swelling  NEUROLOGIC: Cranial nerves II through XII are grossly intact. No focal deficits  PSYCHIATRIC: Appropriate affect .  SKIN: No Rash                                   Current Antibiotics:  DAPTOmycin IVPB 500 milliGRAM(s) IV Intermittent every 24 hours  meropenem  IVPB 1000 milliGRAM(s) IV Intermittent every 8 hours    Other medications:  baclofen 10 milliGRAM(s) Oral three times a day  enoxaparin Injectable 40 milliGRAM(s) SubCutaneous daily  gabapentin 300 milliGRAM(s) Oral three times a day  HYDROmorphone  Injectable 0.5 milliGRAM(s) IV Push every 4 hours  lidocaine 1% Injectable 20 milliLiter(s) Local Injection once  lisinopril 5 milliGRAM(s) Oral daily  pantoprazole    Tablet 40 milliGRAM(s) Oral two times a day                                                                                7.9    8.30  )-----------( 404      ( 20 Jan 2022 07:48 )             25.2   01-20    136  |  103  |  8.7  ----------------------------<  86  3.8   |  23.0  |  0.39<L>    Ca    8.7      20 Jan 2022 07:48  Phos  3.8     01-20  Mg     1.7     01-20                   COVID-19 PCR: NotDetec (01-11-22 @ 12:29)  COVID-19 PCR: NotDetec (01-11-22 @ 05:17)  COVID-19 PCR: NotDetec (01-05-22 @ 08:09)  COVID-19 PCR: NotDetec (12-30-21 @ 22:14)  COVID-19 PCR: NotDetec (12-26-21 @ 11:56)  COVID-19 PCR: NotDetec (12-22-21 @ 05:30)  COVID-19 PCR: NotDetec (12-20-21 @ 08:49)  COVID-19 PCR: NotDetec (12-16-21 @ 15:18)

## 2022-01-21 PROCEDURE — 99232 SBSQ HOSP IP/OBS MODERATE 35: CPT

## 2022-01-21 PROCEDURE — 99024 POSTOP FOLLOW-UP VISIT: CPT

## 2022-01-21 RX ADMIN — Medication 975 MILLIGRAM(S): at 12:46

## 2022-01-21 RX ADMIN — MEROPENEM 100 MILLIGRAM(S): 1 INJECTION INTRAVENOUS at 05:10

## 2022-01-21 RX ADMIN — Medication 1000 MILLIGRAM(S): at 18:38

## 2022-01-21 RX ADMIN — Medication 975 MILLIGRAM(S): at 18:39

## 2022-01-21 RX ADMIN — GABAPENTIN 300 MILLIGRAM(S): 400 CAPSULE ORAL at 22:53

## 2022-01-21 RX ADMIN — Medication 10 MILLIGRAM(S): at 18:42

## 2022-01-21 RX ADMIN — ENOXAPARIN SODIUM 40 MILLIGRAM(S): 100 INJECTION SUBCUTANEOUS at 12:48

## 2022-01-21 RX ADMIN — Medication 10 MILLIGRAM(S): at 12:47

## 2022-01-21 RX ADMIN — DAPTOMYCIN 120 MILLIGRAM(S): 500 INJECTION, POWDER, LYOPHILIZED, FOR SOLUTION INTRAVENOUS at 18:42

## 2022-01-21 RX ADMIN — PANTOPRAZOLE SODIUM 40 MILLIGRAM(S): 20 TABLET, DELAYED RELEASE ORAL at 05:10

## 2022-01-21 RX ADMIN — Medication 1000 MILLIGRAM(S): at 05:11

## 2022-01-21 RX ADMIN — MEROPENEM 100 MILLIGRAM(S): 1 INJECTION INTRAVENOUS at 22:53

## 2022-01-21 RX ADMIN — Medication 10 MILLIGRAM(S): at 22:54

## 2022-01-21 RX ADMIN — GABAPENTIN 300 MILLIGRAM(S): 400 CAPSULE ORAL at 12:57

## 2022-01-21 RX ADMIN — Medication 400 INTERNATIONAL UNIT(S): at 12:57

## 2022-01-21 RX ADMIN — Medication 10 MILLIGRAM(S): at 05:10

## 2022-01-21 RX ADMIN — MEROPENEM 100 MILLIGRAM(S): 1 INJECTION INTRAVENOUS at 14:42

## 2022-01-21 RX ADMIN — HYDROMORPHONE HYDROCHLORIDE 4 MILLIGRAM(S): 2 INJECTION INTRAMUSCULAR; INTRAVENOUS; SUBCUTANEOUS at 03:22

## 2022-01-21 RX ADMIN — PANTOPRAZOLE SODIUM 40 MILLIGRAM(S): 20 TABLET, DELAYED RELEASE ORAL at 18:37

## 2022-01-21 RX ADMIN — ZINC SULFATE TAB 220 MG (50 MG ZINC EQUIVALENT) 220 MILLIGRAM(S): 220 (50 ZN) TAB at 12:47

## 2022-01-21 RX ADMIN — GABAPENTIN 300 MILLIGRAM(S): 400 CAPSULE ORAL at 05:11

## 2022-01-21 RX ADMIN — Medication 975 MILLIGRAM(S): at 22:54

## 2022-01-21 RX ADMIN — Medication 975 MILLIGRAM(S): at 05:11

## 2022-01-21 RX ADMIN — HYDROMORPHONE HYDROCHLORIDE 2 MILLIGRAM(S): 2 INJECTION INTRAMUSCULAR; INTRAVENOUS; SUBCUTANEOUS at 18:48

## 2022-01-21 RX ADMIN — HYDROMORPHONE HYDROCHLORIDE 4 MILLIGRAM(S): 2 INJECTION INTRAMUSCULAR; INTRAVENOUS; SUBCUTANEOUS at 14:44

## 2022-01-21 NOTE — PROGRESS NOTE ADULT - ASSESSMENT
51y Female present to ED with left leg swelling, erythema, pain. Patient endorses she had left knee pain for 1 week presented 3 days ago to ED  where she states was given an steroid shot, and ibuprofen. however pain and edema worsened. Patient states she wasnt feeling herself today reason why she came. Endorses chills initially with pain 3 days ago but none since, denies history of trauma, insect bites, recent interventions, or injections to the area, contact with ticks, history of diabetes.  PT AS ABOVE WITH LEFT LEG SWELLING PT WITH INCREASED WBC PLACED ON ABX FOR PRESUMED INFECTION  PT WITH CT SCAN WITH FASCIAL EDAM PT BROUGHT TO THE OR EMERGENTLY AND  UNDERWENT FASCIOTOMY   PT BROUGHT TO THE OR  BOTH TRAUMA AND ORTHO INVOLVED  PURULENT FLUID FOUND  NECROTIZING SOFT TISSUE INFECTION    BLOOD CX WITH GROUP A STREP AND  OPERATIVE FLUID WITH STREP  PT WAS  ON PCN AND CLINDA for POSSIBLE ANTITOXIN EFFECT IN GROUP A STREP INFECTION    PT  S/P OR WASHOUT 12.2  AND  OR AGAIN 12/7  and late on 12/10 121/15 12/17 and 12/20 12/23     REPEAT OPERATIVE CX NEG     AS ABOVE  REPEAT  OR  12/20 12/23    S/ RETURN TO THE OR 12/28 AND 12/31    ON MERREM/ DAPTOMYCIN  repeat CT 1/10/22 as above with increase in soft tissue abscesses and likely OM.   s/p Ir drainage on 1/11/22, with dark old blood aspirated.  IR cx NGTD  f/u BCX ngtd    plan for  DAPTO 500 mg IV daily and .MERREM  1 g iv q8h THROUGH 1/24/22      AND THEN ORAL TX  WITH BACTRIM DS BID   FOR ADDITIONAL MONTH     WILL HAVE TO DECIDE LENGTH OF TREATMENT     . Will need weekly CBc CMP ESR CRp and CK. Last CK 1/11 is 22     COVID POS WITH NO RESP SX  NOT ON 02   PT HAD  RECEIVED ONE DOSE IN OCTOBER NEVER HAD SECOND DOSE     S/P    SOTROVIMAB  MAB THERAPY 1/19/22

## 2022-01-21 NOTE — PROGRESS NOTE ADULT - ATTENDING COMMENTS
Patient seen and examined on am rounds. No acute events, doing well. Post op dressing to be removed in two days. Discharge planning

## 2022-01-21 NOTE — PROGRESS NOTE ADULT - SUBJECTIVE AND OBJECTIVE BOX
INTERVAL HPI/OVERNIGHT EVENTS:    SUBJECTIVE: No acute overnight events reported. Patient seen at bedside with no major complaints nor issues. States that her pain is well controlled with the current regimen, tolerating diet.       MEDICATIONS  (STANDING):  acetaminophen     Tablet .. 975 milliGRAM(s) Oral every 6 hours  ascorbic acid 1000 milliGRAM(s) Oral two times a day  baclofen 10 milliGRAM(s) Oral every 6 hours  DAPTOmycin IVPB 500 milliGRAM(s) IV Intermittent every 24 hours  enoxaparin Injectable 40 milliGRAM(s) SubCutaneous daily  gabapentin 300 milliGRAM(s) Oral every 8 hours  lisinopril 5 milliGRAM(s) Oral daily  meropenem  IVPB 1000 milliGRAM(s) IV Intermittent every 8 hours  pantoprazole    Tablet 40 milliGRAM(s) Oral two times a day  senna 2 Tablet(s) Oral at bedtime  vitamin E 400 International Unit(s) Oral daily  zinc sulfate 220 milliGRAM(s) Oral daily    MEDICATIONS  (PRN):  HYDROmorphone   Tablet 2 milliGRAM(s) Oral every 4 hours PRN Moderate Pain (4 - 6)  HYDROmorphone   Tablet 4 milliGRAM(s) Oral every 4 hours PRN Severe Pain (7 - 10)  HYDROmorphone  Injectable 0.5 milliGRAM(s) IV Push every 4 hours PRN break thru pain  melatonin 5 milliGRAM(s) Oral at bedtime PRN Sleep  ondansetron Injectable 4 milliGRAM(s) IV Push every 8 hours PRN Nausea and/or Vomiting      Vital Signs Last 24 Hrs  T(C): 37.3 (20 Jan 2022 21:08), Max: 37.6 (20 Jan 2022 05:27)  T(F): 99.1 (20 Jan 2022 21:08), Max: 99.7 (20 Jan 2022 05:27)  HR: 86 (20 Jan 2022 21:08) (84 - 92)  BP: 127/82 (20 Jan 2022 21:08) (124/75 - 136/80)  BP(mean): --  RR: 18 (20 Jan 2022 21:08) (16 - 18)  SpO2: 99% (20 Jan 2022 21:08) (97% - 99%)    PE  Constitutional: resting comfortably   Respiratory: respirations are unlabored, no accessory muscle use, no conversational dyspnea, RA   Cardiovascular: regular rate & rhythm  Gastrointestinal: Abdomen soft, non-tender, non-distended, no rebound tenderness / guarding  Neurological: A&O x 3; no gross sensory / motor / coordination deficits  Musculoskeletal: No joint pain, swelling or deformity; no limitation of movement. wound vac on LLE to suction, good seal, wbat to lle with cam boot       I&O's Detail      LABS:                        7.9    8.30  )-----------( 404      ( 20 Jan 2022 07:48 )             25.2     01-20    136  |  103  |  8.7  ----------------------------<  86  3.8   |  23.0  |  0.39<L>    Ca    8.7      20 Jan 2022 07:48  Phos  3.8     01-20  Mg     1.7     01-20            RADIOLOGY & ADDITIONAL STUDIES:

## 2022-01-21 NOTE — PROGRESS NOTE ADULT - SUBJECTIVE AND OBJECTIVE BOX
ORTHOPEDIC POST-OP PROGRESS NOTE:    Name: LUIS FERNANDO DAVIS    MR #: [id number]    Procedure: Left knee I&D POD #24   Surgeon: Dr Tobias        Pt seen and examined at bedside around 7:30am. Patient states that she continues to have pain to medial aspect of knee, remains unchanged from weeks prior. No new acute complaints. Denies CP, SOB, N/V.        Vital Signs Last 24 Hrs  T(C): 36.8 (01-21-22 @ 04:41), Max: 36.8 (01-21-22 @ 04:41)  T(F): 98.3 (01-21-22 @ 04:41), Max: 98.3 (01-21-22 @ 04:41)  HR: 80 (01-21-22 @ 04:41) (80 - 80)  BP: 127/76 (01-21-22 @ 04:41) (127/76 - 127/76)  BP(mean): --  RR: 18 (01-21-22 @ 04:41) (18 - 18)  SpO2: 99% (01-21-22 @ 04:41) (99% - 99%)      General: Pt Alert and oriented, NAD, controlled pain.    Right LE: Right knee well healed surgical scar. +DF/PF. 2+ DP pulse. Sensation grossly intact to light touch. +AROM of knee.     Left LE: Left knee well healed surgical scar. + Mild tenderness to palpation medial left knee.  2+ dorsalis pedis pulse. Cap refill < 2 sec. Sensation grossly intact to light touch without deficit. + drop foot currently in AFO with wound vac on and functioning. + plantarflexion        A/P: 51yFemale POD#24 s/p Left knee I&D   - Stable  - Pain Control  - DVT ppx: Lovenox  - abx: as per ID team   - PT   - Weight bearing status: Ortho recs WBAT B/L LE yet defer  to ACS team for WB status

## 2022-01-21 NOTE — PROGRESS NOTE ADULT - ASSESSMENT
A 51 year old female s/p multiple OR debridement of left lower extremity for soft tissue infection and abscesses of unclear origin. POD#3 of stsg. Currently, moderate protein caloric malnutrition.    Plan:   - take down RLE donor site dressing, leave to air   - Continue Wound VAC, change on 1/23  - Reg diet with ensure and enliven  - bedrest day 3/5  - pain control   - DVT ppx  - per ID continue merropenum and dapt for an additional 2   - start discharge planning

## 2022-01-21 NOTE — PROGRESS NOTE ADULT - SUBJECTIVE AND OBJECTIVE BOX
[facility]  INFECTIOUS DISEASES AND INTERNAL MEDICINE at [place]  =======================================================  Theo Morrow MD  Diplomates American Board of Internal Medicine and Infectious Diseases  Tel: [phone]      Fax: [phone]  =======================================================    LUIS FERNANDO DAVIS [id number]    Follow up:  left leg necrotizing infection  afebrile  feels well   S/P  OR   REMAINS AFEBRILE  COVID POS S/P SOTROVIMAB 1/19/22      Allergies:  No Known Allergies           REVIEW OF SYSTEMS:  CONSTITUTIONAL:  No Fever or chills  HEENT:   No diplopia or blurred vision.  No earache, sore throat or runny nose.  CARDIOVASCULAR:  No pressure, squeezing, strangling, tightness, heaviness or aching about the chest, neck, axilla or epigastrium.  RESPIRATORY:  No cough, shortness of breath  GASTROINTESTINAL:  No nausea, vomiting or diarrhea.  GENITOURINARY:  No dysuria, frequency or urgency. No Blood in urine  MUSCULOSKELETAL: as PER HPI   SKIN:  AS PER HPI  NEUROLOGIC:  No Headaches, seizures or weakness.  PSYCHIATRIC:  No disorder of thought or mood.  ENDOCRINE:  No heat or cold intolerance  HEMATOLOGICAL:  No easy bruising or bleeding.       Physical Exam:        GEN: NAD, pleasant  HEENT: normocephalic and atraumatic. EOMI. PERRL.  Anicteric   NECK: Supple.   LUNGS: Clear to auscultation.  HEART: Regular rate and rhythm without murmur.  ABDOMEN: Soft, nontender, and nondistended.  Positive bowel sounds.    : No CVA tenderness  EXTREMITIES: Without any edema. LEFT LEG  with multiple incisions,   wound foam in place, vac in place   right knee with dressing in place   MSK: no joint swelling  NEUROLOGIC: Cranial nerves II through XII are grossly intact. No focal deficits  PSYCHIATRIC: Appropriate affect .  SKIN: No Rash          ==========    Vitals:  ============  T(F): 98 (21 Jan 2022 09:40), Max: 99.1 (20 Jan 2022 21:08)  HR: 83 (21 Jan 2022 09:40)  BP: 120/74 (21 Jan 2022 09:40)  RR: 18 (21 Jan 2022 09:40)  SpO2: 100% (21 Jan 2022 09:40) (98% - 100%)  temp max in last 48H T(F): , Max: 99.7 (01-20-22 @ 05:27)    =======================================================  Current Antibiotics:  DAPTOmycin IVPB 500 milliGRAM(s) IV Intermittent every 24 hours  meropenem  IVPB 1000 milliGRAM(s) IV Intermittent every 8 hours    Other medications:  acetaminophen     Tablet .. 975 milliGRAM(s) Oral every 6 hours  ascorbic acid 1000 milliGRAM(s) Oral two times a day  baclofen 10 milliGRAM(s) Oral every 6 hours  enoxaparin Injectable 40 milliGRAM(s) SubCutaneous daily  gabapentin 300 milliGRAM(s) Oral every 8 hours  lisinopril 5 milliGRAM(s) Oral daily  pantoprazole    Tablet 40 milliGRAM(s) Oral two times a day  senna 2 Tablet(s) Oral at bedtime  vitamin E 400 International Unit(s) Oral daily  zinc sulfate 220 milliGRAM(s) Oral daily      =======================================================  Labs:                        7.9    8.30  )-----------( 404      ( 20 Jan 2022 07:48 )             25.2     01-20    136  |  103  |  8.7  ----------------------------<  86  3.8   |  23.0  |  0.39<L>    Ca    8.7      20 Jan 2022 07:48  Phos  3.8     01-20  Mg     1.7     01-20       Culture - Blood (collected 01-13-22 @ 07:54)  Source: .Blood Blood-Venous  Final Report (01-18-22 @ 09:00):    No growth at 5 days.    Culture - Abscess with Gram Stain (collected 01-11-22 @ 21:43)  Source: .Abscess left thigh  Final Report (01-16-22 @ 18:09):    No growth at 5 days      Creatinine, Serum: 0.39 mg/dL (01-20-22 @ 07:48)  Creatinine, Serum: 0.46 mg/dL (01-19-22 @ 06:13)  Creatinine, Serum: 0.35 mg/dL (01-18-22 @ 09:34)  Creatinine, Serum: 0.46 mg/dL (01-17-22 @ 08:36)    WBC Count: 8.30 K/uL (01-20-22 @ 07:48)  WBC Count: 7.19 K/uL (01-19-22 @ 06:13)  WBC Count: 6.87 K/uL (01-18-22 @ 09:34)  WBC Count: 6.52 K/uL (01-17-22 @ 08:36)      SARS-CoV-2 Result: Detected (01-17-22 @ 17:18)  COVID-19 PCR: Detected (01-17-22 @ 09:01)  COVID-19 PCR: NotDetec (01-11-22 @ 12:29)  COVID-19 PCR: NotDetec (01-11-22 @ 05:17)  COVID-19 PCR: NotDetec (01-05-22 @ 08:09)  COVID-19 PCR: NotDetec (12-30-21 @ 22:14)  COVID-19 PCR: NotDetec (12-26-21 @ 11:56)

## 2022-01-22 LAB
CULTURE RESULTS: SIGNIFICANT CHANGE UP
CULTURE RESULTS: SIGNIFICANT CHANGE UP
SPECIMEN SOURCE: SIGNIFICANT CHANGE UP
SPECIMEN SOURCE: SIGNIFICANT CHANGE UP

## 2022-01-22 PROCEDURE — 99024 POSTOP FOLLOW-UP VISIT: CPT

## 2022-01-22 RX ORDER — HYDROMORPHONE HYDROCHLORIDE 2 MG/ML
4 INJECTION INTRAMUSCULAR; INTRAVENOUS; SUBCUTANEOUS EVERY 4 HOURS
Refills: 0 | Status: DISCONTINUED | OUTPATIENT
Start: 2022-01-22 | End: 2022-01-28

## 2022-01-22 RX ADMIN — PANTOPRAZOLE SODIUM 40 MILLIGRAM(S): 20 TABLET, DELAYED RELEASE ORAL at 04:59

## 2022-01-22 RX ADMIN — MEROPENEM 100 MILLIGRAM(S): 1 INJECTION INTRAVENOUS at 21:07

## 2022-01-22 RX ADMIN — Medication 975 MILLIGRAM(S): at 12:00

## 2022-01-22 RX ADMIN — Medication 975 MILLIGRAM(S): at 04:59

## 2022-01-22 RX ADMIN — PANTOPRAZOLE SODIUM 40 MILLIGRAM(S): 20 TABLET, DELAYED RELEASE ORAL at 17:01

## 2022-01-22 RX ADMIN — MEROPENEM 100 MILLIGRAM(S): 1 INJECTION INTRAVENOUS at 13:33

## 2022-01-22 RX ADMIN — HYDROMORPHONE HYDROCHLORIDE 4 MILLIGRAM(S): 2 INJECTION INTRAMUSCULAR; INTRAVENOUS; SUBCUTANEOUS at 22:16

## 2022-01-22 RX ADMIN — Medication 975 MILLIGRAM(S): at 11:09

## 2022-01-22 RX ADMIN — ZINC SULFATE TAB 220 MG (50 MG ZINC EQUIVALENT) 220 MILLIGRAM(S): 220 (50 ZN) TAB at 11:09

## 2022-01-22 RX ADMIN — GABAPENTIN 300 MILLIGRAM(S): 400 CAPSULE ORAL at 21:08

## 2022-01-22 RX ADMIN — DAPTOMYCIN 120 MILLIGRAM(S): 500 INJECTION, POWDER, LYOPHILIZED, FOR SOLUTION INTRAVENOUS at 20:27

## 2022-01-22 RX ADMIN — MEROPENEM 100 MILLIGRAM(S): 1 INJECTION INTRAVENOUS at 05:09

## 2022-01-22 RX ADMIN — Medication 10 MILLIGRAM(S): at 17:00

## 2022-01-22 RX ADMIN — Medication 975 MILLIGRAM(S): at 19:13

## 2022-01-22 RX ADMIN — GABAPENTIN 300 MILLIGRAM(S): 400 CAPSULE ORAL at 13:33

## 2022-01-22 RX ADMIN — HYDROMORPHONE HYDROCHLORIDE 4 MILLIGRAM(S): 2 INJECTION INTRAMUSCULAR; INTRAVENOUS; SUBCUTANEOUS at 21:07

## 2022-01-22 RX ADMIN — Medication 975 MILLIGRAM(S): at 17:01

## 2022-01-22 RX ADMIN — Medication 400 INTERNATIONAL UNIT(S): at 11:20

## 2022-01-22 RX ADMIN — Medication 10 MILLIGRAM(S): at 11:10

## 2022-01-22 RX ADMIN — HYDROMORPHONE HYDROCHLORIDE 4 MILLIGRAM(S): 2 INJECTION INTRAMUSCULAR; INTRAVENOUS; SUBCUTANEOUS at 04:58

## 2022-01-22 RX ADMIN — Medication 1000 MILLIGRAM(S): at 17:01

## 2022-01-22 RX ADMIN — Medication 1000 MILLIGRAM(S): at 04:58

## 2022-01-22 RX ADMIN — Medication 10 MILLIGRAM(S): at 04:59

## 2022-01-22 RX ADMIN — GABAPENTIN 300 MILLIGRAM(S): 400 CAPSULE ORAL at 04:59

## 2022-01-22 RX ADMIN — ENOXAPARIN SODIUM 40 MILLIGRAM(S): 100 INJECTION SUBCUTANEOUS at 11:12

## 2022-01-22 NOTE — PROGRESS NOTE ADULT - ATTENDING COMMENTS
seen and examined 01-22-22 @ 0805    11/17 - 12/31/2021 - serial debridements of left leg  1/18/2022 - STSG left leg (right thigh donor)    right thigh donor covered in dry xeroform  wound VAC over left leg STSG is functioning  -remove wound VAC tomorrow    left distal femur/proximal tibia osteomyelitis  -meropenem (12/14/2021 - 1/24/2022)  -daptomycin (12/28/2021 - 1/24/2022)  -Bactrim DS (1/25 - 2/24/2022)

## 2022-01-22 NOTE — PROGRESS NOTE ADULT - ASSESSMENT
A 51 year old female s/p multiple OR debridement of left lower extremity for soft tissue infection and abscesses of unclear origin. POD#3 of stsg. Currently, moderate protein caloric malnutrition.    Plan:   - take down RLE donor site dressing, leave to air   - Continue Wound VAC, change on 1/23  - Reg diet with ensure and enliven  - bedrest day 4/5  - pain control   - DVT ppx  - per ID continue merropenum and dapt  - start discharge planning PAWEL, order PT on 1/23

## 2022-01-22 NOTE — PROGRESS NOTE ADULT - SUBJECTIVE AND OBJECTIVE BOX
SUBJECTIVE: No acute distress. No acute events overnight. Remains hemodynamically stable.    MEDICATIONS  (STANDING):  acetaminophen     Tablet .. 975 milliGRAM(s) Oral every 6 hours  ascorbic acid 1000 milliGRAM(s) Oral two times a day  baclofen 10 milliGRAM(s) Oral every 6 hours  DAPTOmycin IVPB 500 milliGRAM(s) IV Intermittent every 24 hours  enoxaparin Injectable 40 milliGRAM(s) SubCutaneous daily  gabapentin 300 milliGRAM(s) Oral every 8 hours  lisinopril 5 milliGRAM(s) Oral daily  meropenem  IVPB 1000 milliGRAM(s) IV Intermittent every 8 hours  pantoprazole    Tablet 40 milliGRAM(s) Oral two times a day  senna 2 Tablet(s) Oral at bedtime  vitamin E 400 International Unit(s) Oral daily  zinc sulfate 220 milliGRAM(s) Oral daily    MEDICATIONS  (PRN):  HYDROmorphone   Tablet 2 milliGRAM(s) Oral every 4 hours PRN Moderate Pain (4 - 6)  HYDROmorphone   Tablet 4 milliGRAM(s) Oral every 4 hours PRN Severe Pain (7 - 10)  HYDROmorphone  Injectable 0.5 milliGRAM(s) IV Push every 4 hours PRN break thru pain  melatonin 5 milliGRAM(s) Oral at bedtime PRN Sleep  ondansetron Injectable 4 milliGRAM(s) IV Push every 8 hours PRN Nausea and/or Vomiting      Vital Signs Last 24 Hrs  T(C): 37.1 (21 Jan 2022 21:54), Max: 37.1 (21 Jan 2022 21:54)  T(F): 98.7 (21 Jan 2022 21:54), Max: 98.7 (21 Jan 2022 21:54)  HR: 83 (21 Jan 2022 21:54) (71 - 83)  BP: 137/78 (21 Jan 2022 21:54) (120/74 - 144/83)  BP(mean): --  RR: 18 (21 Jan 2022 21:54) (18 - 18)  SpO2: 99% (21 Jan 2022 21:54) (99% - 100%)    PHYSICAL EXAM:  Constitutional: resting comfortably   Respiratory: respirations are unlabored, no accessory muscle use, no conversational dyspnea, RA   Cardiovascular: regular rate & rhythm  Gastrointestinal: Abdomen soft, non-tender, non-distended, no rebound tenderness / guarding  Neurological: A&O x 3; no gross sensory / motor / coordination deficits  Musculoskeletal: No joint pain, swelling or deformity; no limitation of movement. wound vac on LLE to suction, good seal, wbat to lle with cam boot    I&O's Detail    20 Jan 2022 07:01  -  21 Jan 2022 07:00  --------------------------------------------------------  IN:  Total IN: 0 mL    OUT:    Voided (mL): 500 mL  Total OUT: 500 mL    Total NET: -500 mL          LABS:                        7.9    8.30  )-----------( 404      ( 20 Jan 2022 07:48 )             25.2     01-20    136  |  103  |  8.7  ----------------------------<  86  3.8   |  23.0  |  0.39<L>    Ca    8.7      20 Jan 2022 07:48  Phos  3.8     01-20  Mg     1.7     01-20            RADIOLOGY & ADDITIONAL STUDIES: SUBJECTIVE: No acute distress. No acute events overnight. Remains hemodynamically stable..    MEDICATIONS  (STANDING):  acetaminophen     Tablet .. 975 milliGRAM(s) Oral every 6 hours  ascorbic acid 1000 milliGRAM(s) Oral two times a day  baclofen 10 milliGRAM(s) Oral every 6 hours  DAPTOmycin IVPB 500 milliGRAM(s) IV Intermittent every 24 hours  enoxaparin Injectable 40 milliGRAM(s) SubCutaneous daily  gabapentin 300 milliGRAM(s) Oral every 8 hours  lisinopril 5 milliGRAM(s) Oral daily  meropenem  IVPB 1000 milliGRAM(s) IV Intermittent every 8 hours  pantoprazole    Tablet 40 milliGRAM(s) Oral two times a day  senna 2 Tablet(s) Oral at bedtime  vitamin E 400 International Unit(s) Oral daily  zinc sulfate 220 milliGRAM(s) Oral daily    MEDICATIONS  (PRN):  HYDROmorphone   Tablet 2 milliGRAM(s) Oral every 4 hours PRN Moderate Pain (4 - 6)  HYDROmorphone   Tablet 4 milliGRAM(s) Oral every 4 hours PRN Severe Pain (7 - 10)  HYDROmorphone  Injectable 0.5 milliGRAM(s) IV Push every 4 hours PRN break thru pain  melatonin 5 milliGRAM(s) Oral at bedtime PRN Sleep  ondansetron Injectable 4 milliGRAM(s) IV Push every 8 hours PRN Nausea and/or Vomiting      Vital Signs Last 24 Hrs  T(C): 37.1 (21 Jan 2022 21:54), Max: 37.1 (21 Jan 2022 21:54)  T(F): 98.7 (21 Jan 2022 21:54), Max: 98.7 (21 Jan 2022 21:54)  HR: 83 (21 Jan 2022 21:54) (71 - 83)  BP: 137/78 (21 Jan 2022 21:54) (120/74 - 144/83)  BP(mean): --  RR: 18 (21 Jan 2022 21:54) (18 - 18)  SpO2: 99% (21 Jan 2022 21:54) (99% - 100%)    PHYSICAL EXAM:  Constitutional: resting comfortably   Respiratory: respirations are unlabored, no accessory muscle use, no conversational dyspnea, RA   Cardiovascular: regular rate & rhythm  Gastrointestinal: Abdomen soft, non-tender, non-distended, no rebound tenderness / guarding  Neurological: A&O x 3; no gross sensory / motor / coordination deficits  Musculoskeletal: No joint pain, swelling or deformity; no limitation of movement. wound vac on LLE to suction, good seal, wbat to lle with cam boot    I&O's Detail    20 Jan 2022 07:01  -  21 Jan 2022 07:00  --------------------------------------------------------  IN:  Total IN: 0 mL    OUT:    Voided (mL): 500 mL  Total OUT: 500 mL    Total NET: -500 mL          LABS:                        7.9    8.30  )-----------( 404      ( 20 Jan 2022 07:48 )             25.2     01-20    136  |  103  |  8.7  ----------------------------<  86  3.8   |  23.0  |  0.39<L>    Ca    8.7      20 Jan 2022 07:48  Phos  3.8     01-20  Mg     1.7     01-20            RADIOLOGY & ADDITIONAL STUDIES:

## 2022-01-23 LAB — SARS-COV-2 RNA SPEC QL NAA+PROBE: DETECTED

## 2022-01-23 PROCEDURE — 99024 POSTOP FOLLOW-UP VISIT: CPT | Mod: GC

## 2022-01-23 RX ORDER — HYDROMORPHONE HYDROCHLORIDE 2 MG/ML
1 INJECTION INTRAMUSCULAR; INTRAVENOUS; SUBCUTANEOUS ONCE
Refills: 0 | Status: DISCONTINUED | OUTPATIENT
Start: 2022-01-23 | End: 2022-01-23

## 2022-01-23 RX ORDER — GABAPENTIN 400 MG/1
400 CAPSULE ORAL EVERY 8 HOURS
Refills: 0 | Status: DISCONTINUED | OUTPATIENT
Start: 2022-01-23 | End: 2022-01-25

## 2022-01-23 RX ORDER — DAPTOMYCIN 500 MG/10ML
500 INJECTION, POWDER, LYOPHILIZED, FOR SOLUTION INTRAVENOUS EVERY 24 HOURS
Refills: 0 | Status: COMPLETED | OUTPATIENT
Start: 2022-01-23 | End: 2022-01-24

## 2022-01-23 RX ADMIN — Medication 975 MILLIGRAM(S): at 11:38

## 2022-01-23 RX ADMIN — MEROPENEM 100 MILLIGRAM(S): 1 INJECTION INTRAVENOUS at 22:10

## 2022-01-23 RX ADMIN — HYDROMORPHONE HYDROCHLORIDE 4 MILLIGRAM(S): 2 INJECTION INTRAMUSCULAR; INTRAVENOUS; SUBCUTANEOUS at 10:12

## 2022-01-23 RX ADMIN — Medication 400 INTERNATIONAL UNIT(S): at 12:51

## 2022-01-23 RX ADMIN — LISINOPRIL 5 MILLIGRAM(S): 2.5 TABLET ORAL at 05:59

## 2022-01-23 RX ADMIN — HYDROMORPHONE HYDROCHLORIDE 4 MILLIGRAM(S): 2 INJECTION INTRAMUSCULAR; INTRAVENOUS; SUBCUTANEOUS at 22:21

## 2022-01-23 RX ADMIN — Medication 975 MILLIGRAM(S): at 01:09

## 2022-01-23 RX ADMIN — Medication 10 MILLIGRAM(S): at 06:00

## 2022-01-23 RX ADMIN — Medication 10 MILLIGRAM(S): at 00:07

## 2022-01-23 RX ADMIN — Medication 1000 MILLIGRAM(S): at 18:00

## 2022-01-23 RX ADMIN — GABAPENTIN 400 MILLIGRAM(S): 400 CAPSULE ORAL at 22:10

## 2022-01-23 RX ADMIN — PANTOPRAZOLE SODIUM 40 MILLIGRAM(S): 20 TABLET, DELAYED RELEASE ORAL at 18:00

## 2022-01-23 RX ADMIN — HYDROMORPHONE HYDROCHLORIDE 4 MILLIGRAM(S): 2 INJECTION INTRAMUSCULAR; INTRAVENOUS; SUBCUTANEOUS at 23:46

## 2022-01-23 RX ADMIN — Medication 10 MILLIGRAM(S): at 23:52

## 2022-01-23 RX ADMIN — HYDROMORPHONE HYDROCHLORIDE 1 MILLIGRAM(S): 2 INJECTION INTRAMUSCULAR; INTRAVENOUS; SUBCUTANEOUS at 12:46

## 2022-01-23 RX ADMIN — MEROPENEM 100 MILLIGRAM(S): 1 INJECTION INTRAVENOUS at 06:00

## 2022-01-23 RX ADMIN — ZINC SULFATE TAB 220 MG (50 MG ZINC EQUIVALENT) 220 MILLIGRAM(S): 220 (50 ZN) TAB at 11:37

## 2022-01-23 RX ADMIN — GABAPENTIN 300 MILLIGRAM(S): 400 CAPSULE ORAL at 05:59

## 2022-01-23 RX ADMIN — Medication 1000 MILLIGRAM(S): at 06:00

## 2022-01-23 RX ADMIN — Medication 975 MILLIGRAM(S): at 00:07

## 2022-01-23 RX ADMIN — PANTOPRAZOLE SODIUM 40 MILLIGRAM(S): 20 TABLET, DELAYED RELEASE ORAL at 05:59

## 2022-01-23 RX ADMIN — Medication 975 MILLIGRAM(S): at 06:00

## 2022-01-23 RX ADMIN — Medication 975 MILLIGRAM(S): at 18:00

## 2022-01-23 RX ADMIN — GABAPENTIN 300 MILLIGRAM(S): 400 CAPSULE ORAL at 13:25

## 2022-01-23 RX ADMIN — Medication 975 MILLIGRAM(S): at 23:51

## 2022-01-23 RX ADMIN — MEROPENEM 100 MILLIGRAM(S): 1 INJECTION INTRAVENOUS at 13:25

## 2022-01-23 RX ADMIN — Medication 10 MILLIGRAM(S): at 11:39

## 2022-01-23 RX ADMIN — Medication 10 MILLIGRAM(S): at 18:00

## 2022-01-23 RX ADMIN — ENOXAPARIN SODIUM 40 MILLIGRAM(S): 100 INJECTION SUBCUTANEOUS at 11:37

## 2022-01-23 RX ADMIN — DAPTOMYCIN 120 MILLIGRAM(S): 500 INJECTION, POWDER, LYOPHILIZED, FOR SOLUTION INTRAVENOUS at 22:21

## 2022-01-23 NOTE — PROGRESS NOTE ADULT - ASSESSMENT
A 51 year old female s/p multiple OR debridement of left lower extremity for soft tissue infection and abscesses of unclear origin. POD#4 of stsg. Currently, moderate protein caloric malnutrition.    Plan:   - take down RLE donor site dressing, leave to air   - Continue Wound VAC, change on 1/23  - Reg diet with ensure and enliven  - bedrest day 4/5  - pain control   - DVT ppx  - per ID continue merropenum and dapt  - start discharge planning PAWEL, order PT on 1/23

## 2022-01-23 NOTE — PROGRESS NOTE ADULT - SUBJECTIVE AND OBJECTIVE BOX
SUBJECTIVE: No acute distress. No acute events overnight. Remains hemodynamically stable..        ICU Vital Signs Last 24 Hrs  T(C): 36.7 (23 Jan 2022 04:17), Max: 37 (22 Jan 2022 17:52)  T(F): 98.1 (23 Jan 2022 04:17), Max: 98.6 (22 Jan 2022 17:52)  HR: 125 (23 Jan 2022 04:17) (72 - 125)  BP: 127/78 (23 Jan 2022 04:17) (123/78 - 153/99)  BP(mean): --  ABP: --  ABP(mean): --  RR: 20 (23 Jan 2022 04:17) (18 - 20)  SpO2: 92% (23 Jan 2022 04:17) (92% - 100%)    PHYSICAL EXAM:  Constitutional: resting comfortably   Respiratory: respirations are unlabored, no accessory muscle use, no conversational dyspnea, RA   Cardiovascular: regular rate & rhythm  Gastrointestinal: Abdomen soft, non-tender, non-distended, no rebound tenderness / guarding  Neurological: A&O x 3; no gross sensory / motor / coordination deficits  Musculoskeletal: No joint pain, swelling or deformity; no limitation of movement. wound vac on LLE to suction, good seal, wbat to lle with cam boot    I&O's Detail    21 Jan 2022 07:01  -  22 Jan 2022 07:00  --------------------------------------------------------  IN:  Total IN: 0 mL    OUT:    Voided (mL): 750 mL  Total OUT: 750 mL    Total NET: -750 mL

## 2022-01-23 NOTE — PROGRESS NOTE ADULT - REASON FOR ADMISSION
LLE soft tissue infection
Left lower extremity swelling
ascending soft tissue infection
necrotizing soft tissue infection
Ascending soft tissue infection
Left lower extremity necrotizing fascitis
Septic shock due to soft tissue infection
Soft tissue infection
necrotizing soft tissue infection
septic knee
soft tissue infection
Bilateral knee infections
RONNIE
Sepsis due to Soft tissue infection
UGIB
ascending soft tissue infection
knee pain
knee pain
necrotizing soft tissue infection
soft tissue infection
ascending soft tissue infection
sepsis due to soft tissue infection
Soft tissue infection
ascending soft tissue infection of LLE

## 2022-01-23 NOTE — CHART NOTE - NSCHARTNOTEFT_GEN_A_CORE
Wound vac removed from STSG sites. Grafts appear well adherent. Adaptic and dry gauze placed over wounds, judicious tape used to secure gauze. Patient tolerated procedure well.

## 2022-01-24 LAB
ANION GAP SERPL CALC-SCNC: 9 MMOL/L — SIGNIFICANT CHANGE UP (ref 5–17)
BASOPHILS # BLD AUTO: 0.02 K/UL — SIGNIFICANT CHANGE UP (ref 0–0.2)
BASOPHILS NFR BLD AUTO: 0.3 % — SIGNIFICANT CHANGE UP (ref 0–2)
BUN SERPL-MCNC: 7.9 MG/DL — LOW (ref 8–20)
CALCIUM SERPL-MCNC: 8.8 MG/DL — SIGNIFICANT CHANGE UP (ref 8.6–10.2)
CHLORIDE SERPL-SCNC: 101 MMOL/L — SIGNIFICANT CHANGE UP (ref 98–107)
CO2 SERPL-SCNC: 26 MMOL/L — SIGNIFICANT CHANGE UP (ref 22–29)
CREAT SERPL-MCNC: 0.42 MG/DL — LOW (ref 0.5–1.3)
EOSINOPHIL # BLD AUTO: 0.36 K/UL — SIGNIFICANT CHANGE UP (ref 0–0.5)
EOSINOPHIL NFR BLD AUTO: 5.1 % — SIGNIFICANT CHANGE UP (ref 0–6)
GLUCOSE SERPL-MCNC: 89 MG/DL — SIGNIFICANT CHANGE UP (ref 70–99)
HCT VFR BLD CALC: 26.5 % — LOW (ref 34.5–45)
HGB BLD-MCNC: 8.1 G/DL — LOW (ref 11.5–15.5)
IMM GRANULOCYTES NFR BLD AUTO: 0.7 % — SIGNIFICANT CHANGE UP (ref 0–1.5)
LYMPHOCYTES # BLD AUTO: 2.46 K/UL — SIGNIFICANT CHANGE UP (ref 1–3.3)
LYMPHOCYTES # BLD AUTO: 34.8 % — SIGNIFICANT CHANGE UP (ref 13–44)
MAGNESIUM SERPL-MCNC: 1.7 MG/DL — SIGNIFICANT CHANGE UP (ref 1.6–2.6)
MCHC RBC-ENTMCNC: 27.1 PG — SIGNIFICANT CHANGE UP (ref 27–34)
MCHC RBC-ENTMCNC: 30.6 GM/DL — LOW (ref 32–36)
MCV RBC AUTO: 88.6 FL — SIGNIFICANT CHANGE UP (ref 80–100)
MONOCYTES # BLD AUTO: 0.81 K/UL — SIGNIFICANT CHANGE UP (ref 0–0.9)
MONOCYTES NFR BLD AUTO: 11.5 % — SIGNIFICANT CHANGE UP (ref 2–14)
NEUTROPHILS # BLD AUTO: 3.36 K/UL — SIGNIFICANT CHANGE UP (ref 1.8–7.4)
NEUTROPHILS NFR BLD AUTO: 47.6 % — SIGNIFICANT CHANGE UP (ref 43–77)
PHOSPHATE SERPL-MCNC: 4.4 MG/DL — SIGNIFICANT CHANGE UP (ref 2.4–4.7)
PLATELET # BLD AUTO: 437 K/UL — HIGH (ref 150–400)
POTASSIUM SERPL-MCNC: 3.5 MMOL/L — SIGNIFICANT CHANGE UP (ref 3.5–5.3)
POTASSIUM SERPL-SCNC: 3.5 MMOL/L — SIGNIFICANT CHANGE UP (ref 3.5–5.3)
RBC # BLD: 2.99 M/UL — LOW (ref 3.8–5.2)
RBC # FLD: 17.7 % — HIGH (ref 10.3–14.5)
SODIUM SERPL-SCNC: 136 MMOL/L — SIGNIFICANT CHANGE UP (ref 135–145)
WBC # BLD: 7.06 K/UL — SIGNIFICANT CHANGE UP (ref 3.8–10.5)
WBC # FLD AUTO: 7.06 K/UL — SIGNIFICANT CHANGE UP (ref 3.8–10.5)

## 2022-01-24 PROCEDURE — 99231 SBSQ HOSP IP/OBS SF/LOW 25: CPT | Mod: 24

## 2022-01-24 PROCEDURE — 99232 SBSQ HOSP IP/OBS MODERATE 35: CPT

## 2022-01-24 RX ORDER — MAGNESIUM SULFATE 500 MG/ML
2 VIAL (ML) INJECTION
Refills: 0 | Status: COMPLETED | OUTPATIENT
Start: 2022-01-24 | End: 2022-01-24

## 2022-01-24 RX ORDER — POTASSIUM CHLORIDE 20 MEQ
40 PACKET (EA) ORAL ONCE
Refills: 0 | Status: COMPLETED | OUTPATIENT
Start: 2022-01-24 | End: 2022-01-24

## 2022-01-24 RX ADMIN — Medication 10 MILLIGRAM(S): at 06:42

## 2022-01-24 RX ADMIN — Medication 10 MILLIGRAM(S): at 13:19

## 2022-01-24 RX ADMIN — PANTOPRAZOLE SODIUM 40 MILLIGRAM(S): 20 TABLET, DELAYED RELEASE ORAL at 17:42

## 2022-01-24 RX ADMIN — Medication 975 MILLIGRAM(S): at 06:42

## 2022-01-24 RX ADMIN — Medication 975 MILLIGRAM(S): at 13:19

## 2022-01-24 RX ADMIN — Medication 400 INTERNATIONAL UNIT(S): at 13:20

## 2022-01-24 RX ADMIN — MEROPENEM 100 MILLIGRAM(S): 1 INJECTION INTRAVENOUS at 13:47

## 2022-01-24 RX ADMIN — Medication 25 GRAM(S): at 10:43

## 2022-01-24 RX ADMIN — Medication 10 MILLIGRAM(S): at 17:42

## 2022-01-24 RX ADMIN — Medication 975 MILLIGRAM(S): at 17:42

## 2022-01-24 RX ADMIN — Medication 1000 MILLIGRAM(S): at 17:42

## 2022-01-24 RX ADMIN — ZINC SULFATE TAB 220 MG (50 MG ZINC EQUIVALENT) 220 MILLIGRAM(S): 220 (50 ZN) TAB at 13:19

## 2022-01-24 RX ADMIN — Medication 10 MILLIGRAM(S): at 23:18

## 2022-01-24 RX ADMIN — GABAPENTIN 400 MILLIGRAM(S): 400 CAPSULE ORAL at 06:42

## 2022-01-24 RX ADMIN — GABAPENTIN 400 MILLIGRAM(S): 400 CAPSULE ORAL at 13:19

## 2022-01-24 RX ADMIN — PANTOPRAZOLE SODIUM 40 MILLIGRAM(S): 20 TABLET, DELAYED RELEASE ORAL at 06:42

## 2022-01-24 RX ADMIN — LISINOPRIL 5 MILLIGRAM(S): 2.5 TABLET ORAL at 06:42

## 2022-01-24 RX ADMIN — Medication 25 GRAM(S): at 08:20

## 2022-01-24 RX ADMIN — HYDROMORPHONE HYDROCHLORIDE 4 MILLIGRAM(S): 2 INJECTION INTRAMUSCULAR; INTRAVENOUS; SUBCUTANEOUS at 15:29

## 2022-01-24 RX ADMIN — Medication 975 MILLIGRAM(S): at 23:18

## 2022-01-24 RX ADMIN — GABAPENTIN 400 MILLIGRAM(S): 400 CAPSULE ORAL at 21:47

## 2022-01-24 RX ADMIN — HYDROMORPHONE HYDROCHLORIDE 4 MILLIGRAM(S): 2 INJECTION INTRAMUSCULAR; INTRAVENOUS; SUBCUTANEOUS at 22:50

## 2022-01-24 RX ADMIN — Medication 975 MILLIGRAM(S): at 00:46

## 2022-01-24 RX ADMIN — Medication 975 MILLIGRAM(S): at 13:27

## 2022-01-24 RX ADMIN — MEROPENEM 100 MILLIGRAM(S): 1 INJECTION INTRAVENOUS at 21:47

## 2022-01-24 RX ADMIN — DAPTOMYCIN 120 MILLIGRAM(S): 500 INJECTION, POWDER, LYOPHILIZED, FOR SOLUTION INTRAVENOUS at 22:25

## 2022-01-24 RX ADMIN — MEROPENEM 100 MILLIGRAM(S): 1 INJECTION INTRAVENOUS at 06:43

## 2022-01-24 RX ADMIN — Medication 1000 MILLIGRAM(S): at 06:47

## 2022-01-24 RX ADMIN — Medication 40 MILLIEQUIVALENT(S): at 17:43

## 2022-01-24 RX ADMIN — ENOXAPARIN SODIUM 40 MILLIGRAM(S): 100 INJECTION SUBCUTANEOUS at 13:20

## 2022-01-24 RX ADMIN — HYDROMORPHONE HYDROCHLORIDE 4 MILLIGRAM(S): 2 INJECTION INTRAMUSCULAR; INTRAVENOUS; SUBCUTANEOUS at 21:52

## 2022-01-24 NOTE — PROGRESS NOTE ADULT - SUBJECTIVE AND OBJECTIVE BOX
ACUTE CARE SURGERY PROGRESS NOTE    Subjective: Patient examined at bedside this AM. Reports pain is controlled. Was not evaluated by PT yesterday after wound vac removed. Tolerating PO without N/V. No acute events overnight.    Objective:  Vital Signs  T(C): 36.7 (01-23 @ 20:44), Max: 37 (01-23 @ 10:43)  HR: 96 (01-23 @ 20:44) (83 - 107)  BP: 116/70 (01-23 @ 20:44) (116/70 - 139/87)  RR: 18 (01-23 @ 20:44) (18 - 19)  SpO2: 90% (01-23 @ 17:14) (90% - 98%)  01-22-22 @ 07:01  -  01-23-22 @ 07:00  --------------------------------------------------------  IN:  Total IN: 0 mL    OUT:    Voided (mL): 200 mL  Total OUT: 200 mL    Total NET: -200 mL          Physical Exam:  General: alert and oriented, NAD  Resp: airway patent, respirations unlabored  CVS: regular rate and rhythm  Extremities: RLE donor site with dried xeroform and dried blood, LLE with healthy wound bed and STSG attached with staples.  Skin: warm, dry, appropriate color          Medications:   MEDICATIONS  (STANDING):  acetaminophen     Tablet .. 975 milliGRAM(s) Oral every 6 hours  ascorbic acid 1000 milliGRAM(s) Oral two times a day  baclofen 10 milliGRAM(s) Oral every 6 hours  DAPTOmycin IVPB 500 milliGRAM(s) IV Intermittent every 24 hours  enoxaparin Injectable 40 milliGRAM(s) SubCutaneous daily  gabapentin 400 milliGRAM(s) Oral every 8 hours  lisinopril 5 milliGRAM(s) Oral daily  meropenem  IVPB 1000 milliGRAM(s) IV Intermittent every 8 hours  pantoprazole    Tablet 40 milliGRAM(s) Oral two times a day  senna 2 Tablet(s) Oral at bedtime  vitamin E 400 International Unit(s) Oral daily  zinc sulfate 220 milliGRAM(s) Oral daily    MEDICATIONS  (PRN):  HYDROmorphone   Tablet 4 milliGRAM(s) Oral every 4 hours PRN Severe Pain (7 - 10)  melatonin 5 milliGRAM(s) Oral at bedtime PRN Sleep  ondansetron Injectable 4 milliGRAM(s) IV Push every 8 hours PRN Nausea and/or Vomiting

## 2022-01-24 NOTE — PROGRESS NOTE ADULT - SUBJECTIVE AND OBJECTIVE BOX
[facility]  INFECTIOUS DISEASES AND INTERNAL MEDICINE   =======================================================                               Michael Triplett MD#  Migel Huizar MD*                                     Hitesh Prakash MD*    Mehnaz Morrow MD*            Diplomates American Board of Internal Medicine & Infectious Diseases                  # [place] Office - Appt - Tel  [phone] Fax [phone]                * [place] Office - Appt - Tel [phone] Fax [phone]                                  Hospital Consult line:  [phone]  =======================================================    =======================================================    REMYGASTONLUIS FERNANDO BLANKENSHIP [id number]    Follow up:  left leg necrotizing infection  COVID POS S/P SOTROVIMAB 1/19/22      no new issues.   wound vac removed on 1/23/22      Allergies:  No Known Allergies           REVIEW OF SYSTEMS:  CONSTITUTIONAL:  No Fever or chills  HEENT:   No diplopia or blurred vision.  No earache, sore throat or runny nose.  CARDIOVASCULAR:  No pressure, squeezing, strangling, tightness, heaviness or aching about the chest, neck, axilla or epigastrium.  RESPIRATORY:  No cough, shortness of breath  GASTROINTESTINAL:  No nausea, vomiting or diarrhea.  GENITOURINARY:  No dysuria, frequency or urgency. No Blood in urine  MUSCULOSKELETAL: as PER HPI   SKIN:  AS PER HPI  NEUROLOGIC:  No Headaches, seizures or weakness.  PSYCHIATRIC:  No disorder of thought or mood.  ENDOCRINE:  No heat or cold intolerance  HEMATOLOGICAL:  No easy bruising or bleeding.       Physical Exam:        GEN: NAD, pleasant  HEENT: normocephalic and atraumatic. EOMI. PERRL.  Anicteric   NECK: Supple.   LUNGS: Clear to auscultation.  HEART: Regular rate and rhythm without murmur.  ABDOMEN: Soft, nontender, and nondistended.  Positive bowel sounds.    : No CVA tenderness  EXTREMITIES: Without any edema. LEFT LEG  with multiple incisions,   dressing in place.   wound vac removed  MSK: no joint swelling  NEUROLOGIC: Cranial nerves II through XII are grossly intact. No focal deficits  PSYCHIATRIC: Appropriate affect .  SKIN: No Rash          ==========  Vitals:  ============  T(F): 98.5 (24 Jan 2022 09:47), Max: 98.5 (24 Jan 2022 04:40)  HR: 87 (24 Jan 2022 09:47)  BP: 111/71 (24 Jan 2022 09:47)  RR: 18 (24 Jan 2022 09:47)  SpO2: 99% (24 Jan 2022 09:47) (90% - 99%)  temp max in last 48H T(F): , Max: 98.6 (01-22-22 @ 17:52)    =======================================================  Current Antibiotics:  DAPTOmycin IVPB 500 milliGRAM(s) IV Intermittent every 24 hours  meropenem  IVPB 1000 milliGRAM(s) IV Intermittent every 8 hours    Other medications:  acetaminophen     Tablet .. 975 milliGRAM(s) Oral every 6 hours  ascorbic acid 1000 milliGRAM(s) Oral two times a day  baclofen 10 milliGRAM(s) Oral every 6 hours  enoxaparin Injectable 40 milliGRAM(s) SubCutaneous daily  gabapentin 400 milliGRAM(s) Oral every 8 hours  lisinopril 5 milliGRAM(s) Oral daily  pantoprazole    Tablet 40 milliGRAM(s) Oral two times a day  potassium chloride    Tablet ER 40 milliEquivalent(s) Oral once  senna 2 Tablet(s) Oral at bedtime  vitamin E 400 International Unit(s) Oral daily  zinc sulfate 220 milliGRAM(s) Oral daily      =======================================================  Labs:                        8.1    7.06  )-----------( 437      ( 24 Jan 2022 06:54 )             26.5     01-24    136  |  101  |  7.9<L>  ----------------------------<  89  3.5   |  26.0  |  0.42<L>    Ca    8.8      24 Jan 2022 06:54  Phos  4.4     01-24  Mg     1.7     01-24        Culture - Blood (collected 01-13-22 @ 07:54)  Source: .Blood Blood-Venous  Final Report (01-18-22 @ 09:00):    No growth at 5 days.    Culture - Abscess with Gram Stain (collected 01-11-22 @ 21:43)  Source: .Abscess left thigh  Final Report (01-16-22 @ 18:09):    No growth at 5 days        COVID-19 PCR: Detected (01-23-22 @ 08:29)  SARS-CoV-2 Result: Detected (01-17-22 @ 17:18)  COVID-19 PCR: Detected (01-17-22 @ 09:01)  COVID-19 PCR: NotDetec (01-11-22 @ 12:29)  COVID-19 PCR: NotDetec (01-11-22 @ 05:17)  COVID-19 PCR: NotDetec (01-05-22 @ 08:09)  COVID-19 PCR: NotDetec (12-30-21 @ 22:14)  COVID-19 PCR: NotDetec (12-26-21 @ 11:56)

## 2022-01-24 NOTE — PROGRESS NOTE ADULT - ATTENDING COMMENTS
continue aggressive PT of L knee when cleared by ACS. Continue IV abx as per ID, d/w Dr Triplett. Continue PT/ROM of R knee. continue aggressive PT of L knee when cleared by ACS. Continue IV abx as per ID, d/w Dr Triplett. no fevers, wbc normal. Continue PT/ROM of R knee.

## 2022-01-24 NOTE — PROGRESS NOTE ADULT - SUBJECTIVE AND OBJECTIVE BOX
Pt Name: LUIS FERNANDO DAVIS    MRN: [id number]    Patient is a being followed for bilateral knee washouts, MASON left ankle. Patient is POD#27 from most recent repeat I&D of left knee. Patient reports her right knee is improving, and she is gaining strength and ROM, doing heel slides in the bed. Pt reports her left knee is not worsening, remains stiff with pain localized to medial aspect of her knee. Patient has drop foot on left side that has been ongoing during hospital stay, patient feels like she is gaining some motor to the foot and that her muscles are beginning to fire. Pt denies any sensory changes. No fever/chills.     PAST MEDICAL & SURGICAL HISTORY:  PAST MEDICAL & SURGICAL HISTORY:  No pertinent past medical history    History of ankle surgery    Allergies: No Known Allergies    Medications: acetaminophen     Tablet .. 975 milliGRAM(s) Oral every 6 hours  ascorbic acid 1000 milliGRAM(s) Oral two times a day  baclofen 10 milliGRAM(s) Oral every 6 hours  DAPTOmycin IVPB 500 milliGRAM(s) IV Intermittent every 24 hours  enoxaparin Injectable 40 milliGRAM(s) SubCutaneous daily  gabapentin 400 milliGRAM(s) Oral every 8 hours  HYDROmorphone   Tablet 4 milliGRAM(s) Oral every 4 hours PRN  lisinopril 5 milliGRAM(s) Oral daily  magnesium sulfate  IVPB 2 Gram(s) IV Intermittent every 2 hours  melatonin 5 milliGRAM(s) Oral at bedtime PRN  meropenem  IVPB 1000 milliGRAM(s) IV Intermittent every 8 hours  ondansetron Injectable 4 milliGRAM(s) IV Push every 8 hours PRN  pantoprazole    Tablet 40 milliGRAM(s) Oral two times a day  potassium chloride    Tablet ER 40 milliEquivalent(s) Oral once  senna 2 Tablet(s) Oral at bedtime  vitamin E 400 International Unit(s) Oral daily  zinc sulfate 220 milliGRAM(s) Oral daily                        8.1    7.06  )-----------( 437      ( 24 Jan 2022 06:54 )             26.5     01-24    136  |  101  |  7.9<L>  ----------------------------<  89  3.5   |  26.0  |  0.42<L>    Ca    8.8      24 Jan 2022 06:54  Phos  4.4     01-24  Mg     1.7     01-24        PHYSICAL EXAM:    Vital Signs Last 24 Hrs  T(C): 36.9 (24 Jan 2022 04:40), Max: 37 (23 Jan 2022 10:43)  T(F): 98.5 (24 Jan 2022 04:40), Max: 98.6 (23 Jan 2022 10:43)  HR: 84 (24 Jan 2022 04:40) (83 - 107)  BP: 104/66 (24 Jan 2022 04:40) (104/66 - 139/87)  BP(mean): --  RR: 18 (24 Jan 2022 04:40) (18 - 19)  SpO2: 99% (24 Jan 2022 04:40) (90% - 99%)  Daily     Daily     Appearance: Alert, responsive, in no acute distress.    Musculoskeletal:         Left Lower Extremity: +LLE diffuse swelling noted, +well healed incision over anterior knee, +LLE dressings from skin grafting noted c/d/i, visible skin otherwise intact, knee grossly non-tender with localized pain to medial knee, ROM limited yet not worsening as per patient, +drop foot noted, DP/PT intact, SILT, BCR, no cyanosis, compartments soft and compressible       Right Lower Extremity: no obvious deformity, +well healed incision over right knee, no effusion, visible skin intact, non tender to palpation, AROM 90 degrees without pain, +plantardorsiflexion, DP/PT intact, SILT, BCR, no cyanosis, compartments soft and compressible    A/P:  Pt is a  51y Female with bilateral knee washouts, removal of left ankle hardware    PLAN:   * continue to monitor  * WBAT b/l LE - defer to ACS for final WB status  * PT/OT  * DVTP  * ABX per ID

## 2022-01-24 NOTE — PROGRESS NOTE ADULT - ASSESSMENT
51y Female present to ED with left leg swelling, erythema, pain. Patient endorses she had left knee pain for 1 week presented 3 days ago to ED  where she states was given an steroid shot, and ibuprofen. however pain and edema worsened. Patient states she wasnt feeling herself today reason why she came. Endorses chills initially with pain 3 days ago but none since, denies history of trauma, insect bites, recent interventions, or injections to the area, contact with ticks, history of diabetes.  PT AS ABOVE WITH LEFT LEG SWELLING PT WITH INCREASED WBC PLACED ON ABX FOR PRESUMED INFECTION  PT WITH CT SCAN WITH FASCIAL EDAM PT BROUGHT TO THE OR EMERGENTLY AND  UNDERWENT FASCIOTOMY   PT BROUGHT TO THE OR  BOTH TRAUMA AND ORTHO INVOLVED  PURULENT FLUID FOUND  NECROTIZING SOFT TISSUE INFECTION    BLOOD CX WITH GROUP A STREP AND  OPERATIVE FLUID WITH STREP  PT WAS  ON PCN AND CLINDA for POSSIBLE ANTITOXIN EFFECT IN GROUP A STREP INFECTION    PT  S/P OR WASHOUT 12.2  AND  OR AGAIN 12/7  and late on 12/10 121/15 12/17 and 12/20 12/23     REPEAT OPERATIVE CX NEG     AS ABOVE  REPEAT  OR  12/20 12/23    S/ RETURN TO THE OR 12/28 AND 12/31    ON MERREM/ DAPTOMYCIN  repeat CT 1/10/22 as above with increase in soft tissue abscesses and likely OM.   s/p Ir drainage on 1/11/22, with dark old blood aspirated.  IR cx NGTD  f/u BCX ngtd    COMPLETED  DAPTO 500 mg IV daily and .MERREM  1 g iv q8h THROUGH 1/24/22      start ORAL TX  WITH BACTRIM DS BID   FOR ADDITIONAL MONTH   WILL HAVE TO DECIDE LENGTH OF TREATMENT; outpatient follow up with Dr. Triplett.   Consider d/c planning soon.            COVID POS WITH NO RESP SX  NOT ON 02   PT HAD  RECEIVED ONE DOSE IN OCTOBER NEVER HAD SECOND DOSE     S/P    SOTROVIMAB  MAB THERAPY 1/19/22

## 2022-01-24 NOTE — PROGRESS NOTE ADULT - ASSESSMENT
51 year old female s/p multiple OR debridement of left lower extremity for soft tissue infection and abscesses of unclear origin. Now s/p STSG on 1/18.    Plan:   - Reg diet with ensure and eliven  - Wound VAC removed yesterday, grafts covered with Adaptic and gauze  - Donor site open to air, cut back xeroform as needed  - WB status: WBAT with LLE in CAM boot  - pain control   - DVT ppx  - ID: Dapto and meropenem to end 1/24 (today)  - F/U PT eval  - Dispo: PAWEL

## 2022-01-24 NOTE — PROGRESS NOTE ADULT - ATTENDING COMMENTS
Patient doing well. skin graft appears healthy, donor site looks healthy. Stable for rehab, dispo pending

## 2022-01-25 PROCEDURE — 99231 SBSQ HOSP IP/OBS SF/LOW 25: CPT | Mod: GC,24

## 2022-01-25 RX ORDER — GABAPENTIN 400 MG/1
600 CAPSULE ORAL EVERY 8 HOURS
Refills: 0 | Status: DISCONTINUED | OUTPATIENT
Start: 2022-01-25 | End: 2022-01-27

## 2022-01-25 RX ADMIN — HYDROMORPHONE HYDROCHLORIDE 4 MILLIGRAM(S): 2 INJECTION INTRAMUSCULAR; INTRAVENOUS; SUBCUTANEOUS at 07:58

## 2022-01-25 RX ADMIN — ZINC SULFATE TAB 220 MG (50 MG ZINC EQUIVALENT) 220 MILLIGRAM(S): 220 (50 ZN) TAB at 12:12

## 2022-01-25 RX ADMIN — Medication 975 MILLIGRAM(S): at 12:10

## 2022-01-25 RX ADMIN — PANTOPRAZOLE SODIUM 40 MILLIGRAM(S): 20 TABLET, DELAYED RELEASE ORAL at 05:51

## 2022-01-25 RX ADMIN — HYDROMORPHONE HYDROCHLORIDE 4 MILLIGRAM(S): 2 INJECTION INTRAMUSCULAR; INTRAVENOUS; SUBCUTANEOUS at 13:00

## 2022-01-25 RX ADMIN — Medication 975 MILLIGRAM(S): at 17:55

## 2022-01-25 RX ADMIN — Medication 1000 MILLIGRAM(S): at 17:56

## 2022-01-25 RX ADMIN — Medication 975 MILLIGRAM(S): at 00:12

## 2022-01-25 RX ADMIN — Medication 975 MILLIGRAM(S): at 16:55

## 2022-01-25 RX ADMIN — Medication 10 MILLIGRAM(S): at 12:12

## 2022-01-25 RX ADMIN — LISINOPRIL 5 MILLIGRAM(S): 2.5 TABLET ORAL at 05:51

## 2022-01-25 RX ADMIN — ENOXAPARIN SODIUM 40 MILLIGRAM(S): 100 INJECTION SUBCUTANEOUS at 12:13

## 2022-01-25 RX ADMIN — Medication 975 MILLIGRAM(S): at 05:50

## 2022-01-25 RX ADMIN — Medication 975 MILLIGRAM(S): at 23:04

## 2022-01-25 RX ADMIN — Medication 10 MILLIGRAM(S): at 17:56

## 2022-01-25 RX ADMIN — Medication 10 MILLIGRAM(S): at 05:50

## 2022-01-25 RX ADMIN — HYDROMORPHONE HYDROCHLORIDE 4 MILLIGRAM(S): 2 INJECTION INTRAMUSCULAR; INTRAVENOUS; SUBCUTANEOUS at 08:00

## 2022-01-25 RX ADMIN — HYDROMORPHONE HYDROCHLORIDE 4 MILLIGRAM(S): 2 INJECTION INTRAMUSCULAR; INTRAVENOUS; SUBCUTANEOUS at 12:11

## 2022-01-25 RX ADMIN — PANTOPRAZOLE SODIUM 40 MILLIGRAM(S): 20 TABLET, DELAYED RELEASE ORAL at 17:56

## 2022-01-25 RX ADMIN — Medication 400 INTERNATIONAL UNIT(S): at 12:12

## 2022-01-25 RX ADMIN — GABAPENTIN 600 MILLIGRAM(S): 400 CAPSULE ORAL at 23:04

## 2022-01-25 RX ADMIN — Medication 1000 MILLIGRAM(S): at 05:50

## 2022-01-25 RX ADMIN — Medication 1 TABLET(S): at 05:59

## 2022-01-25 RX ADMIN — Medication 1 TABLET(S): at 17:56

## 2022-01-25 RX ADMIN — Medication 10 MILLIGRAM(S): at 23:04

## 2022-01-25 RX ADMIN — GABAPENTIN 600 MILLIGRAM(S): 400 CAPSULE ORAL at 13:50

## 2022-01-25 RX ADMIN — GABAPENTIN 400 MILLIGRAM(S): 400 CAPSULE ORAL at 05:51

## 2022-01-25 NOTE — PHYSICAL THERAPY INITIAL EVALUATION ADULT - ADDITIONAL COMMENTS
Patient lives in a house with her mother, father, and 2 sisters. her mother and one sister who is visiting will be able to assist as needed. Pt. has 3 KANIKA without a rail and 12 inside with a rail. Patient independent PTA, no DME.

## 2022-01-25 NOTE — PHYSICAL THERAPY INITIAL EVALUATION ADULT - DIAGNOSIS, PT EVAL
Pt with decreased functional mobility secondary to pain
Decreased Functional Mobility
Pt with decreased functional mobility secondary to pain
decreased functional mobility
Decreased functional mobility secondary to pain

## 2022-01-25 NOTE — PHYSICAL THERAPY INITIAL EVALUATION ADULT - LEVEL OF INDEPENDENCE: SIT/SUPINE, REHAB EVAL
unable to perform secondary to 10/10 pain
Patient declining today secondary to pain/unable to perform
attempted but deferred due to 10/10 pain in B/L LE/dependent (less than 25% patients effort)
Pt. with blood pooling from left LE; furhter mobility deferred. RN Zoe aware and contacting SICU team/unable to perform

## 2022-01-25 NOTE — PHYSICAL THERAPY INITIAL EVALUATION ADULT - LEVEL OF INDEPENDENCE: STAIR NEGOTIATION, REHAB EVAL
unable to perform
unable to perform secondary to 10/10 pain
Patient declining today secondary to pain/unable to perform
N/A

## 2022-01-25 NOTE — PHYSICAL THERAPY INITIAL EVALUATION ADULT - LEVEL OF INDEPENDENCE: STAND/SIT, REHAB EVAL
unable to perform secondary to 10/10 pain
Patient declining today secondary to pain/unable to perform
unable to perform

## 2022-01-25 NOTE — PHYSICAL THERAPY INITIAL EVALUATION ADULT - PASSIVE RANGE OF MOTION EXAMINATION, REHAB EVAL
Right hip flex= 90*, R knee flex~ 60*,. Left hip flex/knee flex~ 10 deg. R DF~ 10*/bilateral upper extremity Passive ROM was WFL (within functional limits)
Statement Selected
Right hip flex ~90, right knee flex ~ 50*. Unable to assess left knee as patient reporting 10/10 pain. Left Ankle DF to neutral. Bilateral shoulder flex ~110, abd~ 90 with empty end feel for both./deficits as listed below
with exception to left LE; limited by pain and swelling - unable to formally assess due to above and pooling blood from earl - RN aware/no Passive ROM deficits were identified
B/L LE limited due to pain and anxious with movement/bilateral upper extremity Passive ROM was WFL (within functional limits)

## 2022-01-25 NOTE — PHYSICAL THERAPY INITIAL EVALUATION ADULT - IMPAIRMENTS FOUND, PT EVAL
aerobic capacity/endurance/gait, locomotion, and balance/muscle strength
gait, locomotion, and balance/gross motor/muscle strength/ROM
gait, locomotion, and balance
gait, locomotion, and balance/gross motor
gait, locomotion, and balance/gross motor/muscle strength/ROM

## 2022-01-25 NOTE — PHYSICAL THERAPY INITIAL EVALUATION ADULT - LEVEL OF INDEPENDENCE: SIT/STAND, REHAB EVAL
unable to perform
unable to perform secondary to 10/10 pain
N/A deferred-going for sx again later and does not want to be in pain
Patient declining today secondary to pain/unable to perform
Pt. with blood pooling from left LE; furhter mobility deferred. MONIQUE Enriquez aware and contacting SICU team. CAM boot not available/unable to perform

## 2022-01-25 NOTE — PHYSICAL THERAPY INITIAL EVALUATION ADULT - PRECAUTIONS/LIMITATIONS, REHAB EVAL
fall precautions
fall precautions/surgical precautions
fall precautions

## 2022-01-25 NOTE — PHYSICAL THERAPY INITIAL EVALUATION ADULT - ASR EQUIP NEEDS DISCH PT EVAL
bedside commode/rolling walker (5 inch wheels)
to be assessed
rolling walker (5 inch wheels)

## 2022-01-25 NOTE — PHYSICAL THERAPY INITIAL EVALUATION ADULT - DID THE PATIENT HAVE SURGERY?
wound debridement/yes
pulsed irrigation of left LE with replacement of wound vac/yes
Split-thickness skin graft from right thigh to left distal LE/yes
yes

## 2022-01-25 NOTE — PHYSICAL THERAPY INITIAL EVALUATION ADULT - CRITERIA FOR SKILLED THERAPEUTIC INTERVENTIONS
impairments found/functional limitations in following categories/risk reduction/prevention/rehab potential/therapy frequency/predicted duration of therapy intervention/anticipated equipment needs at discharge/anticipated discharge recommendation
impairments found
impairments found/functional limitations in following categories/risk reduction/prevention/rehab potential/therapy frequency/predicted duration of therapy intervention/anticipated equipment needs at discharge/anticipated discharge recommendation
impairments found
impairments found

## 2022-01-25 NOTE — PHYSICAL THERAPY INITIAL EVALUATION ADULT - LEVEL OF INDEPENDENCE: SUPINE/SIT, REHAB EVAL
N/A deferred-going for sx again later and does not want to be in pain
unable to perform secondary to 10/10 pain
Patient declining today secondary to pain
attempted but deferred due to 10/10 pain in B/L LE/dependent (less than 25% patients effort)

## 2022-01-25 NOTE — PROGRESS NOTE ADULT - SUBJECTIVE AND OBJECTIVE BOX
ACUTE CARE SURGERY PROGRESS NOTE    Subjective: Patient examined at bedside this AM. Reports she is feeling good overall. Pain is adequately controlled. She has been practicing passive movement while in bed. No acute events overnight.     Objective:  Vital Signs  T(C): 36.4 (01-25 @ 10:52), Max: 36.7 (01-24 @ 16:24)  HR: 80 (01-25 @ 10:52) (80 - 93)  BP: 138/63 (01-25 @ 10:52) (102/72 - 138/63)  RR: 18 (01-25 @ 10:52) (18 - 18)  SpO2: 98% (01-25 @ 10:52) (97% - 99%)  01-25-22 @ 07:01  -  01-25-22 @ 14:53  --------------------------------------------------------  IN:  Total IN: 0 mL    OUT:    Voided (mL): 525 mL  Total OUT: 525 mL    Total NET: -525 mL          Physical Exam:  General: alert and oriented, NAD  Resp: airway patent, respirations unlabored  Abdomen: soft, nontender, nondistended  Extremities: RLE donor site with dried xeroform and dried blood, LLE with healthy wound bed and STSG attached with staples.    Labs:                        8.1    7.06  )-----------( 437      ( 24 Jan 2022 06:54 )             26.5   01-24    136  |  101  |  7.9<L>  ----------------------------<  89  3.5   |  26.0  |  0.42<L>    Ca    8.8      24 Jan 2022 06:54  Phos  4.4     01-24  Mg     1.7     01-24      CAPILLARY BLOOD GLUCOSE          Medications:   MEDICATIONS  (STANDING):  acetaminophen     Tablet .. 975 milliGRAM(s) Oral every 6 hours  ascorbic acid 1000 milliGRAM(s) Oral two times a day  baclofen 10 milliGRAM(s) Oral every 6 hours  enoxaparin Injectable 40 milliGRAM(s) SubCutaneous daily  gabapentin 600 milliGRAM(s) Oral every 8 hours  lisinopril 5 milliGRAM(s) Oral daily  pantoprazole    Tablet 40 milliGRAM(s) Oral two times a day  senna 2 Tablet(s) Oral at bedtime  trimethoprim  160 mG/sulfamethoxazole 800 mG 1 Tablet(s) Oral two times a day  vitamin E 400 International Unit(s) Oral daily  zinc sulfate 220 milliGRAM(s) Oral daily    MEDICATIONS  (PRN):  HYDROmorphone   Tablet 4 milliGRAM(s) Oral every 4 hours PRN Severe Pain (7 - 10)  melatonin 5 milliGRAM(s) Oral at bedtime PRN Sleep  ondansetron Injectable 4 milliGRAM(s) IV Push every 8 hours PRN Nausea and/or Vomiting

## 2022-01-25 NOTE — PHYSICAL THERAPY INITIAL EVALUATION ADULT - ACTIVE RANGE OF MOTION EXAMINATION, REHAB EVAL
Right knee flexion approx 45, hip flexion to approx 45 in supine / left LE pt able to activate quads bit not lift leg, able to move toes down, not up/bilateral upper extremity Active ROM was WFL (within functional limits)/deficits as listed below
with exception to left LE; limited by pain and swelling - unable to formally assess due to above and pooling blood from earl - RN aware/no Active ROM deficits were identified
AROM B/L shoulder elevation to * with pain (R>L), RLE hip flex= 70*. Pt unable to perform LLE hip flex/ DF due to 10/10 pain/bilateral upper extremity Active ROM was WFL (within functional limits)
B/L LE limited due to pain and anxious with movement/bilateral upper extremity Active ROM was WFL (within functional limits)
AROM bilateral shoulder elevation to ~80* with pain (right>left), Patient unable to flex left hip/knee or perform ankle DF/PF 2/2 pain. Right hip/knee flex approx 40*/deficits as listed below

## 2022-01-25 NOTE — PHYSICAL THERAPY INITIAL EVALUATION ADULT - REHAB POTENTIAL, PT EVAL
good, to achieve stated therapy goals
fair, will monitor progress closely

## 2022-01-25 NOTE — PHYSICAL THERAPY INITIAL EVALUATION ADULT - PERTINENT HX OF CURRENT PROBLEM, REHAB EVAL
Necrotizing soft tissue infection; Pt taken emergently to the OR with [facility] surgery and orthopedics to explore LLE. Fascia intact laterally and medially, some dishwater colored fluid irrigated. Orthopedics evacuated purulent drainage from knee, removed hardware from L ankle and evacuated pus from L ankle. L knee incision closed with sutures, remaining 4 incisions packed with betadine soaked gauze.
Necrotizing soft tissue infection; Pt taken emergently to the OR with [facility] surgery and orthopedics to explore LLE. Fascia intact laterally and medially, some dishwater colored fluid irrigated. Orthopedics evacuated purulent drainage from knee, removed hardware from L ankle and evacuated pus from L ankle. L knee incision closed with sutures, remaining 4 incisions packed with betadine soaked gauze. Patient has now had several wound debridements and irrigations of left LE during time of stay
Necrotizing soft tissue infection; Pt taken emergently to the OR with [facility] surgery and orthopedics to explore LLE. Fascia intact laterally and medially, some dishwater colored fluid irrigated. Orthopedics evacuated purulent drainage from knee, removed hardware from L ankle and evacuated pus from L ankle. L knee incision closed with sutures, remaining 4 incisions packed with betadine soaked gauze. Patient has now had several wound debridements and irrigations of left LE during time of stay
Necrotizing soft tissue infection; Pt taken emergently to the OR with [facility] surgery and orthopedics to explore LLE. Fascia intact laterally and medially, some dishwater colored fluid irrigated. Orthopedics evacuated purulent drainage from knee, removed hardware from L ankle and evacuated pus from L ankle. L knee incision closed with sutures, remaining 4 incisions packed with betadine soaked gauze.
left LE wound

## 2022-01-25 NOTE — PHYSICAL THERAPY INITIAL EVALUATION ADULT - ORIENTATION, REHAB EVAL
oriented to person, place, time and situation
"Su"/person/place/time

## 2022-01-25 NOTE — PHYSICAL THERAPY INITIAL EVALUATION ADULT - LEVEL OF INDEPENDENCE: GAIT, REHAB EVAL
Pt. with blood pooling from left LE; furhter mobility deferred. RN Zoe aware and contacting SICU team/unable to perform
unable to perform
unable to perform secondary to 10/10 pain
N/A
Patient declining today secondary to pain/unable to perform

## 2022-01-25 NOTE — PHYSICAL THERAPY INITIAL EVALUATION ADULT - PLANNED THERAPY INTERVENTIONS, PT EVAL
bed mobility training/gait training/transfer training
stair training/bed mobility training/gait training/transfer training
stairs/balance training/bed mobility training/gait training/ROM/strengthening/transfer training
stairs/bed mobility training/gait training/ROM/strengthening/transfer training
balance training/gait training/strengthening/transfer training

## 2022-01-25 NOTE — PROGRESS NOTE ADULT - ASSESSMENT
51 year old female s/p multiple OR debridement of left lower extremity for soft tissue infection and abscesses of unclear origin. Now s/p STSG on 1/18.    Plan:   - Reg diet with ensure and eliven  - Wound VAC removed yesterday, grafts covered with Adaptic and gauze  - Donor site open to air, cut back xeroform as needed  - WB status: WBAT with LLE in CAM boot  - pain control   - DVT ppx  - ID: Bactrim PO BID for at least 1 mo, will determine length of treatment as outpatient  - F/U PT eval  - Dispo: [facility]

## 2022-01-25 NOTE — PHYSICAL THERAPY INITIAL EVALUATION ADULT - DISCHARGE DISPOSITION, PT EVAL
Acute Rehabilitation vs home with asisst and PT pending hospital course/progress by d/c
[facility]
Acute Rehabilitation vs home with asisst and PT pending hospital course/progress by d/c
[facility]/rehabilitation facility
[facility]/rehabilitation facility

## 2022-01-25 NOTE — PHYSICAL THERAPY INITIAL EVALUATION ADULT - MANUAL MUSCLE TESTING RESULTS, REHAB EVAL
B/L UE= 3/5, RLE= 2/5, LLE=1+/5
B/L UE = 3/5, BLE= 2/5 DF/PF
with exception to left LE; limited by pain and swelling - unable to formally assess due to above and pooling blood from earl - RN aware. left ankle at least 2/5 DF and PF/no strength deficits were identified
right LE grossly 3-/5 , left LE unable to assess secondary to pain, can perform a quad set

## 2022-01-25 NOTE — PHYSICAL THERAPY INITIAL EVALUATION ADULT - NS ASR WT BEARING DETAIL LLE
In CAM BOOT/weight-bearing as tolerated
ROM knee allowed
weight-bearing as tolerated
with CAM boot on/weight-bearing as tolerated

## 2022-01-25 NOTE — PHYSICAL THERAPY INITIAL EVALUATION ADULT - FUNCTIONAL LIMITATIONS, PT EVAL
self-care/home management/community/leisure
self-care/home management/work/community/leisure

## 2022-01-26 PROCEDURE — 99231 SBSQ HOSP IP/OBS SF/LOW 25: CPT | Mod: GC,24

## 2022-01-26 RX ADMIN — Medication 1 TABLET(S): at 17:54

## 2022-01-26 RX ADMIN — Medication 975 MILLIGRAM(S): at 11:31

## 2022-01-26 RX ADMIN — Medication 10 MILLIGRAM(S): at 11:31

## 2022-01-26 RX ADMIN — PANTOPRAZOLE SODIUM 40 MILLIGRAM(S): 20 TABLET, DELAYED RELEASE ORAL at 17:54

## 2022-01-26 RX ADMIN — Medication 10 MILLIGRAM(S): at 17:54

## 2022-01-26 RX ADMIN — Medication 400 INTERNATIONAL UNIT(S): at 15:15

## 2022-01-26 RX ADMIN — GABAPENTIN 600 MILLIGRAM(S): 400 CAPSULE ORAL at 23:03

## 2022-01-26 RX ADMIN — HYDROMORPHONE HYDROCHLORIDE 4 MILLIGRAM(S): 2 INJECTION INTRAMUSCULAR; INTRAVENOUS; SUBCUTANEOUS at 08:13

## 2022-01-26 RX ADMIN — PANTOPRAZOLE SODIUM 40 MILLIGRAM(S): 20 TABLET, DELAYED RELEASE ORAL at 05:32

## 2022-01-26 RX ADMIN — Medication 1 TABLET(S): at 05:32

## 2022-01-26 RX ADMIN — Medication 975 MILLIGRAM(S): at 23:03

## 2022-01-26 RX ADMIN — Medication 10 MILLIGRAM(S): at 23:03

## 2022-01-26 RX ADMIN — Medication 975 MILLIGRAM(S): at 17:54

## 2022-01-26 RX ADMIN — Medication 975 MILLIGRAM(S): at 05:32

## 2022-01-26 RX ADMIN — ENOXAPARIN SODIUM 40 MILLIGRAM(S): 100 INJECTION SUBCUTANEOUS at 11:31

## 2022-01-26 RX ADMIN — Medication 10 MILLIGRAM(S): at 05:32

## 2022-01-26 RX ADMIN — ZINC SULFATE TAB 220 MG (50 MG ZINC EQUIVALENT) 220 MILLIGRAM(S): 220 (50 ZN) TAB at 11:31

## 2022-01-26 RX ADMIN — GABAPENTIN 600 MILLIGRAM(S): 400 CAPSULE ORAL at 05:32

## 2022-01-26 RX ADMIN — Medication 1000 MILLIGRAM(S): at 17:54

## 2022-01-26 RX ADMIN — HYDROMORPHONE HYDROCHLORIDE 4 MILLIGRAM(S): 2 INJECTION INTRAMUSCULAR; INTRAVENOUS; SUBCUTANEOUS at 09:00

## 2022-01-26 RX ADMIN — Medication 1000 MILLIGRAM(S): at 05:33

## 2022-01-26 RX ADMIN — GABAPENTIN 600 MILLIGRAM(S): 400 CAPSULE ORAL at 13:16

## 2022-01-26 NOTE — PROGRESS NOTE ADULT - ATTENDING COMMENTS
Pt seen and examined. On oral abx. s/p skin graft. continues /w L knee stiffness and pain. Will likely need staged L tka/spacer. Possible lysis of adhesions. Will need f/u for femur osteo. d/w pt. in agreement. continue abx per id. pt for rom.

## 2022-01-26 NOTE — PROGRESS NOTE ADULT - ASSESSMENT
51 year old female s/p multiple OR debridement of left lower extremity for soft tissue infection and abscesses of unclear origin. Now s/p STSG on 1/18.    Plan:   - Reg diet with ensure and eliven  - Wound VAC removed , grafts covered with Adaptic and gauze  - Donor site open to air, cut back xeroform as needed  - WB status: WBAT with LLE in CAM boot  - pain control   - DVT ppx  - ID: Bactrim PO BID for at least 1 mo, will determine length of treatment as outpatient  - F/U PT eval  - Dispo: [facility]

## 2022-01-26 NOTE — PROGRESS NOTE ADULT - ATTENDING COMMENTS
Provided pt with blanket and positioned for comfort. Pt seen and examined by me  cont PT/OT  placement pending

## 2022-01-26 NOTE — PROGRESS NOTE ADULT - SUBJECTIVE AND OBJECTIVE BOX
Pt Name: LUIS FERNANDO DAVIS    MRN: [id number]      Patient is a being followed for bilateral septic knees. Multiple irrigation and debridements of bilateral knees.   Most recent on 12/28.    Patient reports her right knee is improving, and she is gaining strength and ROM, doing heel slides in the bed.   Pt reports her left knee is not worsening, remains stiff with pain localized to medial aspect of her knee. Found in bed with pillows under Left knee, with bed bend at knee joint.  Patient has been advised to do passive ROM exercised while in bed.  Encouraged to try to straighten Left Knee. Pillows under ankle, no pillows under the Left Knee.  Patient understands and is agreeable.    Patient has drop foot on left side that has been ongoing during hospital stay. Pt denies any sensory changes. No fever/chills.         PAST MEDICAL & SURGICAL HISTORY:  PAST MEDICAL & SURGICAL HISTORY:  No pertinent past medical history    History of ankle surgery        Allergies: No Known Allergies      Medications: acetaminophen     Tablet .. 975 milliGRAM(s) Oral every 6 hours  ascorbic acid 1000 milliGRAM(s) Oral two times a day  baclofen 10 milliGRAM(s) Oral every 6 hours  enoxaparin Injectable 40 milliGRAM(s) SubCutaneous daily  gabapentin 600 milliGRAM(s) Oral every 8 hours  HYDROmorphone   Tablet 4 milliGRAM(s) Oral every 4 hours PRN  lisinopril 5 milliGRAM(s) Oral daily  melatonin 5 milliGRAM(s) Oral at bedtime PRN  ondansetron Injectable 4 milliGRAM(s) IV Push every 8 hours PRN  pantoprazole    Tablet 40 milliGRAM(s) Oral two times a day  senna 2 Tablet(s) Oral at bedtime  trimethoprim  160 mG/sulfamethoxazole 800 mG 1 Tablet(s) Oral two times a day  vitamin E 400 International Unit(s) Oral daily  zinc sulfate 220 milliGRAM(s) Oral d    PHYSICAL EXAM:    Vital Signs Last 24 Hrs  T(C): 36.7 (26 Jan 2022 05:23), Max: 36.7 (26 Jan 2022 05:23)  T(F): 98.1 (26 Jan 2022 05:23), Max: 98.1 (26 Jan 2022 05:23)  HR: 80 (26 Jan 2022 05:23) (80 - 99)  BP: 102/62 (26 Jan 2022 05:23) (102/62 - 138/63)  BP(mean): --  RR: 18 (26 Jan 2022 05:23) (16 - 18)  SpO2: 98% (26 Jan 2022 05:23) (98% - 100%)  Daily     Daily     Appearance: Alert, responsive, in no acute distress.    Neurological: Sensation is grossly intact to light touch. LLE foot drop     Skin: no rash on visible skin. Skin is clean, dry and intact. No bleeding. No abrasions. No ulcerations. Right thigh healing skin graft.     Vascular: 2+ distal pulses. Cap refill < 2 sec. No signs of venous   insufficiency   or stasis. No extremity ulcerations. No cyanosis.    Tenderness to Medial aspect of left knee.  Slight muscle atrophy noted in Left  thigh. Compartments soft non tender. Well healed incisions over bilateral knees. No knee effusions noted.       PLAN:   *Continue Abx per ID  WBAT LLE with cam boot  WBAT RLE  Discharge planning  Pain control

## 2022-01-26 NOTE — PROGRESS NOTE ADULT - SUBJECTIVE AND OBJECTIVE BOX
SUBJECTIVE/24 hour events:   Patient with no acute events overnight, pain continues to be controlled on current regimen. Patient is practicing passive movement in bed. Patient getting prepared for discharge to [facility] , infectious disease allowing patient to continue abx regimen po instead of IV    Vital Signs Last 24 Hrs  T(C): 36.6 (25 Jan 2022 21:17), Max: 36.7 (25 Jan 2022 05:47)  T(F): 97.8 (25 Jan 2022 21:17), Max: 98.1 (25 Jan 2022 05:47)  HR: 89 (25 Jan 2022 21:17) (80 - 99)  BP: 108/71 (25 Jan 2022 21:17) (102/72 - 138/63)  BP(mean): --  RR: 16 (25 Jan 2022 21:17) (16 - 18)  SpO2: 98% (25 Jan 2022 21:17) (97% - 100%)  Drug Dosing Weight  Height (cm): 167.6 (28 Dec 2021 12:36)  Weight (kg): 93 (28 Dec 2021 12:36)  BMI (kg/m2): 33.1 (28 Dec 2021 12:36)  BSA (m2): 2.02 (28 Dec 2021 12:36)  I&O's Detail    25 Jan 2022 07:01  -  26 Jan 2022 01:54  --------------------------------------------------------  IN:  Total IN: 0 mL    OUT:    Voided (mL): 525 mL  Total OUT: 525 mL    Total NET: -525 mL        Allergies    No Known Allergies    Intolerances                              8.1    7.06  )-----------( 437      ( 24 Jan 2022 06:54 )             26.5   01-24    136  |  101  |  7.9<L>  ----------------------------<  89  3.5   |  26.0  |  0.42<L>    Ca    8.8      24 Jan 2022 06:54  Phos  4.4     01-24  Mg     1.7     01-24        ROS:    PHYSICAL EXAM:  General: alert and oriented, NAD  Resp: airway patent, respirations unlabored  Abdomen: soft, nontender, nondistended  Extremities: RLE donor site with dried xeroform and dried blood, LLE with healthy wound bed and STSG attached with staples.      MEDICATIONS  (STANDING):  acetaminophen     Tablet .. 975 milliGRAM(s) Oral every 6 hours  ascorbic acid 1000 milliGRAM(s) Oral two times a day  baclofen 10 milliGRAM(s) Oral every 6 hours  enoxaparin Injectable 40 milliGRAM(s) SubCutaneous daily  gabapentin 600 milliGRAM(s) Oral every 8 hours  lisinopril 5 milliGRAM(s) Oral daily  pantoprazole    Tablet 40 milliGRAM(s) Oral two times a day  senna 2 Tablet(s) Oral at bedtime  trimethoprim  160 mG/sulfamethoxazole 800 mG 1 Tablet(s) Oral two times a day  vitamin E 400 International Unit(s) Oral daily  zinc sulfate 220 milliGRAM(s) Oral daily    MEDICATIONS  (PRN):  HYDROmorphone   Tablet 4 milliGRAM(s) Oral every 4 hours PRN Severe Pain (7 - 10)  melatonin 5 milliGRAM(s) Oral at bedtime PRN Sleep  ondansetron Injectable 4 milliGRAM(s) IV Push every 8 hours PRN Nausea and/or Vomiting      RADIOLOGY STUDIES:    CULTURES:

## 2022-01-27 LAB — SARS-COV-2 RNA SPEC QL NAA+PROBE: SIGNIFICANT CHANGE UP

## 2022-01-27 PROCEDURE — 99231 SBSQ HOSP IP/OBS SF/LOW 25: CPT | Mod: GC,24

## 2022-01-27 RX ORDER — GABAPENTIN 400 MG/1
900 CAPSULE ORAL EVERY 8 HOURS
Refills: 0 | Status: DISCONTINUED | OUTPATIENT
Start: 2022-01-27 | End: 2022-01-28

## 2022-01-27 RX ADMIN — GABAPENTIN 600 MILLIGRAM(S): 400 CAPSULE ORAL at 05:31

## 2022-01-27 RX ADMIN — GABAPENTIN 900 MILLIGRAM(S): 400 CAPSULE ORAL at 14:24

## 2022-01-27 RX ADMIN — Medication 10 MILLIGRAM(S): at 23:32

## 2022-01-27 RX ADMIN — Medication 1 TABLET(S): at 18:07

## 2022-01-27 RX ADMIN — Medication 1000 MILLIGRAM(S): at 18:08

## 2022-01-27 RX ADMIN — GABAPENTIN 900 MILLIGRAM(S): 400 CAPSULE ORAL at 21:30

## 2022-01-27 RX ADMIN — HYDROMORPHONE HYDROCHLORIDE 4 MILLIGRAM(S): 2 INJECTION INTRAMUSCULAR; INTRAVENOUS; SUBCUTANEOUS at 09:57

## 2022-01-27 RX ADMIN — Medication 975 MILLIGRAM(S): at 18:07

## 2022-01-27 RX ADMIN — PANTOPRAZOLE SODIUM 40 MILLIGRAM(S): 20 TABLET, DELAYED RELEASE ORAL at 05:31

## 2022-01-27 RX ADMIN — PANTOPRAZOLE SODIUM 40 MILLIGRAM(S): 20 TABLET, DELAYED RELEASE ORAL at 18:07

## 2022-01-27 RX ADMIN — Medication 975 MILLIGRAM(S): at 11:55

## 2022-01-27 RX ADMIN — ZINC SULFATE TAB 220 MG (50 MG ZINC EQUIVALENT) 220 MILLIGRAM(S): 220 (50 ZN) TAB at 14:23

## 2022-01-27 RX ADMIN — ENOXAPARIN SODIUM 40 MILLIGRAM(S): 100 INJECTION SUBCUTANEOUS at 11:55

## 2022-01-27 RX ADMIN — Medication 10 MILLIGRAM(S): at 05:31

## 2022-01-27 RX ADMIN — Medication 1000 MILLIGRAM(S): at 05:30

## 2022-01-27 RX ADMIN — Medication 975 MILLIGRAM(S): at 05:32

## 2022-01-27 RX ADMIN — HYDROMORPHONE HYDROCHLORIDE 4 MILLIGRAM(S): 2 INJECTION INTRAMUSCULAR; INTRAVENOUS; SUBCUTANEOUS at 10:57

## 2022-01-27 RX ADMIN — Medication 975 MILLIGRAM(S): at 12:00

## 2022-01-27 RX ADMIN — Medication 975 MILLIGRAM(S): at 23:32

## 2022-01-27 RX ADMIN — Medication 10 MILLIGRAM(S): at 18:07

## 2022-01-27 RX ADMIN — Medication 10 MILLIGRAM(S): at 11:55

## 2022-01-27 RX ADMIN — Medication 1 TABLET(S): at 05:31

## 2022-01-27 NOTE — CHART NOTE - NSCHARTNOTESELECT_GEN_ALL_CORE
Event Note
POC/Event Note
Wound Vac Change/Event Note
Cardiology Attending
Event Note
Nutrition Services
POC/Event Note
Post-op check
SICU PA/Event Note
SICU PA/Transfer Note
Wound vac removal
Wound vac replacement/Event Note
chart note/Event Note
downgrade/Event Note

## 2022-01-27 NOTE — CHART NOTE - NSCHARTNOTEFT_GEN_A_CORE
Source: Patient [ ]  Family [ ]   other [x ]EMR    Current Diet: Regular with Ensure TID    PO intake:  < 50% [ ]   50-75%  [x ]   %  [ ]  other :    Source for PO intake [ ] Patient [ ] family [x ] chart [ ] staff [ ] other    Enteral /Parenteral Nutrition:     Current Weight: 12/28 205#, 184# 1/12    Current Weight: (1/4) 198.2 lbs- per RD bedscale  (12/29) 181 lbs  (12/21) 194.7 lbs  (12/8) 251.9 lbs  (12/7) 205.9 lbs  (12/1) 234.7 lbs  (11/24) 221.3 lbs  (11/22) 245.1 lbs  (11/21) 242.9 lbs  (11/20) 228.1 lbs       184# 1/12, 205# 12/28  left leg  non pitting edema      % Weight Change     Pertinent Medications: MEDICATIONS  (STANDING):  acetaminophen     Tablet .. 975 milliGRAM(s) Oral every 6 hours  ascorbic acid 1000 milliGRAM(s) Oral two times a day  baclofen 10 milliGRAM(s) Oral every 6 hours  enoxaparin Injectable 40 milliGRAM(s) SubCutaneous daily  gabapentin 900 milliGRAM(s) Oral every 8 hours  lisinopril 5 milliGRAM(s) Oral daily  pantoprazole    Tablet 40 milliGRAM(s) Oral two times a day  senna 2 Tablet(s) Oral at bedtime  trimethoprim  160 mG/sulfamethoxazole 800 mG 1 Tablet(s) Oral two times a day  zinc sulfate 220 milliGRAM(s) Oral daily    MEDICATIONS  (PRN):  HYDROmorphone   Tablet 4 milliGRAM(s) Oral every 4 hours PRN Severe Pain (7 - 10)  melatonin 5 milliGRAM(s) Oral at bedtime PRN Sleep  ondansetron Injectable 4 milliGRAM(s) IV Push every 8 hours PRN Nausea and/or Vomiting    Pertinent Labs:         Skin:     Nutrition focused physical exam conducted - found signs of malnutrition [ ]absent [x ]present    Subcutaneous fat loss: [ x] Orbital fat pads region, [x ]Buccal fat region, [ ]Triceps region,  [ ]Ribs region    Muscle wasting: [x ]Temples region, [ x]Clavicle region, [ ]Shoulder region, [ ]Scapula region, [ ]Interosseous region,  [ ]thigh region, [ ]Calf region    Estimated Needs:   [x ] no change since previous assessment  [ ] recalculated:     Current Nutrition Diagnosis: : Pt presents at risk secondary to acute moderate protein calorie malnutrition, related to inadequate protein calorie intake in the setting of cellulitis and abscess and debridements as evidenced by PO<75% est needs, possible 15% weight loss, edema,  and physical findings.     Recommendations:   1) Continue ensure enlive TID  2) Daily weight  3) Swapnil BID  4) RX MVI, Vit C, Zinc      Monitoring and Evaluation:   [x ] PO intake [x ] Tolerance to diet prescription [X] Weights  [X] Follow up per protocol [X] Labs:

## 2022-01-27 NOTE — PROGRESS NOTE ADULT - SUBJECTIVE AND OBJECTIVE BOX
SUBJECTIVE: No acute distress. No acute events overnight. Remains hemodynamically stable.    MEDICATIONS  (STANDING):  acetaminophen     Tablet .. 975 milliGRAM(s) Oral every 6 hours  ascorbic acid 1000 milliGRAM(s) Oral two times a day  baclofen 10 milliGRAM(s) Oral every 6 hours  enoxaparin Injectable 40 milliGRAM(s) SubCutaneous daily  gabapentin 600 milliGRAM(s) Oral every 8 hours  lisinopril 5 milliGRAM(s) Oral daily  pantoprazole    Tablet 40 milliGRAM(s) Oral two times a day  senna 2 Tablet(s) Oral at bedtime  trimethoprim  160 mG/sulfamethoxazole 800 mG 1 Tablet(s) Oral two times a day  zinc sulfate 220 milliGRAM(s) Oral daily    MEDICATIONS  (PRN):  HYDROmorphone   Tablet 4 milliGRAM(s) Oral every 4 hours PRN Severe Pain (7 - 10)  melatonin 5 milliGRAM(s) Oral at bedtime PRN Sleep  ondansetron Injectable 4 milliGRAM(s) IV Push every 8 hours PRN Nausea and/or Vomiting    Vital Signs Last 24 Hrs  T(C): 36.8 (26 Jan 2022 21:04), Max: 36.8 (26 Jan 2022 17:32)  T(F): 98.2 (26 Jan 2022 21:04), Max: 98.3 (26 Jan 2022 17:32)  HR: 94 (26 Jan 2022 21:04) (79 - 94)  BP: 121/75 (26 Jan 2022 21:04) (102/62 - 123/78)  BP(mean): --  RR: 16 (26 Jan 2022 21:04) (16 - 18)  SpO2: 98% (26 Jan 2022 21:04) (97% - 98%)    PHYSICAL EXAM:  General: alert and oriented, NAD  Resp: airway patent, respirations unlabored  Abdomen: soft, nontender, nondistended  Extremities: RLE donor site with dried xeroform and dried blood, LLE with healthy wound bed and STSG attached with staples.    I&O's Detail    25 Jan 2022 07:01  -  26 Jan 2022 07:00  --------------------------------------------------------  IN:  Total IN: 0 mL    OUT:    Voided (mL): 525 mL  Total OUT: 525 mL    Total NET: -525 mL

## 2022-01-28 ENCOUNTER — TRANSCRIPTION ENCOUNTER (OUTPATIENT)
Age: 52
End: 2022-01-28

## 2022-01-28 VITALS
HEART RATE: 112 BPM | OXYGEN SATURATION: 99 % | TEMPERATURE: 98 F | DIASTOLIC BLOOD PRESSURE: 81 MMHG | SYSTOLIC BLOOD PRESSURE: 120 MMHG | RESPIRATION RATE: 18 BRPM

## 2022-01-28 LAB
ANION GAP SERPL CALC-SCNC: 12 MMOL/L — SIGNIFICANT CHANGE UP (ref 5–17)
BUN SERPL-MCNC: 8.6 MG/DL — SIGNIFICANT CHANGE UP (ref 8–20)
CALCIUM SERPL-MCNC: 9.3 MG/DL — SIGNIFICANT CHANGE UP (ref 8.6–10.2)
CHLORIDE SERPL-SCNC: 99 MMOL/L — SIGNIFICANT CHANGE UP (ref 98–107)
CO2 SERPL-SCNC: 24 MMOL/L — SIGNIFICANT CHANGE UP (ref 22–29)
CREAT SERPL-MCNC: 0.47 MG/DL — LOW (ref 0.5–1.3)
GLUCOSE SERPL-MCNC: 90 MG/DL — SIGNIFICANT CHANGE UP (ref 70–99)
HCT VFR BLD CALC: 26.5 % — LOW (ref 34.5–45)
HGB BLD-MCNC: 8.2 G/DL — LOW (ref 11.5–15.5)
MCHC RBC-ENTMCNC: 27.6 PG — SIGNIFICANT CHANGE UP (ref 27–34)
MCHC RBC-ENTMCNC: 30.9 GM/DL — LOW (ref 32–36)
MCV RBC AUTO: 89.2 FL — SIGNIFICANT CHANGE UP (ref 80–100)
PLATELET # BLD AUTO: 542 K/UL — HIGH (ref 150–400)
POTASSIUM SERPL-MCNC: 3.9 MMOL/L — SIGNIFICANT CHANGE UP (ref 3.5–5.3)
POTASSIUM SERPL-SCNC: 3.9 MMOL/L — SIGNIFICANT CHANGE UP (ref 3.5–5.3)
RBC # BLD: 2.97 M/UL — LOW (ref 3.8–5.2)
RBC # FLD: 18.5 % — HIGH (ref 10.3–14.5)
SODIUM SERPL-SCNC: 135 MMOL/L — SIGNIFICANT CHANGE UP (ref 135–145)
WBC # BLD: 8.65 K/UL — SIGNIFICANT CHANGE UP (ref 3.8–10.5)
WBC # FLD AUTO: 8.65 K/UL — SIGNIFICANT CHANGE UP (ref 3.8–10.5)

## 2022-01-28 PROCEDURE — 99232 SBSQ HOSP IP/OBS MODERATE 35: CPT

## 2022-01-28 PROCEDURE — 86140 C-REACTIVE PROTEIN: CPT

## 2022-01-28 PROCEDURE — 70450 CT HEAD/BRAIN W/O DYE: CPT | Mod: MA

## 2022-01-28 PROCEDURE — 93971 EXTREMITY STUDY: CPT

## 2022-01-28 PROCEDURE — 87102 FUNGUS ISOLATION CULTURE: CPT

## 2022-01-28 PROCEDURE — 86901 BLOOD TYPING SEROLOGIC RH(D): CPT

## 2022-01-28 PROCEDURE — 83010 ASSAY OF HAPTOGLOBIN QUANT: CPT

## 2022-01-28 PROCEDURE — 93970 EXTREMITY STUDY: CPT

## 2022-01-28 PROCEDURE — 82652 VIT D 1 25-DIHYDROXY: CPT

## 2022-01-28 PROCEDURE — 74177 CT ABD & PELVIS W/CONTRAST: CPT

## 2022-01-28 PROCEDURE — 85014 HEMATOCRIT: CPT

## 2022-01-28 PROCEDURE — 83935 ASSAY OF URINE OSMOLALITY: CPT

## 2022-01-28 PROCEDURE — C1769: CPT

## 2022-01-28 PROCEDURE — 97163 PT EVAL HIGH COMPLEX 45 MIN: CPT

## 2022-01-28 PROCEDURE — 84702 CHORIONIC GONADOTROPIN TEST: CPT

## 2022-01-28 PROCEDURE — 87181 SC STD AGAR DILUTION PER AGT: CPT

## 2022-01-28 PROCEDURE — C1889: CPT

## 2022-01-28 PROCEDURE — 82550 ASSAY OF CK (CPK): CPT

## 2022-01-28 PROCEDURE — 82248 BILIRUBIN DIRECT: CPT

## 2022-01-28 PROCEDURE — 73701 CT LOWER EXTREMITY W/DYE: CPT

## 2022-01-28 PROCEDURE — 87086 URINE CULTURE/COLONY COUNT: CPT

## 2022-01-28 PROCEDURE — 71260 CT THORAX DX C+: CPT

## 2022-01-28 PROCEDURE — 83880 ASSAY OF NATRIURETIC PEPTIDE: CPT

## 2022-01-28 PROCEDURE — 93325 DOPPLER ECHO COLOR FLOW MAPG: CPT

## 2022-01-28 PROCEDURE — 87075 CULTR BACTERIA EXCEPT BLOOD: CPT

## 2022-01-28 PROCEDURE — 86769 SARS-COV-2 COVID-19 ANTIBODY: CPT

## 2022-01-28 PROCEDURE — 84300 ASSAY OF URINE SODIUM: CPT

## 2022-01-28 PROCEDURE — 72141 MRI NECK SPINE W/O DYE: CPT

## 2022-01-28 PROCEDURE — 76882 US LMTD JT/FCL EVL NVASC XTR: CPT

## 2022-01-28 PROCEDURE — 85027 COMPLETE CBC AUTOMATED: CPT

## 2022-01-28 PROCEDURE — 83540 ASSAY OF IRON: CPT

## 2022-01-28 PROCEDURE — 85610 PROTHROMBIN TIME: CPT

## 2022-01-28 PROCEDURE — 84295 ASSAY OF SERUM SODIUM: CPT

## 2022-01-28 PROCEDURE — P9040: CPT

## 2022-01-28 PROCEDURE — 83516 IMMUNOASSAY NONANTIBODY: CPT

## 2022-01-28 PROCEDURE — 83520 IMMUNOASSAY QUANT NOS NONAB: CPT

## 2022-01-28 PROCEDURE — 99239 HOSP IP/OBS DSCHRG MGMT >30: CPT | Mod: GC,24

## 2022-01-28 PROCEDURE — 89051 BODY FLUID CELL COUNT: CPT

## 2022-01-28 PROCEDURE — 96376 TX/PRO/DX INJ SAME DRUG ADON: CPT

## 2022-01-28 PROCEDURE — 83605 ASSAY OF LACTIC ACID: CPT

## 2022-01-28 PROCEDURE — 73030 X-RAY EXAM OF SHOULDER: CPT

## 2022-01-28 PROCEDURE — 71045 X-RAY EXAM CHEST 1 VIEW: CPT

## 2022-01-28 PROCEDURE — 82436 ASSAY OF URINE CHLORIDE: CPT

## 2022-01-28 PROCEDURE — 86703 HIV-1/HIV-2 1 RESULT ANTBDY: CPT

## 2022-01-28 PROCEDURE — 87591 N.GONORRHOEAE DNA AMP PROB: CPT

## 2022-01-28 PROCEDURE — 73700 CT LOWER EXTREMITY W/O DYE: CPT

## 2022-01-28 PROCEDURE — 84484 ASSAY OF TROPONIN QUANT: CPT

## 2022-01-28 PROCEDURE — 86850 RBC ANTIBODY SCREEN: CPT

## 2022-01-28 PROCEDURE — 97110 THERAPEUTIC EXERCISES: CPT

## 2022-01-28 PROCEDURE — 86038 ANTINUCLEAR ANTIBODIES: CPT

## 2022-01-28 PROCEDURE — 87116 MYCOBACTERIA CULTURE: CPT

## 2022-01-28 PROCEDURE — 85652 RBC SED RATE AUTOMATED: CPT

## 2022-01-28 PROCEDURE — P9016: CPT

## 2022-01-28 PROCEDURE — C9399: CPT

## 2022-01-28 PROCEDURE — 82247 BILIRUBIN TOTAL: CPT

## 2022-01-28 PROCEDURE — U0003: CPT

## 2022-01-28 PROCEDURE — 85384 FIBRINOGEN ACTIVITY: CPT

## 2022-01-28 PROCEDURE — 73722 MRI JOINT OF LWR EXTR W/DYE: CPT

## 2022-01-28 PROCEDURE — 87491 CHLMYD TRACH DNA AMP PROBE: CPT

## 2022-01-28 PROCEDURE — 86235 NUCLEAR ANTIGEN ANTIBODY: CPT

## 2022-01-28 PROCEDURE — 87070 CULTURE OTHR SPECIMN AEROBIC: CPT

## 2022-01-28 PROCEDURE — 87184 SC STD DISK METHOD PER PLATE: CPT

## 2022-01-28 PROCEDURE — P9059: CPT

## 2022-01-28 PROCEDURE — 70551 MRI BRAIN STEM W/O DYE: CPT

## 2022-01-28 PROCEDURE — 84156 ASSAY OF PROTEIN URINE: CPT

## 2022-01-28 PROCEDURE — C1729: CPT

## 2022-01-28 PROCEDURE — 82435 ASSAY OF BLOOD CHLORIDE: CPT

## 2022-01-28 PROCEDURE — 87206 SMEAR FLUORESCENT/ACID STAI: CPT

## 2022-01-28 PROCEDURE — 83735 ASSAY OF MAGNESIUM: CPT

## 2022-01-28 PROCEDURE — 87077 CULTURE AEROBIC IDENTIFY: CPT

## 2022-01-28 PROCEDURE — 93312 ECHO TRANSESOPHAGEAL: CPT

## 2022-01-28 PROCEDURE — 93005 ELECTROCARDIOGRAM TRACING: CPT

## 2022-01-28 PROCEDURE — 86225 DNA ANTIBODY NATIVE: CPT

## 2022-01-28 PROCEDURE — 82330 ASSAY OF CALCIUM: CPT

## 2022-01-28 PROCEDURE — 85730 THROMBOPLASTIN TIME PARTIAL: CPT

## 2022-01-28 PROCEDURE — 74176 CT ABD & PELVIS W/O CONTRAST: CPT | Mod: MA

## 2022-01-28 PROCEDURE — 80202 ASSAY OF VANCOMYCIN: CPT

## 2022-01-28 PROCEDURE — 73562 X-RAY EXAM OF KNEE 3: CPT

## 2022-01-28 PROCEDURE — 36430 TRANSFUSION BLD/BLD COMPNT: CPT

## 2022-01-28 PROCEDURE — 83036 HEMOGLOBIN GLYCOSYLATED A1C: CPT

## 2022-01-28 PROCEDURE — 84133 ASSAY OF URINE POTASSIUM: CPT

## 2022-01-28 PROCEDURE — 82272 OCCULT BLD FECES 1-3 TESTS: CPT

## 2022-01-28 PROCEDURE — 86900 BLOOD TYPING SEROLOGIC ABO: CPT

## 2022-01-28 PROCEDURE — 80053 COMPREHEN METABOLIC PANEL: CPT

## 2022-01-28 PROCEDURE — 36415 COLL VENOUS BLD VENIPUNCTURE: CPT

## 2022-01-28 PROCEDURE — 84165 PROTEIN E-PHORESIS SERUM: CPT

## 2022-01-28 PROCEDURE — 87150 DNA/RNA AMPLIFIED PROBE: CPT

## 2022-01-28 PROCEDURE — 86618 LYME DISEASE ANTIBODY: CPT

## 2022-01-28 PROCEDURE — 83521 IG LIGHT CHAINS FREE EACH: CPT

## 2022-01-28 PROCEDURE — 85025 COMPLETE CBC W/AUTO DIFF WBC: CPT

## 2022-01-28 PROCEDURE — 87015 SPECIMEN INFECT AGNT CONCNTJ: CPT

## 2022-01-28 PROCEDURE — 83550 IRON BINDING TEST: CPT

## 2022-01-28 PROCEDURE — 83874 ASSAY OF MYOGLOBIN: CPT

## 2022-01-28 PROCEDURE — 82728 ASSAY OF FERRITIN: CPT

## 2022-01-28 PROCEDURE — 84466 ASSAY OF TRANSFERRIN: CPT

## 2022-01-28 PROCEDURE — 82941 ASSAY OF GASTRIN: CPT

## 2022-01-28 PROCEDURE — 84100 ASSAY OF PHOSPHORUS: CPT

## 2022-01-28 PROCEDURE — U0005: CPT

## 2022-01-28 PROCEDURE — 86334 IMMUNOFIX E-PHORESIS SERUM: CPT

## 2022-01-28 PROCEDURE — 82962 GLUCOSE BLOOD TEST: CPT

## 2022-01-28 PROCEDURE — 87635 SARS-COV-2 COVID-19 AMP PRB: CPT

## 2022-01-28 PROCEDURE — P9047: CPT

## 2022-01-28 PROCEDURE — 81374 HLA I TYPING 1 ANTIGEN LR: CPT

## 2022-01-28 PROCEDURE — 86431 RHEUMATOID FACTOR QUANT: CPT

## 2022-01-28 PROCEDURE — 82553 CREATINE MB FRACTION: CPT

## 2022-01-28 PROCEDURE — 88304 TISSUE EXAM BY PATHOLOGIST: CPT

## 2022-01-28 PROCEDURE — 82947 ASSAY GLUCOSE BLOOD QUANT: CPT

## 2022-01-28 PROCEDURE — 87040 BLOOD CULTURE FOR BACTERIA: CPT

## 2022-01-28 PROCEDURE — 76000 FLUOROSCOPY <1 HR PHYS/QHP: CPT

## 2022-01-28 PROCEDURE — 85018 HEMOGLOBIN: CPT

## 2022-01-28 PROCEDURE — 84132 ASSAY OF SERUM POTASSIUM: CPT

## 2022-01-28 PROCEDURE — 99285 EMERGENCY DEPT VISIT HI MDM: CPT | Mod: 25

## 2022-01-28 PROCEDURE — 71250 CT THORAX DX C-: CPT | Mod: MA

## 2022-01-28 PROCEDURE — 97167 OT EVAL HIGH COMPLEX 60 MIN: CPT

## 2022-01-28 PROCEDURE — 81001 URINALYSIS AUTO W/SCOPE: CPT

## 2022-01-28 PROCEDURE — 96374 THER/PROPH/DIAG INJ IV PUSH: CPT

## 2022-01-28 PROCEDURE — 87205 SMEAR GRAM STAIN: CPT

## 2022-01-28 PROCEDURE — 93320 DOPPLER ECHO COMPLETE: CPT

## 2022-01-28 PROCEDURE — 86335 IMMUNFIX E-PHORSIS/URINE/CSF: CPT

## 2022-01-28 PROCEDURE — 97530 THERAPEUTIC ACTIVITIES: CPT

## 2022-01-28 PROCEDURE — 82803 BLOOD GASES ANY COMBINATION: CPT

## 2022-01-28 PROCEDURE — 76942 ECHO GUIDE FOR BIOPSY: CPT

## 2022-01-28 PROCEDURE — 86923 COMPATIBILITY TEST ELECTRIC: CPT

## 2022-01-28 PROCEDURE — 94002 VENT MGMT INPAT INIT DAY: CPT

## 2022-01-28 PROCEDURE — 73610 X-RAY EXAM OF ANKLE: CPT

## 2022-01-28 PROCEDURE — 80048 BASIC METABOLIC PNL TOTAL CA: CPT

## 2022-01-28 PROCEDURE — 94760 N-INVAS EAR/PLS OXIMETRY 1: CPT

## 2022-01-28 PROCEDURE — 76770 US EXAM ABDO BACK WALL COMP: CPT

## 2022-01-28 PROCEDURE — C8929: CPT

## 2022-01-28 PROCEDURE — 84155 ASSAY OF PROTEIN SERUM: CPT

## 2022-01-28 PROCEDURE — 86200 CCP ANTIBODY: CPT

## 2022-01-28 PROCEDURE — 82306 VITAMIN D 25 HYDROXY: CPT

## 2022-01-28 PROCEDURE — 89060 EXAM SYNOVIAL FLUID CRYSTALS: CPT

## 2022-01-28 PROCEDURE — 83615 LACTATE (LD) (LDH) ENZYME: CPT

## 2022-01-28 PROCEDURE — 87186 SC STD MICRODIL/AGAR DIL: CPT

## 2022-01-28 PROCEDURE — 87637 SARSCOV2&INF A&B&RSV AMP PRB: CPT

## 2022-01-28 RX ORDER — PANTOPRAZOLE SODIUM 20 MG/1
1 TABLET, DELAYED RELEASE ORAL
Qty: 0 | Refills: 0 | DISCHARGE
Start: 2022-01-28

## 2022-01-28 RX ORDER — ACETAMINOPHEN 500 MG
3 TABLET ORAL
Qty: 0 | Refills: 0 | DISCHARGE
Start: 2022-01-28

## 2022-01-28 RX ORDER — GABAPENTIN 400 MG/1
900 CAPSULE ORAL
Qty: 0 | Refills: 0 | DISCHARGE
Start: 2022-01-28

## 2022-01-28 RX ORDER — ZINC SULFATE TAB 220 MG (50 MG ZINC EQUIVALENT) 220 (50 ZN) MG
1 TAB ORAL
Qty: 0 | Refills: 0 | DISCHARGE
Start: 2022-01-28

## 2022-01-28 RX ORDER — ASCORBIC ACID 60 MG
1 TABLET,CHEWABLE ORAL
Qty: 0 | Refills: 0 | DISCHARGE
Start: 2022-01-28

## 2022-01-28 RX ORDER — ENOXAPARIN SODIUM 100 MG/ML
40 INJECTION SUBCUTANEOUS
Qty: 0 | Refills: 0 | DISCHARGE
Start: 2022-01-28

## 2022-01-28 RX ORDER — BACLOFEN 100 %
1 POWDER (GRAM) MISCELLANEOUS
Qty: 0 | Refills: 0 | DISCHARGE
Start: 2022-01-28

## 2022-01-28 RX ORDER — IBUPROFEN 200 MG
0 TABLET ORAL
Qty: 0 | Refills: 0 | DISCHARGE

## 2022-01-28 RX ORDER — SENNA PLUS 8.6 MG/1
2 TABLET ORAL
Qty: 0 | Refills: 0 | DISCHARGE
Start: 2022-01-28

## 2022-01-28 RX ORDER — MELOXICAM 15 MG/1
1 TABLET ORAL
Qty: 0 | Refills: 0 | DISCHARGE

## 2022-01-28 RX ORDER — LISINOPRIL 2.5 MG/1
1 TABLET ORAL
Qty: 0 | Refills: 0 | DISCHARGE
Start: 2022-01-28

## 2022-01-28 RX ORDER — LANOLIN ALCOHOL/MO/W.PET/CERES
1 CREAM (GRAM) TOPICAL
Qty: 0 | Refills: 0 | DISCHARGE
Start: 2022-01-28

## 2022-01-28 RX ADMIN — GABAPENTIN 900 MILLIGRAM(S): 400 CAPSULE ORAL at 05:47

## 2022-01-28 RX ADMIN — ENOXAPARIN SODIUM 40 MILLIGRAM(S): 100 INJECTION SUBCUTANEOUS at 12:06

## 2022-01-28 RX ADMIN — Medication 10 MILLIGRAM(S): at 12:06

## 2022-01-28 RX ADMIN — Medication 975 MILLIGRAM(S): at 06:09

## 2022-01-28 RX ADMIN — Medication 975 MILLIGRAM(S): at 06:10

## 2022-01-28 RX ADMIN — HYDROMORPHONE HYDROCHLORIDE 4 MILLIGRAM(S): 2 INJECTION INTRAMUSCULAR; INTRAVENOUS; SUBCUTANEOUS at 09:57

## 2022-01-28 RX ADMIN — Medication 1 TABLET(S): at 05:48

## 2022-01-28 RX ADMIN — Medication 975 MILLIGRAM(S): at 05:48

## 2022-01-28 RX ADMIN — GABAPENTIN 900 MILLIGRAM(S): 400 CAPSULE ORAL at 13:12

## 2022-01-28 RX ADMIN — Medication 1000 MILLIGRAM(S): at 05:48

## 2022-01-28 RX ADMIN — Medication 10 MILLIGRAM(S): at 05:53

## 2022-01-28 RX ADMIN — ZINC SULFATE TAB 220 MG (50 MG ZINC EQUIVALENT) 220 MILLIGRAM(S): 220 (50 ZN) TAB at 12:06

## 2022-01-28 RX ADMIN — Medication 975 MILLIGRAM(S): at 12:05

## 2022-01-28 NOTE — DISCHARGE NOTE NURSING/CASE MANAGEMENT/SOCIAL WORK - NSDCPEFALRISK_GEN_ALL_CORE
For information on Fall & Injury Prevention, visit: [email or web address] OR  [email or web address] OR  https://www.cdc.gov/steadi/patient.html

## 2022-01-28 NOTE — PROGRESS NOTE ADULT - ASSESSMENT
51y Female present to ED with left leg swelling, erythema, pain. Patient endorses she had left knee pain for 1 week presented 3 days ago to ED  where she states was given an steroid shot, and ibuprofen. however pain and edema worsened. Patient states she wasnt feeling herself today reason why she came. Endorses chills initially with pain 3 days ago but none since, denies history of trauma, insect bites, recent interventions, or injections to the area, contact with ticks, history of diabetes.  PT AS ABOVE WITH LEFT LEG SWELLING PT WITH INCREASED WBC PLACED ON ABX FOR PRESUMED INFECTION  PT WITH CT SCAN WITH FASCIAL EDAM PT BROUGHT TO THE OR EMERGENTLY AND  UNDERWENT FASCIOTOMY   PT BROUGHT TO THE OR  BOTH TRAUMA AND ORTHO INVOLVED  PURULENT FLUID FOUND  NECROTIZING SOFT TISSUE INFECTION    BLOOD CX WITH GROUP A STREP AND  OPERATIVE FLUID WITH STREP  PT WAS  ON PCN AND CLINDA for POSSIBLE ANTITOXIN EFFECT IN GROUP A STREP INFECTION    PT  S/P OR WASHOUT 12.2  AND  OR AGAIN 12/7  and late on 12/10 121/15 12/17 and 12/20 12/23     REPEAT OPERATIVE CX NEG     AS ABOVE  REPEAT  OR  12/20 12/23    S/ RETURN TO THE OR 12/28 AND 12/31    ON MERREM/ DAPTOMYCIN  repeat CT 1/10/22 as above with increase in soft tissue abscesses and likely OM.   s/p Ir drainage on 1/11/22, with dark old blood aspirated.  IR cx NGTD  f/u BCX ngtd    COMPLETED  DAPTO 500 mg IV daily and .MERREM  1 g iv q8h on 1/24/22      continue ORAL TX  WITH BACTRIM DS BID   FOR ADDITIONAL MONTH    WILL HAVE TO DECIDE LENGTH OF TREATMENT; outpatient follow up with Dr. Triplett.       COVID POS WITH NO RESP SX  NOT ON 02     S/P    SOTROVIMAB  MAB THERAPY 1/19/22      Consider discharge to community from ID point of view once antibiotics have been arranged.      Contact information:    =======================================================                               Michael Triplett MD#                  # [place] Office - Appt - Tel  [phone] Fax [phone]                                   Hospital Consult line:  [phone]  =======================================================

## 2022-01-28 NOTE — PROCEDURE NOTE - NSPOSTCAREGUIDE_GEN_A_CORE
Verbal/written post procedure instructions were given to patient/caregiver/Instructed patient/caregiver to follow-up with primary care physician/Instructed patient/caregiver regarding signs and symptoms of infection/Keep the cast/splint/dressing clean and dry/Care for catheter as per unit/ICU protocols
Instructed patient/caregiver regarding signs and symptoms of infection/Keep the cast/splint/dressing clean and dry
Instructed patient/caregiver to follow-up with primary care physician
Care for catheter as per unit/ICU protocols
Care for catheter as per unit/ICU protocols
Verbal/written post procedure instructions were given to patient/caregiver/Instructed patient/caregiver to follow-up with primary care physician/Instructed patient/caregiver regarding signs and symptoms of infection/Keep the cast/splint/dressing clean and dry/Care for catheter as per unit/ICU protocols

## 2022-01-28 NOTE — PROCEDURE NOTE - NSANATOMICLLOCATION_GEN_A_CORE
leg/left
left/radial artery
medial lower thigh/left
right/basilic vein
right/knee
left/brachial vein
left/upper arm

## 2022-01-28 NOTE — PROCEDURE NOTE - NSINDICATIONS_GEN_A_CORE
GI Bleed/ Endoscopy/airway protection
GI bleed
venous access
staple removal
emergency venous access
joint pain/joint swelling/synovial infection
antibiotic therapy/venous access
arterial puncture to obtain ABG's/blood sampling/cannulation purposes/critical patient/monitoring purposes
Vasopressor therapy/critical illness/venous access/volume resuscitation

## 2022-01-28 NOTE — PROCEDURE NOTE - NSSITEPREP_SKIN_A_CORE
chlorhexidine
alcohol/chlorhexidine/Adherence to aseptic technique: hand hygiene prior to donning barriers (gown, gloves), don cap and mask, sterile drape over patient
Adherence to aseptic technique: hand hygiene prior to donning barriers (gown, gloves), don cap and mask, sterile drape over patient
chlorhexidine/Adherence to aseptic technique: hand hygiene prior to donning barriers (gown, gloves), don cap and mask, sterile drape over patient
Adherence to aseptic technique: hand hygiene prior to donning barriers (gown, gloves), don cap and mask, sterile drape over patient
chlorhexidine/Adherence to aseptic technique: hand hygiene prior to donning barriers (gown, gloves), don cap and mask, sterile drape over patient
chlorhexidine/Adherence to aseptic technique: hand hygiene prior to donning barriers (gown, gloves), don cap and mask, sterile drape over patient

## 2022-01-28 NOTE — PROCEDURE NOTE - ATTENDING PROVIDER
Agustín Shukla
Dr. Kelsey Zavala
Agustín baez
Agustín Fonseca
Ginette Cuevas
Ginette Cuevas
Dr. Tobias

## 2022-01-28 NOTE — PROGRESS NOTE ADULT - SUBJECTIVE AND OBJECTIVE BOX
[facility]  INFECTIOUS DISEASES AND INTERNAL MEDICINE   =======================================================                               Michael Triplett MD#  Migel Huizar MD*                                     Hitesh Prakash MD*    Mehnaz Morrow MD*            Diplomates American Board of Internal Medicine & Infectious Diseases                  # [place] Office - Appt - Tel  [phone] Fax [phone]                * [place] Office - Appt - Tel [phone] Fax [phone]                                  Hospital Consult line:  [phone]  =======================================================     LUIS FERNANDO DAVIS [id number]    Follow up:  left leg necrotizing infection  COVID POS S/P SOTROVIMAB 1/19/22  wound vac removed on 1/23/22      no new issues.   working with PT this AM        =================  Allergies:  No Known Allergies           REVIEW OF SYSTEMS:  CONSTITUTIONAL:  No Fever or chills  HEENT:   No diplopia or blurred vision.  No earache, sore throat or runny nose.  CARDIOVASCULAR:  No pressure, squeezing, strangling, tightness, heaviness or aching about the chest, neck, axilla or epigastrium.  RESPIRATORY:  No cough, shortness of breath  GASTROINTESTINAL:  No nausea, vomiting or diarrhea.  GENITOURINARY:  No dysuria, frequency or urgency. No Blood in urine  MUSCULOSKELETAL: as PER HPI   SKIN:  AS PER HPI  NEUROLOGIC:  No Headaches, seizures or weakness.  PSYCHIATRIC:  No disorder of thought or mood.  ENDOCRINE:  No heat or cold intolerance  HEMATOLOGICAL:  No easy bruising or bleeding.       Physical Exam:        GEN: NAD, pleasant  HEENT: normocephalic and atraumatic. EOMI. PERRL.  Anicteric   NECK: Supple.   LUNGS: Clear to auscultation.  HEART: Regular rate and rhythm without murmur.  ABDOMEN: Soft, nontender, and nondistended.  Positive bowel sounds.    : No CVA tenderness  EXTREMITIES: Without any edema. LEFT LEG  with multiple incisions,   dressing in place.    MSK: no joint swelling  NEUROLOGIC: Cranial nerves II through XII are grossly intact. No focal deficits  PSYCHIATRIC: Appropriate affect .  SKIN: No Rash          ==========    Vitals:  ============  T(F): 98.7 (28 Jan 2022 09:07), Max: 98.9 (27 Jan 2022 16:08)  HR: 74 (28 Jan 2022 09:07)  BP: 111/69 (28 Jan 2022 09:07)  RR: 18 (28 Jan 2022 09:07)  SpO2: 96% (28 Jan 2022 09:07) (96% - 99%)  temp max in last 48H T(F): , Max: 98.9 (01-27-22 @ 16:08)    =======================================================  Current Antibiotics:  trimethoprim  160 mG/sulfamethoxazole 800 mG 1 Tablet(s) Oral two times a day    Other medications:  acetaminophen     Tablet .. 975 milliGRAM(s) Oral every 6 hours  ascorbic acid 1000 milliGRAM(s) Oral two times a day  baclofen 10 milliGRAM(s) Oral every 6 hours  enoxaparin Injectable 40 milliGRAM(s) SubCutaneous daily  gabapentin 900 milliGRAM(s) Oral every 8 hours  lisinopril 5 milliGRAM(s) Oral daily  pantoprazole    Tablet 40 milliGRAM(s) Oral two times a day  senna 2 Tablet(s) Oral at bedtime  zinc sulfate 220 milliGRAM(s) Oral daily      =======================================================  Labs:                        8.2    8.65  )-----------( 542      ( 28 Jan 2022 10:00 )             26.5     01-28    135  |  99  |  8.6  ----------------------------<  90  3.9   |  24.0  |  0.47<L>    Ca    9.3      28 Jan 2022 10:00      COVID-19 PCR: NotDetec (01-26-22 @ 21:52)  COVID-19 PCR: Detected (01-23-22 @ 08:29)  SARS-CoV-2 Result: Detected (01-17-22 @ 17:18)  COVID-19 PCR: Detected (01-17-22 @ 09:01)  COVID-19 PCR: NotDetec (01-11-22 @ 12:29)  COVID-19 PCR: NotDetec (01-11-22 @ 05:17)  COVID-19 PCR: NotDetec (01-05-22 @ 08:09)  COVID-19 PCR: NotDetec (12-30-21 @ 22:14)

## 2022-01-28 NOTE — PROGRESS NOTE ADULT - NUTRITIONAL ASSESSMENT
This patient has been assessed with a concern for Malnutrition and has been determined to have a diagnosis/diagnoses of Moderate protein-calorie malnutrition.    This patient is being managed with:   Diet NPO after Midnight-     NPO Start Date: 21-Dec-2021   NPO Start Time: 23:59  Except Medications  Entered: Dec 21 2021  6:34AM    Diet Regular-  Supplement Feeding Modality:  Oral  Ensure Enlive Cans or Servings Per Day:  3       Frequency:  Three Times a day  Entered: Dec 20 2021  4:08PM    
This patient has been assessed with a concern for Malnutrition and has been determined to have a diagnosis/diagnoses of Moderate protein-calorie malnutrition.    This patient is being managed with:   Diet NPO-  Except Medications  Entered: Dec 26 2021  9:01PM    
This patient has been assessed with a concern for Malnutrition and has been determined to have a diagnosis/diagnoses of Moderate protein-calorie malnutrition.    This patient is being managed with:   Diet Regular-  Entered: Dec 11 2021  2:31AM    
This patient has been assessed with a concern for Malnutrition and has been determined to have a diagnosis/diagnoses of Moderate protein-calorie malnutrition.    This patient is being managed with:   Diet Regular-  Entered: Dec 15 2021  7:09PM    
This patient has been assessed with a concern for Malnutrition and has been determined to have a diagnosis/diagnoses of Moderate protein-calorie malnutrition.    This patient is being managed with:   Diet Regular-  Entered: Dec 28 2021  5:49PM    
This patient has been assessed with a concern for Malnutrition and has been determined to have a diagnosis/diagnoses of Moderate protein-calorie malnutrition.    This patient is being managed with:   Diet Regular-  Entered: Jan 11 2022  1:59PM    
This patient has been assessed with a concern for Malnutrition and has been determined to have a diagnosis/diagnoses of Moderate protein-calorie malnutrition.    This patient is being managed with:   Diet Regular-  Supplement Feeding Modality:  Oral  Ensure Enlive Cans or Servings Per Day:  3       Frequency:  Three Times a day  Entered: Dec 23 2021  4:40PM    
This patient has been assessed with a concern for Malnutrition and has been determined to have a diagnosis/diagnoses of Moderate protein-calorie malnutrition.    This patient is being managed with:   Diet Regular-  Supplement Feeding Modality:  Oral  Ensure Enlive Cans or Servings Per Day:  3       Frequency:  Three Times a day  Entered: Jan 19 2022  6:13AM    
This patient has been assessed with a concern for Malnutrition and has been determined to have a diagnosis/diagnoses of Moderate protein-calorie malnutrition.    This patient is being managed with:   Diet NPO after Midnight-     NPO Start Date: 16-Dec-2021   NPO Start Time: 23:59  Entered: Dec 16 2021  7:37PM    Diet Regular-  Entered: Dec 15 2021  7:09PM    
This patient has been assessed with a concern for Malnutrition and has been determined to have a diagnosis/diagnoses of Moderate protein-calorie malnutrition.    This patient is being managed with:   Diet Regular-  Entered: Dec 11 2021  2:31AM    
This patient has been assessed with a concern for Malnutrition and has been determined to have a diagnosis/diagnoses of Moderate protein-calorie malnutrition.    This patient is being managed with:   Diet Regular-  Entered: Dec 11 2021  2:31AM    
This patient has been assessed with a concern for Malnutrition and has been determined to have a diagnosis/diagnoses of Moderate protein-calorie malnutrition.    This patient is being managed with:   Diet Regular-  Entered: Dec 17 2021  4:03PM    
This patient has been assessed with a concern for Malnutrition and has been determined to have a diagnosis/diagnoses of Moderate protein-calorie malnutrition.    This patient is being managed with:   Diet Regular-  Entered: Dec 28 2021  5:49PM    
This patient has been assessed with a concern for Malnutrition and has been determined to have a diagnosis/diagnoses of Moderate protein-calorie malnutrition.    This patient is being managed with:   Diet Regular-  Entered: Jan 11 2022  1:59PM    
This patient has been assessed with a concern for Malnutrition and has been determined to have a diagnosis/diagnoses of Moderate protein-calorie malnutrition.    This patient is being managed with:   Diet Regular-  Supplement Feeding Modality:  Oral  Ensure Enlive Cans or Servings Per Day:  3       Frequency:  Three Times a day  Entered: Dec 23 2021  4:40PM    
This patient has been assessed with a concern for Malnutrition and has been determined to have a diagnosis/diagnoses of Moderate protein-calorie malnutrition.    This patient is being managed with:   Diet Regular-  Supplement Feeding Modality:  Oral  Ensure Enlive Cans or Servings Per Day:  3       Frequency:  Three Times a day  Entered: Jan 19 2022  6:13AM    
This patient has been assessed with a concern for Malnutrition and has been determined to have a diagnosis/diagnoses of Moderate protein-calorie malnutrition.    This patient is being managed with:   Diet NPO after Midnight-     NPO Start Date: 14-Dec-2021   NPO Start Time: 23:59  Except Medications  Entered: Dec 14 2021 10:37PM    Diet Regular-  Entered: Dec 14 2021  5:21PM    
This patient has been assessed with a concern for Malnutrition and has been determined to have a diagnosis/diagnoses of Moderate protein-calorie malnutrition.    This patient is being managed with:   Diet NPO after Midnight-     NPO Start Date: 17-Jan-2022   NPO Start Time: 23:59  Entered: Jan 17 2022  6:22AM    Diet Regular-  Entered: Jan 11 2022  1:59PM    
This patient has been assessed with a concern for Malnutrition and has been determined to have a diagnosis/diagnoses of Moderate protein-calorie malnutrition.    This patient is being managed with:   Diet NPO after Midnight-     NPO Start Date: 22-Dec-2021   NPO Start Time: 23:59  Entered: Dec 22 2021 12:51PM    Diet Regular-  Supplement Feeding Modality:  Oral  Ensure Enlive Cans or Servings Per Day:  3       Frequency:  Three Times a day  Entered: Dec 20 2021  4:08PM    
This patient has been assessed with a concern for Malnutrition and has been determined to have a diagnosis/diagnoses of Moderate protein-calorie malnutrition.    This patient is being managed with:   Diet NPO-  Except Medications  Entered: Dec 10 2021  7:31AM    Diet NPO after Midnight-     NPO Start Date: 09-Dec-2021   NPO Start Time: 23:59  Entered: Dec  9 2021  7:46AM    
This patient has been assessed with a concern for Malnutrition and has been determined to have a diagnosis/diagnoses of Moderate protein-calorie malnutrition.    This patient is being managed with:   Diet NPO-  Except Medications  Entered: Dec 14 2021  2:37PM    
This patient has been assessed with a concern for Malnutrition and has been determined to have a diagnosis/diagnoses of Moderate protein-calorie malnutrition.    This patient is being managed with:   Diet Regular-  Entered: Dec 15 2021  7:09PM    
This patient has been assessed with a concern for Malnutrition and has been determined to have a diagnosis/diagnoses of Moderate protein-calorie malnutrition.    This patient is being managed with:   Diet Regular-  Entered: Dec 17 2021  4:03PM    
This patient has been assessed with a concern for Malnutrition and has been determined to have a diagnosis/diagnoses of Moderate protein-calorie malnutrition.    This patient is being managed with:   Diet Regular-  Entered: Dec 17 2021  4:03PM    
This patient has been assessed with a concern for Malnutrition and has been determined to have a diagnosis/diagnoses of Moderate protein-calorie malnutrition.    This patient is being managed with:   Diet Regular-  Entered: Dec 28 2021  5:49PM    
This patient has been assessed with a concern for Malnutrition and has been determined to have a diagnosis/diagnoses of Moderate protein-calorie malnutrition.    This patient is being managed with:   Diet Regular-  Entered: Jan 11 2022  1:59PM    
This patient has been assessed with a concern for Malnutrition and has been determined to have a diagnosis/diagnoses of Moderate protein-calorie malnutrition.    This patient is being managed with:   Diet Regular-  Supplement Feeding Modality:  Oral  Ensure Enlive Cans or Servings Per Day:  3       Frequency:  Three Times a day  Entered: Dec 20 2021  4:08PM    
This patient has been assessed with a concern for Malnutrition and has been determined to have a diagnosis/diagnoses of Moderate protein-calorie malnutrition.    This patient is being managed with:   Diet Regular-  Supplement Feeding Modality:  Oral  Ensure Enlive Cans or Servings Per Day:  3       Frequency:  Three Times a day  Entered: Jan 19 2022  6:13AM    
This patient has been assessed with a concern for Malnutrition and has been determined to have a diagnosis/diagnoses of Moderate protein-calorie malnutrition.    This patient is being managed with:   Diet NPO after Midnight-     NPO Start Date: 27-Dec-2021   NPO Start Time: 23:59  Entered: Dec 27 2021  5:50PM    Diet Regular-  Supplement Feeding Modality:  Oral  Ensure Enlive Cans or Servings Per Day:  3       Frequency:  Three Times a day  Entered: Dec 27 2021  9:43AM    
This patient has been assessed with a concern for Malnutrition and has been determined to have a diagnosis/diagnoses of Moderate protein-calorie malnutrition.    This patient is being managed with:   Diet Regular-  Entered: Dec 17 2021  4:03PM    
This patient has been assessed with a concern for Malnutrition and has been determined to have a diagnosis/diagnoses of Moderate protein-calorie malnutrition.    This patient is being managed with:   Diet Regular-  Entered: Dec 28 2021  5:49PM    
This patient has been assessed with a concern for Malnutrition and has been determined to have a diagnosis/diagnoses of Moderate protein-calorie malnutrition.    This patient is being managed with:   Diet Regular-  Entered: Dec 28 2021  5:49PM      This patient has been assessed with a concern for Malnutrition and has been determined to have a diagnosis/diagnoses of Moderate protein-calorie malnutrition.    This patient is being managed with:   Diet Regular-  Entered: Dec 28 2021  5:49PM    
This patient has been assessed with a concern for Malnutrition and has been determined to have a diagnosis/diagnoses of Moderate protein-calorie malnutrition.    This patient is being managed with:   Diet Regular-  Entered: Jan 11 2022  1:59PM    
This patient has been assessed with a concern for Malnutrition and has been determined to have a diagnosis/diagnoses of Moderate protein-calorie malnutrition.    This patient is being managed with:   Diet Regular-  Supplement Feeding Modality:  Oral  Ensure Enlive Cans or Servings Per Day:  3       Frequency:  Three Times a day  Entered: Dec 20 2021  4:08PM    
This patient has been assessed with a concern for Malnutrition and has been determined to have a diagnosis/diagnoses of Moderate protein-calorie malnutrition.    This patient is being managed with:   Diet Regular-  Supplement Feeding Modality:  Oral  Ensure Enlive Cans or Servings Per Day:  3       Frequency:  Three Times a day  Entered: Dec 23 2021  4:40PM    
This patient has been assessed with a concern for Malnutrition and has been determined to have a diagnosis/diagnoses of Moderate protein-calorie malnutrition.    This patient is being managed with:   Diet Regular-  Supplement Feeding Modality:  Oral  Ensure Enlive Cans or Servings Per Day:  3       Frequency:  Three Times a day  Entered: Jan 19 2022  6:13AM    
This patient has been assessed with a concern for Malnutrition and has been determined to have a diagnosis/diagnoses of Moderate protein-calorie malnutrition.    This patient is being managed with:   Diet Regular-  Supplement Feeding Modality:  Oral  Ensure Enlive Cans or Servings Per Day:  3       Frequency:  Three Times a day  Entered: Jan 19 2022  6:13AM

## 2022-01-28 NOTE — PROCEDURE NOTE - PROCEDURE DATE TIME, MLM
17-Nov-2021 21:50
17-Dec-2021
23-Nov-2021 09:27
17-Nov-2021 22:10
11-Jan-2022
20-Nov-2021
20-Nov-2021
27-Dec-2021 14:20
28-Jan-2022 12:50

## 2022-01-28 NOTE — PROCEDURE NOTE - NSINFORMCONSENT_GEN_A_CORE
Benefits, risks, and possible complications of procedure explained to patient/caregiver who verbalized understanding and gave verbal consent.
Benefits, risks, and possible complications of procedure explained to patient/caregiver who verbalized understanding and gave written consent.

## 2022-01-28 NOTE — PROCEDURE NOTE - NSPROCNAME_GEN_A_CORE
Central Line Insertion
Interventional Radiology
Midline Insertion
Arthrocentesis
Peripheral Line Insertion
Gastric Intubation/Gastric Lavage
Suture/Staple Removal
General
Arterial Puncture/Cannulation
Tracheal Intubation
Midline Insertion

## 2022-01-28 NOTE — PROGRESS NOTE ADULT - SUBJECTIVE AND OBJECTIVE BOX
SUBJECTIVE: No acute distress. No acute events overnight. Remains hemodynamically stable. Pain well controlled.    MEDICATIONS  (STANDING):  acetaminophen     Tablet .. 975 milliGRAM(s) Oral every 6 hours  ascorbic acid 1000 milliGRAM(s) Oral two times a day  baclofen 10 milliGRAM(s) Oral every 6 hours  enoxaparin Injectable 40 milliGRAM(s) SubCutaneous daily  gabapentin 900 milliGRAM(s) Oral every 8 hours  lisinopril 5 milliGRAM(s) Oral daily  pantoprazole    Tablet 40 milliGRAM(s) Oral two times a day  senna 2 Tablet(s) Oral at bedtime  trimethoprim  160 mG/sulfamethoxazole 800 mG 1 Tablet(s) Oral two times a day  zinc sulfate 220 milliGRAM(s) Oral daily    MEDICATIONS  (PRN):  HYDROmorphone   Tablet 4 milliGRAM(s) Oral every 4 hours PRN Severe Pain (7 - 10)  melatonin 5 milliGRAM(s) Oral at bedtime PRN Sleep  ondansetron Injectable 4 milliGRAM(s) IV Push every 8 hours PRN Nausea and/or Vomiting    Vital Signs Last 24 Hrs  T(C): 36.8 (27 Jan 2022 20:16), Max: 37.2 (27 Jan 2022 16:08)  T(F): 98.3 (27 Jan 2022 20:16), Max: 98.9 (27 Jan 2022 16:08)  HR: 95 (27 Jan 2022 20:16) (73 - 95)  BP: 114/67 (27 Jan 2022 20:16) (104/59 - 122/90)  BP(mean): --  RR: 18 (27 Jan 2022 20:16) (18 - 18)  SpO2: 99% (27 Jan 2022 20:16) (96% - 99%)    PHYSICAL EXAM:  General: alert and oriented, NAD  Resp: airway patent, respirations unlabored  Abdomen: soft, nontender, nondistended  Extremities: RLE donor site with dried xeroform and dried blood, LLE with healthy wound bed and STSG attached with staples.    I&O's Detail    26 Jan 2022 07:01  -  27 Jan 2022 07:00  --------------------------------------------------------  IN:  Total IN: 0 mL    OUT:    Voided (mL): 300 mL  Total OUT: 300 mL    Total NET: -300 mL

## 2022-01-28 NOTE — PROGRESS NOTE ADULT - PROVIDER SPECIALTY LIST ADULT
Anesthesia
Infectious Disease
Orthopedics
SICU
Surgery
Trauma Surgery
Anesthesia
Cardiology
Gastroenterology
Gastroenterology
Infectious Disease
Neurology
Orthopedics
SICU
Surgery
Trauma Surgery
Gastroenterology
Infectious Disease
Nephrology
Neurology
Neurology
Orthopedics
SICU
Surgery
Trauma Surgery
Cardiology
Gastroenterology
Infectious Disease
Infectious Disease
Nephrology
Surgery
Trauma Surgery
Pain Medicine
Gastroenterology
Trauma Surgery
Gastroenterology

## 2022-01-28 NOTE — PROGRESS NOTE ADULT - ASSESSMENT
51 year old female s/p multiple OR debridement of left lower extremity for soft tissue infection and abscesses of unclear origin. Now s/p STSG on 1/18.    Plan:   - Reg diet with ensure and eliven  - Wound VAC removed , grafts covered with Adaptic and gauze  - Donor site open to air, cut back xeroform as needed  - WB status: WBAT with LLE in CAM boot  - pain control   - DVT ppx  - ID: Bactrim PO BID for at least 1 mo, will determine length of treatment as outpatient  - Dispo: [facility], pending placement

## 2022-01-28 NOTE — PROCEDURE NOTE - NSANESTHESIA_GEN_A_CORE
1% lidocaine
no anesthesia administered

## 2022-01-28 NOTE — DISCHARGE NOTE NURSING/CASE MANAGEMENT/SOCIAL WORK - PATIENT PORTAL LINK FT
You can access the FollowMyHealth Patient Portal offered by [facility] by registering at the following website: [email or web address]. By joining [facility]’s FollowMyHealth portal, you will also be able to view your health information using other applications (apps) compatible with our system.

## 2022-02-22 ENCOUNTER — APPOINTMENT (OUTPATIENT)
Dept: TRAUMA SURGERY | Facility: CLINIC | Age: 52
End: 2022-02-22
Payer: COMMERCIAL

## 2022-02-22 VITALS
OXYGEN SATURATION: 99 % | HEART RATE: 104 BPM | TEMPERATURE: 97.3 F | DIASTOLIC BLOOD PRESSURE: 78 MMHG | RESPIRATION RATE: 16 BRPM | SYSTOLIC BLOOD PRESSURE: 123 MMHG

## 2022-02-22 PROCEDURE — 99024 POSTOP FOLLOW-UP VISIT: CPT

## 2022-02-22 NOTE — HISTORY OF PRESENT ILLNESS
[de-identified] : 11/17 - 12/31/2021 - serial debridements of left leg\par discharged to [facility] on 1/28/2022. [de-identified] : .

## 2022-02-22 NOTE — PHYSICAL EXAM
[Calm] : calm [de-identified] : appears well. seated in wheelchair. [de-identified] : left leg is in a foot drop brace. [de-identified] : right anterior thigh STSG donor site is completely healed. left leg medial, anterior and lateral STSG have 100% take (see Allscript photos)

## 2022-02-23 ENCOUNTER — APPOINTMENT (OUTPATIENT)
Dept: ORTHOPEDIC SURGERY | Facility: CLINIC | Age: 52
End: 2022-02-23
Payer: COMMERCIAL

## 2022-02-23 DIAGNOSIS — M86.9 OSTEOMYELITIS, UNSPECIFIED: ICD-10-CM

## 2022-02-23 PROCEDURE — 99024 POSTOP FOLLOW-UP VISIT: CPT

## 2022-02-23 RX ORDER — GABAPENTIN 800 MG/1
TABLET, FILM COATED ORAL
Refills: 0 | Status: ACTIVE | COMMUNITY

## 2022-02-23 RX ORDER — OXYCODONE 10 MG/1
10 TABLET ORAL
Refills: 0 | Status: ACTIVE | COMMUNITY

## 2022-02-23 RX ORDER — BACLOFEN 15 MG/1
TABLET ORAL
Refills: 0 | Status: ACTIVE | COMMUNITY

## 2022-02-23 NOTE — HISTORY OF PRESENT ILLNESS
[Knee Pain] : Knee Pain [___ Months Post Op] : [unfilled] months post op [Xray (Date:___)] : [unfilled] Xray -  [de-identified] : s/p Left knee irrigation and debridement, and left knee manipulation under anesthesia.\par DOI: 12/28/21 [de-identified] : Patient is a 51 year old female who presents for follow up.  patient s/p right knee I&D with multiple aspirations and left knee I&D along with multiple lower leg I&D by General surgery.  patient last knee I&D was left side 12/28/21. patient in AFO, states starting to bear with on left leg.  patient on bactrim currently, at [facility].  Overall patient is doing well since discharge.  She is able to bear weight on her left lower extremity and states that she is able to bend her left knee to approximately 50 to 60 degrees when hanging off the bed.  States that her right knee has nearly no pain.  Overall she is happy with her progress.  Has not followed up with ID since discharge. [de-identified] : Left lower extremity: Well-healed surgical incision over the anterior aspect of the left knee.  Knee range of motion 0-35 limited by pain mild effusion no erythema drainage or discharge noted.  Dressings present over the skin graft to the left lower extremity.  Left ankle range of motion limited by pain with positive foot drop and an AFO brace.\par Right knee: Full range of motion of the right knee with a well-healed surgical scar, no instability negative Ivan's [de-identified] : X-rays of the left knee brought in from outside show moderate degenerative joint disease with a small effusion and diffuse soft tissue swelling\par \par X-rays of the left ankle brought in from outside show evidence of removal of hardware with possible osteomyelitis soft tissue swelling and soft tissue defect over the lateral aspect of the left leg [de-identified] : Patient is a 51-year-old female with 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  Overall she is doing well and making slow but steady progress.  She is continue with aggressive physical therapy for her bilateral knees.  She should continue to take pain medications as needed for pain.  She continue with DVT prophylaxis.  She should follow-up with ID for antibiotic treatment.  For her left knee she should continue to work on range of motion.  For the left ankle she needs medical management with antibiotics for the possible osteomyelitis.  She may bear weight in a cam boot at this time.  I will see her back in 6 weeks for repeat evaluation and range of motion check.  All questions were asked and answered.

## 2022-03-24 ENCOUNTER — APPOINTMENT (OUTPATIENT)
Dept: TRAUMA SURGERY | Facility: CLINIC | Age: 52
End: 2022-03-24

## 2022-03-29 ENCOUNTER — APPOINTMENT (OUTPATIENT)
Dept: TRAUMA SURGERY | Facility: CLINIC | Age: 52
End: 2022-03-29
Payer: COMMERCIAL

## 2022-03-29 VITALS
SYSTOLIC BLOOD PRESSURE: 113 MMHG | OXYGEN SATURATION: 99 % | HEART RATE: 90 BPM | TEMPERATURE: 97.5 F | DIASTOLIC BLOOD PRESSURE: 73 MMHG | RESPIRATION RATE: 16 BRPM

## 2022-03-29 DIAGNOSIS — T84.84XA PAIN DUE TO INTERNAL ORTHOPEDIC PROSTHETIC DEVICES, IMPLANTS AND GRAFTS, INITIAL ENCOUNTER: ICD-10-CM

## 2022-03-29 PROCEDURE — 99212 OFFICE O/P EST SF 10 MIN: CPT

## 2022-04-01 PROBLEM — T84.84XA PAINFUL ORTHOPAEDIC HARDWARE: Status: ACTIVE | Noted: 2022-02-23

## 2022-04-01 NOTE — ASSESSMENT
[FreeTextEntry1] : RTC 2 weeks  for wound  check \par F/u I and D /PMDdue  posterior left knee issue\par Continue present  wound  care  and  medical management\par Discussion with patient   All questions answered  Any acute symptoms and or concerns patient understands  to call back office  and  or  go  directly to [facility] ED\par

## 2022-04-01 NOTE — PHYSICAL EXAM
[Normal Breath Sounds] : Normal breath sounds [Normal Heart Sounds] : normal heart sounds [Purpura] : no purpura  [Petechiae] : no petechiae [Skin Ulcer] : no ulcer [Alert] : alert [Oriented to Person] : oriented to person [Oriented to Place] : oriented to place [Oriented to Time] : oriented to time [Calm] : calm [de-identified] : wdwn  female  in  nad   sitting  comfortably in wheelchair [de-identified] : non icteric sclera   PERRLA EOMS intact  [de-identified] : trachea midline [de-identified] : soft  no  guarding [de-identified] : left leg is in a foot drop brace. [de-identified] : right anterior thigh STSG donor site is completely healed. left leg medial, anterior        and lateral STSG have 100% take (see Allscript photos) no  drainage  from  sites  non tender to palaption  nl pedal pulse     posterior left knee   small area  pea size mass with  serosanguineous drainage no odor  no  signs  of  cellultis( as per patient   PAWEL is aware of issue)

## 2022-04-01 NOTE — HISTORY OF PRESENT ILLNESS
[FreeTextEntry1] : Patient presents  to [facility]  for  post op exam  s/p STSG to  left lower leg from January 2022  Patient at  time  of  this  exam  denies  any fevers no  chills  no  n/v/d  Patient  stated  she has  an issue behind her left knee   that  has been draining   Patient  denies  any  trauma   no increase in pain  nor any decompensating  of  range  of  motion to affected  leg.  Her Caregiver at  bedside for entire exam [de-identified] : 11/17 - 12/31/2021 - serial debridement of left leg\par discharged to [facility] on 1/28/2022.

## 2022-04-01 NOTE — VITALS
[Tender] : tender [FreeTextEntry3] : left lower leg [FreeTextEntry1] : rest [FreeTextEntry2] : patient unsure

## 2022-04-01 NOTE — REVIEW OF SYSTEMS
[Skin Wound] : skin wound [Negative] : Psychiatric [FreeTextEntry9] : left  leg STSG   walking  boot  to left  foot  multiple surgeries [de-identified] : left lower leg STSG

## 2022-04-12 ENCOUNTER — APPOINTMENT (OUTPATIENT)
Dept: TRAUMA SURGERY | Facility: CLINIC | Age: 52
End: 2022-04-12
Payer: COMMERCIAL

## 2022-04-12 PROCEDURE — 99212 OFFICE O/P EST SF 10 MIN: CPT

## 2022-04-14 ENCOUNTER — APPOINTMENT (OUTPATIENT)
Dept: ORTHOPEDIC SURGERY | Facility: CLINIC | Age: 52
End: 2022-04-14
Payer: COMMERCIAL

## 2022-04-14 VITALS
HEIGHT: 66 IN | WEIGHT: 206 LBS | BODY MASS INDEX: 33.11 KG/M2 | SYSTOLIC BLOOD PRESSURE: 108 MMHG | HEART RATE: 77 BPM | DIASTOLIC BLOOD PRESSURE: 63 MMHG

## 2022-04-14 PROCEDURE — 99214 OFFICE O/P EST MOD 30 MIN: CPT

## 2022-04-14 NOTE — HISTORY OF PRESENT ILLNESS
[de-identified] : 51-year-old female here today for follow-up of her bilateral septic knees as well as her left ankle removal of hardware.  Patient has been making excellent progress in her nursing home.  She is now able to ambulate with a walker.  With aggressive physical therapy she has been able to improve her range of motion is 0-60 as per the therapist in her left knee.  Her right knee is not causing her any pain or discomfort.  She still wearing the cam boot due to foot drop on the left side.  States that she does have some mild drainage from the posterior aspect of her knee however is not foul-smelling and is not pus.  All of her fasciotomy wounds have been treated and healed with general surgery.  Denies any pain in her ankle.  Overall is very happy with her progress.  Is here today to discuss if there is a manipulation that can be done to help with her knee range of motion as well as with her foot.

## 2022-04-14 NOTE — DISCUSSION/SUMMARY
[Medication Risks Reviewed] : Medication risks reviewed [Surgical risks reviewed] : Surgical risks reviewed [de-identified] : Patient is a 51-year-old female status post necrotizing fasciitis multiple surgeries to her left lower extremity here today for follow-up.  Overall she is making excellent progress.  Her right knee is now asymptomatic and she is able to use this normally.  As for her left knee she has made progress in physical therapy and going from 0-35 to 0-60 range of motion however she and her therapist feel like she has plateaued and is not making any further progress.  I do think that she may be a candidate for a knee lysis of adhesions and I do not think that a simple manipulation would be enough in order to get her the range of motion that she needs.  I have therefore given her referral to one of my colleagues for evaluation for possible knee arthroscopy lysis of adhesions.  As for her left ankle she has made excellent progress with her left ankle however she does still have a foot drop from the muscular necrosis that she experienced due to the necrotizing fasciitis.  I given her referral to foot and ankle surgery for further evaluation of this as well as well as possible surgical consideration in order to help with this dropfoot.  She should continue take the antibiotics and is on Bactrim as prescribed by infectious disease.  As for the posterior collection in her knee this is being managed by the general surgery service and she should follow-up with them for further evaluation and management.  Could consider possible IR aspiration if this becomes clinically significant.  All questions were asked and answered.  I will see her back in 8 weeks for repeat evaluation.  She was advised that she would be a candidate for total knee in the future to both help with range of motion as well as pain however the pain in her knee is relatively well controlled at this time.  I do think that we should exhaust all other treatment modalities prior to any total knee arthroplasty as she is a very high risk for infection and at this time is not a candidate for total knee arthroplasty.  All questions were asked and answered.

## 2022-04-14 NOTE — PHYSICAL EXAM
[de-identified] : Left lower extremity: Well-healed surgical incision over the anterior aspect of the left knee. Knee range of motion 0-50 limited by pain mild effusion no erythema drainage or discharge noted.  Left ankle wound is well-healed with skin graft showing excellent healing, able to invert and brooke and plantar flex the foot however there is no active dorsiflexion of the foot, clean dressing present over the posterior aspect of the knee\par Right knee: Range of motion 0-1 15 - Ivan's no effusion. [de-identified] : Ultrasound of the left knee dated 4/1/2022 shows 2 complex structures measuring 2.6 x 2.6 x 2.4 and 1.9 x 1.8 x 1.3 cm within the left posterior knee differential includes abscess hematoma or cyst.\par \par X-ray of the left knee dated 4/1/2022 shows knee arthritis no evidence of acute fracture old fracture of the medial tibial plateau\par \par

## 2022-04-21 ENCOUNTER — APPOINTMENT (OUTPATIENT)
Dept: CT IMAGING | Facility: CLINIC | Age: 52
End: 2022-04-21

## 2022-04-25 ENCOUNTER — APPOINTMENT (OUTPATIENT)
Dept: ORTHOPEDIC SURGERY | Facility: CLINIC | Age: 52
End: 2022-04-25
Payer: COMMERCIAL

## 2022-04-25 VITALS
DIASTOLIC BLOOD PRESSURE: 82 MMHG | SYSTOLIC BLOOD PRESSURE: 131 MMHG | HEIGHT: 66 IN | WEIGHT: 206 LBS | BODY MASS INDEX: 33.11 KG/M2 | HEART RATE: 105 BPM

## 2022-04-25 PROCEDURE — 99214 OFFICE O/P EST MOD 30 MIN: CPT

## 2022-04-25 NOTE — DISCUSSION/SUMMARY
[de-identified] : Assessment: 51-year-old female with resolved left lower extremity necrotizing fasciitis and left knee septic arthritis, now with arthrofibrosis\par \par Plan: I had a long discussion with the patient today regarding the nature of their diagnosis and treatment plan. We discussed the risks and benefits of no treatment as well as nonoperative and operative treatments.  I reviewed the patient's imaging today with her in the office which demonstrated multiple abscesses in the left lower extremity.  On examination today her incisions are well-healed and matured.  Range of motion is from 5 to 35 degrees with pain at the endpoints of motion.  She has undergone extensive physical therapy for the past 3 months without much improvement in her range of motion.  She has now finished the course of her antibiotics.  At this point I recommend an MRI of the knee to reassess her for any residual infection and to look for any other internal derangement of the knee.  Recommend follow-up after MRI to discuss results in person then any further recommendations.  If the patient is fully cleared of her infection without any improvement with conservative management for her arthrofibrosis, she may be a candidate for arthroscopy with manipulation.  However, I would need her to be evaluated and cleared by her infectious disease doctor given the severity of her prior infection.  She will follow-up after MRI to discuss further management at that time.\par \par The patient verbalizes their understanding and agrees with the plan.  All questions were answered to their satisfaction.

## 2022-04-25 NOTE — PHYSICAL EXAM
[de-identified] : General:\par Awake, alert, no acute distress, Patient was cooperative and appropriate during the examination.\par \par The patient is of normal weight for height and age.\par \par Walks with an antalgic gait on the left side with a walker and then a boot for her foot drop on the left side.\par \par Full, painless range of motion of the neck and back.\par \par Exam of the bilateral lower extremities is intact and symmetric with regards to dermatologic, vascular, and neurologic exam. Bilateral lower extremity sensation is grossly intact to light touch in the DP/SP/T/S/S nerve distributions. Intact DF/PF/EHL. BIlateral lower extremity warm and well-perfused with brisk capillary refill.\par \par Pulmonary:\par Regular, nonlabored breathing\par \par Abdomen:\par Soft, nontender, nondistended.\par \par Lymphatic:\par No evidence of inguinal lymphadenopathy\par \par Left lower extremity examination:\par Physical examination of the lower extremity demonstrates multiple large incisions about the medial thigh, anterior knee, and lower leg, some of which have undergone split-thickness skin grafting.  There is no active purulence, fluctuance, or drainage.  There is no crepitus to palpation about the skin.  Incisions are primarily well-healed and matured at this point.  There is mild joint effusion.  There is no erythema or warmth.\par \par Sensation is intact to light touch L2-S1\par Palpable DP/PT pulse\par EHL/FHL/TA/GSC motor function intact; EHL 3/5, TA 4/5\par \par Range of Motion\par 5 to 35 degrees with pain at  terminal flexion\par \par Strength Testing\par Quadriceps/Hamstring 5/5\par Patient is able to perform a straight leg raise without difficulty.\par \par Palpation\par Not tender to palpation about the distal femur, proximal tibia, or patella\par No palpable defect appreciated in the quadriceps or patellar tendons\par Mildly tender to palpation of medial joint line\par Mildly tender to palpation of lateral joint line\par \par Special Tests\par Anterior Drawer unable to assess\par Posterior Drawer unable to assess\par Lachman Exam negative\par No Varus or Valgus Laxity at 0 or 30 degrees of knee flexion\par Ivan's Test positive medially\par Active compression of the patella is negative for pain\par Translation of the patella 2 quadrants with a firm endpoint [de-identified] : CT scan of the left lower extremity from [facility] in January 2021 was reviewed the patient today in the office.  At the time patient had interval increase in the size of distal anterior thigh soft tissue abscess.  There is multiple extensive abscesses in the left lower leg.  There are large focal subchondral lucencies in the distal femur consistent with fragmentation versus avascular necrosis secondary to infection.  Posterior translation of the tibia relative to the femur.

## 2022-04-25 NOTE — CONSULT LETTER
[FreeTextEntry2] : Doesnt have PCP\par  [FreeTextEntry3] : Alfonso Jay DO.\par Sports Medicine \par Orthopaedic Surgery\par \par [facility] [place]\par [facility] \par [address] \par [address] \[place] [address]\par \par Tel [phone]\par Fax [phone]\par \par For same day and next day orthopedic appointments contact:\par [email or web address] |[phone])\par Appointments available nights and weekends!  \par \par [facility] [place]\par Visit us at [facility]/orthopaedic\par

## 2022-04-25 NOTE — HISTORY OF PRESENT ILLNESS
[Pain Location] : pain [] : left knee [Worsening] : worsening [6] : a current pain level of 6/10 [8] : an average pain level of 8/10 [7] : a minimum pain level of 7/10 [10] : a maximum pain level of 10/10 [Intermit.] : ~He/She~ states the symptoms seem to be intermittent [Direct Pressure] : worsened by direct pressure [Walking] : worsened by walking [Knee Flexion] : worsened with knee flexion [Knee Extension] : worsened with knee extension [Rest] : relieved by rest [de-identified] : 4/25/22: Bryce is a pleasant 51-year-old female who presents to the office today complaining of left knee pain and stiffness.  The patient has a history of left lower extremity necrotizing fasciitis and bilateral knee septic arthritis from December 2021.  Patient does have a history of a left ankle fracture which was treated surgically many years ago while in the .  After she developed necrotizing fasciitis my partner, Dr. Tobias, performed an I&D and removal of hardware for the lower extremity infection.  She subsequently had split-thickness skin grafting with the surgery team.  She has been on long-term antibiotics which she finished her course of 2 weeks ago.  She has been seeing infectious disease specialist but she does not know who this is.  She has been going to physical therapy for stiffness in her left knee for the past several months.  She reports being able to get to 65 degrees of flexion but is not able to get much further than that.  She was referred to me for consideration of knee arthroscopy with lysis of adhesions and manipulation.  She denies any fevers, chills, sweats, redness, warmth, drainage, or pain elsewhere.  The patient does have an ipsilateral foot drop since the time of surgery with associated weakness on the anterolateral aspect of her leg just distal to one of her incisions.

## 2022-05-05 ENCOUNTER — APPOINTMENT (OUTPATIENT)
Dept: MRI IMAGING | Facility: CLINIC | Age: 52
End: 2022-05-05

## 2022-05-06 NOTE — PROGRESS NOTE ADULT - ATTENDING COMMENTS
This note was copied from a baby's chart.   LACTATION NOTE - INFANT    Evaluation Type  Evaluation Type: Inpatient    Problems & Assessment  Problems: comment/detail: high intermediate risk bilirubin  Infant Assessment: Hunger cues present;Skin color: pink or appropriate for ethnicity  Muscle tone: Appropriate for GA    Feeding Assessment  Summary Current Feeding: Adlib;Breastfeeding exclusively;Breastfeeding with breast milk supplement  Breastfeeding Assessment: Sleepy infant, recently fed Patient greatly improved since the last time I saw her.  L leg is not revealing any new abscesses  remains on antibiotics/daptomycin for deep tissue infections  Periodic vac changes at bedside  The functional issues remain as far as the innervation and motoric adequacy of the leg is concerned  Patient will require extensive and long term physical/occupational therapy to get max recovery of function

## 2022-05-17 ENCOUNTER — APPOINTMENT (OUTPATIENT)
Dept: TRAUMA SURGERY | Facility: CLINIC | Age: 52
End: 2022-05-17

## 2022-06-09 ENCOUNTER — APPOINTMENT (OUTPATIENT)
Dept: ORTHOPEDIC SURGERY | Facility: CLINIC | Age: 52
End: 2022-06-09
Payer: COMMERCIAL

## 2022-06-09 VITALS
SYSTOLIC BLOOD PRESSURE: 136 MMHG | BODY MASS INDEX: 33.11 KG/M2 | WEIGHT: 206 LBS | HEART RATE: 83 BPM | HEIGHT: 66 IN | DIASTOLIC BLOOD PRESSURE: 84 MMHG

## 2022-06-09 PROCEDURE — 99213 OFFICE O/P EST LOW 20 MIN: CPT

## 2022-06-09 NOTE — HISTORY OF PRESENT ILLNESS
[de-identified] : 51-year-old female here today for follow-up of her left knee pain.  Overall she is doing extremely well.  She is ambulating without pain at this time.  She has not been in physical therapy due to after she was discharged from rehab she was not set up with a therapist.  States that the knee is becoming more stiff.  Is now ambulate with a crutch.  Is not taking any pain medications.  Swelling in her leg is improved.  Is overall very happy with her progress however she is concerned about the lack of range of motion of her knee.

## 2022-06-09 NOTE — DISCUSSION/SUMMARY
[Medication Risks Reviewed] : Medication risks reviewed [Surgical risks reviewed] : Surgical risks reviewed [de-identified] : Patient is a 51-year-old female status post left knee septic arthritis as well as arthrofibrosis of her left knee here today for follow-up.  She has gotten stiffer since her last visit and now only ranges approximate 0 to 20 degrees.  She is ambulate without pain with the use of a crutch in a cam boot.  I do think that she would be a candidate for lysis of adhesions and possible manipulation under anesthesia.  I have given her a referral back to the sports service for this.  I recommend she continue with physical therapy and given her new prescription for that.  She should take NSAIDs as needed for the pain.  I will see her back in 3 months for repeat evaluation.  All questions were asked and answered.

## 2022-06-09 NOTE — PHYSICAL EXAM
[de-identified] : Left lower extremity: Well-healed surgical incision over the anterior aspect of the left knee. Knee range of motion 0-25 limited by pain mild effusion no erythema drainage or discharge noted.  Left ankle wound is well-healed with skin graft showing excellent healing, able to invert and brooke and plantar flex the foot however there is no active dorsiflexion of the foot, clean dressing present over the posterior aspect of the knee\par Right knee: Range of motion 0-1 15 - Ivan's no effusion. [de-identified] : Ultrasound of the left knee dated 4/1/2022 shows 2 complex structures measuring 2.6 x 2.6 x 2.4 and 1.9 x 1.8 x 1.3 cm within the left posterior knee differential includes abscess hematoma or cyst.\par \par X-ray of the left knee dated 4/1/2022 shows knee arthritis no evidence of acute fracture old fracture of the medial tibial plateau\par \par

## 2022-06-15 ENCOUNTER — OUTPATIENT (OUTPATIENT)
Dept: OUTPATIENT SERVICES | Facility: HOSPITAL | Age: 52
LOS: 1 days | End: 2022-06-15
Payer: COMMERCIAL

## 2022-06-15 ENCOUNTER — APPOINTMENT (OUTPATIENT)
Dept: MRI IMAGING | Facility: CLINIC | Age: 52
End: 2022-06-15

## 2022-06-15 DIAGNOSIS — Z98.890 OTHER SPECIFIED POSTPROCEDURAL STATES: Chronic | ICD-10-CM

## 2022-06-15 DIAGNOSIS — M24.669 ANKYLOSIS, UNSPECIFIED KNEE: ICD-10-CM

## 2022-06-15 PROCEDURE — 73721 MRI JNT OF LWR EXTRE W/O DYE: CPT | Mod: 26,LT

## 2022-07-01 ENCOUNTER — APPOINTMENT (OUTPATIENT)
Dept: ORTHOPEDIC SURGERY | Facility: CLINIC | Age: 52
End: 2022-07-01

## 2022-07-01 VITALS
SYSTOLIC BLOOD PRESSURE: 119 MMHG | WEIGHT: 206 LBS | HEART RATE: 75 BPM | BODY MASS INDEX: 33.11 KG/M2 | HEIGHT: 66 IN | DIASTOLIC BLOOD PRESSURE: 78 MMHG

## 2022-07-01 DIAGNOSIS — M21.379 FOOT DROP, UNSPECIFIED FOOT: ICD-10-CM

## 2022-07-01 PROCEDURE — 99214 OFFICE O/P EST MOD 30 MIN: CPT

## 2022-07-06 ENCOUNTER — NON-APPOINTMENT (OUTPATIENT)
Age: 52
End: 2022-07-06

## 2022-11-30 NOTE — DISCHARGE NOTE PROVIDER - NSDCACTIVITY_GEN_ALL_CORE
Pt. Instructed on laxative and skin prep, pre-op meds, clear liquid diet. Ok to take Tylenol if needed but no other over the counter pain relievers, no vitamins, supplements.    Follow Instructions Provided by your Surgical Team

## 2022-12-19 ENCOUNTER — APPOINTMENT (OUTPATIENT)
Dept: ULTRASOUND IMAGING | Facility: CLINIC | Age: 52
End: 2022-12-19

## 2022-12-19 ENCOUNTER — OUTPATIENT (OUTPATIENT)
Dept: OUTPATIENT SERVICES | Facility: HOSPITAL | Age: 52
LOS: 1 days | End: 2022-12-19
Payer: OTHER GOVERNMENT

## 2022-12-19 ENCOUNTER — APPOINTMENT (OUTPATIENT)
Dept: MAMMOGRAPHY | Facility: CLINIC | Age: 52
End: 2022-12-19

## 2022-12-19 DIAGNOSIS — Z00.00 ENCOUNTER FOR GENERAL ADULT MEDICAL EXAMINATION WITHOUT ABNORMAL FINDINGS: ICD-10-CM

## 2022-12-19 DIAGNOSIS — Z98.890 OTHER SPECIFIED POSTPROCEDURAL STATES: Chronic | ICD-10-CM

## 2022-12-19 PROCEDURE — 77063 BREAST TOMOSYNTHESIS BI: CPT | Mod: 26

## 2022-12-19 PROCEDURE — 77063 BREAST TOMOSYNTHESIS BI: CPT

## 2022-12-19 PROCEDURE — 77067 SCR MAMMO BI INCL CAD: CPT | Mod: 26

## 2022-12-19 PROCEDURE — 76641 ULTRASOUND BREAST COMPLETE: CPT | Mod: 26,50

## 2022-12-19 PROCEDURE — 76641 ULTRASOUND BREAST COMPLETE: CPT

## 2022-12-19 PROCEDURE — 77067 SCR MAMMO BI INCL CAD: CPT

## 2022-12-22 ENCOUNTER — APPOINTMENT (OUTPATIENT)
Dept: ORTHOPEDIC SURGERY | Facility: CLINIC | Age: 52
End: 2022-12-22

## 2022-12-22 VITALS
DIASTOLIC BLOOD PRESSURE: 77 MMHG | HEIGHT: 66 IN | BODY MASS INDEX: 33.11 KG/M2 | SYSTOLIC BLOOD PRESSURE: 138 MMHG | WEIGHT: 206 LBS | HEART RATE: 84 BPM

## 2022-12-22 DIAGNOSIS — M00.9 PYOGENIC ARTHRITIS, UNSPECIFIED: ICD-10-CM

## 2022-12-22 PROCEDURE — 73564 X-RAY EXAM KNEE 4 OR MORE: CPT | Mod: LT

## 2022-12-22 PROCEDURE — 99072 ADDL SUPL MATRL&STAF TM PHE: CPT

## 2022-12-22 PROCEDURE — 99213 OFFICE O/P EST LOW 20 MIN: CPT

## 2022-12-22 NOTE — DISCUSSION/SUMMARY
[Medication Risks Reviewed] : Medication risks reviewed [Surgical risks reviewed] : Surgical risks reviewed [de-identified] : Patient is a 52-year-old female with arthrofibrosis of her left knee after septic arthritis and multiple washouts.  I had a thorough discussion with her that the range of motion in her knee has not changed much since her last visit.  She inquired about a total knee arthroplasty however I did discuss with her that she has an increased risk of infection as well as severe morbidity after the surgery and I also do not think that the surgery will help much with the range of motion that she is having in her knee.  We did discuss that she would still have limited range of motion.  And that this is a pain relieving procedure.  I therefore recommended that she continue with conservative treatment this time.  I will again discussed with the sports service about any possible manipulation and knee arthroscopy in order to try to help with range of motion.  She is continue take Tylenol and NSAIDs as needed for the pain.  I given her new prescription for physical therapy as well as a cane.  I will see her back on an as-needed basis for her left knee.  All questions were asked and answered

## 2022-12-22 NOTE — PHYSICAL EXAM
[de-identified] : Left lower extremity: Well-healed surgical incision over the anterior aspect of the left knee. Knee range of motion 0-25 limited by pain mild effusion no erythema drainage or discharge noted.  Left ankle wound is well-healed with skin graft showing excellent healing, able to invert and brooke and plantar flex the foot however there is no active dorsiflexion of the foot, clean dressing present over the posterior aspect of the knee\par Right knee: Range of motion 0-1 15 - Ivan's no effusion. [de-identified] : 4 views left knee obtained the office today show no acute fracture or dislocation.  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  Osteopenia is noted.

## 2022-12-22 NOTE — REVIEW OF SYSTEMS
[Joint Pain] : joint pain [Joint Stiffness] : joint stiffness [Joint Swelling] : joint swelling [Negative] : Heme/Lymph [FreeTextEntry9] : left knee pain

## 2022-12-22 NOTE — HISTORY OF PRESENT ILLNESS
[de-identified] : Patient is a 52-year-old female here today for follow-up of her left knee pain and arthrofibrosis.  She has been trying to do physical therapy since her last visit however due to insurance issues she has had to stop doing it.  She states that the pain in her knee is controlled however she is unable to bend the knee.  Is using crutches to ambulate due to this pain.  States that she has had no real change since her last visit.  Denies any evidence of infection at this time.  Is here today for follow-up.

## 2022-12-29 ENCOUNTER — APPOINTMENT (OUTPATIENT)
Dept: ORTHOPEDIC SURGERY | Facility: CLINIC | Age: 52
End: 2022-12-29

## 2022-12-29 VITALS
WEIGHT: 206 LBS | BODY MASS INDEX: 33.11 KG/M2 | DIASTOLIC BLOOD PRESSURE: 74 MMHG | SYSTOLIC BLOOD PRESSURE: 137 MMHG | HEART RATE: 89 BPM | HEIGHT: 66 IN

## 2022-12-29 DIAGNOSIS — M25.511 PAIN IN RIGHT SHOULDER: ICD-10-CM

## 2022-12-29 PROCEDURE — 99214 OFFICE O/P EST MOD 30 MIN: CPT

## 2022-12-29 PROCEDURE — 99072 ADDL SUPL MATRL&STAF TM PHE: CPT

## 2022-12-29 NOTE — HISTORY OF PRESENT ILLNESS
[Pain Location] : pain [] : left knee [Worsening] : worsening [6] : a current pain level of 6/10 [8] : an average pain level of 8/10 [7] : a minimum pain level of 7/10 [10] : a maximum pain level of 10/10 [Intermit.] : ~He/She~ states the symptoms seem to be intermittent [Direct Pressure] : worsened by direct pressure [Walking] : worsened by walking [Knee Flexion] : worsened with knee flexion [Knee Extension] : worsened with knee extension [Rest] : relieved by rest [de-identified] : 12/29/2022: Bryce is a 52-year-old female returns to the office today for reassessment of her left knee pain and stiffness as well as new complaints of right shoulder pain and stiffness.  Regarding her knee, she has been going to physical therapy for many months without any significant improvement in her range of motion.  She states her pain is reasonably well controlled and she is able to walk.  Regarding her shoulder, she states this is been bothering her since around the time she was hospitalized with bilateral septic knee arthritis from December 21.  Since that time she has noticed worsening pain and stiffness in the shoulder.  She has difficulty reaching overhead and her pain is primarily activity related relieved by rest.  She has not had any treatment for the shoulder.  She does state that having been  for many years she has had issues on and off of the shoulders previously.  She denies any fevers, chills, sweats, redness, warmth, drainage, numbness, tingling, or pain elsewhere.\par \par 07/01/2022 : BRYCE DAVIS is a 51 year year old female who presents to the office for n follow-up valuation of her left knee pain and stiffness the patient has a history of left lower extremity necrotizing fasciitis and bilateral knee septic arthritis from December 2021. The patient had a history of a left ankle fracture that was treated surgically many years ago while in the . After she developed a necrotizing fasciitis my colleague Dr. Tobias performed an I&D and removal of hardware for the left lower extremity infection. She subsequently had split thickness skin grafting with the surgery team. She has been on long-term antibiotics however has been off antibiotics for 3 months now. She states she has followed up with her infectious disease doctor who states that her infection is fully eradicated and she no longer has any evidence of infection and is off antibiotics. She continues to follow-up with wound care who states she is healing well. She has been going to physical therapy for her left lower extremity ankle and knee stiffness and notices some improvement in her overall function. She is been using a cam boot and crutch because of her foot drop to assist with ambulation. She states her overall pain and function and symptoms are improving however she still has significant stiffness about the knee and some mild stiffness about the ankle but her active range of motion of her ankle is improving. She is here for MRI results to discuss neck steps for her left knee. She denies any fevers, chills, chest pain, shortness of breath. She denies any numbness or tingling distally.\par \par 4/25/22: Bryce is a pleasant 51-year-old female who presents to the office today complaining of left knee pain and stiffness.  The patient has a history of left lower extremity necrotizing fasciitis and bilateral knee septic arthritis from December 2021.  Patient does have a history of a left ankle fracture which was treated surgically many years ago while in the .  After she developed necrotizing fasciitis my partner, Dr. Tobias, performed an I&D and removal of hardware for the lower extremity infection.  She subsequently had split-thickness skin grafting with the surgery team.  She has been on long-term antibiotics which she finished her course of 2 weeks ago.  She has been seeing infectious disease specialist but she does not know who this is.  She has been going to physical therapy for stiffness in her left knee for the past several months.  She reports being able to get to 65 degrees of flexion but is not able to get much further than that.  She was referred to me for consideration of knee arthroscopy with lysis of adhesions and manipulation.  She denies any fevers, chills, sweats, redness, warmth, drainage, or pain elsewhere.  The patient does have an ipsilateral foot drop since the time of surgery with associated weakness on the anterolateral aspect of her leg just distal to one of her incisions.

## 2022-12-29 NOTE — PHYSICAL EXAM
[de-identified] : General:\par Awake, alert, no acute distress, Patient was cooperative and appropriate during the examination.\par \par The patient is of normal weight for height and age.\par \par Walks with an antalgic gait on the left side with a walker and then a boot for her foot drop on the left side.\par \par Full, painless range of motion of the neck and back.\par \par Exam of the bilateral lower extremities is intact and symmetric with regards to dermatologic, vascular, and neurologic exam. Bilateral lower extremity sensation is grossly intact to light touch in the DP/SP/T/S/S nerve distributions. Intact DF/PF/EHL. BIlateral lower extremity warm and well-perfused with brisk capillary refill.\par \par Pulmonary:\par Regular, nonlabored breathing\par \par Abdomen:\par Soft, nontender, nondistended.\par \par Lymphatic:\par No evidence of inguinal lymphadenopathy\par \par Left lower extremity examination:\par Physical examination of the lower extremity demonstrates multiple healed large incisions about the medial thigh, anterior knee, and lower leg, some of which have undergone split-thickness skin grafting.  There is no active purulence, fluctuance, or drainage.  There is no crepitus to palpation about the skin.  Incisions are primarily well-healed and matured at this point.  There is mild joint effusion.  There is no erythema or warmth.\par \par Sensation is intact to light touch L2-S1\par Palpable DP/PT pulse\par EHL/FHL/TA/GSC motor function intact; EHL 3/5, TA 4/5\par \par Range of Motion\par 5 to 15 degrees with firm endpoints of their direction\par \par Strength Testing\par Quadriceps/Hamstring 5/5\par Patient is able to perform a straight leg raise without difficulty.\par \par Palpation\par Not tender to palpation about the distal femur, proximal tibia, or patella\par No palpable defect appreciated in the quadriceps or patellar tendons\par Mildly tender to palpation of medial joint line\par Mildly tender to palpation of lateral joint line\par \par Special Tests\par Anterior Drawer unable to assess\par Posterior Drawer unable to assess\par Lachman Exam negative\par No Varus or Valgus Laxity at 0 or 30 degrees of knee flexion\par Ivan's Test positive medially\par Active compression of the patella is negative for pain\par Translation of the patella 2 quadrants with a firm endpoint\par \par \par Right shoulder Exam:\par Physical exam of the shoulder demonstrates normal skin without signs of skin changes or abnormalities. No erythema, warmth, or joint effusion appreciated.\par \par Sensation intact light touch C5-T1\par Palpable radial pulse\par Radial/ulnar/median/axillary/musculocutaneous/AIN/PIN nerves grossly intact\par \par Active and passive range of motion:\par Forward Flexion: 120\par Abduction: 80\par External Rotation: 30\par Internal Rotation: Back pocket\par \par Palpation:\par Not tender to palpation over the glenohumeral joint\par Mildly tender palpation over the rotator cuff insertion on the greater tuberosity\par Not tender to palpation over the AC joint\par Mildly tender to palpation of the biceps tendon/bicipital groove\par \par Strength testing:\par Supraspinatus: 4/5\par Infraspinatus: 4/5\par Subscapularis: 5/5\par \par Special test:\par Empty can test positive\par Armstrong impingement test positive\par Speeds test negative\par Macon's test negative\par Lift-off test negative\par Bell-press test negative\par Cross-arm adduction test negative\par \par  [de-identified] : MRI of the left knee from 6/15/2022 was reviewed the patient today in the office.  The patient has diffuse soft tissue and intramuscular edema, maceration of the menisci and osseous erosions consistent with history of infection.  The posterior cruciate ligament is not visualized and is presumably chronically torn.Low signal within the intercondylar notch is suggestive of scarring and arthrofibrosis.  Marked patellar tendinosis.  Susceptibility artifact along the extensor tendons and medial retinaculum may be related to previous intervention or soft tissue air.  Enlarged tibial and common peroneal nerve with foci of internal signal; correlate for any signs of neuropathy.\par \par X-rays including 4 views of the right shoulder obtained in the office and reviewed with the patient today on 12/29/2022.  The patient has moderate to advanced glenohumeral osteoarthritis with loss of joint space and osteophyte formation. Subacromial spur is identified with a type II acromion.  No significant AC arthritis.

## 2022-12-29 NOTE — CONSULT LETTER
[FreeTextEntry3] : Alfonso Jay DO.\par Sports Medicine \par Orthopaedic Surgery\par \par [facility]\par [facility] \par [address] \par [address] \[place] [address]\par \par Tel [phone]\par Fax [phone]\par \par For same day and next day orthopedic appointments contact:\par [email or web address] |[phone] Appointments available nights and weekends!  \par \par [facility]\par Visit us at [facility]/orthopaedic\par

## 2022-12-29 NOTE — DISCUSSION/SUMMARY
[de-identified] : Assessment: 6-year-old female with resolved left lower extremity necrotizing fasciitis and left knee septic arthritis, now with arthrofibrosis; right shoulder pain secondary to glenohumeral arthritis with possible tearing of the rotator cuff\par \par Plan: I had a long discussion with the patient today regarding the nature of their diagnosis and treatment plan. We discussed the risks and benefits of no treatment as well as nonoperative and operative treatments.  I reviewed the patient's left knee MRI from June 2022 with her in the office again today.  She has advanced arthritic changes in the setting of her previous septic knee.  The patient has grown very frustrated because she has not significantly improved with conservative treatment with respect to her range of motion.  She still has profound stiffness on examination [place] of anything can be done.  This point I recommend a repeat MRI of the left knee since the old MRI is outdated.  Recommend follow-up after the MRI to discuss the results in possible intervention.  \par \par Regarding her shoulder, she has advanced arthritic changes of the glenohumeral joint.  She also has significant weakness to resisted forward elevation and external rotation on examination concerning for possible rotator cuff pathology.  Additionally, in the setting of a history of bilateral septic knees I would recommend an MRI to further assess glenohumeral joint for any rotator cuff pathology and to exclude the possibility of infection.  Recommend follow-up after MRI to discuss results in person and further recommendations.\par \par The patient verbalizes their understanding and agrees with the plan.  All questions were answered to their satisfaction.

## 2023-01-31 ENCOUNTER — APPOINTMENT (OUTPATIENT)
Dept: MRI IMAGING | Facility: CLINIC | Age: 53
End: 2023-01-31

## 2023-01-31 ENCOUNTER — OUTPATIENT (OUTPATIENT)
Dept: OUTPATIENT SERVICES | Facility: HOSPITAL | Age: 53
LOS: 1 days | End: 2023-01-31

## 2023-01-31 DIAGNOSIS — M25.511 PAIN IN RIGHT SHOULDER: ICD-10-CM

## 2023-01-31 DIAGNOSIS — Z98.890 OTHER SPECIFIED POSTPROCEDURAL STATES: Chronic | ICD-10-CM

## 2023-02-15 ENCOUNTER — APPOINTMENT (OUTPATIENT)
Dept: ORTHOPEDIC SURGERY | Facility: CLINIC | Age: 53
End: 2023-02-15
Payer: MEDICAID

## 2023-02-15 VITALS
HEIGHT: 66 IN | WEIGHT: 206 LBS | SYSTOLIC BLOOD PRESSURE: 121 MMHG | BODY MASS INDEX: 33.11 KG/M2 | DIASTOLIC BLOOD PRESSURE: 84 MMHG | HEART RATE: 96 BPM

## 2023-02-15 PROCEDURE — 99213 OFFICE O/P EST LOW 20 MIN: CPT

## 2023-02-15 PROCEDURE — 99072 ADDL SUPL MATRL&STAF TM PHE: CPT

## 2023-02-15 NOTE — PHYSICAL EXAM
[de-identified] : Left lower extremity: Well-healed surgical incision over the anterior aspect of the left knee. Knee range of motion 0-25 limited by pain mild effusion no erythema drainage or discharge noted.  Left ankle wound is well-healed with skin graft showing excellent healing, able to invert and brooke and plantar flex the foot however there is no active dorsiflexion of the foot, clean dressing present over the posterior aspect of the knee\par Right knee: Range of motion 0-1 15 - Ivan's no effusion.

## 2023-02-15 NOTE — REASON FOR VISIT
[Follow-Up Visit] : a follow-up visit for [FreeTextEntry2] : left knee arthroplasty DOS 12/28/21

## 2023-02-15 NOTE — DISCUSSION/SUMMARY
[Medication Risks Reviewed] : Medication risks reviewed [Surgical risks reviewed] : Surgical risks reviewed [de-identified] : Patient is a 52-year-old female with left knee arthrofibrosis here today for follow-up.  She is scheduled to undergo an MRI of her knee tomorrow.  She will also be following up with one of my sports colleagues for further evaluation.  We did discuss that she may be a candidate for a knee fusion versus total knee arthroplasty in the future however her high risk of infection makes her very poor candidate for a knee replacement.  I also think that due to the severe arthrofibrosis and stiffness that she has in her knee she would do poorly with a knee replacement.  We will follow-up after the MRI for repeat evaluation and management discussion with the sports service.  All questions were asked and answered

## 2023-02-15 NOTE — HISTORY OF PRESENT ILLNESS
[de-identified] : Patient is a 52-year-old female here today for follow-up of her left knee arthrofibrosis status post left knee irrigation debridement for septic arthritis.  She has had no significant change from her prior visit.  Has been attempting to participate in physical therapy.  Has been using the crutch.  Would like to try to transition to a cane.  States she is able to walk for short distances with severe pain.  Is here today for follow-up

## 2023-02-16 ENCOUNTER — APPOINTMENT (OUTPATIENT)
Dept: MRI IMAGING | Facility: CLINIC | Age: 53
End: 2023-02-16
Payer: OTHER GOVERNMENT

## 2023-02-16 ENCOUNTER — OUTPATIENT (OUTPATIENT)
Dept: OUTPATIENT SERVICES | Facility: HOSPITAL | Age: 53
LOS: 1 days | End: 2023-02-16

## 2023-02-16 DIAGNOSIS — Z00.8 ENCOUNTER FOR OTHER GENERAL EXAMINATION: ICD-10-CM

## 2023-02-16 DIAGNOSIS — Z98.890 OTHER SPECIFIED POSTPROCEDURAL STATES: Chronic | ICD-10-CM

## 2023-02-16 PROCEDURE — 73221 MRI JOINT UPR EXTREM W/O DYE: CPT | Mod: 26,RT

## 2023-02-16 PROCEDURE — 73721 MRI JNT OF LWR EXTRE W/O DYE: CPT | Mod: 26,LT

## 2023-03-13 NOTE — ED ADULT TRIAGE NOTE - NS ED NURSE BANDS TYPE
Detail Level: Zone
Quality 226: Preventive Care And Screening: Tobacco Use: Screening And Cessation Intervention: Patient screened for tobacco use and is an ex/non-smoker
Name band;

## 2023-06-26 NOTE — PROVIDER CONTACT NOTE (CRITICAL VALUE NOTIFICATION) - DATE AND TIME:
10-Dec-2021 09:44 Occupational Therapy    Patient not seen in therapy.     Chart reviewed, patient transferred to ICU on 6/25 and intubated, per protocol will discontinue current order. Please re-order when medically appropriate.       OBJECTIVE                       Documented in the chart in the following areas: Assessment/Plan.      Therapy procedure time and total treatment time can be found documented on the Time Entry flowsheet   10-Dec-2021 09:42

## 2023-06-30 NOTE — PROGRESS NOTE ADULT - SUBJECTIVE AND OBJECTIVE BOX
[facility]                                        [facility] [place]                                 [address]                                  [address]                                        [place], [place], [address]                                             [phone]        CC: proximal weakness of upper extremities.    HPI:   50 yo woman with no significant past medical history who was admitted for fasciotomy for necrotizing soft tissue infection. She states she had left lower extremity swelling at home and when she arrived here had emergent surgery for suspected infection. 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 She states at home, she also had pain in her arms and shoulders, but she could move them well. She states two days ago when she was in ICU, she noticed soreness in her shoulders. She states she has weakness in her proximal arms which is new. She denies numbness or tingling in her extremities. She denies headache, or change in vision. She states she feels her upper arms are swollen. She has no further complaints. (TK).    Interim history:  Remains on 2 [place].   Still with pain in both shoulders and proximal weakness in both upper extremities.    ROS:   Denies headache or dizziness.  Denies chest pain.  Denies shortness of breath.    MEDICATIONS  (STANDING):  baclofen 10 milliGRAM(s) Oral three times a day  collagenase Ointment 1 Application(s) Topical two times a day  gabapentin 200 milliGRAM(s) Oral three times a day  glucagon  Injectable 1 milliGRAM(s) IntraMuscular once  heparin   Injectable 5000 Unit(s) SubCutaneous every 8 hours  linezolid  IVPB 600 milliGRAM(s) IV Intermittent once  linezolid  IVPB 600 milliGRAM(s) IV Intermittent every 12 hours  linezolid  IVPB      lisinopril 5 milliGRAM(s) Oral daily  magnesium sulfate  IVPB 2 Gram(s) IV Intermittent once  methocarbamol 750 milliGRAM(s) Oral three times a day  pantoprazole    Tablet 40 milliGRAM(s) Oral two times a day  piperacillin/tazobactam IVPB.. 4.5 Gram(s) IV Intermittent every 8 hours  polyethylene glycol 3350 17 Gram(s) Oral daily  senna 2 Tablet(s) Oral at bedtime  sodium chloride 1 Gram(s) Oral every 12 hours  sodium chloride 0.9%. 1000 milliLiter(s) (100 mL/Hr) IV Continuous <Continuous>      Vital Signs Last 24 Hrs  T(C): 37.3 (30 Nov 2021 11:41), Max: 39.4 (29 Nov 2021 22:01)  T(F): 99.1 (30 Nov 2021 11:41), Max: 102.9 (29 Nov 2021 22:01)  HR: 60 (30 Nov 2021 08:55) (60 - 76)  BP: 112/76 (30 Nov 2021 08:55) (112/62 - 134/76)  RR: 18 (30 Nov 2021 08:55) (17 - 18)  SpO2: 92% (30 Nov 2021 08:55) (92% - 96%)    Detailed Neurologic Exam:    Mental status: The patient is awake and alert. There is no aphasia. There is no dysarthria.     Cranial nerves: Pupils equal and react symmetrically to light. There is no visual field deficit to threat. Extraocular motion is full with no nystagmus. Facial sensation is intact. Facial musculature is symmetric. Palate elevates symmetrically. Tongue is midline.    Motor: There is normal bulk and tone.  There is no tremor.  Deltoids 2/5 bilaterally but limited by pain bilaterally in the shoulders. Strength in upper extremities otherwise 5/5.  Bilateral lower extremities, moves spontaneously at least 1/5, however, full strength examination limited due to pain from recent surgery.    Sensation: Grossly intact to light touch and pin other than around surgical site.    Reflexes: 1+ in UE (LEs not assessed secondary to surgery) and plantar responses are flexor.    Cerebellar: Difficult to assess secondary to weakness.    Labs:     11-30    127<L>  |  98  |  6.9<L>  ----------------------------<  77  5.9<H>   |  15.0<L>  |  0.75    Ca    7.5<L>      30 Nov 2021 07:18  Phos  3.6     11-29  Mg     1.7     11-30                          9.1    14.55 )-----------( 534      ( 30 Nov 2021 07:18 )             31.6         MRI brain w/o contrast - no acute findings  MRI C spine w/o contrast - no gross evidence of cord compression, however, severely motion limited limiting interpretation   Goal Outcome Evaluation:    6/29/23  1900-0730H  A/Ox4. On Tele,NSR. VSS, O2 at 2NC. A2 GBW, T/R. On NPO. R Port HL. G tube in place, tube feeding increased to 30ml/hr with FWF 60ml Q4H. Tolerated it well, denies nausea.  No vomiting noted this shift. On contact precaution. K,Phos,Mag Protocols. Phos 2.3 and K 3.3, replacement done this shift, repeat blood draws this morning. Awaiting results. Milton in place with adequate output. Complained of abd discomfort, Diluadid PO PRN given 1x. Ilieostomy in place with moderate amount of greenish liquid  output. Ileostomy care done, pouched changed. Discharge pending

## 2023-07-31 ENCOUNTER — LABORATORY RESULT (OUTPATIENT)
Age: 53
End: 2023-07-31

## 2023-07-31 ENCOUNTER — APPOINTMENT (OUTPATIENT)
Dept: ORTHOPEDIC SURGERY | Facility: CLINIC | Age: 53
End: 2023-07-31
Payer: MEDICAID

## 2023-07-31 VITALS — HEART RATE: 90 BPM | DIASTOLIC BLOOD PRESSURE: 93 MMHG | SYSTOLIC BLOOD PRESSURE: 155 MMHG

## 2023-07-31 DIAGNOSIS — M00.9 PYOGENIC ARTHRITIS, UNSPECIFIED: ICD-10-CM

## 2023-07-31 DIAGNOSIS — M24.669 ANKYLOSIS, UNSPECIFIED KNEE: ICD-10-CM

## 2023-07-31 DIAGNOSIS — L02.416 CELLULITIS OF LEFT LOWER LIMB: ICD-10-CM

## 2023-07-31 DIAGNOSIS — L03.116 CELLULITIS OF LEFT LOWER LIMB: ICD-10-CM

## 2023-07-31 PROCEDURE — 99214 OFFICE O/P EST MOD 30 MIN: CPT | Mod: 25

## 2023-07-31 PROCEDURE — 73590 X-RAY EXAM OF LOWER LEG: CPT | Mod: LT

## 2023-07-31 PROCEDURE — 73560 X-RAY EXAM OF KNEE 1 OR 2: CPT | Mod: LT

## 2023-07-31 PROCEDURE — 20610 DRAIN/INJ JOINT/BURSA W/O US: CPT | Mod: LT

## 2023-07-31 NOTE — DISCUSSION/SUMMARY
[Medication Risks Reviewed] : Medication risks reviewed [Surgical risks reviewed] : Surgical risks reviewed [de-identified] : Patient is a 52-year-old female here today for follow-up of her left knee arthrofibrosis status post septic arthritis and multiple washouts.  She is having reduced range of motion is now barely able to bend the knee to approximately 10 degrees.  She has severe pain with any range of motion.  Her x-ray does show worsening of her arthritic changes.  I had a thorough discussion with her today about conservative and surgical treatment options.  I did advise her that she is extremely high risk for any type of total knee arthroplasty as well as a high risk for infection as well as ligamentous compromise as well as disruption of her extensor mechanism due to her severe arthrofibrosis.  We also discussed that she is unlikely to obtain full range of motion due to her prolonged contracture of her knee.  We also discussed that due to the high risk of any infection in her knee she would likely require a staged procedure with a articulating spacer that would then be staged to a total knee arthroplasty if there was no evidence of infection.  I did aspirate her knee today and received 1 cc of bloody fluid I sent that for culture as well as Gram stain as there was not enough sample to send for cell count.  I have also ordered a baseline ESR and CRP.  I also discussed that I would like to obtain an MRI of her tib-fib as well as her femur in order to evaluate for any further abscesses as well as osteomyelitis.  We also did discuss that she may be candidate for a knee fusion versus a possible amputation in the future.  She also understands that she may require an amputation in the future if she was to develop further infection or further compromise in her leg due to additional surgical procedures.  Patient understands and agreement.  I will see her back after the MRI and culture results are available for further treatment and discussion of treatment options including a articulating antibiotic spacer versus possible knee fusion.

## 2023-07-31 NOTE — PROCEDURE
[Aspiration] : Aspiration [Left] : of the left [Diagnostic] : Diagnostic [Patient] : patient [Alcohol] : Alcohol [Ethyl Chloride Spray] : ethyl chloride spray was used as a topical anesthetic [Lateral] : lateral [Superior] : superior [18] : an 18-gauge [___ mL Fluid] : [unfilled] mL of [Bloody] : bloody [Bandage Applied] : a bandage [Culture] : culture [Gram Stain] : gram stain [Tolerated Well] : The patient tolerated the procedure well [None] : none

## 2023-07-31 NOTE — HISTORY OF PRESENT ILLNESS
[de-identified] : Patient is a 52-year-old female here today for follow-up of her left knee arthrofibrosis and septic arthritis.  She states that she feels like the pain in her knee has been getting worse.  She also has been noticing reduced range of motion.  She is using crutches to ambulate she states is too painful to walk as well as feelings of instability.  States that she is now barely able to bend her knee and feels like it is more stiff.  It was swollen however the swelling has now subsided.  Denies any fevers chills or constitutional symptoms.  Denies any new falls or trauma.  Is here today for further evaluation

## 2023-07-31 NOTE — PHYSICAL EXAM
[de-identified] : Left lower extremity: Well-healed surgical incision over the anterior aspect of the left knee. Knee range of motion 0-10 limited by pain no effusion no erythema drainage or discharge noted.  Left ankle wound is well-healed with skin graft showing excellent healing, able to invert and brooke and plantar flex the foot however there is no active dorsiflexion of the foot  [de-identified] : 2 views of the left knee obtained the office today show no acute fracture or dislocation.  There is severe medial joint space narrowing bone-on-bone osteoarthritis tricompartmental degenerative changes consistent with sequelae of septic arthritis.  Demineralization of the bone is noted.  2 views of the left tib-fib obtained the office today show no acute fracture or dislocation

## 2023-08-14 NOTE — ED ADULT NURSE NOTE - SUICIDE SCREENING QUESTION 2
How Severe Are Your Spot(S)?: mild What Type Of Note Output Would You Prefer (Optional)?: Bullet Format What Is The Reason For Today's Visit?: Full Body Skin Examination What Is The Reason For Today's Visit? (Being Monitored For X): concerning skin lesions on an annual basis No

## 2023-08-28 ENCOUNTER — OUTPATIENT (OUTPATIENT)
Dept: OUTPATIENT SERVICES | Facility: HOSPITAL | Age: 53
LOS: 1 days | End: 2023-08-28

## 2023-08-28 ENCOUNTER — APPOINTMENT (OUTPATIENT)
Dept: MRI IMAGING | Facility: CLINIC | Age: 53
End: 2023-08-28
Payer: MEDICAID

## 2023-08-28 DIAGNOSIS — Z98.890 OTHER SPECIFIED POSTPROCEDURAL STATES: Chronic | ICD-10-CM

## 2023-08-28 DIAGNOSIS — M00.9 PYOGENIC ARTHRITIS, UNSPECIFIED: ICD-10-CM

## 2023-08-28 PROCEDURE — 73720 MRI LWR EXTREMITY W/O&W/DYE: CPT | Mod: 26,LT

## 2023-08-28 PROCEDURE — 73720 MRI LWR EXTREMITY W/O&W/DYE: CPT | Mod: 26,LT,76

## 2023-09-06 NOTE — ED PROVIDER NOTE - HIV OFFER
PAST MEDICAL HISTORY:  Afib     Anemia     Anxiety     Aortic valve regurgitation     CAD (coronary artery disease)     Cardiac pacemaker     CHF (congestive heart failure)     Chronic CHF     Chronic obstructive pulmonary disease (COPD)     COPD (chronic obstructive pulmonary disease)     Gout     Gout     History of ST elevation myocardial infarction (STEMI)     HTN (hypertension)     HTN (hypertension)     Hypothyroid     Hypothyroidism     Insomnia     Mitral valve insufficiency     Osteopenia      Opt out

## 2023-10-25 NOTE — PROGRESS NOTE ADULT - SUBJECTIVE AND OBJECTIVE BOX
INFECTIOUS DISEASES AND INTERNAL MEDICINE at [facility]  =======================================================  Theo Morrow MD  Diplomates [employer] Board of Internal Medicine and Infectious Diseases  Telephone [phone]  Fax            [phone]  =======================================================    LUIS FERNANDO DAVIS [id number]  Follow up: group A STREP NECROTIZING SOFT TISSUE INFECTION     repeat CT scan of lower ext still with new collections.       Allergies:  No Known Allergies       FAMILY   FAMILY HISTORY:  FH: HTN (hypertension) (Mother)      REVIEW OF SYSTEMS:  CONSTITUTIONAL:  No Fever or chills  HEENT:   No diplopia or blurred vision.  No earache, sore throat or runny nose.  CARDIOVASCULAR:  No pressure, squeezing, strangling, tightness, heaviness or aching about the chest, neck, axilla or epigastrium.  RESPIRATORY:  No cough, shortness of breath, PND or orthopnea.  GASTROINTESTINAL:  No nausea, vomiting or diarrhea.  GENITOURINARY:  No dysuria, frequency or urgency. No Blood in urine  MUSCULOSKELETAL:   AS PER HPI   SKIN:  No change in skin, hair or nails.  NEUROLOGIC:  No paresthesias, fasciculations, seizures or weakness.  PSYCHIATRIC:  No disorder of thought or mood.  ENDOCRINE:  No heat or cold intolerance, polyuria or polydipsia.  HEMATOLOGICAL:  No easy bruising or bleeding.            Physical Exam:     GEN: NAD,    HEENT: normocephalic and atraumatic. EOMI. ASHISH.    NECK: Supple. No carotid bruits.  No lymphadenopathy or thyromegaly.  LUNGS: Clear to auscultation.  HEART: Regular rate and rhythm without murmur.  ABDOMEN: Soft, nontender, and nondistended.  Positive bowel sounds.    : No CVA tenderness  EXTREMITIES: LEFT LEG WRAPPED; left thigh vac in place  right knee with dressing in place   UPPER EXT STRENGTH GOOD BUT CAN T RAISE HANDS ALL THE WAY, localized tenderness to both shoulders   NEUROLOGIC:  C AWAKE ALERT Appropriate affect .  SKIN: No ulceration or induration present.           Vitals:  ===== Vital Signs Last 24 Hrs  T(C): 37.3 (01 Dec 2021 08:21), Max: 38.9 (30 Nov 2021 19:56)  T(F): 99.2 (01 Dec 2021 08:21), Max: 102.1 (30 Nov 2021 19:56)  HR: 84 (01 Dec 2021 08:21) (61 - 90)  BP: 90/52 (01 Dec 2021 08:21) (90/52 - 125/71)  BP(mean): --  RR: 17 (01 Dec 2021 08:21) (17 - 18)  SpO2: 90% (01 Dec 2021 08:21) (90% - 99%)    =======================================================  Current Antibiotics:  clindamycin IVPB 900 milliGRAM(s) IV Intermittent every 8 hours  piperacillin/tazobactam IVPB.. 4.5 Gram(s) IV Intermittent every 8 hours    Other medications:  baclofen 10 milliGRAM(s) Oral three times a day  collagenase Ointment 1 Application(s) Topical two times a day  gabapentin 200 milliGRAM(s) Oral three times a day  glucagon  Injectable 1 milliGRAM(s) IntraMuscular once  heparin   Injectable 5000 Unit(s) SubCutaneous every 8 hours  lisinopril 5 milliGRAM(s) Oral daily  methocarbamol 750 milliGRAM(s) Oral three times a day  pantoprazole    Tablet 40 milliGRAM(s) Oral two times a day  polyethylene glycol 3350 17 Gram(s) Oral daily  senna 2 Tablet(s) Oral at bedtime  sodium chloride 1 Gram(s) Oral every 12 hours  sodium chloride 0.9%. 1000 milliLiter(s) IV Continuous <Continuous>      =======================================================  Labs:                                             7.0    14.38 )-----------( 592      ( 01 Dec 2021 07:12 )             21.7   12-01    134<L>  |  101  |  5.3<L>  ----------------------------<  94  4.1   |  20.0<L>  |  0.66    Ca    7.6<L>      01 Dec 2021 07:12  Phos  3.6     11-29  Mg     1.5     12-01            Culture - Blood (collected 11-28-21 @ 09:14)  Source: .Blood Blood-Peripheral    Culture - Blood (collected 11-28-21 @ 09:14)  Source: .Blood Blood-Peripheral    Culture - Fungal, Body Fluid (collected 11-24-21 @ 13:35)  Source: .Body Fluid Right Knee Fluid    Culture - Acid Fast - Body Fluid w/Smear (collected 11-24-21 @ 13:35)  Source: .Body Fluid Right Knee Fluid    Culture - Body Fluid with Gram Stain (collected 11-24-21 @ 13:35)  Source: .Body Fluid Right Knee Fluid  Gram Stain (11-25-21 @ 01:27):    polymorphonuclear leukocytes seen per low power field    No organisms seen per oil power field    Culture - Surgical Swab (collected 11-24-21 @ 12:30)  Source: .Surgical Swab Swab Right Knee #2  Final Report (11-29-21 @ 07:55):    No growth at 5 days    Culture - Surgical Swab (collected 11-24-21 @ 12:30)  Source: .Surgical Swab Swab Right Knee #1  Final Report (11-29-21 @ 07:56):    No growth at 5 days    Culture - Surgical Swab (collected 11-24-21 @ 12:28)  Source: .Surgical Swab Swab Right Knee #3  Final Report (11-29-21 @ 08:01):    No growth at 5 days    Culture - Body Fluid with Gram Stain (collected 11-23-21 @ 12:50)  Source: .Body Fluid Knee Fluid  Gram Stain (11-23-21 @ 23:34):    polymorphonuclear leukocytes seen    No organisms seen    by cytocentrifuge    Culture - Blood (collected 11-22-21 @ 08:44)  Source: .Blood Blood  Final Report (11-27-21 @ 09:00):    No growth at 5 days.    Culture - Blood (collected 11-20-21 @ 12:57)  Source: .Blood Blood  Final Report (11-25-21 @ 13:00):    No growth at 5 days.    Culture - Body Fluid with Gram Stain (collected 11-18-21 @ 16:20)  Source: .Body Fluid OR Posterior Calf Left Lower Extremity Fluid  Gram Stain (11-18-21 @ 21:39):    polymorphonuclear leukocytes seen    Gram Positive Cocci in Pairs and Chains seen    by cytocentrifuge  Final Report (11-23-21 @ 09:40):    Moderate Streptococcus pyogenes (Group A) Penicillin and ampicillin are    drugs of choice for    treatment of beta-hemolytic streptococcal infections.    Susceptibility testing is not performed routinely because    S. pyogenes (GAS) is universally susceptible to penicillin    and resistance in other strains is extremely rare.    Culture - Body Fluid with Gram Stain (collected 11-18-21 @ 16:20)  Source: .Body Fluid OR Swab Left Knee Lower Extremity Fluid  Gram Stain (11-18-21 @ 19:36):    polymorphonuclear leukocytes seen    Gram Positive Cocci in Pairs and Chains seen    by cytocentrifuge  Final Report (11-23-21 @ 09:41):    Numerous Streptococcus pyogenes (Group A) Penicillin and ampicillin are    drugs of choice for    treatment of beta-hemolytic streptococcal infections.    Susceptibility testing is not performed routinely because    S. pyogenes (GAS) is universally susceptible to penicillin    and resistance in other strains is extremely rare.    Culture - Body Fluid with Gram Stain (collected 11-18-21 @ 16:20)  Source: .Body Fluid OR - Left Lower Extremity Fluid  Gram Stain (11-18-21 @ 20:10):    No polymorphonuclear leukocytes seen    No organisms seen    by cytocentrifuge  Final Report (11-23-21 @ 09:40):    Rare Streptococcus pyogenes (Group A)    Penicillin and ampicillin are drugs of choice for treatment of    beta-hemolytic streptococcal infections.    Susceptibility testing is not performed routinely because S. pyogenes    (GAS) is universally susceptibleto penicillin    and resistance in other strains is extremely rare.    Culture - Urine (collected 11-18-21 @ 10:07)  Source: Catheterized Catheterized  Final Report (11-19-21 @ 07:03):    No growth    Culture - Urine (collected 11-18-21 @ 00:25)  Source: Catheterized Catheterized  Final Report (11-18-21 @ 22:09):    <10,000 CFU/mL Normal Urogenital Aimee    Culture - Blood (collected 11-17-21 @ 14:45)  Source: .Blood Blood  Gram Stain (11-18-21 @ 09:44):    Growth in aerobic and anaerobic bottles: Gram Positive Cocci in Pairs and    Chains    Gram Stain performed by:    [facility]    [address] [place] [id number]    .    TYPE: (C=Critical, N=Notification, A=Abnormal) C    TESTS:  _ GS    DATE/TIME CALLED: _ 11/18/2021 09:42:19    CALLED TO: Chris Hernandez RN    READ BACK (2 Patient Identifiers)(Y/N): _ Y    READ BACK VALUES (Y/N): _ Y    CALLED BY: Chris Quiñones  Final Report (11-21-21 @ 12:28):    Growth in aerobic and anaerobic bottles: Streptococcus pyogenes (Group A)    See previous culture [id number]    Culture - Blood (collected 11-17-21 @ 14:45)  Source: .Blood Blood  Gram Stain (11-18-21 @ 09:40):    Growth in aerobic and anaerobic bottles: Gram Positive Cocci in Pairs and    Chains    ***Blood Panel PCR results on this specimen are available    approximately 3 hours after the Gram stain result.***    Gram stain, PCR, and/or culture results may not always    correspond due to difference in methodologies.    ************************************************************    This PCR assay was performed using [employer].    The following targets are tested for: Enterococcus,    vancomycin resistant enterococci, Listeria monocytogenes,    coagulase negative staphylococci, S. aureus,    methicillin resistant S. aureus, Streptococcus agalactiae    (Group B), S. pneumoniae, S. pyogenes (Group A),    Acinetobacter baumannii, Enterobacter cloacae, E. coli,    Klebsiella oxytoca, K. pneumoniae, Proteus sp.,    Serratia marcescens, Haemophilus influenzae,    Neisseria meningitidis, Pseudomonas aeruginosa, Candida    albicans, C. glabrata, C krusei, C parapsilosis,    C. tropicalis and the KPC resistance gene.    Gram Stain and BCID performed by:    [facility]    [address] [place] [id number]    .    TYPE: (C=Critical, N=Notification, A=Abnormal) C    TESTS:  _ GS    DATE/TIME CALLED: _ 11/18/2021 09:40:08    CALLED TO: Chris Hernandez RN    READ BACK (2 Patient Identifiers)(Y/N): _ Y    READ BACK VALUES (Y/N): _ Y    CALLED BY: Chris Quiñones  Final Report (11-21-21 @ 12:28):    Growth in aerobic and anaerobic bottles: Streptococcus pyogenes (Group A)  Organism: Blood Culture PCR  Streptococcus pyogenes (Group A) (11-21-21 @ 12:28)  Organism: Streptococcus pyogenes (Group A) (11-21-21 @ 12:28)    Sensitivities:      -  Ceftriaxone: S 0.016      -  Penicillin: S 0.016 Predicts results for ampicillin, amoxicillin, amoxicillin/clavulanate, ampicillin/sulbactam, 1st, 2nd and 3rd generation cephalosporins and carbapenems.      Method Type: ETEST  Organism: Streptococcus pyogenes (Group A) (11-21-21 @ 12:28)    Sensitivities:      -  Vancomycin: S      Method Type: KB  Organism: Blood Culture PCR (11-21-21 @ 12:28)    Sensitivities:      -  Streptococcus pyogenes (Group A): Detec      Method Type: PCR      Creatinine, Serum: 0.75 mg/dL (11-30-21 @ 07:18)  Creatinine, Serum: 0.68 mg/dL (11-29-21 @ 09:55)  Creatinine, Serum: 0.64 mg/dL (11-28-21 @ 09:14)  Creatinine, Serum: 0.65 mg/dL (11-27-21 @ 11:51)  Creatinine, Serum: 0.57 mg/dL (11-27-21 @ 05:35)  Creatinine, Serum: 0.61 mg/dL (11-26-21 @ 09:01)        Ferritin, Serum: 388 ng/mL (11-17-21 @ 13:21)    C-Reactive Protein, Serum: 197 mg/L (11-26-21 @ 21:52)  C-Reactive Protein, Serum: 211 mg/L (11-26-21 @ 09:01)  C-Reactive Protein, Serum: 53 mg/L (11-23-21 @ 07:42)  C-Reactive Protein, Serum: >422 mg/L (11-17-21 @ 13:21)    Sedimentation Rate, Erythrocyte: 65 mm/hr (11-26-21 @ 21:52)  Sedimentation Rate, Erythrocyte: 63 mm/hr (11-26-21 @ 09:01)  Sedimentation Rate, Erythrocyte: 55 mm/hr (11-17-21 @ 13:21)    WBC Count: 14.55 K/uL (11-30-21 @ 07:18)  WBC Count: 16.71 K/uL (11-29-21 @ 09:54)  WBC Count: 17.02 K/uL (11-28-21 @ 09:14)  WBC Count: 15.99 K/uL (11-27-21 @ 05:33)  WBC Count: 18.73 K/uL (11-26-21 @ 09:01)      COVID-19 PCR: NotDetec (11-30-21 @ 01:59)  COVID-19 PCR: NotDetec (11-23-21 @ 18:50)  COVID-19 PCR: NotDetec (11-17-21 @ 13:18)    Lactate Dehydrogenase, Serum: 769 U/L (11-20-21 @ 03:56)    Alkaline Phosphatase, Serum: 88 U/L (11-27-21 @ 05:35)  Alanine Aminotransferase (ALT/SGPT): 11 U/L (11-27-21 @ 05:35)  Aspartate Aminotransferase (AST/SGOT): 25 U/L (11-27-21 @ 05:35)  Bilirubin Total, Serum: 0.3 mg/dL (11-27-21 @ 05:35)        < from: CT Lower Extremity w/ IV Cont, Bilateral (11.30.21 @ 09:55) >     EXAM:  CT LWR EXT IC BI                          PROCEDURE DATE:  11/30/2021          INTERPRETATION:  CT LOWER EXTREMITY WITH IV CONTRAST BILATERAL dated 11/30/2021 9:55 AM    INDICATION: Fever. Status post incision and drainage bilaterally. Persistent fevers to 104.    COMPARISON: CT of the right knee dated 11/23/2021. CT of the left lower extremity dated 11/17/2021    TECHNIQUE: CT imaging of the bilateral lower extremities was performed with contrast. The data was reformatted in the axial,coronal, and sagittal planes.  Contrast: Omnipaque 300. Administered: 96 cc. Discarded: 4 cc.    FINDINGS:    OSSEOUS STRUCTURES: Mild degenerative changes at the lower lumbar spine including sclerosis and narrowing at the facet joints. Moderate sclerosis at the right lower sacroiliac joint. Moderate narrowing the bilateral hip joint spaces which is worse posteriorly. There is marginal productive changes. Moderate narrowing at the pubic symphysis. Small cysts appreciated at the left patella suggesting overlying cartilage loss. Postsurgical changes noted at the left distal fibula with a small screw tracts through the distal fibula. A portion of the distal fibula is exposed by an overlying skin wound. No fracture or osteonecrosis is seen. Thereis no periosteal reaction or large erosion appreciated.  SYNOVIUM/ JOINT FLUID: There is a large left knee joint effusion containing several small new locules of gas. A moderate right knee joint effusion is identified. No hip joint effusion is seen.  TENDONS: The tendons are intact.  MUSCLES: There is hypodensity within the left soleus and gastrocnemius musculature. Confluent edema is seen between the soleus and gastrocnemius musculature. Moderate edema is noted in the left anterior compartment calf musculature. There is suggestion of rim enhancement, new compared to the prior exam. There is a hazy appearance of the right facets and lateralis and tensor fascia yohan muscle. Moderate hazy hypodensity throughout the left sartorius and left vastus lateralis muscle. Edema overlies the left rectus femoris muscle.  NEUROVASCULAR STRUCTURES: Mild vascular calcifications.  INTRAPELVIC SOFT TISSUES: Multiple uterine masses are noted without enhancement likely degenerating uterine fibroids, unchanged.  SUBCUTANEOUS SOFT TISSUES: There is diffuse soft tissue swelling and skin thickening. There are skin wounds along the medial lateral aspect of the left calf. No subcutaneous fluid collection is noted.    3-D reformatted imaging confirms these findings.    IMPRESSION:    1.  Soft tissue swelling bilaterally. Medial lateral left calf skin wounds possibly from prior surgery or infection. Nonspecific but can be seen with cellulitis.  2.  Exposure of the left distal fibula by an overlying skin wound raises concern for osteomyelitis. New compared to the prior study.  3.  Large left knee joint effusion with several new foci of gas. Differential considerations include recent aspiration and infection. Correlate clinically.  4.  Hypodensity throughout the left calf musculature in the bilateral hip musculature as detailed above as can be seen with myositis.  5.  Suggestion of rim enhancement involving the left anterior calf musculature concerning for early abscess formation  6.  Edema between the left medial gastrocnemius and soleus musculature, likely infectious  7.  Heterogeneous masses in the uterus, likely degenerating fibroids.    Findings were discussed with Dr. Cortez of Trauma on 11/30/2021 10:42 AM  with read back.    --- End of Report ---            LOREN ALEXANDER MD; Attending Radiologist  This document has been electronically signed. Nov 30 2021 10:42AM    < end of copied text >       Home

## 2024-02-02 ENCOUNTER — OUTPATIENT (OUTPATIENT)
Dept: OUTPATIENT SERVICES | Facility: HOSPITAL | Age: 54
LOS: 1 days | End: 2024-02-02
Payer: OTHER GOVERNMENT

## 2024-02-02 ENCOUNTER — APPOINTMENT (OUTPATIENT)
Dept: MAMMOGRAPHY | Facility: CLINIC | Age: 54
End: 2024-02-02
Payer: OTHER GOVERNMENT

## 2024-02-02 DIAGNOSIS — Z00.8 ENCOUNTER FOR OTHER GENERAL EXAMINATION: ICD-10-CM

## 2024-02-02 DIAGNOSIS — Z98.890 OTHER SPECIFIED POSTPROCEDURAL STATES: Chronic | ICD-10-CM

## 2024-02-02 PROCEDURE — 77063 BREAST TOMOSYNTHESIS BI: CPT | Mod: 26

## 2024-02-02 PROCEDURE — 77067 SCR MAMMO BI INCL CAD: CPT

## 2024-02-02 PROCEDURE — 77067 SCR MAMMO BI INCL CAD: CPT | Mod: 26

## 2024-02-02 PROCEDURE — 77063 BREAST TOMOSYNTHESIS BI: CPT

## 2024-10-14 NOTE — ED CDU PROVIDER DISPOSITION NOTE - PRINCIPAL DIAGNOSIS
DUB (dysfunctional uterine bleeding)
balance training/bed mobility training/gait training/strengthening/transfer training

## 2024-12-16 NOTE — ED ADULT TRIAGE NOTE - NS ED TRIAGE AVPU SCALE
42/213   Alert-The patient is alert, awake and responds to voice. The patient is oriented to time, place, and person. The triage nurse is able to obtain subjective information.

## 2025-02-16 NOTE — ED PROVIDER NOTE - URETHRAL CATH TYPE
[[email or web address]_OBGYN_ALL_OB_FT:[id number]],[NS_DeliveryRN_OBGYN_ALL_OB_FT:[id number]] Indwelling

## 2025-03-16 NOTE — PROGRESS NOTE ADULT - SUBJECTIVE AND OBJECTIVE BOX
Consults  History Of Present Illness:    Julissa Plasencia is a 89 y.o. female past med history notable for hypertension who was admitted to the hospital after a unwitnessed fall at assisted living facility.  X-ray done at that time shows right hip fracture and patient was brought to ER for evaluation.  While there patient was noted to be atrial fibrillation which is a new diagnosis for her.  X-ray confirmed acute intra [place]ick fracture of the right hip.  Patient is also hypertensive.  Orthopedic surgery was consulted and yesterday patient underwent open reduction internal fixation of the right hip.  Patient stable since that time.  Cardiology is consulted for atrial fibrillation management.     Last Recorded Vitals:  Vitals:    03/15/25 2022 03/16/25 0025 03/16/25 0458 03/16/25 0742   BP: 137/61 124/69 130/70 (!) 153/109   BP Location: Right arm Right arm Right arm    Patient Position: Lying Lying Lying    Pulse: 90 80 107 85   Resp: 18 18 18    Temp: 36.3 °C (97.3 °F) 36.2 °C (97.2 °F) 36.4 °C (97.5 °F) 37 °C (98.6 °F)   TempSrc: Temporal Temporal Temporal    SpO2: 92% 93% 91% 92%   Weight:       Height:           Last Labs:  CBC - 3/16/2025:  4:48 AM  15.7 8.6 239    26.7      CMP - 3/16/2025:  4:48 AM  8.7 6.2 21 --- 0.4   _ 3.4 11 88      PTT - 12/11/2024:  4:14 AM  1.0   11.8 32     Troponin I, High Sensitivity   Date/Time Value Ref Range Status   03/14/2025 09:38 PM 17 (H) 0 - 13 ng/L Final   03/14/2025 08:39 PM 18 (H) 0 - 13 ng/L Final   03/14/2025 11:10 AM 15 (H) 0 - 13 ng/L Final     Hemoglobin A1C   Date/Time Value Ref Range Status   08/16/2023 10:04 AM 5.9 (A) % Final     Comment:          Diagnosis of Diabetes-Adults   Non-Diabetic: < or = 5.6%   Increased risk for developing diabetes: 5.7-6.4%   Diagnostic of diabetes: > or = 6.5%  .       Monitoring of Diabetes                Age (y)     Therapeutic Goal (%)   Adults:          >18           <7.0   Pediatrics:    13-18           <7.5                   7-12    "        <8.0                   0- 6            7.5-8.5   American Diabetes Association. Diabetes Care 33(S1), Jan 2010.       VLDL   Date/Time Value Ref Range Status   08/16/2023 10:04 AM 14 0 - 40 mg/dL Final   09/27/2021 01:43 PM 17 0 - 40 mg/dL Final      Last I/O:  I/O last 3 completed shifts:  In: 1175.8 (25.9 mL/kg) [P.O.:240; I.V.:335.8 (7.4 mL/kg); IV Piggyback:600]  Out: 500 (11 mL/kg) [Urine:350 (0.2 mL/kg/hr); Blood:150]  Weight: 45.4 kg     Past Cardiology Tests (Last 3 Years):  EKG:  ECG 12 lead 12/13/2024      ECG 12 lead 12/12/2024      ECG 12 lead 12/11/2024      ECG 12 lead 12/22/2023    Echo:  No results found for this or any previous visit from the past 1095 days.    Ejection Fractions:  No results found for: \"EF\"  Cath:  No results found for this or any previous visit from the past 1095 days.    Stress Test:  No results found for this or any previous visit from the past 1095 days.    Cardiac Imaging:  No results found for this or any previous visit from the past 1095 days.      Past Medical History:  She has a past medical history of Abnormal uterine and vaginal bleeding, unspecified (12/28/2016), Biliary acute pancreatitis without necrosis or infection ([facility]) (02/02/2017), Brain tumor (Multi), Motion sickness, initial encounter (10/11/2016), Other female genital prolapse (06/13/2017), Other specified cough (12/30/2014), Pain in right shoulder (06/01/2017), Personal history of other diseases of the digestive system (06/05/2017), Personal history of other diseases of the digestive system (03/06/2017), and Personal history of urinary (tract) infections (03/21/2017).    Past Surgical History:  She has a past surgical history that includes Colonoscopy (08/12/2015) and Esophagogastroduodenoscopy (12/08/2015).      Social History:  She reports that she has quit smoking. Her smoking use included cigarettes. She has never used smokeless tobacco. She reports that she does not drink alcohol and does not " HPI/OVERNIGHT EVENTS:  NAEON. Patient remains afebrile. Pain well controlled. Denies fever/chills, N&V.     MEDICATIONS  (STANDING):  acetaminophen     Tablet .. 975 milliGRAM(s) Oral every 6 hours  clindamycin IVPB 900 milliGRAM(s) IV Intermittent every 8 hours  collagenase Ointment 1 Application(s) Topical two times a day  gabapentin 200 milliGRAM(s) Oral three times a day  glucagon  Injectable 1 milliGRAM(s) IntraMuscular once  heparin   Injectable 5000 Unit(s) SubCutaneous every 8 hours  insulin lispro (ADMELOG) corrective regimen sliding scale   SubCutaneous Before meals and at bedtime  lisinopril 5 milliGRAM(s) Oral daily  methocarbamol 750 milliGRAM(s) Oral three times a day  multiple electrolytes Injection Type 1 1000 milliLiter(s) (100 mL/Hr) IV Continuous <Continuous>  pantoprazole  Injectable 40 milliGRAM(s) IV Push two times a day  penicillin   G  potassium  IVPB      penicillin   G  potassium  IVPB 4 Million Unit(s) IV Intermittent every 4 hours  polyethylene glycol 3350 17 Gram(s) Oral daily  senna 2 Tablet(s) Oral at bedtime    MEDICATIONS  (PRN):  fentaNYL    Injectable 75 MICROGram(s) IV Push every 8 hours PRN Dressing changes  HYDROmorphone  Injectable 0.5 milliGRAM(s) IV Push every 6 hours PRN breakthrough pain  ondansetron Injectable 4 milliGRAM(s) IV Push every 4 hours PRN Nausea and/or Vomiting  oxyCODONE    IR 5 milliGRAM(s) Oral every 4 hours PRN Moderate Pain (4 - 6)  oxyCODONE    IR 10 milliGRAM(s) Oral every 4 hours PRN Severe Pain (7 - 10)      Vital Signs Last 24 Hrs  T(C): 37.4 (24 Nov 2021 21:53), Max: 39.3 (24 Nov 2021 17:00)  T(F): 99.3 (24 Nov 2021 21:53), Max: 102.8 (24 Nov 2021 17:00)  HR: 85 (24 Nov 2021 21:53) (80 - 94)  BP: 101/63 (24 Nov 2021 21:53) (101/63 - 130/65)  BP(mean): 78 (24 Nov 2021 10:30) (68 - 83)  RR: 18 (24 Nov 2021 21:53) (16 - 20)  SpO2: 97% (24 Nov 2021 21:53) (97% - 100%)    Constitutional: patient resting comfortably in bed, in no acute distress  Respiratory: respirations are unlabored, no accessory muscle use, no conversational dyspnea, RA  Cardiovascular: regular rate & rhythm, HDS  Gastrointestinal: Abdomen soft, non-tender, non-distended, no rebound tenderness / guarding  Neurological: GCS: 15 (4/5/6). A&O x 3; no gross sensory / motor / coordination deficits  Psychiatric: Normal mood, normal affect  Musculoskeletal: B/l lower ext wrapped, no strikethrough       I&O's Detail    23 Nov 2021 07:01  -  24 Nov 2021 07:00  --------------------------------------------------------  IN:  Total IN: 0 mL    OUT:    Voided (mL): 1700 mL  Total OUT: 1700 mL    Total NET: -1700 mL          LABS:                        10.9   28.86 )-----------( 348      ( 24 Nov 2021 06:21 )             36.7     11-24    139  |  106  |  13.8  ----------------------------<  68<L>  4.9   |  19.0<L>  |  0.62    Ca    7.7<L>      24 Nov 2021 06:21  Phos  2.7     11-23  Mg     1.7     11-24           use drugs.    Family History:  No family history on file.     Allergies:  Lisinopril    Inpatient Medications:  Scheduled medications   Medication Dose Route Frequency    acetaminophen  650 mg oral q6h SHIV    enoxaparin  30 mg subcutaneous q12h SHIV    melatonin  5 mg oral Nightly    metoprolol succinate XL  50 mg oral Daily    perflutren lipid microspheres  0.5-10 mL of dilution intravenous Once in imaging    perflutren protein A microsphere  0.5 mL intravenous Once in imaging    polyethylene glycol  17 g oral Daily    senna  10 mL oral BID    sulfur hexafluoride microsphr  2 mL intravenous Once in imaging     PRN medications   Medication    acetaminophen    morphine    naloxone    ondansetron    Or    ondansetron    oxyCODONE-acetaminophen    oxyCODONE-acetaminophen    oxyCODONE-acetaminophen     Continuous Medications   Medication Dose Last Rate    lactated Ringer's  50 mL/hr Stopped (03/15/25 1806)    oxygen  2 L/min       Outpatient Medications:  Current Outpatient Medications   Medication Instructions    acetaminophen (TYLENOL) 650 mg, oral, Every 6 hours PRN    cholecalciferol (VITAMIN D-3) 2,000 Units, oral, Daily    diphenhydrAMINE-acetaminophen (Tylenol PM)  mg per tablet 1 tablet, oral, Nightly    melatonin 5 mg, oral, Nightly    metoprolol succinate XL (TOPROL-XL) 50 mg, oral, Daily    polyethylene glycol (GLYCOLAX, MIRALAX) 17 g, oral, Daily    traMADol (ULTRAM) 50 mg, oral, Every 8 hours PRN       Physical Exam:  General: No acute distress, overall frail, mildly confused  Skin: Warm and dry  Neck: JVD is not elevated  ENT: Moist mucous membranes no lesions appreciated  Pulmonary: CTAB  Cards: Irregular rate and rhythm, no murmurs gallops or rubs appreciated normal S1-S2  Abdomen: Soft nontender nondistended  Extremities: No edema or cyanosis  Psych: Appropriate mood and affect        Assessment/Plan     1.  Newly diagnosed atrial fibrillation: CHADS2 vas score of 4. Has bled score of 2. Given  patient's advanced age, overall frail disposition and history of multiple falls [unsteady gait], would not recommend initiation of oral anticoagulation at this time.  Patient should be followed as an outpatient and reassessed at that time.  -Agree with oral metoprolol for rate management  -Echo pending    (This note was generated with voice recognition software and may contain errors including spelling, grammar, syntax and missed recognition of what was dictated, of which may not have been fully corrected)     Code Status:  DNR Comfort Measures Only    Vimal Bonilla MD PhD

## 2025-04-23 ENCOUNTER — OUTPATIENT (OUTPATIENT)
Dept: OUTPATIENT SERVICES | Facility: HOSPITAL | Age: 55
LOS: 1 days | End: 2025-04-23
Payer: OTHER GOVERNMENT

## 2025-04-23 ENCOUNTER — APPOINTMENT (OUTPATIENT)
Dept: MAMMOGRAPHY | Facility: CLINIC | Age: 55
End: 2025-04-23
Payer: OTHER GOVERNMENT

## 2025-04-23 DIAGNOSIS — Z00.00 ENCOUNTER FOR GENERAL ADULT MEDICAL EXAMINATION WITHOUT ABNORMAL FINDINGS: ICD-10-CM

## 2025-04-23 DIAGNOSIS — Z98.890 OTHER SPECIFIED POSTPROCEDURAL STATES: Chronic | ICD-10-CM

## 2025-04-23 PROCEDURE — 77067 SCR MAMMO BI INCL CAD: CPT | Mod: 26

## 2025-04-23 PROCEDURE — 77067 SCR MAMMO BI INCL CAD: CPT

## 2025-04-23 PROCEDURE — 77063 BREAST TOMOSYNTHESIS BI: CPT

## 2025-04-23 PROCEDURE — 77063 BREAST TOMOSYNTHESIS BI: CPT | Mod: 26

## (undated) DEVICE — SOL IRR BAG NS 0.9% 3000ML

## (undated) DEVICE — PACK MINOR WITH LAP

## (undated) DEVICE — KT PULSAVAC WOUND W/ SPRAYTIP

## (undated) DEVICE — SUT MONOCRYL 3-0 27" PS-2 UNDYED

## (undated) DEVICE — BLADE ZIMMER DERMATONE

## (undated) DEVICE — WRAP COMPRESSION CALF MED

## (undated) DEVICE — GLV 7 PROTEXIS

## (undated) DEVICE — SUT PDS II 2-0 27" CT-1

## (undated) DEVICE — DRAPE 3/4 SHEET 52X76"

## (undated) DEVICE — DERMACARRIER ZIMMER SKIN GRAFT MESH 1.5:1

## (undated) DEVICE — DRSG XEROFORM 5"

## (undated) DEVICE — DRAIN BLAKE 15FR BARD CHANNEL

## (undated) DEVICE — STAPLER SKIN PROXIMATE

## (undated) DEVICE — SUT VICRYL PLUS 4-0 18" PS-2 UNDYED